# Patient Record
Sex: FEMALE | Race: WHITE | NOT HISPANIC OR LATINO | Employment: OTHER | URBAN - NONMETROPOLITAN AREA
[De-identification: names, ages, dates, MRNs, and addresses within clinical notes are randomized per-mention and may not be internally consistent; named-entity substitution may affect disease eponyms.]

---

## 2017-01-04 ENCOUNTER — LAB (OUTPATIENT)
Dept: LAB | Facility: HOSPITAL | Age: 57
End: 2017-01-04
Attending: INTERNAL MEDICINE

## 2017-01-04 ENCOUNTER — OFFICE VISIT (OUTPATIENT)
Dept: GASTROENTEROLOGY | Facility: CLINIC | Age: 57
End: 2017-01-04

## 2017-01-04 VITALS
DIASTOLIC BLOOD PRESSURE: 70 MMHG | BODY MASS INDEX: 28.17 KG/M2 | WEIGHT: 165 LBS | HEIGHT: 64 IN | HEART RATE: 74 BPM | TEMPERATURE: 97 F | SYSTOLIC BLOOD PRESSURE: 126 MMHG

## 2017-01-04 DIAGNOSIS — K74.3 PRIMARY BILIARY CHOLANGITIS (HCC): ICD-10-CM

## 2017-01-04 DIAGNOSIS — K74.3 PRIMARY BILIARY CHOLANGITIS (HCC): Primary | ICD-10-CM

## 2017-01-04 DIAGNOSIS — K21.00 GASTROESOPHAGEAL REFLUX DISEASE WITH ESOPHAGITIS: ICD-10-CM

## 2017-01-04 LAB
ALBUMIN SERPL-MCNC: 3.8 G/DL (ref 3.5–5)
ALBUMIN/GLOB SERPL: 1 G/DL (ref 1.1–2.5)
ALP SERPL-CCNC: 131 U/L (ref 24–120)
ALT SERPL W P-5'-P-CCNC: 35 U/L (ref 0–54)
ANION GAP SERPL CALCULATED.3IONS-SCNC: 12 MMOL/L (ref 4–13)
AST SERPL-CCNC: 35 U/L (ref 7–45)
BILIRUB SERPL-MCNC: 0.4 MG/DL (ref 0.1–1)
BUN BLD-MCNC: 10 MG/DL (ref 5–21)
BUN/CREAT SERPL: 16.4 (ref 7–25)
CALCIUM SPEC-SCNC: 8.8 MG/DL (ref 8.4–10.4)
CHLORIDE SERPL-SCNC: 99 MMOL/L (ref 98–110)
CO2 SERPL-SCNC: 33 MMOL/L (ref 24–31)
CREAT BLD-MCNC: 0.61 MG/DL (ref 0.5–1.4)
GFR SERPL CREATININE-BSD FRML MDRD: 101 ML/MIN/1.73
GLOBULIN UR ELPH-MCNC: 3.9 GM/DL
GLUCOSE BLD-MCNC: 99 MG/DL (ref 70–100)
POTASSIUM BLD-SCNC: 3.7 MMOL/L (ref 3.5–5.3)
PROT SERPL-MCNC: 7.7 G/DL (ref 6.3–8.7)
SODIUM BLD-SCNC: 144 MMOL/L (ref 135–145)

## 2017-01-04 PROCEDURE — 36415 COLL VENOUS BLD VENIPUNCTURE: CPT

## 2017-01-04 PROCEDURE — 99214 OFFICE O/P EST MOD 30 MIN: CPT | Performed by: INTERNAL MEDICINE

## 2017-01-04 PROCEDURE — 80053 COMPREHEN METABOLIC PANEL: CPT | Performed by: INTERNAL MEDICINE

## 2017-01-04 RX ORDER — OMEPRAZOLE 40 MG/1
40 CAPSULE, DELAYED RELEASE ORAL 2 TIMES DAILY
COMMUNITY
End: 2018-02-08 | Stop reason: SDUPTHER

## 2017-01-04 NOTE — PROGRESS NOTES
Chief Complaint   Patient presents with   • GI Problem     had endo still having problems coughing needs refill of ursodiol tid        Subjective     HPI     Hx of PBC.  No recent lab work.  Currently maintained on Ursodiol tid.  No abdominal pain.  No changes in bowel habit.  No hematochezia or melena. No N/V.  Pt denies any further complaints at this time    Continues to experience difficulty coughing.  She cough daily.  Cough is dry  She is taking Omeprazole 40 mg bid in morning and at bedtime.  EGD in 8/2016 showed Grade A esophagitis and normal duodenum.    Past Medical History   Diagnosis Date   • Arthritis        Past Surgical History   Procedure Laterality Date   • Cholecystectomy         Outpatient Prescriptions Marked as Taking for the 1/4/17 encounter (Office Visit) with Michael Landin, DO   Medication Sig Dispense Refill   • Flaxseed, Linseed, (FLAX SEED OIL PO) Take  by mouth.     • Multiple Vitamin (MULTI VITAMIN PO) Take  by mouth.     • omeprazole (priLOSEC) 40 MG capsule Take 40 mg by mouth Daily.     • venlafaxine (EFFEXOR) 75 MG tablet Take 75 mg by mouth 2 (Two) Times a Day.     • vitamin C (ASCORBIC ACID) 500 MG tablet Take 500 mg by mouth Daily.         No Known Allergies    Social History     Social History   • Marital status:      Spouse name: N/A   • Number of children: N/A   • Years of education: N/A     Occupational History   • Not on file.     Social History Main Topics   • Smoking status: Never Smoker   • Smokeless tobacco: Not on file   • Alcohol use No   • Drug use: No   • Sexual activity: Not on file     Other Topics Concern   • Not on file     Social History Narrative       Family History   Problem Relation Age of Onset   • Cirrhosis Sister    • Liver cancer Brother        Review of Systems   Constitutional: Negative for fatigue, fever and unexpected weight change.   HENT: Negative for hearing loss, sore throat and voice change.    Eyes: Negative for visual disturbance.  "  Respiratory: Negative for cough, shortness of breath and wheezing.    Cardiovascular: Negative for chest pain and palpitations.   Gastrointestinal: Negative for abdominal pain, blood in stool and vomiting.   Endocrine: Negative for polydipsia and polyuria.   Genitourinary: Negative for difficulty urinating, dysuria, hematuria and urgency.   Musculoskeletal: Negative for joint swelling and myalgias.   Skin: Negative for color change, rash and wound.   Neurological: Negative for dizziness, tremors, seizures and syncope.   Hematological: Does not bruise/bleed easily.   Psychiatric/Behavioral: Negative for agitation and confusion. The patient is not nervous/anxious.        Objective     Vitals:    01/04/17 1345   BP: 126/70   Pulse: 74   Temp: 97 °F (36.1 °C)   Weight: 165 lb (74.8 kg)   Height: 64\" (162.6 cm)     Body mass index is 28.32 kg/(m^2).    Physical Exam   Constitutional: She is oriented to person, place, and time. She appears well-developed and well-nourished.   HENT:   Head: Normocephalic and atraumatic.   Eyes: Conjunctivae are normal. Pupils are equal, round, and reactive to light. No scleral icterus.   Neck: No JVD present. No thyroid mass and no thyromegaly present.   Cardiovascular: Normal rate, regular rhythm and normal heart sounds.  Exam reveals no gallop and no friction rub.    No murmur heard.  Pulmonary/Chest: Effort normal and breath sounds normal. No accessory muscle usage. No respiratory distress. She has no wheezes. She has no rales.   Abdominal: Soft. Bowel sounds are normal. She exhibits no distension, no ascites and no mass. There is no splenomegaly or hepatomegaly. There is no tenderness. There is no rebound and no guarding.   Genitourinary:   Genitourinary Comments: Rectal-Did not examine   Musculoskeletal: Normal range of motion. She exhibits no edema.   Neurological: She is alert and oriented to person, place, and time.   Deemed a reliable historian, able to converse without " difficulty and able to move all extremities without difficulty   Skin: Skin is warm and dry.   Psychiatric: She has a normal mood and affect. Her behavior is normal.       Imaging Results (most recent)     None          Assessment/Plan     Elaine was seen today for gi problem.    Diagnoses and all orders for this visit:    Primary biliary cholangitis  -     Comprehensive Metabolic Panel  -     Comprehensive Metabolic Panel; Future    Gastroesophageal reflux disease with esophagitis          May consider change in medication if labs are not improved  Will review medication dose after CMP reviewed  CMP in 6 months,   Take Omeprazole in morning 20-30 minutes prior to breakfast and before evening meal due to cough not improving with use at [facility].  She is to call with progress  * Surgery not found *    Patient Instructions

## 2017-01-04 NOTE — MR AVS SNAPSHOT
Elaine Juárez   1/4/2017 1:30 PM   Office Visit    Dept Phone:  [phone]   Encounter #:  [id number]    Provider:  Michael Landin DO   Department:  [facility]                Your Full Care Plan              Your Updated Medication List          This list is accurate as of: 1/4/17  2:18 PM.  Always use your most recent med list.                FLAX SEED OIL PO       MULTI VITAMIN PO       omeprazole 40 MG capsule   Commonly known as:  priLOSEC       ursodiol 300 MG capsule   Commonly known as:  ACTIGALL       venlafaxine 75 MG tablet   Commonly known as:  EFFEXOR       vitamin C 500 MG tablet   Commonly known as:  ASCORBIC ACID               We Performed the Following     Comprehensive Metabolic Panel       You Were Diagnosed With        Codes Comments    Primary biliary cholangitis    -  Primary ICD-10-CM: K74.3  ICD-9-CM: 571.6     Gastroesophageal reflux disease with esophagitis     ICD-10-CM: K21.0  ICD-9-CM: 530.11       Instructions     None    Patient Instructions History      Upcoming Appointments     Visit Type Date Time Department    OFFICE VISIT 1/4/2017  1:30 PM [facility]    OFFICE VISIT 7/10/2017  1:45 PM [facility]      MyChart Signup     Our records indicate that you have declined [employer] Inchart signup. If you would like to sign up for [employer], please email [email or web address] or call [phone] to obtain an activation code.             Other Info from Your Visit           Your Appointments     Jul 10, 2017  1:45 PM CDT   Office Visit with Michael Landin DO   [facility] (--)    [address] [place] [id number] [phone]           Arrive 15 minutes prior to appointment.              Allergies     No Known Allergies      Reason for Visit     GI Problem had endo still having problems coughing needs refill of ursodiol tid       Vital Signs     Blood  "Pressure Pulse Temperature Height Weight Body Mass Index    126/70 74 97 °F (36.1 °C) 64\" (162.6 cm) 165 lb (74.8 kg) 28.32 kg/m2    Smoking Status                   Never Smoker           Problems and Diagnoses Noted     Primary biliary cholangitis    -  Primary    Inflammation of the esophagus            "

## 2017-01-21 ENCOUNTER — APPOINTMENT (OUTPATIENT)
Dept: CT IMAGING | Facility: HOSPITAL | Age: 57
End: 2017-01-21

## 2017-01-21 ENCOUNTER — HOSPITAL ENCOUNTER (INPATIENT)
Facility: HOSPITAL | Age: 57
LOS: 2 days | Discharge: HOME | End: 2017-01-23
Attending: EMERGENCY MEDICINE | Admitting: INTERNAL MEDICINE

## 2017-01-21 ENCOUNTER — APPOINTMENT (OUTPATIENT)
Dept: GENERAL RADIOLOGY | Facility: HOSPITAL | Age: 57
End: 2017-01-21

## 2017-01-21 DIAGNOSIS — J18.9 PNEUMONIA OF BOTH LOWER LOBES DUE TO INFECTIOUS ORGANISM: Primary | ICD-10-CM

## 2017-01-21 LAB
ALBUMIN SERPL-MCNC: 4 G/DL (ref 3.5–5)
ALBUMIN/GLOB SERPL: 0.9 G/DL (ref 1.1–2.5)
ALP SERPL-CCNC: 211 U/L (ref 24–120)
ALT SERPL W P-5'-P-CCNC: 28 U/L (ref 0–54)
ANION GAP SERPL CALCULATED.3IONS-SCNC: 10 MMOL/L (ref 4–13)
ARTERIAL PATENCY WRIST A: ABNORMAL
AST SERPL-CCNC: 42 U/L (ref 7–45)
ATMOSPHERIC PRESS: ABNORMAL MMHG
BASE EXCESS BLDA CALC-SCNC: 3.4 MMOL/L (ref -2–2)
BASOPHILS # BLD AUTO: 0.05 10*3/MM3 (ref 0–0.2)
BASOPHILS NFR BLD AUTO: 0.6 % (ref 0–2)
BDY SITE: ABNORMAL
BILIRUB SERPL-MCNC: 0.5 MG/DL (ref 0.1–1)
BUN BLD-MCNC: 13 MG/DL (ref 5–21)
BUN/CREAT SERPL: 19.1 (ref 7–25)
CALCIUM SPEC-SCNC: 9 MG/DL (ref 8.4–10.4)
CHLORIDE SERPL-SCNC: 101 MMOL/L (ref 98–110)
CO2 SERPL-SCNC: 30 MMOL/L (ref 24–31)
CREAT BLD-MCNC: 0.68 MG/DL (ref 0.5–1.4)
D DIMER PPP FEU-MCNC: 1.92 MG/L (FEU) (ref 0–0.5)
DEPRECATED RDW RBC AUTO: 43.1 FL (ref 40–54)
EOSINOPHIL # BLD AUTO: 0.41 10*3/MM3 (ref 0–0.7)
EOSINOPHIL NFR BLD AUTO: 5.3 % (ref 0–4)
ERYTHROCYTE [DISTWIDTH] IN BLOOD BY AUTOMATED COUNT: 13.9 % (ref 12–15)
GFR SERPL CREATININE-BSD FRML MDRD: 89 ML/MIN/1.73
GLOBULIN UR ELPH-MCNC: 4.3 GM/DL
GLUCOSE BLD-MCNC: 102 MG/DL (ref 70–100)
HCO3 BLDA-SCNC: 27.6 MMOL/L (ref 22–26)
HCT VFR BLD AUTO: 41.3 % (ref 37–47)
HGB BLD-MCNC: 13.5 G/DL (ref 12–16)
HOLD SPECIMEN: NORMAL
HOLD SPECIMEN: NORMAL
IMM GRANULOCYTES # BLD: 0.02 10*3/MM3 (ref 0–0.03)
IMM GRANULOCYTES NFR BLD: 0.3 % (ref 0–5)
LYMPHOCYTES # BLD AUTO: 2.04 10*3/MM3 (ref 0.72–4.86)
LYMPHOCYTES NFR BLD AUTO: 26.2 % (ref 15–45)
MCH RBC QN AUTO: 27.8 PG (ref 28–32)
MCHC RBC AUTO-ENTMCNC: 32.7 G/DL (ref 33–36)
MCV RBC AUTO: 85.2 FL (ref 82–98)
MODALITY: ABNORMAL
MONOCYTES # BLD AUTO: 0.66 10*3/MM3 (ref 0.19–1.3)
MONOCYTES NFR BLD AUTO: 8.5 % (ref 4–12)
NEUTROPHILS # BLD AUTO: 4.6 10*3/MM3 (ref 1.87–8.4)
NEUTROPHILS NFR BLD AUTO: 59.1 % (ref 39–78)
NRBC BLD MANUAL-RTO: 0 /100 WBC (ref 0–0)
NT-PROBNP SERPL-MCNC: 56.1 PG/ML (ref 0–900)
PCO2 BLDA: 40.6 MM HG (ref 35–45)
PH BLDA: 7.45 PH UNITS (ref 7.35–7.45)
PLATELET # BLD AUTO: 335 10*3/MM3 (ref 130–400)
PMV BLD AUTO: 11.4 FL (ref 6–12)
PO2 BLDA: 75.6 MM HG (ref 80–100)
POTASSIUM BLD-SCNC: 4.2 MMOL/L (ref 3.5–5.3)
PROT SERPL-MCNC: 8.3 G/DL (ref 6.3–8.7)
RBC # BLD AUTO: 4.85 10*6/MM3 (ref 4.2–5.4)
SAO2 % BLDCOA: 95.7 % (ref 94–100)
SAO2 % BLDCOA: 95.7 % (ref 94–100)
SODIUM BLD-SCNC: 141 MMOL/L (ref 135–145)
TROPONIN I SERPL-MCNC: <0.012 NG/ML (ref 0–0.03)
WBC NRBC COR # BLD: 7.78 10*3/MM3 (ref 4.8–10.8)
WHOLE BLOOD HOLD SPECIMEN: NORMAL
WHOLE BLOOD HOLD SPECIMEN: NORMAL

## 2017-01-21 PROCEDURE — 25010000002 METHYLPREDNISOLONE PER 125 MG: Performed by: EMERGENCY MEDICINE

## 2017-01-21 PROCEDURE — 25010000002 CEFTRIAXONE: Performed by: FAMILY MEDICINE

## 2017-01-21 PROCEDURE — 93005 ELECTROCARDIOGRAM TRACING: CPT | Performed by: EMERGENCY MEDICINE

## 2017-01-21 PROCEDURE — 82803 BLOOD GASES ANY COMBINATION: CPT

## 2017-01-21 PROCEDURE — 93010 ELECTROCARDIOGRAM REPORT: CPT | Performed by: INTERNAL MEDICINE

## 2017-01-21 PROCEDURE — 87040 BLOOD CULTURE FOR BACTERIA: CPT | Performed by: EMERGENCY MEDICINE

## 2017-01-21 PROCEDURE — 99284 EMERGENCY DEPT VISIT MOD MDM: CPT

## 2017-01-21 PROCEDURE — 85379 FIBRIN DEGRADATION QUANT: CPT | Performed by: EMERGENCY MEDICINE

## 2017-01-21 PROCEDURE — 84484 ASSAY OF TROPONIN QUANT: CPT | Performed by: EMERGENCY MEDICINE

## 2017-01-21 PROCEDURE — 80053 COMPREHEN METABOLIC PANEL: CPT | Performed by: EMERGENCY MEDICINE

## 2017-01-21 PROCEDURE — 25010000002 LEVOFLOXACIN PER 250 MG: Performed by: EMERGENCY MEDICINE

## 2017-01-21 PROCEDURE — 94760 N-INVAS EAR/PLS OXIMETRY 1: CPT

## 2017-01-21 PROCEDURE — 0 IOPAMIDOL PER 1 ML: Performed by: EMERGENCY MEDICINE

## 2017-01-21 PROCEDURE — 94799 UNLISTED PULMONARY SVC/PX: CPT

## 2017-01-21 PROCEDURE — 71275 CT ANGIOGRAPHY CHEST: CPT

## 2017-01-21 PROCEDURE — 85025 COMPLETE CBC W/AUTO DIFF WBC: CPT | Performed by: EMERGENCY MEDICINE

## 2017-01-21 PROCEDURE — 71020 HC CHEST PA AND LATERAL: CPT

## 2017-01-21 PROCEDURE — 94640 AIRWAY INHALATION TREATMENT: CPT

## 2017-01-21 PROCEDURE — 25010000002 METHYLPREDNISOLONE PER 125 MG: Performed by: FAMILY MEDICINE

## 2017-01-21 PROCEDURE — 83880 ASSAY OF NATRIURETIC PEPTIDE: CPT | Performed by: EMERGENCY MEDICINE

## 2017-01-21 PROCEDURE — 36600 WITHDRAWAL OF ARTERIAL BLOOD: CPT

## 2017-01-21 RX ORDER — NALOXONE HCL 0.4 MG/ML
0.4 VIAL (ML) INJECTION
Status: DISCONTINUED | OUTPATIENT
Start: 2017-01-21 | End: 2017-01-23 | Stop reason: HOSPADM

## 2017-01-21 RX ORDER — MAGNESIUM HYDROXIDE/ALUMINUM HYDROXICE/SIMETHICONE 120; 1200; 1200 MG/30ML; MG/30ML; MG/30ML
30 SUSPENSION ORAL EVERY 6 HOURS PRN
Status: DISCONTINUED | OUTPATIENT
Start: 2017-01-21 | End: 2017-01-23 | Stop reason: HOSPADM

## 2017-01-21 RX ORDER — MORPHINE SULFATE 2 MG/ML
1 INJECTION, SOLUTION INTRAMUSCULAR; INTRAVENOUS EVERY 4 HOURS PRN
Status: DISCONTINUED | OUTPATIENT
Start: 2017-01-21 | End: 2017-01-23 | Stop reason: HOSPADM

## 2017-01-21 RX ORDER — PANTOPRAZOLE SODIUM 40 MG/1
40 TABLET, DELAYED RELEASE ORAL
Status: DISCONTINUED | OUTPATIENT
Start: 2017-01-22 | End: 2017-01-23 | Stop reason: HOSPADM

## 2017-01-21 RX ORDER — URSODIOL 300 MG/1
300 CAPSULE ORAL 2 TIMES DAILY
Status: DISCONTINUED | OUTPATIENT
Start: 2017-01-22 | End: 2017-01-23 | Stop reason: HOSPADM

## 2017-01-21 RX ORDER — ONDANSETRON 2 MG/ML
4 INJECTION INTRAMUSCULAR; INTRAVENOUS EVERY 6 HOURS PRN
Status: DISCONTINUED | OUTPATIENT
Start: 2017-01-21 | End: 2017-01-23 | Stop reason: HOSPADM

## 2017-01-21 RX ORDER — ACETAMINOPHEN 325 MG/1
650 TABLET ORAL EVERY 4 HOURS PRN
Status: DISCONTINUED | OUTPATIENT
Start: 2017-01-21 | End: 2017-01-23 | Stop reason: HOSPADM

## 2017-01-21 RX ORDER — ALBUTEROL SULFATE 2.5 MG/3ML
2.5 SOLUTION RESPIRATORY (INHALATION)
Status: COMPLETED | OUTPATIENT
Start: 2017-01-21 | End: 2017-01-21

## 2017-01-21 RX ORDER — ALBUTEROL SULFATE 2.5 MG/3ML
2.5 SOLUTION RESPIRATORY (INHALATION)
Status: DISCONTINUED | OUTPATIENT
Start: 2017-01-21 | End: 2017-01-22 | Stop reason: ALTCHOICE

## 2017-01-21 RX ORDER — METHYLPREDNISOLONE SODIUM SUCCINATE 125 MG/2ML
60 INJECTION, POWDER, LYOPHILIZED, FOR SOLUTION INTRAMUSCULAR; INTRAVENOUS EVERY 6 HOURS SCHEDULED
Status: DISCONTINUED | OUTPATIENT
Start: 2017-01-21 | End: 2017-01-22

## 2017-01-21 RX ORDER — VENLAFAXINE 37.5 MG/1
75 TABLET ORAL 2 TIMES DAILY
Status: DISCONTINUED | OUTPATIENT
Start: 2017-01-22 | End: 2017-01-23 | Stop reason: HOSPADM

## 2017-01-21 RX ORDER — SODIUM CHLORIDE 0.9 % (FLUSH) 0.9 %
1-10 SYRINGE (ML) INJECTION AS NEEDED
Status: DISCONTINUED | OUTPATIENT
Start: 2017-01-21 | End: 2017-01-23 | Stop reason: HOSPADM

## 2017-01-21 RX ORDER — TEMAZEPAM 7.5 MG/1
7.5 CAPSULE ORAL NIGHTLY PRN
Status: DISCONTINUED | OUTPATIENT
Start: 2017-01-21 | End: 2017-01-22

## 2017-01-21 RX ORDER — HYDROCODONE BITARTRATE AND ACETAMINOPHEN 5; 325 MG/1; MG/1
1 TABLET ORAL EVERY 4 HOURS PRN
Status: DISCONTINUED | OUTPATIENT
Start: 2017-01-21 | End: 2017-01-23 | Stop reason: HOSPADM

## 2017-01-21 RX ORDER — METHYLPREDNISOLONE SODIUM SUCCINATE 125 MG/2ML
125 INJECTION, POWDER, LYOPHILIZED, FOR SOLUTION INTRAMUSCULAR; INTRAVENOUS ONCE
Status: COMPLETED | OUTPATIENT
Start: 2017-01-21 | End: 2017-01-21

## 2017-01-21 RX ORDER — LEVOFLOXACIN 5 MG/ML
750 INJECTION, SOLUTION INTRAVENOUS ONCE
Status: COMPLETED | OUTPATIENT
Start: 2017-01-21 | End: 2017-01-21

## 2017-01-21 RX ORDER — SODIUM CHLORIDE 0.9 % (FLUSH) 0.9 %
10 SYRINGE (ML) INJECTION AS NEEDED
Status: DISCONTINUED | OUTPATIENT
Start: 2017-01-21 | End: 2017-01-23 | Stop reason: HOSPADM

## 2017-01-21 RX ORDER — ASCORBIC ACID 500 MG
500 TABLET ORAL DAILY
Status: DISCONTINUED | OUTPATIENT
Start: 2017-01-22 | End: 2017-01-23 | Stop reason: HOSPADM

## 2017-01-21 RX ORDER — LORAZEPAM 1 MG/1
1 TABLET ORAL EVERY 6 HOURS PRN
Status: DISCONTINUED | OUTPATIENT
Start: 2017-01-21 | End: 2017-01-23 | Stop reason: HOSPADM

## 2017-01-21 RX ADMIN — TEMAZEPAM 7.5 MG: 7.5 CAPSULE ORAL at 23:42

## 2017-01-21 RX ADMIN — LEVOFLOXACIN 750 MG: 5 INJECTION, SOLUTION INTRAVENOUS at 16:48

## 2017-01-21 RX ADMIN — METHYLPREDNISOLONE SODIUM SUCCINATE 125 MG: 125 INJECTION, POWDER, FOR SOLUTION INTRAMUSCULAR; INTRAVENOUS at 16:42

## 2017-01-21 RX ADMIN — IOPAMIDOL 89 ML: 755 INJECTION, SOLUTION INTRAVENOUS at 17:46

## 2017-01-21 RX ADMIN — ALBUTEROL SULFATE 2.5 MG: 2.5 SOLUTION RESPIRATORY (INHALATION) at 16:26

## 2017-01-21 RX ADMIN — CEFTRIAXONE 1 G: 1 INJECTION, POWDER, FOR SOLUTION INTRAMUSCULAR; INTRAVENOUS at 22:59

## 2017-01-21 RX ADMIN — ALBUTEROL SULFATE 2.5 MG: 2.5 SOLUTION RESPIRATORY (INHALATION) at 16:29

## 2017-01-21 RX ADMIN — METHYLPREDNISOLONE SODIUM SUCCINATE 60 MG: 125 INJECTION, POWDER, FOR SOLUTION INTRAMUSCULAR; INTRAVENOUS at 23:00

## 2017-01-21 NOTE — IP AVS SNAPSHOT
AFTER VISIT SUMMARY             Elaine Juárez           About your hospitalization     You were admitted on:  January 21, 2017 You last received care in the:  [address]       Procedures & Surgeries         Medications    If you or your caregiver advised us that you are currently taking a medication and that medication is marked below as “Resume”, this simply indicates that we have reviewed those medications to make sure our new therapy recommendations do not interfere.  If you have concerns about medications other than those new ones which we are prescribing today, please consult the physician who prescribed them (or your primary physician).  Our review of your home medications is not meant to indicate that we are directing their use.             Your Medications      START taking these medications     cefdinir 300 MG capsule   Take 1 capsule by mouth Every 12 (Twelve) Hours. Indications: Pneumonia   Last time this was given:  1/23/2017 11:40 AM   Commonly known as:  OMNICEF           HYDROcodone-acetaminophen 5-325 MG per tablet   Take 1 tablet by mouth Every 4 (Four) Hours As Needed for moderate pain (4-6) for up to 8 days.   Last time this was given:  1/22/2017  9:18 PM   Commonly known as:  NORCO           Influenza Vac Subunit Quad 0.5 ML suspension prefilled syringe injection   Inject 0.5 mL into the shoulder, thigh, or buttocks 1 (One) Time for 1 dose.   Commonly known as:  FLUCELVAX           pneumococcal polysaccharide 23-valent 25 MCG/0.5ML vaccine   Inject 0.5 mL into the shoulder, thigh, or buttocks 1 (One) Time for 1 dose.   Commonly known as:  PNEUMOVAX-23           predniSONE 20 MG tablet   40 mg daily x 3 days then 30 mg daily x 3 days then 20 mg daily x 3 days then 10 mg daily x 3 days then stop   Commonly known as:  DELTASONE   Notes to Patient:  Take as directed on packaging             CONTINUE taking these medications     FLAX SEED OIL PO   Take  by mouth.           MULTI  VITAMIN PO   Take  by mouth.           omeprazole 40 MG capsule   Take 40 mg by mouth Daily.   Commonly known as:  priLOSEC           ursodiol 300 MG capsule   Take 300 mg by mouth 2 (Two) Times a Day.   Last time this was given:  1/23/2017  5:01 PM   Commonly known as:  ACTIGALL           venlafaxine 75 MG tablet   Take 75 mg by mouth 2 (Two) Times a Day.   Last time this was given:  1/23/2017  8:52 AM   Commonly known as:  EFFEXOR           vitamin C 500 MG tablet   Take 500 mg by mouth Daily.   Last time this was given:  1/23/2017  8:52 AM   Commonly known as:  ASCORBIC ACID                Where to Get Your Medications      These medications were sent to [facility] [address] [facility], [place] - [address] AT [facility] [address] - [phone] [facility] [phone] FX  [address], [facility] [address]    Hours:  24-hours Phone:  [phone]     cefdinir 300 MG capsule    Influenza Vac Subunit Quad 0.5 ML suspension prefilled syringe injection    pneumococcal polysaccharide 23-valent 25 MCG/0.5ML vaccine    predniSONE 20 MG tablet         You can get these medications from any pharmacy     Bring a paper prescription for each of these medications     HYDROcodone-acetaminophen 5-325 MG per tablet                  Your Medications      Your Medication List           Morning Noon Evening Bedtime As Needed    cefdinir 300 MG capsule   Take 1 capsule by mouth Every 12 (Twelve) Hours. Indications: Pneumonia   Commonly known as:  OMNICEF                                      FLAX SEED OIL PO   Take  by mouth.                                   HYDROcodone-acetaminophen 5-325 MG per tablet   Take 1 tablet by mouth Every 4 (Four) Hours As Needed for moderate pain (4-6) for up to 8 days.   Commonly known as:  NORCO                                   Influenza Vac Subunit Quad 0.5 ML suspension prefilled syringe injection   Inject 0.5 mL into the shoulder, thigh, or buttocks 1 (One) Time for 1 dose.    Commonly known as:  FLUCELVAX                                MULTI VITAMIN PO   Take  by mouth.                                   omeprazole 40 MG capsule   Take 40 mg by mouth Daily.   Commonly known as:  priLOSEC                                   pneumococcal polysaccharide 23-valent 25 MCG/0.5ML vaccine   Inject 0.5 mL into the shoulder, thigh, or buttocks 1 (One) Time for 1 dose.   Commonly known as:  PNEUMOVAX-23                                predniSONE 20 MG tablet   40 mg daily x 3 days then 30 mg daily x 3 days then 20 mg daily x 3 days then 10 mg daily x 3 days then stop   Commonly known as:  DELTASONE   Notes to Patient:  Take as directed on packaging                                ursodiol 300 MG capsule   Take 300 mg by mouth 2 (Two) Times a Day.   Commonly known as:  ACTIGALL                                      venlafaxine 75 MG tablet   Take 75 mg by mouth 2 (Two) Times a Day.   Commonly known as:  EFFEXOR                                      vitamin C 500 MG tablet   Take 500 mg by mouth Daily.   Commonly known as:  ASCORBIC ACID                                            Instructions for After Discharge        Activity Instructions     Activity as Tolerated                 Diet Instructions     Diet: Regular; Thin Liquids, No Restrictions       Discharge Diet:  Regular   Fluid Consistency:  Thin Liquids, No Restrictions             Discharge References/Attachments     PNEUMONIA, ADULT, EASY-TO-READ (ENGLISH)    CEFDINIR CAPSULES (ENGLISH)    ACETAMINOPHEN; HYDROCODONE TABLETS OR CAPSULES (ENGLISH)    PREDNISONE TABLETS (ENGLISH)       Follow-ups for After Discharge        Follow-up Information     Follow up with Aaron Carmona MD Follow up in 6 week(s).    Specialty:  Pulmonary Disease    Why:  with complete PFT's    Contact information:    [facility] [id number] [phone]          Follow up with Aaron Stoddard MD. Schedule an appointment as soon as possible for a visit  in 1 week(s).    Specialty:  Family Medicine    Contact information:    [address] [place] [address] [address]  [place] [id number] [phone]        Referrals and Follow-ups to Schedule     Follow-Up    As directed    Follow Up Details:  Dr. Rinney - in 1 wk; Dr. jacobs per his recommendation; Dr. Landry per her recommendation             Scheduled Appointments     Jul 10, 2017  1:45 PM CDT   Office Visit with Michael Landin DO   [facility] GASTROENTEROLOGY (--)    [address] [place] [address] [address]  [place] [address] [phone]           Arrive 15 minutes prior to appointment.              MyChart Signup     Our records indicate that you have declined [facility] signup. If you would like to sign up for [employer], please email [email or web address] or call [phone] to obtain an activation code.         Summary of Your Hospitalization        Reason for Hospitalization     Your primary diagnosis was:  Not on File    Your diagnoses also included:  Pneumonia Of Both Lower Lobes Due To Infectious Organism, Cough, Sob (Shortness Of Breath), Crest Syndrome, Raynaud's Phenomenon, Pulmonary Fibrosis      Care Providers     Provider Service Role Specialty    Peng Denson MD -- Attending Provider Hospitalist    Antonia Landry MD -- Consulting Physician  Rheumatology      Your Allergies  Date Reviewed: 1/21/2017    No active allergies      Pending Labs     Order Current Status    SANGEETHA In process    Anti-Centromere B Antibodies In process    Anti-DNA Antibody, Double-stranded In process    Anti-Dasia 1 Antibody, IgG In process    Anti-Smith Antibody In process    Anti-scleroderma Antibody In process    RNP Antibodies In process    Sjogrens Syndrome-A Extractable Nuclear Antibody In process    Sjogrens Syndrome-B Extractable Nuclear Antibody In process    Blood Culture Preliminary result    Blood Culture Preliminary result    Respiratory Culture Preliminary result      Patient Belongings  Returned     Document Return of Belongings Flowsheet     Were the patient bedside belongings sent home?   Yes   Belongings Retrieved from Security & Sent Home   N/A    Belongings Sent to Safe   --   Medications Retrieved from Pharmacy & Sent Home   N/A              More Information      Community-Acquired Pneumonia, Adult  Pneumonia is an infection of the lungs. One type of pneumonia can happen while a person is in a hospital. A different type can happen when a person is not in a hospital (community-acquired pneumonia). It is easy for this kind to spread from person to person. It can spread to you if you breathe near an infected person who coughs or sneezes. Some symptoms include:  · A dry cough.  · A wet (productive) cough.  · Fever.  · Sweating.  · Chest pain.  HOME CARE  · Take over-the-counter and prescription medicines only as told by your doctor.    Only take cough medicine if you are losing sleep.    If you were prescribed an antibiotic medicine, take it as told by your doctor. Do not stop taking the antibiotic even if you start to feel better.  · Sleep with your head and neck raised (elevated). You can do this by putting a few pillows under your head, or you can sleep in a recliner.  · Do not use tobacco products. These include cigarettes, chewing tobacco, and e-cigarettes. If you need help quitting, ask your doctor.  · Drink enough water to keep your pee (urine) clear or pale yellow.  A shot (vaccine) can help prevent pneumonia. Shots are often suggested for:  · People older than 65 years of age.  · People older than 19 years of age:    Who are having cancer treatment.    Who have long-term (chronic) lung disease.    Who have problems with their body's defense system (immune system).  You may also prevent pneumonia if you take these actions:  · Get the flu (influenza) shot every year.  · Go to the dentist as often as told.  · Wash your hands often. If soap and water are not available, use hand  .  GET HELP IF:  · You have a fever.  · You lose sleep because your cough medicine does not help.  GET HELP RIGHT AWAY IF:  · You are short of breath and it gets worse.  · You have more chest pain.  · Your sickness gets worse. This is very serious if:    You are an older adult.    Your body's defense system is weak.  · You cough up blood.     This information is not intended to replace advice given to you by your health care provider. Make sure you discuss any questions you have with your health care provider.     Document Released: 06/05/2009 Document Revised: 09/07/2016 Document Reviewed: 04/13/2016  [employer] Patient Education ©2016 Elsevier Inc.          Cefdinir capsules  What is this medicine?  CEFDINIR (SEF di ner) is a cephalosporin antibiotic. It is used to treat certain kinds of bacterial infections. It will not work for colds, flu, or other viral infections.  This medicine may be used for other purposes; ask your health care provider or pharmacist if you have questions.  What should I tell my health care provider before I take this medicine?  They need to know if you have any of these conditions:  -bleeding problems  -kidney disease  -stomach or intestine problems (especially colitis)  -an unusual or allergic reaction to cefdinir, other cephalosporin antibiotics, penicillin, penicillamine, other foods, dyes or preservatives  -pregnant or trying to get pregnant  -breast-feeding  How should I use this medicine?  Take this medicine by mouth. Swallow it with a drink of water. Follow the directions on the prescription label. You can take it with or without food. If it upsets your stomach it may help to take it with food. Take your doses at regular intervals. Do not take it more often than directed. Finish all the medicine you are prescribed even if you think your infection is better.  Talk to your pediatrician regarding the use of this medicine in children. Special care may be  needed.  Overdosage: If you think you have taken too much of this medicine contact a poison control center or emergency room at once.  NOTE: This medicine is only for you. Do not share this medicine with others.  What if I miss a dose?  If you miss a dose, take it as soon as you can. If it is almost time for your next dose, take only that dose. Do not take double or extra doses.  What may interact with this medicine?  -antacids that contain aluminum or magnesium  -iron supplements  -other antibiotics  -probenecid  This list may not describe all possible interactions. Give your health care provider a list of all the medicines, herbs, non-prescription drugs, or dietary supplements you use. Also tell them if you smoke, drink alcohol, or use illegal drugs. Some items may interact with your medicine.  What should I watch for while using this medicine?  Tell your doctor or health care professional if your symptoms do not get better in a few days.  If you are diabetic you may get a false-positive result for sugar in your urine. Check with your doctor or health care professional before you change your diet or the dose of your diabetes medicine.  What side effects may I notice from receiving this medicine?  Side effects that you should report to your doctor or health care professional as soon as possible:  -allergic reactions like skin rash, itching or hives, swelling of the face, lips, or tongue  -breathing problems  -fever or chills  -redness, blistering, peeling or loosening of the skin, including inside the mouth  -seizures  -severe or watery diarrhea  -sore throat  -swollen joints  -trouble passing urine or change in the amount of urine  -unusual bleeding or bruising  -unusually weak or tired  Side effects that usually do not require medical attention (report to your doctor or health care professional if they continue or are bothersome):  -constipation  -dizziness  -gas or heartburn  -headache  -loss of  appetite  -nausea or vomiting  -stomach pain  -stool discoloration  -vaginal itching  This list may not describe all possible side effects. Call your doctor for medical advice about side effects. You may report side effects to FDA at 0-123-FDA-8358.  Where should I keep my medicine?  Keep out of the reach of children.  Store at room temperature between 15 and 30 degrees C (59 and 86 degrees F). Throw the medicine away after the expiration date.  NOTE: This sheet is a summary. It may not cover all possible information. If you have questions about this medicine, talk to your doctor, pharmacist, or health care provider.     © 2016, Elsevier/Gold Standard. (2009-02-27 16:02:53)          Acetaminophen; Hydrocodone tablets or capsules  What is this medicine?  ACETAMINOPHEN; HYDROCODONE (a set a KAMILLE kunal fen; concepcion droe KOE done) is a pain reliever. It is used to treat moderate to severe pain.  This medicine may be used for other purposes; ask your health care provider or pharmacist if you have questions.  What should I tell my health care provider before I take this medicine?  They need to know if you have any of these conditions:  -brain tumor  -Crohn's disease, inflammatory bowel disease, or ulcerative colitis  -drug abuse or addiction  -head injury  -heart or circulation problems  -if you often drink alcohol  -kidney disease or problems going to the bathroom  -liver disease  -lung disease, asthma, or breathing problems  -an unusual or allergic reaction to acetaminophen, hydrocodone, other opioid analgesics, other medicines, foods, dyes, or preservatives  -pregnant or trying to get pregnant  -breast-feeding  How should I use this medicine?  Take this medicine by mouth. Swallow it with a full glass of water. Follow the directions on the prescription label. If the medicine upsets your stomach, take the medicine with food or milk. Do not take more than you are told to take.  Talk to your pediatrician regarding the use of this  medicine in children. This medicine is not approved for use in children.  Patients over 65 years may have a stronger reaction and need a smaller dose.  Overdosage: If you think you have taken too much of this medicine contact a poison control center or emergency room at once.  NOTE: This medicine is only for you. Do not share this medicine with others.  What if I miss a dose?  If you miss a dose, take it as soon as you can. If it is almost time for your next dose, take only that dose. Do not take double or extra doses.  What may interact with this medicine?  -alcohol  -antihistamines  -isoniazid  -medicines for depression, anxiety, or psychotic disturbances  -medicines for sleep  -muscle relaxants  -naltrexone  -narcotic medicines (opiates) for pain  -phenobarbital  -ritonavir  -tramadol  This list may not describe all possible interactions. Give your health care provider a list of all the medicines, herbs, non-prescription drugs, or dietary supplements you use. Also tell them if you smoke, drink alcohol, or use illegal drugs. Some items may interact with your medicine.  What should I watch for while using this medicine?  Tell your doctor or health care professional if your pain does not go away, if it gets worse, or if you have new or a different type of pain. You may develop tolerance to the medicine. Tolerance means that you will need a higher dose of the medicine for pain relief. Tolerance is normal and is expected if you take the medicine for a long time.  Do not suddenly stop taking your medicine because you may develop a severe reaction. Your body becomes used to the medicine. This does NOT mean you are addicted. Addiction is a behavior related to getting and using a drug for a non-medical reason. If you have pain, you have a medical reason to take pain medicine. Your doctor will tell you how much medicine to take. If your doctor wants you to stop the medicine, the dose will be slowly lowered over time to  avoid any side effects.  You may get drowsy or dizzy when you first start taking the medicine or change doses. Do not drive, use machinery, or do anything that may be dangerous until you know how the medicine affects you. Stand or sit up slowly.  There are different types of narcotic medicines (opiates) for pain. If you take more than one type at the same time, you may have more side effects. Give your health care provider a list of all medicines you use. Your doctor will tell you how much medicine to take. Do not take more medicine than directed. Call emergency for help if you have problems breathing.  The medicine will cause constipation. Try to have a bowel movement at least every 2 to 3 days. If you do not have a bowel movement for 3 days, call your doctor or health care professional.  Too much acetaminophen can be very dangerous. Do not take Tylenol (acetaminophen) or medicines that contain acetaminophen with this medicine. Many non-prescription medicines contain acetaminophen. Always read the labels carefully.  What side effects may I notice from receiving this medicine?  Side effects that you should report to your doctor or health care professional as soon as possible:  -allergic reactions like skin rash, itching or hives, swelling of the face, lips, or tongue  -breathing problems  -confusion  -feeling faint or lightheaded, falls  -stomach pain  -yellowing of the eyes or skin  Side effects that usually do not require medical attention (report to your doctor or health care professional if they continue or are bothersome):  -nausea, vomiting  -stomach upset  This list may not describe all possible side effects. Call your doctor for medical advice about side effects. You may report side effects to FDA at 7-707-FDA-9134.  Where should I keep my medicine?  Keep out of the reach of children. This medicine can be abused. Keep your medicine in a safe place to protect it from theft. Do not share this medicine with  anyone. Selling or giving away this medicine is dangerous and against the law.  This medicine may cause accidental overdose and death if it taken by other adults, children, or pets. Mix any unused medicine with a substance like cat litter or coffee grounds. Then throw the medicine away in a sealed container like a sealed bag or a coffee can with a lid. Do not use the medicine after the expiration date.  Store at room temperature between 15 and 30 degrees C (59 and 86 degrees F).  NOTE: This sheet is a summary. It may not cover all possible information. If you have questions about this medicine, talk to your doctor, pharmacist, or health care provider.     © 2016, Elsevier/Gold Standard. (2015-11-18 15:29:20)          Prednisone tablets  What is this medicine?  PREDNISONE (PRED ni sone) is a corticosteroid. It is commonly used to treat inflammation of the skin, joints, lungs, and other organs. Common conditions treated include asthma, allergies, and arthritis. It is also used for other conditions, such as blood disorders and diseases of the adrenal glands.  This medicine may be used for other purposes; ask your health care provider or pharmacist if you have questions.  What should I tell my health care provider before I take this medicine?  They need to know if you have any of these conditions:  -Cushing's syndrome  -diabetes  -glaucoma  -heart disease  -high blood pressure  -infection (especially a virus infection such as chickenpox, cold sores, or herpes)  -kidney disease  -liver disease  -mental illness  -myasthenia gravis  -osteoporosis  -seizures  -stomach or intestine problems  -thyroid disease  -an unusual or allergic reaction to lactose, prednisone, other medicines, foods, dyes, or preservatives  -pregnant or trying to get pregnant  -breast-feeding  How should I use this medicine?  Take this medicine by mouth with a glass of water. Follow the directions on the prescription label. Take this medicine with food.  If you are taking this medicine once a day, take it in the morning. Do not take more medicine than you are told to take. Do not suddenly stop taking your medicine because you may develop a severe reaction. Your doctor will tell you how much medicine to take. If your doctor wants you to stop the medicine, the dose may be slowly lowered over time to avoid any side effects.  Talk to your pediatrician regarding the use of this medicine in children. Special care may be needed.  Overdosage: If you think you have taken too much of this medicine contact a poison control center or emergency room at once.  NOTE: This medicine is only for you. Do not share this medicine with others.  What if I miss a dose?  If you miss a dose, take it as soon as you can. If it is almost time for your next dose, talk to your doctor or health care professional. You may need to miss a dose or take an extra dose. Do not take double or extra doses without advice.  What may interact with this medicine?  Do not take this medicine with any of the following medications:  -metyrapone  -mifepristone  This medicine may also interact with the following medications:  -aminoglutethimide  -amphotericin B  -aspirin and aspirin-like medicines  -barbiturates  -certain medicines for diabetes, like glipizide or glyburide  -cholestyramine  -cholinesterase inhibitors  -cyclosporine  -digoxin  -diuretics  -ephedrine  -female hormones, like estrogens and birth control pills  -isoniazid  -ketoconazole  -NSAIDS, medicines for pain and inflammation, like ibuprofen or naproxen  -phenytoin  -rifampin  -toxoids  -vaccines  -warfarin  This list may not describe all possible interactions. Give your health care provider a list of all the medicines, herbs, non-prescription drugs, or dietary supplements you use. Also tell them if you smoke, drink alcohol, or use illegal drugs. Some items may interact with your medicine.  What should I watch for while using this medicine?  Visit  your doctor or health care professional for regular checks on your progress. If you are taking this medicine over a prolonged period, carry an identification card with your name and address, the type and dose of your medicine, and your doctor's name and address.  This medicine may increase your risk of getting an infection. Tell your doctor or health care professional if you are around anyone with measles or chickenpox, or if you develop sores or blisters that do not heal properly.  If you are going to have surgery, tell your doctor or health care professional that you have taken this medicine within the last twelve months.  Ask your doctor or health care professional about your diet. You may need to lower the amount of salt you eat.  This medicine may affect blood sugar levels. If you have diabetes, check with your doctor or health care professional before you change your diet or the dose of your diabetic medicine.  What side effects may I notice from receiving this medicine?  Side effects that you should report to your doctor or health care professional as soon as possible:  -allergic reactions like skin rash, itching or hives, swelling of the face, lips, or tongue  -changes in emotions or moods  -changes in vision  -depressed mood  -eye pain  -fever or chills, cough, sore throat, pain or difficulty passing urine  -increased thirst  -swelling of ankles, feet  Side effects that usually do not require medical attention (report to your doctor or health care professional if they continue or are bothersome):  -confusion, excitement, restlessness  -headache  -nausea, vomiting  -skin problems, acne, thin and shiny skin  -trouble sleeping  -weight gain  This list may not describe all possible side effects. Call your doctor for medical advice about side effects. You may report side effects to FDA at 7-815-FDA-4060.  Where should I keep my medicine?  Keep out of the reach of children.  Store at room temperature between 15  and 30 degrees C (59 and 86 degrees F). Protect from light. Keep container tightly closed. Throw away any unused medicine after the expiration date.  NOTE: This sheet is a summary. It may not cover all possible information. If you have questions about this medicine, talk to your doctor, pharmacist, or health care provider.     © 2016, [employer]. (2012-08-02 10:57:14)            SYMPTOMS OF A STROKE    Call 911 or have someone take you to the Emergency Department if you have any of the following:    · Sudden numbness or weakness of your face, arm or leg especially on one side of the body  · Sudden confusion, diffiiculty speaking or trouble understanding   · Changes in your vision or loss of sight in one eye  · Sudden severe headache with no known cause  · sudden dizziness, trouble walking, loss of balance or coordination    It is important to seek emergency care right away if you have further stroke symptoms. If you get emergency help quickly, the powerful clot-dissolving medicines can reduce the disabilities caused by a stroke.     For more information:    American Stroke Association  8-282-0-STROKE  www.strokeassociation.org           IF YOU SMOKE OR USE TOBACCO PLEASE READ THE FOLLOWING:    Why is smoking bad for me?  Smoking increases the risk of heart disease, lung disease, vascular disease, stroke, and cancer.     If you smoke, STOP!    If you would like more information on quitting smoking, please visit the [employer] [facility] website: [email or web address]   This link will provide additional resources including the QUIT line and the Beat the [employer] support groups.     For more information:    American Cancer Society  ([phone]    American Heart Association  1-984.283.7727               YOU ARE THE MOST IMPORTANT FACTOR IN YOUR RECOVERY.     Follow all instructions carefully.     I have reviewed my discharge instructions with my nurse, including the  following information, if applicable:     Information about my illness and diagnosis   Follow up appointments (including lab draws)   Wound Care   Equipment Needs   Medications (new and continuing) along with side effects   Preventative information such as vaccines and smoking cessations   Diet   Pain   I know when to contact my Doctor's office or seek emergency care      I want my nurse to describe the side effects of my medications: YES NO   If the answer is no, I understand the side effects of my medications: YES NO   My nurse described the side effects of my medications in a way that I could understand: YES NO   I have taken my personal belongings and my own medications with me at discharge: YES NO            I have received this information and my questions have been answered. I have discussed any concerns I see with this plan with the nurse or physician. I understand these instructions.    Signature of Patient or Responsible Person: _____________________________________    Date: _________________  Time: __________________    Signature of Healthcare Provider: _______________________________________  Date: _________________  Time: __________________

## 2017-01-21 NOTE — ED PROVIDER NOTES
Subjective   Patient is a 56 y.o. female presenting with shortness of breath.   Shortness of Breath   Severity:  Moderate  Onset quality:  Gradual  Timing:  Intermittent  Progression:  Worsening  Chronicity:  Recurrent  Context: not activity, not animal exposure, not emotional upset, not occupational exposure, not pollens, not URI and not weather changes    Relieved by:  Nothing  Worsened by:  Nothing  Associated symptoms: cough and wheezing    Associated symptoms: no abdominal pain, no chest pain, no claudication, no diaphoresis, no ear pain, no fever, no headaches, no hemoptysis, no neck pain, no rash, no sore throat, no sputum production, no syncope and no vomiting    Risk factors: no recent alcohol use, no obesity and no prolonged immobilization        Review of Systems   Constitutional: Negative.  Negative for activity change, appetite change, chills, diaphoresis, fatigue and fever.   HENT: Negative for congestion, drooling, ear pain, facial swelling, hearing loss, sinus pressure and sore throat.    Eyes: Negative.  Negative for discharge.   Respiratory: Positive for cough, shortness of breath and wheezing. Negative for hemoptysis and sputum production.    Cardiovascular: Negative for chest pain, claudication and syncope.   Gastrointestinal: Negative for abdominal distention, abdominal pain, blood in stool, diarrhea, nausea and vomiting.   Endocrine: Negative.  Negative for cold intolerance, heat intolerance, polydipsia, polyphagia and polyuria.   Genitourinary: Negative.  Negative for dysuria, flank pain and urgency.   Musculoskeletal: Negative.  Negative for arthralgias, back pain, myalgias, neck pain and neck stiffness.   Skin: Negative.  Negative for color change, pallor and rash.   Allergic/Immunologic: Negative.    Neurological: Negative.  Negative for dizziness, seizures, speech difficulty, weakness, numbness and headaches.   Hematological: Negative.  Negative for adenopathy.   All other systems reviewed  and are negative.      Past Medical History   Diagnosis Date   • Arthritis        No Known Allergies    Past Surgical History   Procedure Laterality Date   • Cholecystectomy         Family History   Problem Relation Age of Onset   • Cirrhosis Sister    • Liver cancer Brother        Social History     Social History   • Marital status:      Spouse name: N/A   • Number of children: N/A   • Years of education: N/A     Social History Main Topics   • Smoking status: Never Smoker   • Smokeless tobacco: None   • Alcohol use No   • Drug use: No   • Sexual activity: Not Asked     Other Topics Concern   • None     Social History Narrative           Objective   Physical Exam   Constitutional: She appears well-developed and well-nourished.   HENT:   Head: Normocephalic.   Right Ear: External ear normal.   Eyes: Conjunctivae are normal. Pupils are equal, round, and reactive to light.   Neck: Normal range of motion. Neck supple.   Cardiovascular: Normal rate, regular rhythm, normal heart sounds and intact distal pulses.  Exam reveals no friction rub.    No murmur heard.  Pulmonary/Chest: Effort normal. No respiratory distress. She has wheezes. She has no rales.   Abdominal: Soft. Bowel sounds are normal. There is no tenderness.   Musculoskeletal: Normal range of motion. She exhibits no edema or tenderness.       Vascular Status -  Her exam exhibits right foot vasculature normal. Her exam exhibits no right foot edema. Her exam exhibits left foot vasculature normal. Her exam exhibits no left foot edema.  Neurological: She is alert. She has normal reflexes. No cranial nerve deficit. Coordination normal.   Skin: Skin is warm. No rash noted. No erythema.   Nursing note and vitals reviewed.      Procedures         ED Course  ED Course   Comment By Time   EKG shows normal sinus rhythm with nothing acute Jaycob Baldwin MD 01/21 6499   No evidence of pulmonary thromboembolic disease. The thoracic aorta  is normal in caliber with no  evidence of dissection or aneurysm.  2. Rather diffuse mixed interstitial and alveolar pneumonia involving  both lower lobes and the right middle lobe. An element of interstitial  fibrosis is also suspected. There is a tiny right-sided effusion.   Jaycob Baldwin MD 01/21 1827   Will admit Jaycob Baldwin MD 01/21 1828                  MDM    Final diagnoses:   Pneumonia of both lower lobes due to infectious organism            Jaycob Baldwin MD  01/21/17 1829

## 2017-01-21 NOTE — Clinical Note
Level of Care: Med/Surg [1]   Diagnosis: Pneumonia of both lower lobes due to infectious organism [[id number]]   Admitting Physician: KIRK CHANG [[id number]]

## 2017-01-22 LAB
ALBUMIN SERPL-MCNC: 3.7 G/DL (ref 3.5–5)
ALBUMIN/GLOB SERPL: 0.9 G/DL (ref 1.1–2.5)
ALP SERPL-CCNC: 200 U/L (ref 24–120)
ALT SERPL W P-5'-P-CCNC: 37 U/L (ref 0–54)
ANION GAP SERPL CALCULATED.3IONS-SCNC: 11 MMOL/L (ref 4–13)
ARTERIAL PATENCY WRIST A: NORMAL
AST SERPL-CCNC: 39 U/L (ref 7–45)
ATMOSPHERIC PRESS: NORMAL MMHG
BASE EXCESS BLDA CALC-SCNC: 0 MMOL/L (ref -2–2)
BASOPHILS # BLD AUTO: 0.01 10*3/MM3 (ref 0–0.2)
BASOPHILS NFR BLD AUTO: 0.2 % (ref 0–2)
BDY SITE: NORMAL
BILIRUB SERPL-MCNC: 0.3 MG/DL (ref 0.1–1)
BUN BLD-MCNC: 13 MG/DL (ref 5–21)
BUN/CREAT SERPL: 21.7 (ref 7–25)
CALCIUM SPEC-SCNC: 8.9 MG/DL (ref 8.4–10.4)
CHLORIDE SERPL-SCNC: 103 MMOL/L (ref 98–110)
CO2 SERPL-SCNC: 27 MMOL/L (ref 24–31)
CREAT BLD-MCNC: 0.6 MG/DL (ref 0.5–1.4)
DEPRECATED RDW RBC AUTO: 42.2 FL (ref 40–54)
EOSINOPHIL # BLD AUTO: 0 10*3/MM3 (ref 0–0.7)
EOSINOPHIL NFR BLD AUTO: 0 % (ref 0–4)
ERYTHROCYTE [DISTWIDTH] IN BLOOD BY AUTOMATED COUNT: 13.7 % (ref 12–15)
GFR SERPL CREATININE-BSD FRML MDRD: 103 ML/MIN/1.73
GLOBULIN UR ELPH-MCNC: 4.2 GM/DL
GLUCOSE BLD-MCNC: 196 MG/DL (ref 70–100)
HCO3 BLDA-SCNC: 24.5 MMOL/L (ref 22–26)
HCT VFR BLD AUTO: 39.2 % (ref 37–47)
HGB BLD-MCNC: 12.6 G/DL (ref 12–16)
IMM GRANULOCYTES # BLD: 0.04 10*3/MM3 (ref 0–0.03)
IMM GRANULOCYTES NFR BLD: 0.7 % (ref 0–5)
LYMPHOCYTES # BLD AUTO: 1.19 10*3/MM3 (ref 0.72–4.86)
LYMPHOCYTES NFR BLD AUTO: 21.2 % (ref 15–45)
MCH RBC QN AUTO: 27.3 PG (ref 28–32)
MCHC RBC AUTO-ENTMCNC: 32.1 G/DL (ref 33–36)
MCV RBC AUTO: 85 FL (ref 82–98)
MODALITY: NORMAL
MONOCYTES # BLD AUTO: 0.15 10*3/MM3 (ref 0.19–1.3)
MONOCYTES NFR BLD AUTO: 2.7 % (ref 4–12)
NEUTROPHILS # BLD AUTO: 4.22 10*3/MM3 (ref 1.87–8.4)
NEUTROPHILS NFR BLD AUTO: 75.2 % (ref 39–78)
PCO2 BLDA: 39.4 MM HG (ref 35–45)
PH BLDA: 7.41 PH UNITS (ref 7.35–7.45)
PLATELET # BLD AUTO: 296 10*3/MM3 (ref 130–400)
PMV BLD AUTO: 11.4 FL (ref 6–12)
PO2 BLDA: 88 MM HG (ref 80–100)
POTASSIUM BLD-SCNC: 3.8 MMOL/L (ref 3.5–5.3)
PROT SERPL-MCNC: 7.9 G/DL (ref 6.3–8.7)
RBC # BLD AUTO: 4.61 10*6/MM3 (ref 4.2–5.4)
SAO2 % BLDCOA: 96.8 % (ref 94–100)
SAO2 % BLDCOA: 96.8 % (ref 94–100)
SODIUM BLD-SCNC: 141 MMOL/L (ref 135–145)
WBC NRBC COR # BLD: 5.61 10*3/MM3 (ref 4.8–10.8)

## 2017-01-22 PROCEDURE — 25010000002 METHYLPREDNISOLONE PER 125 MG: Performed by: FAMILY MEDICINE

## 2017-01-22 PROCEDURE — 25010000002 AZITHROMYCIN: Performed by: FAMILY MEDICINE

## 2017-01-22 PROCEDURE — 85025 COMPLETE CBC W/AUTO DIFF WBC: CPT | Performed by: FAMILY MEDICINE

## 2017-01-22 PROCEDURE — 36600 WITHDRAWAL OF ARTERIAL BLOOD: CPT

## 2017-01-22 PROCEDURE — 25010000002 ENOXAPARIN PER 10 MG: Performed by: FAMILY MEDICINE

## 2017-01-22 PROCEDURE — 94799 UNLISTED PULMONARY SVC/PX: CPT

## 2017-01-22 PROCEDURE — 87205 SMEAR GRAM STAIN: CPT | Performed by: FAMILY MEDICINE

## 2017-01-22 PROCEDURE — 82803 BLOOD GASES ANY COMBINATION: CPT

## 2017-01-22 PROCEDURE — 25010000002 METHYLPREDNISOLONE PER 125 MG: Performed by: NURSE PRACTITIONER

## 2017-01-22 PROCEDURE — 94640 AIRWAY INHALATION TREATMENT: CPT

## 2017-01-22 PROCEDURE — 25010000002 LEVALBUTEROL PER 0.5 MG: Performed by: FAMILY MEDICINE

## 2017-01-22 PROCEDURE — 94760 N-INVAS EAR/PLS OXIMETRY 1: CPT

## 2017-01-22 PROCEDURE — 87070 CULTURE OTHR SPECIMN AEROBIC: CPT | Performed by: FAMILY MEDICINE

## 2017-01-22 PROCEDURE — 25010000002 CEFTRIAXONE: Performed by: FAMILY MEDICINE

## 2017-01-22 PROCEDURE — 80053 COMPREHEN METABOLIC PANEL: CPT | Performed by: FAMILY MEDICINE

## 2017-01-22 RX ORDER — METHYLPREDNISOLONE SODIUM SUCCINATE 40 MG/ML
40 INJECTION, POWDER, LYOPHILIZED, FOR SOLUTION INTRAMUSCULAR; INTRAVENOUS EVERY 12 HOURS
Status: DISCONTINUED | OUTPATIENT
Start: 2017-01-22 | End: 2017-01-23 | Stop reason: HOSPADM

## 2017-01-22 RX ORDER — LEVALBUTEROL INHALATION SOLUTION 1.25 MG/3ML
1.25 SOLUTION RESPIRATORY (INHALATION)
Status: DISCONTINUED | OUTPATIENT
Start: 2017-01-22 | End: 2017-01-23 | Stop reason: HOSPADM

## 2017-01-22 RX ORDER — TEMAZEPAM 30 MG/1
30 CAPSULE ORAL NIGHTLY PRN
Status: DISCONTINUED | OUTPATIENT
Start: 2017-01-22 | End: 2017-01-23 | Stop reason: HOSPADM

## 2017-01-22 RX ORDER — LEVALBUTEROL 1.25 MG/.5ML
1.25 SOLUTION, CONCENTRATE RESPIRATORY (INHALATION)
Status: DISCONTINUED | OUTPATIENT
Start: 2017-01-22 | End: 2017-01-22 | Stop reason: SDUPTHER

## 2017-01-22 RX ADMIN — PANTOPRAZOLE SODIUM 40 MG: 40 TABLET, DELAYED RELEASE ORAL at 05:36

## 2017-01-22 RX ADMIN — OXYCODONE HYDROCHLORIDE AND ACETAMINOPHEN 500 MG: 500 TABLET ORAL at 08:32

## 2017-01-22 RX ADMIN — ALBUTEROL SULFATE 2.5 MG: 2.5 SOLUTION RESPIRATORY (INHALATION) at 07:02

## 2017-01-22 RX ADMIN — HYDROCODONE BITARTRATE AND ACETAMINOPHEN 1 TABLET: 5; 325 TABLET ORAL at 21:18

## 2017-01-22 RX ADMIN — VENLAFAXINE HYDROCHLORIDE 75 MG: 37.5 TABLET ORAL at 08:32

## 2017-01-22 RX ADMIN — LEVALBUTEROL HYDROCHLORIDE 1.25 MG: 1.25 SOLUTION RESPIRATORY (INHALATION) at 15:31

## 2017-01-22 RX ADMIN — ALBUTEROL SULFATE 2.5 MG: 2.5 SOLUTION RESPIRATORY (INHALATION) at 11:36

## 2017-01-22 RX ADMIN — LEVALBUTEROL HYDROCHLORIDE 1.25 MG: 1.25 SOLUTION RESPIRATORY (INHALATION) at 19:46

## 2017-01-22 RX ADMIN — AZITHROMYCIN 500 MG: 500 INJECTION, POWDER, LYOPHILIZED, FOR SOLUTION INTRAVENOUS at 00:28

## 2017-01-22 RX ADMIN — TEMAZEPAM 30 MG: 30 CAPSULE ORAL at 21:18

## 2017-01-22 RX ADMIN — URSODIOL 300 MG: 300 CAPSULE ORAL at 17:54

## 2017-01-22 RX ADMIN — METHYLPREDNISOLONE SODIUM SUCCINATE 60 MG: 125 INJECTION, POWDER, FOR SOLUTION INTRAMUSCULAR; INTRAVENOUS at 05:36

## 2017-01-22 RX ADMIN — ALBUTEROL SULFATE 2.5 MG: 2.5 SOLUTION RESPIRATORY (INHALATION) at 03:49

## 2017-01-22 RX ADMIN — METHYLPREDNISOLONE SODIUM SUCCINATE 40 MG: 125 INJECTION, POWDER, FOR SOLUTION INTRAMUSCULAR; INTRAVENOUS at 17:54

## 2017-01-22 RX ADMIN — URSODIOL 300 MG: 300 CAPSULE ORAL at 08:32

## 2017-01-22 RX ADMIN — AZITHROMYCIN 500 MG: 500 INJECTION, POWDER, LYOPHILIZED, FOR SOLUTION INTRAVENOUS at 22:34

## 2017-01-22 RX ADMIN — ENOXAPARIN SODIUM 40 MG: 40 INJECTION SUBCUTANEOUS at 08:32

## 2017-01-22 RX ADMIN — CEFTRIAXONE 1 G: 1 INJECTION, POWDER, FOR SOLUTION INTRAMUSCULAR; INTRAVENOUS at 21:31

## 2017-01-22 RX ADMIN — HYDROCODONE BITARTRATE AND ACETAMINOPHEN 1 TABLET: 5; 325 TABLET ORAL at 11:42

## 2017-01-22 RX ADMIN — ALBUTEROL SULFATE 2.5 MG: 2.5 SOLUTION RESPIRATORY (INHALATION) at 00:18

## 2017-01-22 NOTE — PROGRESS NOTES
Exercise Oximetry    Patient Name:Elaine Juárez   MRN: [id number]   Date: 01/22/17             ROOM AIR BASELINE   SpO2% 99   Heart Rate 110   Blood Pressure      EXERCISE ON ROOM AIR SpO2% EXERCISE ON O2 @  LPM SpO2%   1 MINUTE 97 1 MINUTE    2 MINUTES 95 2 MINUTES    3 MINUTES 93 3 MINUTES    4 MINUTES 95 4 MINUTES    5 MINUTES 96 5 MINUTES    6 MINUTES  6 MINUTES               Distance Walked  300 Distance Walked   Dyspnea (Cassius Scale)   Dyspnea (Cassius Scale)   Fatigue (Cassius Scale)   Fatigue (Cassius Scale)   SpO2% Post Exercise  99 SpO2% Post Exercise   HR Post Exercise  115 HR Post Exercise   Time to Recovery  < 3 minutes Time to Recovery     Comments: Pt walked approximately 300 feet without any dyspnea or fatigue. Pt did begin coughing while walking, but SpO2 never dropped below 93%. HR during walk ox ranged from 110-130. Upon arriving back to room, pt SpO2 was 99% and HR was 115. Breathing treatment (Xopenex) was due and given post procedure.

## 2017-01-22 NOTE — H&P
[facility]  HISTORY AND PHYSICAL    Date of Admission: 2017  Primary Care Physician: Aaron Stoddard MD    Subjective     Chief Complaint: Several weeks of coughing with worsening since .    History of Present Illness  Patient relates she has been coughing all year.  Early in the year she had issues with ulcers and acid reflux.  She is being treated by Dr Landin for this.   Since  she has had increased problems with cough and shortness of breath to the point where she just can't handle it any more.    She presented to the ER for evaluation.  She relates she has had Bactrim recently for UTI but made no difference in cough.  Does not have fever.  No real sputum production.          Review of Systems   Constitutional: Positive for fatigue. Negative for fever and unexpected weight change.   HENT: Negative for trouble swallowing.    Eyes: Negative.  Negative for visual disturbance.   Respiratory: Positive for cough and shortness of breath. Negative for wheezing.    Cardiovascular: Negative for chest pain, palpitations and leg swelling.   Gastrointestinal: Negative for abdominal pain, diarrhea, nausea and vomiting.   Endocrine: Negative for polydipsia and polyuria.   Genitourinary: Negative for difficulty urinating and frequency.   Musculoskeletal: Negative for arthralgias and myalgias.   Skin: Negative for rash and wound.   Neurological: Negative for dizziness, light-headedness and headaches.   Hematological: Does not bruise/bleed easily.   Psychiatric/Behavioral: Negative for agitation.              Past Medical History:   Lupus  Raynauds  Elevated LFTs  Primary billiary colangitis.    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    Medications:  Prior to Admission medications    Medication Sig Start Date End Date Taking? Authorizing Provider   Flaxseed, Linseed, (FLAX SEED OIL PO) Take  by mouth.    Historical Provider, MD   Multiple Vitamin (MULTI VITAMIN PO) Take  by mouth.    Historical Provider, MD   omeprazole (priLOSEC) 40 MG capsule Take 40 mg by mouth Daily.    Historical Provider, MD   ursodiol (ACTIGALL) 300 MG capsule Take 300 mg by mouth 2 (Two) Times a Day.    Historical Provider, MD   venlafaxine (EFFEXOR) 75 MG tablet Take 75 mg by mouth 2 (Two) Times a Day.    Historical Provider, MD   vitamin C (ASCORBIC ACID) 500 MG tablet Take 500 mg by mouth Daily.    Historical Provider, MD       Objective     Vital Signs:   Visit Vitals   • BP 99/55 (BP Location: Right arm, Patient Position: Sitting)   • Pulse 91   • Temp 98.3 °F (36.8 °C)   • Resp 25   • Ht 63\" (160 cm)   • Wt 160 lb (72.6 kg)   • SpO2 97%   • BMI 28.34 kg/m2     Physical Exam   Constitutional: She is oriented to person, place, and time. She appears well-developed and well-nourished.   HENT:   Head: Normocephalic and atraumatic.   Left Ear: External ear normal.   Right occluded with  cerumen   Eyes: Conjunctivae and EOM are normal. Pupils are equal, round, and reactive to light. Right eye exhibits no discharge. Left eye exhibits no discharge.   Neck: Neck supple. No JVD present. No thyromegaly present.   Cardiovascular: Normal rate, regular rhythm, normal heart sounds and intact distal pulses.  Exam reveals no gallop and no friction rub.    No murmur heard.  Pulmonary/Chest:   Tachypnea  Fine velcro crackles bilateral lower lobes.     Abdominal: Soft. Bowel sounds are normal. She exhibits no distension. There is no tenderness. There is no rebound and no guarding.   Musculoskeletal: Normal range of " motion. She exhibits no edema, tenderness or deformity.   Lymphadenopathy:     She has no cervical adenopathy.   Neurological: She is alert and oriented to person, place, and time. No cranial nerve deficit.   Skin: Skin is warm and dry. No rash noted.   Psychiatric: She has a normal mood and affect. Her behavior is normal. Judgment and thought content normal.           Results Reviewed:  Lab Results (last 24 hours)     Procedure Component Value Units Date/Time    Blood Gas, Arterial [[id number]]  (Abnormal) Collected:  01/21/17 1624    Specimen:  Arterial Blood Updated:  01/21/17 1626     Site Arterial: right brachial      Tony's Test --       Documented in Rapid Comm        pH, Arterial 7.450 pH units      pCO2, Arterial 40.6 mm Hg      pO2, Arterial 75.6 (L) mm Hg      HCO3, Arterial 27.6 (H) mmol/L      Base Excess, Arterial 3.4 (H) mmol/L      O2 Saturation, Arterial 95.7 %      O2 Saturation Calculated 95.7 %      Barometric Pressure for Blood Gas -- mmHg       Component not reported at this site.        Modality Room air     Narrative:                                                              WBC 7.78 10*3/mm3      RBC 4.85 10*6/mm3      Hemoglobin 13.5 g/dL      Hematocrit 41.3 %      MCV 85.2 fL      MCH 27.8 (L) pg      MCHC 32.7 (L) g/dL      RDW 13.9 %      RDW-SD 43.1 fl      MPV 11.4 fL      Platelets 335 10*3/mm3      Neutrophil % 59.1 %      Lymphocyte % 26.2 %      Monocyte % 8.5 %      Eosinophil % 5.3 (H) %      Basophil % 0.6 %      Immature Grans % 0.3 %      Neutrophils, Absolute 4.60 10*3/mm3      Lymphocytes, Absolute 2.04 10*3/mm3      Monocytes, Absolute 0.66 10*3/mm3      Eosinophils, Absolute 0.41 10*3/mm3      Basophils, Absolute 0.05 10*3/mm3      Immature Grans, Absolute 0.02 10*3/mm3      nRBC 0.0 /100 WBC     CBC & Differential [[id number]] Collected:  01/21/17 1636                 D-dimer, Quantitative [[id number]]  (Abnormal) Collected:  01/21/17 1635    Specimen:  Blood from Arm,  Left Updated:  01/21/17 1658     D-Dimer, Quantitative 1.92 (H) mg/L (FEU)     Narrative:       Reference Range is 0-0.50 mg/L FEU. However, results <0.50 mg/L FEU tends to rule out DVT or PE. Results >0.50 mg/L FEU are not useful in predicting absence or presence of DVT or PE.    Blood Culture [[id number]] Collected:  01/21/17 1634    Specimen:  Blood from Arm, Right Updated:  01/21/17 1708    Blood Culture [[id number]] Collected:  01/21/17 1634    Specimen:  Blood from Arm, Left Updated:  01/21/17 1708    Comprehensive Metabolic Panel [[id number]]  (Abnormal) Collected:  01/21/17 1635    Specimen:  Blood from Arm, Left Updated:  01/21/17 1714     Glucose 102 (H) mg/dL      BUN 13 mg/dL      Creatinine 0.68 mg/dL      Sodium 141 mmol/L      Potassium 4.2 mmol/L      Chloride 101 mmol/L      CO2 30.0 mmol/L      Calcium 9.0 mg/dL      Total Protein 8.3 g/dL      Albumin 4.00 g/dL      ALT (SGPT) 28 U/L      AST (SGOT) 42 U/L      Alkaline Phosphatase 211 (H) U/L      Total Bilirubin 0.5 mg/dL      eGFR Non African Amer 89 mL/min/1.73      Globulin 4.3 gm/dL      A/G Ratio 0.9 (L) g/dL      BUN/Creatinine Ratio 19.1      Anion Gap 10.0 mmol/L     BNP [[id number]]  (Normal) Collected:  01/21/17 1635    Specimen:  Blood from Arm, Left Updated:  01/21/17 1726     proBNP 56.1 pg/mL     Troponin [[id number]]  (Normal) Collected:  01/21/17 1635    Specimen:  Blood from Arm, Left Updated:  01/21/17 1726     Troponin I <0.012 ng/mL     [facility] Draw [[id number]] Collected:  01/21/17 1635                     Imaging Results (last 24 hours)     Procedure Component Value Units Date/Time    XR Chest 2 View [[id number]] Collected:  01/21/17 1548     Updated:  01/21/17 1631    Narrative:       HISTORY: Cough     CXR: Two views of the chest are obtained. There are no prior studies for  comparison.     There are bibasilar opacities with involvement of the lingula and/or  right middle lobe. There is a prominence of the interstitial densities.  No  pleural effusion or pneumothorax is identified. There are calcified  granuloma. The heart appears normal in size. The mediastinal contour is  normal. There is mild degenerative change of the thoracic spine.       Impression:       Bibasilar opacities with prominent interstitial markings. Multifocal  pneumonia/pneumonitis is favored. No pleural effusion. Normal-sized  heart.  This report was finalized on 01/21/2017 16:29 by Dr. Keke Bangura MD.    CT Angiogram Chest With Contrast [[id number]] Collected:  01/21/17 1803     Updated:  01/21/17 1810    Narrative:       Exam: CT angiogram of the of the chest with intravenous contrast.     CLINICAL HISTORY:  Chest pain.     DOSE:  302 mGycm. Automated exposure control was utilized to diminish  patient radiation dose.     TECHNIQUE: Contiguous axial images were obtained through the thorax  following intravenous contrast administration with reformatted images  obtained in the sagittal and coronal projections from the original data  set. Three-dimensional reconstructions are also obtained.     COMPARISON:  None available     FINDINGS:     Pulmonary arteries:  There is normal enhancement of the pulmonary  arteries with no evidence of pulmonary thromboembolic disease.     Aorta:  The thoracic aorta and proximal great vessels are normal in  appearance. There is no evidence of aortic dissection or aneurysm.     LUNGS:  There are rather diffuse interstitial changes of the lungs  within the right middle lobe and both lower lobes with some associated  patchy alveolar disease. I would favor a bilateral lower lobe and right  middle lobe mixed interstitial and alveolar pneumonia. A tiny right  effusion is present. There may also be an element of chronic  interstitial fibrosis. Follow-up films of the chest are recommended  following treatment.     Pleural spaces: A tiny right effusion is present.     HEART: No evidence of enlargement. No coronary artery calcifications are  present.  There is no evidence of pericardial effusion. No evidence of RV  dysfunction.     Bones: No acute osseous abnormalities are demonstrated.     CHEST WALL: No chest wall abnormalities are demonstrated.     Lymph nodes: There are some mildly enlarged middle mediastinal lymph  nodes involving the pretracheal, AP window and subcarinal cora groups  with a subcarinal node measuring 1.37 m in short axis. The AP window  measures 1.1 m in short axis. The pretracheal node measures 1.2 cm in  short axis.     Upper abdomen: A small hiatal hernia is present. There is a  nonobstructing calculus involving the upper pole the right kidney.       Impression:       1. No evidence of pulmonary thromboembolic disease. The thoracic aorta  is normal in caliber with no evidence of dissection or aneurysm.  2. Rather diffuse mixed interstitial and alveolar pneumonia involving  both lower lobes and the right middle lobe. An element of interstitial  fibrosis is also suspected. There is a tiny right-sided effusion.  This report was finalized on 01/21/2017 18:08 by Dr. Romaine Castillo MD.          I have personally reviewed and interpreted the radiology studies and ECG obtained at time of admission.     Assessment / Plan     Assessment & Plan  Hospital Problem List     Pneumonia of both lower lobes due to infectious organism        Assessment    Interstitial infiltrates bilaterally with fibrosis.  Lupus  Primary bilary cholangitis.   Raynauds    Plan    Admit inpatient  O2  Nebs  SoluMedrol  Rocephin  Zithromax  Consult pulmonology    Code Status: Full     I discussed the patients findings and my recommendations with patient and daughter.    Estimated length of stay 4-5 days    Sadaf Junior DO   01/21/17   7:02 PM

## 2017-01-22 NOTE — PROGRESS NOTES
[facility]  INPATIENT PROGRESS NOTE    Length of Stay: 1  Date of Admission: 1/21/2017  Primary Care Physician: Aaron Stoddard MD    Subjective   Chief Complaint: Jittery from the breathing treatments.   HPI   Less short of breath.  No nausea.  Did not sleep last night.         Review of Systems     All pertinent negatives and positives are as above. All other systems have been reviewed and are negative unless otherwise stated.     Objective    Temp:  [97.1 °F (36.2 °C)-98.6 °F (37 °C)] 97.5 °F (36.4 °C)  Heart Rate:  [] 111  Resp:  [18-25] 20  BP: ()/(55-74) 133/67  Physical Exam   Constitutional: She is oriented to person, place, and time. She appears well-developed and well-nourished.   Eyes: Conjunctivae and EOM are normal. Pupils are equal, round, and reactive to light.   Neck: Neck supple.   Cardiovascular: Normal rate and regular rhythm.  Exam reveals no gallop and no friction rub.    No murmur heard.  Pulmonary/Chest:   Remains mildly tachypneic.  Less wheeze/crackle.     Abdominal: Soft. Bowel sounds are normal. There is no hepatosplenomegaly. There is no tenderness.   Musculoskeletal: Normal range of motion. She exhibits no edema.   Neurological: She is alert and oriented to person, place, and time. No cranial nerve deficit.   Skin: Skin is warm and dry.   Psychiatric: She has a normal mood and affect. Her behavior is normal.   Nursing note and vitals reviewed.          Results Review:  I have reviewed the labs, radiology results, and diagnostic studies.    Laboratory Data:     Results from last 7 days  Lab Units 01/22/17  0448 01/21/17  1636   WBC 10*3/mm3 5.61 7.78   HEMOGLOBIN g/dL 12.6 13.5   HEMATOCRIT % 39.2 41.3   PLATELETS 10*3/mm3 296 335          Results from last 7 days  Lab Units 01/22/17  0448 01/21/17  1635   SODIUM mmol/L 141 141   POTASSIUM mmol/L 3.8 4.2   CHLORIDE mmol/L 103 101   TOTAL CO2 mmol/L 27.0 30.0   BUN mg/dL  13 13   CREATININE mg/dL 0.60 0.68   CALCIUM mg/dL 8.9 9.0   BILIRUBIN mg/dL 0.3 0.5   ALK PHOS U/L 200* 211*   ALT (SGPT) U/L 37 28   AST (SGOT) U/L 39 42   GLUCOSE mg/dL 196* 102*       Culture Data:   BLOOD CULTURE   Date Value Ref Range Status   01/21/2017 No growth at less than 24 hours  Preliminary   01/21/2017 No growth at less than 24 hours  Preliminary       Radiology Data:   Imaging Results (last 24 hours)     Procedure Component Value Units Date/Time    XR Chest 2 View [[id number]] Collected:  01/21/17 1548     Updated:  01/21/17 1631    Narrative:       HISTORY: Cough     CXR: Two views of the chest are obtained. There are no prior studies for  comparison.     There are bibasilar opacities with involvement of the lingula and/or  right middle lobe. There is a prominence of the interstitial densities.  No pleural effusion or pneumothorax is identified. There are calcified  granuloma. The heart appears normal in size. The mediastinal contour is  normal. There is mild degenerative change of the thoracic spine.       Impression:       Bibasilar opacities with prominent interstitial markings. Multifocal  pneumonia/pneumonitis is favored. No pleural effusion. Normal-sized  heart.  This report was finalized on 01/21/2017 16:29 by Dr. Keke Bangura MD.    CT Angiogram Chest With Contrast [[id number]] Collected:  01/21/17 1803     Updated:  01/21/17 1810    Narrative:       Exam: CT angiogram of the of the chest with intravenous contrast.     CLINICAL HISTORY:  Chest pain.     DOSE:  302 mGycm. Automated exposure control was utilized to diminish  patient radiation dose.     TECHNIQUE: Contiguous axial images were obtained through the thorax  following intravenous contrast administration with reformatted images  obtained in the sagittal and coronal projections from the original data  set. Three-dimensional reconstructions are also obtained.     COMPARISON:  None available     FINDINGS:     Pulmonary arteries:  There  is normal enhancement of the pulmonary  arteries with no evidence of pulmonary thromboembolic disease.     Aorta:  The thoracic aorta and proximal great vessels are normal in  appearance. There is no evidence of aortic dissection or aneurysm.     LUNGS:  There are rather diffuse interstitial changes of the lungs  within the right middle lobe and both lower lobes with some associated  patchy alveolar disease. I would favor a bilateral lower lobe and right  middle lobe mixed interstitial and alveolar pneumonia. A tiny right  effusion is present. There may also be an element of chronic  interstitial fibrosis. Follow-up films of the chest are recommended  following treatment.     Pleural spaces: A tiny right effusion is present.     HEART: No evidence of enlargement. No coronary artery calcifications are  present. There is no evidence of pericardial effusion. No evidence of RV  dysfunction.     Bones: No acute osseous abnormalities are demonstrated.     CHEST WALL: No chest wall abnormalities are demonstrated.     Lymph nodes: There are some mildly enlarged middle mediastinal lymph  nodes involving the pretracheal, AP window and subcarinal cora groups  with a subcarinal node measuring 1.37 m in short axis. The AP window  measures 1.1 m in short axis. The pretracheal node measures 1.2 cm in  short axis.     Upper abdomen: A small hiatal hernia is present. There is a  nonobstructing calculus involving the upper pole the right kidney.       Impression:       1. No evidence of pulmonary thromboembolic disease. The thoracic aorta  is normal in caliber with no evidence of dissection or aneurysm.  2. Rather diffuse mixed interstitial and alveolar pneumonia involving  both lower lobes and the right middle lobe. An element of interstitial  fibrosis is also suspected. There is a tiny right-sided effusion.  This report was finalized on 01/21/2017 18:08 by Dr. Romaine Castillo MD.          I have reviewed the patient current  medications.     Assessment/Plan     Hospital Problem List     Pneumonia of both lower lobes due to infectious organism          Assessment     Interstitial infiltrates bilaterally with fibrosis.  Lupus  Primary bilary  Cirrhoisis   Raynauds      Plan    Continue steroids.  Change albuterol to xopenx  Consult Rheumatology      Discharge Planning: I expect patient to be discharged to home in 2 days.    Sadaf Junior DO   01/22/17   11:10 AM

## 2017-01-22 NOTE — CONSULTS
"    PULMONARY & CRITICAL CARE CONSULT - [facility]    17, 11:12 AM  Patient Care Team:  Aaron Stoddard MD as PCP - General  Aaron Stoddard MD as PCP - Family Medicine  Name: Elaine Juárez  : 1960  MRN: [id number]  Contact Serial Number [id number]    Chief complaint: Dyspnea    HPI:  We have been consulted by Sadaf Junior DO to see this 56 y.o. year old female born on 1960.  Patient complains of dyspnea and cough in the lower, chest for several months. Severity: moderate.  Aggravating factors: exercise.   Alleviating factors: medication(s) (none) Associated symptoms: fatigue. Sputum is white.  Patient currently is not on home oxygen therapy.. Respiratory history: none  Pt reports that she has had a cough and some SOA for months but its been worse since . She denies any fever or chills but does report when she has these \"coughing fits\" she cough so hard and so long that she sweats. She reports that she rarely produces sputum it is mostly a dry cough but if she does its white and thick. She is ok at rest but SOA with activity. She does report a hx of Lupus that has never been treated by anyone. She sees Dr. Stoddard as her PCP but she is on nothing for the Lupus. She is feeling better today and wants to go home. No other aggravating, alleviating factors or associated symptoms.    Past Medical History:  Past Medical History   Diagnosis Date   • Arthritis      Past Surgical History   Procedure Laterality Date   • Cholecystectomy       No Known Allergies  Medications:    albuterol 2.5 mg Nebulization Q4H - RT   ceftriaxone 1 g Intravenous Q24H   And      azithromycin 500 mg Intravenous Q24H   enoxaparin 40 mg Subcutaneous Daily   methylPREDNISolone sodium succinate 60 mg Intravenous Q6H   pantoprazole 40 mg Oral Q AM   ursodiol 300 mg Oral BID   venlafaxine 75 mg Oral BID   vitamin C 500 mg Oral Daily        Family History:  Family History   Problem Relation Age of Onset   • " Cirrhosis Sister    • Liver cancer Brother      Social History:   reports that she has never smoked. She does not have any smokeless tobacco history on file. She reports that she does not drink alcohol or use illicit drugs.  Review of Systems:  Review of Systems :    Constitutional: Positive for fatigue. Negative for fever and unexpected weight change.   HENT: Negative for trouble swallowing.   Eyes: Negative. Negative for visual disturbance.   Respiratory: Positive for cough and shortness of breath. Negative for wheezing.   Cardiovascular: Negative for chest pain, palpitations and leg swelling.   Gastrointestinal: Negative for abdominal pain, diarrhea, nausea and vomiting.   Endocrine: Negative for polydipsia and polyuria.   Genitourinary: Negative for difficulty urinating and frequency.   Musculoskeletal: Negative for arthralgias and myalgias.   Skin: Negative for rash and wound.   Neurological: Negative for dizziness, light-headedness and headaches.   Hematological: Does not bruise/bleed easily.   Psychiatric/Behavioral: Negative for agitation.       Physical Exam:  Temp:  [97.1 °F (36.2 °C)-98.6 °F (37 °C)] 97.5 °F (36.4 °C)  Heart Rate:  [] 111  Resp:  [18-25] 20  BP: ()/(55-74) 133/67No intake or output data in the 24 hours ending 01/22/17 1112  Last 3 weights    01/21/17  1455 01/21/17 2000   Weight: 160 lb (72.6 kg) 160 lb 9.6 oz (72.8 kg)     Physical Exam:    Constitutional: She is oriented to person, place, and time. She appears well-developed and well-nourished.   HENT:   Head: Normocephalic and atraumatic.   Left Ear: External ear normal.   Eyes: Conjunctivae and EOM are normal. Pupils are equal, round, and reactive to light. Right eye exhibits no discharge. Left eye exhibits no discharge.   Neck: Neck supple. No JVD present.   Cardiovascular: Normal rate, regular rhythm, normal heart sounds and intact distal pulses. Exam reveals no gallop and no friction rub.   No murmur  heard.  Pulmonary/Chest: No signs of distress or increased effort. Fine velcro like rales bilateral lower lobes, worse on the right  Abdominal: Soft. Bowel sounds are normal. She exhibits no distension. There is no tenderness. There is no rebound and no guarding.   Musculoskeletal: Normal range of motion. She exhibits no edema, tenderness or deformity.   Lymphadenopathy:   She has no cervical adenopathy.   Neurological: She is alert and oriented to person, place, and time. No cranial nerve deficit.   Skin: Skin is warm and dry. No rash noted.   Psychiatric: She has a normal mood and affect. Her behavior is normal. Judgment and thought content normal.         Results from last 7 days  Lab Units 01/22/17  0448 01/21/17  1636   WBC 10*3/mm3 5.61 7.78   HEMOGLOBIN g/dL 12.6 13.5   PLATELETS 10*3/mm3 296 335       Results from last 7 days  Lab Units 01/22/17  0448 01/21/17  1635   SODIUM mmol/L 141 141   POTASSIUM mmol/L 3.8 4.2   TOTAL CO2 mmol/L 27.0 30.0   BUN mg/dL 13 13   CREATININE mg/dL 0.60 0.68   GLUCOSE mg/dL 196* 102*       Results from last 7 days  Lab Units 01/22/17  0642 01/21/17  1624   PH, ARTERIAL pH units 7.411 7.450   PCO2, ARTERIAL mm Hg 39.4 40.6   PO2 ART mm Hg 88.0 75.6*     Lab Results   Component Value Date    PROBNP 56.1 01/21/2017     BLOOD CULTURE   Date Value Ref Range Status   01/21/2017 No growth at less than 24 hours  Preliminary   01/21/2017 No growth at less than 24 hours  Preliminary     Recent radiology:  Imaging Results (last 72 hours)     Procedure Component Value Units Date/Time    XR Chest 2 View [[id number]] Collected:  01/21/17 1548     Updated:  01/21/17 1631    Narrative:       HISTORY: Cough     CXR: Two views of the chest are obtained. There are no prior studies for  comparison.     There are bibasilar opacities with involvement of the lingula and/or  right middle lobe. There is a prominence of the interstitial densities.  No pleural effusion or pneumothorax is identified.  There are calcified  granuloma. The heart appears normal in size. The mediastinal contour is  normal. There is mild degenerative change of the thoracic spine.       Impression:       Bibasilar opacities with prominent interstitial markings. Multifocal  pneumonia/pneumonitis is favored. No pleural effusion. Normal-sized  heart.  This report was finalized on 01/21/2017 16:29 by Dr. Keke Bangura MD.    CT Angiogram Chest With Contrast [[id number]] Collected:  01/21/17 1803     Updated:  01/21/17 1810    Narrative:       Exam: CT angiogram of the of the chest with intravenous contrast.     CLINICAL HISTORY:  Chest pain.     DOSE:  302 mGycm. Automated exposure control was utilized to diminish  patient radiation dose.     TECHNIQUE: Contiguous axial images were obtained through the thorax  following intravenous contrast administration with reformatted images  obtained in the sagittal and coronal projections from the original data  set. Three-dimensional reconstructions are also obtained.     COMPARISON:  None available     FINDINGS:     Pulmonary arteries:  There is normal enhancement of the pulmonary  arteries with no evidence of pulmonary thromboembolic disease.     Aorta:  The thoracic aorta and proximal great vessels are normal in  appearance. There is no evidence of aortic dissection or aneurysm.     LUNGS:  There are rather diffuse interstitial changes of the lungs  within the right middle lobe and both lower lobes with some associated  patchy alveolar disease. I would favor a bilateral lower lobe and right  middle lobe mixed interstitial and alveolar pneumonia. A tiny right  effusion is present. There may also be an element of chronic  interstitial fibrosis. Follow-up films of the chest are recommended  following treatment.     Pleural spaces: A tiny right effusion is present.     HEART: No evidence of enlargement. No coronary artery calcifications are  present. There is no evidence of pericardial effusion. No  evidence of RV  dysfunction.     Bones: No acute osseous abnormalities are demonstrated.     CHEST WALL: No chest wall abnormalities are demonstrated.     Lymph nodes: There are some mildly enlarged middle mediastinal lymph  nodes involving the pretracheal, AP window and subcarinal cora groups  with a subcarinal node measuring 1.37 m in short axis. The AP window  measures 1.1 m in short axis. The pretracheal node measures 1.2 cm in  short axis.     Upper abdomen: A small hiatal hernia is present. There is a  nonobstructing calculus involving the upper pole the right kidney.       Impression:       1. No evidence of pulmonary thromboembolic disease. The thoracic aorta  is normal in caliber with no evidence of dissection or aneurysm.  2. Rather diffuse mixed interstitial and alveolar pneumonia involving  both lower lobes and the right middle lobe. An element of interstitial  fibrosis is also suspected. There is a tiny right-sided effusion.  This report was finalized on 01/21/2017 18:08 by Dr. Romaine Castillo MD.          Problems:    Active Problems:    Pneumonia of both lower lobes due to infectious organism    Pulmonary Impression:  1. Dyspnea  2. Chronic Cough  3. Pulmonary fibrosis-likely related to her underlying autoimmune conditions  4. Bilateral infiltrates  5. GERD    Recommend:  · After comparing yesterday's scan with one of her abdomen in 2015, the middle lobe and bases of her lungs are minimally worse.  · Cannot rule out pneumonia or bronchitis in addition to the fibrosis but not likely given no fever, sputum production or elevated WBCs.  · Would recommend rest/exercise sats for O2 need and rheumatology to see outpatient as she has never seen anyone or been treated for her autoimmune conditions.   · Pt wants to go home today. If she does it would be reasonable to place her on omnicef and a prednisone taper and have her f/u with Dr. Stoddard short term and our office in about 4-6 week for FVL with diffusion  and another cxr.     Electronically signed by ANASTASIA Briggs, 01/22/17, 11:12 AM  . I have seen and examined patient personally, performing a face-to-face diagnostic evaluation with plan of care reviewed and developed with APRN and nursing staff. I have addended and/or modified the above history of present illness, physical examination, and assessment and plan to reflect my findings and impressions. Essential elements of the care plan were discussed with APRN above.  Agree with findings and assessment/plan as documented above.  I did examine the patient this afternoon and discussed the current recommendations from a pulmonary standpoint with her and family member at the bedside.  She does appear to have some chronic changes on chest imaging studies particularly on the right which may relate to underlying connective tissue disease, presents of aspiration with resultant pneumonia, or possibly some other etiology but clearly she has some chronic changes.  We will plan on seeing her in the office in several weeks with PFTs and certainly agree with rheumatology evaluation.    Electronically signed by Aaron Carmona MD, on 1/22/2017, 2:49 PM

## 2017-01-22 NOTE — PLAN OF CARE
Problem: Patient Care Overview (Adult)  Goal: Plan of Care Review  Outcome: Ongoing (interventions implemented as appropriate)    01/22/17 0423   Coping/Psychosocial Response Interventions   Plan Of Care Reviewed With patient   Patient Care Overview   Progress no change   Outcome Evaluation   Outcome Summary/Follow up Plan pt just admitted         Problem: Pneumonia (Adult)  Goal: Signs and Symptoms of Listed Potential Problems Will be Absent or Manageable (Pneumonia)  Outcome: Ongoing (interventions implemented as appropriate)    01/22/17 0423   Pneumonia   Problems Assessed (Pneumonia) all   Problems Present (Pneumonia) respiratory compromise

## 2017-01-23 ENCOUNTER — APPOINTMENT (OUTPATIENT)
Dept: CARDIOLOGY | Facility: HOSPITAL | Age: 57
End: 2017-01-23

## 2017-01-23 VITALS
SYSTOLIC BLOOD PRESSURE: 124 MMHG | WEIGHT: 161 LBS | OXYGEN SATURATION: 96 % | RESPIRATION RATE: 20 BRPM | DIASTOLIC BLOOD PRESSURE: 68 MMHG | HEART RATE: 102 BPM | HEIGHT: 64 IN | TEMPERATURE: 97.3 F | BODY MASS INDEX: 27.49 KG/M2

## 2017-01-23 PROBLEM — I73.00 RAYNAUD'S PHENOMENON: Status: ACTIVE | Noted: 2017-01-23

## 2017-01-23 PROBLEM — R05.9 COUGH: Status: ACTIVE | Noted: 2017-01-23

## 2017-01-23 PROBLEM — M34.1 CREST SYNDROME: Status: ACTIVE | Noted: 2017-01-23

## 2017-01-23 PROBLEM — R06.02 SOB (SHORTNESS OF BREATH): Status: ACTIVE | Noted: 2017-01-23

## 2017-01-23 PROBLEM — J84.10 PULMONARY FIBROSIS (HCC): Status: ACTIVE | Noted: 2017-01-23

## 2017-01-23 LAB
CHROMATIN AB SERPL-ACNC: <8.6 IU/ML (ref 0–11.9)
CK SERPL-CCNC: 63 U/L (ref 0–203)
CRP SERPL-MCNC: 1.1 MG/DL (ref 0–0.99)
ERYTHROCYTE [SEDIMENTATION RATE] IN BLOOD: 34 MM/HR (ref 0–20)

## 2017-01-23 PROCEDURE — 86235 NUCLEAR ANTIGEN ANTIBODY: CPT | Performed by: INTERNAL MEDICINE

## 2017-01-23 PROCEDURE — 82550 ASSAY OF CK (CPK): CPT | Performed by: INTERNAL MEDICINE

## 2017-01-23 PROCEDURE — 85651 RBC SED RATE NONAUTOMATED: CPT | Performed by: INTERNAL MEDICINE

## 2017-01-23 PROCEDURE — 86038 ANTINUCLEAR ANTIBODIES: CPT | Performed by: INTERNAL MEDICINE

## 2017-01-23 PROCEDURE — 93306 TTE W/DOPPLER COMPLETE: CPT

## 2017-01-23 PROCEDURE — 86140 C-REACTIVE PROTEIN: CPT | Performed by: INTERNAL MEDICINE

## 2017-01-23 PROCEDURE — 25010000002 LEVALBUTEROL PER 0.5 MG: Performed by: FAMILY MEDICINE

## 2017-01-23 PROCEDURE — 25010000002 METHYLPREDNISOLONE PER 40 MG: Performed by: NURSE PRACTITIONER

## 2017-01-23 PROCEDURE — 86431 RHEUMATOID FACTOR QUANT: CPT | Performed by: INTERNAL MEDICINE

## 2017-01-23 PROCEDURE — 86225 DNA ANTIBODY NATIVE: CPT | Performed by: INTERNAL MEDICINE

## 2017-01-23 PROCEDURE — 25010000002 ENOXAPARIN PER 10 MG: Performed by: FAMILY MEDICINE

## 2017-01-23 PROCEDURE — 94640 AIRWAY INHALATION TREATMENT: CPT

## 2017-01-23 PROCEDURE — 93306 TTE W/DOPPLER COMPLETE: CPT | Performed by: INTERNAL MEDICINE

## 2017-01-23 RX ORDER — AZITHROMYCIN 250 MG/1
500 TABLET, FILM COATED ORAL
Status: DISCONTINUED | OUTPATIENT
Start: 2017-01-23 | End: 2017-01-23 | Stop reason: HOSPADM

## 2017-01-23 RX ORDER — CEFDINIR 300 MG/1
300 CAPSULE ORAL EVERY 12 HOURS SCHEDULED
Qty: 20 CAPSULE | Refills: 0 | Status: SHIPPED | OUTPATIENT
Start: 2017-01-23 | End: 2019-06-05

## 2017-01-23 RX ORDER — CEFDINIR 300 MG/1
300 CAPSULE ORAL EVERY 12 HOURS SCHEDULED
Status: DISCONTINUED | OUTPATIENT
Start: 2017-01-23 | End: 2017-01-23 | Stop reason: HOSPADM

## 2017-01-23 RX ORDER — PREDNISONE 20 MG/1
TABLET ORAL
Qty: 14 TABLET | Refills: 0 | Status: SHIPPED | OUTPATIENT
Start: 2017-01-23 | End: 2019-06-05

## 2017-01-23 RX ORDER — HYDROCODONE BITARTRATE AND ACETAMINOPHEN 5; 325 MG/1; MG/1
1 TABLET ORAL EVERY 4 HOURS PRN
Qty: 10 TABLET | Refills: 0 | Status: SHIPPED | OUTPATIENT
Start: 2017-01-23 | End: 2017-01-31

## 2017-01-23 RX ADMIN — METHYLPREDNISOLONE SODIUM SUCCINATE 40 MG: 125 INJECTION, POWDER, FOR SOLUTION INTRAMUSCULAR; INTRAVENOUS at 17:01

## 2017-01-23 RX ADMIN — METHYLPREDNISOLONE SODIUM SUCCINATE 40 MG: 125 INJECTION, POWDER, FOR SOLUTION INTRAMUSCULAR; INTRAVENOUS at 06:29

## 2017-01-23 RX ADMIN — URSODIOL 300 MG: 300 CAPSULE ORAL at 08:52

## 2017-01-23 RX ADMIN — LEVALBUTEROL HYDROCHLORIDE 1.25 MG: 1.25 SOLUTION RESPIRATORY (INHALATION) at 15:12

## 2017-01-23 RX ADMIN — PANTOPRAZOLE SODIUM 40 MG: 40 TABLET, DELAYED RELEASE ORAL at 06:29

## 2017-01-23 RX ADMIN — OXYCODONE HYDROCHLORIDE AND ACETAMINOPHEN 500 MG: 500 TABLET ORAL at 08:52

## 2017-01-23 RX ADMIN — CEFDINIR 300 MG: 300 CAPSULE ORAL at 11:40

## 2017-01-23 RX ADMIN — URSODIOL 300 MG: 300 CAPSULE ORAL at 17:01

## 2017-01-23 RX ADMIN — ENOXAPARIN SODIUM 40 MG: 40 INJECTION SUBCUTANEOUS at 08:52

## 2017-01-23 RX ADMIN — LEVALBUTEROL HYDROCHLORIDE 1.25 MG: 1.25 SOLUTION RESPIRATORY (INHALATION) at 10:40

## 2017-01-23 RX ADMIN — VENLAFAXINE HYDROCHLORIDE 75 MG: 37.5 TABLET ORAL at 08:52

## 2017-01-23 RX ADMIN — LEVALBUTEROL HYDROCHLORIDE 1.25 MG: 1.25 SOLUTION RESPIRATORY (INHALATION) at 07:20

## 2017-01-23 NOTE — PLAN OF CARE
Problem: Patient Care Overview (Adult)  Goal: Plan of Care Review  Outcome: Outcome(s) achieved Date Met:  01/23/17    Problem: Pneumonia (Adult)  Goal: Signs and Symptoms of Listed Potential Problems Will be Absent or Manageable (Pneumonia)  Outcome: Ongoing (interventions implemented as appropriate)

## 2017-01-23 NOTE — CONSULTS
Rheumatology Consultation Note    Physician Requesting Consult: Sadaf Junior, DO    Reason for Consult: Autoimmune disease?    Chief Complaint:  Cough and Shortness of Breath      History of Present Illness: Elaine Juárez is a 56 y.o.  female who presents to the hospital with cough (productive of white/thick sputum) and shortness of breath.  She has had a cough (mostly dry) for about 1 year that has worsened recently. She thinks she has had some low grade fever associated with this.  She also has severe reflux (has been on omeprazole and EGD 08/16 revealed Grade A esophagitis and normal duodenum).  She has a history of PBC (liver biopsy in 2015) and is a patient of Dr. Wiggins.  She is on ursodiol.  She reports that she has had Raynaud's symptoms for 28 years.  No history of digital ulcers. She has noticed some red spots on her face/palms of her hands in recent years.  She denies orthopnea or PND symptoms.  She has no history of photosensitive rashes or alopecia.  She has a long history of sores in the mouth (painful).  She has some mild-moderate bilateral knee pain (worse with activity).   CT chest revealed diffuse interstitial changes in RML and both lower lobes with some patchy alveolar disease, mildly enlarged mediastinal lymph nodes.  No PE.  Respiratory cultures have revealed (1+) gram negative bacille and mixed gram positive jm.  She is currently on steroids and antibiotics with improvement in symptoms.     PMH:     PBC (history of liver biopsy in 2015)  Raynaud's  Reflux Esophagitis      Surgical History:   Cholecystectomy  Liver Biopsy    Social History:  , works as a realtor, nonsmoker.  No etoh use.       Family History:   Family History   Problem Relation Age of Onset   • Cirrhosis Sister    • Liver cancer Brother      Mother - Thyroid disease.  No known rheumatological diseases in the family.     Allergies:  Codeine - GI upset    Medication Review:   Current  Facility-Administered Medications   Medication Dose Route Frequency Provider Last Rate Last Dose   • acetaminophen (TYLENOL) tablet 650 mg  650 mg Oral Q4H PRN Sadaf Junior DO       • aluminum-magnesium hydroxide-simethicone (MAALOX/MYLANTA) suspension 30 mL  30 mL Oral Q6H PRN Sadaf Junior DO       • cefTRIAXone (ROCEPHIN) 1 g/100 mL 0.9% NS (MBP)  1 g Intravenous Q24H Sadaf Junior DO   1 g at 01/22/17 2131    And   • AZITHROMYCIN 500 MG/250 ML 0.9% NS IVPB (vial-mate)  500 mg Intravenous Q24H Sadaf Junior DO   500 mg at 01/22/17 2234   • enoxaparin (LOVENOX) syringe 40 mg  40 mg Subcutaneous Daily Sadaf Junior DO   40 mg at 01/23/17 0852   • HYDROcodone-acetaminophen (NORCO) 5-325 MG per tablet 1 tablet  1 tablet Oral Q4H PRN Sadaf Junior DO   1 tablet at 01/22/17 2118   • Influenza Vac Subunit Quad (FLUCELVAX) injection 0.5 mL  0.5 mL Intramuscular During Hospitalization Sadaf Junior DO       • levalbuterol (XOPENEX) nebulizer solution 1.25 mg  1.25 mg Nebulization 4x Daily - RT aSdaf Junior DO   1.25 mg at 01/23/17 0720   • LORazepam (ATIVAN) tablet 1 mg  1 mg Oral Q6H PRN Sadaf Junior DO       • magnesium hydroxide (MILK OF MAGNESIA) suspension 2400 mg/10mL 10 mL  10 mL Oral Daily PRN Sadaf Junior DO       • methylPREDNISolone sodium succinate (SOLU-Medrol) injection 40 mg  40 mg Intravenous Q12H FelaANASTASIA Mott   40 mg at 01/23/17 0629   • Morphine sulfate (PF) injection 1 mg  1 mg Intravenous Q4H PRN Sadaf Junior DO        And   • naloxone (NARCAN) injection 0.4 mg  0.4 mg Intravenous Q5 Min PRN Sadaf Junior DO       • ondansetron (ZOFRAN) injection 4 mg  4 mg Intravenous Q6H PRN Sadaf Junior DO       • pantoprazole (PROTONIX) EC tablet 40 mg  40 mg Oral Q AM Sadaf Junior DO   40 mg at 01/23/17 0629   • pneumococcal polysaccharide 23-valent (PNEUMOVAX-23) vaccine 0.5 mL  0.5 mL Intramuscular During  "Hospitalization Sadaf Junior DO       • sodium chloride 0.9 % flush 1-10 mL  1-10 mL Intravenous PRN Sadaf Junior DO       • sodium chloride 0.9 % flush 10 mL  10 mL Intravenous PRN Jaycob Baldwin MD       • temazepam (RESTORIL) capsule 30 mg  30 mg Oral Nightly PRN Sadaf Junior DO   30 mg at 01/22/17 2118   • ursodiol (ACTIGALL) capsule 300 mg  300 mg Oral BID Sadaf Junior DO   300 mg at 01/23/17 0852   • venlafaxine (EFFEXOR) tablet 75 mg  75 mg Oral BID Sadaf Junior DO   75 mg at 01/23/17 0852   • vitamin C (ASCORBIC ACID) tablet 500 mg  500 mg Oral Daily Sadaf Junior DO   500 mg at 01/23/17 0852           Review of Systems:     CONSTITUTIONAL: negative for chills, fevers, night sweats, weight loss  EYES: negative for pain, redness, changes in vision, dry eyes  ENT: negative for sinus congestion, rhinorrhea, nose bleeds, sore throat, +oral sores, nasal ulcers, + dry mouth  NECK: negative for lymphadenopathy, stiffness, pain  RESPIRATORY: +shortness of breath, +cough, +dyspnea on exertion, no hemoptysis  CARDIOVASCULAR: negative for chest pain, palpitations, lower extremity edema  GASTROINTESTINAL: negative for abdominal pain, constipation, diarrhea, nausea, vomiting, + reflux  GENITOURINARY: negative for frequency, dysuria, hematuria, genital ulcers  SKIN: negative for rashes, +skin lesions, no psoriasis, no photosensitivity, no alopecia, + color changes in extremities  NEUROLOGICAL: negative for dizziness, headaches, numbness/tingling in extremities, seizures  MUSCULOSKELETAL: + knee pain, no joint swelling or redness  PSYCHIATRIC/BEHAVIOR: negative for depression, anxiety, sleep disturbance    Objective     Vital Signs  Visit Vitals   • /58 (BP Location: Left arm, Patient Position: Lying)   • Pulse 94   • Temp 97.1 °F (36.2 °C) (Temporal Artery )   • Resp 20   • Ht 64\" (162.6 cm)   • Wt 161 lb 12.8 oz (73.4 kg)   • SpO2 94%   • BMI 27.77 kg/m2       Physical " Exam:    GENERAL:  Alert, cooperative, no distress  MENTAL STATUS: oriented to person, place, and time with normal affect  HEAD: normocephalic, atraumatic  EYES: pupils equal and reactive, extraocular eye movements intact, conjunctiva clear  NOSE: turbinates non-inflamed, no drainage  MOUTH: mucous membranes moist without oral lesions  NECK: supple, no carotid bruits, no lymphadenopathy  HEART: RRR; no murmurs, gallops, or rubs  LUNGS: scattered rales  ABDOMEN: soft, nontender, nondistended, positive bowel sounds, no hepatosplenomegaly  EXTREMITIES: no edema, erythema, peripheral pulses palpable  NEUROLOGICAL: normal speech, no focal findings or movement disorder noted, cranial nerves II through XII grossly intact  SKIN: no appreciable periungual erythema, no sclerodactyly.  + telangectasias (facial) as well as palmar.   MSK: No swelling of MCP's, PIP's, DIP's, wrists. No elbow effusion. Full ROM shoulders. No knee or ankle effusion. No MTP swelling. Muscle strength 5/5 bilateral upper and lower extremities.             Results Review:   Lab Results (last 24 hours)     Procedure Component Value Units Date/Time    Blood Culture [[id number]]  (Normal) Collected:  01/21/17 1634    Specimen:  Blood from Arm, Left Updated:  01/22/17 1801     Blood Culture No growth at 24 hours     Blood Culture [[id number]]  (Normal) Collected:  01/21/17 1634    Specimen:  Blood from Arm, Right Updated:  01/22/17 1801     Blood Culture No growth at 24 hours     Sjogrens Syndrome-A Extractable Nuclear Antibody [[id number]] Collected:  01/23/17 0516    Specimen:  Blood Updated:  01/23/17 0600    Sjogrens Syndrome-B Extractable Nuclear Antibody [[id number]] Collected:  01/23/17 0516    Specimen:  Blood Updated:  01/23/17 0600    Anti-Smith Antibody [[id number]] Collected:  01/23/17 0516    Specimen:  Blood Updated:  01/23/17 0600    RNP Antibodies [[id number]] Collected:  01/23/17 0516    Specimen:  Blood Updated:  01/23/17 0600    SANGEETHA [[id number]]  Collected:  01/23/17 0516    Specimen:  Blood Updated:  01/23/17 0600    Anti-scleroderma Antibody [[id number]] Collected:  01/23/17 0516    Specimen:  Blood Updated:  01/23/17 0600    Rheumatoid Factor [[id number]] Collected:  01/23/17 0516    Specimen:  Blood Updated:  01/23/17 0601    Anti-Dasia 1 Antibody, IgG [[id number]] Collected:  01/23/17 0516    Specimen:  Blood Updated:  01/23/17 0601    Anti-DNA Antibody, Double-stranded [[id number]] Collected:  01/23/17 0516    Specimen:  Blood Updated:  01/23/17 0601    Anti-Centromere B Antibodies [[id number]] Collected:  01/23/17 0516    Specimen:  Blood Updated:  01/23/17 0601    C-reactive Protein [[id number]]  (Abnormal) Collected:  01/23/17 0516    Specimen:  Blood Updated:  01/23/17 0620     C-Reactive Protein 1.10 (H) mg/dL     CK [[id number]]  (Normal) Collected:  01/23/17 0516    Specimen:  Blood Updated:  01/23/17 0620     Creatine Kinase 63 U/L     Sedimentation Rate [[id number]]  (Abnormal) Collected:  01/23/17 0516    Specimen:  Blood Updated:  01/23/17 0646     Sed Rate 34 (H) mm/hr     Respiratory Culture [[id number]] Collected:  01/22/17 0031    Specimen:  Sputum from Cough Updated:  01/23/17 0838     Respiratory Culture        Moderate growth (3+) Normal Respiratory Jm     Gram Stain Result Less than 25 WBCs per low power field              Rare (1+) Epithelial cells per low power field      Rare (1+) Mixed gram positive jm       Rare (1+) Gram negative bacilli         Imaging Results (last 72 hours)     Procedure Component Value Units Date/Time    XR Chest 2 View [[id number]] Collected:  01/21/17 1548     Updated:  01/21/17 1631    Narrative:       HISTORY: Cough     CXR: Two views of the chest are obtained. There are no prior studies for  comparison.     There are bibasilar opacities with involvement of the lingula and/or  right middle lobe. There is a prominence of the interstitial densities.  No pleural effusion or pneumothorax is identified. There are  calcified  granuloma. The heart appears normal in size. The mediastinal contour is  normal. There is mild degenerative change of the thoracic spine.       Impression:       Bibasilar opacities with prominent interstitial markings. Multifocal  pneumonia/pneumonitis is favored. No pleural effusion. Normal-sized  heart.  This report was finalized on 01/21/2017 16:29 by Dr. Keke Bangura MD.    CT Angiogram Chest With Contrast [[id number]] Collected:  01/21/17 1803     Updated:  01/21/17 1810    Narrative:       Exam: CT angiogram of the of the chest with intravenous contrast.     CLINICAL HISTORY:  Chest pain.     DOSE:  302 mGycm. Automated exposure control was utilized to diminish  patient radiation dose.     TECHNIQUE: Contiguous axial images were obtained through the thorax  following intravenous contrast administration with reformatted images  obtained in the sagittal and coronal projections from the original data  set. Three-dimensional reconstructions are also obtained.     COMPARISON:  None available     FINDINGS:     Pulmonary arteries:  There is normal enhancement of the pulmonary  arteries with no evidence of pulmonary thromboembolic disease.     Aorta:  The thoracic aorta and proximal great vessels are normal in  appearance. There is no evidence of aortic dissection or aneurysm.     LUNGS:  There are rather diffuse interstitial changes of the lungs  within the right middle lobe and both lower lobes with some associated  patchy alveolar disease. I would favor a bilateral lower lobe and right  middle lobe mixed interstitial and alveolar pneumonia. A tiny right  effusion is present. There may also be an element of chronic  interstitial fibrosis. Follow-up films of the chest are recommended  following treatment.     Pleural spaces: A tiny right effusion is present.     HEART: No evidence of enlargement. No coronary artery calcifications are  present. There is no evidence of pericardial effusion. No evidence of  RV  dysfunction.     Bones: No acute osseous abnormalities are demonstrated.     CHEST WALL: No chest wall abnormalities are demonstrated.     Lymph nodes: There are some mildly enlarged middle mediastinal lymph  nodes involving the pretracheal, AP window and subcarinal cora groups  with a subcarinal node measuring 1.37 m in short axis. The AP window  measures 1.1 m in short axis. The pretracheal node measures 1.2 cm in  short axis.     Upper abdomen: A small hiatal hernia is present. There is a  nonobstructing calculus involving the upper pole the right kidney.       Impression:       1. No evidence of pulmonary thromboembolic disease. The thoracic aorta  is normal in caliber with no evidence of dissection or aneurysm.  2. Rather diffuse mixed interstitial and alveolar pneumonia involving  both lower lobes and the right middle lobe. An element of interstitial  fibrosis is also suspected. There is a tiny right-sided effusion.  This report was finalized on 01/21/2017 18:08 by Dr. Romaine Castillo MD.              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  Discussed that patient's with PBC often have other autoimmune conditions.  I have ordered serologies for further evaluation.  Will get 2D echo to assess for pulmonary artery hypertension.  She is currently being treated for the possibility of superimposed infection. Agree with full PFT's as well, she certainly may require immunosuppression from a pulmonary standpoint, would be helpful to have baseline PFT's prior to initiation of a dmard.              Antonia Landry MD  01/23/17  9:58 AM

## 2017-01-23 NOTE — PLAN OF CARE
Problem: Patient Care Overview (Adult)  Goal: Plan of Care Review  Outcome: Ongoing (interventions implemented as appropriate)    01/23/17 0531   Coping/Psychosocial Response Interventions   Plan Of Care Reviewed With patient   Patient Care Overview   Progress progress toward functional goals as expected       Goal: Adult Individualization and Mutuality  Outcome: Ongoing (interventions implemented as appropriate)    Problem: Pneumonia (Adult)  Goal: Signs and Symptoms of Listed Potential Problems Will be Absent or Manageable (Pneumonia)  Outcome: Ongoing (interventions implemented as appropriate)    01/23/17 0531   Pneumonia   Problems Assessed (Pneumonia) all   Problems Present (Pneumonia) respiratory compromise

## 2017-01-23 NOTE — PROGRESS NOTES
Automatic IV to PO Conversion Pharmacy Note - Antimicrobial    Elaine Juárez is a 56 y.o. female who meets the following criteria for IV to PO therapy conversion :     · Tolerating oral fluids or 40ml/hour of enteral nutrition and oral route not otherwise compromised  · Receiving other oral medications on a scheduled basis  · Afebrile (temperature less than 100.4 degrees F) for at least 24 hours  · WBC within/decreasing toward normal range    Lab Results   Component Value Date    WBC 5.61 01/22/2017     Temp Readings from Last 1 Encounters:   01/23/17 97.1 °F (36.2 °C) (Temporal Artery )       Assessment/Plan  Based on this criteria,   Rocephin 1000 mg IV Q24H has been changed to Cefdinir 300 mg PO Q12H and  Zithromax 500mg IV q24 has beed changed to 500mg po q24   per the directives and guidelines established by [facility] and [facility]rapeutics Committee and [facility] Executive Committee .       SONY Deleon, Pharm.D.    01/23/1710:55 AM

## 2017-01-24 LAB
ANA SER QL: NEGATIVE
BACTERIA SPEC RESP CULT: NORMAL
BH CV ECHO MEAS - AO MAX PG (FULL): 1.7 MMHG
BH CV ECHO MEAS - AO MAX PG: 6.9 MMHG
BH CV ECHO MEAS - AO MEAN PG (FULL): 1 MMHG
BH CV ECHO MEAS - AO MEAN PG: 4 MMHG
BH CV ECHO MEAS - AO ROOT AREA (BSA CORRECTED): 1.9
BH CV ECHO MEAS - AO ROOT AREA: 8.6 CM^2
BH CV ECHO MEAS - AO ROOT DIAM: 3.3 CM
BH CV ECHO MEAS - AO V2 MAX: 131 CM/SEC
BH CV ECHO MEAS - AO V2 MEAN: 89.1 CM/SEC
BH CV ECHO MEAS - AO V2 VTI: 25.2 CM
BH CV ECHO MEAS - AVA(I,A): 3.4 CM^2
BH CV ECHO MEAS - AVA(I,D): 3.4 CM^2
BH CV ECHO MEAS - AVA(V,A): 3 CM^2
BH CV ECHO MEAS - AVA(V,D): 3 CM^2
BH CV ECHO MEAS - BSA(HAYCOCK): 1.8 M^2
BH CV ECHO MEAS - BSA: 1.8 M^2
BH CV ECHO MEAS - BZI_BMI: 27.6 KILOGRAMS/M^2
BH CV ECHO MEAS - BZI_METRIC_HEIGHT: 162.6 CM
BH CV ECHO MEAS - BZI_METRIC_WEIGHT: 73 KG
BH CV ECHO MEAS - EDV(CUBED): 73.6 ML
BH CV ECHO MEAS - EDV(TEICH): 78.1 ML
BH CV ECHO MEAS - EF(CUBED): 79.3 %
BH CV ECHO MEAS - EF(TEICH): 72 %
BH CV ECHO MEAS - ESV(CUBED): 15.3 ML
BH CV ECHO MEAS - ESV(TEICH): 21.9 ML
BH CV ECHO MEAS - FS: 40.8 %
BH CV ECHO MEAS - IVS/LVPW: 1.1
BH CV ECHO MEAS - IVSD: 1.3 CM
BH CV ECHO MEAS - LA DIMENSION: 3.9 CM
BH CV ECHO MEAS - LA/AO: 1.2
BH CV ECHO MEAS - LAT PEAK E' VEL: 10.4 CM/SEC
BH CV ECHO MEAS - LV MASS(C)D: 176.4 GRAMS
BH CV ECHO MEAS - LV MASS(C)DI: 98.9 GRAMS/M^2
BH CV ECHO MEAS - LV MAX PG: 5.2 MMHG
BH CV ECHO MEAS - LV MEAN PG: 3 MMHG
BH CV ECHO MEAS - LV V1 MAX: 114 CM/SEC
BH CV ECHO MEAS - LV V1 MEAN: 78.1 CM/SEC
BH CV ECHO MEAS - LV V1 VTI: 25 CM
BH CV ECHO MEAS - LVIDD: 4.2 CM
BH CV ECHO MEAS - LVIDS: 2.5 CM
BH CV ECHO MEAS - LVOT AREA (M): 3.5 CM^2
BH CV ECHO MEAS - LVOT AREA: 3.5 CM^2
BH CV ECHO MEAS - LVOT DIAM: 2.1 CM
BH CV ECHO MEAS - LVPWD: 1.1 CM
BH CV ECHO MEAS - MED PEAK E' VEL: 5.77 CM/SEC
BH CV ECHO MEAS - MV A MAX VEL: 77.8 CM/SEC
BH CV ECHO MEAS - MV DEC TIME: 0.19 SEC
BH CV ECHO MEAS - MV E MAX VEL: 75.8 CM/SEC
BH CV ECHO MEAS - MV E/A: 0.97
BH CV ECHO MEAS - SI(AO): 120.8 ML/M^2
BH CV ECHO MEAS - SI(CUBED): 32.7 ML/M^2
BH CV ECHO MEAS - SI(LVOT): 48.5 ML/M^2
BH CV ECHO MEAS - SI(TEICH): 31.5 ML/M^2
BH CV ECHO MEAS - SV(AO): 215.5 ML
BH CV ECHO MEAS - SV(CUBED): 58.3 ML
BH CV ECHO MEAS - SV(LVOT): 86.6 ML
BH CV ECHO MEAS - SV(TEICH): 56.3 ML
CENTROMERE B AB SER-ACNC: <0.2 AI (ref 0–0.9)
DSDNA AB SER-ACNC: <1 IU/ML (ref 0–9)
E/E' RATIO: 13.1
ENA JO1 AB SER-ACNC: <0.2 AI (ref 0–0.9)
ENA RNP AB SER-ACNC: 0.2 AI (ref 0–0.9)
ENA SCL70 AB SER-ACNC: <0.2 AI (ref 0–0.9)
ENA SM AB SER-ACNC: <0.2 AI (ref 0–0.9)
ENA SS-A AB SER-ACNC: <0.2 AI (ref 0–0.9)
ENA SS-B AB SER-ACNC: <0.2 AI (ref 0–0.9)
GRAM STN SPEC: NORMAL
LEFT ATRIUM VOLUME INDEX: 33 ML/M2
LEFT ATRIUM VOLUME: 58.8 CM3

## 2017-01-24 NOTE — DISCHARGE SUMMARY
[facility] Medicine Services  DISCHARGE SUMMARY       Date of Admission: 1/21/2017  Date of Discharge:  1/23/2017  Primary Care Physician: Aaron Stoddard MD    Discharge Diagnoses:  Hospital Problem List     Pneumonia of both lower lobes due to infectious organism    Cough    SOB (shortness of breath)    CREST syndrome, partial     Raynaud's phenomenon    Pulmonary fibrosis prob sec underlying autoimmune disease            Presenting Problem/History of Present Illness:  Pneumonia of both lower lobes due to infectious organism [J18.9]  Pneumonia of both lower lobes due to infectious organism [J18.9]  Pneumonia of both lower lobes due to infectious organism [J18.9]         Hospital Course  Patient is a 56 y.o. female presented with  aches of coughing with worsening since Stacey.  Initially talked to her daughter who is a practicing nurse practitioner.  She told me that initially they thought it must have been acid reflux.  However, I treatment.  Symptoms continue to persist and now accompanied by shortness of breath.    In evaluation.  Patient underwent further testing as documented below .  Dr. Carmona and Dr. Landry and seen patient in consultation and felt that it is reasonable to place her on Omnicef and prednisone taper with follow-up with Dr.Rinney  in a short term while in follow-up in the office in about 4-6 weeks with another x-ray as well as pulmonary function tests.  Patient has been cleared from their standpoint to be discharged.  Dr. Landry on the other hand, requested some serology and echocardiogram which are still pending prior to discharge.  I will defer to Dr. Landry to follow through these.    Patient and daughter states she is doing better and breathing better.  She is very adamant of going home.  She has  been cleared by Pulmonary to go home.  Blood work for serology has been collected and Echo has been done.  She has a good support group.  Her  oxygen saturation  Ranges from 92-96% and had no post exercise destauration.        Consults:   Dr. Carmona - Pulmonary   Dr. Landry - Rheumatology      Pertinent Test Results:   Lab Results (last 24 hours)     Procedure Component Value Units Date/Time    Sjogrens Syndrome-A Extractable Nuclear Antibody [[id number]] Collected:  01/23/17 0516    Specimen:  Blood Updated:  01/23/17 0600    Sjogrens Syndrome-B Extractable Nuclear Antibody [[id number]] Collected:  01/23/17 0516    Specimen:  Blood Updated:  01/23/17 0600    Anti-Smith Antibody [[id number]] Collected:  01/23/17 0516    Specimen:  Blood Updated:  01/23/17 0600    RNP Antibodies [[id number]] Collected:  01/23/17 0516    Specimen:  Blood Updated:  01/23/17 0600    SANGEETHA [[id number]] Collected:  01/23/17 0516    Specimen:  Blood Updated:  01/23/17 0600    Anti-scleroderma Antibody [[id number]] Collected:  01/23/17 0516    Specimen:  Blood Updated:  01/23/17 0600    Anti-Dasia 1 Antibody, IgG [[id number]] Collected:  01/23/17 0516    Specimen:  Blood Updated:  01/23/17 0601    Anti-DNA Antibody, Double-stranded [[id number]] Collected:  01/23/17 0516    Specimen:  Blood Updated:  01/23/17 0601    Anti-Centromere B Antibodies [[id number]] Collected:  01/23/17 0516    Specimen:  Blood Updated:  01/23/17 0601    C-reactive Protein [[id number]]  (Abnormal) Collected:  01/23/17 0516    Specimen:  Blood Updated:  01/23/17 0620     C-Reactive Protein 1.10 (H) mg/dL     CK [[id number]]  (Normal) Collected:  01/23/17 0516    Specimen:  Blood Updated:  01/23/17 0620     Creatine Kinase 63 U/L     Sedimentation Rate [[id number]]  (Abnormal) Collected:  01/23/17 0516    Specimen:  Blood Updated:  01/23/17 0646     Sed Rate 34 (H) mm/hr     Respiratory Culture [[id number]] Collected:  01/22/17 0031    Specimen:  Sputum from Cough Updated:  01/23/17 0838     Respiratory Culture        Moderate growth (3+) Normal Respiratory Staci     Gram Stain Result Less than 25 WBCs per low power field               Rare (1+) Epithelial cells per low power field      Rare (1+) Mixed gram positive jm       Rare (1+) Gram negative bacilli     Rheumatoid Factor, Quant [[id number]]  (Normal) Collected:  01/23/17 0516    Specimen:  Blood Updated:  01/23/17 1409     Rheumatoid Factor Quantitative <8.6 IU/mL     Blood Culture [[id number]]  (Normal) Collected:  01/21/17 1634    Specimen:  Blood from Arm, Left Updated:  01/23/17 1801     Blood Culture No growth at 2 days     Blood Culture [[id number]]  (Normal) Collected:  01/21/17 1634    Specimen:  Blood from Arm, Right Updated:  01/23/17 1801     Blood Culture No growth at 2 days         Imaging Results (last 72 hours)     Procedure Component Value Units Date/Time    XR Chest 2 View [[id number]] Collected:  01/21/17 1548     Updated:  01/21/17 1631    Narrative:       HISTORY: Cough     CXR: Two views of the chest are obtained. There are no prior studies for  comparison.     There are bibasilar opacities with involvement of the lingula and/or  right middle lobe. There is a prominence of the interstitial densities.  No pleural effusion or pneumothorax is identified. There are calcified  granuloma. The heart appears normal in size. The mediastinal contour is  normal. There is mild degenerative change of the thoracic spine.       Impression:       Bibasilar opacities with prominent interstitial markings. Multifocal  pneumonia/pneumonitis is favored. No pleural effusion. Normal-sized  heart.  This report was finalized on 01/21/2017 16:29 by Dr. Keke Bangura MD.    CT Angiogram Chest With Contrast [[id number]] Collected:  01/21/17 1803     Updated:  01/21/17 1810    Narrative:       Exam: CT angiogram of the of the chest with intravenous contrast.     CLINICAL HISTORY:  Chest pain.     DOSE:  302 mGycm. Automated exposure control was utilized to diminish  patient radiation dose.     TECHNIQUE: Contiguous axial images were obtained through the thorax  following intravenous contrast  administration with reformatted images  obtained in the sagittal and coronal projections from the original data  set. Three-dimensional reconstructions are also obtained.     COMPARISON:  None available     FINDINGS:     Pulmonary arteries:  There is normal enhancement of the pulmonary  arteries with no evidence of pulmonary thromboembolic disease.     Aorta:  The thoracic aorta and proximal great vessels are normal in  appearance. There is no evidence of aortic dissection or aneurysm.     LUNGS:  There are rather diffuse interstitial changes of the lungs  within the right middle lobe and both lower lobes with some associated  patchy alveolar disease. I would favor a bilateral lower lobe and right  middle lobe mixed interstitial and alveolar pneumonia. A tiny right  effusion is present. There may also be an element of chronic  interstitial fibrosis. Follow-up films of the chest are recommended  following treatment.     Pleural spaces: A tiny right effusion is present.     HEART: No evidence of enlargement. No coronary artery calcifications are  present. There is no evidence of pericardial effusion. No evidence of RV  dysfunction.     Bones: No acute osseous abnormalities are demonstrated.     CHEST WALL: No chest wall abnormalities are demonstrated.     Lymph nodes: There are some mildly enlarged middle mediastinal lymph  nodes involving the pretracheal, AP window and subcarinal cora groups  with a subcarinal node measuring 1.37 m in short axis. The AP window  measures 1.1 m in short axis. The pretracheal node measures 1.2 cm in  short axis.     Upper abdomen: A small hiatal hernia is present. There is a  nonobstructing calculus involving the upper pole the right kidney.       Impression:       1. No evidence of pulmonary thromboembolic disease. The thoracic aorta  is normal in caliber with no evidence of dissection or aneurysm.  2. Rather diffuse mixed interstitial and alveolar pneumonia involving  both lower  "lobes and the right middle lobe. An element of interstitial  fibrosis is also suspected. There is a tiny right-sided effusion.  This report was finalized on 01/21/2017 18:08 by Dr. Romaine Castillo MD.        Condition on Discharge:   Stable    Physical Exam on Discharge:  Visit Vitals   • /68 (BP Location: Left arm, Patient Position: Lying)   • Pulse 87   • Temp 97.3 °F (36.3 °C) (Temporal Artery )   • Resp 20   • Ht 64\" (162.6 cm)   • Wt 161 lb (73 kg)   • SpO2 97%   • BMI 27.64 kg/m2     Physical Exam   Constitutional: She is oriented to person, place, and time. She appears well-developed and well-nourished.   HENT:   Head: Normocephalic and atraumatic.   Nose: Nose normal.   Mouth/Throat: Oropharynx is clear and moist.   Eyes: Conjunctivae are normal. Pupils are equal, round, and reactive to light.   Neck: Normal range of motion. Neck supple.   Cardiovascular: Normal rate, regular rhythm and normal heart sounds.    Pulmonary/Chest: Effort normal. She has no wheezes. She has no rales.   Abdominal: Soft. Bowel sounds are normal.   Neurological: She is alert and oriented to person, place, and time.   Skin: Skin is warm and dry.   Psychiatric: She has a normal mood and affect. Her behavior is normal. Thought content normal.         Discharge Disposition:  Home or Self Care    Discharge Medications:   Elaine Juárez   Home Medication Instructions STORM:[id number]    Printed on:01/23/17 1166   Medication Information                      cefdinir (OMNICEF) 300 MG capsule  Take 1 capsule by mouth Every 12 (Twelve) Hours. Indications: Pneumonia             Flaxseed, Linseed, (FLAX SEED OIL PO)  Take  by mouth.             HYDROcodone-acetaminophen (NORCO) 5-325 MG per tablet  Take 1 tablet by mouth Every 4 (Four) Hours As Needed for moderate pain (4-6) for up to 8 days.             Influenza Vac Subunit Quad (FLUCELVAX) 0.5 ML suspension prefilled syringe injection  Inject 0.5 mL into the shoulder, thigh, or " buttocks 1 (One) Time for 1 dose.             Multiple Vitamin (MULTI VITAMIN PO)  Take  by mouth.             omeprazole (priLOSEC) 40 MG capsule  Take 40 mg by mouth Daily.             pneumococcal polysaccharide 23-valent (PNEUMOVAX-23) 25 MCG/0.5ML vaccine  Inject 0.5 mL into the shoulder, thigh, or buttocks 1 (One) Time for 1 dose.             predniSONE (DELTASONE) 20 MG tablet  40 mg daily x 3 days then 30 mg daily x 3 days then 20 mg daily x 3 days then 10 mg daily x 3 days then stop             ursodiol (ACTIGALL) 300 MG capsule  Take 300 mg by mouth 2 (Two) Times a Day.             venlafaxine (EFFEXOR) 75 MG tablet  Take 75 mg by mouth 2 (Two) Times a Day.             vitamin C (ASCORBIC ACID) 500 MG tablet  Take 500 mg by mouth Daily.                 Discharge Diet:   Diet Instructions     Diet: Regular; Thin Liquids, No Restrictions       Discharge Diet:  Regular   Fluid Consistency:  Thin Liquids, No Restrictions                     Activity at Discharge:   Activity Instructions     Activity as Tolerated                     Follow-up Appointments: Dr. Rinney in 1 wk  Dr. Mathew juan 4- 6 wks with pft or per his instructions  Dr. Landry  Per her recomendation  Test Results Pending at Discharge:    Order Current Status    SANGEETHA In process    Anti-Centromere B Antibodies In process    Anti-DNA Antibody, Double-stranded In process    Anti-Dasia 1 Antibody, IgG In process    Anti-Smith Antibody In process    Anti-scleroderma Antibody In process    RNP Antibodies In process    Sjogrens Syndrome-A Extractable Nuclear Antibody In process    Sjogrens Syndrome-B Extractable Nuclear Antibody In process    Blood Culture Preliminary result    Blood Culture Preliminary result    Respiratory Culture Preliminary result           Peng Denson MD  01/23/17  6:23 PM    Time: > 30 mins    Please note that portions of this note may have been completed with a voice recognition program. Efforts were made to edit the  dictations, but occasionally words are mistranscribed.

## 2017-01-26 LAB
BACTERIA SPEC AEROBE CULT: NORMAL
BACTERIA SPEC AEROBE CULT: NORMAL

## 2017-03-17 LAB
ALBUMIN SERPL-MCNC: 3.8 G/DL (ref 3.5–5.2)
ALP BLD-CCNC: 211 U/L (ref 35–104)
ALT SERPL-CCNC: 20 U/L (ref 5–33)
ANION GAP SERPL CALCULATED.3IONS-SCNC: 15 MMOL/L (ref 7–19)
AST SERPL-CCNC: 28 U/L (ref 5–32)
BILIRUB SERPL-MCNC: 0.5 MG/DL (ref 0.2–1.2)
BUN BLDV-MCNC: 8 MG/DL (ref 6–20)
CALCIUM SERPL-MCNC: 9.6 MG/DL (ref 8.6–10)
CHLORIDE BLD-SCNC: 104 MMOL/L (ref 98–111)
CHOLESTEROL, TOTAL: 191 MG/DL (ref 160–199)
CO2: 25 MMOL/L (ref 22–29)
CREAT SERPL-MCNC: 0.5 MG/DL (ref 0.5–0.9)
GFR NON-AFRICAN AMERICAN: >60
GLOBULIN: 3.9 G/DL
GLUCOSE BLD-MCNC: 115 MG/DL (ref 74–109)
HDLC SERPL-MCNC: 48 MG/DL (ref 65–121)
LDL CHOLESTEROL CALCULATED: 110 MG/DL
POTASSIUM SERPL-SCNC: 4.3 MMOL/L (ref 3.5–5)
SODIUM BLD-SCNC: 144 MMOL/L (ref 136–145)
TOTAL PROTEIN: 7.7 G/DL (ref 6.6–8.7)
TRIGL SERPL-MCNC: 163 MG/DL (ref 150–199)
TSH SERPL DL<=0.05 MIU/L-ACNC: 1.7 UIU/ML (ref 0.27–4.2)

## 2017-04-21 ENCOUNTER — HOSPITAL ENCOUNTER (OUTPATIENT)
Dept: GENERAL RADIOLOGY | Age: 57
Discharge: HOME | End: 2017-04-21
Payer: COMMERCIAL

## 2017-04-21 DIAGNOSIS — J84.10 POSTINFLAMMATORY PULMONARY FIBROSIS (HCC): ICD-10-CM

## 2017-04-21 PROCEDURE — 71020 XR CHEST STANDARD TWO VW: CPT

## 2017-08-09 ENCOUNTER — APPOINTMENT (OUTPATIENT)
Dept: LAB | Facility: HOSPITAL | Age: 57
End: 2017-08-09
Attending: INTERNAL MEDICINE

## 2017-08-09 ENCOUNTER — OFFICE VISIT (OUTPATIENT)
Dept: GASTROENTEROLOGY | Facility: CLINIC | Age: 57
End: 2017-08-09

## 2017-08-09 VITALS
DIASTOLIC BLOOD PRESSURE: 70 MMHG | BODY MASS INDEX: 28.68 KG/M2 | TEMPERATURE: 98.1 F | HEART RATE: 80 BPM | HEIGHT: 64 IN | WEIGHT: 168 LBS | SYSTOLIC BLOOD PRESSURE: 126 MMHG

## 2017-08-09 DIAGNOSIS — K74.3 PBC (PRIMARY BILIARY CIRRHOSIS): Primary | ICD-10-CM

## 2017-08-09 LAB
ALBUMIN SERPL-MCNC: 4.1 G/DL (ref 3.5–5)
ALBUMIN/GLOB SERPL: 1.2 G/DL (ref 1.1–2.5)
ALP SERPL-CCNC: 142 U/L (ref 24–120)
ALT SERPL W P-5'-P-CCNC: 33 U/L (ref 0–54)
ANION GAP SERPL CALCULATED.3IONS-SCNC: 9 MMOL/L (ref 4–13)
AST SERPL-CCNC: 33 U/L (ref 7–45)
BILIRUB SERPL-MCNC: 0.5 MG/DL (ref 0.1–1)
BUN BLD-MCNC: 12 MG/DL (ref 5–21)
BUN/CREAT SERPL: 16.9 (ref 7–25)
CALCIUM SPEC-SCNC: 9.5 MG/DL (ref 8.4–10.4)
CHLORIDE SERPL-SCNC: 102 MMOL/L (ref 98–110)
CO2 SERPL-SCNC: 31 MMOL/L (ref 24–31)
CREAT BLD-MCNC: 0.71 MG/DL (ref 0.5–1.4)
GFR SERPL CREATININE-BSD FRML MDRD: 84 ML/MIN/1.73
GLOBULIN UR ELPH-MCNC: 3.4 GM/DL
GLUCOSE BLD-MCNC: 90 MG/DL (ref 70–100)
POTASSIUM BLD-SCNC: 4.5 MMOL/L (ref 3.5–5.3)
PROT SERPL-MCNC: 7.5 G/DL (ref 6.3–8.7)
SODIUM BLD-SCNC: 142 MMOL/L (ref 135–145)

## 2017-08-09 PROCEDURE — 80053 COMPREHEN METABOLIC PANEL: CPT | Performed by: INTERNAL MEDICINE

## 2017-08-09 PROCEDURE — 36415 COLL VENOUS BLD VENIPUNCTURE: CPT | Performed by: INTERNAL MEDICINE

## 2017-08-09 PROCEDURE — 99213 OFFICE O/P EST LOW 20 MIN: CPT | Performed by: INTERNAL MEDICINE

## 2017-08-09 RX ORDER — MYCOPHENOLATE MOFETIL 500 MG/1
1000 TABLET ORAL 2 TIMES DAILY
COMMUNITY

## 2017-08-09 NOTE — PROGRESS NOTES
Chief Complaint   Patient presents with   • Heartburn     has reflux takes omeprazole 40mg bid needs refilled also ursodiol       Subjective     Heartburn   She reports no abdominal pain, no belching, no chest pain, no coughing, no dysphagia, no early satiety, no nausea, no sore throat or no wheezing. This is a recurrent problem. The problem occurs rarely. The problem has been unchanged. Nothing aggravates the symptoms. Pertinent negatives include no anemia, fatigue, melena, muscle weakness or weight loss. Treatments tried: DAVID. The treatment provided significant relief. Past procedures include an abdominal ultrasound and an EGD.       Past Medical History:   Diagnosis Date   • Arthritis        Past Surgical History:   Procedure Laterality Date   • BREAST BIOPSY     • CHOLECYSTECTOMY     • COLONOSCOPY  05/11/2015    5 POLYPS       Outpatient Prescriptions Marked as Taking for the 8/9/17 encounter (Office Visit) with Michael Landin, DO   Medication Sig Dispense Refill   • Flaxseed, Linseed, (FLAX SEED OIL PO) Take  by mouth.     • Multiple Vitamin (MULTI VITAMIN PO) Take  by mouth.     • mycophenolate (CELLCEPT) 500 MG tablet Take  by mouth 3 (Three) Times a Day.     • omeprazole (priLOSEC) 40 MG capsule Take 40 mg by mouth 2 (Two) Times a Day.     • ursodiol (ACTIGALL) 300 MG capsule Take 300 mg by mouth 2 (Two) Times a Day.     • venlafaxine (EFFEXOR) 75 MG tablet Take 75 mg by mouth 2 (Two) Times a Day.         No Known Allergies    Social History     Social History   • Marital status:      Spouse name: N/A   • Number of children: N/A   • Years of education: N/A     Occupational History   • Not on file.     Social History Main Topics   • Smoking status: Never Smoker   • Smokeless tobacco: Never Used   • Alcohol use No   • Drug use: No   • Sexual activity: Not on file     Other Topics Concern   • Not on file     Social History Narrative       Family History   Problem Relation Age of Onset   • Cirrhosis  "Sister    • Liver cancer Brother        Review of Systems   Constitutional: Negative for fatigue, fever, unexpected weight change and weight loss.   HENT: Negative for hearing loss, sore throat and voice change.    Eyes: Negative for visual disturbance.   Respiratory: Negative for cough, shortness of breath and wheezing.    Cardiovascular: Negative for chest pain and palpitations.   Gastrointestinal: Negative for abdominal pain, blood in stool, dysphagia, melena, nausea and vomiting.   Endocrine: Negative for polydipsia and polyuria.   Genitourinary: Negative for difficulty urinating, dysuria, hematuria and urgency.   Musculoskeletal: Negative for joint swelling, myalgias and muscle weakness.   Skin: Negative for color change, rash and wound.   Neurological: Negative for dizziness, tremors, seizures and syncope.   Hematological: Does not bruise/bleed easily.   Psychiatric/Behavioral: Negative for agitation and confusion. The patient is not nervous/anxious.        Objective     Vitals:    08/09/17 1326   BP: 126/70   Pulse: 80   Temp: 98.1 °F (36.7 °C)   Weight: 168 lb (76.2 kg)   Height: 64\" (162.6 cm)     Body mass index is 28.84 kg/(m^2).    Physical Exam   Constitutional: She is oriented to person, place, and time. She appears well-developed and well-nourished.   HENT:   Head: Normocephalic and atraumatic.   Eyes: Conjunctivae are normal. Pupils are equal, round, and reactive to light. No scleral icterus.   Neck: No JVD present. No thyroid mass and no thyromegaly present.   Cardiovascular: Normal rate, regular rhythm and normal heart sounds.  Exam reveals no gallop and no friction rub.    No murmur heard.  Pulmonary/Chest: Effort normal and breath sounds normal. No accessory muscle usage. No respiratory distress. She has no wheezes. She has no rales.   Abdominal: Soft. Bowel sounds are normal. She exhibits no distension, no ascites and no mass. There is no splenomegaly or hepatomegaly. There is no tenderness. " There is no rebound and no guarding.   Genitourinary:   Genitourinary Comments: Rectal-Did not examine   Musculoskeletal: Normal range of motion. She exhibits no edema.   Neurological: She is alert and oriented to person, place, and time.   Deemed a reliable historian, able to converse without difficulty and able to move all extremities without difficulty   Skin: Skin is warm and dry.   Psychiatric: She has a normal mood and affect. Her behavior is normal.       Imaging Results (most recent)     None          Assessment/Plan     Elaine was seen today for heartburn.    Diagnoses and all orders for this visit:    PBC (primary biliary cirrhosis)  -     Comprehensive Metabolic Panel    cmp today  Ov in 6 months  * Surgery not found *    There are no Patient Instructions on file for this visit.

## 2017-08-16 ENCOUNTER — TELEPHONE (OUTPATIENT)
Dept: GASTROENTEROLOGY | Facility: CLINIC | Age: 57
End: 2017-08-16

## 2017-08-16 NOTE — TELEPHONE ENCOUNTER
----- Message from Michael Landin DO sent at 8/10/2017 12:10 PM CDT -----  Can u let her know her alk phos was 142      ----- Message -----     From: Lab, Background User     Sent: 8/9/2017   2:49 PM       To: Michael Landin DO        Called patient gave results.

## 2017-10-04 LAB
ALBUMIN SERPL-MCNC: 3.9 G/DL (ref 3.5–5.2)
ALP BLD-CCNC: 122 U/L (ref 35–104)
ALT SERPL-CCNC: 11 U/L (ref 5–33)
ANION GAP SERPL CALCULATED.3IONS-SCNC: 9 MMOL/L (ref 7–19)
AST SERPL-CCNC: 16 U/L (ref 5–32)
BASOPHILS ABSOLUTE: 0.1 K/UL (ref 0–0.2)
BASOPHILS RELATIVE PERCENT: 1 % (ref 0–1)
BILIRUB SERPL-MCNC: <0.2 MG/DL (ref 0.2–1.2)
BUN BLDV-MCNC: 10 MG/DL (ref 6–20)
CALCIUM SERPL-MCNC: 9.6 MG/DL (ref 8.6–10)
CHLORIDE BLD-SCNC: 100 MMOL/L (ref 98–111)
CO2: 31 MMOL/L (ref 22–29)
CREAT SERPL-MCNC: 0.7 MG/DL (ref 0.5–0.9)
EOSINOPHILS ABSOLUTE: 0.3 K/UL (ref 0–0.6)
EOSINOPHILS RELATIVE PERCENT: 3.8 % (ref 0–5)
GFR NON-AFRICAN AMERICAN: >60
GLUCOSE BLD-MCNC: 104 MG/DL (ref 74–109)
HCT VFR BLD CALC: 40.9 % (ref 37–47)
HEMOGLOBIN: 13 G/DL (ref 12–16)
LYMPHOCYTES ABSOLUTE: 2 K/UL (ref 1.1–4.5)
LYMPHOCYTES RELATIVE PERCENT: 25.9 % (ref 20–40)
MCH RBC QN AUTO: 27.4 PG (ref 27–31)
MCHC RBC AUTO-ENTMCNC: 31.8 G/DL (ref 33–37)
MCV RBC AUTO: 86.1 FL (ref 81–99)
MONOCYTES ABSOLUTE: 0.8 K/UL (ref 0–0.9)
MONOCYTES RELATIVE PERCENT: 10.5 % (ref 0–10)
NEUTROPHILS ABSOLUTE: 4.6 K/UL (ref 1.5–7.5)
NEUTROPHILS RELATIVE PERCENT: 58.5 % (ref 50–65)
PDW BLD-RTO: 13.3 % (ref 11.5–14.5)
PLATELET # BLD: 267 K/UL (ref 130–400)
PMV BLD AUTO: 10.5 FL (ref 9.4–12.3)
POTASSIUM SERPL-SCNC: 3.9 MMOL/L (ref 3.5–5)
RBC # BLD: 4.75 M/UL (ref 4.2–5.4)
SODIUM BLD-SCNC: 140 MMOL/L (ref 136–145)
TOTAL PROTEIN: 7.1 G/DL (ref 6.6–8.7)
WBC # BLD: 7.8 K/UL (ref 4.8–10.8)

## 2017-11-22 ENCOUNTER — HOSPITAL ENCOUNTER (OUTPATIENT)
Dept: CT IMAGING | Age: 57
Discharge: HOME | End: 2017-11-22
Payer: COMMERCIAL

## 2017-11-22 DIAGNOSIS — J84.10 PULMONARY FIBROSIS (HCC): ICD-10-CM

## 2017-11-22 PROCEDURE — 71250 CT THORAX DX C-: CPT

## 2018-02-08 ENCOUNTER — APPOINTMENT (OUTPATIENT)
Dept: LAB | Facility: HOSPITAL | Age: 58
End: 2018-02-08
Attending: INTERNAL MEDICINE

## 2018-02-08 ENCOUNTER — OFFICE VISIT (OUTPATIENT)
Dept: GASTROENTEROLOGY | Facility: CLINIC | Age: 58
End: 2018-02-08

## 2018-02-08 VITALS
BODY MASS INDEX: 29.02 KG/M2 | HEART RATE: 74 BPM | OXYGEN SATURATION: 98 % | HEIGHT: 64 IN | TEMPERATURE: 97.4 F | WEIGHT: 170 LBS | DIASTOLIC BLOOD PRESSURE: 74 MMHG | SYSTOLIC BLOOD PRESSURE: 120 MMHG

## 2018-02-08 DIAGNOSIS — K21.9 GASTROESOPHAGEAL REFLUX DISEASE, ESOPHAGITIS PRESENCE NOT SPECIFIED: ICD-10-CM

## 2018-02-08 DIAGNOSIS — K74.3 PRIMARY BILIARY CHOLANGITIS (HCC): Primary | ICD-10-CM

## 2018-02-08 LAB
ALBUMIN SERPL-MCNC: 4 G/DL (ref 3.5–5)
ALBUMIN/GLOB SERPL: 1.1 G/DL (ref 1.1–2.5)
ALP SERPL-CCNC: 126 U/L (ref 24–120)
ALT SERPL W P-5'-P-CCNC: 34 U/L (ref 0–54)
ANION GAP SERPL CALCULATED.3IONS-SCNC: 11 MMOL/L (ref 4–13)
AST SERPL-CCNC: 37 U/L (ref 7–45)
BILIRUB SERPL-MCNC: 0.2 MG/DL (ref 0.1–1)
BUN BLD-MCNC: 10 MG/DL (ref 5–21)
BUN/CREAT SERPL: 13.3 (ref 7–25)
CALCIUM SPEC-SCNC: 9 MG/DL (ref 8.4–10.4)
CHLORIDE SERPL-SCNC: 103 MMOL/L (ref 98–110)
CO2 SERPL-SCNC: 31 MMOL/L (ref 24–31)
CREAT BLD-MCNC: 0.75 MG/DL (ref 0.5–1.4)
GFR SERPL CREATININE-BSD FRML MDRD: 79 ML/MIN/1.73
GLOBULIN UR ELPH-MCNC: 3.5 GM/DL
GLUCOSE BLD-MCNC: 94 MG/DL (ref 70–100)
POTASSIUM BLD-SCNC: 3.7 MMOL/L (ref 3.5–5.3)
PROT SERPL-MCNC: 7.5 G/DL (ref 6.3–8.7)
SODIUM BLD-SCNC: 145 MMOL/L (ref 135–145)

## 2018-02-08 PROCEDURE — 36415 COLL VENOUS BLD VENIPUNCTURE: CPT | Performed by: INTERNAL MEDICINE

## 2018-02-08 PROCEDURE — 80053 COMPREHEN METABOLIC PANEL: CPT | Performed by: INTERNAL MEDICINE

## 2018-02-08 PROCEDURE — 99213 OFFICE O/P EST LOW 20 MIN: CPT | Performed by: INTERNAL MEDICINE

## 2018-02-08 RX ORDER — URSODIOL 300 MG/1
300 CAPSULE ORAL 2 TIMES DAILY
Qty: 180 CAPSULE | Refills: 3 | Status: SHIPPED | OUTPATIENT
Start: 2018-02-08 | End: 2019-02-13 | Stop reason: SDUPTHER

## 2018-02-08 RX ORDER — OMEPRAZOLE 40 MG/1
40 CAPSULE, DELAYED RELEASE ORAL DAILY
Qty: 90 CAPSULE | Refills: 3 | Status: SHIPPED | OUTPATIENT
Start: 2018-02-08 | End: 2019-02-13 | Stop reason: SDUPTHER

## 2018-02-08 NOTE — PROGRESS NOTES
Chief Complaint   Patient presents with   • Heartburn     was seen in august here to follow up       Subjective     HPI    Hx PBC, pulmonary fibrosis.  Recent dx of Crest. Maintained on Ursodiol tid.  No LFT testing within last 6 mo.  No abdominal pain.  No N/V.   No BRBPR.  No dysphagia.  Referral to [facility] for PF.  C/o increase fatigue and productive daily cough.    Past Medical History:   Diagnosis Date   • Arthritis        Past Surgical History:   Procedure Laterality Date   • BREAST BIOPSY     • CHOLECYSTECTOMY     • COLONOSCOPY  05/11/2015    5 POLYPS       Outpatient Prescriptions Marked as Taking for the 2/8/18 encounter (Office Visit) with Michael Landin,    Medication Sig Dispense Refill   • cefdinir (OMNICEF) 300 MG capsule Take 1 capsule by mouth Every 12 (Twelve) Hours. Indications: Pneumonia 20 capsule 0   • Flaxseed, Linseed, (FLAX SEED OIL PO) Take  by mouth.     • Multiple Vitamin (MULTI VITAMIN PO) Take  by mouth.     • mycophenolate (CELLCEPT) 500 MG tablet Take  by mouth 3 (Three) Times a Day.     • omeprazole (priLOSEC) 40 MG capsule Take 1 capsule by mouth Daily. 90 capsule 3   • ursodiol (ACTIGALL) 300 MG capsule Take 1 capsule by mouth 2 (Two) Times a Day. 180 capsule 3   • venlafaxine (EFFEXOR) 75 MG tablet Take 75 mg by mouth 2 (Two) Times a Day.     • vitamin C (ASCORBIC ACID) 500 MG tablet Take 500 mg by mouth Daily.     • [DISCONTINUED] omeprazole (priLOSEC) 40 MG capsule Take 40 mg by mouth 2 (Two) Times a Day.     • [DISCONTINUED] ursodiol (ACTIGALL) 300 MG capsule Take 300 mg by mouth 2 (Two) Times a Day.         No Known Allergies    Social History     Social History   • Marital status:      Spouse name: N/A   • Number of children: N/A   • Years of education: N/A     Occupational History   • Not on file.     Social History Main Topics   • Smoking status: Never Smoker   • Smokeless tobacco: Never Used   • Alcohol use No   • Drug use: No   • Sexual activity: Not on file  "    Other Topics Concern   • Not on file     Social History Narrative       Family History   Problem Relation Age of Onset   • Cirrhosis Sister    • Liver cancer Brother        Review of Systems   Constitutional: Positive for fatigue. Negative for fever and unexpected weight change.   HENT: Negative for hearing loss, sore throat and voice change.    Eyes: Negative for visual disturbance.   Respiratory: Positive for cough. Negative for shortness of breath and wheezing.    Cardiovascular: Negative for chest pain and palpitations.   Gastrointestinal: Negative for abdominal pain, blood in stool and vomiting.   Endocrine: Negative for polydipsia and polyuria.   Genitourinary: Negative for difficulty urinating, dysuria, hematuria and urgency.   Musculoskeletal: Negative for joint swelling and myalgias.   Skin: Negative for color change, rash and wound.   Neurological: Negative for dizziness, tremors, seizures and syncope.   Hematological: Does not bruise/bleed easily.   Psychiatric/Behavioral: Negative for agitation and confusion. The patient is not nervous/anxious.        Objective     Vitals:    02/08/18 1321   BP: 120/74   Pulse: 74   Temp: 97.4 °F (36.3 °C)   SpO2: 98%   Weight: 77.1 kg (170 lb)   Height: 162.6 cm (64\")     Body mass index is 29.18 kg/(m^2).    Physical Exam   Constitutional: She is oriented to person, place, and time. She appears well-developed and well-nourished.   HENT:   Head: Normocephalic and atraumatic.   Eyes: Conjunctivae are normal. Pupils are equal, round, and reactive to light. No scleral icterus.   Neck: No JVD present. No thyroid mass and no thyromegaly present.   Cardiovascular: Normal rate, regular rhythm and normal heart sounds.  Exam reveals no gallop and no friction rub.    No murmur heard.  Pulmonary/Chest: Effort normal and breath sounds normal. No accessory muscle usage. No respiratory distress. She has no wheezes. She has no rales.   Abdominal: Soft. Bowel sounds are normal. She " exhibits no distension, no ascites and no mass. There is no splenomegaly or hepatomegaly. There is no tenderness. There is no rebound and no guarding.   Genitourinary:   Genitourinary Comments: Rectal-Did not examine   Musculoskeletal: Normal range of motion. She exhibits no edema.   Neurological: She is alert and oriented to person, place, and time.   Deemed a reliable historian, able to converse without difficulty and able to move all extremities without difficulty   Skin: Skin is warm and dry.   Psychiatric: She has a normal mood and affect. Her behavior is normal.       Imaging Results (most recent)     None          Body mass index is 29.18 kg/(m^2).    Assessment/Plan     Elaine was seen today for heartburn.    Diagnoses and all orders for this visit:    PBC (primary biliary cirrhosis)    Gastroesophageal reflux disease, esophagitis presence not specified  -     Comprehensive Metabolic Panel    Other orders  -     ursodiol (ACTIGALL) 300 MG capsule; Take 1 capsule by mouth 2 (Two) Times a Day.  -     omeprazole (priLOSEC) 40 MG capsule; Take 1 capsule by mouth Daily.      * Surgery not found *    There are no Patient Instructions on file for this visit.

## 2018-02-13 LAB
ALBUMIN SERPL-MCNC: 3.9 G/DL (ref 3.5–5.2)
ALP BLD-CCNC: 115 U/L (ref 35–104)
ALT SERPL-CCNC: 16 U/L (ref 5–33)
ANION GAP SERPL CALCULATED.3IONS-SCNC: 15 MMOL/L (ref 7–19)
AST SERPL-CCNC: 21 U/L (ref 5–32)
BASOPHILS ABSOLUTE: 0.1 K/UL (ref 0–0.2)
BASOPHILS RELATIVE PERCENT: 1.3 % (ref 0–1)
BILIRUB SERPL-MCNC: <0.2 MG/DL (ref 0.2–1.2)
BUN BLDV-MCNC: 10 MG/DL (ref 6–20)
CALCIUM SERPL-MCNC: 9.1 MG/DL (ref 8.6–10)
CHLORIDE BLD-SCNC: 102 MMOL/L (ref 98–111)
CO2: 27 MMOL/L (ref 22–29)
CREAT SERPL-MCNC: 0.5 MG/DL (ref 0.5–0.9)
EOSINOPHILS ABSOLUTE: 0.2 K/UL (ref 0–0.6)
EOSINOPHILS RELATIVE PERCENT: 3 % (ref 0–5)
GFR NON-AFRICAN AMERICAN: >60
GLUCOSE BLD-MCNC: 75 MG/DL (ref 74–109)
HCT VFR BLD CALC: 43.4 % (ref 37–47)
HEMOGLOBIN: 13.5 G/DL (ref 12–16)
LYMPHOCYTES ABSOLUTE: 1.9 K/UL (ref 1.1–4.5)
LYMPHOCYTES RELATIVE PERCENT: 27.6 % (ref 20–40)
MCH RBC QN AUTO: 27 PG (ref 27–31)
MCHC RBC AUTO-ENTMCNC: 31.1 G/DL (ref 33–37)
MCV RBC AUTO: 86.8 FL (ref 81–99)
MONOCYTES ABSOLUTE: 0.7 K/UL (ref 0–0.9)
MONOCYTES RELATIVE PERCENT: 10.2 % (ref 0–10)
NEUTROPHILS ABSOLUTE: 4 K/UL (ref 1.5–7.5)
NEUTROPHILS RELATIVE PERCENT: 57.6 % (ref 50–65)
PDW BLD-RTO: 14 % (ref 11.5–14.5)
PLATELET # BLD: 283 K/UL (ref 130–400)
PMV BLD AUTO: 10.9 FL (ref 9.4–12.3)
POTASSIUM SERPL-SCNC: 3.8 MMOL/L (ref 3.5–5)
RBC # BLD: 5 M/UL (ref 4.2–5.4)
SODIUM BLD-SCNC: 144 MMOL/L (ref 136–145)
TOTAL PROTEIN: 7.3 G/DL (ref 6.6–8.7)
WBC # BLD: 7 K/UL (ref 4.8–10.8)

## 2018-04-27 LAB
CHOLESTEROL, TOTAL: 203 MG/DL (ref 160–199)
HDLC SERPL-MCNC: 58 MG/DL (ref 65–121)
LDL CHOLESTEROL CALCULATED: 104 MG/DL
TRIGL SERPL-MCNC: 206 MG/DL (ref 0–149)

## 2018-05-09 LAB
ALBUMIN SERPL-MCNC: 4 G/DL (ref 3.5–5.2)
ALP BLD-CCNC: 117 U/L (ref 35–104)
ALT SERPL-CCNC: 22 U/L (ref 5–33)
ANION GAP SERPL CALCULATED.3IONS-SCNC: 20 MMOL/L (ref 7–19)
AST SERPL-CCNC: 20 U/L (ref 5–32)
BASOPHILS ABSOLUTE: 0.1 K/UL (ref 0–0.2)
BASOPHILS RELATIVE PERCENT: 0.9 % (ref 0–1)
BILIRUB SERPL-MCNC: <0.2 MG/DL (ref 0.2–1.2)
BUN BLDV-MCNC: 8 MG/DL (ref 8–23)
CALCIUM SERPL-MCNC: 9.5 MG/DL (ref 8.8–10.2)
CHLORIDE BLD-SCNC: 101 MMOL/L (ref 98–111)
CO2: 23 MMOL/L (ref 22–29)
CREAT SERPL-MCNC: 0.6 MG/DL (ref 0.5–0.9)
EOSINOPHILS ABSOLUTE: 0.1 K/UL (ref 0–0.6)
EOSINOPHILS RELATIVE PERCENT: 0.5 % (ref 0–5)
GFR NON-AFRICAN AMERICAN: >60
GLUCOSE BLD-MCNC: 139 MG/DL (ref 74–109)
HCT VFR BLD CALC: 46.7 % (ref 37–47)
HEMOGLOBIN: 14.6 G/DL (ref 12–16)
LYMPHOCYTES ABSOLUTE: 1.2 K/UL (ref 1.1–4.5)
LYMPHOCYTES RELATIVE PERCENT: 13 % (ref 20–40)
MCH RBC QN AUTO: 27.2 PG (ref 27–31)
MCHC RBC AUTO-ENTMCNC: 31.3 G/DL (ref 33–37)
MCV RBC AUTO: 87 FL (ref 81–99)
MONOCYTES ABSOLUTE: 0.6 K/UL (ref 0–0.9)
MONOCYTES RELATIVE PERCENT: 6.3 % (ref 0–10)
NEUTROPHILS ABSOLUTE: 7.5 K/UL (ref 1.5–7.5)
NEUTROPHILS RELATIVE PERCENT: 78.8 % (ref 50–65)
PDW BLD-RTO: 13.3 % (ref 11.5–14.5)
PLATELET # BLD: 316 K/UL (ref 130–400)
PMV BLD AUTO: 11.2 FL (ref 9.4–12.3)
POTASSIUM SERPL-SCNC: 3.9 MMOL/L (ref 3.5–5)
RBC # BLD: 5.37 M/UL (ref 4.2–5.4)
SODIUM BLD-SCNC: 144 MMOL/L (ref 136–145)
TOTAL PROTEIN: 7.5 G/DL (ref 6.6–8.7)
WBC # BLD: 9.5 K/UL (ref 4.8–10.8)

## 2018-08-02 ENCOUNTER — TREATMENT (OUTPATIENT)
Dept: CARDIAC REHAB | Facility: HOSPITAL | Age: 58
End: 2018-08-02

## 2018-08-02 DIAGNOSIS — M34.9 SCLERODERMA (HCC): ICD-10-CM

## 2018-08-02 DIAGNOSIS — J84.9 INTERSTITIAL LUNG DISEASE (HCC): Primary | ICD-10-CM

## 2018-08-02 PROCEDURE — G0238 OTH RESP PROC, INDIV: HCPCS

## 2018-08-07 ENCOUNTER — TREATMENT (OUTPATIENT)
Dept: CARDIAC REHAB | Facility: HOSPITAL | Age: 58
End: 2018-08-07

## 2018-08-07 DIAGNOSIS — J84.9 INTERSTITIAL LUNG DISEASE (HCC): Primary | ICD-10-CM

## 2018-08-07 PROCEDURE — G0238 OTH RESP PROC, INDIV: HCPCS

## 2018-08-14 ENCOUNTER — TREATMENT (OUTPATIENT)
Dept: CARDIAC REHAB | Facility: HOSPITAL | Age: 58
End: 2018-08-14

## 2018-08-14 DIAGNOSIS — J84.9 INTERSTITIAL LUNG DISEASE (HCC): Primary | ICD-10-CM

## 2018-08-14 DIAGNOSIS — M34.9 SCLERODERMA (HCC): ICD-10-CM

## 2018-08-14 PROCEDURE — G0238 OTH RESP PROC, INDIV: HCPCS

## 2018-08-16 ENCOUNTER — APPOINTMENT (OUTPATIENT)
Dept: LAB | Facility: HOSPITAL | Age: 58
End: 2018-08-16
Attending: INTERNAL MEDICINE

## 2018-08-16 ENCOUNTER — OFFICE VISIT (OUTPATIENT)
Dept: GASTROENTEROLOGY | Facility: CLINIC | Age: 58
End: 2018-08-16

## 2018-08-16 VITALS
WEIGHT: 180 LBS | HEIGHT: 64 IN | TEMPERATURE: 97 F | DIASTOLIC BLOOD PRESSURE: 76 MMHG | HEART RATE: 100 BPM | BODY MASS INDEX: 30.73 KG/M2 | SYSTOLIC BLOOD PRESSURE: 124 MMHG | OXYGEN SATURATION: 98 %

## 2018-08-16 DIAGNOSIS — K21.00 GASTROESOPHAGEAL REFLUX DISEASE WITH ESOPHAGITIS: ICD-10-CM

## 2018-08-16 DIAGNOSIS — K74.3 PRIMARY BILIARY CHOLANGITIS (HCC): Primary | ICD-10-CM

## 2018-08-16 LAB
ALBUMIN SERPL-MCNC: 4.1 G/DL (ref 3.5–5)
ALBUMIN/GLOB SERPL: 1.3 G/DL (ref 1.1–2.5)
ALP SERPL-CCNC: 98 U/L (ref 24–120)
ALT SERPL W P-5'-P-CCNC: 22 U/L (ref 0–54)
ANION GAP SERPL CALCULATED.3IONS-SCNC: 9 MMOL/L (ref 4–13)
AST SERPL-CCNC: 22 U/L (ref 7–45)
BILIRUB SERPL-MCNC: 0.3 MG/DL (ref 0.1–1)
BUN BLD-MCNC: 11 MG/DL (ref 5–21)
BUN/CREAT SERPL: 14.5 (ref 7–25)
CALCIUM SPEC-SCNC: 9.2 MG/DL (ref 8.4–10.4)
CHLORIDE SERPL-SCNC: 103 MMOL/L (ref 98–110)
CO2 SERPL-SCNC: 30 MMOL/L (ref 24–31)
CREAT BLD-MCNC: 0.76 MG/DL (ref 0.5–1.4)
GFR SERPL CREATININE-BSD FRML MDRD: 78 ML/MIN/1.73
GLOBULIN UR ELPH-MCNC: 3.2 GM/DL
GLUCOSE BLD-MCNC: 120 MG/DL (ref 70–100)
POTASSIUM BLD-SCNC: 4.3 MMOL/L (ref 3.5–5.3)
PROT SERPL-MCNC: 7.3 G/DL (ref 6.3–8.7)
SODIUM BLD-SCNC: 142 MMOL/L (ref 135–145)

## 2018-08-16 PROCEDURE — 36415 COLL VENOUS BLD VENIPUNCTURE: CPT | Performed by: INTERNAL MEDICINE

## 2018-08-16 PROCEDURE — 80053 COMPREHEN METABOLIC PANEL: CPT | Performed by: INTERNAL MEDICINE

## 2018-08-16 PROCEDURE — 99213 OFFICE O/P EST LOW 20 MIN: CPT | Performed by: INTERNAL MEDICINE

## 2018-08-16 NOTE — PROGRESS NOTES
"Chief Complaint   Patient presents with   • Cirrhosis     has pbc here for 6 month follow up also wants to see about having a endo       Subjective     HPI    Hx of PBC, pulmonary fibrosis.  Dx of [place] within past year.  Maintained on Ursodiol tid.  Last CMP in Feb 2018.  T Bili-normal.  No CT scan of liver x 3 years. No abd pain, no weight loss, no rectal bleeding, no changes in bowels.  Treated at [facility] by pulmonology.  Pulmonologist questions if pt has \"silent reflux\" according to pt.  Increased to bid Prilosec over past year.  She has frequent cough.  No dysphagia.  Apt with rheumatologist at [facility] next week.      Past Medical History:   Diagnosis Date   • Arthritis        Past Surgical History:   Procedure Laterality Date   • BREAST BIOPSY     • CHOLECYSTECTOMY     • COLONOSCOPY  05/11/2015    5 POLYPS       Outpatient Prescriptions Marked as Taking for the 8/16/18 encounter (Office Visit) with Michael Landin, DO   Medication Sig Dispense Refill   • cefdinir (OMNICEF) 300 MG capsule Take 1 capsule by mouth Every 12 (Twelve) Hours. Indications: Pneumonia 20 capsule 0   • Flaxseed, Linseed, (FLAX SEED OIL PO) Take  by mouth.     • Multiple Vitamin (MULTI VITAMIN PO) Take  by mouth.     • mycophenolate (CELLCEPT) 500 MG tablet Take  by mouth 3 (Three) Times a Day.     • omeprazole (priLOSEC) 40 MG capsule Take 1 capsule by mouth Daily. (Patient taking differently: Take 40 mg by mouth 2 (Two) Times a Day.) 90 capsule 3   • predniSONE (DELTASONE) 20 MG tablet 40 mg daily x 3 days then 30 mg daily x 3 days then 20 mg daily x 3 days then 10 mg daily x 3 days then stop 14 tablet 0   • ursodiol (ACTIGALL) 300 MG capsule Take 1 capsule by mouth 2 (Two) Times a Day. 180 capsule 3   • venlafaxine (EFFEXOR) 75 MG tablet Take 75 mg by mouth 2 (Two) Times a Day.     • vitamin C (ASCORBIC ACID) 500 MG tablet Take 500 mg by mouth Daily.         No Known Allergies    Social History     Social History   • Marital " "status:      Spouse name: N/A   • Number of children: N/A   • Years of education: N/A     Occupational History   • Not on file.     Social History Main Topics   • Smoking status: Never Smoker   • Smokeless tobacco: Never Used   • Alcohol use No   • Drug use: No   • Sexual activity: Not on file     Other Topics Concern   • Not on file     Social History Narrative   • No narrative on file       Family History   Problem Relation Age of Onset   • Cirrhosis Sister    • Liver cancer Brother        Review of Systems   Constitutional: Negative for fatigue, fever and unexpected weight change.   HENT: Negative for hearing loss, sore throat and voice change.    Eyes: Negative for visual disturbance.   Respiratory: Negative for cough, shortness of breath and wheezing.    Cardiovascular: Negative for chest pain and palpitations.   Gastrointestinal: Negative for abdominal pain, blood in stool and vomiting.   Endocrine: Negative for polydipsia and polyuria.   Genitourinary: Negative for difficulty urinating, dysuria, hematuria and urgency.   Musculoskeletal: Negative for joint swelling and myalgias.   Skin: Negative for color change, rash and wound.   Neurological: Negative for dizziness, tremors, seizures and syncope.   Hematological: Does not bruise/bleed easily.   Psychiatric/Behavioral: Negative for agitation and confusion. The patient is not nervous/anxious.        Objective     Vitals:    08/16/18 1316   BP: 124/76   Pulse: 100   Temp: 97 °F (36.1 °C)   SpO2: 98%   Weight: 81.6 kg (180 lb)   Height: 162.6 cm (64\")     Body mass index is 30.9 kg/m².    Physical Exam   Constitutional: She is oriented to person, place, and time. She appears well-developed and well-nourished. She is cooperative.   HENT:   Head: Normocephalic and atraumatic.   Eyes: Pupils are equal, round, and reactive to light. Conjunctivae are normal. No scleral icterus.   Neck: Normal range of motion. Neck supple. No JVD present. No thyroid mass and no " thyromegaly present.   Cardiovascular: Normal rate, regular rhythm and normal heart sounds.  Exam reveals no gallop and no friction rub.    No murmur heard.  Pulmonary/Chest: Effort normal and breath sounds normal. No accessory muscle usage. No respiratory distress. She has no wheezes. She has no rales.   Abdominal: Soft. Normal appearance and bowel sounds are normal. She exhibits no distension, no ascites and no mass. There is no hepatosplenomegaly. There is no tenderness. There is no rebound and no guarding.   Musculoskeletal: Normal range of motion. She exhibits no edema or tenderness.     Vascular Status -  Her right foot exhibits normal foot vasculature  and no edema. Her left foot exhibits normal foot vasculature  and no edema.  Lymphadenopathy:     She has no cervical adenopathy.   Neurological: She is alert and oriented to person, place, and time. She has normal strength. Gait normal.   Skin: Skin is warm, dry and intact. No rash noted.       Imaging Results (most recent)     None          Body mass index is 30.9 kg/m².    Assessment/Plan     Elaine was seen today for cirrhosis.    Diagnoses and all orders for this visit:    PBC (primary biliary cirrhosis)  -     US Liver  -     Comprehensive Metabolic Panel    Gastroesophageal reflux disease with esophagitis  -     Ambulatory Referral to Gastroenterology        * Surgery not found *    US with elastography to determine level of liver fibrosis (if insurance approves)  Unusual for bid PPI to not control reflux, discussed Phill, referral to Dr De Leon  pulmonology has concerns regarding needing surgical intervention-will proceed with referral  No adjustment to ursodiol as t bili is stable    Patient's Body mass index is 30.9 kg/m². BMI is above normal parameters. Recommendations include: no follow-up required.      There are no Patient Instructions on file for this visit.

## 2018-08-17 ENCOUNTER — TELEPHONE (OUTPATIENT)
Dept: GASTROENTEROLOGY | Facility: CLINIC | Age: 58
End: 2018-08-17

## 2018-08-17 NOTE — TELEPHONE ENCOUNTER
----- Message from Michael Landin DO sent at 8/16/2018  6:05 PM CDT -----  Regarding: lft's  Can u let her know her latest LFT's are completely NORMAL!!  ----- Message -----  From: Lab, Background User  Sent: 8/16/2018   2:57 PM  To: Michael Landin DO        Called patient gave results

## 2018-08-21 ENCOUNTER — APPOINTMENT (OUTPATIENT)
Dept: CARDIAC REHAB | Facility: HOSPITAL | Age: 58
End: 2018-08-21

## 2018-08-21 ENCOUNTER — OFFICE VISIT (OUTPATIENT)
Dept: CARDIAC REHAB | Facility: HOSPITAL | Age: 58
End: 2018-08-21

## 2018-08-21 DIAGNOSIS — M34.9 SCLERODERMA (HCC): ICD-10-CM

## 2018-08-21 DIAGNOSIS — J84.9 INTERSTITIAL LUNG DISEASE (HCC): Primary | ICD-10-CM

## 2018-08-21 DIAGNOSIS — M34.9 SCLERODERMA (HCC): Primary | ICD-10-CM

## 2018-08-23 ENCOUNTER — APPOINTMENT (OUTPATIENT)
Dept: CARDIAC REHAB | Facility: HOSPITAL | Age: 58
End: 2018-08-23

## 2018-08-23 ENCOUNTER — APPOINTMENT (OUTPATIENT)
Dept: ULTRASOUND IMAGING | Facility: HOSPITAL | Age: 58
End: 2018-08-23
Attending: INTERNAL MEDICINE

## 2018-08-23 ENCOUNTER — TELEPHONE (OUTPATIENT)
Dept: GASTROENTEROLOGY | Facility: CLINIC | Age: 58
End: 2018-08-23

## 2018-08-24 ENCOUNTER — OFFICE VISIT (OUTPATIENT)
Dept: CARDIAC REHAB | Facility: HOSPITAL | Age: 58
End: 2018-08-24

## 2018-08-24 ENCOUNTER — APPOINTMENT (OUTPATIENT)
Dept: ULTRASOUND IMAGING | Facility: HOSPITAL | Age: 58
End: 2018-08-24
Attending: INTERNAL MEDICINE

## 2018-08-24 DIAGNOSIS — J84.9 INTERSTITIAL LUNG DISEASE (HCC): ICD-10-CM

## 2018-08-24 DIAGNOSIS — M34.9 SCLERODERMA (HCC): Primary | ICD-10-CM

## 2018-08-27 ENCOUNTER — OFFICE VISIT (OUTPATIENT)
Dept: CARDIAC REHAB | Facility: HOSPITAL | Age: 58
End: 2018-08-27

## 2018-08-27 DIAGNOSIS — M34.9 SCLERODERMA (HCC): Primary | ICD-10-CM

## 2018-08-28 ENCOUNTER — HOSPITAL ENCOUNTER (OUTPATIENT)
Dept: ULTRASOUND IMAGING | Facility: HOSPITAL | Age: 58
Discharge: HOME | End: 2018-08-28
Attending: INTERNAL MEDICINE | Admitting: INTERNAL MEDICINE

## 2018-08-28 ENCOUNTER — APPOINTMENT (OUTPATIENT)
Dept: CARDIAC REHAB | Facility: HOSPITAL | Age: 58
End: 2018-08-28

## 2018-08-28 PROCEDURE — 76705 ECHO EXAM OF ABDOMEN: CPT

## 2018-08-30 ENCOUNTER — APPOINTMENT (OUTPATIENT)
Dept: CARDIAC REHAB | Facility: HOSPITAL | Age: 58
End: 2018-08-30

## 2018-08-31 ENCOUNTER — TELEPHONE (OUTPATIENT)
Dept: GASTROENTEROLOGY | Facility: CLINIC | Age: 58
End: 2018-08-31

## 2018-09-04 ENCOUNTER — APPOINTMENT (OUTPATIENT)
Dept: CARDIAC REHAB | Facility: HOSPITAL | Age: 58
End: 2018-09-04

## 2018-09-06 ENCOUNTER — APPOINTMENT (OUTPATIENT)
Dept: CARDIAC REHAB | Facility: HOSPITAL | Age: 58
End: 2018-09-06

## 2018-09-10 ENCOUNTER — OFFICE VISIT (OUTPATIENT)
Dept: CARDIAC REHAB | Facility: HOSPITAL | Age: 58
End: 2018-09-10

## 2018-09-10 DIAGNOSIS — J84.9 INTERSTITIAL LUNG DISEASE (HCC): Primary | ICD-10-CM

## 2018-09-11 ENCOUNTER — APPOINTMENT (OUTPATIENT)
Dept: CARDIAC REHAB | Facility: HOSPITAL | Age: 58
End: 2018-09-11

## 2018-09-13 ENCOUNTER — APPOINTMENT (OUTPATIENT)
Dept: CARDIAC REHAB | Facility: HOSPITAL | Age: 58
End: 2018-09-13

## 2018-09-14 ENCOUNTER — OFFICE VISIT (OUTPATIENT)
Dept: CARDIAC REHAB | Facility: HOSPITAL | Age: 58
End: 2018-09-14

## 2018-09-14 DIAGNOSIS — J84.9 INTERSTITIAL LUNG DISEASE (HCC): Primary | ICD-10-CM

## 2018-09-18 ENCOUNTER — APPOINTMENT (OUTPATIENT)
Dept: CARDIAC REHAB | Facility: HOSPITAL | Age: 58
End: 2018-09-18

## 2018-09-19 ENCOUNTER — APPOINTMENT (OUTPATIENT)
Dept: CARDIAC REHAB | Facility: HOSPITAL | Age: 58
End: 2018-09-19

## 2018-09-19 ENCOUNTER — OFFICE VISIT (OUTPATIENT)
Dept: CARDIAC REHAB | Facility: HOSPITAL | Age: 58
End: 2018-09-19

## 2018-09-19 DIAGNOSIS — J84.9 INTERSTITIAL LUNG DISEASE (HCC): Primary | ICD-10-CM

## 2018-09-20 ENCOUNTER — APPOINTMENT (OUTPATIENT)
Dept: CARDIAC REHAB | Facility: HOSPITAL | Age: 58
End: 2018-09-20

## 2018-09-24 ENCOUNTER — OFFICE VISIT (OUTPATIENT)
Dept: CARDIAC REHAB | Facility: HOSPITAL | Age: 58
End: 2018-09-24

## 2018-09-24 DIAGNOSIS — J84.9 INTERSTITIAL LUNG DISEASE (HCC): Primary | ICD-10-CM

## 2018-10-05 ENCOUNTER — TELEPHONE (OUTPATIENT)
Dept: GASTROENTEROLOGY | Facility: CLINIC | Age: 58
End: 2018-10-05

## 2018-10-05 NOTE — TELEPHONE ENCOUNTER
Review of records from Dr Arevalo from [place] [facility]    Recommends Impedance while on PPI for sx of GERD/cough  recommend bid PPI  He suspects dysphagia to pills and some solids r/t scleroderma esophagus  He agree with further w/u for pulm HTN, and thinks she would benefit from ENT consult    He does not think esophageal manometry would be helpful as he suspects scleroderma of esophagus (no definitive tx for this).    Record scanned to pt chart

## 2018-12-19 ENCOUNTER — APPOINTMENT (OUTPATIENT)
Dept: CARDIAC REHAB | Facility: HOSPITAL | Age: 58
End: 2018-12-19

## 2018-12-21 ENCOUNTER — APPOINTMENT (OUTPATIENT)
Dept: CARDIAC REHAB | Facility: HOSPITAL | Age: 58
End: 2018-12-21

## 2018-12-24 ENCOUNTER — APPOINTMENT (OUTPATIENT)
Dept: CARDIAC REHAB | Facility: HOSPITAL | Age: 58
End: 2018-12-24

## 2018-12-26 ENCOUNTER — APPOINTMENT (OUTPATIENT)
Dept: CARDIAC REHAB | Facility: HOSPITAL | Age: 58
End: 2018-12-26

## 2018-12-28 ENCOUNTER — APPOINTMENT (OUTPATIENT)
Dept: CARDIAC REHAB | Facility: HOSPITAL | Age: 58
End: 2018-12-28

## 2018-12-31 ENCOUNTER — APPOINTMENT (OUTPATIENT)
Dept: CARDIAC REHAB | Facility: HOSPITAL | Age: 58
End: 2018-12-31

## 2019-02-13 ENCOUNTER — OFFICE VISIT (OUTPATIENT)
Dept: GASTROENTEROLOGY | Facility: CLINIC | Age: 59
End: 2019-02-13

## 2019-02-13 ENCOUNTER — LAB (OUTPATIENT)
Dept: LAB | Facility: HOSPITAL | Age: 59
End: 2019-02-13

## 2019-02-13 ENCOUNTER — TELEPHONE (OUTPATIENT)
Dept: GASTROENTEROLOGY | Facility: CLINIC | Age: 59
End: 2019-02-13

## 2019-02-13 VITALS
OXYGEN SATURATION: 98 % | SYSTOLIC BLOOD PRESSURE: 126 MMHG | HEART RATE: 70 BPM | BODY MASS INDEX: 30.73 KG/M2 | HEIGHT: 64 IN | WEIGHT: 180 LBS | TEMPERATURE: 97 F | DIASTOLIC BLOOD PRESSURE: 80 MMHG

## 2019-02-13 DIAGNOSIS — K74.3 PRIMARY BILIARY CHOLANGITIS (HCC): Primary | ICD-10-CM

## 2019-02-13 DIAGNOSIS — K21.9 GASTROESOPHAGEAL REFLUX DISEASE, ESOPHAGITIS PRESENCE NOT SPECIFIED: ICD-10-CM

## 2019-02-13 DIAGNOSIS — K74.3 PRIMARY BILIARY CHOLANGITIS (HCC): ICD-10-CM

## 2019-02-13 LAB
ALBUMIN SERPL-MCNC: 4 G/DL (ref 3.5–5)
ALBUMIN/GLOB SERPL: 1.3 G/DL (ref 1.1–2.5)
ALP SERPL-CCNC: 121 U/L (ref 24–120)
ALT SERPL W P-5'-P-CCNC: 30 U/L (ref 0–54)
ANION GAP SERPL CALCULATED.3IONS-SCNC: 8 MMOL/L (ref 4–13)
AST SERPL-CCNC: 45 U/L (ref 7–45)
BILIRUB SERPL-MCNC: 0.4 MG/DL (ref 0.1–1)
BUN BLD-MCNC: 9 MG/DL (ref 5–21)
BUN/CREAT SERPL: 12.7 (ref 7–25)
CALCIUM SPEC-SCNC: 8.5 MG/DL (ref 8.4–10.4)
CHLORIDE SERPL-SCNC: 99 MMOL/L (ref 98–110)
CO2 SERPL-SCNC: 30 MMOL/L (ref 24–31)
CREAT BLD-MCNC: 0.71 MG/DL (ref 0.5–1.4)
DEPRECATED RDW RBC AUTO: 40.1 FL (ref 40–54)
ERYTHROCYTE [DISTWIDTH] IN BLOOD BY AUTOMATED COUNT: 13.9 % (ref 12–15)
GFR SERPL CREATININE-BSD FRML MDRD: 84 ML/MIN/1.73
GLOBULIN UR ELPH-MCNC: 3.2 GM/DL
GLUCOSE BLD-MCNC: 98 MG/DL (ref 70–100)
HCT VFR BLD AUTO: 40.6 % (ref 37–47)
HGB BLD-MCNC: 13.2 G/DL (ref 12–16)
MCH RBC QN AUTO: 26.3 PG (ref 28–32)
MCHC RBC AUTO-ENTMCNC: 32.5 G/DL (ref 33–36)
MCV RBC AUTO: 80.9 FL (ref 82–98)
PLATELET # BLD AUTO: 254 10*3/MM3 (ref 130–400)
PMV BLD AUTO: 10.8 FL (ref 6–12)
POTASSIUM BLD-SCNC: 4 MMOL/L (ref 3.5–5.3)
PROT SERPL-MCNC: 7.2 G/DL (ref 6.3–8.7)
RBC # BLD AUTO: 5.02 10*6/MM3 (ref 4.2–5.4)
SODIUM BLD-SCNC: 137 MMOL/L (ref 135–145)
WBC NRBC COR # BLD: 5.45 10*3/MM3 (ref 4.8–10.8)

## 2019-02-13 PROCEDURE — 80053 COMPREHEN METABOLIC PANEL: CPT | Performed by: INTERNAL MEDICINE

## 2019-02-13 PROCEDURE — 36415 COLL VENOUS BLD VENIPUNCTURE: CPT | Performed by: INTERNAL MEDICINE

## 2019-02-13 PROCEDURE — 99214 OFFICE O/P EST MOD 30 MIN: CPT | Performed by: INTERNAL MEDICINE

## 2019-02-13 PROCEDURE — 85027 COMPLETE CBC AUTOMATED: CPT | Performed by: INTERNAL MEDICINE

## 2019-02-13 RX ORDER — OMEPRAZOLE 40 MG/1
40 CAPSULE, DELAYED RELEASE ORAL 2 TIMES DAILY
Qty: 180 CAPSULE | Refills: 2 | Status: SHIPPED | OUTPATIENT
Start: 2019-02-13 | End: 2020-02-15

## 2019-02-13 RX ORDER — OMEPRAZOLE 40 MG/1
40 CAPSULE, DELAYED RELEASE ORAL 2 TIMES DAILY
Qty: 180 CAPSULE | Refills: 3 | Status: SHIPPED | OUTPATIENT
Start: 2019-02-13 | End: 2019-02-13 | Stop reason: SDUPTHER

## 2019-02-13 RX ORDER — URSODIOL 300 MG/1
300 CAPSULE ORAL 2 TIMES DAILY
Qty: 180 CAPSULE | Refills: 2 | Status: SHIPPED | OUTPATIENT
Start: 2019-02-13 | End: 2020-02-06 | Stop reason: SDUPTHER

## 2019-02-13 NOTE — PROGRESS NOTES
Chief Complaint   Patient presents with   • Liver Follow-up     not having any problems was seen 6 months ago       PCP: Aaron Stoddard MD  REFER: No ref. provider found    Subjective     HPI    Hx of PBC.  Hx of GERD.  GERD and dysphagia s controlled with use of bid Prilosec.  No abdominal pain, no rectal bleeding, no weight loss.  Maintained on Ursodiol bid.  Last CMP Aug 2018.   No US since Aug 2018.     Past Medical History:   Diagnosis Date   • Arthritis        Past Surgical History:   Procedure Laterality Date   • BREAST BIOPSY     • CHOLECYSTECTOMY     • COLONOSCOPY  05/11/2015    5 POLYPS       Outpatient Medications Marked as Taking for the 2/13/19 encounter (Office Visit) with Michael Landin, DO   Medication Sig Dispense Refill   • Flaxseed, Linseed, (FLAX SEED OIL PO) Take  by mouth.     • Multiple Vitamin (MULTI VITAMIN PO) Take  by mouth.     • mycophenolate (CELLCEPT) 500 MG tablet Take  by mouth 3 (Three) Times a Day.     • omeprazole (priLOSEC) 40 MG capsule Take 1 capsule by mouth 2 (Two) Times a Day. 180 capsule 2   • ursodiol (ACTIGALL) 300 MG capsule Take 1 capsule by mouth 2 (Two) Times a Day. 180 capsule 2   • venlafaxine (EFFEXOR) 75 MG tablet Take 75 mg by mouth 2 (Two) Times a Day.     • vitamin C (ASCORBIC ACID) 500 MG tablet Take 500 mg by mouth Daily.     • [DISCONTINUED] omeprazole (priLOSEC) 40 MG capsule Take 1 capsule by mouth Daily. (Patient taking differently: Take 40 mg by mouth 2 (Two) Times a Day.) 90 capsule 3   • [DISCONTINUED] omeprazole (priLOSEC) 40 MG capsule Take 1 capsule by mouth 2 (Two) Times a Day. 180 capsule 3   • [DISCONTINUED] ursodiol (ACTIGALL) 300 MG capsule Take 1 capsule by mouth 2 (Two) Times a Day. 180 capsule 3       No Known Allergies    Social History     Socioeconomic History   • Marital status:      Spouse name: Not on file   • Number of children: Not on file   • Years of education: Not on file   • Highest education level: Not on file  "  Social Needs   • Financial resource strain: Not on file   • Food insecurity - worry: Not on file   • Food insecurity - inability: Not on file   • Transportation needs - medical: Not on file   • Transportation needs - non-medical: Not on file   Occupational History   • Not on file   Tobacco Use   • Smoking status: Never Smoker   • Smokeless tobacco: Never Used   Substance and Sexual Activity   • Alcohol use: No   • Drug use: No   • Sexual activity: Not on file   Other Topics Concern   • Not on file   Social History Narrative   • Not on file       Family History   Problem Relation Age of Onset   • Cirrhosis Sister    • Liver cancer Brother        Review of Systems   Constitutional: Negative for fatigue, fever and unexpected weight change.   HENT: Negative for hearing loss, sore throat and voice change.    Eyes: Negative for visual disturbance.   Respiratory: Negative for cough, shortness of breath and wheezing.    Cardiovascular: Negative for chest pain and palpitations.   Gastrointestinal: Negative for abdominal pain, blood in stool and vomiting.   Endocrine: Negative for polydipsia and polyuria.   Genitourinary: Negative for difficulty urinating, dysuria, hematuria and urgency.   Musculoskeletal: Negative for joint swelling and myalgias.   Skin: Negative for color change, rash and wound.   Neurological: Negative for dizziness, tremors, seizures and syncope.   Hematological: Does not bruise/bleed easily.   Psychiatric/Behavioral: Negative for agitation and confusion. The patient is not nervous/anxious.        Objective     Vitals:    02/13/19 1355   BP: 126/80   Pulse: 70   Temp: 97 °F (36.1 °C)   SpO2: 98%   Weight: 81.6 kg (180 lb)   Height: 162.6 cm (64\")     Body mass index is 30.9 kg/m².    Physical Exam   Constitutional: She is oriented to person, place, and time. She appears well-developed and well-nourished. She is cooperative.   HENT:   Head: Normocephalic and atraumatic.   Eyes: Conjunctivae are normal. " Pupils are equal, round, and reactive to light. No scleral icterus.   Neck: Normal range of motion. Neck supple. No JVD present. No thyroid mass and no thyromegaly present.   Cardiovascular: Normal rate, regular rhythm and normal heart sounds. Exam reveals no gallop and no friction rub.   No murmur heard.  Pulmonary/Chest: Effort normal and breath sounds normal. No accessory muscle usage. No respiratory distress. She has no wheezes. She has no rales.   Abdominal: Soft. Normal appearance and bowel sounds are normal. She exhibits no distension, no ascites and no mass. There is no hepatosplenomegaly. There is no tenderness. There is no rebound and no guarding.   Musculoskeletal: Normal range of motion. She exhibits no edema or tenderness.     Vascular Status -  Her right foot exhibits normal foot vasculature  and no edema. Her left foot exhibits normal foot vasculature  and no edema.  Lymphadenopathy:     She has no cervical adenopathy.   Neurological: She is alert and oriented to person, place, and time. She has normal strength. Gait normal.   Skin: Skin is warm, dry and intact. No rash noted.       Imaging Results (most recent)     None          Body mass index is 30.9 kg/m².    Assessment/Plan     Elaine was seen today for liver follow-up.    Diagnoses and all orders for this visit:    PBC (primary biliary cirrhosis)  -     CBC (No Diff)  -     Comprehensive Metabolic Panel  -     CBC (No Diff)  -     Comprehensive Metabolic Panel  -     CBC (No Diff); Future  -     Comprehensive Metabolic Panel; Future  -     US Liver; Future    Gastroesophageal reflux disease, esophagitis presence not specified  -     Discontinue: omeprazole (priLOSEC) 40 MG capsule; Take 1 capsule by mouth 2 (Two) Times a Day.  -     omeprazole (priLOSEC) 40 MG capsule; Take 1 capsule by mouth 2 (Two) Times a Day.    Other orders  -     ursodiol (ACTIGALL) 300 MG capsule; Take 1 capsule by mouth 2 (Two) Times a Day.        * Surgery not found  *    Patient's Body mass index is 30.9 kg/m². BMI is above normal parameters. Recommendations include: no follow-up required.      There are no Patient Instructions on file for this visit.

## 2019-02-14 ENCOUNTER — TELEPHONE (OUTPATIENT)
Dept: GASTROENTEROLOGY | Facility: CLINIC | Age: 59
End: 2019-02-14

## 2019-02-14 NOTE — PROGRESS NOTES
Canceled previous US Liver order [id number]. Since the yearly US Liver will require elastography, I will re-order as [id number] US Elastography Parenchyma. I will pend and route to Micah/Dr. Landin for review and sig.  Phoebe Collins MA

## 2019-02-14 NOTE — TELEPHONE ENCOUNTER
Gave Pt her results. Verbally understood.       ----- Message from Michael Landin DO sent at 2/13/2019  6:03 PM CST -----  Regarding: labs  Can u let her know her LFT's continue to trend more towards NORMAL and that I would like her to stay on her current rx      ----- Message -----  From: Lab, Background User  Sent: 2/13/2019   3:01 PM  To: Michael Landin DO

## 2019-02-20 LAB
ALBUMIN SERPL-MCNC: 3.9 G/DL (ref 3.5–5.2)
ALP BLD-CCNC: 118 U/L (ref 35–104)
ALT SERPL-CCNC: 17 U/L (ref 5–33)
ANION GAP SERPL CALCULATED.3IONS-SCNC: 14 MMOL/L (ref 7–19)
AST SERPL-CCNC: 19 U/L (ref 5–32)
BASOPHILS ABSOLUTE: 0.1 K/UL (ref 0–0.2)
BASOPHILS RELATIVE PERCENT: 1.1 % (ref 0–1)
BILIRUB SERPL-MCNC: <0.2 MG/DL (ref 0.2–1.2)
BUN BLDV-MCNC: 10 MG/DL (ref 8–23)
CALCIUM SERPL-MCNC: 9.2 MG/DL (ref 8.8–10.2)
CHLORIDE BLD-SCNC: 100 MMOL/L (ref 98–111)
CO2: 27 MMOL/L (ref 22–29)
CREAT SERPL-MCNC: 0.7 MG/DL (ref 0.5–0.9)
EOSINOPHILS ABSOLUTE: 0.3 K/UL (ref 0–0.6)
EOSINOPHILS RELATIVE PERCENT: 4.1 % (ref 0–5)
GFR NON-AFRICAN AMERICAN: >60
GLUCOSE BLD-MCNC: 98 MG/DL (ref 74–109)
HCT VFR BLD CALC: 43.7 % (ref 37–47)
HEMOGLOBIN: 13.4 G/DL (ref 12–16)
LYMPHOCYTES ABSOLUTE: 1.8 K/UL (ref 1.1–4.5)
LYMPHOCYTES RELATIVE PERCENT: 28.5 % (ref 20–40)
MCH RBC QN AUTO: 25.9 PG (ref 27–31)
MCHC RBC AUTO-ENTMCNC: 30.7 G/DL (ref 33–37)
MCV RBC AUTO: 84.4 FL (ref 81–99)
MONOCYTES ABSOLUTE: 0.7 K/UL (ref 0–0.9)
MONOCYTES RELATIVE PERCENT: 10.7 % (ref 0–10)
NEUTROPHILS ABSOLUTE: 3.5 K/UL (ref 1.5–7.5)
NEUTROPHILS RELATIVE PERCENT: 55.1 % (ref 50–65)
PDW BLD-RTO: 13.8 % (ref 11.5–14.5)
PLATELET # BLD: 288 K/UL (ref 130–400)
PMV BLD AUTO: 10.8 FL (ref 9.4–12.3)
POTASSIUM SERPL-SCNC: 3.5 MMOL/L (ref 3.5–5)
RBC # BLD: 5.18 M/UL (ref 4.2–5.4)
SODIUM BLD-SCNC: 141 MMOL/L (ref 136–145)
TOTAL PROTEIN: 7.3 G/DL (ref 6.6–8.7)
WBC # BLD: 6.4 K/UL (ref 4.8–10.8)

## 2019-04-22 ENCOUNTER — HOSPITAL ENCOUNTER (OUTPATIENT)
Dept: GENERAL RADIOLOGY | Age: 59
Discharge: HOME | End: 2019-04-22
Payer: COMMERCIAL

## 2019-04-22 DIAGNOSIS — R05.9 COUGH: ICD-10-CM

## 2019-04-22 DIAGNOSIS — R07.81 RIB PAIN ON LEFT SIDE: ICD-10-CM

## 2019-04-22 DIAGNOSIS — J84.10 PULMONARY FIBROSIS (HCC): ICD-10-CM

## 2019-04-22 PROCEDURE — 71046 X-RAY EXAM CHEST 2 VIEWS: CPT

## 2019-05-10 NOTE — PAYOR COMM NOTE
"[facility]  MORENA BRADY RN [phone]  FAX [phone]  Elaine Juárez (56 y.o. Female)     Date of Birth Social Security Number Address Home Phone MRN    1960  [id number] [place] [address] [phone] [id number]    Jewish Marital Status          Jew        Admission Date Admission Type Admitting Provider Attending Provider Department, Room/Bed    1/21/17 Emergency Sadaf Junior DO Horn, Frances Marie, DO [facility] 4C, 477/1    Discharge Date Discharge Disposition Discharge Destination                      Attending Provider: Sadaf Junior DO     Allergies:  No Known Allergies    Isolation:  None   Infection:  None   Code Status:  FULL    Ht:  64\" (162.6 cm)   Wt:  161 lb 12.8 oz (73.4 kg)    Admission Cmt:  None   Principal Problem:  None                Active Insurance as of 1/21/2017     Primary Coverage     Payor Plan Insurance Group Employer/Plan Group    ANTHEM BLUE CROSS ANTHEM BLUE CROSS BLUE [facility] PPO [id number]     Payor Plan Address Payor Plan Phone Number Effective From Effective To    [address] [phone] 1/1/2017     [place] [address]       Subscriber Name Subscriber Birth Date Member ID       GARRISON JUÁREZ 9/8/1957 [id number]                 Emergency Contacts      (Rel.) Home Phone Work Phone Mobile Phone    FranckGarrison (Spouse) [phone] -- --               History & Physical      Sadaf Junior DO at 1/21/2017  7:02 PM              [facility] Services  HISTORY AND PHYSICAL    Date of Admission: 1/21/2017  Primary Care Physician: Aaron Stoddard MD    Subjective     Chief Complaint: Several weeks of coughing with worsening since Christmas.    History of Present Illness  Patient relates she has been coughing all year.  Early in the year she had issues with ulcers and acid reflux.  She is being treated by Dr Landin for this.   Since Christmas she " has had increased problems with cough and shortness of breath to the point where she just can't handle it any more.    She presented to the ER for evaluation.  She relates she has had Bactrim recently for UTI but made no difference in cough.  Does not have fever.  No real sputum production.          Review of Systems   Constitutional: Positive for fatigue. Negative for fever and unexpected weight change.   HENT: Negative for trouble swallowing.    Eyes: Negative.  Negative for visual disturbance.   Respiratory: Positive for cough and shortness of breath. Negative for wheezing.    Cardiovascular: Negative for chest pain, palpitations and leg swelling.   Gastrointestinal: Negative for abdominal pain, diarrhea, nausea and vomiting.   Endocrine: Negative for polydipsia and polyuria.   Genitourinary: Negative for difficulty urinating and frequency.   Musculoskeletal: Negative for arthralgias and myalgias.   Skin: Negative for rash and wound.   Neurological: Negative for dizziness, light-headedness and headaches.   Hematological: Does not bruise/bleed easily.   Psychiatric/Behavioral: Negative for agitation.              Past Medical History:   Lupus  Raynauds  Elevated LFTs  Primary billiary colangitis.    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    Medications:  Prior to Admission medications    Medication Sig Start Date End Date Taking? Authorizing Provider   Flaxseed, Linseed, (FLAX SEED OIL PO) Take  by mouth.    Historical Provider, MD   Multiple Vitamin (MULTI VITAMIN PO) Take  by mouth.    Historical Provider, MD   omeprazole  "(priLOSEC) 40 MG capsule Take 40 mg by mouth Daily.    Historical Provider, MD   ursodiol (ACTIGALL) 300 MG capsule Take 300 mg by mouth 2 (Two) Times a Day.    Historical Provider, MD   venlafaxine (EFFEXOR) 75 MG tablet Take 75 mg by mouth 2 (Two) Times a Day.    Historical Provider, MD   vitamin C (ASCORBIC ACID) 500 MG tablet Take 500 mg by mouth Daily.    Historical Provider, MD       Objective     Vital Signs:   Visit Vitals   • BP 99/55 (BP Location: Right arm, Patient Position: Sitting)   • Pulse 91   • Temp 98.3 °F (36.8 °C)   • Resp 25   • Ht 63\" (160 cm)   • Wt 160 lb (72.6 kg)   • SpO2 97%   • BMI 28.34 kg/m2     Physical Exam   Constitutional: She is oriented to person, place, and time. She appears well-developed and well-nourished.   HENT:   Head: Normocephalic and atraumatic.   Left Ear: External ear normal.   Right occluded with  cerumen   Eyes: Conjunctivae and EOM are normal. Pupils are equal, round, and reactive to light. Right eye exhibits no discharge. Left eye exhibits no discharge.   Neck: Neck supple. No JVD present. No thyromegaly present.   Cardiovascular: Normal rate, regular rhythm, normal heart sounds and intact distal pulses.  Exam reveals no gallop and no friction rub.    No murmur heard.  Pulmonary/Chest:   Tachypnea  Fine velcro crackles bilateral lower lobes.     Abdominal: Soft. Bowel sounds are normal. She exhibits no distension. There is no tenderness. There is no rebound and no guarding.   Musculoskeletal: Normal range of motion. She exhibits no edema, tenderness or deformity.   Lymphadenopathy:     She has no cervical adenopathy.   Neurological: She is alert and oriented to person, place, and time. No cranial nerve deficit.   Skin: Skin is warm and dry. No rash noted.   Psychiatric: She has a normal mood and affect. Her behavior is normal. Judgment and thought content normal.           Results Reviewed:  Lab Results (last 24 hours)     Procedure Component Value Units Date/Time "    Blood Gas, Arterial [[id number]]  (Abnormal) Collected:  01/21/17 1624    Specimen:  Arterial Blood Updated:  01/21/17 1626     Site Arterial: right brachial      Tony's Test --       Documented in Rapid Comm        pH, Arterial 7.450 pH units      pCO2, Arterial 40.6 mm Hg      pO2, Arterial 75.6 (L) mm Hg      HCO3, Arterial 27.6 (H) mmol/L      Base Excess, Arterial 3.4 (H) mmol/L      O2 Saturation, Arterial 95.7 %      O2 Saturation Calculated 95.7 %      Barometric Pressure for Blood Gas -- mmHg       Component not reported at this site.        Modality Room air     Narrative:                                                              WBC 7.78 10*3/mm3      RBC 4.85 10*6/mm3      Hemoglobin 13.5 g/dL      Hematocrit 41.3 %      MCV 85.2 fL      MCH 27.8 (L) pg      MCHC 32.7 (L) g/dL      RDW 13.9 %      RDW-SD 43.1 fl      MPV 11.4 fL      Platelets 335 10*3/mm3      Neutrophil % 59.1 %      Lymphocyte % 26.2 %      Monocyte % 8.5 %      Eosinophil % 5.3 (H) %      Basophil % 0.6 %      Immature Grans % 0.3 %      Neutrophils, Absolute 4.60 10*3/mm3      Lymphocytes, Absolute 2.04 10*3/mm3      Monocytes, Absolute 0.66 10*3/mm3      Eosinophils, Absolute 0.41 10*3/mm3      Basophils, Absolute 0.05 10*3/mm3      Immature Grans, Absolute 0.02 10*3/mm3      nRBC 0.0 /100 WBC     CBC & Differential [[id number]] Collected:  01/21/17 1636                 D-dimer, Quantitative [[id number]]  (Abnormal) Collected:  01/21/17 1635    Specimen:  Blood from Arm, Left Updated:  01/21/17 1658     D-Dimer, Quantitative 1.92 (H) mg/L (FEU)     Narrative:       Reference Range is 0-0.50 mg/L FEU. However, results <0.50 mg/L FEU tends to rule out DVT or PE. Results >0.50 mg/L FEU are not useful in predicting absence or presence of DVT or PE.    Blood Culture [[id number]] Collected:  01/21/17 1634    Specimen:  Blood from Arm, Right Updated:  01/21/17 1708    Blood Culture [[id number]] Collected:  01/21/17 0481    Specimen:  Blood  from Arm, Left Updated:  01/21/17 1708    Comprehensive Metabolic Panel [[id number]]  (Abnormal) Collected:  01/21/17 1635    Specimen:  Blood from Arm, Left Updated:  01/21/17 1714     Glucose 102 (H) mg/dL      BUN 13 mg/dL      Creatinine 0.68 mg/dL      Sodium 141 mmol/L      Potassium 4.2 mmol/L      Chloride 101 mmol/L      CO2 30.0 mmol/L      Calcium 9.0 mg/dL      Total Protein 8.3 g/dL      Albumin 4.00 g/dL      ALT (SGPT) 28 U/L      AST (SGOT) 42 U/L      Alkaline Phosphatase 211 (H) U/L      Total Bilirubin 0.5 mg/dL      eGFR Non African Amer 89 mL/min/1.73      Globulin 4.3 gm/dL      A/G Ratio 0.9 (L) g/dL      BUN/Creatinine Ratio 19.1      Anion Gap 10.0 mmol/L     BNP [[id number]]  (Normal) Collected:  01/21/17 1635    Specimen:  Blood from Arm, Left Updated:  01/21/17 1726     proBNP 56.1 pg/mL     Troponin [[id number]]  (Normal) Collected:  01/21/17 1635    Specimen:  Blood from Arm, Left Updated:  01/21/17 1726     Troponin I <0.012 ng/mL     [place] Draw [[id number]] Collected:  01/21/17 1635                     Imaging Results (last 24 hours)     Procedure Component Value Units Date/Time    XR Chest 2 View [[id number]] Collected:  01/21/17 1548     Updated:  01/21/17 1631    Narrative:       HISTORY: Cough     CXR: Two views of the chest are obtained. There are no prior studies for  comparison.     There are bibasilar opacities with involvement of the lingula and/or  right middle lobe. There is a prominence of the interstitial densities.  No pleural effusion or pneumothorax is identified. There are calcified  granuloma. The heart appears normal in size. The mediastinal contour is  normal. There is mild degenerative change of the thoracic spine.       Impression:       Bibasilar opacities with prominent interstitial markings. Multifocal  pneumonia/pneumonitis is favored. No pleural effusion. Normal-sized  heart.  This report was finalized on 01/21/2017 16:29 by Dr. Keke Bangura MD.    CT Angiogram  Chest With Contrast [[id number]] Collected:  01/21/17 1803     Updated:  01/21/17 1810    Narrative:       Exam: CT angiogram of the of the chest with intravenous contrast.     CLINICAL HISTORY:  Chest pain.     DOSE:  302 mGycm. Automated exposure control was utilized to diminish  patient radiation dose.     TECHNIQUE: Contiguous axial images were obtained through the thorax  following intravenous contrast administration with reformatted images  obtained in the sagittal and coronal projections from the original data  set. Three-dimensional reconstructions are also obtained.     COMPARISON:  None available     FINDINGS:     Pulmonary arteries:  There is normal enhancement of the pulmonary  arteries with no evidence of pulmonary thromboembolic disease.     Aorta:  The thoracic aorta and proximal great vessels are normal in  appearance. There is no evidence of aortic dissection or aneurysm.     LUNGS:  There are rather diffuse interstitial changes of the lungs  within the right middle lobe and both lower lobes with some associated  patchy alveolar disease. I would favor a bilateral lower lobe and right  middle lobe mixed interstitial and alveolar pneumonia. A tiny right  effusion is present. There may also be an element of chronic  interstitial fibrosis. Follow-up films of the chest are recommended  following treatment.     Pleural spaces: A tiny right effusion is present.     HEART: No evidence of enlargement. No coronary artery calcifications are  present. There is no evidence of pericardial effusion. No evidence of RV  dysfunction.     Bones: No acute osseous abnormalities are demonstrated.     CHEST WALL: No chest wall abnormalities are demonstrated.     Lymph nodes: There are some mildly enlarged middle mediastinal lymph  nodes involving the pretracheal, AP window and subcarinal cora groups  with a subcarinal node measuring 1.37 m in short axis. The AP window  measures 1.1 m in short axis. The pretracheal node  measures 1.2 cm in  short axis.     Upper abdomen: A small hiatal hernia is present. There is a  nonobstructing calculus involving the upper pole the right kidney.       Impression:       1. No evidence of pulmonary thromboembolic disease. The thoracic aorta  is normal in caliber with no evidence of dissection or aneurysm.  2. Rather diffuse mixed interstitial and alveolar pneumonia involving  both lower lobes and the right middle lobe. An element of interstitial  fibrosis is also suspected. There is a tiny right-sided effusion.  This report was finalized on 01/21/2017 18:08 by Dr. Romaine Castillo MD.          I have personally reviewed and interpreted the radiology studies and ECG obtained at time of admission.     Assessment / Plan     Assessment & Plan  Hospital Problem List     Pneumonia of both lower lobes due to infectious organism        Assessment    Interstitial infiltrates bilaterally with fibrosis.  Lupus  Primary bilary cholangitis.   Raynauds    Plan    Admit inpatient  O2  Nebs  SoluMedrol  Rocephin  Zithromax  Consult pulmonology    Code Status: Full     I discussed the patients findings and my recommendations with patient and daughter.    Estimated length of stay 4-5 days    Sadaf Junior DO   01/21/17   7:02 PM               Electronically signed by Sadaf Junior DO at 1/21/2017  7:16 PM           Emergency Department Notes      Jaycob Baldwin MD at 1/21/2017  4:05 PM          Subjective   Patient is a 56 y.o. female presenting with shortness of breath.   Shortness of Breath   Severity:  Moderate  Onset quality:  Gradual  Timing:  Intermittent  Progression:  Worsening  Chronicity:  Recurrent  Context: not activity, not animal exposure, not emotional upset, not occupational exposure, not pollens, not URI and not weather changes    Relieved by:  Nothing  Worsened by:  Nothing  Associated symptoms: cough and wheezing    Associated symptoms: no abdominal pain, no chest pain, no  claudication, no diaphoresis, no ear pain, no fever, no headaches, no hemoptysis, no neck pain, no rash, no sore throat, no sputum production, no syncope and no vomiting    Risk factors: no recent alcohol use, no obesity and no prolonged immobilization        Review of Systems   Constitutional: Negative.  Negative for activity change, appetite change, chills, diaphoresis, fatigue and fever.   HENT: Negative for congestion, drooling, ear pain, facial swelling, hearing loss, sinus pressure and sore throat.    Eyes: Negative.  Negative for discharge.   Respiratory: Positive for cough, shortness of breath and wheezing. Negative for hemoptysis and sputum production.    Cardiovascular: Negative for chest pain, claudication and syncope.   Gastrointestinal: Negative for abdominal distention, abdominal pain, blood in stool, diarrhea, nausea and vomiting.   Endocrine: Negative.  Negative for cold intolerance, heat intolerance, polydipsia, polyphagia and polyuria.   Genitourinary: Negative.  Negative for dysuria, flank pain and urgency.   Musculoskeletal: Negative.  Negative for arthralgias, back pain, myalgias, neck pain and neck stiffness.   Skin: Negative.  Negative for color change, pallor and rash.   Allergic/Immunologic: Negative.    Neurological: Negative.  Negative for dizziness, seizures, speech difficulty, weakness, numbness and headaches.   Hematological: Negative.  Negative for adenopathy. other systems reviewed and are negative.      Past Medical History   Diagnosis Date   • Arthritis        No Known Allergies    Past Surgical History   Procedure Laterality Date   • Cholecystectomy         Family History   Problem Relation Age of Onset   • Cirrhosis Sister    • Liver cancer Brother        Social History     Social History   • Marital status:      Spouse name: N/A   • Number of children: N/A   • Years of education: N/A     Social History Main Topics   • Smoking status: Never Smoker   • Smokeless tobacco:  None   • Alcohol use No   • Drug use: No   • Sexual activity: Not Asked     Other Topics Concern   • None     Social History Narrative           Objective   Physical Exam   Constitutional: She appears well-developed and well-nourished.   HENT:   Head: Normocephalic.   Right Ear: External ear normal.   Eyes: Conjunctivae are normal. Pupils are equal, round, and reactive to light.   Neck: Normal range of motion. Neck supple.   Cardiovascular: Normal rate, regular rhythm, normal heart sounds and intact distal pulses.  Exam reveals no friction rub.  murmur heard.  Pulmonary/Chest: Effort normal. No respiratory distress. She has wheezes. She has no rales.   Abdominal: Soft. Bowel sounds are normal. There is no tenderness.   Musculoskeletal: Normal range of motion. She exhibits no edema or tenderness.       Vascular Status -  Her exam exhibits right foot vasculature normal. Her exam exhibits no right foot edema. Her exam exhibits left foot vasculature normal. Her exam exhibits no left foot edema.  Neurological: She is alert. She has normal reflexes. No cranial nerve deficit. Coordination normal.   Skin: Skin is warm. No rash noted. No erythema. note and vitals reviewed.      Procedures        ED Course  ED Course   Comment By Time   EKG shows normal sinus rhythm with nothing acute Jaycob Baldwin MD 01/21 1659   No evidence of pulmonary thromboembolic disease. The thoracic aorta  is normal in caliber with no evidence of dissection or aneurysm.  2. Rather diffuse mixed interstitial and alveolar pneumonia involving  both lower lobes and the right middle lobe. An element of interstitial  fibrosis is also suspected. There is a tiny right-sided effusion.   Jaycob Baldwin MD 01/21 1827   Will admit Jaycob Baldwin MD 01/21 1828                  MDM    Final diagnoses:   Pneumonia of both lower lobes due to infectious organism           Jaycob Baldwin MD  01/21/17 1829       Electronically signed by Jaycob Baldwin MD at 1/21/2017  6:29  PM        Intake & Output (last 3 days)       01/20 0701 - 01/21 0700 01/21 0701 - 01/22 0700 01/22 0701 - 01/23 0700 01/23 0701 - 01/24 0700    P.O.   720     Total Intake(mL/kg)   720 (9.8)     Net     +720              Unmeasured Urine Occurrence   4 x         Lab Results (last 72 hours)     Procedure Component Value Units Date/Time    Blood Gas, Arterial [[id number]]  (Abnormal) Collected:  01/21/17 1624    Specimen:  Arterial Blood Updated:  01/21/17 1626     Site Arterial: right brachial      Tony's Test --       Documented in Rapid Comm        pH, Arterial 7.450 pH units      pCO2, Arterial 40.6 mm Hg      pO2, Arterial 75.6 (L) mm Hg      HCO3, Arterial 27.6 (H) mmol/L      Base Excess, Arterial 3.4 (H) mmol/L      O2 Saturation, Arterial 95.7 %      O2 Saturation Calculated 95.7 %      Barometric Pressure for Blood Gas -- mmHg       Component not reported at this site.        Modality Room air     Narrative:       Serial Number: [id number]    : [id number]    CBC Auto Differential [[id number]]  (Abnormal) Collected:  01/21/17 1636    Specimen:  Blood from Arm, Left Updated:  01/21/17 1656     WBC 7.78 10*3/mm3      RBC 4.85 10*6/mm3      Hemoglobin 13.5 g/dL      Hematocrit 41.3 %      MCV 85.2 fL      MCH 27.8 (L) pg      MCHC 32.7 (L) g/dL      RDW 13.9 %      RDW-SD 43.1 fl      MPV 11.4 fL      Platelets 335 10*3/mm3      Neutrophil % 59.1 %      Lymphocyte % 26.2 %      Monocyte % 8.5 %      Eosinophil % 5.3 (H) %      Basophil % 0.6 %      Immature Grans % 0.3 %      Neutrophils, Absolute 4.60 10*3/mm3      Lymphocytes, Absolute 2.04 10*3/mm3      Monocytes, Absolute 0.66 10*3/mm3      Eosinophils, Absolute 0.41 10*3/mm3      Basophils, Absolute 0.05 10*3/mm3      Immature Grans, Absolute 0.02 10*3/mm3      nRBC 0.0 /100 WBC     CBC & Differential [[id number]] Collected:  01/21/17 1636    Specimen:  Blood Updated:  01/21/17 1656    Narrative:       The following orders were created for panel order CBC &  Differential.  Procedure                               Abnormality         Status                     ---------                               -----------         ------                     CBC Auto Differential[[id number]]         Abnormal            Final result                 Please view results for these tests on the individual orders.    D-dimer, Quantitative [[id number]]  (Abnormal) Collected:  01/21/17 1635    Specimen:  Blood from Arm, Left Updated:  01/21/17 1658     D-Dimer, Quantitative 1.92 (H) mg/L (FEU)     Narrative:       Reference Range is 0-0.50 mg/L FEU. However, results <0.50 mg/L FEU tends to rule out DVT or PE. Results >0.50 mg/L FEU are not useful in predicting absence or presence of DVT or PE.    Comprehensive Metabolic Panel [[id number]]  (Abnormal) Collected:  01/21/17 1635    Specimen:  Blood from Arm, Left Updated:  01/21/17 1714     Glucose 102 (H) mg/dL      BUN 13 mg/dL      Creatinine 0.68 mg/dL      Sodium 141 mmol/L      Potassium 4.2 mmol/L      Chloride 101 mmol/L      CO2 30.0 mmol/L      Calcium 9.0 mg/dL      Total Protein 8.3 g/dL      Albumin 4.00 g/dL      ALT (SGPT) 28 U/L      AST (SGOT) 42 U/L      Alkaline Phosphatase 211 (H) U/L      Total Bilirubin 0.5 mg/dL      eGFR Non African Amer 89 mL/min/1.73      Globulin 4.3 gm/dL      A/G Ratio 0.9 (L) g/dL      BUN/Creatinine Ratio 19.1      Anion Gap 10.0 mmol/L     BNP [[id number]]  (Normal) Collected:  01/21/17 1635    Specimen:  Blood from Arm, Left Updated:  01/21/17 1726     proBNP 56.1 pg/mL     Troponin [[id number]]  (Normal) Collected:  01/21/17 1635    Specimen:  Blood from Arm, Left Updated:  01/21/17 1726     Troponin I <0.012 ng/mL     [facility] Draw [[id number]] Collected:  01/21/17 1635    Specimen:  Blood Updated:  01/21/17 2101    Narrative:       The following orders were created for panel order [facility] Draw.  Procedure                               Abnormality         Status                     ---------                                -----------         ------                     Light Blue Top[[id number]]                                    Final result               Green Top (Gel)[[id number]]                                   Final result               Lavender Top[[id number]]                                      Final result               Red Top[[id number]]                                           Final result               Green Top (No Gel)[[id number]]                                                             Please view results for these tests on the individual orders.    Light Blue Top [[id number]] Collected:  01/21/17 1635    Specimen:  Blood from Arm, Left Updated:  01/21/17 2101     Extra Tube hold for add-on       Auto resulted       Green Top (Gel) [[id number]] Collected:  01/21/17 1635    Specimen:  Blood from Arm, Left Updated:  01/21/17 2101     Extra Tube Hold for add-ons.       Auto resulted.       Lavender Top [[id number]] Collected:  01/21/17 1636    Specimen:  Blood from Arm, Left Updated:  01/21/17 2101     Extra Tube hold for add-on       Auto resulted       Red Top [[id number]] Collected:  01/21/17 1635    Specimen:  Blood from Arm, Left Updated:  01/21/17 2101     Extra Tube Hold for add-ons.       Auto resulted.       CBC Auto Differential [[id number]]  (Abnormal) Collected:  01/22/17 0448    Specimen:  Blood Updated:  01/22/17 0540     WBC 5.61 10*3/mm3      RBC 4.61 10*6/mm3      Hemoglobin 12.6 g/dL      Hematocrit 39.2 %      MCV 85.0 fL      MCH 27.3 (L) pg      MCHC 32.1 (L) g/dL      RDW 13.7 %      RDW-SD 42.2 fl      MPV 11.4 fL      Platelets 296 10*3/mm3      Neutrophil % 75.2 %      Lymphocyte % 21.2 %      Monocyte % 2.7 (L) %      Eosinophil % 0.0 %      Basophil % 0.2 %      Immature Grans % 0.7 %      Neutrophils, Absolute 4.22 10*3/mm3      Lymphocytes, Absolute 1.19 10*3/mm3      Monocytes, Absolute 0.15 (L) 10*3/mm3      Eosinophils, Absolute 0.00 10*3/mm3      Basophils, Absolute 0.01 10*3/mm3       Immature Grans, Absolute 0.04 (H) 10*3/mm3     Comprehensive Metabolic Panel [[id number]]  (Abnormal) Collected:  01/22/17 0448    Specimen:  Blood Updated:  01/22/17 0557     Glucose 196 (H) mg/dL      BUN 13 mg/dL      Creatinine 0.60 mg/dL      Sodium 141 mmol/L      Potassium 3.8 mmol/L      Chloride 103 mmol/L      CO2 27.0 mmol/L      Calcium 8.9 mg/dL      Total Protein 7.9 g/dL      Albumin 3.70 g/dL      ALT (SGPT) 37 U/L      AST (SGOT) 39 U/L      Alkaline Phosphatase 200 (H) U/L      Total Bilirubin 0.3 mg/dL      eGFR Non African Amer 103 mL/min/1.73      Globulin 4.2 gm/dL      A/G Ratio 0.9 (L) g/dL      BUN/Creatinine Ratio 21.7      Anion Gap 11.0 mmol/L     Blood Gas, Arterial [[id number]] Collected:  01/22/17 0642    Specimen:  Arterial Blood Updated:  01/22/17 0644     Site Arterial: left radial      Tony's Test --       Documented in Rapid Comm        pH, Arterial 7.411 pH units      pCO2, Arterial 39.4 mm Hg      pO2, Arterial 88.0 mm Hg      HCO3, Arterial 24.5 mmol/L      Base Excess, Arterial 0.0 mmol/L      O2 Saturation, Arterial 96.8 %      O2 Saturation Calculated 96.8 %      Barometric Pressure for Blood Gas -- mmHg       Component not reported at this site.        Modality Room air     Narrative:       Serial Number: [id number]    : [id number]    Respiratory Culture [[id number]] Collected:  01/22/17 0031    Specimen:  Sputum from Cough Updated:  01/22/17 1127     Gram Stain Result Less than 25 WBCs per low power field              Rare (1+) Epithelial cells per low power field      Rare (1+) Mixed gram positive jm       Rare (1+) Gram negative bacilli     Blood Culture [[id number]]  (Normal) Collected:  01/21/17 1634    Specimen:  Blood from Arm, Left Updated:  01/22/17 1801     Blood Culture No growth at 24 hours     Blood Culture [[id number]]  (Normal) Collected:  01/21/17 1634    Specimen:  Blood from Arm, Right Updated:  01/22/17 1801     Blood Culture No growth at 24 hours     Sjogrens  Syndrome-A Extractable Nuclear Antibody [[id number]] Collected:  01/23/17 0516    Specimen:  Blood Updated:  01/23/17 0600    Sjogrens Syndrome-B Extractable Nuclear Antibody [[id number]] Collected:  01/23/17 0516    Specimen:  Blood Updated:  01/23/17 0600    Anti-Smith Antibody [[id number]] Collected:  01/23/17 0516    Specimen:  Blood Updated:  01/23/17 0600    RNP Antibodies [[id number]] Collected:  01/23/17 0516    Specimen:  Blood Updated:  01/23/17 0600    SANGEETHA [[id number]] Collected:  01/23/17 0516    Specimen:  Blood Updated:  01/23/17 0600    Anti-scleroderma Antibody [[id number]] Collected:  01/23/17 0516    Specimen:  Blood Updated:  01/23/17 0600    Rheumatoid Factor [[id number]] Collected:  01/23/17 0516    Specimen:  Blood Updated:  01/23/17 0601    Anti-Dasia 1 Antibody, IgG [[id number]] Collected:  01/23/17 0516    Specimen:  Blood Updated:  01/23/17 0601    Anti-DNA Antibody, Double-stranded [[id number]] Collected:  01/23/17 0516    Specimen:  Blood Updated:  01/23/17 0601    Anti-Centromere B Antibodies [[id number]] Collected:  01/23/17 0516    Specimen:  Blood Updated:  01/23/17 0601    C-reactive Protein [[id number]]  (Abnormal) Collected:  01/23/17 0516    Specimen:  Blood Updated:  01/23/17 0620     C-Reactive Protein 1.10 (H) mg/dL     CK [[id number]]  (Normal) Collected:  01/23/17 0516    Specimen:  Blood Updated:  01/23/17 0620     Creatine Kinase 63 U/L     Sedimentation Rate [[id number]]  (Abnormal) Collected:  01/23/17 0516    Specimen:  Blood Updated:  01/23/17 0646     Sed Rate 34 (H) mm/hr           Imaging Results (last 72 hours)     Procedure Component Value Units Date/Time    XR Chest 2 View [[id number]] Collected:  01/21/17 1548     Updated:  01/21/17 1631    Narrative:       HISTORY: Cough     CXR: Two views of the chest are obtained. There are no prior studies for  comparison.     There are bibasilar opacities with involvement of the lingula and/or  right middle lobe. There is a prominence of the interstitial  [Follow-Up: _____] : a [unfilled] follow-up visit densities.  No pleural effusion or pneumothorax is identified. There are calcified  granuloma. The heart appears normal in size. The mediastinal contour is  normal. There is mild degenerative change of the thoracic spine.       Impression:       Bibasilar opacities with prominent interstitial markings. Multifocal  pneumonia/pneumonitis is favored. No pleural effusion. Normal-sized  heart.  This report was finalized on 01/21/2017 16:29 by Dr. Keke Bangura MD.    CT Angiogram Chest With Contrast [[id number]] Collected:  01/21/17 1803     Updated:  01/21/17 1810    Narrative:       Exam: CT angiogram of the of the chest with intravenous contrast.     CLINICAL HISTORY:  Chest pain.     DOSE:  302 mGycm. Automated exposure control was utilized to diminish  patient radiation dose.     TECHNIQUE: Contiguous axial images were obtained through the thorax  following intravenous contrast administration with reformatted images  obtained in the sagittal and coronal projections from the original data  set. Three-dimensional reconstructions are also obtained.     COMPARISON:  None available     FINDINGS:     Pulmonary arteries:  There is normal enhancement of the pulmonary  arteries with no evidence of pulmonary thromboembolic disease.     Aorta:  The thoracic aorta and proximal great vessels are normal in  appearance. There is no evidence of aortic dissection or aneurysm.     LUNGS:  There are rather diffuse interstitial changes of the lungs  within the right middle lobe and both lower lobes with some associated  patchy alveolar disease. I would favor a bilateral lower lobe and right  middle lobe mixed interstitial and alveolar pneumonia. A tiny right  effusion is present. There may also be an element of chronic  interstitial fibrosis. Follow-up films of the chest are recommended  following treatment.     Pleural spaces: A tiny right effusion is present.     HEART: No evidence of enlargement. No coronary artery calcifications  are  present. There is no evidence of pericardial effusion. No evidence of RV  dysfunction.     Bones: No acute osseous abnormalities are demonstrated.     CHEST WALL: No chest wall abnormalities are demonstrated.     Lymph nodes: There are some mildly enlarged middle mediastinal lymph  nodes involving the pretracheal, AP window and subcarinal cora groups  with a subcarinal node measuring 1.37 m in short axis. The AP window  measures 1.1 m in short axis. The pretracheal node measures 1.2 cm in  short axis.     Upper abdomen: A small hiatal hernia is present. There is a  nonobstructing calculus involving the upper pole the right kidney.       Impression:       1. No evidence of pulmonary thromboembolic disease. The thoracic aorta  is normal in caliber with no evidence of dissection or aneurysm.  2. Rather diffuse mixed interstitial and alveolar pneumonia involving  both lower lobes and the right middle lobe. An element of interstitial  fibrosis is also suspected. There is a tiny right-sided effusion.  This report was finalized on 01/21/2017 18:08 by Dr. Romaine Castillo MD.          Orders (last 24 hrs)     Start     Ordered    01/23/17 0600  SANGEETHA  Morning Draw      01/22/17 1957 01/23/17 0600  Anti-DNA Antibody, Double-stranded  Morning Draw      01/22/17 1957 01/23/17 0600  C-reactive Protein  Morning Draw      01/22/17 1957 01/23/17 0600  Rheumatoid Factor  Morning Draw      01/22/17 1957 01/23/17 0600  Sjogrens Syndrome-A Extractable Nuclear Antibody  Morning Draw      01/22/17 1957 01/23/17 0600  Sjogrens Syndrome-B Extractable Nuclear Antibody  Morning Draw      01/22/17 1957 01/23/17 0600  Anti-Smith Antibody  Morning Draw      01/22/17 1957 01/23/17 0600  Sedimentation Rate  Morning Draw      01/22/17 1957 01/23/17 0600  Anti-Centromere B Antibodies  Morning Draw      01/22/17 1957 01/23/17 0600  Anti-scleroderma Antibody  Morning Draw      01/22/17 1957 01/23/17 0600  RNP  Antibodies  Morning Draw      01/22/17 1957 01/23/17 0600  CK  Morning Draw      01/22/17 1957 01/1958  Anti-Dasia 1 Antibody, IgG  Once      01/22/17 1957 01/22/17 1800  methylPREDNISolone sodium succinate (SOLU-Medrol) injection 40 mg  Every 12 Hours      01/22/17 1132    01/22/17 1630  levalbuterol (XOPENEX) nebulizer solution 1.25 mg  4 Times Daily - RT      01/22/17 1224    01/22/17 1451  Nursing Communication Patient is OK for discharge pulmonary wise, call my office in AM and schedule follow-up in 6 weeks with complete PFT's  Continuous     Comments:  Patient is OK for discharge pulmonary wise, call my office in AM and schedule follow-up in 6 weeks with complete PFT's    01/22/17 1452    01/22/17 1230  levalbuterol (XOPENEX) nebulizer solution 1.25 mg  4 Times Daily - RT,   Status:  Discontinued      01/22/17 1201    01/22/17 1201  temazepam (RESTORIL) capsule 30 mg  Nightly PRN      01/22/17 1201    01/22/17 1157  Inpatient Consult to Rheumatology  Once     Provider:  Antonia Landry MD    01/22/17 1201    01/22/17 1133  Home O2 Eval (Desaturation Screen)  Once     Comments:  Please document in the notes all four O2 readings for qualification.     01/22/17 1132    01/22/17 0900  ursodiol (ACTIGALL) capsule 300 mg  2 Times Daily      01/21/17 2212 01/22/17 0900  venlafaxine (EFFEXOR) tablet 75 mg  2 Times Daily      01/21/17 2212 01/22/17 0900  vitamin C (ASCORBIC ACID) tablet 500 mg  Daily      01/21/17 2212 01/22/17 0900  enoxaparin (LOVENOX) syringe 40 mg  Daily      01/21/17 2212 01/22/17 0600  pantoprazole (PROTONIX) EC tablet 40 mg  Every Early Morning      01/21/17 2212 01/21/17 2330  albuterol (PROVENTIL) nebulizer solution 2.5 mg  Every 4 Hours - RT,   Status:  Discontinued      01/21/17 2212    01/21/17 2318  temazepam (RESTORIL) capsule 7.5 mg  Nightly PRN,   Status:  Discontinued      01/21/17 2318 01/21/17 2300  AZITHROMYCIN 500 MG/250 ML 0.9% NS IVPB (vial-mate)   Every 24 Hours      01/21/17 2212 01/21/17 2300  methylPREDNISolone sodium succinate (SOLU-Medrol) injection 60 mg  Every 6 Hours Scheduled,   Status:  Discontinued      01/21/17 2212 01/21/17 2230  cefTRIAXone (ROCEPHIN) 1 g/100 mL 0.9% NS (MBP)  Every 24 Hours      01/21/17 2212 01/21/17 2212  Morphine sulfate (PF) injection 1 mg  Every 4 Hours PRN      01/21/17 2212    01/21/17 2212  naloxone (NARCAN) injection 0.4 mg  Every 5 Minutes PRN      01/21/17 2212    01/21/17 2212  sodium chloride 0.9 % flush 1-10 mL  As Needed      01/21/17 2212 01/21/17 2212  acetaminophen (TYLENOL) tablet 650 mg  Every 4 Hours PRN      01/21/17 2212    01/21/17 2212  HYDROcodone-acetaminophen (NORCO) 5-325 MG per tablet 1 tablet  Every 4 Hours PRN      01/21/17 2212 01/21/17 2212  aluminum-magnesium hydroxide-simethicone (MAALOX/MYLANTA) suspension 30 mL  Every 6 Hours PRN      01/21/17 2212 01/21/17 2212  LORazepam (ATIVAN) tablet 1 mg  Every 6 Hours PRN      01/21/17 2212 01/21/17 2212  magnesium hydroxide (MILK OF MAGNESIA) suspension 2400 mg/10mL 10 mL  Daily PRN      01/21/17 2212 01/21/17 2212  ondansetron (ZOFRAN) injection 4 mg  Every 6 Hours PRN      01/21/17 2212 01/21/17 1953  pneumococcal polysaccharide 23-valent (PNEUMOVAX-23) vaccine 0.5 mL  During Hospitalization      01/21/17 1954 01/21/17 1953  Influenza Vac Subunit Quad (FLUCELVAX) injection 0.5 mL  During Hospitalization      01/21/17 1954 01/21/17 1607  sodium chloride 0.9 % flush 10 mL  As Needed      01/21/17 1608    Unscheduled  Blood Gas, Arterial  As Needed     Comments:  Respiratory Distress    01/21/17 2212             Physician Progress Notes (last 72 hours) (Notes from 1/20/2017  7:31 AM through 1/23/2017  7:31 AM)      Sadaf Junior DO at 1/22/2017 11:10 AM  Version 1 of 1             [facility] Medicine Services  INPATIENT PROGRESS NOTE    Length of Stay: 1  Date of Admission:  1/21/2017  Primary Care Physician: Aaron Stoddard MD    Subjective   Chief Complaint: Jittery from the breathing treatments.   HPI   Less short of breath.  No nausea.  Did not sleep last night.         Review of Systems     All pertinent negatives and positives are as above. All other systems have been reviewed and are negative unless otherwise stated.     Objective    Temp:  [97.1 °F (36.2 °C)-98.6 °F (37 °C)] 97.5 °F (36.4 °C)  Heart Rate:  [] 111  Resp:  [18-25] 20  BP: ()/(55-74) 133/67  Physical Exam   Constitutional: She is oriented to person, place, and time. She appears well-developed and well-nourished.   Eyes: Conjunctivae and EOM are normal. Pupils are equal, round, and reactive to light.   Neck: Neck supple.   Cardiovascular: Normal rate and regular rhythm.  Exam reveals no gallop and no friction rub.    No murmur heard.  Pulmonary/Chest:   Remains mildly tachypneic.  Less wheeze/crackle.     Abdominal: Soft. Bowel sounds are normal. There is no hepatosplenomegaly. There is no tenderness.   Musculoskeletal: Normal range of motion. She exhibits no edema.   Neurological: She is alert and oriented to person, place, and time. No cranial nerve deficit.   Skin: Skin is warm and dry.   Psychiatric: She has a normal mood and affect. Her behavior is normal.   Nursing note and vitals reviewed.          Results Review:  I have reviewed the labs, radiology results, and diagnostic studies.    Laboratory Data:     Results from last 7 days  Lab Units 01/22/17  0448 01/21/17  1636   WBC 10*3/mm3 5.61 7.78   HEMOGLOBIN g/dL 12.6 13.5   HEMATOCRIT % 39.2 41.3   PLATELETS 10*3/mm3 296 335          Results from last 7 days  Lab Units 01/22/17  0448 01/21/17  1635   SODIUM mmol/L 141 141   POTASSIUM mmol/L 3.8 4.2   CHLORIDE mmol/L 103 101   TOTAL CO2 mmol/L 27.0 30.0   BUN mg/dL 13 13   CREATININE mg/dL 0.60 0.68   CALCIUM mg/dL 8.9 9.0   BILIRUBIN mg/dL 0.3 0.5   ALK PHOS U/L 200* 211*   ALT (SGPT) U/L 37 28    AST (SGOT) U/L 39 42   GLUCOSE mg/dL 196* 102*       Culture Data:   BLOOD CULTURE   Date Value Ref Range Status   01/21/2017 No growth at less than 24 hours  Preliminary   01/21/2017 No growth at less than 24 hours  Preliminary       Radiology Data:   Imaging Results (last 24 hours)     Procedure Component Value Units Date/Time    XR Chest 2 View [[id number]] Collected:  01/21/17 1548     Updated:  01/21/17 1631    Narrative:       HISTORY: Cough     CXR: Two views of the chest are obtained. There are no prior studies for  comparison.     There are bibasilar opacities with involvement of the lingula and/or  right middle lobe. There is a prominence of the interstitial densities.  No pleural effusion or pneumothorax is identified. There are calcified  granuloma. The heart appears normal in size. The mediastinal contour is  normal. There is mild degenerative change of the thoracic spine.       Impression:       Bibasilar opacities with prominent interstitial markings. Multifocal  pneumonia/pneumonitis is favored. No pleural effusion. Normal-sized  heart.  This report was finalized on 01/21/2017 16:29 by Dr. Keke Bangura MD.    CT Angiogram Chest With Contrast [[id number]] Collected:  01/21/17 1803     Updated:  01/21/17 1810    Narrative:       Exam: CT angiogram of the of the chest with intravenous contrast.     CLINICAL HISTORY:  Chest pain.     DOSE:  302 mGycm. Automated exposure control was utilized to diminish  patient radiation dose.     TECHNIQUE: Contiguous axial images were obtained through the thorax  following intravenous contrast administration with reformatted images  obtained in the sagittal and coronal projections from the original data  set. Three-dimensional reconstructions are also obtained.     COMPARISON:  None available     FINDINGS:     Pulmonary arteries:  There is normal enhancement of the pulmonary  arteries with no evidence of pulmonary thromboembolic disease.     Aorta:  The thoracic aorta  and proximal great vessels are normal in  appearance. There is no evidence of aortic dissection or aneurysm.     LUNGS:  There are rather diffuse interstitial changes of the lungs  within the right middle lobe and both lower lobes with some associated  patchy alveolar disease. I would favor a bilateral lower lobe and right  middle lobe mixed interstitial and alveolar pneumonia. A tiny right  effusion is present. There may also be an element of chronic  interstitial fibrosis. Follow-up films of the chest are recommended  following treatment.     Pleural spaces: A tiny right effusion is present.     HEART: No evidence of enlargement. No coronary artery calcifications are  present. There is no evidence of pericardial effusion. No evidence of RV  dysfunction.     Bones: No acute osseous abnormalities are demonstrated.     CHEST WALL: No chest wall abnormalities are demonstrated.     Lymph nodes: There are some mildly enlarged middle mediastinal lymph  nodes involving the pretracheal, AP window and subcarinal cora groups  with a subcarinal node measuring 1.37 m in short axis. The AP window  measures 1.1 m in short axis. The pretracheal node measures 1.2 cm in  short axis.     Upper abdomen: A small hiatal hernia is present. There is a  nonobstructing calculus involving the upper pole the right kidney.       Impression:       1. No evidence of pulmonary thromboembolic disease. The thoracic aorta  is normal in caliber with no evidence of dissection or aneurysm.  2. Rather diffuse mixed interstitial and alveolar pneumonia involving  both lower lobes and the right middle lobe. An element of interstitial  fibrosis is also suspected. There is a tiny right-sided effusion.  This report was finalized on 01/21/2017 18:08 by Dr. Romaine Castillo MD.          I have reviewed the patient current medications.     Assessment/Plan     Hospital Problem List     Pneumonia of both lower lobes due to infectious organism     "      Assessment     Interstitial infiltrates bilaterally with fibrosis.  Lupus  Primary bilary  Cirrhoisis   Raynauds      Plan    Continue steroids.  Change albuterol to xopenx  Consult Rheumatology      Discharge Planning: I expect patient to be discharged to home in 2 days.    Sadaf Junior DO   17   11:10 AM       Electronically signed by Sadaf Junior DO at 2017 11:54 AM           Consult Notes (last 72 hours) (Notes from 2017  7:31 AM through 2017  7:31 AM)      Aaron Carmona MD at 2017 11:12 AM  Version 2 of 2     Consult Orders:    1. Inpatient Consult to Pulmonology [[id number]] ordered by Sadaf Junior DO at 17 1901                    PULMONARY & CRITICAL CARE CONSULT - [facility]    17, 11:12 AM  Patient Care Team:  Aaron Stoddard MD as PCP - General  Aaron Stoddard MD as PCP - Family Medicine  Name: Elaine Juárez  : 1960  MRN: [id number]  Contact Serial Number [id number]    Chief complaint: Dyspnea    HPI:  We have been consulted by Sadaf Junior DO to see this 56 y.o. year old female born on 1960.  Patient complains of dyspnea and cough in the lower, chest for several months. Severity: moderate.  Aggravating factors: exercise.   Alleviating factors: medication(s) (none) Associated symptoms: fatigue. Sputum is white.  Patient currently is not on home oxygen therapy.. Respiratory history: none  Pt reports that she has had a cough and some SOA for months but its been worse since . She denies any fever or chills but does report when she has these \"coughing fits\" she cough so hard and so long that she sweats. She reports that she rarely produces sputum it is mostly a dry cough but if she does its white and thick. She is ok at rest but SOA with activity. She does report a hx of Lupus that has never been treated by anyone. She sees Dr. Stoddard as her PCP but she is on nothing for the Lupus. She is feeling " better today and wants to go home. No other aggravating, alleviating factors or associated symptoms.    Past Medical History:  Past Medical History   Diagnosis Date   • Arthritis      Past Surgical History   Procedure Laterality Date   • Cholecystectomy       No Known Allergies  Medications:    albuterol 2.5 mg Nebulization Q4H - RT   ceftriaxone 1 g Intravenous Q24H   And      azithromycin 500 mg Intravenous Q24H   enoxaparin 40 mg Subcutaneous Daily   methylPREDNISolone sodium succinate 60 mg Intravenous Q6H   pantoprazole 40 mg Oral Q AM   ursodiol 300 mg Oral BID   venlafaxine 75 mg Oral BID   vitamin C 500 mg Oral Daily        Family History:  Family History   Problem Relation Age of Onset   • Cirrhosis Sister    • Liver cancer Brother      Social History:   reports that she has never smoked. She does not have any smokeless tobacco history on file. She reports that she does not drink alcohol or use illicit drugs.  Review of Systems:  Review of Systems :    Constitutional: Positive for fatigue. Negative for fever and unexpected weight change.   HENT: Negative for trouble swallowing.   Eyes: Negative. Negative for visual disturbance.   Respiratory: Positive for cough and shortness of breath. Negative for wheezing.   Cardiovascular: Negative for chest pain, palpitations and leg swelling.   Gastrointestinal: Negative for abdominal pain, diarrhea, nausea and vomiting.   Endocrine: Negative for polydipsia and polyuria.   Genitourinary: Negative for difficulty urinating and frequency.   Musculoskeletal: Negative for arthralgias and myalgias.   Skin: Negative for rash and wound.   Neurological: Negative for dizziness, light-headedness and headaches.   Hematological: Does not bruise/bleed easily.   Psychiatric/Behavioral: Negative for agitation.       Physical Exam:  Temp:  [97.1 °F (36.2 °C)-98.6 °F (37 °C)] 97.5 °F (36.4 °C)  Heart Rate:  [] 111  Resp:  [18-25] 20  BP: ()/(55-74) 133/67No intake or output  data in the 24 hours ending 01/22/17 1112  Last 3 weights    01/21/17  1455 01/21/17 2000   Weight: 160 lb (72.6 kg) 160 lb 9.6 oz (72.8 kg)     Physical Exam:    Constitutional: She is oriented to person, place, and time. She appears well-developed and well-nourished.   HENT:   Head: Normocephalic and atraumatic.   Left Ear: External ear normal.   Eyes: Conjunctivae and EOM are normal. Pupils are equal, round, and reactive to light. Right eye exhibits no discharge. Left eye exhibits no discharge.   Neck: Neck supple. No JVD present.   Cardiovascular: Normal rate, regular rhythm, normal heart sounds and intact distal pulses. Exam reveals no gallop and no friction rub.   No murmur heard.  Pulmonary/Chest: No signs of distress or increased effort. Fine velcro like rales bilateral lower lobes, worse on the right  Abdominal: Soft. Bowel sounds are normal. She exhibits no distension. There is no tenderness. There is no rebound and no guarding.   Musculoskeletal: Normal range of motion. She exhibits no edema, tenderness or deformity.   Lymphadenopathy:   She has no cervical adenopathy.   Neurological: She is alert and oriented to person, place, and time. No cranial nerve deficit.   Skin: Skin is warm and dry. No rash noted.   Psychiatric: She has a normal mood and affect. Her behavior is normal. Judgment and thought content normal.         Results from last 7 days  Lab Units 01/22/17  0448 01/21/17  1636   WBC 10*3/mm3 5.61 7.78   HEMOGLOBIN g/dL 12.6 13.5   PLATELETS 10*3/mm3 296 335       Results from last 7 days  Lab Units 01/22/17  0448 01/21/17  1635   SODIUM mmol/L 141 141   POTASSIUM mmol/L 3.8 4.2   TOTAL CO2 mmol/L 27.0 30.0   BUN mg/dL 13 13   CREATININE mg/dL 0.60 0.68   GLUCOSE mg/dL 196* 102*       Results from last 7 days  Lab Units 01/22/17  0642 01/21/17  1624   PH, ARTERIAL pH units 7.411 7.450   PCO2, ARTERIAL mm Hg 39.4 40.6   PO2 ART mm Hg 88.0 75.6*     Lab Results   Component Value Date    PROBNP  56.1 01/21/2017     BLOOD CULTURE   Date Value Ref Range Status   01/21/2017 No growth at less than 24 hours  Preliminary   01/21/2017 No growth at less than 24 hours  Preliminary     Recent radiology:  Imaging Results (last 72 hours)     Procedure Component Value Units Date/Time    XR Chest 2 View [[id number]] Collected:  01/21/17 1548     Updated:  01/21/17 1631    Narrative:       HISTORY: Cough     CXR: Two views of the chest are obtained. There are no prior studies for  comparison.     There are bibasilar opacities with involvement of the lingula and/or  right middle lobe. There is a prominence of the interstitial densities.  No pleural effusion or pneumothorax is identified. There are calcified  granuloma. The heart appears normal in size. The mediastinal contour is  normal. There is mild degenerative change of the thoracic spine.       Impression:       Bibasilar opacities with prominent interstitial markings. Multifocal  pneumonia/pneumonitis is favored. No pleural effusion. Normal-sized  heart.  This report was finalized on 01/21/2017 16:29 by Dr. Keke Bangura MD.    CT Angiogram Chest With Contrast [[id number]] Collected:  01/21/17 1803     Updated:  01/21/17 1810    Narrative:       Exam: CT angiogram of the of the chest with intravenous contrast.     CLINICAL HISTORY:  Chest pain.     DOSE:  302 mGycm. Automated exposure control was utilized to diminish  patient radiation dose.     TECHNIQUE: Contiguous axial images were obtained through the thorax  following intravenous contrast administration with reformatted images  obtained in the sagittal and coronal projections from the original data  set. Three-dimensional reconstructions are also obtained.     COMPARISON:  None available     FINDINGS:     Pulmonary arteries:  There is normal enhancement of the pulmonary  arteries with no evidence of pulmonary thromboembolic disease.     Aorta:  The thoracic aorta and proximal great vessels are normal  in  appearance. There is no evidence of aortic dissection or aneurysm.     LUNGS:  There are rather diffuse interstitial changes of the lungs  within the right middle lobe and both lower lobes with some associated  patchy alveolar disease. I would favor a bilateral lower lobe and right  middle lobe mixed interstitial and alveolar pneumonia. A tiny right  effusion is present. There may also be an element of chronic  interstitial fibrosis. Follow-up films of the chest are recommended  following treatment.     Pleural spaces: A tiny right effusion is present.     HEART: No evidence of enlargement. No coronary artery calcifications are  present. There is no evidence of pericardial effusion. No evidence of RV  dysfunction.     Bones: No acute osseous abnormalities are demonstrated.     CHEST WALL: No chest wall abnormalities are demonstrated.     Lymph nodes: There are some mildly enlarged middle mediastinal lymph  nodes involving the pretracheal, AP window and subcarinal cora groups  with a subcarinal node measuring 1.37 m in short axis. The AP window  measures 1.1 m in short axis. The pretracheal node measures 1.2 cm in  short axis.     Upper abdomen: A small hiatal hernia is present. There is a  nonobstructing calculus involving the upper pole the right kidney.       Impression:       1. No evidence of pulmonary thromboembolic disease. The thoracic aorta  is normal in caliber with no evidence of dissection or aneurysm.  2. Rather diffuse mixed interstitial and alveolar pneumonia involving  both lower lobes and the right middle lobe. An element of interstitial  fibrosis is also suspected. There is a tiny right-sided effusion.  This report was finalized on 01/21/2017 18:08 by Dr. Romaine Castillo MD.          Problems:    Active Problems:    Pneumonia of both lower lobes due to infectious organism    Pulmonary Impression:  1. Dyspnea  2. Chronic Cough  3. Pulmonary fibrosis-likely related to her underlying autoimmune  conditions  4. Bilateral infiltrates  5. GERD    Recommend:  · After comparing yesterday's scan with one of her abdomen in 2015, the middle lobe and bases of her lungs are minimally worse.  · Cannot rule out pneumonia or bronchitis in addition to the fibrosis but not likely given no fever, sputum production or elevated WBCs.  · Would recommend rest/exercise sats for O2 need and rheumatology to see outpatient as she has never seen anyone or been treated for her autoimmune conditions.   · Pt wants to go home today. If she does it would be reasonable to place her on omnicef and a prednisone taper and have her f/u with Dr. Stoddard short term and our office in about 4-6 week for FVL with diffusion and another cxr.     Electronically signed by ANASTASIA Briggs, 01/22/17, 11:12 AM  . I have seen and examined patient personally, performing a face-to-face diagnostic evaluation with plan of care reviewed and developed with APRN and nursing staff. I have addended and/or modified the above history of present illness, physical examination, and assessment and plan to reflect my findings and impressions. Essential elements of the care plan were discussed with APRN above.  Agree with findings and assessment/plan as documented above.  I did examine the patient this afternoon and discussed the current recommendations from a pulmonary standpoint with her and family member at the bedside.  She does appear to have some chronic changes on chest imaging studies particularly on the right which may relate to underlying connective tissue disease, presents of aspiration with resultant pneumonia, or possibly some other etiology but clearly she has some chronic changes.  We will plan on seeing her in the office in several weeks with PFTs and certainly agree with rheumatology evaluation.    Electronically signed by Aaron Carmona MD, on 1/22/2017, 2:49 PM             Electronically signed by Aaron Carmona MD at 1/22/2017  2:50  "PM      ANASTASIA Barksdale at 2017 11:12 AM  Version 1 of 2             PULMONARY & CRITICAL CARE CONSULT - [facility]    17, 11:12 AM  Patient Care Team:  Aaron Stoddard MD as PCP - General  Aaron Stoddard MD as PCP - Family Medicine  Name: Elaine Juárez  : 1960  MRN: [id number]  Contact Serial Number [id number]    Chief complaint: Dyspnea    HPI:  We have been consulted by Sadaf Junior DO to see this 56 y.o. year old female born on 1960.  Patient complains of dyspnea and cough in the lower, chest for several months. Severity: moderate.  Aggravating factors: exercise.   Alleviating factors: medication(s) (none) Associated symptoms: fatigue. Sputum is white.  Patient currently is not on home oxygen therapy.. Respiratory history: none  Pt reports that she has had a cough and some SOA for months but its been worse since . She denies any fever or chills but does report when she has these \"coughing fits\" she cough so hard and so long that she sweats. She reports that she rarely produces sputum it is mostly a dry cough but if she does its white and thick. She is ok at rest but SOA with activity. She does report a hx of Lupus that has never been treated by anyone. She sees Dr. Stoddard as her PCP but she is on nothing for the Lupus. She is feeling better today and wants to go home. No other aggravating, alleviating factors or associated symptoms.    Past Medical History:  Past Medical History   Diagnosis Date   • Arthritis      Past Surgical History   Procedure Laterality Date   • Cholecystectomy       No Known Allergies  Medications:    albuterol 2.5 mg Nebulization Q4H - RT   ceftriaxone 1 g Intravenous Q24H   And      azithromycin 500 mg Intravenous Q24H   enoxaparin 40 mg Subcutaneous Daily   methylPREDNISolone sodium succinate 60 mg Intravenous Q6H   pantoprazole 40 mg Oral Q AM   ursodiol 300 mg Oral BID   venlafaxine 75 mg Oral BID   vitamin C 500 mg Oral " Daily        Family History:  Family History   Problem Relation Age of Onset   • Cirrhosis Sister    • Liver cancer Brother      Social History:   reports that she has never smoked. She does not have any smokeless tobacco history on file. She reports that she does not drink alcohol or use illicit drugs.  Review of Systems:  Review of Systems :    Constitutional: Positive for fatigue. Negative for fever and unexpected weight change.   HENT: Negative for trouble swallowing.   Eyes: Negative. Negative for visual disturbance.   Respiratory: Positive for cough and shortness of breath. Negative for wheezing.   Cardiovascular: Negative for chest pain, palpitations and leg swelling.   Gastrointestinal: Negative for abdominal pain, diarrhea, nausea and vomiting.   Endocrine: Negative for polydipsia and polyuria.   Genitourinary: Negative for difficulty urinating and frequency.   Musculoskeletal: Negative for arthralgias and myalgias.   Skin: Negative for rash and wound.   Neurological: Negative for dizziness, light-headedness and headaches.   Hematological: Does not bruise/bleed easily.   Psychiatric/Behavioral: Negative for agitation.       Physical Exam:  Temp:  [97.1 °F (36.2 °C)-98.6 °F (37 °C)] 97.5 °F (36.4 °C)  Heart Rate:  [] 111  Resp:  [18-25] 20  BP: ()/(55-74) 133/67No intake or output data in the 24 hours ending 01/22/17 1112  Last 3 weights    01/21/17  1455 01/21/17 2000   Weight: 160 lb (72.6 kg) 160 lb 9.6 oz (72.8 kg)     Physical Exam:    Constitutional: She is oriented to person, place, and time. She appears well-developed and well-nourished.   HENT:   Head: Normocephalic and atraumatic.   Left Ear: External ear normal.   Eyes: Conjunctivae and EOM are normal. Pupils are equal, round, and reactive to light. Right eye exhibits no discharge. Left eye exhibits no discharge.   Neck: Neck supple. No JVD present.   Cardiovascular: Normal rate, regular rhythm, normal heart sounds and intact distal  pulses. Exam reveals no gallop and no friction rub.   No murmur heard.  Pulmonary/Chest: No signs of distress or increased effort. Fine velcro like rales bilateral lower lobes, worse on the right  Abdominal: Soft. Bowel sounds are normal. She exhibits no distension. There is no tenderness. There is no rebound and no guarding.   Musculoskeletal: Normal range of motion. She exhibits no edema, tenderness or deformity.   Lymphadenopathy:   She has no cervical adenopathy.   Neurological: She is alert and oriented to person, place, and time. No cranial nerve deficit.   Skin: Skin is warm and dry. No rash noted.   Psychiatric: She has a normal mood and affect. Her behavior is normal. Judgment and thought content normal.         Results from last 7 days  Lab Units 01/22/17  0448 01/21/17  1636   WBC 10*3/mm3 5.61 7.78   HEMOGLOBIN g/dL 12.6 13.5   PLATELETS 10*3/mm3 296 335       Results from last 7 days  Lab Units 01/22/17  0448 01/21/17  1635   SODIUM mmol/L 141 141   POTASSIUM mmol/L 3.8 4.2   TOTAL CO2 mmol/L 27.0 30.0   BUN mg/dL 13 13   CREATININE mg/dL 0.60 0.68   GLUCOSE mg/dL 196* 102*       Results from last 7 days  Lab Units 01/22/17  0642 01/21/17  1624   PH, ARTERIAL pH units 7.411 7.450   PCO2, ARTERIAL mm Hg 39.4 40.6   PO2 ART mm Hg 88.0 75.6*     Lab Results   Component Value Date    PROBNP 56.1 01/21/2017     BLOOD CULTURE   Date Value Ref Range Status   01/21/2017 No growth at less than 24 hours  Preliminary   01/21/2017 No growth at less than 24 hours  Preliminary     Recent radiology:  Imaging Results (last 72 hours)     Procedure Component Value Units Date/Time    XR Chest 2 View [[id number]] Collected:  01/21/17 1548     Updated:  01/21/17 1631    Narrative:       HISTORY: Cough     CXR: Two views of the chest are obtained. There are no prior studies for  comparison.     There are bibasilar opacities with involvement of the lingula and/or  right middle lobe. There is a prominence of the interstitial  densities.  No pleural effusion or pneumothorax is identified. There are calcified  granuloma. The heart appears normal in size. The mediastinal contour is  normal. There is mild degenerative change of the thoracic spine.       Impression:       Bibasilar opacities with prominent interstitial markings. Multifocal  pneumonia/pneumonitis is favored. No pleural effusion. Normal-sized  heart.  This report was finalized on 01/21/2017 16:29 by Dr. Keke Bangura MD.    CT Angiogram Chest With Contrast [[id number]] Collected:  01/21/17 1803     Updated:  01/21/17 1810    Narrative:       Exam: CT angiogram of the of the chest with intravenous contrast.     CLINICAL HISTORY:  Chest pain.     DOSE:  302 mGycm. Automated exposure control was utilized to diminish  patient radiation dose.     TECHNIQUE: Contiguous axial images were obtained through the thorax  following intravenous contrast administration with reformatted images  obtained in the sagittal and coronal projections from the original data  set. Three-dimensional reconstructions are also obtained.     COMPARISON:  None available     FINDINGS:     Pulmonary arteries:  There is normal enhancement of the pulmonary  arteries with no evidence of pulmonary thromboembolic disease.     Aorta:  The thoracic aorta and proximal great vessels are normal in  appearance. There is no evidence of aortic dissection or aneurysm.     LUNGS:  There are rather diffuse interstitial changes of the lungs  within the right middle lobe and both lower lobes with some associated  patchy alveolar disease. I would favor a bilateral lower lobe and right  middle lobe mixed interstitial and alveolar pneumonia. A tiny right  effusion is present. There may also be an element of chronic  interstitial fibrosis. Follow-up films of the chest are recommended  following treatment.     Pleural spaces: A tiny right effusion is present.     HEART: No evidence of enlargement. No coronary artery calcifications  are  present. There is no evidence of pericardial effusion. No evidence of RV  dysfunction.     Bones: No acute osseous abnormalities are demonstrated.     CHEST WALL: No chest wall abnormalities are demonstrated.     Lymph nodes: There are some mildly enlarged middle mediastinal lymph  nodes involving the pretracheal, AP window and subcarinal cora groups  with a subcarinal node measuring 1.37 m in short axis. The AP window  measures 1.1 m in short axis. The pretracheal node measures 1.2 cm in  short axis.     Upper abdomen: A small hiatal hernia is present. There is a  nonobstructing calculus involving the upper pole the right kidney.       Impression:       1. No evidence of pulmonary thromboembolic disease. The thoracic aorta  is normal in caliber with no evidence of dissection or aneurysm.  2. Rather diffuse mixed interstitial and alveolar pneumonia involving  both lower lobes and the right middle lobe. An element of interstitial  fibrosis is also suspected. There is a tiny right-sided effusion.  This report was finalized on 01/21/2017 18:08 by Dr. Romaine Castillo MD.          Problems:    Active Problems:    Pneumonia of both lower lobes due to infectious organism    Pulmonary Impression:  6. Dyspnea  7. Chronic Cough  8. Pulmonary fibrosis-likely related to her underlying autoimmune conditions  9. Bilateral infiltrates  10. GERD    Recommend:  · After comparing yesterday's scan with one of her abdomen in 2015, the middle lobe and bases of her lungs are minimally worse.  · Cannot rule out pneumonia or bronchitis in addition to the fibrosis but not likely given no fever, sputum production or elevated WBCs.  · Would recommend rest/exercise sats for O2 need and rheumatology to see outpatient as she has never seen anyone or been treated for her autoimmune conditions.   · Pt wants to go home today. If she does it would be reasonable to place her on omnicef and a prednisone taper and have her f/u with Dr. Stoddard  short term and our office in about 4-6 week for FVL with diffusion and another cxr.     Electronically signed by ANASTASIA Briggs, 01/22/17, 11:12 AM           Electronically signed by ANASTASIA Barksdale at 1/22/2017 11:31 AM

## 2019-06-04 PROBLEM — R05.9 COUGH: Status: RESOLVED | Noted: 2017-01-23 | Resolved: 2019-06-04

## 2019-06-04 PROBLEM — G47.33 OBSTRUCTIVE SLEEP APNEA ON CPAP: Status: ACTIVE | Noted: 2019-06-04

## 2019-06-04 PROBLEM — J84.10 CALCIFIED GRANULOMA OF LUNG: Status: ACTIVE | Noted: 2019-06-04

## 2019-06-04 PROBLEM — R06.02 SOB (SHORTNESS OF BREATH): Status: RESOLVED | Noted: 2017-01-23 | Resolved: 2019-06-04

## 2019-06-04 PROBLEM — K21.9 GASTROESOPHAGEAL REFLUX DISEASE WITHOUT ESOPHAGITIS: Status: ACTIVE | Noted: 2019-06-04

## 2019-06-04 PROBLEM — J98.4 CALCIFIED GRANULOMA OF LUNG: Status: ACTIVE | Noted: 2019-06-04

## 2019-06-04 PROBLEM — J18.9 PNEUMONIA OF BOTH LOWER LOBES DUE TO INFECTIOUS ORGANISM: Status: RESOLVED | Noted: 2017-01-21 | Resolved: 2019-06-04

## 2019-06-04 PROBLEM — Z99.89 OBSTRUCTIVE SLEEP APNEA ON CPAP: Status: ACTIVE | Noted: 2019-06-04

## 2019-06-04 NOTE — PROGRESS NOTES
ANASTASIA Briggs  [facility]   Respiratory Disease Clinic  [address] [place] [address]  Phone: [phone]  Fax: [phone]     Elaine Juárez is a 59 y.o. female.   CC:   Chief Complaint   Patient presents with   • F/U OF PULMONARY FIBROSIS        HPI:Mr. Juárez is coming in for a follow-up of pulmonary fibrosis secondary to underlying autoimmune disease.  She has been lost to follow-up since last year secondary to insurance coverage issues.  She has been diagnosed with crest syndrome.  She is followed by Dr. Landry.  She was also seen by Dr. Eugene in [place].  She indicates that they were concerned she may have pulmonary hypertension secondary to her scleroderma.  She says there were some treatments that were recommended however she would like those to be performed locally so she does not have to travel as much.  I am uncertain of what those procedures are other than I assume she had an echocardiogram showing some pulmonary hypertension.  She denies any leg edema, dizziness or syncope.  Patient cannot recall.  She is on CellCept 500 mg twice daily as well as prednisone 2.5 mg.  Last time I saw her she was on CellCept 1000 mg twice daily and prednisone 5 mg.  She indicates both of these doses have been reduced secondary to improved coughing.  However she is present today indicating her cough is now worse.  Recent increasing in symptoms prompted a repeat CT of her chest.  This showed her fibrosis to be stable and no new abnormalities.  She is on Prilosec for reflux.  This is stable.  When I saw her previously she indicated Mucinex was helpful in thinning secretions and helping her cough.  She has not been utilizing this.  She has been on multiple inhalers none of which were helpful.  She did undergo home sleep study and an auto titrating CPAP was recommended.  She indicates she attempted it however this was not tolerated and she is no longer using it.    The following  portions of the patient's history were reviewed and updated as appropriate: allergies, current medications, past family history, past medical history, past social history, past surgical history and problem list.    Past Medical History:   Diagnosis Date   • Arthritis    • BMI 31.0-31.9,adult 6/4/2019   • Calcified granuloma of lung (CMS/HCC) 6/4/2019    Right lung   • Gastroesophageal reflux disease without esophagitis 6/4/2019   • Obstructive sleep apnea on CPAP 6/4/2019       Family History   Problem Relation Age of Onset   • Cirrhosis Sister    • Liver cancer Brother        Social History     Socioeconomic History   • Marital status:      Spouse name: Not on file   • Number of children: Not on file   • Years of education: Not on file   • Highest education level: Not on file   Tobacco Use   • Smoking status: Never Smoker   • Smokeless tobacco: Never Used   Substance and Sexual Activity   • Alcohol use: No   • Drug use: No   • Sexual activity: Defer       Review of Systems   Constitutional: Positive for fatigue. Negative for activity change, chills and fever.   HENT: Negative for congestion, postnasal drip, rhinorrhea, sinus pressure, sore throat and trouble swallowing.    Eyes: Negative for blurred vision, double vision and pain.   Respiratory: Positive for cough and shortness of breath. Negative for chest tightness and wheezing.    Cardiovascular: Negative for chest pain, palpitations and leg swelling.   Gastrointestinal: Negative for abdominal distention, constipation, diarrhea, nausea and vomiting.   Endocrine: Negative for polydipsia, polyphagia and polyuria.   Genitourinary: Negative for dysuria, frequency and urgency.   Musculoskeletal: Positive for joint swelling. Negative for arthralgias, back pain and gait problem.   Skin: Negative for color change, dry skin, rash and skin lesions.   Allergic/Immunologic: Negative for environmental allergies, food allergies and immunocompromised state.    Neurological: Negative for dizziness, seizures, speech difficulty, weakness, light-headedness, memory problem and confusion.   Hematological: Negative for adenopathy. Does not bruise/bleed easily.   Psychiatric/Behavioral: Negative for sleep disturbance, negative for hyperactivity and depressed mood. The patient is not nervous/anxious.        .vs    Physical Exam   Constitutional: She is oriented to person, place, and time. She appears well-developed and well-nourished. No distress.   HENT:   Head: Normocephalic and atraumatic.   Right Ear: External ear normal.   Left Ear: External ear normal.   Nose: Nose normal.   Mouth/Throat: Oropharynx is clear and moist. No oropharyngeal exudate.   Eyes: Conjunctivae and EOM are normal. Pupils are equal, round, and reactive to light. Right eye exhibits no discharge. Left eye exhibits no discharge.   Neck: Normal range of motion. Neck supple. No JVD present.   Cardiovascular: Regular rhythm. Tachycardia present.   No murmur heard.  Prominent P2   Pulmonary/Chest: Effort normal and breath sounds normal. No respiratory distress. She has no wheezes.   Bilateral fine rales, worse in the upper lobes and left lower lobe   Abdominal: Soft. Bowel sounds are normal. She exhibits no distension. There is no tenderness.   Musculoskeletal: Normal range of motion. She exhibits no edema or deformity.   Splint to the right wrist   Neurological: She is alert and oriented to person, place, and time. She displays normal reflexes. No cranial nerve deficit. Coordination normal.   Skin: Skin is warm and dry. No rash noted. She is not diaphoretic. No erythema.   Petechial type skin changes to the palms of her hands and circumorally   Psychiatric: She has a normal mood and affect. Her behavior is normal. Thought content normal.   Nursing note and vitals reviewed.      Pulmonary Functions Testing Results:    FEV1   Date Value Ref Range Status   06/05/2019 59% liters Final     FVC   Date Value Ref  Range Status   06/05/2019 54% liters Final     FEV1/FVC   Date Value Ref Range Status   06/05/2019 87.09% % Final     DLCO   Date Value Ref Range Status   06/05/2019 48% ml/mmHg sec Final     My interpretation of her flow volume loop diffusion reflects moderate restrictive disease with normal mid flows, severe diffusion impairment when corrected for alveolar volume was normal, FEV1, FVC and diffusion all reduced compared to spirometry from last year    CXR: No imaging for today.  However she had imaging at [facility] in April 2019 showing bibasilar fibrosis, hypoaeration and calcified granuloma in the right lung, stable        Problem List Items Addressed This Visit        Respiratory    Pulmonary fibrosis prob sec underlying autoimmune disease - Primary    Relevant Orders    Pulmonary Function Test (Completed)    Calcified granuloma of lung (CMS/HCC)    Overview     Right lung         Obstructive sleep apnea on CPAP       Digestive    Gastroesophageal reflux disease without esophagitis       Musculoskeletal and Integument    CREST syndrome, partial        Other    BMI 31.0-31.9,adult      Other Visit Diagnoses     Pulmonary hypertension (CMS/HCC)        Relevant Orders    Adult Transthoracic Echo Complete W/ Cont if Necessary Per Protocol        Patient's Body mass index is 30.35 kg/m². BMI is above normal parameters. Recommendations include: educational material.      Assessment/Plan         Patient with persistent shortness of breath and coughing.  Lost to follow-up.  Imaging remains stable however her spirometry and diffusion both have declined.  Recent reduction in her CellCept and prednisone for improvement in symptoms.  Symptoms have worsened since this decrease which may indicate they need to be increased back to previous dosing.  Will defer that for now.  Will order echocardiogram to assess for pulmonary hypertension secondary to her scleroderma.  Order 6-minute walk for functional class testing.  She is  encouraged to utilize her oxygen with exertion to prevent worsening pulmonary hypertension.  Unfortunately she cannot tolerate treatment for sleep apnea.  Recommended Biotene rinse for mouth dryness.  Reassessment in a few weeks after echocardiogram and 6-minute walk testing has been completed.  If symptoms continue to worsen and/or these tests are abnormal we may need to consider increasing CellCept and prednisone additionally as well as considering treatment for the pulmonary hypertension.    Fela Blackman, ANASTASIA  6/5/2019  11:50 AM    Return in about 2 months (around 8/5/2019).

## 2019-06-05 ENCOUNTER — OFFICE VISIT (OUTPATIENT)
Dept: PULMONOLOGY | Facility: CLINIC | Age: 59
End: 2019-06-05

## 2019-06-05 VITALS
HEART RATE: 115 BPM | WEIGHT: 176.8 LBS | DIASTOLIC BLOOD PRESSURE: 82 MMHG | SYSTOLIC BLOOD PRESSURE: 128 MMHG | BODY MASS INDEX: 30.19 KG/M2 | OXYGEN SATURATION: 95 % | HEIGHT: 64 IN

## 2019-06-05 DIAGNOSIS — Z99.89 OBSTRUCTIVE SLEEP APNEA ON CPAP: ICD-10-CM

## 2019-06-05 DIAGNOSIS — I27.20 PULMONARY HYPERTENSION (HCC): ICD-10-CM

## 2019-06-05 DIAGNOSIS — K21.9 GASTROESOPHAGEAL REFLUX DISEASE WITHOUT ESOPHAGITIS: ICD-10-CM

## 2019-06-05 DIAGNOSIS — M34.1 CREST SYNDROME (HCC): ICD-10-CM

## 2019-06-05 DIAGNOSIS — J84.10 PULMONARY FIBROSIS (HCC): Primary | ICD-10-CM

## 2019-06-05 DIAGNOSIS — G47.33 OBSTRUCTIVE SLEEP APNEA ON CPAP: ICD-10-CM

## 2019-06-05 DIAGNOSIS — J84.10 CALCIFIED GRANULOMA OF LUNG (HCC): ICD-10-CM

## 2019-06-05 LAB
DIFF CAP.CO: NORMAL ML/MMHG SEC
FEV1/FVC: NORMAL %
FEV1: NORMAL LITERS
FVC VOL RESPIRATORY: NORMAL LITERS

## 2019-06-05 PROCEDURE — 94010 BREATHING CAPACITY TEST: CPT | Performed by: NURSE PRACTITIONER

## 2019-06-05 PROCEDURE — 94729 DIFFUSING CAPACITY: CPT | Performed by: NURSE PRACTITIONER

## 2019-06-05 PROCEDURE — 99214 OFFICE O/P EST MOD 30 MIN: CPT | Performed by: NURSE PRACTITIONER

## 2019-06-05 RX ORDER — TRAZODONE HYDROCHLORIDE 150 MG/1
75 TABLET ORAL NIGHTLY
COMMUNITY
Start: 2019-04-30 | End: 2020-07-17 | Stop reason: DRUGHIGH

## 2019-06-05 RX ORDER — PREDNISONE 2.5 MG
2.5 TABLET ORAL DAILY
Refills: 1 | COMMUNITY
Start: 2019-05-09 | End: 2021-04-14

## 2019-06-05 NOTE — PATIENT INSTRUCTIONS

## 2019-06-07 ENCOUNTER — TELEPHONE (OUTPATIENT)
Dept: PULMONOLOGY | Facility: CLINIC | Age: 59
End: 2019-06-07

## 2019-06-10 ENCOUNTER — TELEPHONE (OUTPATIENT)
Dept: PULMONOLOGY | Facility: CLINIC | Age: 59
End: 2019-06-10

## 2019-06-10 NOTE — TELEPHONE ENCOUNTER
Pt canceled her Echo by email to [facility].   It was noted in computer that due to estimated amount cost and after given all options pt still canceled.  Just wanted you to know.

## 2019-06-12 ENCOUNTER — APPOINTMENT (OUTPATIENT)
Dept: CARDIOLOGY | Facility: HOSPITAL | Age: 59
End: 2019-06-12

## 2019-07-05 ENCOUNTER — OFFICE VISIT (OUTPATIENT)
Dept: PULMONOLOGY | Facility: CLINIC | Age: 59
End: 2019-07-05

## 2019-07-05 DIAGNOSIS — J84.10 PULMONARY FIBROSIS (HCC): Primary | ICD-10-CM

## 2019-07-05 PROCEDURE — 94618 PULMONARY STRESS TESTING: CPT | Performed by: NURSE PRACTITIONER

## 2019-07-05 NOTE — PROGRESS NOTES
SIX MINUTE WALK COMPLETED. PATIENT WALKED FOR A FULL SIX MINUTES ON THE TREADMILL. THE PATIENT WALKED AT A SPEED OF 1.5. PATIENT WALKED A DISTANCE .3082 METERS.  PATIENT'S SAT DROPPED TO 88% AND WAS PUT ON 2L. PATIENT'S SATS STAYED BETWEEN 90% AND 93% AFTER BEING PLACED ON 2L. SEE FULL SCANNED DOCUMENT FOR ENTIRE STUDY RESULTS.

## 2019-07-25 ENCOUNTER — TELEPHONE (OUTPATIENT)
Dept: GASTROENTEROLOGY | Facility: CLINIC | Age: 59
End: 2019-07-25

## 2019-08-17 NOTE — TELEPHONE ENCOUNTER
F/U with Micah on 9/16/19 for further advisement.    Pt has a f/u appt with Dr. Landin 9/16/19 at 1:15 pm.    Phoebe Collins MA

## 2019-11-05 DIAGNOSIS — I27.20 PULMONARY HYPERTENSION (HCC): ICD-10-CM

## 2019-11-05 DIAGNOSIS — J84.10 PULMONARY FIBROSIS (HCC): Primary | ICD-10-CM

## 2019-11-18 DIAGNOSIS — J84.10 PULMONARY FIBROSIS (HCC): ICD-10-CM

## 2019-11-18 DIAGNOSIS — I27.20 PULMONARY HYPERTENSION (HCC): ICD-10-CM

## 2020-02-06 ENCOUNTER — OFFICE VISIT (OUTPATIENT)
Dept: GASTROENTEROLOGY | Facility: CLINIC | Age: 60
End: 2020-02-06

## 2020-02-06 ENCOUNTER — TELEPHONE (OUTPATIENT)
Dept: GASTROENTEROLOGY | Facility: CLINIC | Age: 60
End: 2020-02-06

## 2020-02-06 VITALS
SYSTOLIC BLOOD PRESSURE: 126 MMHG | HEART RATE: 74 BPM | TEMPERATURE: 97.5 F | BODY MASS INDEX: 28.85 KG/M2 | DIASTOLIC BLOOD PRESSURE: 80 MMHG | WEIGHT: 169 LBS | HEIGHT: 64 IN | OXYGEN SATURATION: 97 %

## 2020-02-06 DIAGNOSIS — K74.3 PRIMARY BILIARY CHOLANGITIS (HCC): Primary | ICD-10-CM

## 2020-02-06 PROCEDURE — 99213 OFFICE O/P EST LOW 20 MIN: CPT | Performed by: NURSE PRACTITIONER

## 2020-02-06 RX ORDER — MELATONIN
1000 DAILY
COMMUNITY
End: 2020-07-17

## 2020-02-06 RX ORDER — URSODIOL 300 MG/1
300 CAPSULE ORAL 2 TIMES DAILY
Qty: 180 CAPSULE | Refills: 2 | Status: SHIPPED | OUTPATIENT
Start: 2020-02-06 | End: 2020-11-05 | Stop reason: SDUPTHER

## 2020-02-06 NOTE — PROGRESS NOTES
Chief Complaint   Patient presents with   • Cirrhosis     primary biliary cirrhosis needs ursodiol refilled ingenio        PCP: Aaron Stoddard MD  REFER: No ref. provider found    Subjective     HPI    Known diagnosis of PBC,  successfully maintained on luz.  She has been out of Luz for little over one month.  No increase fatigue, no abdominal pain.  Labs drawn by rheumatology 1/9/2020 with elevation in LFT (no records to review).  No other complaints.  No ultrasound since 9/2018.  Dx of CREST 3 yr ago.    Past Medical History:   Diagnosis Date   • Arthritis    • BMI 31.0-31.9,adult 6/4/2019   • Calcified granuloma of lung (CMS/HCC) 6/4/2019    Right lung   • Gastroesophageal reflux disease without esophagitis 6/4/2019   • Obstructive sleep apnea on CPAP 6/4/2019       Past Surgical History:   Procedure Laterality Date   • BREAST BIOPSY     • CHOLECYSTECTOMY     • COLONOSCOPY  05/11/2015    5 POLYPS       Outpatient Medications Marked as Taking for the 2/6/20 encounter (Office Visit) with Micah Barton APRN   Medication Sig Dispense Refill   • cholecalciferol (VITAMIN D3) 25 MCG (1000 UT) tablet Take 1,000 Units by mouth Daily.     • Flaxseed, Linseed, (FLAX SEED OIL PO) Take  by mouth.     • mycophenolate (CELLCEPT) 500 MG tablet Take  by mouth 3 (Three) Times a Day.     • omeprazole (priLOSEC) 40 MG capsule Take 1 capsule by mouth 2 (Two) Times a Day. 180 capsule 2   • predniSONE (DELTASONE) 2.5 MG tablet   1   • traZODone (DESYREL) 150 MG tablet 75 mg Every Night.     • ursodiol (ACTIGALL) 300 MG capsule Take 1 capsule by mouth 2 (Two) Times a Day. 180 capsule 2   • venlafaxine (EFFEXOR) 75 MG tablet Take 75 mg by mouth 2 (Two) Times a Day.     • [DISCONTINUED] ursodiol (ACTIGALL) 300 MG capsule Take 1 capsule by mouth 2 (Two) Times a Day. 180 capsule 2       Allergies   Allergen Reactions   • Codeine GI Intolerance       Social History     Socioeconomic History   • Marital status:      Spouse  "name: Not on file   • Number of children: Not on file   • Years of education: Not on file   • Highest education level: Not on file   Tobacco Use   • Smoking status: Never Smoker   • Smokeless tobacco: Never Used   Substance and Sexual Activity   • Alcohol use: No   • Drug use: No   • Sexual activity: Defer       Family History   Problem Relation Age of Onset   • Cirrhosis Sister    • Liver cancer Brother        Review of Systems   Constitutional: Negative for fatigue, fever and unexpected weight change.   HENT: Negative for hearing loss, sore throat and voice change.    Eyes: Negative for visual disturbance.   Respiratory: Negative for cough, shortness of breath and wheezing.    Cardiovascular: Negative for chest pain and palpitations.   Gastrointestinal: Negative for abdominal pain, blood in stool and vomiting.   Endocrine: Negative for polydipsia and polyuria.   Genitourinary: Negative for difficulty urinating, dysuria, hematuria and urgency.   Musculoskeletal: Negative for joint swelling and myalgias.   Skin: Negative for color change, rash and wound.   Neurological: Negative for dizziness, tremors, seizures and syncope.   Hematological: Does not bruise/bleed easily.   Psychiatric/Behavioral: Negative for agitation and confusion. The patient is not nervous/anxious.        Objective     Vitals:    02/06/20 1333   BP: 126/80   Pulse: 74   Temp: 97.5 °F (36.4 °C)   SpO2: 97%   Weight: 76.7 kg (169 lb)   Height: 162.6 cm (64\")     Body mass index is 29.01 kg/m².    Physical Exam   Constitutional: She is oriented to person, place, and time. She appears well-developed and well-nourished. She is cooperative.   HENT:   Head: Normocephalic and atraumatic.   Eyes: Pupils are equal, round, and reactive to light. Conjunctivae are normal. No scleral icterus.   Neck: Normal range of motion. Neck supple. No JVD present. No thyroid mass and no thyromegaly present.   Cardiovascular: Normal rate, regular rhythm and normal heart " sounds. Exam reveals no gallop and no friction rub.   No murmur heard.  Pulmonary/Chest: Effort normal and breath sounds normal. No accessory muscle usage. No respiratory distress. She has no wheezes. She has no rales.   Abdominal: Soft. Normal appearance and bowel sounds are normal. She exhibits no distension, no ascites and no mass. There is no hepatosplenomegaly. There is no tenderness. There is no rebound and no guarding.   Musculoskeletal: Normal range of motion. She exhibits no edema or tenderness.     Vascular Status -  Her right foot exhibits normal foot vasculature  and no edema. Her left foot exhibits normal foot vasculature  and no edema.  Lymphadenopathy:     She has no cervical adenopathy.   Neurological: She is alert and oriented to person, place, and time. She has normal strength. Gait normal.   Skin: Skin is warm, dry and intact. No rash noted.       Imaging Results (Most Recent)     None          Body mass index is 29.01 kg/m².    Assessment/Plan     Elaine was seen today for cirrhosis.    Diagnoses and all orders for this visit:    PBC (primary biliary cirrhosis)  -     US Liver; Future  -     US Elastography Parenchyma; Future  -     Comprehensive Metabolic Panel; Future    Other orders  -     ursodiol (ACTIGALL) 300 MG capsule; Take 1 capsule by mouth 2 (Two) Times a Day.        * Surgery not found *      Restart brandi  Repeat CMP in 6 months (unless review of labs drawn in Jan indicate labs need to be sooner)  Explained need to have US w/ elastiography      Patient's Body mass index is 29.01 kg/m². BMI is above normal parameters. Recommendations include: no follow up.      Michael Landin,    02/06/20            There are no Patient Instructions on file for this visit.

## 2020-02-11 ENCOUNTER — HOSPITAL ENCOUNTER (OUTPATIENT)
Dept: ULTRASOUND IMAGING | Facility: HOSPITAL | Age: 60
Discharge: HOME | End: 2020-02-11

## 2020-02-11 ENCOUNTER — HOSPITAL ENCOUNTER (OUTPATIENT)
Dept: ULTRASOUND IMAGING | Facility: HOSPITAL | Age: 60
Discharge: HOME | End: 2020-02-11
Admitting: INTERNAL MEDICINE

## 2020-02-11 DIAGNOSIS — K74.3 PRIMARY BILIARY CHOLANGITIS (HCC): ICD-10-CM

## 2020-02-11 PROCEDURE — 76705 ECHO EXAM OF ABDOMEN: CPT

## 2020-02-11 PROCEDURE — 76981 USE PARENCHYMA: CPT

## 2020-02-15 DIAGNOSIS — K21.9 GASTROESOPHAGEAL REFLUX DISEASE, ESOPHAGITIS PRESENCE NOT SPECIFIED: ICD-10-CM

## 2020-02-15 RX ORDER — OMEPRAZOLE 40 MG/1
CAPSULE, DELAYED RELEASE ORAL
Qty: 180 CAPSULE | Refills: 4 | Status: SHIPPED | OUTPATIENT
Start: 2020-02-15 | End: 2022-03-22 | Stop reason: SDUPTHER

## 2020-02-16 ENCOUNTER — TELEPHONE (OUTPATIENT)
Dept: GASTROENTEROLOGY | Facility: CLINIC | Age: 60
End: 2020-02-16

## 2020-02-16 DIAGNOSIS — K74.3 PRIMARY BILIARY CHOLANGITIS (HCC): Primary | ICD-10-CM

## 2020-02-24 ENCOUNTER — APPOINTMENT (OUTPATIENT)
Dept: GENERAL RADIOLOGY | Age: 60
DRG: 866 | End: 2020-02-24
Attending: FAMILY MEDICINE
Payer: COMMERCIAL

## 2020-02-24 ENCOUNTER — HOSPITAL ENCOUNTER (INPATIENT)
Age: 60
LOS: 1 days | Discharge: HOME | DRG: 866 | End: 2020-02-27
Attending: FAMILY MEDICINE | Admitting: FAMILY MEDICINE
Payer: COMMERCIAL

## 2020-02-24 ENCOUNTER — OUTSIDE FACILITY SERVICE (OUTPATIENT)
Dept: PULMONOLOGY | Facility: CLINIC | Age: 60
End: 2020-02-24

## 2020-02-24 LAB
ALBUMIN SERPL-MCNC: 3.2 G/DL (ref 3.5–5.2)
ALP BLD-CCNC: 264 U/L (ref 35–104)
ALT SERPL-CCNC: 27 U/L (ref 5–33)
ANION GAP SERPL CALCULATED.3IONS-SCNC: 14 MMOL/L (ref 7–19)
AST SERPL-CCNC: 34 U/L (ref 5–32)
BASE EXCESS ARTERIAL: 1 MMOL/L (ref -2–2)
BASOPHILS ABSOLUTE: 0 K/UL (ref 0–0.2)
BASOPHILS RELATIVE PERCENT: 0.8 % (ref 0–1)
BILIRUB SERPL-MCNC: 0.5 MG/DL (ref 0.2–1.2)
BILIRUBIN URINE: NEGATIVE
BLOOD, URINE: NEGATIVE
BUN BLDV-MCNC: 10 MG/DL (ref 8–23)
CALCIUM SERPL-MCNC: 8.4 MG/DL (ref 8.8–10.2)
CARBOXYHEMOGLOBIN ARTERIAL: 2.4 % (ref 0–5)
CHLORIDE BLD-SCNC: 98 MMOL/L (ref 98–111)
CLARITY: CLEAR
CO2: 19 MMOL/L (ref 22–29)
COLOR: YELLOW
CREAT SERPL-MCNC: 0.7 MG/DL (ref 0.5–0.9)
EOSINOPHILS ABSOLUTE: 0 K/UL (ref 0–0.6)
EOSINOPHILS RELATIVE PERCENT: 1.1 % (ref 0–5)
GFR NON-AFRICAN AMERICAN: >60
GLUCOSE BLD-MCNC: 107 MG/DL (ref 74–109)
GLUCOSE URINE: NEGATIVE MG/DL
HCO3 ARTERIAL: 23.6 MMOL/L (ref 22–26)
HCT VFR BLD CALC: 46.7 % (ref 37–47)
HEMOGLOBIN, ART, EXTENDED: 13.6 G/DL (ref 12–16)
HEMOGLOBIN: 14.4 G/DL (ref 12–16)
IMMATURE GRANULOCYTES #: 0 K/UL
KETONES, URINE: NEGATIVE MG/DL
LEUKOCYTE ESTERASE, URINE: NEGATIVE
LYMPHOCYTES ABSOLUTE: 1 K/UL (ref 1.1–4.5)
LYMPHOCYTES RELATIVE PERCENT: 28.3 % (ref 20–40)
MCH RBC QN AUTO: 26 PG (ref 27–31)
MCHC RBC AUTO-ENTMCNC: 30.8 G/DL (ref 33–37)
MCV RBC AUTO: 84.3 FL (ref 81–99)
METHEMOGLOBIN ARTERIAL: 0.6 %
MONOCYTES ABSOLUTE: 0.6 K/UL (ref 0–0.9)
MONOCYTES RELATIVE PERCENT: 15.8 % (ref 0–10)
NEUTROPHILS ABSOLUTE: 2 K/UL (ref 1.5–7.5)
NEUTROPHILS RELATIVE PERCENT: 53.2 % (ref 50–65)
NITRITE, URINE: NEGATIVE
O2 CONTENT ARTERIAL: 18.8 ML/DL
O2 SAT, ARTERIAL: 97 %
O2 THERAPY: ABNORMAL
PCO2 ARTERIAL: 31 MMHG (ref 35–45)
PDW BLD-RTO: 15 % (ref 11.5–14.5)
PH ARTERIAL: 7.49 (ref 7.35–7.45)
PH UA: 6 (ref 5–8)
PLATELET # BLD: 183 K/UL (ref 130–400)
PMV BLD AUTO: 11.7 FL (ref 9.4–12.3)
PO2 ARTERIAL: 144 MMHG (ref 80–100)
POTASSIUM SERPL-SCNC: 3.7 MMOL/L (ref 3.5–5)
POTASSIUM, WHOLE BLOOD: 3
PROTEIN UA: NEGATIVE MG/DL
RBC # BLD: 5.54 M/UL (ref 4.2–5.4)
SODIUM BLD-SCNC: 131 MMOL/L (ref 136–145)
SPECIFIC GRAVITY UA: 1.01 (ref 1–1.03)
TOTAL PROTEIN: 7.2 G/DL (ref 6.6–8.7)
UROBILINOGEN, URINE: 1 E.U./DL
WBC # BLD: 3.7 K/UL (ref 4.8–10.8)

## 2020-02-24 PROCEDURE — 80053 COMPREHEN METABOLIC PANEL: CPT

## 2020-02-24 PROCEDURE — 36600 WITHDRAWAL OF ARTERIAL BLOOD: CPT

## 2020-02-24 PROCEDURE — 2580000003 HC RX 258: Performed by: FAMILY MEDICINE

## 2020-02-24 PROCEDURE — 87632 RESP VIRUS 6-11 TARGETS: CPT

## 2020-02-24 PROCEDURE — 81003 URINALYSIS AUTO W/O SCOPE: CPT

## 2020-02-24 PROCEDURE — 96374 THER/PROPH/DIAG INJ IV PUSH: CPT

## 2020-02-24 PROCEDURE — 99244 OFF/OP CNSLTJ NEW/EST MOD 40: CPT | Performed by: INTERNAL MEDICINE

## 2020-02-24 PROCEDURE — 6370000000 HC RX 637 (ALT 250 FOR IP): Performed by: FAMILY MEDICINE

## 2020-02-24 PROCEDURE — G0379 DIRECT REFER HOSPITAL OBSERV: HCPCS

## 2020-02-24 PROCEDURE — 85025 COMPLETE CBC W/AUTO DIFF WBC: CPT

## 2020-02-24 PROCEDURE — G0378 HOSPITAL OBSERVATION PER HR: HCPCS

## 2020-02-24 PROCEDURE — 6360000002 HC RX W HCPCS: Performed by: FAMILY MEDICINE

## 2020-02-24 PROCEDURE — 36415 COLL VENOUS BLD VENIPUNCTURE: CPT

## 2020-02-24 PROCEDURE — 71046 X-RAY EXAM CHEST 2 VIEWS: CPT

## 2020-02-24 PROCEDURE — 87804 INFLUENZA ASSAY W/OPTIC: CPT

## 2020-02-24 PROCEDURE — 1210000000 HC MED SURG R&B

## 2020-02-24 PROCEDURE — 84132 ASSAY OF SERUM POTASSIUM: CPT

## 2020-02-24 PROCEDURE — 2700000000 HC OXYGEN THERAPY PER DAY

## 2020-02-24 PROCEDURE — 87040 BLOOD CULTURE FOR BACTERIA: CPT

## 2020-02-24 PROCEDURE — 82803 BLOOD GASES ANY COMBINATION: CPT

## 2020-02-24 RX ORDER — MYCOPHENOLATE MOFETIL 500 MG/1
TABLET ORAL 2 TIMES DAILY
COMMUNITY

## 2020-02-24 RX ORDER — METHYLPREDNISOLONE SODIUM SUCCINATE 40 MG/ML
80 INJECTION, POWDER, LYOPHILIZED, FOR SOLUTION INTRAMUSCULAR; INTRAVENOUS EVERY 8 HOURS
Status: DISCONTINUED | OUTPATIENT
Start: 2020-02-24 | End: 2020-02-25

## 2020-02-24 RX ORDER — VENLAFAXINE 75 MG/1
75 TABLET ORAL DAILY
COMMUNITY
End: 2022-09-27 | Stop reason: ALTCHOICE

## 2020-02-24 RX ORDER — TRAZODONE HYDROCHLORIDE 150 MG/1
150 TABLET ORAL NIGHTLY
COMMUNITY
End: 2022-09-27 | Stop reason: ALTCHOICE

## 2020-02-24 RX ORDER — URSODIOL 300 MG/1
300 CAPSULE ORAL 2 TIMES DAILY
Status: DISCONTINUED | OUTPATIENT
Start: 2020-02-24 | End: 2020-02-27 | Stop reason: HOSPADM

## 2020-02-24 RX ORDER — VENLAFAXINE 75 MG/1
75 TABLET ORAL DAILY
Status: DISCONTINUED | OUTPATIENT
Start: 2020-02-25 | End: 2020-02-27 | Stop reason: HOSPADM

## 2020-02-24 RX ORDER — URSODIOL 300 MG/1
300 CAPSULE ORAL 2 TIMES DAILY
COMMUNITY

## 2020-02-24 RX ORDER — PREDNISONE 2.5 MG
2.5 TABLET ORAL DAILY
Status: ON HOLD | COMMUNITY
End: 2020-02-27 | Stop reason: HOSPADM

## 2020-02-24 RX ORDER — SODIUM CHLORIDE, SODIUM LACTATE, POTASSIUM CHLORIDE, CALCIUM CHLORIDE 600; 310; 30; 20 MG/100ML; MG/100ML; MG/100ML; MG/100ML
INJECTION, SOLUTION INTRAVENOUS CONTINUOUS
Status: DISCONTINUED | OUTPATIENT
Start: 2020-02-24 | End: 2020-02-27

## 2020-02-24 RX ORDER — OMEPRAZOLE 20 MG/1
40 CAPSULE, DELAYED RELEASE ORAL DAILY
COMMUNITY
End: 2022-09-27 | Stop reason: ALTCHOICE

## 2020-02-24 RX ORDER — PANTOPRAZOLE SODIUM 40 MG/1
40 TABLET, DELAYED RELEASE ORAL
Status: DISCONTINUED | OUTPATIENT
Start: 2020-02-25 | End: 2020-02-27 | Stop reason: HOSPADM

## 2020-02-24 RX ORDER — TRAZODONE HYDROCHLORIDE 150 MG/1
150 TABLET ORAL NIGHTLY
Status: DISCONTINUED | OUTPATIENT
Start: 2020-02-24 | End: 2020-02-27 | Stop reason: HOSPADM

## 2020-02-24 RX ORDER — AMOXICILLIN AND CLAVULANATE POTASSIUM 500; 125 MG/1; MG/1
1 TABLET, FILM COATED ORAL 3 TIMES DAILY
Status: ON HOLD | COMMUNITY
End: 2020-02-27 | Stop reason: HOSPADM

## 2020-02-24 RX ORDER — MYCOPHENOLATE MOFETIL 250 MG/1
500 CAPSULE ORAL 2 TIMES DAILY
Status: DISCONTINUED | OUTPATIENT
Start: 2020-02-24 | End: 2020-02-27 | Stop reason: HOSPADM

## 2020-02-24 RX ADMIN — POTASSIUM BICARBONATE 20 MEQ: 782 TABLET, EFFERVESCENT ORAL at 20:48

## 2020-02-24 RX ADMIN — TRAZODONE HYDROCHLORIDE 150 MG: 150 TABLET ORAL at 20:48

## 2020-02-24 RX ADMIN — SODIUM CHLORIDE, POTASSIUM CHLORIDE, SODIUM LACTATE AND CALCIUM CHLORIDE: 600; 310; 30; 20 INJECTION, SOLUTION INTRAVENOUS at 16:58

## 2020-02-24 RX ADMIN — METHYLPREDNISOLONE SODIUM SUCCINATE 80 MG: 40 INJECTION, POWDER, FOR SOLUTION INTRAMUSCULAR; INTRAVENOUS at 16:58

## 2020-02-24 RX ADMIN — MYCOPHENOLATE MOFETIL 500 MG: 250 CAPSULE ORAL at 20:48

## 2020-02-24 RX ADMIN — URSODIOL 300 MG: 300 CAPSULE ORAL at 20:48

## 2020-02-24 SDOH — HEALTH STABILITY: MENTAL HEALTH: HOW OFTEN DO YOU HAVE A DRINK CONTAINING ALCOHOL?: NEVER

## 2020-02-24 ASSESSMENT — ENCOUNTER SYMPTOMS
EYES NEGATIVE: 1
VOMITING: 1
NAUSEA: 1
SHORTNESS OF BREATH: 1
COUGH: 1

## 2020-02-24 NOTE — PROGRESS NOTES
2/24/2020  4:31 PM - April Briggs Incoming Lab Results From [employer] Value Ref Range & Units Status Collected Lab   pH, Arterial 7.490High   7.350 - 7.450 Final 02/24/2020  4:28 PM [facility]   pCO2, Arterial 31. 0Low   35.0 - 45.0 mmHg Final 02/24/2020  4:28 PM [facility]   pO2, Arterial 144. 0High   80.0 - 100.0 mmHg Final 02/24/2020  4:28 PM [facility]   HCO3, Arterial 23.6  22.0 - 26.0 mmol/L Final 02/24/2020  4:28 PM [facility] Excess, Arterial 1.0  -2.0 - 2.0 mmol/L Final 02/24/2020  4:28 PM [facility]   Hemoglobin, Art, Extended 13.6  12.0 - 16.0 g/dL Final 02/24/2020  4:28 PM [address] [facility]   O2 Sat, Arterial 97.0  >92 % Final 02/24/2020  4:28 PM [facility]   Carboxyhgb, Arterial 2.4  0.0 - 5.0 % Final 02/24/2020  4:28 PM [facility]        0.0-1.5   (Smokers 1.5-5.0)    Methemoglobin, Arterial 0.6  <1.5 % Final 02/24/2020  4:28 PM [address] [facility]   O2 Content, Arterial 18.8  Not Established         Pt on 2 lpm nc, RB

## 2020-02-24 NOTE — CONSULTS
Pulmonary and Critical Care Consult Note  [address] [facility]    MR# [id number]    Acct# [de-identified]  2/24/2020   5:44 PM    Referring Laura Moeller MD  Chief Complaint: Fever and cough. HPI: We have been consulted to see this 62y.o. year old female born on 1960. 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 She is followed by Dr. Maximiliano Alonzo from a rheumatologic standpoint and has been seen by myself in the office and is routinely followed by Dr. Nida Bowens in our office. He is on chronic therapy with CellCept and prednisone. She was on a cruise to [place] recently returning home here to [place] this past weekend. She developed some problems with fever and cough. She was seen by her primary care physician admitted for further evaluation of these issues. She wears oxygen at home periodically and her blood gases on 2 L FiO2 revealed a PO2 of 144, PCO2 31, and pH 7.49. She has had some fever and some cough which is been minimally productive. Her chest x-ray shows chronic changes with no acute process. She apparently had some fever at home but has been afebrile since admission. Had some mild GI symptoms with nausea and vomiting but no diarrhea. Of her crest syndrome she has had a recent 2D echocardiogram which showed no evidence of pulmonary hypertension.   Past Medical History      Past Medical History:   Diagnosis Date    CREST syndrome ([address]     Kidney calculi     Lupus (HCC)     RIOS (nonalcoholic steatohepatitis)     Pulmonary fibrosis (HCC)      SurgicalHistory  Past Surgical History:   Procedure Laterality Date    BREAST BIOPSY      CHOLECYSTECTOMY      COLONOSCOPY       Allergies  Allergies   Allergen Reactions    Codeine Nausea And Vomiting     Medications    methylPREDNISolone, 80 mg, Intravenous, Q8H    potassium bicarb-citric acid, 20 mEq, Oral, Daily    mycophenolate, 500 mg, Oral, BID    [START ON 2/25/2020]

## 2020-02-25 ENCOUNTER — LAB REQUISITION (OUTPATIENT)
Dept: LAB | Facility: HOSPITAL | Age: 60
End: 2020-02-25

## 2020-02-25 ENCOUNTER — OUTSIDE FACILITY SERVICE (OUTPATIENT)
Dept: PULMONOLOGY | Facility: CLINIC | Age: 60
End: 2020-02-25

## 2020-02-25 ENCOUNTER — APPOINTMENT (OUTPATIENT)
Dept: ULTRASOUND IMAGING | Age: 60
DRG: 866 | End: 2020-02-25
Attending: FAMILY MEDICINE
Payer: COMMERCIAL

## 2020-02-25 DIAGNOSIS — Z00.00 ROUTINE GENERAL MEDICAL EXAMINATION AT A HEALTH CARE FACILITY: ICD-10-CM

## 2020-02-25 PROBLEM — R50.9 FEVER OF UNKNOWN ORIGIN: Status: ACTIVE | Noted: 2020-02-25

## 2020-02-25 LAB
ANION GAP SERPL CALCULATED.3IONS-SCNC: 13 MMOL/L (ref 7–19)
BASOPHILS ABSOLUTE: 0 K/UL (ref 0–0.2)
BASOPHILS RELATIVE PERCENT: 0.6 % (ref 0–1)
BUN BLDV-MCNC: 11 MG/DL (ref 8–23)
C DIFF TOXIN/ANTIGEN: NORMAL
CALCIUM SERPL-MCNC: 8.8 MG/DL (ref 8.8–10.2)
CHLORIDE BLD-SCNC: 101 MMOL/L (ref 98–111)
CO2: 26 MMOL/L (ref 22–29)
CREAT SERPL-MCNC: 0.7 MG/DL (ref 0.5–0.9)
EOSINOPHILS ABSOLUTE: 0 K/UL (ref 0–0.6)
EOSINOPHILS RELATIVE PERCENT: 0 % (ref 0–5)
GFR NON-AFRICAN AMERICAN: >60
GLUCOSE BLD-MCNC: 149 MG/DL (ref 74–109)
HCT VFR BLD CALC: 44.7 % (ref 37–47)
HEMOGLOBIN: 13.8 G/DL (ref 12–16)
IMMATURE GRANULOCYTES #: 0 K/UL
LYMPHOCYTES ABSOLUTE: 0.6 K/UL (ref 1.1–4.5)
LYMPHOCYTES RELATIVE PERCENT: 36.8 % (ref 20–40)
MCH RBC QN AUTO: 26.6 PG (ref 27–31)
MCHC RBC AUTO-ENTMCNC: 30.9 G/DL (ref 33–37)
MCV RBC AUTO: 86.3 FL (ref 81–99)
MONOCYTES ABSOLUTE: 0.1 K/UL (ref 0–0.9)
MONOCYTES RELATIVE PERCENT: 6.1 % (ref 0–10)
NEUTROPHILS ABSOLUTE: 0.9 K/UL (ref 1.5–7.5)
NEUTROPHILS RELATIVE PERCENT: 55.9 % (ref 50–65)
PDW BLD-RTO: 14.9 % (ref 11.5–14.5)
PLATELET # BLD: 192 K/UL (ref 130–400)
PLATELET SLIDE REVIEW: ABNORMAL
PMV BLD AUTO: 11.6 FL (ref 9.4–12.3)
POTASSIUM SERPL-SCNC: 4.5 MMOL/L (ref 3.5–5)
RAPID INFLUENZA  B AGN: NEGATIVE
RAPID INFLUENZA A AGN: NEGATIVE
RBC # BLD: 5.18 M/UL (ref 4.2–5.4)
SODIUM BLD-SCNC: 140 MMOL/L (ref 136–145)
WBC # BLD: 1.6 K/UL (ref 4.8–10.8)

## 2020-02-25 PROCEDURE — 87427 SHIGA-LIKE TOXIN AG IA: CPT

## 2020-02-25 PROCEDURE — G0378 HOSPITAL OBSERVATION PER HR: HCPCS

## 2020-02-25 PROCEDURE — 76705 ECHO EXAM OF ABDOMEN: CPT

## 2020-02-25 PROCEDURE — 99222 1ST HOSP IP/OBS MODERATE 55: CPT | Performed by: INTERNAL MEDICINE

## 2020-02-25 PROCEDURE — 96372 THER/PROPH/DIAG INJ SC/IM: CPT

## 2020-02-25 PROCEDURE — 99214 OFFICE O/P EST MOD 30 MIN: CPT | Performed by: INTERNAL MEDICINE

## 2020-02-25 PROCEDURE — 87070 CULTURE OTHR SPECIMN AEROBIC: CPT

## 2020-02-25 PROCEDURE — 6360000002 HC RX W HCPCS: Performed by: NURSE PRACTITIONER

## 2020-02-25 PROCEDURE — 96376 TX/PRO/DX INJ SAME DRUG ADON: CPT

## 2020-02-25 PROCEDURE — 87205 SMEAR GRAM STAIN: CPT

## 2020-02-25 PROCEDURE — 87899 AGENT NOS ASSAY W/OPTIC: CPT

## 2020-02-25 PROCEDURE — 87045 FECES CULTURE AEROBIC BACT: CPT

## 2020-02-25 PROCEDURE — 6360000002 HC RX W HCPCS: Performed by: FAMILY MEDICINE

## 2020-02-25 PROCEDURE — 87324 CLOSTRIDIUM AG IA: CPT

## 2020-02-25 PROCEDURE — 6370000000 HC RX 637 (ALT 250 FOR IP): Performed by: FAMILY MEDICINE

## 2020-02-25 PROCEDURE — 87015 SPECIMEN INFECT AGNT CONCNTJ: CPT

## 2020-02-25 PROCEDURE — 87077 CULTURE AEROBIC IDENTIFY: CPT

## 2020-02-25 PROCEDURE — 87449 NOS EACH ORGANISM AG IA: CPT

## 2020-02-25 PROCEDURE — 36415 COLL VENOUS BLD VENIPUNCTURE: CPT

## 2020-02-25 PROCEDURE — 2700000000 HC OXYGEN THERAPY PER DAY

## 2020-02-25 PROCEDURE — 80048 BASIC METABOLIC PNL TOTAL CA: CPT

## 2020-02-25 PROCEDURE — 85025 COMPLETE CBC W/AUTO DIFF WBC: CPT

## 2020-02-25 PROCEDURE — 0097U HC SO GI PTHGN MULT REV TRANS & AMP PRB TECH 22 TRGT: CPT

## 2020-02-25 RX ORDER — METHYLPREDNISOLONE SODIUM SUCCINATE 125 MG/2ML
60 INJECTION, POWDER, LYOPHILIZED, FOR SOLUTION INTRAMUSCULAR; INTRAVENOUS EVERY 8 HOURS
Status: DISCONTINUED | OUTPATIENT
Start: 2020-02-25 | End: 2020-02-27

## 2020-02-25 RX ADMIN — METHYLPREDNISOLONE SODIUM SUCCINATE 60 MG: 125 INJECTION, POWDER, FOR SOLUTION INTRAMUSCULAR; INTRAVENOUS at 23:33

## 2020-02-25 RX ADMIN — METHYLPREDNISOLONE SODIUM SUCCINATE 60 MG: 125 INJECTION, POWDER, FOR SOLUTION INTRAMUSCULAR; INTRAVENOUS at 16:29

## 2020-02-25 RX ADMIN — VENLAFAXINE 75 MG: 75 TABLET ORAL at 08:18

## 2020-02-25 RX ADMIN — METHYLPREDNISOLONE SODIUM SUCCINATE 80 MG: 40 INJECTION, POWDER, FOR SOLUTION INTRAMUSCULAR; INTRAVENOUS at 00:09

## 2020-02-25 RX ADMIN — MYCOPHENOLATE MOFETIL 500 MG: 250 CAPSULE ORAL at 21:01

## 2020-02-25 RX ADMIN — TRAZODONE HYDROCHLORIDE 150 MG: 150 TABLET ORAL at 21:01

## 2020-02-25 RX ADMIN — MYCOPHENOLATE MOFETIL 500 MG: 250 CAPSULE ORAL at 08:18

## 2020-02-25 RX ADMIN — METHYLPREDNISOLONE SODIUM SUCCINATE 80 MG: 40 INJECTION, POWDER, FOR SOLUTION INTRAMUSCULAR; INTRAVENOUS at 08:18

## 2020-02-25 RX ADMIN — URSODIOL 300 MG: 300 CAPSULE ORAL at 08:18

## 2020-02-25 RX ADMIN — PANTOPRAZOLE SODIUM 40 MG: 40 TABLET, DELAYED RELEASE ORAL at 06:05

## 2020-02-25 RX ADMIN — URSODIOL 300 MG: 300 CAPSULE ORAL at 21:01

## 2020-02-25 RX ADMIN — ENOXAPARIN SODIUM 40 MG: 40 INJECTION SUBCUTANEOUS at 08:18

## 2020-02-25 ASSESSMENT — PAIN SCALES - GENERAL: PAINLEVEL_OUTOF10: 0

## 2020-02-25 NOTE — PROGRESS NOTES
shortness of breath. Cardiovascular: Negative. Gastrointestinal: Positive for nausea and vomiting. Genitourinary: Negative. Musculoskeletal: Positive for joint swelling. Skin: Negative. Neurological: Negative. Psychiatric/Behavioral: Negative. Physical Exam:  Blood pressure 96/62, pulse 89, temperature 97.1 °F (36.2 °C), temperature source Temporal, resp. rate 18, SpO2 98 %. Intake/Output Summary (Last 24 hours) at 2/25/2020 1052  Last data filed at 2/25/2020 0534  Gross per 24 hour   Intake 1323 ml   Output 1150 ml   Net 173 ml     Physical Exam:    Constitutional:       Appearance: Normal appearance. Comments: She is a somewhat overweight white female who appears in no acute distress. HENT:      Head:      Comments: Nasal oxygen is in place. Eyes:      Extraocular Movements: Extraocular movements intact. Pupils: Pupils are equal, round, and reactive to light. Neck:      Musculoskeletal: Normal range of motion and neck supple. Cardiovascular:      Rate and Rhythm: Normal rate and regular rhythm. Pulmonary:      Comments: Some fine basilar crackles are present posteriorly, mild tachypnea  Abdominal:      General: Abdomen is flat. Palpations: Abdomen is soft. Musculoskeletal:      Comments: She has some mild joint deformities of the fingers. Skin:     General: Skin is warm and dry. Neurological:      Mental Status: She is alert and oriented to person, place, and time.    Psychiatric:         Mood and Affect: Mood normal.         Behavior: Behavior normal.     Recent Labs     02/24/20  1638 02/25/20  0428   WBC 3.7* 1.6*   RBC 5.54* 5.18   HGB 14.4 13.8   HCT 46.7 44.7    192   MCV 84.3 86.3   MCH 26.0* 26.6*   MCHC 30.8* 30.9*   RDW 15.0* 14.9*      Recent Labs     02/24/20  1628 02/24/20  1638 02/25/20  0428   NA  --  131* 140   K 3.0 3.7 4.5   CL  --  98 101   CO2  --  19* 26   BUN  --  10 11   CREATININE  --  0.7 0.7   CALCIUM  --  8.4* 8.8   GLUCOSE

## 2020-02-25 NOTE — PROGRESS NOTES
Attempted to call lab regarding blood cultures x2 that need to be drawn. No answer will attempt to call back.  Electronically signed by Nury Hall RN on 2/24/2020 at 9:00 PM

## 2020-02-25 NOTE — H&P
[facility] [address]                              HISTORY AND PHYSICAL    PATIENT NAME: Aashish Daniels                     :        1960  MED REC NO:   [id number]                              ROOM:       [facility]  ACCOUNT NO:   [de-identified]                           ADMIT DATE: 2020  PROVIDER:     Hemal Dela Cruz MD    HISTORY OF PRESENT ILLNESS:  This is a 66-year-old admitted from the  office with acute hypoxic respiratory failure on a history of chronic  hypoxic respiratory failure due to pulmonary fibrosis secondary to CREST  syndrome, followed by Dr. Leigh Morales as an outpatient. The patient had  been on a cruise last week and on Wednesday began feeling like she was  developing a sinus problem; on Thursday, she began having nausea,  vomiting, diarrhea, weakness, achiness, chills and subjective fever. She got off the cruise ship and presented to home. She has continued to  progressively get weaker and continued to feel worse, more breathless,  more short of air. Denies any chest pain, syncope or presyncope. PAST MEDICAL HISTORY:  Significant for the above-mentioned pulmonary  fibrosis, followed by Dr. Leigh Morales. She has a history of CREST  syndrome, followed by her rheumatologist, Dr. Radha Salazar. She has a  history of reflux disease. She has a lupus, chronic depression. MEDICATIONS:  Include recently started on Augmentin 875 b.i.d., takes  over-the-counter multivitamin and flaxseed oil. She takes mycophenolate  500 mg one tablet four times daily; omeprazole 40 mg twice daily; oxygen  2 liters a day by nasal cannula continuously; prednisone 2.5 mg daily;  trazodone 150 at bedtime; ursodiol 300 mg twice daily from Dr. Jillian Juárez  for liver problems, she has a history of cirrhosis, non-alcoholic  steatohepatitis; and she takes venlafaxine XR 75 mg daily.     ALLERGIES:  She has allergies to LORTAB AND CODEINE. SOCIAL HISTORY:  She is . She is a nonsmoker. She does not use  alcohol. She has a very supportive family. PHYSICAL EXAMINATION:  VITAL SIGNS:  Here in the office, O2 sat on 2 liters was anywhere from  83% to 87% and that was lying down at rest.  Her blood pressure is  116/76, pulse was in the 90s, weight 168 pounds, temperature is 98.3,  height 5 feet 4 inches. GENERAL:  She looks ill, moderate distress. NECK:  Supple. No adenopathy. CHEST:  She has bibasilar rales. HEART:  Regular. ABDOMEN:  Benign. EXTREMITIES:  No clubbing, cyanosis or significant edema. ASSESSMENT:  1. Acute illness with acute hypoxic respiratory failure in the face of  a history of interstitial fibrosis or pulmonary fibrosis caused by CREST  syndrome. 2.  Autoimmune issues causing CREST syndrome. 3.  Cirrhosis. 4.  Chronic depression. 5.  Reflux disease. PLAN:  She is admitted with studies ordered for flu and others. We will  get Dr. Magdalene Tee group to see her. We will start her on some IV  steroids, oxygen and follow up based on how she progresses. Please see  orders for further details.         Jose New MD    D: 02/24/2020 18:12:34      T: 02/24/2020 18:15:59     [email or web address]  Job#: [id number]     Doc#: [id number]    CC:

## 2020-02-25 NOTE — PROGRESS NOTES
Progress Note  2/25/2020 6:35 AM  Subjective:   Admit Date: 2/24/2020  PCP: Debbie Mendoza MD    Interval History: \"I feel a little better. \"    DIET GENERAL; Intake/Output Summary (Last 24 hours) at 2/25/2020 0635  Last data filed at 2/25/2020 0534  Gross per 24 hour   Intake 1323 ml   Output 1150 ml   Net 173 ml     Medications:      lactated ringers 75 mL/hr at 02/24/20 1658      enoxaparin  40 mg Subcutaneous Daily    methylPREDNISolone  80 mg Intravenous Q8H    potassium bicarb-citric acid  20 mEq Oral Daily    mycophenolate  500 mg Oral BID    pantoprazole  40 mg Oral QAM AC    traZODone  150 mg Oral Nightly    ursodiol  300 mg Oral BID    venlafaxine  75 mg Oral Daily     Recent Labs     02/24/20  1638 02/25/20  0428   WBC 3.7* 1.6*   HGB 14.4 13.8    192     Recent Labs     02/24/20  1628 02/24/20  1638 02/25/20  0428   NA  --  131* 140   K 3.0 3.7 4.5   CL  --  98 101   CO2  --  19* 26   BUN  --  10 11   CREATININE  --  0.7 0.7   GLUCOSE  --  107 149*     Recent Labs     02/24/20  1638   AST 34*   ALT 27   BILITOT 0.5   ALKPHOS 264*       Objective:   Vitals: BP 96/60   Pulse 77   Temp 97.5 °F (36.4 °C) (Temporal)   Resp 14   SpO2 96%   General appearance: alert and cooperative with exam  Lungs: Bibasilar rales. Heart: regular rate and rhythm, S1, S2 normal, no murmur, click, rub or gallop  Abdomen: soft, non-tender; bowel sounds normal; no masses,  no organomegaly  Extremities: extremities normal, atraumatic, no cyanosis or edema  Neurologic: No obvious focal neurologic deficits. Assessment and Plan:   1. FUO: ID consult. 2. Leukopenia: Hematology consult. 3. Pulm fibrosis with CREST syndrome: Dr Morena Cook has seen her. 4. Hyperglycemia: Related to IV steroids. Advance Directive: Full Code  DVT prophylaxis with enoxaparin 40 mg sub-Q daily. Discharge planning: Active Problems:    * No active hospital problems. *  Resolved Problems:    * No resolved hospital problems. Sy Shah MD

## 2020-02-25 NOTE — CONSULTS
CONSULTATION NOTE :ID       Patient - Lisa Anglin,  Age - 62 y.o.    - 1960      Room Number - 4283/227-76   N -  [id number]   Acct # - [de-identified]  Date of Admission -  2020  2:51 PM  Patient's PCP: Leeanna Mills MD     Requesting Physician: Leeanna Mills MD    REASON FOR CONSULTATION   fuo in immunocompromised patient    HISTORY OF PRESENT ILLNESS       This is a very pleasant 62 y.o. female who was admitted to the hospital with a chief complaints of \"I felt awful\". The patient was recently on a cruise. She reports about 3 or 4 days into the cruise she began feeling \"sick\". When I asked her to be more specific she said she had some worsening shortness of breath but since she has some baseline pulmonary fibrosis she did not think much of it. She also developed nausea vomiting and diarrhea. She did not check her temperature but felt like she had fever because she also experienced some chills and sweats. She was on a cruise with other family members and none of them are ill. On Tuesday she was in [facility]. She states she was already a little more short of breath at that time. The next day she was at [facility] and she was already getting sicker and by Wednesday night states that she felt quite sick and essentially stayed in her room. When she got off the ship she states that she would just go shopping. She did not do any excursions where she was in the water or around any wildlife. She did not get evaluated by any medical personnel on the ship. When I asked her what her main complaint was she just said \"I feel bad\". She states she was not actually even able to hold her head up when she was admitted so she feels like she was improved from that standpoint.     When I asked her if there is anything else specific like headache, specific worsening of her shortness of breath, cough, nausea vomiting or diarrhea that was more problematic, she stated no. She had been home for a few days and was not improving so went to see her primary provider Dr. Titus Guillen. In his office her O2 sats were between 83 to 87% on 2 L. She was subsequently admitted to the hospital.  Per his note she had recently been started on Augmentin 875 twice a day however I do not know when it was started and did not asked the patient about that. Patient states she has oxygen at home \"when needed\" she did not take it with her on the cruise. Patient has been on CellCept under the direction of Dr. Ralph Conte her rheumatologist.  This is been held since admission. PAST MEDICAL AND SURGICAL HISTORY       Past Medical History:   Diagnosis Date    [facility] ([facility]     Kidney calculi     Lupus ([facility]     RIOS (nonalcoholic steatohepatitis)     Pulmonary fibrosis (HCC)        Past Surgical History:   Procedure Laterality Date    BREAST BIOPSY      CHOLECYSTECTOMY      COLONOSCOPY           MEDICATIONS :       Scheduled Meds:   enoxaparin  40 mg Subcutaneous Daily    methylPREDNISolone  80 mg Intravenous Q8H    potassium bicarb-citric acid  20 mEq Oral Daily    mycophenolate  500 mg Oral BID    pantoprazole  40 mg Oral QAM AC    traZODone  150 mg Oral Nightly    ursodiol  300 mg Oral BID    venlafaxine  75 mg Oral Daily     Continuous Infusions:   lactated ringers 75 mL/hr at 02/24/20 1658     PRN Meds:    ALLERGIES:      Codeine      SOCIAL HISTORY:     TOBACCO:   reports that she has never smoked. She has never used smokeless tobacco.     ETOH:   reports no history of alcohol use. Patient currently lives at home    FAMILY HISTORY:     History reviewed. No pertinent family history.     REVIEW OF SYSTEMS:     Constitutional: Subjective fever, is complaining of night sweats and fatigue  Ears: no hearing difficulty  Mouth/throat: no  dysphagia  Lungs:  shortness of breath  CVS: no palpitation, no chest pain  GI:  no nausea , no vomiting, no diarrhea that have negative  Blood culture reportedly pending  Respiratory culture reportedly pending    Urinalysis negative    Peripheral blood smear review pending       Culture: No results for input(s): BC, BLOODCULT2, ORG in the last 72 hours. IMAGING:   Xr Chest Standard (2 Vw)    Result Date: 2/24/2020  Examination. XR CHEST (2 VW) 2/24/2020 2:30 PM History: Respiratory distress. The frontal and lateral views of the chest are compared with the previous study dated 4/22/2019. No significant interval change. Interstitial changes in the lungs bilaterally more pronounced in the lower lobes are stable. There is elevation of the right diaphragm with blunting of the right lateral costophrenic sulcus similar to the previous study. This may also represent chronic process as suggested previously. The heart size in the normal range. There is no bony abnormality. Chronic interstitial lung changes more pronounced at bilateral lung bases, right worse than the left. No change in the previous study. Signed by Dr Hernandez Jimenez on 2/24/2020 4:12 PM          ASSESSMENT AND PLAN     There is no problem list on file for this patient. 1. Leukopenia/neutropenia-appears to be a new problem. Viral?  Unlikely related to her travel but certainly need to keep that in mind. 2. Acute respiratory failure in patient with history of chronic respiratory failure (associated with interstitial lung disease from CREST syndrome) on home O2 periodically. 3. Nausea vomiting diarrhea  4. Subjective fevers and patient with complaints of myalgias arthralgias and sweats. Agree with holding antibacterial therapy at this point unless patient does have a positive blood culture or deterioration in her status. Certainly need to continue to monitor for elevated temperature but this may be masked with the steroids. Monitor white blood count/absolute neutrophil count closely as well.   Monitor respiratory status    Thank you Jose Rosenthal MD for

## 2020-02-25 NOTE — CONSULTS
of breath;  CARDIOVASCULAR: no palpitation, no chest pain, no shortness of breath;  GI: no abdominal pain, no nausea, no vomiting,  diarrhea, no constipation;  AD: no dysuria, no hematuria, no frequency or urgency, no nephrolithiasis;  MUSCULOSKELETAL: no joint pain, no swelling, no stiffness;  ENDOCRINE: no polyuria, no polydipsia, no cold or heat intolerance;  HEMATOLOGY: no easy bruising or bleeding, no history of clotting disorder;  DERMATOLOGY: no skin rash, no eczema, no pruritus;  PSYCHIATRY: no depression, no anxiety, no panic attacks, no suicidal ideation, no homicidal ideation;  NEUROLOGY: no syncope, no seizures, no numbness or tingling of hands, no numbness or tingling of feet, no paresis;    Objective   BP 96/62   Pulse 89   Temp 97.1 °F (36.2 °C) (Temporal)   Resp 18   SpO2 98%     PHYSICAL EXAM:  CONSTITUTIONAL: Alert, appropriate, no acute distress, ill appearing  EYES: Non icteric, EOM intact, pupils equal round   ENT: Mucus membranes moist, no oral pharyngeal lesions, external inspection of ears and nose are normal  NECK: Supple, no masses. No palpable thyroid mass  CHEST/LUNGS: CTA bilaterally, normal respiratory effort   CARDIOVASCULAR: RRR, no murmurs. No lower extremity edema  ABDOMEN: soft non-tender, active bowel sounds, no HSM. No palpable masses  EXTREMITIES: warm, full ROM in all 4 extremities, no focal weakness. SKIN: warm, dry with no rashes or lesions  LYMPH: No cervical, clavicular, axillary, or inguinal lymphadenopathy  NEUROLOGIC: follows commands, non focal   PSYCH: mood and affect appropriate.   Alert and oriented to time, place, person        LABORATORY RESULTS REVIEWED BY ME:  Recent Labs     02/25/20  0428 02/24/20  1638   WBC 1.6* 3.7*   HGB 13.8 14.4   HCT 44.7 46.7   MCV 86.3 84.3    183       Lab Results   Component Value Date     02/25/2020    K 4.5 02/25/2020     02/25/2020    CO2 26 02/25/2020    BUN 11 02/25/2020    CREATININE 0.7 02/25/2020

## 2020-02-26 ENCOUNTER — LAB REQUISITION (OUTPATIENT)
Dept: LAB | Facility: HOSPITAL | Age: 60
End: 2020-02-26

## 2020-02-26 ENCOUNTER — OUTSIDE FACILITY SERVICE (OUTPATIENT)
Dept: PULMONOLOGY | Facility: CLINIC | Age: 60
End: 2020-02-26

## 2020-02-26 DIAGNOSIS — Z00.00 ROUTINE GENERAL MEDICAL EXAMINATION AT A HEALTH CARE FACILITY: ICD-10-CM

## 2020-02-26 LAB
ADV 40+41 DNA STL QL NAA+NON-PROBE: NOT DETECTED
ANION GAP SERPL CALCULATED.3IONS-SCNC: 9 MMOL/L (ref 7–19)
ASTRO TYP 1-8 RNA STL QL NAA+NON-PROBE: NOT DETECTED
BASOPHILS ABSOLUTE: 0 K/UL (ref 0–0.2)
BASOPHILS RELATIVE PERCENT: 0.2 % (ref 0–1)
BUN BLDV-MCNC: 15 MG/DL (ref 8–23)
C CAYETANENSIS DNA STL QL NAA+NON-PROBE: NOT DETECTED
CALCIUM SERPL-MCNC: 8.4 MG/DL (ref 8.8–10.2)
CAMPY SP DNA.DIARRHEA STL QL NAA+PROBE: NOT DETECTED
CHLORIDE BLD-SCNC: 102 MMOL/L (ref 98–111)
CO2: 28 MMOL/L (ref 22–29)
CREAT SERPL-MCNC: 0.6 MG/DL (ref 0.5–0.9)
CRYPTOSP STL CULT: NOT DETECTED
E COLI DNA SPEC QL NAA+PROBE: NOT DETECTED
E HISTOLYT AG STL-ACNC: NOT DETECTED
EAEC PAA PLAS AGGR+AATA ST NAA+NON-PRB: NOT DETECTED
EC STX1 + STX2 GENES STL NAA+PROBE: NOT DETECTED
EOSINOPHILS ABSOLUTE: 0 K/UL (ref 0–0.6)
EOSINOPHILS RELATIVE PERCENT: 0 % (ref 0–5)
EPEC EAE GENE STL QL NAA+NON-PROBE: NOT DETECTED
ETEC LTA+ST1A+ST1B TOX ST NAA+NON-PROBE: NOT DETECTED
G LAMBLIA DNA SPEC QL NAA+PROBE: NOT DETECTED
GFR NON-AFRICAN AMERICAN: >60
GLUCOSE BLD-MCNC: 153 MG/DL (ref 74–109)
HCT VFR BLD CALC: 42.7 % (ref 37–47)
HEMOGLOBIN: 12.9 G/DL (ref 12–16)
IMMATURE GRANULOCYTES #: 0 K/UL
LYMPHOCYTES ABSOLUTE: 1.1 K/UL (ref 1.1–4.5)
LYMPHOCYTES RELATIVE PERCENT: 17.3 % (ref 20–40)
MCH RBC QN AUTO: 26.9 PG (ref 27–31)
MCHC RBC AUTO-ENTMCNC: 30.2 G/DL (ref 33–37)
MCV RBC AUTO: 89 FL (ref 81–99)
MONOCYTES ABSOLUTE: 0.5 K/UL (ref 0–0.9)
MONOCYTES RELATIVE PERCENT: 7.1 % (ref 0–10)
NEUTROPHILS ABSOLUTE: 4.9 K/UL (ref 1.5–7.5)
NEUTROPHILS RELATIVE PERCENT: 74.8 % (ref 50–65)
NOROVIRUS GI+II RNA STL QL NAA+NON-PROBE: NOT DETECTED
P SHIGELLOIDES DNA STL QL NAA+PROBE: NOT DETECTED
PDW BLD-RTO: 14.7 % (ref 11.5–14.5)
PLATELET # BLD: 182 K/UL (ref 130–400)
PMV BLD AUTO: 12 FL (ref 9.4–12.3)
POTASSIUM SERPL-SCNC: 4.3 MMOL/L (ref 3.5–5)
RBC # BLD: 4.8 M/UL (ref 4.2–5.4)
RV RNA STL NAA+PROBE: NOT DETECTED
SALMONELLA DNA SPEC QL NAA+PROBE: NOT DETECTED
SAPO I+II+IV+V RNA STL QL NAA+NON-PROBE: NOT DETECTED
SHIGELLA SP+EIEC IPAH STL QL NAA+PROBE: NOT DETECTED
SODIUM BLD-SCNC: 139 MMOL/L (ref 136–145)
V CHOLERAE DNA SPEC QL NAA+PROBE: NOT DETECTED
VIBRIO DNA SPEC NAA+PROBE: NOT DETECTED
WBC # BLD: 6.5 K/UL (ref 4.8–10.8)
YERSINIA STL CULT: NOT DETECTED

## 2020-02-26 PROCEDURE — 99231 SBSQ HOSP IP/OBS SF/LOW 25: CPT | Performed by: INTERNAL MEDICINE

## 2020-02-26 PROCEDURE — 1210000000 HC MED SURG R&B

## 2020-02-26 PROCEDURE — 2580000003 HC RX 258: Performed by: FAMILY MEDICINE

## 2020-02-26 PROCEDURE — 99232 SBSQ HOSP IP/OBS MODERATE 35: CPT | Performed by: INTERNAL MEDICINE

## 2020-02-26 PROCEDURE — 6370000000 HC RX 637 (ALT 250 FOR IP): Performed by: FAMILY MEDICINE

## 2020-02-26 PROCEDURE — 36415 COLL VENOUS BLD VENIPUNCTURE: CPT

## 2020-02-26 PROCEDURE — 80048 BASIC METABOLIC PNL TOTAL CA: CPT

## 2020-02-26 PROCEDURE — 2700000000 HC OXYGEN THERAPY PER DAY

## 2020-02-26 PROCEDURE — 85025 COMPLETE CBC W/AUTO DIFF WBC: CPT

## 2020-02-26 PROCEDURE — 96372 THER/PROPH/DIAG INJ SC/IM: CPT

## 2020-02-26 PROCEDURE — 6360000002 HC RX W HCPCS: Performed by: FAMILY MEDICINE

## 2020-02-26 PROCEDURE — 0097U HC BIOFIRE FILMARRAY GI PANEL: CPT

## 2020-02-26 PROCEDURE — 6360000002 HC RX W HCPCS: Performed by: NURSE PRACTITIONER

## 2020-02-26 RX ADMIN — TRAZODONE HYDROCHLORIDE 150 MG: 150 TABLET ORAL at 20:38

## 2020-02-26 RX ADMIN — METHYLPREDNISOLONE SODIUM SUCCINATE 60 MG: 125 INJECTION, POWDER, FOR SOLUTION INTRAMUSCULAR; INTRAVENOUS at 09:14

## 2020-02-26 RX ADMIN — VENLAFAXINE 75 MG: 75 TABLET ORAL at 09:14

## 2020-02-26 RX ADMIN — URSODIOL 300 MG: 300 CAPSULE ORAL at 09:14

## 2020-02-26 RX ADMIN — URSODIOL 300 MG: 300 CAPSULE ORAL at 20:39

## 2020-02-26 RX ADMIN — MYCOPHENOLATE MOFETIL 500 MG: 250 CAPSULE ORAL at 20:38

## 2020-02-26 RX ADMIN — ENOXAPARIN SODIUM 40 MG: 40 INJECTION SUBCUTANEOUS at 09:27

## 2020-02-26 RX ADMIN — METHYLPREDNISOLONE SODIUM SUCCINATE 60 MG: 125 INJECTION, POWDER, FOR SOLUTION INTRAMUSCULAR; INTRAVENOUS at 16:41

## 2020-02-26 RX ADMIN — PANTOPRAZOLE SODIUM 40 MG: 40 TABLET, DELAYED RELEASE ORAL at 06:22

## 2020-02-26 RX ADMIN — METHYLPREDNISOLONE SODIUM SUCCINATE 60 MG: 125 INJECTION, POWDER, FOR SOLUTION INTRAMUSCULAR; INTRAVENOUS at 23:37

## 2020-02-26 RX ADMIN — MYCOPHENOLATE MOFETIL 500 MG: 250 CAPSULE ORAL at 09:27

## 2020-02-26 RX ADMIN — SODIUM CHLORIDE, POTASSIUM CHLORIDE, SODIUM LACTATE AND CALCIUM CHLORIDE: 600; 310; 30; 20 INJECTION, SOLUTION INTRAVENOUS at 20:39

## 2020-02-26 ASSESSMENT — ENCOUNTER SYMPTOMS
SHORTNESS OF BREATH: 1
COUGH: 1
BACK PAIN: 0
COLOR CHANGE: 0
ABDOMINAL PAIN: 0
VOICE CHANGE: 0
EYE DISCHARGE: 0
NAUSEA: 0
EYE ITCHING: 0
BLOOD IN STOOL: 0
CONSTIPATION: 0
TROUBLE SWALLOWING: 0
WHEEZING: 0
ABDOMINAL DISTENTION: 0
DIARRHEA: 1
PHOTOPHOBIA: 0
VOMITING: 0
SORE THROAT: 0

## 2020-02-26 ASSESSMENT — PAIN SCALES - GENERAL: PAINLEVEL_OUTOF10: 0

## 2020-02-26 NOTE — PROGRESS NOTES
Progress Note  2/26/2020 5:35 AM  Subjective:   Admit Date: 2/24/2020  PCP: Shola Garcia MD    Interval History: She feels even better    DIET GENERAL; Intake/Output Summary (Last 24 hours) at 2/26/2020 0535  Last data filed at 2/25/2020 1923  Gross per 24 hour   Intake 720 ml   Output 500 ml   Net 220 ml     Medications:      lactated ringers 75 mL/hr at 02/24/20 1658      enoxaparin  40 mg Subcutaneous Daily    methylPREDNISolone  60 mg Intravenous Q8H    potassium bicarb-citric acid  20 mEq Oral Daily    mycophenolate  500 mg Oral BID    pantoprazole  40 mg Oral QAM AC    traZODone  150 mg Oral Nightly    ursodiol  300 mg Oral BID    venlafaxine  75 mg Oral Daily     Recent Labs     02/24/20  1638 02/25/20  0428   WBC 3.7* 1.6*   HGB 14.4 13.8    192     Recent Labs     02/24/20  1628 02/24/20  1638 02/25/20  0428   NA  --  131* 140   K 3.0 3.7 4.5   CL  --  98 101   CO2  --  19* 26   BUN  --  10 11   CREATININE  --  0.7 0.7   GLUCOSE  --  107 149*     Recent Labs     02/24/20  1638   AST 34*   ALT 27   BILITOT 0.5   ALKPHOS 264*       Objective:   Vitals: /77   Pulse 69   Temp 98.2 °F (36.8 °C) (Temporal)   Resp 16   SpO2 98%   General appearance: alert and cooperative with exam  Lungs: Bibasilar rales. Heart: regular rate and rhythm, S1, S2 normal, no murmur, click, rub or gallop  Abdomen: soft, non-tender; bowel sounds normal; no masses,  no organomegaly  Extremities: extremities normal, atraumatic, no cyanosis or edema  Neurologic: No obvious focal neurologic deficits. Assessment and Plan:   1. FUO: ID consult. 2. Leukopenia: Hematology consult. 3. Pulm fibrosis with CREST syndrome: Dr Ciaran Bauer has seen her. 4. Hyponatremia: Likely due to illness and poor po intake due to the illness, but now resolved with IV fluids. 5. LORETTA: Use home CPAP. 6. Hyperglycemia: Related to IV steroids. Advance Directive: Full Code  DVT prophylaxis with enoxaparin 40 mg sub-Q daily.

## 2020-02-26 NOTE — PROGRESS NOTES
Spoke with Ed Rodriguez in lab. The respiratory virus PCR panel is a send out and will be back within 48 hours.  Electronically signed by Nury Hall RN on 2/25/2020 at 9:42 PM

## 2020-02-26 NOTE — PROGRESS NOTES
present posteriorly  Abdominal:      General: Abdomen is flat. Palpations: Abdomen is soft. Musculoskeletal:      Comments: She has some mild joint deformities of the fingers. Skin:     General: Skin is warm and dry. Neurological:      Mental Status: She is alert and oriented to person, place, and time. Psychiatric:         Mood and Affect: Mood normal.         Behavior: Behavior normal.     Recent Labs     02/24/20  1638 02/25/20 0428 02/26/20 0457   WBC 3.7* 1.6* 6.5   RBC 5.54* 5.18 4.80   HGB 14.4 13.8 12.9   HCT 46.7 44.7 42.7    192 182   MCV 84.3 86.3 89.0   MCH 26.0* 26.6* 26.9*   MCHC 30.8* 30.9* 30.2*   RDW 15.0* 14.9* 14.7*      Recent Labs     02/24/20  1628 02/24/20 1638 02/25/20 0428 02/26/20  0457   NA  --  131* 140 139   K 3.0 3.7 4.5 4.3   CL  --  98 101 102   CO2  --  19* 26 28   BUN  --  10 11 15   CREATININE  --  0.7 0.7 0.6   CALCIUM  --  8.4* 8.8 8.4*   GLUCOSE  --  107 149* 153*   PHART 7.490*  --   --   --    DVP5GJL 31.0*  --   --   --    PO2ART 144.0*  --   --   --    LQO0TSH 23.6  --   --   --    [id number] 97.0  --   --   --    BEART 1.0  --   --   --    AST  --  34*  --   --    ALT  --  27  --   --    ALKPHOS  --  264*  --   --    BILITOT  --  0.5  --   --       Recent Labs     02/24/20 2111 02/24/20  2345   BC No Growth to date. Any change in status will be called. --    LABGRAM  --  Many Gram positive cocci  in chains and pairs  Moderate WBC's (Polymorphonuclear) present  Many Gram negative rods present  Moderate Epithelial Cells present     CULTRESP  --  Normal respiratory kike     Radiograph: None today  My radiograph interpretation: None today    Pulmonary Assessment:    1. Fever, rule out viral or bacterial illness, resolved  2. 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 Presumed pulmonary hypertension  4. Obstructive sleep apnea, on CPAP  5.  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 In terms of her CellCept and steroid therapy will defer this to her rheumatologist particularly long-term. We will schedule her for an office follow-up with [facility] in about 4 weeks.     Electronically signed by Rob Bland on 2/26/2020 at 8:42 AM

## 2020-02-26 NOTE — PROGRESS NOTES
Negative for color change and rash. Neurological: Negative for dizziness, tremors, seizures, syncope and light-headedness. Hematological: Negative for adenopathy. Does not bruise/bleed easily. Psychiatric/Behavioral: Negative for behavioral problems and suicidal ideas. The patient is not nervous/anxious. All other systems reviewed and are negative. Objective   /77   Pulse 69   Temp 98.2 °F (36.8 °C) (Temporal)   Resp 16   SpO2 98%     PHYSICAL EXAM:  Physical Exam  Constitutional:       Appearance: She is well-developed. HENT:      Head: Normocephalic and atraumatic. Eyes:      General: No scleral icterus. Conjunctiva/sclera: Conjunctivae normal.   Neck:      Musculoskeletal: Normal range of motion and neck supple. Trachea: No tracheal deviation. Cardiovascular:      Rate and Rhythm: Normal rate and regular rhythm. Heart sounds: Normal heart sounds. No murmur. Pulmonary:      Effort: Pulmonary effort is normal. No respiratory distress. Breath sounds: Normal breath sounds. Comments: Nasal O2  Abdominal:      General: Bowel sounds are normal. There is no distension. Palpations: Abdomen is soft. Tenderness: There is no abdominal tenderness. Musculoskeletal:         General: No tenderness. Comments: Full ROM in all 4 extremities   Skin:     General: Skin is warm and dry. Findings: No rash. Neurological:      Mental Status: She is alert and oriented to person, place, and time. Coordination: Coordination normal.   Psychiatric:         Behavior: Behavior normal.         Thought Content:  Thought content normal.           Recent Labs     02/26/20  0457 02/25/20  0428 02/24/20  1638   WBC 6.5 1.6* 3.7*   HGB 12.9 13.8 14.4   HCT 42.7 44.7 46.7   MCV 89.0 86.3 84.3    192 183       Lab Results   Component Value Date     02/26/2020    K 4.3 02/26/2020     02/26/2020    CO2 28 02/26/2020    BUN 15 02/26/2020    CREATININE 0.6

## 2020-02-26 NOTE — PROGRESS NOTES
Contacted reference lab regarding [facility] stool send out order. Stool collected yesterday evening was not enough and reported to night shift staff. Stool misc sendout recollected at 1424 today. Respiratory viral PCR panel collected 2/25 at 1824 was sent out today. [facility] lab reports it will be 2 days before resulted.

## 2020-02-27 VITALS
OXYGEN SATURATION: 91 % | DIASTOLIC BLOOD PRESSURE: 72 MMHG | RESPIRATION RATE: 18 BRPM | TEMPERATURE: 97.6 F | SYSTOLIC BLOOD PRESSURE: 128 MMHG | HEART RATE: 50 BPM

## 2020-02-27 LAB
ADENOVIRUS PCR: NOT DETECTED
ANION GAP SERPL CALCULATED.3IONS-SCNC: 11 MMOL/L (ref 7–19)
BASOPHILS ABSOLUTE: 0 K/UL (ref 0–0.2)
BASOPHILS RELATIVE PERCENT: 0.1 % (ref 0–1)
BUN BLDV-MCNC: 16 MG/DL (ref 8–23)
CALCIUM SERPL-MCNC: 8.5 MG/DL (ref 8.8–10.2)
CAMPYLOBACTER ANTIGEN: NORMAL
CHLORIDE BLD-SCNC: 103 MMOL/L (ref 98–111)
CO2: 28 MMOL/L (ref 22–29)
CREAT SERPL-MCNC: 0.6 MG/DL (ref 0.5–0.9)
CULTURE, STOOL: NORMAL
E COLI SHIGA TOXIN ASSAY: NORMAL
EOSINOPHILS ABSOLUTE: 0 K/UL (ref 0–0.6)
EOSINOPHILS RELATIVE PERCENT: 0 % (ref 0–5)
GFR NON-AFRICAN AMERICAN: >60
GLUCOSE BLD-MCNC: 139 MG/DL (ref 74–109)
HCT VFR BLD CALC: 41.9 % (ref 37–47)
HEMOGLOBIN: 12.3 G/DL (ref 12–16)
HUMAN METAPNEUMOVIRUS PCR: NOT DETECTED
IMMATURE GRANULOCYTES #: 0.1 K/UL
INFLUENZA A: DETECTED
INFLUENZA B: NOT DETECTED
LYMPHOCYTES ABSOLUTE: 1.4 K/UL (ref 1.1–4.5)
LYMPHOCYTES RELATIVE PERCENT: 20 % (ref 20–40)
MCH RBC QN AUTO: 26.3 PG (ref 27–31)
MCHC RBC AUTO-ENTMCNC: 29.4 G/DL (ref 33–37)
MCV RBC AUTO: 89.5 FL (ref 81–99)
MONOCYTES ABSOLUTE: 0.5 K/UL (ref 0–0.9)
MONOCYTES RELATIVE PERCENT: 6.9 % (ref 0–10)
NEUTROPHILS ABSOLUTE: 5 K/UL (ref 1.5–7.5)
NEUTROPHILS RELATIVE PERCENT: 71.6 % (ref 50–65)
PARAINFLUENZA 1 PCR: NOT DETECTED
PARAINFLUENZA 2 PCR: NOT DETECTED
PARAINFLUENZA 3 PCR: NOT DETECTED
PARAINFLUENZA 4 PCR: NOT DETECTED
PDW BLD-RTO: 14.7 % (ref 11.5–14.5)
PLATELET # BLD: 205 K/UL (ref 130–400)
PMV BLD AUTO: 11.9 FL (ref 9.4–12.3)
POTASSIUM SERPL-SCNC: 4.3 MMOL/L (ref 3.5–5)
RBC # BLD: 4.68 M/UL (ref 4.2–5.4)
RHINO/ENTEROVIRUS PCR: NOT DETECTED
RSV BY PCR: NOT DETECTED
RSV SOURCE: ABNORMAL
SODIUM BLD-SCNC: 142 MMOL/L (ref 136–145)
WBC # BLD: 7 K/UL (ref 4.8–10.8)

## 2020-02-27 PROCEDURE — 87086 URINE CULTURE/COLONY COUNT: CPT

## 2020-02-27 PROCEDURE — 6360000002 HC RX W HCPCS: Performed by: FAMILY MEDICINE

## 2020-02-27 PROCEDURE — 36415 COLL VENOUS BLD VENIPUNCTURE: CPT

## 2020-02-27 PROCEDURE — 87186 SC STD MICRODIL/AGAR DIL: CPT

## 2020-02-27 PROCEDURE — 85025 COMPLETE CBC W/AUTO DIFF WBC: CPT

## 2020-02-27 PROCEDURE — 6370000000 HC RX 637 (ALT 250 FOR IP): Performed by: FAMILY MEDICINE

## 2020-02-27 PROCEDURE — 2700000000 HC OXYGEN THERAPY PER DAY

## 2020-02-27 PROCEDURE — 80048 BASIC METABOLIC PNL TOTAL CA: CPT

## 2020-02-27 PROCEDURE — 99231 SBSQ HOSP IP/OBS SF/LOW 25: CPT | Performed by: INTERNAL MEDICINE

## 2020-02-27 RX ORDER — METHYLPREDNISOLONE SODIUM SUCCINATE 40 MG/ML
40 INJECTION, POWDER, LYOPHILIZED, FOR SOLUTION INTRAMUSCULAR; INTRAVENOUS EVERY 12 HOURS
Status: DISCONTINUED | OUTPATIENT
Start: 2020-02-27 | End: 2020-02-27 | Stop reason: HOSPADM

## 2020-02-27 RX ORDER — PREDNISONE 10 MG/1
TABLET ORAL
Qty: 12 TABLET | Refills: 0 | Status: SHIPPED | OUTPATIENT
Start: 2020-02-27

## 2020-02-27 RX ADMIN — URSODIOL 300 MG: 300 CAPSULE ORAL at 09:16

## 2020-02-27 RX ADMIN — VENLAFAXINE 75 MG: 75 TABLET ORAL at 09:16

## 2020-02-27 RX ADMIN — PANTOPRAZOLE SODIUM 40 MG: 40 TABLET, DELAYED RELEASE ORAL at 06:12

## 2020-02-27 RX ADMIN — MYCOPHENOLATE MOFETIL 500 MG: 250 CAPSULE ORAL at 09:16

## 2020-02-27 ASSESSMENT — ENCOUNTER SYMPTOMS
EYE DISCHARGE: 0
DIARRHEA: 0
VOICE CHANGE: 0
ABDOMINAL PAIN: 0
WHEEZING: 0
ABDOMINAL DISTENTION: 0
EYE ITCHING: 0
TROUBLE SWALLOWING: 0
VOMITING: 0
BLOOD IN STOOL: 0
CONSTIPATION: 0
BACK PAIN: 0
SHORTNESS OF BREATH: 1
COLOR CHANGE: 0
SORE THROAT: 0
NAUSEA: 0
PHOTOPHOBIA: 0

## 2020-02-27 ASSESSMENT — PAIN SCALES - GENERAL: PAINLEVEL_OUTOF10: 0

## 2020-02-27 NOTE — PLAN OF CARE
Problem: Falls - Risk of:  Goal: Will remain free from falls  Description  Will remain free from falls  2/27/2020 0939 by Khushboo Royal RN  Outcome: Completed  2/27/2020 0315 by Justus Watts RN  Outcome: Ongoing  Goal: Absence of physical injury  Description  Absence of physical injury  2/27/2020 0939 by Khushboo Royal RN  Outcome: Completed  2/27/2020 0315 by Justus Watts RN  Outcome: Ongoing  Goal: Will remain free from falls  Description  Will remain free from falls  Outcome: Completed

## 2020-02-27 NOTE — PROGRESS NOTES
PROGRESS NOTE    Patient name: Areli Machuca  Patient : 1960  Room: 434      SUBJECTIVE: She feels 100% better. Breathing better even with exertion now. Diarrhea has resolved. INTERVAL HISTORY  The patient is a 62years old female who has a history of crest syndrome and pulmonary fibrosis. She is followed by pulmonary, rheumatology and Dr. Corie Richard. She is currently on treatment with mycophenolate 500 mg 4 times daily and prednisone 2.5 mg daily. 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 She reports a history of being in a cruise ship last week. She had complaints of nausea, vomiting diarrhea, generalized weakness chills and subjective fever. She was then seen by Dr. Corie Richard on 2000 and and admitted to the hospital for further work-up. Her CBC showed neutropenia with neutrophil counts of 900, hemoglobin 13.8 platelet counts 525,395. Subjective   REVIEW OF SYSTEMS:   Review of Systems   Constitutional: Positive for activity change and fatigue. Negative for chills, diaphoresis, fever and unexpected weight change. HENT: Negative for mouth sores, nosebleeds, sore throat, trouble swallowing and voice change. Eyes: Negative for photophobia, discharge and itching. Respiratory: Positive for shortness of breath (better). Negative for wheezing. Cardiovascular: Negative for chest pain, palpitations and leg swelling. Gastrointestinal: Negative for abdominal distention, abdominal pain, blood in stool, constipation, diarrhea, nausea and vomiting. Endocrine: Negative for cold intolerance, heat intolerance, polydipsia and polyuria. Genitourinary: Negative for difficulty urinating, dysuria, hematuria and urgency. Musculoskeletal: Positive for arthralgias. Negative for back pain, joint swelling and myalgias. Skin: Negative for color change and rash.    Neurological: Negative for 02/27/2020    PROT 7.2 02/24/2020    LABALBU 3.2 (L) 02/24/2020    BILITOT 0.5 02/24/2020    ALKPHOS 264 (H) 02/24/2020    AST 34 (H) 02/24/2020    ALT 27 02/24/2020    LABGLOM >60 02/27/2020    GLOB 3.9 03/17/2017       No results found for: INR, PROTIME    30 Day lookback of cultures:    Blood Culture Recent:   Recent Labs     02/24/20  2111   BC No Growth to date. Any change in status will be called. Gram Stain Recent:   Recent Labs     02/24/20 2345   LABGRAM Many Gram positive cocci  in chains and pairs  Moderate WBC's (Polymorphonuclear) present  Many Gram negative rods present  Moderate Epithelial Cells present       Resp Culture Recent:   Recent Labs     02/24/20 2345   CULTRESP Normal respiratory kike     Body Fluid Recent : No results for input(s): BFCX in the last 720 hours. MRSA Recent : No results for input(s): [address] in the last 720 hours. Urine Culture Recent : No results for input(s): LABURIN in the last 720 hours. Organism Recent : No results for input(s): ORG in the last 720 hours. 2/11/2020 ultrasound elastography at [facility]  1. Increased echogenicity of the liver with mild coarsening of the  echotexture consistent with fatty infiltration. 2. Metavir score F1 -- Portal fibrosis without septa.     CT abdomen November 2015- at [facility]  Mild splenomegaly with the spleen measured 13 cm [id number].     Narrative   US SPLEEN - 2/25/2020 6:45 AM   Indication: Neutropenia   Comparison: None available.       Impression   Findings/impression:   1.  Mild splenomegaly with maximum splenic measurement of 13.4 cm. 2.  No focal splenic lesion identified. 3.  No perisplenic fluid collection.    Signed by Dr Alonso Pike on 2/25/2020 12:24 PM             ASSESSMENT:  #Neutropenia- differential diagnosis include autoimmune neutropenia versus drug-induced neutropenia (CellCept) vs viral induced vs splenomegaly    On cellcept 500 mg PO bid    Hematologic & oncologic: Leukopenia (19% to 46%), Neutropenia: Neutropenia (including severe neutropenia) may occur, requiring dose reduction or interruption of treatment. CBC today 2/26/2020  WBC 7 with ANC 5  -- normalized   HGB 12.3   ,000    U/s spleen 2/25/2020 -- Mild splenomegaly 13.4 cm    PBS - Peripheral blood smear, physician requested review: 2/25/2020   1.  Leukopenia with absolute neutropenia and lymphopenia.   2.  Mild left shift of the myeloid series.   3.  Mildly increased numbers of reactive lymphocytes present.   4.  No obvious infectious organisms identified.   5.  Negative for blasts. #CREST syndrome    #Pulmonary fibrosis    Solumedrol 60 mg IV every 8 hrs  Nasal O2    Respiratory viral PCR panel collected 2/25 at 1824 was sent out today. [facility] lab reports it will be 2 days before resulted. #Chronic hepatopathy       PLAN:  Continue current therapy      Buck Wotoen PA-C    02/27/20  6:35 AM  Physician's attestation/substantial contribution:  I, Dr Al Hoffman, independently performed an evaluation on Virginia Holland. I have reviewed relevant medical information/data to include but not limited to medication list, relevant appropriate labs and imaging when applicable. I reviewed other physician's notes, ancillary services and nurses assessment. I have reviewed the above documentation completed by the Nurse Practitioner or Physician Assistant. Please see my additional contributions to the history of present illness, physical examination, and assessment/medical decision-making that reflect my findings and impressions. I discussed essential elements of the care plan with the NP or PA and the patient as well. I answered all the questions to the patient's satisfaction. I concur with above stated. Subjective-feeling overall well. Afebrile. Respiratory symptoms are better. Objective- physical exam is unchanged. Assessment/plan:  Neutropenia-resolved. Will repeat CBC in 1 week as outpatient.   Continue current medications. Disposition-okay to discharge from my standpoint.     Payam Ho MD

## 2020-02-27 NOTE — PROGRESS NOTES
INFECTIOUS DISEASES PROGRESS NOTE    Patient: Mariela Pierre  YOB: 1960  MRN: [id number]   Admit date: 2020   Admitting Physician: Kaelyn Leslie MD  Primary Care Physician: Kaelyn Leslie MD    CHIEF COMPLAINT: \"I am better\"      Interval History: Reports she is feeling much better. She is not really having any more diarrhea. I did order a stool for GI panel by PCR at [facility].  There was inadequate stool so was re-collected today and sent. I also contacted her nurse today via phone and asked about the respiratory PCR panel. It apparently was not collected until late yesterday and not sent out until today. Patient states they are trying to wean her off steroids currently. She reports her breathing is much better as well. Allergies: Allergies   Allergen Reactions    Codeine Nausea And Vomiting       Current Meds: enoxaparin (LOVENOX) injection 40 mg, Daily  methylPREDNISolone sodium (SOLU-MEDROL) injection 60 mg, Q8H  lactated ringers infusion, Continuous  potassium bicarb-citric acid (EFFER-K) effervescent tablet 20 mEq, Daily  mycophenolate (CELLCEPT) capsule 500 mg, BID  pantoprazole (PROTONIX) tablet 40 mg, QAM AC  traZODone (DESYREL) tablet 150 mg, Nightly  ursodiol (ACTIGALL) capsule 300 mg, BID  venlafaxine (EFFEXOR) tablet 75 mg, Daily        Review of Systems  General: No fever  GI: Diarrhea has significantly improved  Respiratory cough and shortness of breath improved    Vital Signs:  /77   Pulse 65   Temp 97.7 °F (36.5 °C) (Temporal)   Resp 16   SpO2 96%   Temp (24hrs), Av.2 °F (36.8 °C), Min:97.7 °F (36.5 °C), Max:98.7 °F (37.1 °C)      Physical Exam  General: The patient's much better appearing lying in bed she is in no acute distress  HEENT: Sclera anicteric and noninjected  Neck: No meningismus  Respiratory: Effort fairly unlabored however patient does have scattered rhonchi throughout with some coarse expiratory wheezing.   Abdomen: Soft nontender

## 2020-02-29 LAB
CULTURE, RESPIRATORY: ABNORMAL
CULTURE, RESPIRATORY: ABNORMAL
GRAM STAIN RESULT: ABNORMAL
ORGANISM: ABNORMAL

## 2020-03-01 LAB
BLOOD CULTURE, ROUTINE: NORMAL
CULTURE, BLOOD 2: NORMAL
ORGANISM: ABNORMAL
ORGANISM: ABNORMAL
URINE CULTURE, ROUTINE: ABNORMAL

## 2020-03-03 LAB
BASOPHILS ABSOLUTE: 0.1 K/UL (ref 0–0.2)
BASOPHILS RELATIVE PERCENT: 0.7 % (ref 0–1)
EOSINOPHILS ABSOLUTE: 0.3 K/UL (ref 0–0.6)
EOSINOPHILS RELATIVE PERCENT: 2.1 % (ref 0–5)
HCT VFR BLD CALC: 50 % (ref 37–47)
HEMOGLOBIN: 15.4 G/DL (ref 12–16)
IMMATURE GRANULOCYTES #: 0.4 K/UL
LYMPHOCYTES ABSOLUTE: 2.8 K/UL (ref 1.1–4.5)
LYMPHOCYTES RELATIVE PERCENT: 21.2 % (ref 20–40)
MCH RBC QN AUTO: 26 PG (ref 27–31)
MCHC RBC AUTO-ENTMCNC: 30.8 G/DL (ref 33–37)
MCV RBC AUTO: 84.5 FL (ref 81–99)
MONOCYTES ABSOLUTE: 1.7 K/UL (ref 0–0.9)
MONOCYTES RELATIVE PERCENT: 12.9 % (ref 0–10)
NEUTROPHILS ABSOLUTE: 7.9 K/UL (ref 1.5–7.5)
NEUTROPHILS RELATIVE PERCENT: 60.3 % (ref 50–65)
PDW BLD-RTO: 15.4 % (ref 11.5–14.5)
PLATELET # BLD: 425 K/UL (ref 130–400)
PMV BLD AUTO: 11.5 FL (ref 9.4–12.3)
RBC # BLD: 5.92 M/UL (ref 4.2–5.4)
WBC # BLD: 13.1 K/UL (ref 4.8–10.8)

## 2020-03-13 ENCOUNTER — TELEPHONE (OUTPATIENT)
Dept: GASTROENTEROLOGY | Facility: CLINIC | Age: 60
End: 2020-03-13

## 2020-03-18 PROBLEM — I27.0 PRIMARY PULMONARY HYPERTENSION: Status: ACTIVE | Noted: 2020-03-18

## 2020-05-05 ENCOUNTER — APPOINTMENT (OUTPATIENT)
Dept: ULTRASOUND IMAGING | Facility: HOSPITAL | Age: 60
End: 2020-05-05

## 2020-05-05 ENCOUNTER — OFFICE VISIT (OUTPATIENT)
Dept: PULMONOLOGY | Facility: CLINIC | Age: 60
End: 2020-05-05

## 2020-05-05 DIAGNOSIS — G47.33 OBSTRUCTIVE SLEEP APNEA ON CPAP: ICD-10-CM

## 2020-05-05 DIAGNOSIS — K21.9 GASTROESOPHAGEAL REFLUX DISEASE WITHOUT ESOPHAGITIS: ICD-10-CM

## 2020-05-05 DIAGNOSIS — Z99.89 OBSTRUCTIVE SLEEP APNEA ON CPAP: ICD-10-CM

## 2020-05-05 DIAGNOSIS — M34.1 CREST SYNDROME (HCC): ICD-10-CM

## 2020-05-05 DIAGNOSIS — I27.0 PRIMARY PULMONARY HYPERTENSION (HCC): ICD-10-CM

## 2020-05-05 DIAGNOSIS — J84.10 PULMONARY FIBROSIS (HCC): Primary | ICD-10-CM

## 2020-05-05 DIAGNOSIS — J84.10 CALCIFIED GRANULOMA OF LUNG (HCC): ICD-10-CM

## 2020-05-05 PROCEDURE — 99442 PR PHYS/QHP TELEPHONE EVALUATION 11-20 MIN: CPT | Performed by: NURSE PRACTITIONER

## 2020-05-05 RX ORDER — PREDNISONE 1 MG/1
5 TABLET ORAL DAILY
Qty: 90 TABLET | Refills: 1 | Status: SHIPPED | OUTPATIENT
Start: 2020-05-05 | End: 2020-07-17

## 2020-05-05 RX ORDER — PREDNISONE 10 MG/1
TABLET ORAL
Qty: 31 TABLET | Refills: 0 | Status: SHIPPED | OUTPATIENT
Start: 2020-05-05 | End: 2020-07-17

## 2020-05-28 LAB
ALBUMIN SERPL-MCNC: 3.9 G/DL (ref 3.5–5.2)
ALP BLD-CCNC: 142 U/L (ref 35–104)
ALT SERPL-CCNC: 28 U/L (ref 5–33)
ANION GAP SERPL CALCULATED.3IONS-SCNC: 16 MMOL/L (ref 7–19)
AST SERPL-CCNC: 22 U/L (ref 5–32)
BASOPHILS ABSOLUTE: 0.1 K/UL (ref 0–0.2)
BASOPHILS RELATIVE PERCENT: 0.8 % (ref 0–1)
BILIRUB SERPL-MCNC: 0.3 MG/DL (ref 0.2–1.2)
BUN BLDV-MCNC: 12 MG/DL (ref 8–23)
CALCIUM SERPL-MCNC: 10.3 MG/DL (ref 8.8–10.2)
CHLORIDE BLD-SCNC: 101 MMOL/L (ref 98–111)
CO2: 26 MMOL/L (ref 22–29)
CREAT SERPL-MCNC: 0.7 MG/DL (ref 0.5–0.9)
EOSINOPHILS ABSOLUTE: 0.3 K/UL (ref 0–0.6)
EOSINOPHILS RELATIVE PERCENT: 2.7 % (ref 0–5)
GFR NON-AFRICAN AMERICAN: >60
GLUCOSE BLD-MCNC: 100 MG/DL (ref 74–109)
HCT VFR BLD CALC: 46.3 % (ref 37–47)
HEMOGLOBIN: 14 G/DL (ref 12–16)
IMMATURE GRANULOCYTES #: 0.1 K/UL
LYMPHOCYTES ABSOLUTE: 2 K/UL (ref 1.1–4.5)
LYMPHOCYTES RELATIVE PERCENT: 20.3 % (ref 20–40)
MCH RBC QN AUTO: 26.5 PG (ref 27–31)
MCHC RBC AUTO-ENTMCNC: 30.2 G/DL (ref 33–37)
MCV RBC AUTO: 87.5 FL (ref 81–99)
MONOCYTES ABSOLUTE: 1 K/UL (ref 0–0.9)
MONOCYTES RELATIVE PERCENT: 10.1 % (ref 0–10)
NEUTROPHILS ABSOLUTE: 6.4 K/UL (ref 1.5–7.5)
NEUTROPHILS RELATIVE PERCENT: 65.3 % (ref 50–65)
PDW BLD-RTO: 14.8 % (ref 11.5–14.5)
PLATELET # BLD: 271 K/UL (ref 130–400)
PMV BLD AUTO: 11.2 FL (ref 9.4–12.3)
POTASSIUM SERPL-SCNC: 4.4 MMOL/L (ref 3.5–5)
RBC # BLD: 5.29 M/UL (ref 4.2–5.4)
SODIUM BLD-SCNC: 143 MMOL/L (ref 136–145)
TOTAL PROTEIN: 6.9 G/DL (ref 6.6–8.7)
WBC # BLD: 9.7 K/UL (ref 4.8–10.8)

## 2020-06-19 LAB
AMMONIA: 19 UMOL/L (ref 11–51)
BACTERIA: ABNORMAL /HPF
BILIRUBIN URINE: NEGATIVE
BLOOD, URINE: NEGATIVE
CLARITY: CLEAR
COLOR: YELLOW
EPITHELIAL CELLS, UA: ABNORMAL /HPF
GLUCOSE URINE: NEGATIVE MG/DL
KETONES, URINE: NEGATIVE MG/DL
LEUKOCYTE ESTERASE, URINE: ABNORMAL
NITRITE, URINE: NEGATIVE
PH UA: 6 (ref 5–8)
PROTEIN UA: NEGATIVE MG/DL
RBC UA: ABNORMAL /HPF (ref 0–2)
SPECIFIC GRAVITY UA: 1.02 (ref 1–1.03)
T4 FREE: 1.13 NG/DL (ref 0.93–1.7)
TSH SERPL DL<=0.05 MIU/L-ACNC: 1.47 UIU/ML (ref 0.27–4.2)
UROBILINOGEN, URINE: 0.2 E.U./DL
WBC UA: ABNORMAL /HPF (ref 0–5)

## 2020-07-13 DIAGNOSIS — J84.10 PULMONARY FIBROSIS (HCC): Primary | ICD-10-CM

## 2020-07-17 ENCOUNTER — OFFICE VISIT (OUTPATIENT)
Dept: CARDIOLOGY | Facility: CLINIC | Age: 60
End: 2020-07-17

## 2020-07-17 ENCOUNTER — PREP FOR SURGERY (OUTPATIENT)
Dept: OTHER | Facility: HOSPITAL | Age: 60
End: 2020-07-17

## 2020-07-17 ENCOUNTER — TRANSCRIBE ORDERS (OUTPATIENT)
Dept: ADMINISTRATIVE | Facility: HOSPITAL | Age: 60
End: 2020-07-17

## 2020-07-17 VITALS
BODY MASS INDEX: 28.17 KG/M2 | HEIGHT: 64 IN | DIASTOLIC BLOOD PRESSURE: 78 MMHG | HEART RATE: 105 BPM | WEIGHT: 165 LBS | SYSTOLIC BLOOD PRESSURE: 116 MMHG

## 2020-07-17 DIAGNOSIS — I27.0 PRIMARY PULMONARY HYPERTENSION (HCC): Primary | ICD-10-CM

## 2020-07-17 DIAGNOSIS — I73.00 RAYNAUD'S PHENOMENON WITHOUT GANGRENE: ICD-10-CM

## 2020-07-17 DIAGNOSIS — M34.1 CREST SYNDROME (HCC): ICD-10-CM

## 2020-07-17 DIAGNOSIS — Z01.818 PRE-OP TESTING: Primary | ICD-10-CM

## 2020-07-17 DIAGNOSIS — J84.10 PULMONARY FIBROSIS (HCC): ICD-10-CM

## 2020-07-17 PROCEDURE — 93000 ELECTROCARDIOGRAM COMPLETE: CPT | Performed by: INTERNAL MEDICINE

## 2020-07-17 PROCEDURE — 99203 OFFICE O/P NEW LOW 30 MIN: CPT | Performed by: INTERNAL MEDICINE

## 2020-07-17 RX ORDER — ASPIRIN 81 MG/1
81 TABLET ORAL DAILY
Status: CANCELLED | OUTPATIENT
Start: 2020-07-18

## 2020-07-17 RX ORDER — SODIUM CHLORIDE 0.9 % (FLUSH) 0.9 %
10 SYRINGE (ML) INJECTION AS NEEDED
Status: CANCELLED | OUTPATIENT
Start: 2020-07-17

## 2020-07-17 RX ORDER — ASPIRIN 81 MG/1
324 TABLET, CHEWABLE ORAL ONCE
Status: CANCELLED | OUTPATIENT
Start: 2020-07-17 | End: 2020-07-17

## 2020-07-17 RX ORDER — SODIUM CHLORIDE 0.9 % (FLUSH) 0.9 %
3 SYRINGE (ML) INJECTION EVERY 12 HOURS SCHEDULED
Status: CANCELLED | OUTPATIENT
Start: 2020-07-17

## 2020-07-17 RX ORDER — TRAZODONE HYDROCHLORIDE 150 MG/1
150 TABLET ORAL NIGHTLY
Status: ON HOLD | COMMUNITY
End: 2022-04-22 | Stop reason: SDUPTHER

## 2020-07-17 NOTE — PROGRESS NOTES
"    [facility] - CARDIOLOGY  New Patient Initial Outpatient [facility]    Primary Care Physician: Aaron Stoddard MD    Subjective     Chief Complaint:  \"need a right heart catheterization,\" SOA      History of Present Illness  61yo followed by Dr. Griffith and Dr Sanders for scleroderma/CREST, and was referred for right heart catheterization. She c/o chronic dyspnea (>1 year). Aggravating factor: scleroderma. Alleviating factor: on O2 via nasal cannula. Progression: gradually worsening. Associated symptoms: no chest pain, orthopnea, PND, LE edema.     Prior echocardiograms have not shown any pulm hyn      Review of Systems   Constitution: Negative.   HENT: Negative.  Negative for nosebleeds.    Eyes: Negative.    Cardiovascular: Negative.  Negative for chest pain, claudication, dyspnea on exertion, irregular heartbeat, leg swelling, near-syncope, orthopnea, palpitations, paroxysmal nocturnal dyspnea and syncope.   Respiratory: Positive for cough and shortness of breath. Negative for wheezing.    Endocrine: Negative.    Hematologic/Lymphatic: Negative.  Negative for bleeding problem. Does not bruise/bleed easily.   Skin: Negative.    Musculoskeletal: Negative.    Gastrointestinal: Negative.  Negative for dysphagia, hematemesis, hematochezia and melena.   Genitourinary: Negative.  Negative for hematuria and non-menstrual bleeding.   Neurological: Negative.    Psychiatric/Behavioral: Negative.    Allergic/Immunologic: Negative.     Otherwise complete ROS reviewed and negative except as mentioned in the HPI.  I have reviewed the Review of Systems as provided above.       Past Medical History:   Past Medical History:   Diagnosis Date   • Arthritis    • BMI 31.0-31.9,adult 6/4/2019   • Calcified granuloma of lung (CMS/HCC) 6/4/2019    Right lung   • CHF (congestive heart failure) (CMS/HCC)    • Gastroesophageal reflux disease without esophagitis 6/4/2019   • Obstructive sleep apnea on CPAP 6/4/2019       Past Surgical " History:  Past Surgical History:   Procedure Laterality Date   • BREAST BIOPSY     • CHOLECYSTECTOMY     • COLONOSCOPY  05/11/2015    5 POLYPS       Family History: family history includes Cirrhosis in her sister; Liver cancer in her brother; No Known Problems in her father and mother.    Social History:  reports that she has never smoked. She has never used smokeless tobacco. She reports that she does not drink alcohol or use drugs.    Medications:  Prior to Admission medications    Medication Sig Start Date End Date Taking? Authorizing Provider   cholecalciferol (VITAMIN D3) 25 MCG (1000 UT) tablet Take 1,000 Units by mouth Daily.    Latanya Spring MD   Flaxseed, Linseed, (FLAX SEED OIL PO) Take  by mouth.    Latanya Spring MD   Multiple Vitamin (MULTI VITAMIN PO) Take  by mouth.    Latanya Spring MD   mycophenolate (CELLCEPT) 500 MG tablet Take  by mouth 3 (Three) Times a Day.    Latanya Spring MD   omeprazole (priLOSEC) 40 MG capsule TAKE 1 CAPSULE TWICE A DAY 2/15/20   Michael Landin,    predniSONE (DELTASONE) 10 MG tablet Take 4 tabs daily x 3 days, then take 3 tabs daily x 3 days, then take 2 tabs daily x 3 days, then take 1 tab daily x 3 days 5/5/20   Fela Blackman APRN   predniSONE (DELTASONE) 2.5 MG tablet  5/9/19   Latanya Spring MD   predniSONE (DELTASONE) 5 MG tablet Take 1 tablet by mouth Daily for 90 days. 5/5/20 8/3/20  Fela Blackman APRN   traZODone (DESYREL) 150 MG tablet 75 mg Every Night. 4/30/19   Latanya Spring MD   ursodiol (ACTIGALL) 300 MG capsule Take 1 capsule by mouth 2 (Two) Times a Day. 2/6/20   Michael Landin DO   venlafaxine (EFFEXOR) 75 MG tablet Take 75 mg by mouth 2 (Two) Times a Day.    Latanya Spring MD   vitamin C (ASCORBIC ACID) 500 MG tablet Take 500 mg by mouth Daily.    Latanya Spring MD     Allergies:  Allergies   Allergen Reactions   • Codeine GI Intolerance       Objective     Vital Signs: /78  "(BP Location: Right arm, Patient Position: Sitting)   Pulse 105   Ht 162.6 cm (64\")   Wt 74.8 kg (165 lb)   BMI 28.32 kg/m²     Physical Exam   Constitutional: No distress.   HENT:   Mouth/Throat: Oropharynx is clear. Pharynx is normal.   Neck: Normal range of motion and thyroid normal. Neck supple. No JVD present. No thyromegaly present.   Cardiovascular: Regular rhythm, S1 normal, S2 normal, normal heart sounds, intact distal pulses and normal pulses.  No extrasystoles are present. Tachycardia present. PMI is not displaced. Exam reveals no gallop.   No murmur heard.  Pulmonary/Chest: Effort normal.   +dry crackles at bases bilaterally   Abdominal: Soft. Bowel sounds are normal. She exhibits no distension. There is no splenomegaly or hepatomegaly. There is no tenderness.   Musculoskeletal: She exhibits no edema or tenderness.   Neurological: She is alert and oriented to person, place, and time.   Skin: Skin is warm and dry.       Results Reviewed:      ECG 12 Lead  Date/Time: 7/17/2020 11:15 AM  Performed by: Thong Martin MD  Authorized by: Thong Martin MD   Comparison: not compared with previous ECG   Rhythm: sinus tachycardia  BPM: 105  QRS axis: right    Clinical impression: abnormal EKG              Lab Results   Component Value Date    TRIG 206 (H) 04/27/2018    HDL 58 (L) 04/27/2018     No results found for: HGBA1C    Assessment / Plan        Problem List Items Addressed This Visit (all new to me, more work-up planned)       Cardiovascular and Mediastinum    Primary pulmonary hypertension (CMS/HCC) - Primary    Overview     Suspected, awaiting right heart cath            Respiratory    Pulmonary fibrosis prob sec underlying autoimmune disease       Musculoskeletal and Integument    CREST syndrome, partial        Other    Raynaud's phenomenon        Plans: proceed with outpatient RHC via IJ approach; all r/b/a discussed with patient. Results will be communicated with pulmonary; likely no cardiology " f/u will be required.    Thong Martin MD   07/17/20   11:16

## 2020-07-20 ENCOUNTER — APPOINTMENT (OUTPATIENT)
Dept: LAB | Facility: HOSPITAL | Age: 60
End: 2020-07-20

## 2020-07-21 ENCOUNTER — LAB (OUTPATIENT)
Dept: LAB | Facility: HOSPITAL | Age: 60
End: 2020-07-21

## 2020-07-21 LAB — SARS-COV-2 N GENE RESP QL NAA+PROBE: NOT DETECTED

## 2020-07-21 PROCEDURE — 87635 SARS-COV-2 COVID-19 AMP PRB: CPT | Performed by: INTERNAL MEDICINE

## 2020-07-21 PROCEDURE — C9803 HOPD COVID-19 SPEC COLLECT: HCPCS | Performed by: INTERNAL MEDICINE

## 2020-07-22 ENCOUNTER — HOSPITAL ENCOUNTER (OUTPATIENT)
Facility: HOSPITAL | Age: 60
Discharge: HOME | End: 2020-07-22
Attending: INTERNAL MEDICINE | Admitting: INTERNAL MEDICINE

## 2020-07-22 VITALS
SYSTOLIC BLOOD PRESSURE: 133 MMHG | TEMPERATURE: 99 F | BODY MASS INDEX: 28.19 KG/M2 | HEIGHT: 64 IN | HEART RATE: 86 BPM | RESPIRATION RATE: 23 BRPM | DIASTOLIC BLOOD PRESSURE: 91 MMHG | OXYGEN SATURATION: 94 % | WEIGHT: 165.13 LBS

## 2020-07-22 DIAGNOSIS — I27.0 PRIMARY PULMONARY HYPERTENSION (HCC): ICD-10-CM

## 2020-07-22 DIAGNOSIS — I27.0 PRIMARY PULMONARY HYPERTENSION (HCC): Primary | ICD-10-CM

## 2020-07-22 DIAGNOSIS — Z99.89 OBSTRUCTIVE SLEEP APNEA ON CPAP: Primary | ICD-10-CM

## 2020-07-22 DIAGNOSIS — G47.33 OBSTRUCTIVE SLEEP APNEA ON CPAP: Primary | ICD-10-CM

## 2020-07-22 LAB
ARTERIAL PATENCY WRIST A: ABNORMAL
ATMOSPHERIC PRESS: 752 MMHG
BASE EXCESS BLDV CALC-SCNC: 2.9 MMOL/L (ref 0–2)
BASE EXCESS BLDV CALC-SCNC: 3.2 MMOL/L (ref 0–2)
BDY SITE: ABNORMAL
BODY TEMPERATURE: 37 C
COHGB MFR BLD: 1.2 % (ref 0–5)
COHGB MFR BLD: 1.3 % (ref 0–5)
COHGB MFR BLD: 1.3 % (ref 0–5)
HCO3 BLDV-SCNC: 26.4 MMOL/L (ref 22–28)
HCO3 BLDV-SCNC: 28 MMOL/L (ref 22–28)
HGB BLDA-MCNC: 13.2 G/DL (ref 12–16)
HGB BLDA-MCNC: 13.3 G/DL (ref 12–16)
HGB BLDA-MCNC: 13.4 G/DL (ref 12–16)
Lab: ABNORMAL
METHGB BLD QL: 0.5 % (ref 0–3)
METHGB BLD QL: 0.7 % (ref 0–3)
METHGB BLD QL: 0.7 % (ref 0–3)
MODALITY: ABNORMAL
NOTE: ABNORMAL
NOTE: ABNORMAL
OXYHGB MFR BLDV: 59.9 % (ref 60–85)
OXYHGB MFR BLDV: 61 % (ref 94–99)
OXYHGB MFR BLDV: 70.8 % (ref 60–85)
PCO2 BLDV: 34.8 MM HG (ref 41–51)
PCO2 BLDV: 43.7 MM HG (ref 41–51)
PH BLDV: 7.41 PH UNITS (ref 7.32–7.42)
PH BLDV: 7.49 PH UNITS (ref 7.32–7.42)
PO2 BLDV: 31.6 MM HG (ref 27–53)
PO2 BLDV: 34.6 MM HG (ref 27–53)
POTASSIUM BLDV-SCNC: 4 MMOL/L (ref 3.5–5.2)
POTASSIUM BLDV-SCNC: 4.2 MMOL/L (ref 3.5–5.2)
SAO2 % BLDCOV: 61.1 % (ref 45–75)
SAO2 % BLDCOV: 72.1 % (ref 45–75)
SODIUM BLDV-SCNC: 142 MMOL/L (ref 136–145)
SODIUM BLDV-SCNC: 142 MMOL/L (ref 136–145)
VENTILATOR MODE: ABNORMAL

## 2020-07-22 PROCEDURE — 83050 HGB METHEMOGLOBIN QUAN: CPT

## 2020-07-22 PROCEDURE — 82805 BLOOD GASES W/O2 SATURATION: CPT

## 2020-07-22 PROCEDURE — C1894 INTRO/SHEATH, NON-LASER: HCPCS | Performed by: INTERNAL MEDICINE

## 2020-07-22 PROCEDURE — 82375 ASSAY CARBOXYHB QUANT: CPT

## 2020-07-22 PROCEDURE — S0260 H&P FOR SURGERY: HCPCS | Performed by: INTERNAL MEDICINE

## 2020-07-22 PROCEDURE — 93451 RIGHT HEART CATH: CPT | Performed by: INTERNAL MEDICINE

## 2020-07-22 PROCEDURE — 82820 HEMOGLOBIN-OXYGEN AFFINITY: CPT

## 2020-07-22 PROCEDURE — 93463 DRUG ADMIN & HEMODYNMIC MEAS: CPT | Performed by: INTERNAL MEDICINE

## 2020-07-22 PROCEDURE — 25010000002 ADENOSINE (DIAGNOSTIC) PER 30 MG: Performed by: INTERNAL MEDICINE

## 2020-07-22 PROCEDURE — 25010000002 DIPHENHYDRAMINE PER 50 MG: Performed by: INTERNAL MEDICINE

## 2020-07-22 RX ORDER — DIPHENHYDRAMINE HYDROCHLORIDE 50 MG/ML
INJECTION INTRAMUSCULAR; INTRAVENOUS AS NEEDED
Status: DISCONTINUED | OUTPATIENT
Start: 2020-07-22 | End: 2020-07-22 | Stop reason: HOSPADM

## 2020-07-22 RX ORDER — ASPIRIN 81 MG/1
81 TABLET ORAL DAILY
Status: DISCONTINUED | OUTPATIENT
Start: 2020-07-23 | End: 2020-07-22 | Stop reason: HOSPADM

## 2020-07-22 RX ORDER — ASPIRIN 81 MG/1
TABLET, CHEWABLE ORAL
Status: DISCONTINUED
Start: 2020-07-22 | End: 2020-07-22 | Stop reason: HOSPADM

## 2020-07-22 RX ORDER — ASPIRIN 81 MG/1
324 TABLET, CHEWABLE ORAL ONCE
Status: COMPLETED | OUTPATIENT
Start: 2020-07-22 | End: 2020-07-22

## 2020-07-22 RX ORDER — SODIUM CHLORIDE 0.9 % (FLUSH) 0.9 %
3 SYRINGE (ML) INJECTION EVERY 12 HOURS SCHEDULED
Status: DISCONTINUED | OUTPATIENT
Start: 2020-07-22 | End: 2020-07-22 | Stop reason: HOSPADM

## 2020-07-22 RX ORDER — SODIUM CHLORIDE 0.9 % (FLUSH) 0.9 %
10 SYRINGE (ML) INJECTION AS NEEDED
Status: DISCONTINUED | OUTPATIENT
Start: 2020-07-22 | End: 2020-07-22 | Stop reason: HOSPADM

## 2020-07-22 RX ADMIN — ASPIRIN 81 MG 324 MG: 81 TABLET ORAL at 08:27

## 2020-07-22 NOTE — INTERVAL H&P NOTE
H&P reviewed. The patient was examined and there are no changes to the H&P.       I discussed right heart catheterization and possible vasodilator challenge, the procedure, risks (including bleeding, infection, vascular damage [including minor oozing, bruising, bleeding, and up to and including the need for vascular surgery], contrast reaction, renal failure, respiratory failure, heart attack, stroke, arrhythmia and even death), benefits, and alternatives and the patient has voiced understanding and is willing to proceed.

## 2020-08-05 PROBLEM — J84.10 CALCIFIED GRANULOMA OF LUNG: Status: RESOLVED | Noted: 2019-06-04 | Resolved: 2020-08-05

## 2020-08-05 PROBLEM — J96.11 CHRONIC RESPIRATORY FAILURE WITH HYPOXIA: Status: ACTIVE | Noted: 2020-08-05

## 2020-08-05 PROBLEM — J98.4 CALCIFIED GRANULOMA OF LUNG: Status: RESOLVED | Noted: 2019-06-04 | Resolved: 2020-08-05

## 2020-08-05 NOTE — PROGRESS NOTES
ANASTASIA Briggs  [facility]   Respiratory Disease Clinic  [address] [place] [address]  Phone: [phone]  Fax: [phone]     Elaine Juárez is a 60 y.o. female.   CC:   Chief Complaint   Patient presents with   • pulmonary fibrosis        HPI: Mrs. 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  Recent right heart catheterization confirms primary pulmonary hypertension secondary to this condition.  PA pressure 39.  She has been prescribed OPSUMIT.  At baseline she experiences moderate persistent shortness of breath and dry cough occurring in the chest daily for many years.  It is worsened by activity and improved with rest, oxygen, Mucinex, CellCept and prednisone.  At her last office visit she reported her rheumatologist decreased her CellCept dosing to 500 mg twice a day and prednisone to 2.5 mg for improvement in cough at the request of the patient.  We discussed that she has continued to report to me worsening coughing symptoms. We discussed she may need to return to her previous dosing on the CellCept and prednisone.  She reports seeing them within the last 2 weeks.  They have increased her back to full dose CellCept and 2.5 mg of prednisone.  She reports since starting the OPSUMIT, increasing her CellCept and resuming her CPAP her cough has improved significantly.  She has been prescribed 2 L of supplemental oxygen.  She does benefit from this and is compliant with it.  She has been prescribed a auto titrating CPAP for sleep apnea however she was not compliant with this in the past.  This has recently been represcribed to her.  Most recent compliance report indicates she is compliant with it.  Her AHI is less than 1.  She is feeling much better.  She is scheduled for updated 6-minute walk on September 4.   to better assess her functional class in regards to her  "pulmonary hypertension.   Her condition is complicated by reflux however this is currently controlled by her medications.  She has recently reduced her omeprazole to once daily.  She is not noticed worsening cough or reflux symptoms with this reduction.  It is also complicated by her obesity.   She is a bit frustrated as she had applied for disability but was declined due to \"making too much money\".    The following portions of the patient's history were reviewed and updated as appropriate: allergies, current medications, past family history, past medical history, past social history, past surgical history and problem list.    Past Medical History:   Diagnosis Date   • Arthritis    • BMI 31.0-31.9,adult 6/4/2019   • Calcified granuloma of lung (CMS/HCC) 6/4/2019    Right lung   • CHF (congestive heart failure) (CMS/[facility])    • Chronic respiratory failure with hypoxia (CMS/[facility]) 8/5/2020   • Gastroesophageal reflux disease without esophagitis 6/4/2019   • Obstructive sleep apnea on CPAP 6/4/2019       Prior to Admission medications    Medication Sig Start Date End Date Taking? Authorizing Provider   mycophenolate (CELLCEPT) 500 MG tablet Take  by mouth 2 (Two) Times a Day.    Latanya Spring MD   omeprazole (priLOSEC) 40 MG capsule TAKE 1 CAPSULE TWICE A DAY 2/15/20   Michael Landin,    predniSONE (DELTASONE) 2.5 MG tablet Take 2.5 mg by mouth Daily. 5/9/19   Latanya Spring MD   traZODone (DESYREL) 150 MG tablet Take 150 mg by mouth Every Night.    Latanya Spring MD   ursodiol (ACTIGALL) 300 MG capsule Take 1 capsule by mouth 2 (Two) Times a Day. 2/6/20   Michael Landin DO   venlafaxine (EFFEXOR) 75 MG tablet Take 75 mg by mouth Daily.    Latanya Spring MD       Family History   Problem Relation Age of Onset   • Cirrhosis Sister    • Liver cancer Brother    • No Known Problems Mother    • No Known Problems Father        Social History     Socioeconomic History   • Marital status: " "     Spouse name: Not on file   • Number of children: Not on file   • Years of education: Not on file   • Highest education level: Not on file   Tobacco Use   • Smoking status: Never Smoker   • Smokeless tobacco: Never Used   Substance and Sexual Activity   • Alcohol use: No   • Drug use: No   • Sexual activity: Defer       Review of Systems   Constitutional: Positive for fatigue. Negative for activity change, chills and fever.   HENT: Negative for congestion, postnasal drip, rhinorrhea, sinus pressure, sore throat and trouble swallowing.    Eyes: Negative for blurred vision, double vision and pain.   Respiratory: Positive for cough and shortness of breath. Negative for chest tightness and wheezing.    Cardiovascular: Negative for chest pain, palpitations and leg swelling.   Gastrointestinal: Positive for GERD. Negative for abdominal distention, constipation, diarrhea, nausea and vomiting.   Endocrine: Negative for polydipsia, polyphagia and polyuria.   Genitourinary: Negative for dysuria, frequency and urgency.   Musculoskeletal: Negative for arthralgias, back pain, gait problem and joint swelling.   Skin: Negative for color change, dry skin, rash and skin lesions.   Allergic/Immunologic: Negative for environmental allergies, food allergies and immunocompromised state.   Neurological: Negative for dizziness, seizures, speech difficulty, weakness, light-headedness, memory problem and confusion.   Hematological: Negative for adenopathy. Does not bruise/bleed easily.   Psychiatric/Behavioral: Negative for sleep disturbance, negative for hyperactivity and depressed mood. The patient is not nervous/anxious.        /84   Pulse 101   Temp 97.3 °F (36.3 °C)   Ht 162.6 cm (64\")   Wt 74.9 kg (165 lb 3.2 oz)   SpO2 93% Comment: RA  BMI 28.36 kg/m²     Physical Exam   Constitutional: She is oriented to person, place, and time. She appears well-developed and well-nourished. No distress. Face mask in place. " She is obese.  HENT:   Head: Normocephalic and atraumatic.   Right Ear: External ear normal.   Left Ear: External ear normal.   Nose: Nose normal.   Mouth/Throat: Oropharynx is clear and moist. No oropharyngeal exudate.   Eyes: Pupils are equal, round, and reactive to light. Conjunctivae and EOM are normal. Right eye exhibits no discharge. Left eye exhibits no discharge.   Neck: Normal range of motion. Neck supple. No JVD present.   Cardiovascular: Regular rhythm. Tachycardia present.   No murmur heard.  Pulmonary/Chest: Effort normal and breath sounds normal. No accessory muscle usage. Tachypnea noted. No respiratory distress. She has no wheezes.   Fine bibasilar Velcro rales   Abdominal: Soft. Bowel sounds are normal. She exhibits no distension. There is no tenderness.   Musculoskeletal: Normal range of motion. She exhibits no edema or deformity.   Neurological: She is alert and oriented to person, place, and time. She displays normal reflexes. No cranial nerve deficit. Coordination normal.   Skin: Skin is warm and dry. No rash noted. She is not diaphoretic. No erythema.   Bilateral hand telangectasis   Psychiatric: She has a normal mood and affect. Her behavior is normal. Thought content normal.   Nursing note and vitals reviewed.      Pulmonary Functions Testing Results:    PFT Values        Some values may be hidden. Unless noted otherwise, only the newest values recorded on each date are displayed.         Old Values PFT Results 6/5/19   FVC 54%   FEV1 59%   FEV1/FVC 87.09%   DLCO 48%      Pre Drug PFT Results 6/5/19   No data to display.      Post Drug PFT Results 6/5/19   No data to display.      Other Tests PFT Results 6/5/19   No data to display.           Results for orders placed in visit on 06/05/19   Pulmonary Function Test         No PFTs for this visit    CXR: No imaging for this visit        Problem List Items Addressed This Visit        Cardiovascular and Mediastinum    Primary pulmonary  hypertension (CMS/HCC) - Primary    Overview     Suspected, awaiting right heart cath         Relevant Orders    CT Chest Hi Resolution       Respiratory    Pulmonary fibrosis prob sec underlying autoimmune disease    Relevant Orders    CT Chest Hi Resolution    Obstructive sleep apnea on CPAP    Chronic respiratory failure with hypoxia (CMS/HCC)       Digestive    Gastroesophageal reflux disease without esophagitis       Musculoskeletal and Integument    CREST syndrome, partial     Relevant Orders    CT Chest Hi Resolution       Other    Raynaud's phenomenon    BMI 31.0-31.9,adult        Patient's Body mass index is 28.36 kg/m². BMI is above normal parameters. Recommendations include: educational material.      Assessment/Plan         Patient with pulmonary fibrosis secondary to crest syndrome, now with pulmonary hypertension.  Most recent right heart cath showed a PA pressure 39.  She has been started on OPSUMIT.  Recently evaluated by rheumatology.  CellCept was increased to 1000 mg twice daily.  She remains on prednisone 2.5 mg daily.  She also recently resumed her CPAP therapy.  Review of her compliance report confirms compliance.  She reports since starting the above-stated medications and resuming her CPAP per fatigue and coughing have improved significantly.  I encouraged to continue all of these measures.  She is scheduled for 6-minute walk on September 4 to better assess functional class to determine whether she will need it any additional pulmonary hypertension medications.  Recently she reduced her reflux treatment to once daily.  She has had no worsening symptoms.  We will keep her on once a day dosing for now but I have encouraged her to monitor this closely and resume twice a day dosing if coughing worsens or she experiences any reflux related symptoms.  She continues to do well on her 2 L of supplemental O2.  I recommend she continue this.  She needs updated high-resolution CT imaging.  She also  needs updated PFTs.  Unable to do PFTs currently due to the pandemic.  We will arrange this as well as her high-resolution CT imaging prior to her next office visit.  She is a bit discouraged about being declined for disability.  I encouraged her to reapply.  Given her underlying autoimmune disease, resulting interstitial lung disease and now developing pulmonary hypertension, I feel she should meet criteria for some sort of disability assistance.  She has tried bronchodilators on numerous occasions and did not benefit.  We will not rechallenge her with any of these.  Follow-up scheduled.  She is aware to reach out sooner if she feels that her condition is changing or worsening.    Fela Blackman, APRN  8/18/2020  16:48    Return in about 6 months (around 2/18/2021) for ct, FVL with diffusion.

## 2020-08-18 ENCOUNTER — OFFICE VISIT (OUTPATIENT)
Dept: PULMONOLOGY | Facility: CLINIC | Age: 60
End: 2020-08-18

## 2020-08-18 VITALS
HEART RATE: 101 BPM | TEMPERATURE: 97.3 F | SYSTOLIC BLOOD PRESSURE: 136 MMHG | DIASTOLIC BLOOD PRESSURE: 84 MMHG | OXYGEN SATURATION: 93 % | BODY MASS INDEX: 28.2 KG/M2 | HEIGHT: 64 IN | WEIGHT: 165.2 LBS

## 2020-08-18 DIAGNOSIS — G47.33 OBSTRUCTIVE SLEEP APNEA ON CPAP: ICD-10-CM

## 2020-08-18 DIAGNOSIS — J84.10 PULMONARY FIBROSIS (HCC): ICD-10-CM

## 2020-08-18 DIAGNOSIS — J96.11 CHRONIC RESPIRATORY FAILURE WITH HYPOXIA (HCC): ICD-10-CM

## 2020-08-18 DIAGNOSIS — Z99.89 OBSTRUCTIVE SLEEP APNEA ON CPAP: ICD-10-CM

## 2020-08-18 DIAGNOSIS — I73.00 RAYNAUD'S PHENOMENON WITHOUT GANGRENE: ICD-10-CM

## 2020-08-18 DIAGNOSIS — I27.0 PRIMARY PULMONARY HYPERTENSION (HCC): Primary | ICD-10-CM

## 2020-08-18 DIAGNOSIS — M34.1 CREST SYNDROME (HCC): ICD-10-CM

## 2020-08-18 DIAGNOSIS — K21.9 GASTROESOPHAGEAL REFLUX DISEASE WITHOUT ESOPHAGITIS: ICD-10-CM

## 2020-08-18 PROCEDURE — 99214 OFFICE O/P EST MOD 30 MIN: CPT | Performed by: NURSE PRACTITIONER

## 2020-08-18 NOTE — PATIENT INSTRUCTIONS

## 2020-09-04 ENCOUNTER — OFFICE VISIT (OUTPATIENT)
Dept: PULMONOLOGY | Facility: CLINIC | Age: 60
End: 2020-09-04

## 2020-09-04 DIAGNOSIS — J84.10 PULMONARY FIBROSIS (HCC): Primary | ICD-10-CM

## 2020-09-04 PROCEDURE — 94618 PULMONARY STRESS TESTING: CPT | Performed by: NURSE PRACTITIONER

## 2020-09-04 NOTE — PROCEDURES
6 Minute Walk Test  Performed by: Fela Blackman APRN  Authorized by: Fela Blackman APRN     General:     - Medical History Checked      - Medical clearance provided for the patient to participate in exercise testing      Contraindications:    - No contraindications identified       Exercise Details     Supplemental oxygen:  2L with rest and exercise/[employer] [facility]    Mobility aid:  N/A    Speed:  1.1    Miles:  0.1    Feet:  52.8    Meters:  16.0934    Rest, Exercise, Recovery Readings    Rest     BP: 130/70    SAT: 98%    O2: RA    HR: 83    RPE: 0   Finish     BP: 130/80    SAT: 95%    O2: 3L    HR: 88    RPE: 0   Recovery 1     BP: 130/80    SAT: 98%    O2: 2L    HR: 91    RPE:  0   Recovery 2     BP:  130/70    SAT:  97%    O2: RA    HR:  88    RPE: 0    Minute by Minute Recordings    1st Minute     SAT: 92%    O2: RA    HR: 68    RPE:  3   2nd minute     SAT: 84%    O2: RA    HR: 108    RPE: 3    Notes: 81% 2L Pulse 117    3rd minute     SAT: 87%    O2: 3L    HR: 100    RPE: 3   4th minute     SAT: 93%    O2: 3L    HR: 117    RPE: 2   5th minute     SAT: 95%    O2: 3L    HR: 82    RPE: 1   6th minute     SAT: 97%    O2: 3L    HR: 84    RPE: 1    Was test terminated?: No        Technician:  Jarrod Schulz CMA       Overall notes:  Patient walked the full 6 minutes at the speed of 1. She walked a total distance of 0.1miles. After minute 1 her sat dropped to 84% I then put her on 2L. Her sat stayed right at 84-85% and then dropped to 81%. I turned the oxygen up to 3L. She was able to stay on 3L for the rest of the test. Her oxygen levels steadily increased from 87% to 97%.

## 2020-09-22 LAB
ALBUMIN SERPL-MCNC: 4 G/DL (ref 3.5–5.2)
ALP BLD-CCNC: 166 U/L (ref 35–104)
ALT SERPL-CCNC: 12 U/L (ref 5–33)
ANION GAP SERPL CALCULATED.3IONS-SCNC: 12 MMOL/L (ref 7–19)
AST SERPL-CCNC: 17 U/L (ref 5–32)
BASOPHILS ABSOLUTE: 0.1 K/UL (ref 0–0.2)
BASOPHILS RELATIVE PERCENT: 1.4 % (ref 0–1)
BILIRUB SERPL-MCNC: 0.4 MG/DL (ref 0.2–1.2)
BUN BLDV-MCNC: 10 MG/DL (ref 8–23)
CALCIUM SERPL-MCNC: 9.5 MG/DL (ref 8.8–10.2)
CHLORIDE BLD-SCNC: 102 MMOL/L (ref 98–111)
CHOLESTEROL, TOTAL: 216 MG/DL (ref 160–199)
CO2: 28 MMOL/L (ref 22–29)
CREAT SERPL-MCNC: 0.7 MG/DL (ref 0.5–0.9)
EOSINOPHILS ABSOLUTE: 0.3 K/UL (ref 0–0.6)
EOSINOPHILS RELATIVE PERCENT: 3.4 % (ref 0–5)
GFR AFRICAN AMERICAN: >59
GFR NON-AFRICAN AMERICAN: >60
GLUCOSE BLD-MCNC: 106 MG/DL (ref 74–109)
HCT VFR BLD CALC: 43.9 % (ref 37–47)
HDLC SERPL-MCNC: 66 MG/DL (ref 65–121)
HEMOGLOBIN: 13.8 G/DL (ref 12–16)
IMMATURE GRANULOCYTES #: 0 K/UL
LDL CHOLESTEROL CALCULATED: 121 MG/DL
LYMPHOCYTES ABSOLUTE: 1.9 K/UL (ref 1.1–4.5)
LYMPHOCYTES RELATIVE PERCENT: 26.2 % (ref 20–40)
MCH RBC QN AUTO: 27.1 PG (ref 27–31)
MCHC RBC AUTO-ENTMCNC: 31.4 G/DL (ref 33–37)
MCV RBC AUTO: 86.2 FL (ref 81–99)
MONOCYTES ABSOLUTE: 0.7 K/UL (ref 0–0.9)
MONOCYTES RELATIVE PERCENT: 9.6 % (ref 0–10)
NEUTROPHILS ABSOLUTE: 4.3 K/UL (ref 1.5–7.5)
NEUTROPHILS RELATIVE PERCENT: 58.9 % (ref 50–65)
PDW BLD-RTO: 13.7 % (ref 11.5–14.5)
PLATELET # BLD: 286 K/UL (ref 130–400)
PMV BLD AUTO: 11.2 FL (ref 9.4–12.3)
POTASSIUM SERPL-SCNC: 3.8 MMOL/L (ref 3.5–5)
RBC # BLD: 5.09 M/UL (ref 4.2–5.4)
SEDIMENTATION RATE, ERYTHROCYTE: 28 MM/HR (ref 0–25)
SODIUM BLD-SCNC: 142 MMOL/L (ref 136–145)
TOTAL PROTEIN: 7.5 G/DL (ref 6.6–8.7)
TRIGL SERPL-MCNC: 143 MG/DL (ref 0–149)
WBC # BLD: 7.3 K/UL (ref 4.8–10.8)

## 2020-10-05 ENCOUNTER — TELEPHONE (OUTPATIENT)
Dept: PULMONOLOGY | Facility: CLINIC | Age: 60
End: 2020-10-05

## 2020-10-05 NOTE — TELEPHONE ENCOUNTER
Patient is going to call [facility] to see if she has refills.  According to the enrollment form she has 11 refiils.

## 2020-10-20 RX ORDER — PREDNISONE 1 MG/1
5 TABLET ORAL DAILY
Qty: 90 TABLET | Refills: 3 | Status: SHIPPED | OUTPATIENT
Start: 2020-10-20 | End: 2021-04-14

## 2020-11-05 ENCOUNTER — TELEPHONE (OUTPATIENT)
Dept: GASTROENTEROLOGY | Facility: CLINIC | Age: 60
End: 2020-11-05

## 2020-11-05 RX ORDER — URSODIOL 300 MG/1
CAPSULE ORAL
Qty: 180 CAPSULE | Refills: 2 | OUTPATIENT
Start: 2020-11-05

## 2020-11-05 RX ORDER — URSODIOL 300 MG/1
300 CAPSULE ORAL 2 TIMES DAILY
Qty: 180 CAPSULE | Refills: 1 | Status: SHIPPED | OUTPATIENT
Start: 2020-11-05 | End: 2020-11-05 | Stop reason: SDUPTHER

## 2020-11-05 RX ORDER — URSODIOL 300 MG/1
300 CAPSULE ORAL 2 TIMES DAILY
Qty: 180 CAPSULE | Refills: 1 | Status: SHIPPED | OUTPATIENT
Start: 2020-11-05 | End: 2021-07-21 | Stop reason: SDUPTHER

## 2021-01-12 ENCOUNTER — TELEPHONE (OUTPATIENT)
Dept: PULMONOLOGY | Facility: CLINIC | Age: 61
End: 2021-01-12

## 2021-01-12 NOTE — TELEPHONE ENCOUNTER
Patient reached out wanting assistance with her OPSUMIT.  Can you please call her and see if you can help her with the patient assistance or have our local rep or someone from the drug company assist her if you cannot question?  Thank you

## 2021-01-14 NOTE — TELEPHONE ENCOUNTER
Spoke with patient and she is going to call [phone] Boo arrieta for patient assistance on the opsumit.    She says it is $8000 for a month through [employer].

## 2021-02-08 PROBLEM — J84.10 PULMONARY FIBROSIS (HCC): Chronic | Status: ACTIVE | Noted: 2017-01-23

## 2021-02-08 PROBLEM — Z99.89 OBSTRUCTIVE SLEEP APNEA ON CPAP: Chronic | Status: ACTIVE | Noted: 2019-06-04

## 2021-02-08 PROBLEM — G47.33 OBSTRUCTIVE SLEEP APNEA ON CPAP: Chronic | Status: ACTIVE | Noted: 2019-06-04

## 2021-02-08 PROBLEM — I27.0 PRIMARY PULMONARY HYPERTENSION: Chronic | Status: ACTIVE | Noted: 2020-03-18

## 2021-02-08 PROBLEM — J96.11 CHRONIC RESPIRATORY FAILURE WITH HYPOXIA (HCC): Chronic | Status: ACTIVE | Noted: 2020-08-05

## 2021-02-08 PROBLEM — M34.1 CREST SYNDROME: Chronic | Status: ACTIVE | Noted: 2017-01-23

## 2021-02-08 PROBLEM — K21.9 GASTROESOPHAGEAL REFLUX DISEASE WITHOUT ESOPHAGITIS: Chronic | Status: ACTIVE | Noted: 2019-06-04

## 2021-02-15 DIAGNOSIS — M34.1 CREST SYNDROME (HCC): Primary | ICD-10-CM

## 2021-02-15 RX ORDER — PREDNISONE 1 MG/1
5 TABLET ORAL DAILY
Qty: 90 TABLET | Refills: 3 | Status: SHIPPED | OUTPATIENT
Start: 2021-02-15 | End: 2021-05-05 | Stop reason: SDUPTHER

## 2021-03-02 DIAGNOSIS — M34.1 CREST SYNDROME (HCC): ICD-10-CM

## 2021-03-02 DIAGNOSIS — I27.0 PRIMARY PULMONARY HYPERTENSION (HCC): ICD-10-CM

## 2021-03-02 DIAGNOSIS — J84.10 PULMONARY FIBROSIS (HCC): ICD-10-CM

## 2021-03-05 ENCOUNTER — IMMUNIZATION (OUTPATIENT)
Age: 61
End: 2021-03-05
Payer: COMMERCIAL

## 2021-03-05 PROCEDURE — 0001A COVID-19, PFIZER VACCINE 30MCG/0.3ML DOSE: CPT | Performed by: FAMILY MEDICINE

## 2021-03-05 PROCEDURE — 91300 COVID-19, PFIZER VACCINE 30MCG/0.3ML DOSE: CPT | Performed by: FAMILY MEDICINE

## 2021-03-26 ENCOUNTER — IMMUNIZATION (OUTPATIENT)
Age: 61
End: 2021-03-26
Payer: COMMERCIAL

## 2021-03-26 PROCEDURE — 91300 COVID-19, PFIZER VACCINE 30MCG/0.3ML DOSE: CPT | Performed by: FAMILY MEDICINE

## 2021-03-26 PROCEDURE — 0002A PR IMM ADMN SARSCOV2 30MCG/0.3ML DIL RECON 2ND DOSE: CPT | Performed by: FAMILY MEDICINE

## 2021-04-06 NOTE — PROGRESS NOTES
Chief Complaint  primary pulmonary hypertension    Subjective    History of Present Illness {CC  Problem List  Visit Diagnosis   Encounters  Notes  Medications  Labs  Result Review Imaging  Media     Elaine Juárez presents to [facility] for:    Management of her pulmonary fibrosis likely secondary to underlying autoimmune disease, specifically crest syndrome.  Most recent high-resolution imaging shows persistent pulmonary fibrosis as well as some nodularity in the right lower lobe measuring approximately 7 mm which is new.  She also had a pretracheal node measuring approximately 2.2 cm.  Follow-up was recommended.  PFTs support restriction with diffusion impairment.  Recent right heart catheterization confirms primary pulmonary hypertension secondary to this condition.  PA pressure 39.  She has been prescribed OPSUMIT.  She has had no issue.  She indicates her cough actually improved after starting the OPSUMIT.  Most recent walk test her oxygen was increased from 2 L to 3 L with exertion.  She is compliant with this and does benefit from it.  Distance walked 0.1 mile.  She frequently gets short of breath and has dry coughing with exertion.  She reports her functional status to be unchanged.  She is on CellCept 1000 mg twice a day and prednisone 5 mg.  This is an increase from 2.5 mg previously.  She is followed by rheumatology for this, Dr. Landry.  She has been diagnosed with sleep apnea.  She is on CPAP.  She has not been compliant with it as she reports it is not helping her.  She has a diagnosis of reflux.  She is on omeprazole which helps.  She has been out of this and symptoms have been worse.  She is needing a prescription.        Prior to Admission medications    Medication Sig Start Date End Date Taking? Authorizing Provider   mycophenolate (CELLCEPT) 500 MG tablet Take  by mouth 4 (Four) Times a Day.    Provider, MD Latanya   omeprazole  "(priLOSEC) 40 MG capsule TAKE 1 CAPSULE TWICE A DAY 2/15/20   Michael Landin,    predniSONE (DELTASONE) 2.5 MG tablet Take 2.5 mg by mouth Daily. 5/9/19   ProviderLatanya MD   predniSONE (DELTASONE) 5 MG tablet Take 1 tablet by mouth Daily. 10/20/20   Fela Blackman APRN   predniSONE (DELTASONE) 5 MG tablet Take 1 tablet by mouth Daily for 90 days. 2/15/21 5/16/21  Fela Blackman APRN   traZODone (DESYREL) 150 MG tablet Take 75 mg by mouth Every Night.    Provider, MD Latanya   ursodiol (ACTIGALL) 300 MG capsule Take 1 capsule by mouth 2 (Two) Times a Day. 11/5/20   Micah Barton APRN   venlafaxine (EFFEXOR) 75 MG tablet Take 75 mg by mouth Daily.    ProviderLatanya MD       Social History     Socioeconomic History   • Marital status:      Spouse name: Not on file   • Number of children: Not on file   • Years of education: Not on file   • Highest education level: Not on file   Tobacco Use   • Smoking status: Never Smoker   • Smokeless tobacco: Never Used   Substance and Sexual Activity   • Alcohol use: No   • Drug use: No   • Sexual activity: Defer       Objective   Vital Signs:   /76   Pulse 106   Ht 162.6 cm (64\")   Wt 78.9 kg (174 lb)   SpO2 95% Comment: RA  BMI 29.87 kg/m²     Physical Exam  Constitutional:       Interventions: Face mask in place.   Cardiovascular:      Rate and Rhythm: Normal rate and regular rhythm.      Heart sounds: No murmur heard.     Pulmonary:      Effort: Pulmonary effort is normal. No tachypnea, accessory muscle usage or respiratory distress.      Breath sounds: Examination of the right-lower field reveals rales. Examination of the left-lower field reveals rales. Rales present.   Musculoskeletal:      Right lower leg: No edema.      Left lower leg: No edema.   Skin:     Comments: Bilateral hand and face telangectasis    Neurological:      Mental Status: She is alert and oriented to person, place, and time.        Result Review :{ " Labs  Result Review  Imaging  Med Tab  Media :    PFT Values        Some values may be hidden. Unless noted otherwise, only the newest values recorded on each date are displayed.         Old Values PFT Results 19   FVC 54%   FEV1 59%   FEV1/FVC 87.09%   DLCO 48%      Pre Drug PFT Results 19   No data to display.      Post Drug PFT Results 19   No data to display.      Other Tests PFT Results 19   No data to display.           My interpretation of the PFT: None for this visit    Results for orders placed in visit on 19    Pulmonary Function Test     CT Chest Hi Resolution (2021 00:00)      My interpretation of imagin.2 cm pretracheal node, new, chronic pleural thickening and honeycombing, chronic right middle lobe bronchiectasis, new 7 mm nodule right lower lobe    My interpretation of labs: None for this visit      Assessment and Plan {CC Problem List  Visit Diagnosis  ROS  Review (Popup)  Health Maintenance  Quality  BestPractice  Medications  SmartSets  SnapShot Encounters  Media      Diagnoses and all orders for this visit:    1. Pulmonary fibrosis prob sec underlying autoimmune disease (Primary)  Comments:  CT ordered for August to update imaging of her condition.  Recent imaging changed    2. Chronic respiratory failure with hypoxia (CMS/HCC)  Comments:  Continue 3 L of oxygen with exertion    3. Primary pulmonary hypertension (CMS/HCC)  Comments:  Continue OPSUMIT.  Functional status unchanged.  If worse repeat echo and consider additional medication    4. Obstructive sleep apnea on CPAP  Comments:  Noncompliant.  Encouraged to return device to DME if not using    5. CREST syndrome, partial   Comments:  Continue CellCept 1000 mg twice daily.  Continue prednisone 5 mg daily.  Keep follow-up with Dr. Landry    6. Gastroesophageal reflux disease without esophagitis  Comments:  Prescription for omeprazole sent to pharmacy.  Continue  Orders:  -     omeprazole  (priLOSEC) 40 MG capsule; Take 1 capsule by mouth Daily for 90 days.  Dispense: 90 capsule; Refill: 3    7. Lung nodule  Comments:  Repeat CT in August, 6 months from previous, new nodule, 7 mm, right lower lobe  Orders:  -     CT Chest With Contrast; Future  -     Creatinine, Serum; Future    8. Adenopathy  Comments:  Follow-up CT in August for adenopathy, pretracheal, 2.2 cm, new  Orders:  -     CT Chest With Contrast; Future  -     Creatinine, Serum; Future        Patient's Body mass index is 29.87 kg/m². BMI is above normal parameters. Recommendations include: educational material.      Fela Blackman, ANASTASIA  4/14/2021  13:17 CDT    Follow Up {Instructions Charge Capture  Follow-up Communications   Return in about 4 months (around 8/14/2021) for FVL with diffusion.    Patient was given instructions and counseling regarding her condition or for health maintenance advice. Please see specific information pulled into the AVS if appropriate.

## 2021-04-14 ENCOUNTER — OFFICE VISIT (OUTPATIENT)
Dept: PULMONOLOGY | Facility: CLINIC | Age: 61
End: 2021-04-14

## 2021-04-14 VITALS
SYSTOLIC BLOOD PRESSURE: 124 MMHG | HEART RATE: 106 BPM | BODY MASS INDEX: 29.71 KG/M2 | HEIGHT: 64 IN | WEIGHT: 174 LBS | DIASTOLIC BLOOD PRESSURE: 76 MMHG | OXYGEN SATURATION: 95 %

## 2021-04-14 DIAGNOSIS — Z99.89 OBSTRUCTIVE SLEEP APNEA ON CPAP: Chronic | ICD-10-CM

## 2021-04-14 DIAGNOSIS — G47.33 OBSTRUCTIVE SLEEP APNEA ON CPAP: Chronic | ICD-10-CM

## 2021-04-14 DIAGNOSIS — R91.1 LUNG NODULE: ICD-10-CM

## 2021-04-14 DIAGNOSIS — J96.11 CHRONIC RESPIRATORY FAILURE WITH HYPOXIA (HCC): Chronic | ICD-10-CM

## 2021-04-14 DIAGNOSIS — R59.9 ADENOPATHY: ICD-10-CM

## 2021-04-14 DIAGNOSIS — K21.9 GASTROESOPHAGEAL REFLUX DISEASE WITHOUT ESOPHAGITIS: Chronic | ICD-10-CM

## 2021-04-14 DIAGNOSIS — I27.0 PRIMARY PULMONARY HYPERTENSION (HCC): Chronic | ICD-10-CM

## 2021-04-14 DIAGNOSIS — M34.1 CREST SYNDROME (HCC): Chronic | ICD-10-CM

## 2021-04-14 DIAGNOSIS — J84.10 PULMONARY FIBROSIS (HCC): Primary | Chronic | ICD-10-CM

## 2021-04-14 PROCEDURE — 99214 OFFICE O/P EST MOD 30 MIN: CPT | Performed by: NURSE PRACTITIONER

## 2021-04-14 RX ORDER — MACITENTAN 10 MG/1
TABLET, FILM COATED ORAL
COMMUNITY
End: 2021-08-16 | Stop reason: SDUPTHER

## 2021-04-14 RX ORDER — OMEPRAZOLE 40 MG/1
40 CAPSULE, DELAYED RELEASE ORAL DAILY
Qty: 90 CAPSULE | Refills: 3 | Status: SHIPPED | OUTPATIENT
Start: 2021-04-14 | End: 2021-07-13

## 2021-04-14 NOTE — PATIENT INSTRUCTIONS
"BMI for Adults  What is BMI?  Body mass index (BMI) is a number that is calculated from a person's weight and height. BMI can help estimate how much of a person's weight is composed of fat. BMI does not measure body fat directly. Rather, it is an alternative to procedures that directly measure body fat, which can be difficult and expensive.  BMI can help identify people who may be at higher risk for certain medical problems.  What are BMI measurements used for?  BMI is used as a screening tool to identify possible weight problems. It helps determine whether a person is obese, overweight, a healthy weight, or underweight.  BMI is useful for:  · Identifying a weight problem that may be related to a medical condition or may increase the risk for medical problems.  · Promoting changes, such as changes in diet and exercise, to help reach a healthy weight. BMI screening can be repeated to see if these changes are working.  How is BMI calculated?  BMI involves measuring your weight in relation to your height. Both height and weight are measured, and the BMI is calculated from those numbers. This can be done either in English (U[place] or metric measurements. Note that charts and online BMI calculators are available to help you find your BMI quickly and easily without having to do these calculations yourself.  To calculate your BMI in English (U.S[place] measurements:    1. Measure your weight in pounds (lb).  2. Multiply the number of pounds by 703.  ? For example, for a person who weighs 180 lb, multiply that number by 703, which equals 126,540.  3. Measure your height in inches. Then multiply that number by itself to get a measurement called \"inches squared.\"  ? For example, for a person who is 70 inches tall, the \"inches squared\" measurement is 70 inches x 70 inches, which equals 4,900 inches squared.  4. Divide the total from step 2 (number of lb x 703) by the total from step 3 (inches squared): 126,540 ÷ 4,900 = 25.8. This is " "your BMI.  To calculate your BMI in metric measurements:  1. Measure your weight in kilograms (kg).  2. Measure your height in meters (m). Then multiply that number by itself to get a measurement called \"meters squared.\"  ? For example, for a person who is 1.75 m tall, the \"meters squared\" measurement is 1.75 m x 1.75 m, which is equal to 3.1 meters squared.  3. Divide the number of kilograms (your weight) by the meters squared number. In this example: 70 ÷ 3.1 = 22.6. This is your BMI.  What do the results mean?  BMI charts are used to identify whether you are underweight, normal weight, overweight, or obese. The following guidelines will be used:  · Underweight: BMI less than 18.5.  · Normal weight: BMI between 18.5 and 24.9.  · Overweight: BMI between 25 and 29.9.  · Obese: BMI of 30 or above.  Keep these notes in mind:  · Weight includes both fat and muscle, so someone with a muscular build, such as an athlete, may have a BMI that is higher than 24.9. In cases like these, BMI is not an accurate measure of body fat.  · To determine if excess body fat is the cause of a BMI of 25 or higher, further assessments may need to be done by a health care provider.  · BMI is usually interpreted in the same way for men and women.  Where to find more information  For more information about BMI, including tools to quickly calculate your BMI, go to these websites:  · Centers for Disease Control and Prevention: www.cdc.gov  · American Heart Association: www.heart.org  · National Heart, Lung, and Blood [place]: www.nhlbi.nih.gov  Summary  · Body mass index (BMI) is a number that is calculated from a person's weight and height.  · BMI may help estimate how much of a person's weight is composed of fat. BMI can help identify those who may be at higher risk for certain medical problems.  · BMI can be measured using English measurements or metric measurements.  · BMI charts are used to identify whether you are underweight, normal " weight, overweight, or obese.  This information is not intended to replace advice given to you by your health care provider. Make sure you discuss any questions you have with your health care provider.  Document Revised: 09/09/2020 Document Reviewed: 07/17/2020  Elsevier Patient Education © 2021 [employer]

## 2021-05-05 DIAGNOSIS — M34.1 CREST SYNDROME (HCC): ICD-10-CM

## 2021-05-05 RX ORDER — PREDNISONE 1 MG/1
5 TABLET ORAL DAILY
Qty: 90 TABLET | Refills: 3 | Status: SHIPPED | OUTPATIENT
Start: 2021-05-05 | End: 2021-08-04

## 2021-05-05 NOTE — TELEPHONE ENCOUNTER
Fax received from [facility] to refill prednisone for 90- days supply.   I looked and there was a script sent back in February.  Do you want to approve this script?

## 2021-05-11 RX ORDER — URSODIOL 300 MG/1
CAPSULE ORAL
Qty: 180 CAPSULE | Refills: 1 | OUTPATIENT
Start: 2021-05-11

## 2021-05-27 RX ORDER — URSODIOL 300 MG/1
CAPSULE ORAL
Qty: 180 CAPSULE | Refills: 1 | OUTPATIENT
Start: 2021-05-27

## 2021-07-19 NOTE — PROGRESS NOTES
Chief Complaint   Patient presents with   • Cirrhosis     has cirrhois needs ursodiol refilled also has question about liver us       PCP: Aaron Stoddard MD  REFER: No ref. provider found    Subjective     HPI    Elaine Juárez is maintained on brandi for known diagnosis of PBC.  She denies fatigue, abdominal pain.  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   She has been out of brandi for apx one month.  No increase fatigue.  She complain of   Difficulty swallowing, constant globus sensation, constant cough.  No nausea/vomitting.   No heartburn, no indigestion.   No bright red blood per rectum, no melena.  No change in bowels.    Last US 2019.     CScope (Dr Landin) 2015-tubular adenoma       Lab Results - Last 18 Months   Lab Units 09/22/20  1028 05/28/20  0833 03/03/20  1530 02/27/20  0408 02/26/20  0457 02/25/20  0428   HEMOGLOBIN g/dL 13.8 14.0 15.4 12.3 12.9 13.8   HEMATOCRIT % 43.9 46.3 50.0* 41.9 42.7 44.7   MCV fL 86.2 87.5 84.5 89.5 89.0 86.3   WBC K/uL 7.3 9.7 13.1* 7.0 6.5 1.6*   PLATELETS K/uL 286 271 425* 205 182 192       Lab Results - Last 18 Months   Lab Units 09/22/20  1028 07/22/20  1100 07/22/20  1047 05/28/20  0833 02/27/20  0408 02/26/20  0457 02/25/20  0428 02/25/20  0428 02/24/20  1638 02/24/20  1638   GLUCOSE mg/dL 106  --   --  100 139* 153*  --  149*  --  107   SODIUM mmol/L 142  --   --  143 142 139   < > 140   < > 131*   SODIUM, VENOUS mmol/L  --  142 142  --   --   --   --   --   --   --    POTASSIUM mmol/L 3.8  --   --  4.4 4.3 4.3   < > 4.5   < > 3.7   POTASSIUM, VENOUS mmol/L  --  4.0 4.2  --   --   --   --   --   --   --    CREATININE mg/dL 0.7  --   --  0.7 0.6 0.6  --  0.7  --  0.7   BUN mg/dL 10  --   --  12 16 15  --  11  --  10   ALK PHOS U/L 166*  --   --  142*  --   --   --   --   --  264*   ALT (SGPT) U/L 12  --   --  28  --   --   --   --   --  27   AST (SGOT) U/L 17  --   --  22  --   --   --   --   --  34*   BILIRUBIN mg/dL 0.4  --   --  0.3  --   --    --   --   --  0.5   ALBUMIN g/dL 4  --   --  3.9  --   --   --   --   --  3.2*    < > = values in this interval not displayed.       Lab Results - Last 18 Months   Lab Units 06/19/20  1312   TSH uIU/mL 1.470       Past Medical History:   Diagnosis Date   • Arthritis    • BMI 31.0-31.9,adult 6/4/2019   • Calcified granuloma of lung (CMS/HCC) 6/4/2019    Right lung   • CHF (congestive heart failure) (CMS/[facility])    • Chronic respiratory failure with hypoxia (CMS/[facility]) 8/5/2020   • Gastroesophageal reflux disease without esophagitis 6/4/2019   • Obstructive sleep apnea on CPAP 6/4/2019       Past Surgical History:   Procedure Laterality Date   • BREAST BIOPSY     • CARDIAC CATHETERIZATION N/A 7/22/2020    Procedure: Right Heart Cath;  Surgeon: Thong Martin MD;  Location:  [facility]ATH [facility];  Service: Cardiology;  Laterality: N/A;   • CHOLECYSTECTOMY     • COLONOSCOPY  05/11/2015    5 POLYPS       Outpatient Medications Marked as Taking for the 7/21/21 encounter (Office Visit) with Micah Barton APRN   Medication Sig Dispense Refill   • Macitentan (Opsumit) 10 MG tablet Opsumit 10 mg tablet     • mycophenolate (CELLCEPT) 500 MG tablet Take  by mouth 4 (Four) Times a Day.     • omeprazole (priLOSEC) 40 MG capsule TAKE 1 CAPSULE TWICE A  capsule 4   • predniSONE (DELTASONE) 5 MG tablet Take 1 tablet by mouth Daily for 90 days. 90 tablet 3   • traZODone (DESYREL) 150 MG tablet Take 75 mg by mouth Every Night.     • ursodiol (ACTIGALL) 300 MG capsule Take 1 capsule by mouth 2 (Two) Times a Day. 180 capsule 3   • venlafaxine (EFFEXOR) 75 MG tablet Take 75 mg by mouth Daily.     • [DISCONTINUED] ursodiol (ACTIGALL) 300 MG capsule Take 1 capsule by mouth 2 (Two) Times a Day. 180 capsule 1       Allergies   Allergen Reactions   • Codeine GI Intolerance       Social History     Socioeconomic History   • Marital status:      Spouse name: Not on file   • Number of children: Not on file   • Years of  "education: Not on file   • Highest education level: Not on file   Tobacco Use   • Smoking status: Never Smoker   • Smokeless tobacco: Never Used   Substance and Sexual Activity   • Alcohol use: No   • Drug use: No   • Sexual activity: Defer       Review of Systems   Constitutional: Negative for unexpected weight change.   HENT: Positive for trouble swallowing.    Respiratory: Positive for cough. Negative for shortness of breath.    Cardiovascular: Negative for chest pain.   Gastrointestinal: Negative for abdominal pain and anal bleeding.       Objective     Vitals:    07/21/21 1019   BP: 130/80   Pulse: 100   Temp: 97.3 °F (36.3 °C)   SpO2: 96%   Weight: 79.4 kg (175 lb)   Height: 162.6 cm (64\")     Body mass index is 30.04 kg/m².    Physical Exam  Constitutional:       Appearance: Normal appearance. She is well-developed.   Eyes:      General: No scleral icterus.  Cardiovascular:      Rate and Rhythm: Regular rhythm.      Heart sounds: Normal heart sounds. No murmur heard.     Pulmonary:      Effort: Pulmonary effort is normal. No accessory muscle usage.      Breath sounds: Normal breath sounds.   Abdominal:      General: Bowel sounds are normal. There is no distension.      Palpations: Abdomen is soft. There is no mass.      Tenderness: There is no abdominal tenderness. There is no guarding or rebound.   Skin:     General: Skin is warm and dry.      Coloration: Skin is not jaundiced.   Neurological:      Mental Status: She is alert.   Psychiatric:         Behavior: Behavior is cooperative.         Imaging Results (Most Recent)     None          Body mass index is 30.04 kg/m².    Assessment/Plan     Diagnoses and all orders for this visit:    1. PBC (primary biliary cirrhosis) (Primary)  -     Comprehensive Metabolic Panel; Future  -     CBC (No Diff); Future  -     US Liver; Future  -     US Elastography Parenchyma; Future    2. History of adenomatous polyp of colon  -     Case Request; Standing  -     Implement " Anesthesia Orders Day of Procedure; Standing  -     Obtain Informed Consent; Standing  -     Verify bowel prep was successful; Standing  -     Give tap water enema if bowel prep was insufficient; Standing  -     Case Request    3. Globus sensation  -     FL Esophagram Complete Single Contrast; Future  -     Case Request; Standing  -     Implement Anesthesia Orders Day of Procedure; Standing  -     Obtain Informed Consent; Standing  -     Verify bowel prep was successful; Standing  -     Give tap water enema if bowel prep was insufficient; Standing  -     Case Request    Other orders  -     ursodiol (ACTIGALL) 300 MG capsule; Take 1 capsule by mouth 2 (Two) Times a Day.  Dispense: 180 capsule; Refill: 3        COLONOSCOPY WITH ANESTHESIA (N/A), ESOPHAGOGASTRODUODENOSCOPY WITH ANESTHESIA (N/A)    Esophagram prior to EGD to assess motility (scleroderma)  If EGD unremarkable consideration for ENT    Will keep brandi at current dose for now, may need to increase dose pending result of lab work ordered at today visit  (reviewed with Dr Landin)    Advised pt to stop use of NSAIDs, Fish Oil, and MV 5 days prior to procedure, per Dr Landin protocol.  Tylenol based products are ok to take.  Pt verbalized understanding.    Precautions are currently being put in place due to COVID-19.  I have explained to Elaine Juárez they will be required to undergo COVID testing prior to their procedure.  Elaine Juárez verbalized understanding and was willing to proceed.    The risk of the endoscopy were discussed in detail.  We discussed the risk of perforation (one out of [id number], riskier with dilation), bleeding (one out of 500), and the rare risks of infection, adverse reaction to anesthesia, respiratory failure, cardiac failure including MI and adverse reaction to medications, etc.  We discussed consequences that could occur if a risk were to develop such as the need for hospitalization, blood transfusion, surgical intervention,  medications, pain and disability and death.  Alternatives include not doing anything, or pursuing an UGI series which only offers a diagnosis with potential less accuracy compared to egd.  The patient verbalizes understanding and agrees to proceed.    All risks, benefits, alternatives, and indications of colonoscopy procedure have been discussed with the patient. Risks to include perforation of the colon requiring possible surgery or colostomy, risk of bleeding from biopsies or removal of colon tissue, possibility of missing a colon polyp or cancer, or adverse drug reaction.  Benefits to include the diagnosis and management of disease of the colon and rectum. Alternatives to include barium enema, radiographic evaluation, lab testing or no intervention. Pt verbalizes understanding and agrees to proceed with procedure.        Micah Barton, APRN  07/21/21          There are no Patient Instructions on file for this visit.

## 2021-07-21 ENCOUNTER — OFFICE VISIT (OUTPATIENT)
Dept: GASTROENTEROLOGY | Facility: CLINIC | Age: 61
End: 2021-07-21

## 2021-07-21 VITALS
SYSTOLIC BLOOD PRESSURE: 130 MMHG | DIASTOLIC BLOOD PRESSURE: 80 MMHG | HEART RATE: 100 BPM | WEIGHT: 175 LBS | BODY MASS INDEX: 29.88 KG/M2 | TEMPERATURE: 97.3 F | OXYGEN SATURATION: 96 % | HEIGHT: 64 IN

## 2021-07-21 DIAGNOSIS — Z86.010 HISTORY OF ADENOMATOUS POLYP OF COLON: ICD-10-CM

## 2021-07-21 DIAGNOSIS — R09.89 GLOBUS SENSATION: ICD-10-CM

## 2021-07-21 DIAGNOSIS — K74.3 PRIMARY BILIARY CHOLANGITIS (HCC): Primary | ICD-10-CM

## 2021-07-21 PROBLEM — R09.A2 GLOBUS SENSATION: Status: ACTIVE | Noted: 2021-07-21

## 2021-07-21 PROBLEM — Z86.0101 HISTORY OF ADENOMATOUS POLYP OF COLON: Status: ACTIVE | Noted: 2021-07-21

## 2021-07-21 PROCEDURE — 99214 OFFICE O/P EST MOD 30 MIN: CPT | Performed by: NURSE PRACTITIONER

## 2021-07-21 RX ORDER — URSODIOL 300 MG/1
300 CAPSULE ORAL 2 TIMES DAILY
Qty: 180 CAPSULE | Refills: 3 | Status: SHIPPED | OUTPATIENT
Start: 2021-07-21 | End: 2022-07-07

## 2021-07-29 ENCOUNTER — TRANSCRIBE ORDERS (OUTPATIENT)
Dept: LAB | Facility: HOSPITAL | Age: 61
End: 2021-07-29

## 2021-07-29 DIAGNOSIS — Z01.818 PREOPERATIVE TESTING: Primary | ICD-10-CM

## 2021-08-02 ENCOUNTER — TELEPHONE (OUTPATIENT)
Dept: PULMONOLOGY | Facility: CLINIC | Age: 61
End: 2021-08-02

## 2021-08-02 ENCOUNTER — LAB (OUTPATIENT)
Dept: LAB | Facility: HOSPITAL | Age: 61
End: 2021-08-02

## 2021-08-02 DIAGNOSIS — M34.1 CREST SYNDROME (HCC): Primary | ICD-10-CM

## 2021-08-02 DIAGNOSIS — J40 BRONCHITIS: ICD-10-CM

## 2021-08-02 LAB — SARS-COV-2 ORF1AB RESP QL NAA+PROBE: NOT DETECTED

## 2021-08-02 PROCEDURE — U0004 COV-19 TEST NON-CDC HGH THRU: HCPCS | Performed by: INTERNAL MEDICINE

## 2021-08-02 PROCEDURE — C9803 HOPD COVID-19 SPEC COLLECT: HCPCS | Performed by: INTERNAL MEDICINE

## 2021-08-02 RX ORDER — AMOXICILLIN AND CLAVULANATE POTASSIUM 875; 125 MG/1; MG/1
1 TABLET, FILM COATED ORAL 2 TIMES DAILY
Qty: 20 TABLET | Refills: 0 | Status: ON HOLD | OUTPATIENT
Start: 2021-08-02 | End: 2021-08-04

## 2021-08-02 RX ORDER — PREDNISONE 10 MG/1
TABLET ORAL
Qty: 31 TABLET | Refills: 0 | Status: ON HOLD | OUTPATIENT
Start: 2021-08-02 | End: 2021-08-04

## 2021-08-02 RX ORDER — DOXYCYCLINE HYCLATE 100 MG
100 TABLET ORAL 2 TIMES DAILY
Qty: 20 TABLET | Refills: 0 | Status: ON HOLD | OUTPATIENT
Start: 2021-08-02 | End: 2021-08-04

## 2021-08-02 NOTE — TELEPHONE ENCOUNTER
diffuculty breathing despite being on 2L.   While moving around her 02 drops to 78%.    She is requesting to take extra prednisone.   She takes it once a day currently.   She is coughing up white sputum.  She is having a lung scan with contrast.   She feels like she is getting pneumonia.

## 2021-08-02 NOTE — TELEPHONE ENCOUNTER
Thank you.  If these medications do not help in the outpatient setting she needs to go to the hospital.  This would be her second round of steroids.

## 2021-08-02 NOTE — TELEPHONE ENCOUNTER
Augmentin, doxycycline and prednisone taper sent to pharmacy.  Ask her where the CT scan is being performed so I can look at the results.

## 2021-08-03 ENCOUNTER — TRANSCRIBE ORDERS (OUTPATIENT)
Dept: LAB | Facility: HOSPITAL | Age: 61
End: 2021-08-03

## 2021-08-03 DIAGNOSIS — Z01.818 PREOPERATIVE TESTING: Primary | ICD-10-CM

## 2021-08-04 ENCOUNTER — ANESTHESIA EVENT (OUTPATIENT)
Dept: GASTROENTEROLOGY | Facility: HOSPITAL | Age: 61
End: 2021-08-04

## 2021-08-04 ENCOUNTER — HOSPITAL ENCOUNTER (OUTPATIENT)
Facility: HOSPITAL | Age: 61
Setting detail: HOSPITAL OUTPATIENT SURGERY
Discharge: HOME | End: 2021-08-04
Attending: INTERNAL MEDICINE | Admitting: INTERNAL MEDICINE

## 2021-08-04 ENCOUNTER — ANESTHESIA (OUTPATIENT)
Dept: GASTROENTEROLOGY | Facility: HOSPITAL | Age: 61
End: 2021-08-04

## 2021-08-04 VITALS
HEIGHT: 64 IN | RESPIRATION RATE: 20 BRPM | HEART RATE: 105 BPM | BODY MASS INDEX: 29.71 KG/M2 | DIASTOLIC BLOOD PRESSURE: 76 MMHG | OXYGEN SATURATION: 89 % | TEMPERATURE: 98.2 F | SYSTOLIC BLOOD PRESSURE: 122 MMHG | WEIGHT: 174 LBS

## 2021-08-04 DIAGNOSIS — R09.89 GLOBUS SENSATION: ICD-10-CM

## 2021-08-04 DIAGNOSIS — Z86.010 HISTORY OF ADENOMATOUS POLYP OF COLON: ICD-10-CM

## 2021-08-04 PROCEDURE — 45385 COLONOSCOPY W/LESION REMOVAL: CPT | Performed by: INTERNAL MEDICINE

## 2021-08-04 PROCEDURE — 25010000002 PROPOFOL 10 MG/ML EMULSION: Performed by: NURSE ANESTHETIST, CERTIFIED REGISTERED

## 2021-08-04 PROCEDURE — 88305 TISSUE EXAM BY PATHOLOGIST: CPT | Performed by: INTERNAL MEDICINE

## 2021-08-04 PROCEDURE — 43235 EGD DIAGNOSTIC BRUSH WASH: CPT | Performed by: INTERNAL MEDICINE

## 2021-08-04 RX ORDER — LIDOCAINE HYDROCHLORIDE 20 MG/ML
INJECTION, SOLUTION EPIDURAL; INFILTRATION; INTRACAUDAL; PERINEURAL AS NEEDED
Status: DISCONTINUED | OUTPATIENT
Start: 2021-08-04 | End: 2021-08-04 | Stop reason: SURG

## 2021-08-04 RX ORDER — PROPOFOL 10 MG/ML
VIAL (ML) INTRAVENOUS AS NEEDED
Status: DISCONTINUED | OUTPATIENT
Start: 2021-08-04 | End: 2021-08-04 | Stop reason: SURG

## 2021-08-04 RX ORDER — SODIUM CHLORIDE 0.9 % (FLUSH) 0.9 %
10 SYRINGE (ML) INJECTION AS NEEDED
Status: DISCONTINUED | OUTPATIENT
Start: 2021-08-04 | End: 2021-08-04 | Stop reason: HOSPADM

## 2021-08-04 RX ORDER — SODIUM CHLORIDE 9 MG/ML
500 INJECTION, SOLUTION INTRAVENOUS CONTINUOUS PRN
Status: DISCONTINUED | OUTPATIENT
Start: 2021-08-04 | End: 2021-08-04 | Stop reason: HOSPADM

## 2021-08-04 RX ORDER — LIDOCAINE HYDROCHLORIDE 10 MG/ML
0.5 INJECTION, SOLUTION EPIDURAL; INFILTRATION; INTRACAUDAL; PERINEURAL ONCE AS NEEDED
Status: DISCONTINUED | OUTPATIENT
Start: 2021-08-04 | End: 2021-08-04 | Stop reason: HOSPADM

## 2021-08-04 RX ADMIN — PROPOFOL 480 MG: 10 INJECTION, EMULSION INTRAVENOUS at 11:10

## 2021-08-04 RX ADMIN — LIDOCAINE HYDROCHLORIDE 100 MG: 20 INJECTION, SOLUTION EPIDURAL; INFILTRATION; INTRACAUDAL; PERINEURAL at 11:13

## 2021-08-04 RX ADMIN — SODIUM CHLORIDE 500 ML: 9 INJECTION, SOLUTION INTRAVENOUS at 10:45

## 2021-08-04 RX ADMIN — LIDOCAINE HYDROCHLORIDE 100 MG: 20 INJECTION, SOLUTION EPIDURAL; INFILTRATION; INTRACAUDAL; PERINEURAL at 11:10

## 2021-08-04 NOTE — ANESTHESIA POSTPROCEDURE EVALUATION
Patient: Elaine Juárez    Procedure Summary     Date: 08/04/21 Room / Location: [facility] ENDOSCOPY 5 / [facility]    Anesthesia Start: 1105 Anesthesia Stop: 1130    Procedures:       COLONOSCOPY WITH ANESTHESIA (N/A )      ESOPHAGOGASTRODUODENOSCOPY WITH ANESTHESIA (N/A ) Diagnosis:       History of adenomatous polyp of colon      Globus sensation      (History of adenomatous polyp of colon [Z86.010])      (Globus sensation [R09.89])    Surgeons: Michael Landin DO Provider: Melissa Quinones CRNA    Anesthesia Type: MAC ASA Status: 3          Anesthesia Type: MAC    Vitals  Vitals Value Taken Time   BP 74/44 08/04/21 1150   Temp     Pulse 106 08/04/21 1152   Resp 22 08/04/21 1145   SpO2 79 % 08/04/21 1152   Vitals shown include unvalidated device data.        Post Anesthesia Care and Evaluation    Patient location during evaluation: PHASE II  Patient participation: complete - patient participated  Level of consciousness: awake and alert  Pain management: adequate  Airway patency: patent  Anesthetic complications: No anesthetic complications  PONV Status: none  Cardiovascular status: acceptable  Respiratory status: acceptable  Hydration status: acceptable  No anesthesia care post op

## 2021-08-04 NOTE — ANESTHESIA PREPROCEDURE EVALUATION
Anesthesia Evaluation     Patient summary reviewed   no history of anesthetic complications:  NPO Solid Status: > 8 hours  NPO Liquid Status: > 2 hours           Airway   Mallampati: I  TM distance: >3 FB  Neck ROM: full  No difficulty expected  Dental - normal exam     Pulmonary    (+) home oxygen, sleep apnea,   (-) asthma    ROS comment: Pulmonary fibrosis  Pulmonary HTN  Cardiovascular     (-) hypertension, CAD, CHF, CABG, hyperlipidemia      Neuro/Psych  (-) seizures, TIA, CVA  GI/Hepatic/Renal/Endo    (+)  GERD,  liver disease (stage 1 cirrhosis),   (-) no renal disease, diabetes    Musculoskeletal     Abdominal    Substance History      OB/GYN          Other        ROS/Med Hx Other: CREST                  Anesthesia Plan    ASA 3     MAC     intravenous induction     Anesthetic plan, all risks, benefits, and alternatives have been provided, discussed and informed consent has been obtained with: patient.

## 2021-08-04 NOTE — PROGRESS NOTES
Chief Complaint  pulmonary fibrosis prob sec underlying autoimmune disease    Subjective    History of Present Illness {CC  Problem List  Visit Diagnosis   Encounters  Notes  Medications  Labs  Result Review Imaging  Media     Elaine Juárez presents to [facility] for:    Management of her pulmonary fibrosis likely secondary to underlying autoimmune disease, specifically crest syndrome.  Most recent high-resolution imaging a few months ago showed persistent pulmonary fibrosis as well as some nodularity in the right lower lobe measuring approximately 7 mm which was new.  She also had a pretracheal node measuring approximately 2.2 cm.  Follow-up was recommended.  She is here today to discuss those results.  PFTs support restriction with diffusion impairment.  She has not had those updated in quite some time due to Covid.  She was due to do those today however she had an office appointment with GI prior to her appointment here.  By the time she arrived she was out of oxygen.  Saturation was in the 70s.  She was placed on oxygen with a quick recovery however she felt too fatigued to proceed with PFTs.  We will arrange those for future visit.  Right heart catheterization performed secondary to known connective tissue disease.  Cath confirms primary pulmonary hypertension secondary to this condition.  PA pressure 39.  She has been prescribed OPSUMIT.  She has had no issue.  Last office visit she indicated her cough actually improved after starting the OPSUMIT.  Most recent walk test her oxygen was increased from 2 L to 3 L with exertion.  She is compliant with this and does benefit from it.  Distance walked 0.1 mile.  She frequently gets short of breath and has dry coughing with exertion.  She reports her functional status to be unchanged.  She is on CellCept 1000 mg twice a day and prednisone 5 mg.  She is followed by rheumatology for this, Dr. Landry.  She  has been diagnosed with sleep apnea.  She has been prescribed CPAP.  She has been intermittently compliant with this in the past.  More recently she has been compliant with it and sleeping better. She is obese.  She has a diagnosis of reflux associated with her crest syndrome.  She is on omeprazole which helps.  She was seen by GI and underwent an upper endoscopy and swallow evaluation.  Endoscopy was normal.  She saw Dr. Landin today who is going to refer her to [place] for treatment of her swallowing and esophageal complaints.  Recently she has had worsening upper respiratory symptoms.  She was initially treated with Medrol by another provider without overwhelming improvement.  She called back to the clinic and was concerned she may be developing pneumonia.  CT scan was already pending.  Prescription for Augmentin, doxycycline and additional prednisone given.  She reports she is actually starting to feel much better.  Her primary complaint now is lots of drainage and mucus from her sinuses.  She also thinks she has allergies.  She is requesting referral to an allergy specialist.  She is currently not taking any medication for allergies.  She is requesting a prescription for a portable oxygen concentrator as well as a wheelchair as she is having some difficulty getting around now due to her O2 need.  She is also requesting refills for her nebulizer machine.        Prior to Admission medications    Medication Sig Start Date End Date Taking? Authorizing Provider   Macitentan (Opsumit) 10 MG tablet Opsumit 10 mg tablet    ProviderLatanya MD   mycophenolate (CELLCEPT) 500 MG tablet Take  by mouth 4 (Four) Times a Day.    Provider, MD Latanya   omeprazole (priLOSEC) 40 MG capsule TAKE 1 CAPSULE TWICE A DAY 2/15/20   Michael Landin DO   predniSONE (DELTASONE) 5 MG tablet Take 1 tablet by mouth Daily for 90 days. 5/5/21 8/4/21  Fela Blackman APRN   traZODone (DESYREL) 150 MG tablet Take 75 mg by mouth  "Every Night.    Provider, MD Latanya   ursodiol (ACTIGALL) 300 MG capsule Take 1 capsule by mouth 2 (Two) Times a Day. 7/21/21   Micah Barton APRN   venlafaxine (EFFEXOR) 75 MG tablet Take 75 mg by mouth Daily.    Provider, MD Latanya   amoxicillin-clavulanate (Augmentin) 875-125 MG per tablet Take 1 tablet by mouth 2 (Two) Times a Day for 10 days. 8/2/21 8/4/21  Fela Blackman APRN   doxycycline (VIBRAMYICN) 100 MG tablet Take 1 tablet by mouth 2 (Two) Times a Day. 8/2/21 8/4/21  Fela Blackman APRN   predniSONE (DELTASONE) 10 MG tablet Take 4 tabs daily x 3 days, then take 3 tabs daily x 3 days, then take 2 tabs daily x 3 days, then take 1 tab daily x 3 days 8/2/21 8/4/21  Fela Blackman APRN       Social History     Socioeconomic History   • Marital status:    Tobacco Use   • Smoking status: Never Smoker   • Smokeless tobacco: Never Used   Vaping Use   • Vaping Use: Never used   Substance and Sexual Activity   • Alcohol use: No   • Drug use: No   • Sexual activity: Defer       Objective   Vital Signs:   /90   Pulse 73   Ht 162.6 cm (64\")   Wt 80.7 kg (178 lb)   SpO2 90% Comment: 3 L (after sitting it went up)  BMI 30.55 kg/m²     Physical Exam  Constitutional:       Appearance: She is obese.      Interventions: Face mask in place.   Cardiovascular:      Rate and Rhythm: Normal rate and regular rhythm.      Heart sounds: No murmur heard.     Pulmonary:      Effort: Pulmonary effort is normal.      Breath sounds: Normal breath sounds.      Comments: Fine bibasilar Velcro rales  Musculoskeletal:      Right lower leg: No edema.      Left lower leg: No edema.   Skin:     Comments: Bilateral hand telangectasis    Neurological:      Mental Status: She is alert and oriented to person, place, and time.        Result Review :{ Labs  Result Review  Imaging  Med Tab  Media :      My interpretation of the PFT: None for this visit    Results for orders placed in visit on " 06/05/19    Pulmonary Function Test     CT Chest With Contrast Diagnostic (08/05/2021 10:00)    My interpretation of imaging: Mildly enlarged pretracheal lymph node, stable, persistent bronchiectasis of both lower lobes, unchanged, no new infiltrates, similar peripheral right lower lobe nodule, 7 mm, stable, suspect scarring    My interpretation of labs: none for this visit      Assessment and Plan {CC Problem List  Visit Diagnosis  ROS  Review (Popup)  Health Maintenance  Quality  BestPractice  Medications  SmartSets  SnapShot Encounters  Media      Diagnoses and all orders for this visit:    1. Pulmonary fibrosis prob sec underlying autoimmune disease (Primary)  -     ipratropium-albuterol (DUO-NEB) 0.5-2.5 mg/3 ml nebulizer; Take 3 mL by nebulization 4 (Four) Times a Day As Needed for Wheezing for up to 30 days.  Dispense: 120 mL; Refill: 5    2. Chronic respiratory failure with hypoxia (CMS/HCC)  Comments:  Continue 3 L of supplemental oxygen.  She is benefiting from it.  Will reassess with walking at her next visit  Orders:  -     Pulmonary Function Test  -     Lightweight Wheelchair  -     ipratropium-albuterol (DUO-NEB) 0.5-2.5 mg/3 ml nebulizer; Take 3 mL by nebulization 4 (Four) Times a Day As Needed for Wheezing for up to 30 days.  Dispense: 120 mL; Refill: 5  -     Oxygen Therapy    3. CREST syndrome, partial   Comments:  Continue CellCept 1000 twice daily and prednisone 5 daily.  Complete taper.  If symptoms worsen or recur upon completion patient may need to increase to 7.5 mg  Orders:  -     ipratropium-albuterol (DUO-NEB) 0.5-2.5 mg/3 ml nebulizer; Take 3 mL by nebulization 4 (Four) Times a Day As Needed for Wheezing for up to 30 days.  Dispense: 120 mL; Refill: 5    4. Primary pulmonary hypertension (CMS/HCC)  Comments:  Continue OPSUMIT.  Update walking oxygen evaluation at next visit.  Unfortunate event with patient running out of oxygen today, unable to perform    5. Obstructive sleep  apnea on CPAP  Comments:  Strongly encourage compliance with her CPAP.  Currently she is compliant and benefiting    6. Gastroesophageal reflux disease without esophagitis  Comments:  Continue Prilosec.  Awaiting referral to [place] specialist    7. BMI 31.0-31.9,adult  Comments:  Obesity contributing.  Educational material provided in her after visit summary    8. Non-seasonal allergic rhinitis due to other allergic trigger  -     Ambulatory Referral to Allergy    9. Simple chronic bronchitis (CMS/[facility])  Comments:  Refills sent to pharmacy for her DuoNeb.  She has supplies a nebulizer at home available  Orders:  -     ipratropium-albuterol (DUO-NEB) 0.5-2.5 mg/3 ml nebulizer; Take 3 mL by nebulization 4 (Four) Times a Day As Needed for Wheezing for up to 30 days.  Dispense: 120 mL; Refill: 5      Unable to complete PFTs today due to patient running out of portable oxygen just prior to her arrival here.  Respiratory status would not allow her to complete this test or additional walking assessment.  She is currently on a taper of prednisone for her underlying condition.  Imaging showed no acute infiltrates or issues.  She has developed some allergy complaints that she is insistent upon being referred to an allergist for.  I am making the referral however I encouraged her to start some basic over-the-counter antihistamine and or nasal sprays in the interim.  She has never tried this before.  Strongly encouraged to remain compliant with all recommended therapies.  Keep follow-up with all specialists.  Call if condition is worsening or not improving.  Her condition has continued to slowly decline over time despite treatment.  She is requesting a wheelchair for assistance with mobility as this seems to be worsening.  Order given.      Patient's Body mass index is 30.55 kg/m². indicating that she is obese (BMI >30). Obesity-related health conditions include the following: GERD, swallowing issues and obstructive sleep  apnea.  Educational material provided and after visit summary about obesity    Fela Blackman, APRCHEY  10/25/2021  08:52 CDT    Follow Up {Instructions Charge Capture  Follow-up Communications   Return in about 3 months (around 11/12/2021) for FVL with diffusion, Rest/exercise.    Patient was given instructions and counseling regarding her condition or for health maintenance advice. Please see specific information pulled into the AVS if appropriate.

## 2021-08-05 ENCOUNTER — HOSPITAL ENCOUNTER (OUTPATIENT)
Dept: CT IMAGING | Facility: HOSPITAL | Age: 61
Discharge: HOME | End: 2021-08-05
Admitting: NURSE PRACTITIONER

## 2021-08-05 ENCOUNTER — TELEPHONE (OUTPATIENT)
Dept: GASTROENTEROLOGY | Facility: CLINIC | Age: 61
End: 2021-08-05

## 2021-08-05 DIAGNOSIS — R91.1 LUNG NODULE: ICD-10-CM

## 2021-08-05 DIAGNOSIS — R59.9 ADENOPATHY: ICD-10-CM

## 2021-08-05 LAB
CREAT BLDA-MCNC: 0.8 MG/DL (ref 0.6–1.3)
CYTO UR: NORMAL
LAB AP CASE REPORT: NORMAL
PATH REPORT.FINAL DX SPEC: NORMAL
PATH REPORT.GROSS SPEC: NORMAL

## 2021-08-05 PROCEDURE — 25010000002 IOPAMIDOL 61 % SOLUTION: Performed by: NURSE PRACTITIONER

## 2021-08-05 PROCEDURE — 82565 ASSAY OF CREATININE: CPT

## 2021-08-05 PROCEDURE — 71260 CT THORAX DX C+: CPT

## 2021-08-05 RX ADMIN — IOPAMIDOL 100 ML: 612 INJECTION, SOLUTION INTRAVENOUS at 10:02

## 2021-08-09 ENCOUNTER — LAB (OUTPATIENT)
Dept: LAB | Facility: HOSPITAL | Age: 61
End: 2021-08-09

## 2021-08-09 LAB — SARS-COV-2 RNA PNL SPEC NAA+PROBE: NOT DETECTED

## 2021-08-09 PROCEDURE — C9803 HOPD COVID-19 SPEC COLLECT: HCPCS | Performed by: NURSE PRACTITIONER

## 2021-08-09 PROCEDURE — 87635 SARS-COV-2 COVID-19 AMP PRB: CPT | Performed by: NURSE PRACTITIONER

## 2021-08-10 ENCOUNTER — HOSPITAL ENCOUNTER (OUTPATIENT)
Dept: ULTRASOUND IMAGING | Facility: HOSPITAL | Age: 61
Discharge: HOME | End: 2021-08-10

## 2021-08-10 ENCOUNTER — APPOINTMENT (OUTPATIENT)
Dept: ULTRASOUND IMAGING | Facility: HOSPITAL | Age: 61
End: 2021-08-10

## 2021-08-10 ENCOUNTER — HOSPITAL ENCOUNTER (OUTPATIENT)
Dept: ULTRASOUND IMAGING | Facility: HOSPITAL | Age: 61
End: 2021-08-10

## 2021-08-10 ENCOUNTER — HOSPITAL ENCOUNTER (OUTPATIENT)
Dept: GENERAL RADIOLOGY | Facility: HOSPITAL | Age: 61
Discharge: HOME | End: 2021-08-10

## 2021-08-10 DIAGNOSIS — R09.89 GLOBUS SENSATION: ICD-10-CM

## 2021-08-10 DIAGNOSIS — K74.3 PRIMARY BILIARY CHOLANGITIS (HCC): ICD-10-CM

## 2021-08-10 PROCEDURE — 74220 X-RAY XM ESOPHAGUS 1CNTRST: CPT

## 2021-08-10 PROCEDURE — 76705 ECHO EXAM OF ABDOMEN: CPT

## 2021-08-10 PROCEDURE — 76981 USE PARENCHYMA: CPT

## 2021-08-10 RX ADMIN — BARIUM SULFATE 60 ML: 960 POWDER, FOR SUSPENSION ORAL at 08:35

## 2021-08-10 RX ADMIN — BARIUM SULFATE 120 ML: 980 POWDER, FOR SUSPENSION ORAL at 08:35

## 2021-08-10 RX ADMIN — BARIUM SULFATE 700 MG: 700 TABLET ORAL at 08:35

## 2021-08-12 ENCOUNTER — OFFICE VISIT (OUTPATIENT)
Dept: GASTROENTEROLOGY | Facility: CLINIC | Age: 61
End: 2021-08-12

## 2021-08-12 ENCOUNTER — LAB (OUTPATIENT)
Dept: LAB | Facility: HOSPITAL | Age: 61
End: 2021-08-12

## 2021-08-12 ENCOUNTER — OFFICE VISIT (OUTPATIENT)
Dept: PULMONOLOGY | Facility: CLINIC | Age: 61
End: 2021-08-12

## 2021-08-12 VITALS
WEIGHT: 178 LBS | DIASTOLIC BLOOD PRESSURE: 90 MMHG | OXYGEN SATURATION: 90 % | HEART RATE: 73 BPM | HEIGHT: 64 IN | SYSTOLIC BLOOD PRESSURE: 172 MMHG | BODY MASS INDEX: 30.39 KG/M2

## 2021-08-12 VITALS
BODY MASS INDEX: 29.88 KG/M2 | SYSTOLIC BLOOD PRESSURE: 130 MMHG | TEMPERATURE: 97 F | DIASTOLIC BLOOD PRESSURE: 80 MMHG | WEIGHT: 175 LBS | HEART RATE: 74 BPM | OXYGEN SATURATION: 99 % | HEIGHT: 64 IN

## 2021-08-12 DIAGNOSIS — K74.3 PRIMARY BILIARY CHOLANGITIS (HCC): ICD-10-CM

## 2021-08-12 DIAGNOSIS — I27.0 PRIMARY PULMONARY HYPERTENSION (HCC): Chronic | ICD-10-CM

## 2021-08-12 DIAGNOSIS — K74.3 PRIMARY BILIARY CHOLANGITIS (HCC): Primary | ICD-10-CM

## 2021-08-12 DIAGNOSIS — K21.9 GASTROESOPHAGEAL REFLUX DISEASE WITHOUT ESOPHAGITIS: Chronic | ICD-10-CM

## 2021-08-12 DIAGNOSIS — G47.33 OBSTRUCTIVE SLEEP APNEA ON CPAP: Chronic | ICD-10-CM

## 2021-08-12 DIAGNOSIS — M34.1 CREST SYNDROME (HCC): Chronic | ICD-10-CM

## 2021-08-12 DIAGNOSIS — R13.19 ESOPHAGEAL DYSPHAGIA: ICD-10-CM

## 2021-08-12 DIAGNOSIS — J96.11 CHRONIC RESPIRATORY FAILURE WITH HYPOXIA (HCC): Chronic | ICD-10-CM

## 2021-08-12 DIAGNOSIS — R59.9 ADENOPATHY: ICD-10-CM

## 2021-08-12 DIAGNOSIS — J41.0 SIMPLE CHRONIC BRONCHITIS (HCC): Chronic | ICD-10-CM

## 2021-08-12 DIAGNOSIS — J84.10 PULMONARY FIBROSIS (HCC): Primary | Chronic | ICD-10-CM

## 2021-08-12 DIAGNOSIS — R91.1 LUNG NODULE: Primary | ICD-10-CM

## 2021-08-12 DIAGNOSIS — J30.89 NON-SEASONAL ALLERGIC RHINITIS DUE TO OTHER ALLERGIC TRIGGER: ICD-10-CM

## 2021-08-12 DIAGNOSIS — Z99.89 OBSTRUCTIVE SLEEP APNEA ON CPAP: Chronic | ICD-10-CM

## 2021-08-12 LAB
ALBUMIN SERPL-MCNC: 4.1 G/DL (ref 3.5–5.2)
ALBUMIN/GLOB SERPL: 1.5 G/DL
ALP SERPL-CCNC: 112 U/L (ref 39–117)
ALT SERPL W P-5'-P-CCNC: 12 U/L (ref 1–33)
ANION GAP SERPL CALCULATED.3IONS-SCNC: 9 MMOL/L (ref 5–15)
AST SERPL-CCNC: 17 U/L (ref 1–32)
BILIRUB SERPL-MCNC: 0.3 MG/DL (ref 0–1.2)
BUN SERPL-MCNC: 10 MG/DL (ref 8–23)
BUN/CREAT SERPL: 12.5 (ref 7–25)
CALCIUM SPEC-SCNC: 9.2 MG/DL (ref 8.6–10.5)
CHLORIDE SERPL-SCNC: 102 MMOL/L (ref 98–107)
CO2 SERPL-SCNC: 28 MMOL/L (ref 22–29)
CREAT SERPL-MCNC: 0.8 MG/DL (ref 0.57–1)
DEPRECATED RDW RBC AUTO: 43.8 FL (ref 37–54)
ERYTHROCYTE [DISTWIDTH] IN BLOOD BY AUTOMATED COUNT: 14.6 % (ref 12.3–15.4)
GFR SERPL CREATININE-BSD FRML MDRD: 72 ML/MIN/1.73
GLOBULIN UR ELPH-MCNC: 2.7 GM/DL
GLUCOSE SERPL-MCNC: 106 MG/DL (ref 65–99)
HCT VFR BLD AUTO: 41.5 % (ref 34–46.6)
HGB BLD-MCNC: 12.9 G/DL (ref 12–15.9)
MCH RBC QN AUTO: 26.1 PG (ref 26.6–33)
MCHC RBC AUTO-ENTMCNC: 31.1 G/DL (ref 31.5–35.7)
MCV RBC AUTO: 84 FL (ref 79–97)
PLATELET # BLD AUTO: 226 10*3/MM3 (ref 140–450)
PMV BLD AUTO: 11.1 FL (ref 6–12)
POTASSIUM SERPL-SCNC: 3.4 MMOL/L (ref 3.5–5.2)
PROT SERPL-MCNC: 6.8 G/DL (ref 6–8.5)
RBC # BLD AUTO: 4.94 10*6/MM3 (ref 3.77–5.28)
SODIUM SERPL-SCNC: 139 MMOL/L (ref 136–145)
WBC # BLD AUTO: 9.92 10*3/MM3 (ref 3.4–10.8)

## 2021-08-12 PROCEDURE — 99213 OFFICE O/P EST LOW 20 MIN: CPT | Performed by: INTERNAL MEDICINE

## 2021-08-12 PROCEDURE — 80053 COMPREHEN METABOLIC PANEL: CPT

## 2021-08-12 PROCEDURE — 85027 COMPLETE CBC AUTOMATED: CPT

## 2021-08-12 PROCEDURE — 36415 COLL VENOUS BLD VENIPUNCTURE: CPT

## 2021-08-12 PROCEDURE — 99214 OFFICE O/P EST MOD 30 MIN: CPT | Performed by: NURSE PRACTITIONER

## 2021-08-12 RX ORDER — OMEPRAZOLE 40 MG/1
40 CAPSULE, DELAYED RELEASE ORAL DAILY
COMMUNITY
End: 2022-03-16

## 2021-08-12 RX ORDER — IPRATROPIUM BROMIDE AND ALBUTEROL SULFATE 2.5; .5 MG/3ML; MG/3ML
3 SOLUTION RESPIRATORY (INHALATION) 4 TIMES DAILY PRN
Qty: 120 ML | Refills: 5 | Status: ON HOLD | OUTPATIENT
Start: 2021-08-12 | End: 2022-04-21

## 2021-08-12 RX ORDER — METHYLPREDNISOLONE 4 MG/1
TABLET ORAL
COMMUNITY
Start: 2021-07-09 | End: 2021-11-11

## 2021-08-12 NOTE — PATIENT INSTRUCTIONS
"BMI for Adults  What is BMI?  Body mass index (BMI) is a number that is calculated from a person's weight and height. BMI can help estimate how much of a person's weight is composed of fat. BMI does not measure body fat directly. Rather, it is an alternative to procedures that directly measure body fat, which can be difficult and expensive.  BMI can help identify people who may be at higher risk for certain medical problems.  What are BMI measurements used for?  BMI is used as a screening tool to identify possible weight problems. It helps determine whether a person is obese, overweight, a healthy weight, or underweight.  BMI is useful for:  · Identifying a weight problem that may be related to a medical condition or may increase the risk for medical problems.  · Promoting changes, such as changes in diet and exercise, to help reach a healthy weight. BMI screening can be repeated to see if these changes are working.  How is BMI calculated?  BMI involves measuring your weight in relation to your height. Both height and weight are measured, and the BMI is calculated from those numbers. This can be done either in English (U[place] or metric measurements. Note that charts and online BMI calculators are available to help you find your BMI quickly and easily without having to do these calculations yourself.  To calculate your BMI in English ([place].S[place] measurements:    1. Measure your weight in pounds (lb).  2. Multiply the number of pounds by 703.  ? For example, for a person who weighs 180 lb, multiply that number by 703, which equals 126,540.  3. Measure your height in inches. Then multiply that number by itself to get a measurement called \"inches squared.\"  ? For example, for a person who is 70 inches tall, the \"inches squared\" measurement is 70 inches x 70 inches, which equals 4,900 inches squared.  4. Divide the total from step 2 (number of lb x 703) by the total from step 3 (inches squared): 126,540 ÷ 4,900 = 25.8. This is " "your BMI.  To calculate your BMI in metric measurements:  1. Measure your weight in kilograms (kg).  2. Measure your height in meters (m). Then multiply that number by itself to get a measurement called \"meters squared.\"  ? For example, for a person who is 1.75 m tall, the \"meters squared\" measurement is 1.75 m x 1.75 m, which is equal to 3.1 meters squared.  3. Divide the number of kilograms (your weight) by the meters squared number. In this example: 70 ÷ 3.1 = 22.6. This is your BMI.  What do the results mean?  BMI charts are used to identify whether you are underweight, normal weight, overweight, or obese. The following guidelines will be used:  · Underweight: BMI less than 18.5.  · Normal weight: BMI between 18.5 and 24.9.  · Overweight: BMI between 25 and 29.9.  · Obese: BMI of 30 or above.  Keep these notes in mind:  · Weight includes both fat and muscle, so someone with a muscular build, such as an athlete, may have a BMI that is higher than 24.9. In cases like these, BMI is not an accurate measure of body fat.  · To determine if excess body fat is the cause of a BMI of 25 or higher, further assessments may need to be done by a health care provider.  · BMI is usually interpreted in the same way for men and women.  Where to find more information  For more information about BMI, including tools to quickly calculate your BMI, go to these websites:  · Centers for Disease Control and Prevention: www.cdc.gov  · American Heart Association: www.heart.org  · National Heart, Lung, and Blood [place]: www.nhlbi.nih.gov  Summary  · Body mass index (BMI) is a number that is calculated from a person's weight and height.  · BMI may help estimate how much of a person's weight is composed of fat. BMI can help identify those who may be at higher risk for certain medical problems.  · BMI can be measured using English measurements or metric measurements.  · BMI charts are used to identify whether you are underweight, normal " weight, overweight, or obese.  This information is not intended to replace advice given to you by your health care provider. Make sure you discuss any questions you have with your health care provider.  Document Revised: 09/09/2020 Document Reviewed: 07/17/2020  Elsevier Patient Education © 2021 [employer].

## 2021-08-12 NOTE — PROGRESS NOTES
Chief Complaint   Patient presents with   • Cirrhosis     still having problems wanat to ask questions about her swallowing test       PCP: Aaron Stoddard MD  REFER: No ref. provider found    Subjective     HPI               Returns after her recent EGD /UGI  Overall she is doing well   Most of her issues are related to her SOB  regurg is still an issue        Past Medical History:   Diagnosis Date   • Arthritis    • BMI 31.0-31.9,adult 6/4/2019   • Calcified granuloma of lung (CMS/HCC) 6/4/2019    Right lung   • CHF (congestive heart failure) (CMS/[facility])     denies   • Chronic respiratory failure with hypoxia (CMS/[facility]) 8/5/2020   • Gastroesophageal reflux disease without esophagitis 6/4/2019   • Obstructive sleep apnea on CPAP 6/4/2019       Past Surgical History:   Procedure Laterality Date   • BREAST BIOPSY     • CARDIAC CATHETERIZATION N/A 7/22/2020    Procedure: Right Heart Cath;  Surgeon: Thong Martin MD;  Location: [facility] CATH INVASIVE LOCATION;  Service: Cardiology;  Laterality: N/A;   • CHOLECYSTECTOMY     • COLONOSCOPY  05/11/2015    5 POLYPS   • COLONOSCOPY N/A 8/4/2021    Procedure: COLONOSCOPY WITH ANESTHESIA;  Surgeon: Michael Landin DO;  Location: [facility] ENDOSCOPY;  Service: Gastroenterology;  Laterality: N/A;  pre-hx polyps  post-colon polyps   • ENDOSCOPY N/A 8/4/2021    Procedure: ESOPHAGOGASTRODUODENOSCOPY WITH ANESTHESIA;  Surgeon: Michael Landin DO;  Location: [facility] ENDOSCOPY;  Service: Gastroenterology;  Laterality: N/A;  pre-dysphagia  post-normal  pcp-lanette stoddard       Outpatient Medications Marked as Taking for the 8/12/21 encounter (Office Visit) with Michael Landin DO   Medication Sig Dispense Refill   • Macitentan (Opsumit) 10 MG tablet Opsumit 10 mg tablet     • methylPREDNISolone (MEDROL) 4 MG dose pack follow package directions     • mycophenolate (CELLCEPT) 500 MG tablet Take  by mouth 4 (Four) Times a Day.     • omeprazole (priLOSEC) 40 MG capsule Take 40 mg by  "mouth Daily.     • traZODone (DESYREL) 150 MG tablet Take 75 mg by mouth Every Night.     • ursodiol (ACTIGALL) 300 MG capsule Take 1 capsule by mouth 2 (Two) Times a Day. 180 capsule 3   • venlafaxine (EFFEXOR) 75 MG tablet Take 75 mg by mouth Daily.         Allergies   Allergen Reactions   • Codeine Nausea And Vomiting       Social History     Socioeconomic History   • Marital status:      Spouse name: Not on file   • Number of children: Not on file   • Years of education: Not on file   • Highest education level: Not on file   Tobacco Use   • Smoking status: Never Smoker   • Smokeless tobacco: Never Used   Vaping Use   • Vaping Use: Never used   Substance and Sexual Activity   • Alcohol use: No   • Drug use: No   • Sexual activity: Defer       Review of Systems   Constitutional: Negative for unexpected weight change.   Respiratory: Negative for shortness of breath.    Cardiovascular: Negative for chest pain.   Gastrointestinal: Negative for abdominal pain and anal bleeding.       Objective     Vitals:    08/12/21 1310   BP: 130/80   Pulse: 74   Temp: 97 °F (36.1 °C)   SpO2: 99%   Weight: 79.4 kg (175 lb)   Height: 162.6 cm (64\")     Body mass index is 30.04 kg/m².    Physical Exam  Constitutional:       Appearance: Normal appearance. She is well-developed.   Eyes:      General: No scleral icterus.  Neck:      Thyroid: No thyroid mass or thyromegaly.      Vascular: No JVD.   Pulmonary:      Effort: Pulmonary effort is normal. No accessory muscle usage.   Abdominal:      General: There is no distension.      Tenderness: There is no abdominal tenderness. There is no guarding.   Skin:     Coloration: Skin is not jaundiced.   Neurological:      Mental Status: She is alert.   Psychiatric:         Behavior: Behavior is cooperative.         Judgment: Judgment normal.         Imaging Results (Most Recent)     None          Body mass index is 30.04 kg/m².    Assessment/Plan     Diagnoses and all orders for this " visit:    1. PBC (primary biliary cirrhosis) (Primary)    2. Esophageal dysphagia      Would suggest she get the blood work today to f/u on her LFT's/dz progression  Suggest she see's an esoph specialist in [place]   * Surgery not found *        Advised pt to stop use of NSAIDs, Fish Oil, and MV 5 days prior to procedure, per Dr Landin protocol.  Tylenol based products are ok to take.  Pt verbalized understanding.    Precautions are currently being put in place due to COVID-19.  I have explained to Elaine Juárez they will be required to undergo COVID testing prior to their procedure.  Elaine Juárez verbalized understanding and was willing to proceed.        Michael Landin, DO  08/12/21          There are no Patient Instructions on file for this visit.

## 2021-08-13 ENCOUNTER — TELEPHONE (OUTPATIENT)
Dept: PULMONOLOGY | Facility: CLINIC | Age: 61
End: 2021-08-13

## 2021-08-13 NOTE — TELEPHONE ENCOUNTER
Patient was speaking to our Referral coordinator about an referral. While speaking with the patient she noticed to patient seemed to be having difficulty breathing and offered to call an ambulance. Patient's  told her he was calling for an ambulance.     I called to check on the patient and also spoke to the . He did say he had called for an ambulance.

## 2021-08-14 ENCOUNTER — TELEPHONE (OUTPATIENT)
Dept: GASTROENTEROLOGY | Facility: CLINIC | Age: 61
End: 2021-08-14

## 2021-08-16 ENCOUNTER — TELEPHONE (OUTPATIENT)
Dept: PULMONOLOGY | Facility: CLINIC | Age: 61
End: 2021-08-16

## 2021-08-16 RX ORDER — MACITENTAN 10 MG/1
1 TABLET, FILM COATED ORAL DAILY
Qty: 90 TABLET | Refills: 3 | Status: SHIPPED | OUTPATIENT
Start: 2021-08-16 | End: 2022-08-16 | Stop reason: SDUPTHER

## 2021-08-20 ENCOUNTER — TELEPHONE (OUTPATIENT)
Dept: PULMONOLOGY | Facility: CLINIC | Age: 61
End: 2021-08-20

## 2021-08-20 DIAGNOSIS — J96.11 CHRONIC RESPIRATORY FAILURE WITH HYPOXIA (HCC): Primary | ICD-10-CM

## 2021-08-20 DIAGNOSIS — J41.0 SIMPLE CHRONIC BRONCHITIS (HCC): ICD-10-CM

## 2021-08-20 NOTE — TELEPHONE ENCOUNTER
PA for opsumit sent and approved. Patient's daughter notified.      Approvedtoday  PA Case: [id number], Status: Approved, Coverage Starts on: 8/20/2021 12:00:00 AM, Coverage Ends on: 8/20/2022 12:00:00 AM.

## 2021-08-30 LAB
ALBUMIN SERPL-MCNC: 3.9 G/DL (ref 3.5–5.2)
ALP BLD-CCNC: 106 U/L (ref 35–104)
ALT SERPL-CCNC: 10 U/L (ref 5–33)
ANION GAP SERPL CALCULATED.3IONS-SCNC: 9 MMOL/L (ref 7–19)
AST SERPL-CCNC: 16 U/L (ref 5–32)
BILIRUB SERPL-MCNC: <0.2 MG/DL (ref 0.2–1.2)
BUN BLDV-MCNC: 10 MG/DL (ref 8–23)
CALCIUM SERPL-MCNC: 8.8 MG/DL (ref 8.8–10.2)
CHLORIDE BLD-SCNC: 102 MMOL/L (ref 98–111)
CHOLESTEROL, TOTAL: 187 MG/DL (ref 160–199)
CO2: 27 MMOL/L (ref 22–29)
CREAT SERPL-MCNC: 0.8 MG/DL (ref 0.5–0.9)
GFR AFRICAN AMERICAN: >59
GFR NON-AFRICAN AMERICAN: >60
GLUCOSE BLD-MCNC: 116 MG/DL (ref 74–109)
HBA1C MFR BLD: 5.7 % (ref 4–6)
HDLC SERPL-MCNC: 54 MG/DL (ref 65–121)
LDL CHOLESTEROL CALCULATED: 102 MG/DL
POTASSIUM SERPL-SCNC: 3.6 MMOL/L (ref 3.5–5)
SODIUM BLD-SCNC: 138 MMOL/L (ref 136–145)
TOTAL PROTEIN: 6.8 G/DL (ref 6.6–8.7)
TRIGL SERPL-MCNC: 154 MG/DL (ref 0–149)

## 2021-08-31 ENCOUNTER — HOSPITAL ENCOUNTER (OUTPATIENT)
Dept: PULMONOLOGY | Facility: HOSPITAL | Age: 61
Discharge: HOME | End: 2021-08-31
Admitting: NURSE PRACTITIONER

## 2021-08-31 DIAGNOSIS — R06.02 SHORTNESS OF BREATH: Primary | ICD-10-CM

## 2021-08-31 LAB
A-A DO2: 6 MMHG
ARTERIAL PATENCY WRIST A: POSITIVE
ATMOSPHERIC PRESS: 741 MMHG
BASE EXCESS BLDA CALC-SCNC: 1.7 MMOL/L (ref 0–2)
BDY SITE: NORMAL
BODY TEMPERATURE: 37 C
COHGB MFR BLD: 1 % (ref 0–5)
GAS FLOW AIRWAY: 2 LPM
HCO3 BLDA-SCNC: 25.6 MMOL/L (ref 20–26)
HCT VFR BLD CALC: 39.9 % (ref 38–51)
HGB BLDA-MCNC: 13 G/DL (ref 12–16)
Lab: NORMAL
METHGB BLD QL: 0.7 % (ref 0–3)
MODALITY: NORMAL
NOTE: NORMAL
OXYHGB MFR BLDV: 96.6 % (ref 94–99)
PCO2 BLDA: 37.2 MM HG (ref 35–45)
PCO2 TEMP ADJ BLD: 37.2 MM HG (ref 35–45)
PH BLDA: 7.45 PH UNITS (ref 7.35–7.45)
PH, TEMP CORRECTED: 7.45 PH UNITS (ref 7.35–7.45)
PO2 BLDA: 97.9 MM HG (ref 83–108)
PO2 TEMP ADJ BLD: 97.9 MM HG (ref 83–108)
POTASSIUM BLDA-SCNC: 3.7 MMOL/L (ref 3.5–5.2)
SAO2 % BLDCOA: 98.2 % (ref 94–99)
SODIUM BLDA-SCNC: 139 MMOL/L (ref 136–145)
VENTILATOR MODE: NORMAL

## 2021-08-31 PROCEDURE — 83050 HGB METHEMOGLOBIN QUAN: CPT

## 2021-08-31 PROCEDURE — 82805 BLOOD GASES W/O2 SATURATION: CPT

## 2021-08-31 PROCEDURE — 36600 WITHDRAWAL OF ARTERIAL BLOOD: CPT

## 2021-08-31 PROCEDURE — 82375 ASSAY CARBOXYHB QUANT: CPT

## 2021-09-01 ENCOUNTER — TELEPHONE (OUTPATIENT)
Dept: PULMONOLOGY | Facility: CLINIC | Age: 61
End: 2021-09-01

## 2021-09-01 NOTE — TELEPHONE ENCOUNTER
Linsey from [employer] [phone] phone/fax [phone] left voicemail requesting F2F and O2 order for pt to get O2 from their company.  Orders and F2F faxed.

## 2021-10-14 ENCOUNTER — TELEPHONE (OUTPATIENT)
Dept: PULMONOLOGY | Facility: CLINIC | Age: 61
End: 2021-10-14

## 2021-10-14 NOTE — TELEPHONE ENCOUNTER
She called stating she is needing something to help with breathing other than steroids.    She don't have any other symptoms.  She did say that when exerting herself on 3L 02 her sat goes down into the 60's.  This does It at home as well.       Please advise.

## 2021-10-20 NOTE — TELEPHONE ENCOUNTER
Discussed L2K with pt.  She was given information and is agreeable to device.  Paperwork and F2F faxed to [employer].

## 2021-11-08 ENCOUNTER — LAB (OUTPATIENT)
Dept: LAB | Facility: HOSPITAL | Age: 61
End: 2021-11-08

## 2021-11-08 ENCOUNTER — TELEPHONE (OUTPATIENT)
Dept: PULMONOLOGY | Facility: CLINIC | Age: 61
End: 2021-11-08

## 2021-11-08 DIAGNOSIS — Z01.812 ENCOUNTER FOR PREOPERATIVE SCREENING LABORATORY TESTING FOR COVID-19 VIRUS: ICD-10-CM

## 2021-11-08 DIAGNOSIS — Z20.822 ENCOUNTER FOR PREOPERATIVE SCREENING LABORATORY TESTING FOR COVID-19 VIRUS: ICD-10-CM

## 2021-11-08 PROCEDURE — C9803 HOPD COVID-19 SPEC COLLECT: HCPCS

## 2021-11-08 PROCEDURE — U0004 COV-19 TEST NON-CDC HGH THRU: HCPCS

## 2021-11-08 NOTE — TELEPHONE ENCOUNTER
I was at pt's house for the L2K setup.  Her initial sat was 77% on 3L with exercise.  Increased pt's O2 to 5L with exercise,Sats 91%.  3L at rest (97%)  ANASTASIA Gallagher is aware and agreeable.  Pt has an appt in our office on Nov 11.

## 2021-11-09 LAB — SARS-COV-2 ORF1AB RESP QL NAA+PROBE: NOT DETECTED

## 2021-11-11 ENCOUNTER — PROCEDURE VISIT (OUTPATIENT)
Dept: PULMONOLOGY | Facility: CLINIC | Age: 61
End: 2021-11-11

## 2021-11-11 ENCOUNTER — OFFICE VISIT (OUTPATIENT)
Dept: PULMONOLOGY | Facility: CLINIC | Age: 61
End: 2021-11-11

## 2021-11-11 VITALS
SYSTOLIC BLOOD PRESSURE: 128 MMHG | OXYGEN SATURATION: 94 % | WEIGHT: 177.8 LBS | HEIGHT: 63 IN | HEART RATE: 105 BPM | BODY MASS INDEX: 31.5 KG/M2 | DIASTOLIC BLOOD PRESSURE: 78 MMHG

## 2021-11-11 DIAGNOSIS — I27.0 PRIMARY PULMONARY HYPERTENSION (HCC): Chronic | ICD-10-CM

## 2021-11-11 DIAGNOSIS — Z23 NEED FOR INFLUENZA VACCINATION: ICD-10-CM

## 2021-11-11 DIAGNOSIS — Z01.812 ENCOUNTER FOR PREOPERATIVE SCREENING LABORATORY TESTING FOR COVID-19 VIRUS: ICD-10-CM

## 2021-11-11 DIAGNOSIS — Z20.822 ENCOUNTER FOR PREOPERATIVE SCREENING LABORATORY TESTING FOR COVID-19 VIRUS: ICD-10-CM

## 2021-11-11 DIAGNOSIS — G47.33 OBSTRUCTIVE SLEEP APNEA ON CPAP: Chronic | ICD-10-CM

## 2021-11-11 DIAGNOSIS — Z99.89 OBSTRUCTIVE SLEEP APNEA ON CPAP: Chronic | ICD-10-CM

## 2021-11-11 DIAGNOSIS — M34.1 CREST SYNDROME (HCC): Chronic | ICD-10-CM

## 2021-11-11 DIAGNOSIS — J96.11 CHRONIC RESPIRATORY FAILURE WITH HYPOXIA (HCC): Chronic | ICD-10-CM

## 2021-11-11 DIAGNOSIS — I27.0 PRIMARY PULMONARY HYPERTENSION (HCC): Primary | ICD-10-CM

## 2021-11-11 DIAGNOSIS — J84.10 PULMONARY FIBROSIS (HCC): Primary | Chronic | ICD-10-CM

## 2021-11-11 DIAGNOSIS — K21.9 GASTROESOPHAGEAL REFLUX DISEASE WITHOUT ESOPHAGITIS: Chronic | ICD-10-CM

## 2021-11-11 PROCEDURE — 99214 OFFICE O/P EST MOD 30 MIN: CPT | Performed by: NURSE PRACTITIONER

## 2021-11-11 PROCEDURE — 90471 IMMUNIZATION ADMIN: CPT | Performed by: NURSE PRACTITIONER

## 2021-11-11 PROCEDURE — 90686 IIV4 VACC NO PRSV 0.5 ML IM: CPT | Performed by: NURSE PRACTITIONER

## 2021-11-11 RX ORDER — PREDNISONE 10 MG/1
10 TABLET ORAL DAILY
Qty: 90 TABLET | Refills: 3 | Status: SHIPPED | OUTPATIENT
Start: 2021-11-11 | End: 2022-01-28 | Stop reason: SDUPTHER

## 2021-11-11 NOTE — PROCEDURES
Walking Oximetry  Performed by: Philomena Rivera, RRT  Authorized by: Fela Blackman APRN     Supplemental Oxygen: continuous  Rest on O2 @ Liters:  5L PD  SAT %:  93  Exercise on O2 @ Liters:  6L CONT  SAT %:  94      Pt was 81% on 4L; On pt's inogen at 6L PD sat was 88%.    Pt walked for a distance of 308 ft for 6 mins.

## 2021-11-11 NOTE — PATIENT INSTRUCTIONS
"BMI for Adults  What is BMI?  Body mass index (BMI) is a number that is calculated from a person's weight and height. BMI can help estimate how much of a person's weight is composed of fat. BMI does not measure body fat directly. Rather, it is an alternative to procedures that directly measure body fat, which can be difficult and expensive.  BMI can help identify people who may be at higher risk for certain medical problems.  What are BMI measurements used for?  BMI is used as a screening tool to identify possible weight problems. It helps determine whether a person is obese, overweight, a healthy weight, or underweight.  BMI is useful for:  · Identifying a weight problem that may be related to a medical condition or may increase the risk for medical problems.  · Promoting changes, such as changes in diet and exercise, to help reach a healthy weight. BMI screening can be repeated to see if these changes are working.  How is BMI calculated?  BMI involves measuring your weight in relation to your height. Both height and weight are measured, and the BMI is calculated from those numbers. This can be done either in English (U.S[place] or metric measurements. Note that charts and online BMI calculators are available to help you find your BMI quickly and easily without having to do these calculations yourself.  To calculate your BMI in English ([place].S[place] measurements:    1. Measure your weight in pounds (lb).  2. Multiply the number of pounds by 703.  ? For example, for a person who weighs 180 lb, multiply that number by 703, which equals 126,540.  3. Measure your height in inches. Then multiply that number by itself to get a measurement called \"inches squared.\"  ? For example, for a person who is 70 inches tall, the \"inches squared\" measurement is 70 inches x 70 inches, which equals 4,900 inches squared.  4. Divide the total from step 2 (number of lb x 703) by the total from step 3 (inches squared): 126,540 ÷ 4,900 = 25.8. This is " "your BMI.    To calculate your BMI in metric measurements:  1. Measure your weight in kilograms (kg).  2. Measure your height in meters (m). Then multiply that number by itself to get a measurement called \"meters squared.\"  ? For example, for a person who is 1.75 m tall, the \"meters squared\" measurement is 1.75 m x 1.75 m, which is equal to 3.1 meters squared.  3. Divide the number of kilograms (your weight) by the meters squared number. In this example: 70 ÷ 3.1 = 22.6. This is your BMI.  What do the results mean?  BMI charts are used to identify whether you are underweight, normal weight, overweight, or obese. The following guidelines will be used:  · Underweight: BMI less than 18.5.  · Normal weight: BMI between 18.5 and 24.9.  · Overweight: BMI between 25 and 29.9.  · Obese: BMI of 30 or above.  Keep these notes in mind:  · Weight includes both fat and muscle, so someone with a muscular build, such as an athlete, may have a BMI that is higher than 24.9. In cases like these, BMI is not an accurate measure of body fat.  · To determine if excess body fat is the cause of a BMI of 25 or higher, further assessments may need to be done by a health care provider.  · BMI is usually interpreted in the same way for men and women.  Where to find more information  For more information about BMI, including tools to quickly calculate your BMI, go to these websites:  · Centers for Disease Control and Prevention: www.cdc.gov  · American Heart Association: www.heart.org  · National Heart, Lung, and Blood [place]: www.nhlbi.nih.gov  Summary  · Body mass index (BMI) is a number that is calculated from a person's weight and height.  · BMI may help estimate how much of a person's weight is composed of fat. BMI can help identify those who may be at higher risk for certain medical problems.  · BMI can be measured using English measurements or metric measurements.  · BMI charts are used to identify whether you are underweight, normal " weight, overweight, or obese.  This information is not intended to replace advice given to you by your health care provider. Make sure you discuss any questions you have with your health care provider.  Document Revised: 09/09/2020 Document Reviewed: 07/17/2020  Elsevier Patient Education © 2021 [employer] Inc.

## 2021-11-22 DIAGNOSIS — M34.1 CREST SYNDROME (HCC): Chronic | ICD-10-CM

## 2021-11-22 DIAGNOSIS — I27.0 PRIMARY PULMONARY HYPERTENSION (HCC): Chronic | ICD-10-CM

## 2022-01-26 DIAGNOSIS — D84.9 IMMUNOCOMPROMISED (HCC): ICD-10-CM

## 2022-01-26 RX ORDER — SODIUM CHLORIDE 9 MG/ML
INJECTION, SOLUTION INTRAVENOUS CONTINUOUS
OUTPATIENT
Start: 2022-01-26

## 2022-01-26 RX ORDER — ACETAMINOPHEN 325 MG/1
650 TABLET ORAL
OUTPATIENT
Start: 2022-01-26

## 2022-01-26 RX ORDER — DIPHENHYDRAMINE HYDROCHLORIDE 50 MG/ML
50 INJECTION INTRAMUSCULAR; INTRAVENOUS
OUTPATIENT
Start: 2022-01-26

## 2022-01-26 RX ORDER — ONDANSETRON 2 MG/ML
8 INJECTION INTRAMUSCULAR; INTRAVENOUS
OUTPATIENT
Start: 2022-01-26

## 2022-01-26 RX ORDER — ALBUTEROL SULFATE 90 UG/1
4 AEROSOL, METERED RESPIRATORY (INHALATION) PRN
OUTPATIENT
Start: 2022-01-26

## 2022-01-26 RX ORDER — EPINEPHRINE 1 MG/ML
0.3 INJECTION, SOLUTION, CONCENTRATE INTRAVENOUS PRN
OUTPATIENT
Start: 2022-01-26

## 2022-01-28 DIAGNOSIS — M34.1 CREST SYNDROME: Chronic | ICD-10-CM

## 2022-01-28 DIAGNOSIS — J84.10 PULMONARY FIBROSIS: Chronic | ICD-10-CM

## 2022-01-28 RX ORDER — PREDNISONE 10 MG/1
5 TABLET ORAL DAILY
Qty: 90 TABLET | Refills: 3 | Status: SHIPPED | OUTPATIENT
Start: 2022-01-28 | End: 2022-04-28

## 2022-01-28 NOTE — TELEPHONE ENCOUNTER
Rx Refill Note  Requested Prescriptions     Pending Prescriptions Disp Refills   • predniSONE (DELTASONE) 10 MG tablet 90 tablet 3     Sig: Take 0.5 tablets by mouth Daily for 90 days.      Last office visit with prescribing clinician: 11/11/2021      Next office visit with prescribing clinician: 2/15/2022            Ari Mccullough Rep  01/28/22, 11:01 CST

## 2022-02-01 PROBLEM — Z91.09 ENVIRONMENTAL ALLERGIES: Chronic | Status: ACTIVE | Noted: 2022-02-01

## 2022-02-01 PROBLEM — Z91.09 ENVIRONMENTAL ALLERGIES: Status: ACTIVE | Noted: 2022-02-01

## 2022-02-07 ENCOUNTER — TELEPHONE (OUTPATIENT)
Dept: INFUSION THERAPY | Age: 62
End: 2022-02-07

## 2022-03-02 NOTE — PROGRESS NOTES
Chief Complaint  pulmonary fibrosis  and chronic respiratory failure with hypoxia    Subjective    History of Present Illness     Elaine Juárez presents to [facility] for:    Management of her pulmonary fibrosis likely secondary to underlying autoimmune disease, specifically crest syndrome.  She is also under treatment for pulmonary hypertension secondary to her autoimmune disease.  She is obese.  She is a never smoker.     Most recent high-resolution imaging August 2021, stable ILD/bronchiectasis    Unable to do PFTs times multiple attempts due to coughing     Most recent 6-minute walk in 2020 showing the need for 3 L with a walk distance of 52 feet. We did attempt to qualify her for high flow oxygen via L2K. Respiratory therapy arranged this at her home.  She tolerated it very well.  She was able to utilize 3 L on the L2K.  Without it she required 3 L at rest and 5 L with exertion.  She indicates it is very cumbersome and difficult to use.  She was also diagnosed with COVID around 1 February.  Since then her O2 need has increased.  She has had a transition to high flow cannula.  This has made using the L2K even more cumbersome.  She has not been using it as a result.  She is also having difficulty getting insurance to approve it.  We brought her back in for 6-minute walk February 2022. She increased her distance significantly to 422 feet however her walking O2 need increased to 8 L.    Most recent echocardiogram November 2021 showing no pulmonary hypertension. Previous echo in 2019 showing an EF of 50 and no pulmonary hypertension.  She did undergo a heart catheterization with right heart cath in 2020 showing a PA pressure of 38.  She did not have a positive vasodilator response.  She was started on OPSUMIT.  She is still taking this without issue.  She recently saw cardiology.  She indicated she wanted them to do another heart cath to see if things were  "better.  She indicates they did not recommend doing this unless we felt it was necessary.    Most recent ABG in August 2021 showing a pH of 7.44, PCO2 37, PO2 of 97 and a bicarb of 25.     She has some allergic complaints. These were much better with Allegra.  She indicates she began to feel that things were \"too dry\" and she discontinued it.  Since starting high flow oxygen she has developed what she believes to be a hole in her septum.  She has had some bleeding and irritation.  She is wondering if she may benefit from an ENT evaluation.    She is followed by Dr. Landry with rheumatology for her crest syndrome. She is on CellCept 1000 mg twice a day and prednisone 5 mg.  We have treated her with prednisone tapers and also an attempt at increasing daily prednisone dose to 10 mg.  She is very intolerant to this as it results in agitation and insomnia.  It does not improve her condition either.  She    She has been diagnosed with sleep apnea.  She has been prescribed CPAP. She is compliant with this and benefiting from it.     She has reflux related to her crest syndrome. She is on omeprazole. She had a recent upper endoscopy and swallow evaluation per GI which was normal.  She feels this is doing well.  She is wanting refills on her medications.    She would like to be referred to [place] for consideration of lung transplantation if this would be beneficial.  We did review notes together from her previous interstitial lung disease specialist at [facility].  She felt that the esophageal dysmotility issue related to her crest may keep her from being a candidate for transplant although she was never formally referred.  Patient is aware this may factor into transplant candidacy but would like a formal evaluation.       Prior to Admission medications    Medication Sig Start Date End Date Taking? Authorizing Provider   ipratropium-albuterol (DUO-NEB) 0.5-2.5 mg/3 ml nebulizer Take 3 mL by nebulization 4 (Four) Times " "a Day As Needed for Wheezing for up to 30 days. 8/12/21 9/11/21  Fela Blackman APRN   Macitentan (Opsumit) 10 MG tablet Take 1 tablet by mouth Daily. 8/16/21   Fela Blackman APRN   mycophenolate (CELLCEPT) 500 MG tablet Take  by mouth 4 (Four) Times a Day.    ProviderLatanya MD   omeprazole (priLOSEC) 40 MG capsule TAKE 1 CAPSULE TWICE A DAY 2/15/20   Michael Landin,    omeprazole (priLOSEC) 40 MG capsule Take 40 mg by mouth Daily.    ProviderLatanya MD   predniSONE (DELTASONE) 10 MG tablet Take 0.5 tablets by mouth Daily for 90 days. 1/28/22 4/28/22  Fela Blackman APRN   traZODone (DESYREL) 150 MG tablet Take 75 mg by mouth Every Night.    ProviderLatanya MD   ursodiol (ACTIGALL) 300 MG capsule Take 1 capsule by mouth 2 (Two) Times a Day. 7/21/21   Micah Barton APRN   venlafaxine (EFFEXOR) 75 MG tablet Take 75 mg by mouth Daily.    Provider, MD Latanya       Social History     Socioeconomic History   • Marital status:    Tobacco Use   • Smoking status: Never Smoker   • Smokeless tobacco: Never Used   Vaping Use   • Vaping Use: Never used   Substance and Sexual Activity   • Alcohol use: No   • Drug use: No   • Sexual activity: Defer       Objective   Vital Signs:   /88   Pulse 119   Ht 160 cm (63\")   Wt 79.6 kg (175 lb 6.4 oz)   SpO2 94% Comment: 6lt  BMI 31.07 kg/m²     Physical Exam  Constitutional:       Appearance: She is obese.      Interventions: Nasal cannula and face mask in place.   Cardiovascular:      Rate and Rhythm: Normal rate and regular rhythm.      Heart sounds: No murmur heard.  Pulmonary:      Effort: Pulmonary effort is normal. No tachypnea, accessory muscle usage or respiratory distress.      Breath sounds: Examination of the right-lower field reveals rales. Examination of the left-lower field reveals rales. Rales present.   Musculoskeletal:      Right lower leg: No edema.      Left lower leg: No edema.   Skin:     Nails: There is " clubbing.      Comments: Bilateral hand telangectasis and face, thick nails     Neurological:      Mental Status: She is alert and oriented to person, place, and time.        Result Review :      My interpretation of the PFT: None for this visit    Results for orders placed in visit on 06/05/19    Pulmonary Function Test    Rest/Exercise Pulse Ox Values        Some values may be hidden. Unless noted otherwise, only the newest values recorded on each date are displayed.         Rest/Exercise Pulse Ox Results 11/11/21   Rest on O2 @ Liters 5L PD   Rest on O2 SAT % 93   Exercise on O2 @ Liters 6L CONT   Exercise on O2 SAT % 94             My interpretation of imaging: None for this visit    My interpretation of labs: None for this visit      Assessment and Plan     Diagnoses and all orders for this visit:    1. Pulmonary fibrosis prob sec underlying autoimmune disease (Primary)  Comments:  Stable on CT imaging August 2021.  Recommend annual imaging unless symptoms worsen and imaging is needed sooner.  Referral for transplant per pt request  Orders:  -     Ambulatory Referral to Pulmonology    2. CREST syndrome, partial   Comments:  Doing well on CellCept and prednisone 5 mg.  Unimproved with increased doses of prednisone.  Keep ongoing follow-up with Dr. Landry.    3. Primary pulmonary hypertension (HCC)  Comments:  Continue OPSUMIT.  Recent 6MW showed significant improvement in distance however worsening oxygenation in the setting of Covid. F/U 6MW 90 days post COVID  Orders:  -     Ambulatory Referral to Pulmonology    4. Chronic respiratory failure with hypoxia (HCC)  Comments:  Continue 4 L at rest and 6 to 8 L with exertion per recent walking oximetry to keep saturation above 88.      5. Obstructive sleep apnea on CPAP  Comments:  Continue PAP therapy    6. Gastroesophageal reflux disease without esophagitis  Comments:  Continue omeprazole.  Refill sent to the pharmacy.  Currently asymptomatic.  Recent endoscopy  performed which was good.    7. BMI 31.0-31.9,adult  Comments:  Contributes to shortness of breath.  Education material provided    8. Environmental allergies  Comments:  Improved.  No longer requiring Allegra    9. Abnormal nasal septum  Comments:  Defect to nasal septum, likely secondary to high flow O2.  Some bleeding issues.  Will refer to ENT  Orders:  -     Ambulatory Referral to ENT (Otolaryngology)      Patient with pulmonary hypertension in the setting of autoimmune disease.  She is on OPSUMIT.  Perform walking oximetry testing recently.  She improved her distance significantly but O2 need increased.  Unfortunately she has contracted Covid.  Uncertain whether increased O2 need is secondary to Covid versus worsening pulmonary hypertension.  We will repeat 6-minute walk 90 days from COVID diagnosis.  If 6-minute walk worse, consider addition of Uptravi to her OPSUMIT.  She will see Dr. Berg when she returns to discuss further.  To date she has not benefited from increased treatment for her autoimmune disease.  She is requesting to be evaluated at a transplant center for lung transplantation.  We did go over some concerns about her underlying conditions that may prevent her from being a candidate.  We reviewed records from [place] interstitial [facility], Dr. Eugene in 2018 expressing concern that her esophageal dysmotility issue may affect her candidacy.  She still feels that if she does not have formal evaluation she will feels she may not have done everything that could be done to help her condition.  We briefly discussed palliative care as an option if future planning and treatment options fail.  She is very open minded to this however wants to pursue additional options at this point.    Patient's Body mass index is 31.07 kg/m². indicating that she is obese (BMI >30).     Fela Blackman, APRN  3/22/2022  16:38 CDT    Follow Up   Return in about 6 weeks (around 5/2/2022) for MAKE F/U WITH DR BERG,  DISCUSSED WITH HIM, 6 MW BEFORE APPT, MAKE IT BEGINNING OF MAY.    Patient was given instructions and counseling regarding her condition or for health maintenance advice. Please see specific information pulled into the AVS if appropriate.

## 2022-03-04 ENCOUNTER — OFFICE VISIT (OUTPATIENT)
Dept: PULMONOLOGY | Facility: CLINIC | Age: 62
End: 2022-03-04

## 2022-03-04 DIAGNOSIS — I27.0 PRIMARY PULMONARY HYPERTENSION: Primary | ICD-10-CM

## 2022-03-04 PROCEDURE — 94618 PULMONARY STRESS TESTING: CPT | Performed by: NURSE PRACTITIONER

## 2022-03-07 VITALS
WEIGHT: 176 LBS | RESPIRATION RATE: 16 BRPM | HEIGHT: 63 IN | HEART RATE: 100 BPM | BODY MASS INDEX: 31.18 KG/M2 | OXYGEN SATURATION: 99 % | SYSTOLIC BLOOD PRESSURE: 120 MMHG | DIASTOLIC BLOOD PRESSURE: 80 MMHG

## 2022-03-07 NOTE — PROCEDURES
6 Minute Walk Test  Performed by: Jarord Schulz CMA  Authorized by: Fela Blackman APRN     General:     - Medical History Checked      - Medical clearance provided for the patient to participate in exercise testing      Contraindications:    - No contraindications identified       Exercise Details     Supplemental oxygen:  Portables and Concentrator per [facility]    Mobility aid:  NA    Speed:  1.2    Miles:  0.08    Feet:  422.4    Meters:  128.75    Rest, Exercise, Recovery Readings    Rest     BP: 120/80    SAT: 99%    O2: 3L    HR: 100    RPE: 0   Finish     BP: 132/80    SAT: 84%    O2: 8L    HR: 110    RPE: 3   Recovery 1     BP: 130/80    SAT: 96%    O2: 8L    HR: 111    RPE:  2   Recovery 2     BP:  130/80    SAT:  98%    O2: 3L    HR:  105    RPE: 0    Minute by Minute Recordings    1st Minute     SAT: 90%    O2: 6L    HR: 92    RPE:  2   2nd minute     SAT: 85%    O2: 7L    HR: 80   3rd minute     SAT: 84%    O2: 8L    Notes: Stopped at 3:22 to 2:35   4th minute     SAT: 91%    O2: 8L    HR: 95    RPE: 2   5th minute     SAT: 86%    O2: 8L    HR: 115    RPE: 3    Notes: Stopped at 1:30 to 1:13   6th minute     SAT: 84%    O2: 8L    HR: 120    RPE: 3    Was test terminated?: No        Technician:  Jarrod Schulz CMA       Overall notes:  Patient walked on the treadmill for 4 minutes and 56 seconds. Her oxygen levels did drop as low as 84% around minutes 3 and 6. She did stop walking both times during the test to allow her oxygen level to recover. The total distance walked was 422.4 feet or 128.75 meters.  She is scheduled to see Fela OCONNOR on 3-.

## 2022-03-16 ENCOUNTER — OFFICE VISIT (OUTPATIENT)
Dept: CARDIOLOGY | Facility: CLINIC | Age: 62
End: 2022-03-16

## 2022-03-16 ENCOUNTER — TELEPHONE (OUTPATIENT)
Dept: PULMONOLOGY | Facility: CLINIC | Age: 62
End: 2022-03-16

## 2022-03-16 VITALS
HEIGHT: 63 IN | SYSTOLIC BLOOD PRESSURE: 116 MMHG | BODY MASS INDEX: 36.86 KG/M2 | WEIGHT: 208 LBS | DIASTOLIC BLOOD PRESSURE: 82 MMHG | OXYGEN SATURATION: 86 % | HEART RATE: 87 BPM

## 2022-03-16 DIAGNOSIS — Z99.89 OBSTRUCTIVE SLEEP APNEA ON CPAP: Chronic | ICD-10-CM

## 2022-03-16 DIAGNOSIS — R06.02 SHORTNESS OF BREATH: ICD-10-CM

## 2022-03-16 DIAGNOSIS — J96.11 CHRONIC RESPIRATORY FAILURE WITH HYPOXIA: Chronic | ICD-10-CM

## 2022-03-16 DIAGNOSIS — G47.33 OBSTRUCTIVE SLEEP APNEA ON CPAP: Chronic | ICD-10-CM

## 2022-03-16 DIAGNOSIS — J84.10 PULMONARY FIBROSIS: Primary | Chronic | ICD-10-CM

## 2022-03-16 DIAGNOSIS — I27.0 PRIMARY PULMONARY HYPERTENSION: Chronic | ICD-10-CM

## 2022-03-16 DIAGNOSIS — M34.1 CREST SYNDROME: Chronic | ICD-10-CM

## 2022-03-16 PROBLEM — K76.6 PAH (PULMONARY ARTERIAL HYPERTENSION) WITH PORTAL HYPERTENSION: Status: ACTIVE | Noted: 2022-03-16

## 2022-03-16 PROBLEM — I27.21 PAH (PULMONARY ARTERIAL HYPERTENSION) WITH PORTAL HYPERTENSION (HCC): Status: ACTIVE | Noted: 2022-03-16

## 2022-03-16 PROCEDURE — 93000 ELECTROCARDIOGRAM COMPLETE: CPT | Performed by: NURSE PRACTITIONER

## 2022-03-16 PROCEDURE — 99213 OFFICE O/P EST LOW 20 MIN: CPT | Performed by: NURSE PRACTITIONER

## 2022-03-16 NOTE — PROGRESS NOTES
"    Subjective:     Encounter Date:03/16/2022      Patient ID: Elaine Juárez is a 62 y.o. female.    Chief Complaint: shortness of breath     History of Present Illness     The patient is followed by Dr. Griffith for pulmonary hypertension, pulmonary fibrosis, chronic hypoxic respiratory failure on home oxygen and Dr. Sanders for scleroderma/CREST.  When she establish care with Dr. Martin around July 2020 she reported chronic shortness of breath for greater than 1 year.  She was on oxygen at that time as well.  Shortness of breath was gradually worsening.  She denied any chest pain, orthopnea, PND, lower extremity edema.  Prior echoes had not shown any pulmonary hypertension.  She was referred for right heart catheterization.  This ultimately took place in July 2020 and revealed pulmonary arterial hypertension, who Group 1.  It appears no further cardiology follow-up was recommended at that time.    The patient presents today, stating that ANASTASIA Gallagher has referred her back for further evaluation of her shortness of breath.  However, per my discussion with Fela, there are no current cardiac concerns.  The patient states that her shortness of breath with exertion has been gradually worsening over the past 6 months (since around September).  She states she was previously on 3 L nasal cannula and is now on 6 L.  She becomes very short of breath and hypoxic with oxygen saturations in the mid 80s with minimal exertion (for example walking just a few steps to get on the exam table today).  She has associated coughing.  She has occasional white or clear sputum production.  She denies fever or chills.  She states she had a Covid 1 month ago, \"but my lungs did okay.\"  She denies any edema, sudden weight gain, orthopnea, PND, chest pain, palpitations, syncope or presyncope.  She reports well-controlled blood pressure.  She states that she is very sedentary due to her breathing, and therefore has gained 12 pounds over " the past 1 year.  She states that she feels well, without any significant symptoms at rest.     Her most recent echo was November 2021-see report below.  She states this was performed after her shortness of breath had already begun to worsen further.    The following portions of the patient's history were reviewed and updated as appropriate: allergies, current medications, past family history, past medical history, past social history, past surgical history and problem list.    Review of Systems   Constitutional: Positive for malaise/fatigue.   Cardiovascular: Positive for dyspnea on exertion. Negative for chest pain, claudication, leg swelling, near-syncope, orthopnea, palpitations, paroxysmal nocturnal dyspnea and syncope.   Respiratory: Positive for cough, shortness of breath and sputum production.    Hematologic/Lymphatic: Does not bruise/bleed easily.   Musculoskeletal: Negative for falls.   Gastrointestinal: Negative for bloating.   Neurological: Negative for dizziness, light-headedness and weakness.       Allergies   Allergen Reactions   • Codeine Nausea And Vomiting       Current Outpatient Medications:   •  Macitentan (Opsumit) 10 MG tablet, Take 1 tablet by mouth Daily., Disp: 90 tablet, Rfl: 3  •  mycophenolate (CELLCEPT) 500 MG tablet, Take  by mouth 4 (Four) Times a Day., Disp: , Rfl:   •  omeprazole (priLOSEC) 40 MG capsule, TAKE 1 CAPSULE TWICE A DAY, Disp: 180 capsule, Rfl: 4  •  predniSONE (DELTASONE) 10 MG tablet, Take 0.5 tablets by mouth Daily for 90 days., Disp: 90 tablet, Rfl: 3  •  traZODone (DESYREL) 150 MG tablet, Take 75 mg by mouth Every Night., Disp: , Rfl:   •  ursodiol (ACTIGALL) 300 MG capsule, Take 1 capsule by mouth 2 (Two) Times a Day., Disp: 180 capsule, Rfl: 3  •  venlafaxine (EFFEXOR) 75 MG tablet, Take 75 mg by mouth Daily., Disp: , Rfl:   •  ipratropium-albuterol (DUO-NEB) 0.5-2.5 mg/3 ml nebulizer, Take 3 mL by nebulization 4 (Four) Times a Day As Needed for Wheezing for up to  "30 days., Disp: 120 mL, Rfl: 5         Objective:    /82   Pulse 87   Ht 160 cm (63\")   Wt 94.3 kg (208 lb)   SpO2 (!) 86%   BMI 36.85 kg/m²        Vitals and nursing note reviewed.   Constitutional:       General: Not in acute distress.     Appearance: Well-developed and not in distress. Not diaphoretic.   Neck:      Vascular: No JVD.   Pulmonary:      Effort: Pulmonary effort is normal. No respiratory distress.      Breath sounds: Rales (dry crackles bilateral bases ) present.      Comments: Nasal cannula in place - now on 6 L   Cardiovascular:      Normal rate. Regular rhythm.      Murmurs: There is no murmur.   Edema:     Peripheral edema absent.   Abdominal:      Tenderness: There is no abdominal tenderness.   Skin:     General: Skin is warm and dry.   Neurological:      Mental Status: Alert and oriented to person, place, and time.         Lab Review:   Lab Results   Component Value Date    GLUCOSE 116 (H) 08/30/2021    BUN 10 08/30/2021    CREATININE 0.8 08/30/2021    EGFRIFNONA >60 08/30/2021    EGFRIFAFRI >59 08/30/2021    BCR 12.5 08/12/2021    K 3.6 08/30/2021    CO2 27 08/30/2021    CALCIUM 8.8 08/30/2021    ALBUMIN 3.9 08/30/2021    AST 16 08/30/2021    ALT 10 08/30/2021             ECG 12 Lead    Date/Time: 3/16/2022 4:57 PM  Performed by: Juliana Mims APRN  Authorized by: Juliana Mims APRN   Comparison: compared with previous ECG from 7/17/2020  Similar to previous ECG  Comparison to previous ECG: T wave inversions V1, V2, T wave flattening aVL  Rhythm: sinus rhythm  Ectopy: unifocal PVCs  BPM: 80          7/2020 right heart cath:    Findings:     Ultrasound assessment of the right neck: Small internal jugular vein located lateral to the internal carotid artery with two-dimensional ultrasound.  Vein was observed to fully collapse with inspiration, and had no visible thrombus.  Artery had pulsatile flow was confirmed by color Doppler.  Needle seen directly entering anterior surface of the " vein.     Right heart catheterization (performed with patient on RA)  Hemodynamics (baseline)  RA: 6  RV: 63/6  PA:  64/19, m38  PCW: 10     Transpulmonary gradient: 28     Oximetry  PA: 61%  Ao: 96% (by pulse oximetry)     Hgb 13.3 g/dL     Cardiac output and index via assumed David method: 3.8 L/min, 2.1 L/min/m2  Pulmonary vascular resistance: 7.4 Wood units     Vasodilator challenge (performed with patient on RA)  Hemodynamics (mean PA pressures at respective adenosine doses)  @50mcg/kg/min: 63/18, m36  @100mcg/kg/min: 53/13, m30  @150mcg/kg/min: 58/17, m33  @200mcg/kg/min: 61/21, m38  @250mcg/kg/min:  65/22, m39     Oximetry (at 250mcg/kg/min)  PA: 71%  Ao: 95% (by pulse oximetry)     Hgb 13.4 g/dL     Cardiac output and index via assumed David method (on 250mcg/kg/min of adenosine):  5.6 L/min, 3.1 L/min/m2     Estimated Blood Loss: 1mL  Specimens: None  Complications: None     Impression:  1.  Pulmonary arterial hypertension, WHO group 1 (mean PA pressure 39mmHg with normal wedge pressure, low-normal cardiac output); PVR ~7.4 Wood units  2.  Non-responder to vasodilator challenge with adenosine, as mean PA pressure did not fall by >10mmHg, but it did demonstrate initial fall up to 9mmHg and cardiac output did improve to 5.6 L/min     Plan: Results will be communicated to patient's pulmonologist, Dr. Griffith          Assessment:      Problem List Items Addressed This Visit        Multi-system (Lupus, Sarcoid...)    CREST syndrome, partial  (Chronic)       Pulmonary and Pneumonias    Pulmonary fibrosis prob sec underlying autoimmune disease - Primary (Chronic)    Primary pulmonary hypertension (HCC) (Chronic)            Chronic respiratory failure with hypoxia (HCC) (Chronic)       Sleep    Obstructive sleep apnea on CPAP (Chronic)          Plan:     1.  Pulmonary arterial hypertension, WHO Group 1: She has scleroderma/CREST, sleep apnea, pulmonary fibrosis, chronic hypoxic respiratory failure with worsening  exertional dyspnea and increased oxygen requirements in recent months.  However, she has no symptoms to suggest a new cardiac source for her dyspnea.  Specifically she denies any edema, orthopnea, PND or rapid weight gain to suggest heart failure-recent echo findings in November are unremarkable per report noted above.  She also denies chest pain or palpitations.  I suspect worsening dyspnea on exertion with associated hypoxia/worsening oxygen desaturations are secondary to her lung disease.  No further cardiac work-up at this time.    I did recheck her oxygen saturation prior to leaving the office and this had improved to 97%.    Follow-up as needed for new or worsening symptoms, or development of a cardiac problem/new cardiac diagnosis.

## 2022-03-16 NOTE — TELEPHONE ENCOUNTER
Patient is scheduled to be seen today at 2pm with the heart group. They have questions about the reason for the visit.   Please call [phone].

## 2022-03-22 ENCOUNTER — OFFICE VISIT (OUTPATIENT)
Dept: PULMONOLOGY | Facility: CLINIC | Age: 62
End: 2022-03-22

## 2022-03-22 VITALS
DIASTOLIC BLOOD PRESSURE: 88 MMHG | BODY MASS INDEX: 31.08 KG/M2 | HEART RATE: 119 BPM | WEIGHT: 175.4 LBS | SYSTOLIC BLOOD PRESSURE: 148 MMHG | HEIGHT: 63 IN | OXYGEN SATURATION: 94 %

## 2022-03-22 DIAGNOSIS — J96.11 CHRONIC RESPIRATORY FAILURE WITH HYPOXIA: Chronic | ICD-10-CM

## 2022-03-22 DIAGNOSIS — Z99.89 OBSTRUCTIVE SLEEP APNEA ON CPAP: Chronic | ICD-10-CM

## 2022-03-22 DIAGNOSIS — I27.0 PRIMARY PULMONARY HYPERTENSION: Chronic | ICD-10-CM

## 2022-03-22 DIAGNOSIS — J84.10 PULMONARY FIBROSIS: Primary | Chronic | ICD-10-CM

## 2022-03-22 DIAGNOSIS — G47.33 OBSTRUCTIVE SLEEP APNEA ON CPAP: Chronic | ICD-10-CM

## 2022-03-22 DIAGNOSIS — K21.9 GERD WITHOUT ESOPHAGITIS: ICD-10-CM

## 2022-03-22 DIAGNOSIS — K21.9 GASTROESOPHAGEAL REFLUX DISEASE WITHOUT ESOPHAGITIS: Chronic | ICD-10-CM

## 2022-03-22 DIAGNOSIS — Q30.9 ABNORMAL NASAL SEPTUM: ICD-10-CM

## 2022-03-22 DIAGNOSIS — M34.1 CREST SYNDROME: Chronic | ICD-10-CM

## 2022-03-22 DIAGNOSIS — Z91.09 ENVIRONMENTAL ALLERGIES: Chronic | ICD-10-CM

## 2022-03-22 PROCEDURE — 99214 OFFICE O/P EST MOD 30 MIN: CPT | Performed by: NURSE PRACTITIONER

## 2022-03-22 RX ORDER — OMEPRAZOLE 40 MG/1
40 CAPSULE, DELAYED RELEASE ORAL DAILY
Qty: 90 CAPSULE | Refills: 3 | Status: SHIPPED | OUTPATIENT
Start: 2022-03-22 | End: 2022-04-11 | Stop reason: SDUPTHER

## 2022-03-22 NOTE — TELEPHONE ENCOUNTER
Rx Refill Note  Requested Prescriptions     Pending Prescriptions Disp Refills   • omeprazole (priLOSEC) 40 MG capsule 90 capsule 3     Sig: Take 1 capsule by mouth Daily.      Last office visit with prescribing clinician: 3/22/2022      Next office visit with prescribing clinician: Visit date not found            Ari Mccullough Rep  03/22/22, 14:18 CDT

## 2022-03-31 NOTE — PROGRESS NOTES
Phase II visit - see session report   Cellcept Counseling:  I discussed with the patient the risks of mycophenolate mofetil including but not limited to infection/immunosuppression, GI upset, hypokalemia, hypercholesterolemia, bone marrow suppression, lymphoproliferative disorders, malignancy, GI ulceration/bleed/perforation, colitis, interstitial lung disease, kidney failure, progressive multifocal leukoencephalopathy, and birth defects.  The patient understands that monitoring is required including a baseline creatinine and regular CBC testing. In addition, patient must alert us immediately if symptoms of infection or other concerning signs are noted.

## 2022-04-11 DIAGNOSIS — K21.9 GERD WITHOUT ESOPHAGITIS: ICD-10-CM

## 2022-04-11 RX ORDER — OMEPRAZOLE 40 MG/1
40 CAPSULE, DELAYED RELEASE ORAL DAILY
Qty: 90 CAPSULE | Refills: 3 | Status: SHIPPED | OUTPATIENT
Start: 2022-04-11 | End: 2022-04-20

## 2022-04-11 NOTE — TELEPHONE ENCOUNTER
Rx Refill Note  Requested Prescriptions     Pending Prescriptions Disp Refills   • omeprazole (priLOSEC) 40 MG capsule 90 capsule 3     Sig: Take 1 capsule by mouth Daily.      Last office visit with prescribing clinician: 3/22/2022      Next office visit with prescribing clinician: Visit date not found            Ari Mccullough Rep  04/11/22, 11:53 CDT

## 2022-04-20 ENCOUNTER — APPOINTMENT (OUTPATIENT)
Dept: CT IMAGING | Facility: HOSPITAL | Age: 62
End: 2022-04-20

## 2022-04-20 ENCOUNTER — HOSPITAL ENCOUNTER (OUTPATIENT)
Facility: HOSPITAL | Age: 62
Setting detail: OBSERVATION
Discharge: HOME | End: 2022-04-22
Attending: EMERGENCY MEDICINE | Admitting: EMERGENCY MEDICINE

## 2022-04-20 ENCOUNTER — APPOINTMENT (OUTPATIENT)
Dept: GENERAL RADIOLOGY | Facility: HOSPITAL | Age: 62
End: 2022-04-20

## 2022-04-20 DIAGNOSIS — R41.82 ALTERED MENTAL STATUS, UNSPECIFIED ALTERED MENTAL STATUS TYPE: Primary | ICD-10-CM

## 2022-04-20 DIAGNOSIS — M34.1 CREST SYNDROME: ICD-10-CM

## 2022-04-20 PROBLEM — E87.6 HYPOKALEMIA: Status: ACTIVE | Noted: 2022-04-20

## 2022-04-20 PROBLEM — Z91.199 H/O NONCOMPLIANCE WITH MEDICAL TREATMENT, PRESENTING HAZARDS TO HEALTH: Status: ACTIVE | Noted: 2022-04-20

## 2022-04-20 PROBLEM — F41.0 PANIC ATTACK: Status: ACTIVE | Noted: 2022-04-20

## 2022-04-20 PROBLEM — F22 PARANOIA (PSYCHOSIS): Status: ACTIVE | Noted: 2022-04-20

## 2022-04-20 LAB
ALBUMIN SERPL-MCNC: 4 G/DL (ref 3.5–5.2)
ALBUMIN/GLOB SERPL: 1.6 G/DL
ALP SERPL-CCNC: 101 U/L (ref 39–117)
ALT SERPL W P-5'-P-CCNC: 13 U/L (ref 1–33)
AMPHET+METHAMPHET UR QL: NEGATIVE
AMPHETAMINES UR QL: NEGATIVE
ANION GAP SERPL CALCULATED.3IONS-SCNC: 12 MMOL/L (ref 5–15)
ARTERIAL PATENCY WRIST A: ABNORMAL
AST SERPL-CCNC: 18 U/L (ref 1–32)
ATMOSPHERIC PRESS: 753 MMHG
BARBITURATES UR QL SCN: NEGATIVE
BASE EXCESS BLDA CALC-SCNC: 3.3 MMOL/L (ref 0–2)
BASOPHILS # BLD AUTO: 0.06 10*3/MM3 (ref 0–0.2)
BASOPHILS NFR BLD AUTO: 1 % (ref 0–1.5)
BDY SITE: ABNORMAL
BENZODIAZ UR QL SCN: NEGATIVE
BILIRUB SERPL-MCNC: 0.3 MG/DL (ref 0–1.2)
BODY TEMPERATURE: 37 C
BUN SERPL-MCNC: 5 MG/DL (ref 8–23)
BUN/CREAT SERPL: 7.7 (ref 7–25)
BUPRENORPHINE SERPL-MCNC: NEGATIVE NG/ML
CALCIUM SPEC-SCNC: 9.1 MG/DL (ref 8.6–10.5)
CANNABINOIDS SERPL QL: NEGATIVE
CHLORIDE SERPL-SCNC: 104 MMOL/L (ref 98–107)
CO2 SERPL-SCNC: 28 MMOL/L (ref 22–29)
COCAINE UR QL: NEGATIVE
CREAT SERPL-MCNC: 0.65 MG/DL (ref 0.57–1)
D DIMER PPP FEU-MCNC: 0.8 MG/L (FEU) (ref 0–0.5)
DEPRECATED RDW RBC AUTO: 42.1 FL (ref 37–54)
EGFRCR SERPLBLD CKD-EPI 2021: 98.5 ML/MIN/1.73
EOSINOPHIL # BLD AUTO: 0.21 10*3/MM3 (ref 0–0.4)
EOSINOPHIL NFR BLD AUTO: 3.5 % (ref 0.3–6.2)
ERYTHROCYTE [DISTWIDTH] IN BLOOD BY AUTOMATED COUNT: 13.9 % (ref 12.3–15.4)
GAS FLOW AIRWAY: 3 LPM
GLOBULIN UR ELPH-MCNC: 2.5 GM/DL
GLUCOSE SERPL-MCNC: 113 MG/DL (ref 65–99)
HCO3 BLDA-SCNC: 27.7 MMOL/L (ref 20–26)
HCT VFR BLD AUTO: 39.8 % (ref 34–46.6)
HGB BLD-MCNC: 12.9 G/DL (ref 12–15.9)
HOLD SPECIMEN: NORMAL
HOLD SPECIMEN: NORMAL
IMM GRANULOCYTES # BLD AUTO: 0.02 10*3/MM3 (ref 0–0.05)
IMM GRANULOCYTES NFR BLD AUTO: 0.3 % (ref 0–0.5)
LYMPHOCYTES # BLD AUTO: 1.54 10*3/MM3 (ref 0.7–3.1)
LYMPHOCYTES NFR BLD AUTO: 25.4 % (ref 19.6–45.3)
Lab: ABNORMAL
MAGNESIUM SERPL-MCNC: 2.1 MG/DL (ref 1.6–2.4)
MCH RBC QN AUTO: 26.9 PG (ref 26.6–33)
MCHC RBC AUTO-ENTMCNC: 32.4 G/DL (ref 31.5–35.7)
MCV RBC AUTO: 82.9 FL (ref 79–97)
METHADONE UR QL SCN: NEGATIVE
MODALITY: ABNORMAL
MONOCYTES # BLD AUTO: 0.59 10*3/MM3 (ref 0.1–0.9)
MONOCYTES NFR BLD AUTO: 9.7 % (ref 5–12)
NEUTROPHILS NFR BLD AUTO: 3.65 10*3/MM3 (ref 1.7–7)
NEUTROPHILS NFR BLD AUTO: 60.1 % (ref 42.7–76)
NRBC BLD AUTO-RTO: 0 /100 WBC (ref 0–0.2)
NT-PROBNP SERPL-MCNC: 107.8 PG/ML (ref 0–900)
NT-PROBNP SERPL-MCNC: 121.7 PG/ML (ref 0–900)
OPIATES UR QL: NEGATIVE
OXYCODONE UR QL SCN: NEGATIVE
PCO2 BLDA: 40.6 MM HG (ref 35–45)
PCO2 TEMP ADJ BLD: 40.6 MM HG (ref 35–45)
PCP UR QL SCN: NEGATIVE
PH BLDA: 7.44 PH UNITS (ref 7.35–7.45)
PH, TEMP CORRECTED: 7.44 PH UNITS (ref 7.35–7.45)
PLATELET # BLD AUTO: 228 10*3/MM3 (ref 140–450)
PMV BLD AUTO: 10.7 FL (ref 6–12)
PO2 BLDA: 68.8 MM HG (ref 83–108)
PO2 TEMP ADJ BLD: 68.8 MM HG (ref 83–108)
POTASSIUM SERPL-SCNC: 3 MMOL/L (ref 3.5–5.2)
PROPOXYPH UR QL: NEGATIVE
PROT SERPL-MCNC: 6.5 G/DL (ref 6–8.5)
RBC # BLD AUTO: 4.8 10*6/MM3 (ref 3.77–5.28)
SAO2 % BLDCOA: 95.4 % (ref 94–99)
SARS-COV-2 RNA PNL SPEC NAA+PROBE: NOT DETECTED
SODIUM SERPL-SCNC: 144 MMOL/L (ref 136–145)
TRICYCLICS UR QL SCN: NEGATIVE
TROPONIN T SERPL-MCNC: <0.01 NG/ML (ref 0–0.03)
TROPONIN T SERPL-MCNC: <0.01 NG/ML (ref 0–0.03)
VENTILATOR MODE: ABNORMAL
WBC NRBC COR # BLD: 6.07 10*3/MM3 (ref 3.4–10.8)
WHOLE BLOOD HOLD SPECIMEN: NORMAL
WHOLE BLOOD HOLD SPECIMEN: NORMAL

## 2022-04-20 PROCEDURE — 80306 DRUG TEST PRSMV INSTRMNT: CPT | Performed by: EMERGENCY MEDICINE

## 2022-04-20 PROCEDURE — 80053 COMPREHEN METABOLIC PANEL: CPT | Performed by: EMERGENCY MEDICINE

## 2022-04-20 PROCEDURE — 83880 ASSAY OF NATRIURETIC PEPTIDE: CPT | Performed by: EMERGENCY MEDICINE

## 2022-04-20 PROCEDURE — 0 IOPAMIDOL PER 1 ML: Performed by: EMERGENCY MEDICINE

## 2022-04-20 PROCEDURE — 36600 WITHDRAWAL OF ARTERIAL BLOOD: CPT

## 2022-04-20 PROCEDURE — G0378 HOSPITAL OBSERVATION PER HR: HCPCS

## 2022-04-20 PROCEDURE — 84484 ASSAY OF TROPONIN QUANT: CPT | Performed by: EMERGENCY MEDICINE

## 2022-04-20 PROCEDURE — 83735 ASSAY OF MAGNESIUM: CPT | Performed by: EMERGENCY MEDICINE

## 2022-04-20 PROCEDURE — 70450 CT HEAD/BRAIN W/O DYE: CPT

## 2022-04-20 PROCEDURE — 82803 BLOOD GASES ANY COMBINATION: CPT

## 2022-04-20 PROCEDURE — 63710000001 MYCOPHENOLATE MOFETIL PER 250 MG: Performed by: NURSE PRACTITIONER

## 2022-04-20 PROCEDURE — 85379 FIBRIN DEGRADATION QUANT: CPT | Performed by: EMERGENCY MEDICINE

## 2022-04-20 PROCEDURE — 25010000002 LORAZEPAM PER 2 MG: Performed by: FAMILY MEDICINE

## 2022-04-20 PROCEDURE — 99285 EMERGENCY DEPT VISIT HI MDM: CPT

## 2022-04-20 PROCEDURE — 71275 CT ANGIOGRAPHY CHEST: CPT

## 2022-04-20 PROCEDURE — 0 POTASSIUM CHLORIDE 10 MEQ/100ML SOLUTION: Performed by: EMERGENCY MEDICINE

## 2022-04-20 PROCEDURE — 87635 SARS-COV-2 COVID-19 AMP PRB: CPT | Performed by: EMERGENCY MEDICINE

## 2022-04-20 PROCEDURE — 93005 ELECTROCARDIOGRAM TRACING: CPT | Performed by: EMERGENCY MEDICINE

## 2022-04-20 PROCEDURE — 93010 ELECTROCARDIOGRAM REPORT: CPT | Performed by: INTERNAL MEDICINE

## 2022-04-20 PROCEDURE — 71045 X-RAY EXAM CHEST 1 VIEW: CPT

## 2022-04-20 PROCEDURE — 85025 COMPLETE CBC W/AUTO DIFF WBC: CPT | Performed by: EMERGENCY MEDICINE

## 2022-04-20 RX ORDER — POTASSIUM CHLORIDE 7.45 MG/ML
10 INJECTION INTRAVENOUS ONCE
Status: COMPLETED | OUTPATIENT
Start: 2022-04-20 | End: 2022-04-20

## 2022-04-20 RX ORDER — SODIUM CHLORIDE 0.9 % (FLUSH) 0.9 %
10 SYRINGE (ML) INJECTION EVERY 12 HOURS SCHEDULED
Status: DISCONTINUED | OUTPATIENT
Start: 2022-04-20 | End: 2022-04-22 | Stop reason: HOSPADM

## 2022-04-20 RX ORDER — PREDNISONE 1 MG/1
5 TABLET ORAL DAILY
Status: DISCONTINUED | OUTPATIENT
Start: 2022-04-21 | End: 2022-04-22 | Stop reason: HOSPADM

## 2022-04-20 RX ORDER — ONDANSETRON 2 MG/ML
4 INJECTION INTRAMUSCULAR; INTRAVENOUS EVERY 6 HOURS PRN
Status: DISCONTINUED | OUTPATIENT
Start: 2022-04-20 | End: 2022-04-22 | Stop reason: HOSPADM

## 2022-04-20 RX ORDER — BUSPIRONE HYDROCHLORIDE 10 MG/1
10 TABLET ORAL 2 TIMES DAILY PRN
Status: ON HOLD | COMMUNITY
End: 2022-09-12

## 2022-04-20 RX ORDER — ACETAMINOPHEN 325 MG/1
650 TABLET ORAL EVERY 4 HOURS PRN
Status: DISCONTINUED | OUTPATIENT
Start: 2022-04-20 | End: 2022-04-22 | Stop reason: HOSPADM

## 2022-04-20 RX ORDER — BUSPIRONE HYDROCHLORIDE 10 MG/1
10 TABLET ORAL 2 TIMES DAILY
Status: DISCONTINUED | OUTPATIENT
Start: 2022-04-21 | End: 2022-04-22 | Stop reason: HOSPADM

## 2022-04-20 RX ORDER — CLONAZEPAM 0.5 MG/1
0.5 TABLET ORAL 2 TIMES DAILY PRN
Status: DISCONTINUED | OUTPATIENT
Start: 2022-04-20 | End: 2022-04-22 | Stop reason: HOSPADM

## 2022-04-20 RX ORDER — SODIUM CHLORIDE 0.9 % (FLUSH) 0.9 %
10 SYRINGE (ML) INJECTION AS NEEDED
Status: DISCONTINUED | OUTPATIENT
Start: 2022-04-20 | End: 2022-04-22 | Stop reason: HOSPADM

## 2022-04-20 RX ORDER — LORAZEPAM 2 MG/ML
0.5 INJECTION INTRAMUSCULAR ONCE
Status: COMPLETED | OUTPATIENT
Start: 2022-04-20 | End: 2022-04-20

## 2022-04-20 RX ORDER — ONDANSETRON 2 MG/ML
4 INJECTION INTRAMUSCULAR; INTRAVENOUS ONCE
Status: DISCONTINUED | OUTPATIENT
Start: 2022-04-20 | End: 2022-04-20

## 2022-04-20 RX ORDER — FEXOFENADINE HCL 180 MG/1
180 TABLET ORAL DAILY PRN
Status: ON HOLD | COMMUNITY
End: 2022-09-12

## 2022-04-20 RX ORDER — ESOMEPRAZOLE MAGNESIUM 40 MG/1
40 CAPSULE, DELAYED RELEASE ORAL
Status: ON HOLD | COMMUNITY
End: 2022-04-21

## 2022-04-20 RX ORDER — URSODIOL 300 MG/1
300 CAPSULE ORAL 2 TIMES DAILY
Status: DISCONTINUED | OUTPATIENT
Start: 2022-04-20 | End: 2022-04-22 | Stop reason: HOSPADM

## 2022-04-20 RX ORDER — PANTOPRAZOLE SODIUM 40 MG/1
40 TABLET, DELAYED RELEASE ORAL
Status: DISCONTINUED | OUTPATIENT
Start: 2022-04-21 | End: 2022-04-22 | Stop reason: HOSPADM

## 2022-04-20 RX ORDER — POTASSIUM CHLORIDE 750 MG/1
40 CAPSULE, EXTENDED RELEASE ORAL
Status: COMPLETED | OUTPATIENT
Start: 2022-04-20 | End: 2022-04-21

## 2022-04-20 RX ORDER — BUSPIRONE HYDROCHLORIDE 10 MG/1
10 TABLET ORAL ONCE
Status: COMPLETED | OUTPATIENT
Start: 2022-04-20 | End: 2022-04-20

## 2022-04-20 RX ORDER — FAMOTIDINE 10 MG/ML
20 INJECTION, SOLUTION INTRAVENOUS ONCE
Status: DISCONTINUED | OUTPATIENT
Start: 2022-04-20 | End: 2022-04-20

## 2022-04-20 RX ORDER — CLONAZEPAM 0.5 MG/1
0.5 TABLET ORAL 2 TIMES DAILY PRN
COMMUNITY

## 2022-04-20 RX ORDER — ONDANSETRON 4 MG/1
4 TABLET, FILM COATED ORAL EVERY 6 HOURS PRN
Status: DISCONTINUED | OUTPATIENT
Start: 2022-04-20 | End: 2022-04-22 | Stop reason: HOSPADM

## 2022-04-20 RX ORDER — ACETAMINOPHEN 650 MG/1
650 SUPPOSITORY RECTAL EVERY 4 HOURS PRN
Status: DISCONTINUED | OUTPATIENT
Start: 2022-04-20 | End: 2022-04-22 | Stop reason: HOSPADM

## 2022-04-20 RX ORDER — ACETAMINOPHEN 160 MG/5ML
650 SOLUTION ORAL EVERY 4 HOURS PRN
Status: DISCONTINUED | OUTPATIENT
Start: 2022-04-20 | End: 2022-04-22 | Stop reason: HOSPADM

## 2022-04-20 RX ORDER — MYCOPHENOLATE MOFETIL 500 MG/1
1000 TABLET ORAL 2 TIMES DAILY
Status: DISCONTINUED | OUTPATIENT
Start: 2022-04-20 | End: 2022-04-22 | Stop reason: HOSPADM

## 2022-04-20 RX ORDER — VENLAFAXINE 37.5 MG/1
75 TABLET ORAL DAILY
Status: DISCONTINUED | OUTPATIENT
Start: 2022-04-21 | End: 2022-04-22 | Stop reason: HOSPADM

## 2022-04-20 RX ADMIN — POTASSIUM CHLORIDE 10 MEQ: 7.46 INJECTION, SOLUTION INTRAVENOUS at 16:57

## 2022-04-20 RX ADMIN — MYCOPHENOLATE MOFETIL 1000 MG: 500 TABLET ORAL at 23:39

## 2022-04-20 RX ADMIN — LORAZEPAM 0.5 MG: 2 INJECTION INTRAMUSCULAR; INTRAVENOUS at 21:26

## 2022-04-20 RX ADMIN — Medication 10 ML: at 23:24

## 2022-04-20 RX ADMIN — URSODIOL 300 MG: 300 CAPSULE ORAL at 23:41

## 2022-04-20 RX ADMIN — BUSPIRONE HYDROCHLORIDE 10 MG: 10 TABLET ORAL at 17:38

## 2022-04-20 RX ADMIN — TRAZODONE HYDROCHLORIDE 100 MG: 100 TABLET ORAL at 23:40

## 2022-04-20 RX ADMIN — POTASSIUM CHLORIDE 40 MEQ: 10 CAPSULE, COATED, EXTENDED RELEASE ORAL at 23:24

## 2022-04-20 RX ADMIN — IOPAMIDOL 100 ML: 755 INJECTION, SOLUTION INTRAVENOUS at 14:09

## 2022-04-21 ENCOUNTER — TELEPHONE (OUTPATIENT)
Dept: NEUROLOGY | Facility: CLINIC | Age: 62
End: 2022-04-21

## 2022-04-21 ENCOUNTER — APPOINTMENT (OUTPATIENT)
Dept: MRI IMAGING | Facility: HOSPITAL | Age: 62
End: 2022-04-21

## 2022-04-21 ENCOUNTER — APPOINTMENT (OUTPATIENT)
Dept: NEUROLOGY | Facility: HOSPITAL | Age: 62
End: 2022-04-21

## 2022-04-21 ENCOUNTER — APPOINTMENT (OUTPATIENT)
Dept: GENERAL RADIOLOGY | Facility: HOSPITAL | Age: 62
End: 2022-04-21

## 2022-04-21 PROBLEM — F31.12 BIPOLAR AFFECTIVE DISORDER, CURRENTLY MANIC, MODERATE: Status: ACTIVE | Noted: 2022-04-21

## 2022-04-21 LAB
AMMONIA BLD-SCNC: 36 UMOL/L (ref 11–51)
ANION GAP SERPL CALCULATED.3IONS-SCNC: 11 MMOL/L (ref 5–15)
APPEARANCE CSF: CLEAR
APPEARANCE CSF: CLEAR
APTT PPP: 27.5 SECONDS (ref 24.1–35)
BUN SERPL-MCNC: 5 MG/DL (ref 8–23)
BUN/CREAT SERPL: 6.8 (ref 7–25)
C GATTII+NEOFOR DNA CSF QL NAA+NON-PROBE: NOT DETECTED
CALCIUM SPEC-SCNC: 8.7 MG/DL (ref 8.6–10.5)
CERULOPLASMIN SERPL-MCNC: 24 MG/DL (ref 19–39)
CHLORIDE SERPL-SCNC: 108 MMOL/L (ref 98–107)
CHOLEST SERPL-MCNC: 175 MG/DL (ref 0–200)
CMV DNA CSF QL NAA+PROBE: NOT DETECTED
CO2 SERPL-SCNC: 27 MMOL/L (ref 22–29)
COLOR CSF: COLORLESS
COLOR CSF: COLORLESS
COLOR SPUN CSF: COLORLESS
COLOR SPUN CSF: COLORLESS
CREAT SERPL-MCNC: 0.73 MG/DL (ref 0.57–1)
DEPRECATED RDW RBC AUTO: 42.6 FL (ref 37–54)
E COLI K1 DNA CSF QL NAA+NON-PROBE: NOT DETECTED
EGFRCR SERPLBLD CKD-EPI 2021: 92 ML/MIN/1.73
ERYTHROCYTE [DISTWIDTH] IN BLOOD BY AUTOMATED COUNT: 13.9 % (ref 12.3–15.4)
EV RNA CSF QL NAA+PROBE: NOT DETECTED
FERRITIN SERPL-MCNC: 193.4 NG/ML (ref 13–150)
GLUCOSE CSF-MCNC: 73 MG/DL (ref 40–70)
GLUCOSE SERPL-MCNC: 95 MG/DL (ref 65–99)
GP B STREP DNA SPEC QL NAA+PROBE: NOT DETECTED
HAEM INFLU SEROTYP DNA SPEC NAA+PROBE: NOT DETECTED
HAV IGM SERPL QL IA: NORMAL
HBA1C MFR BLD: 5.5 % (ref 4.8–5.6)
HBV CORE IGM SERPL QL IA: NORMAL
HBV SURFACE AG SERPL QL IA: NORMAL
HCT VFR BLD AUTO: 37.6 % (ref 34–46.6)
HCV AB SER DONR QL: NORMAL
HDLC SERPL-MCNC: 50 MG/DL (ref 40–60)
HGB BLD-MCNC: 11.9 G/DL (ref 12–15.9)
HHV6 DNA CSF QL NAA+PROBE: NOT DETECTED
HIV1+2 AB SER QL: NORMAL
HOLD SPECIMEN: NORMAL
HSV1 DNA CSF QL NAA+PROBE: NOT DETECTED
HSV2 DNA CSF QL NAA+PROBE: NOT DETECTED
INR PPP: 1 (ref 0.91–1.09)
INR PPP: 1.03 (ref 0.91–1.09)
IRON 24H UR-MRATE: 50 MCG/DL (ref 37–145)
IRON SATN MFR SERPL: 19 % (ref 20–50)
L MONOCYTOG RRNA SPEC QL PROBE: NOT DETECTED
LDLC SERPL CALC-MCNC: 101 MG/DL (ref 0–100)
LDLC/HDLC SERPL: 1.95 {RATIO}
MCH RBC QN AUTO: 26.8 PG (ref 26.6–33)
MCHC RBC AUTO-ENTMCNC: 31.6 G/DL (ref 31.5–35.7)
MCV RBC AUTO: 84.7 FL (ref 79–97)
METHOD: ABNORMAL
METHOD: ABNORMAL
N MEN DNA SPEC QL NAA+PROBE: NOT DETECTED
NUC CELL # CSF MANUAL: 4 /MM3 (ref 0–8)
NUC CELL # CSF MANUAL: 4 /MM3 (ref 0–8)
PARECHOVIRUS A RNA CSF QL NAA+NON-PROBE: NOT DETECTED
PLATELET # BLD AUTO: 218 10*3/MM3 (ref 140–450)
PMV BLD AUTO: 11.3 FL (ref 6–12)
POTASSIUM SERPL-SCNC: 3.8 MMOL/L (ref 3.5–5.2)
PROT CSF-MCNC: 26.3 MG/DL (ref 15–45)
PROTHROMBIN TIME: 12.8 SECONDS (ref 11.9–14.6)
PROTHROMBIN TIME: 13.1 SECONDS (ref 11.9–14.6)
RBC # BLD AUTO: 4.44 10*6/MM3 (ref 3.77–5.28)
RBC # CSF MANUAL: 1 /MM3 (ref 0–0)
RBC # CSF MANUAL: 35 /MM3 (ref 0–0)
S PNEUM DNA CSF QL NAA+NON-PROBE: NOT DETECTED
SODIUM SERPL-SCNC: 146 MMOL/L (ref 136–145)
SPECIMEN VOL CSF: 8.5 ML
SPECIMEN VOL CSF: 8.5 ML
TIBC SERPL-MCNC: 265 MCG/DL (ref 298–536)
TRANSFERRIN SERPL-MCNC: 178 MG/DL (ref 200–360)
TRIGL SERPL-MCNC: 137 MG/DL (ref 0–150)
TSH SERPL DL<=0.05 MIU/L-ACNC: 3.53 UIU/ML (ref 0.27–4.2)
TUBE # CSF: 1
TUBE # CSF: 4
VLDLC SERPL-MCNC: 24 MG/DL (ref 5–40)
VZV DNA CSF QL NAA+PROBE: NOT DETECTED
WBC NRBC COR # BLD: 5.96 10*3/MM3 (ref 3.4–10.8)

## 2022-04-21 PROCEDURE — 0 GADOBENATE DIMEGLUMINE 529 MG/ML SOLUTION: Performed by: INTERNAL MEDICINE

## 2022-04-21 PROCEDURE — 63710000001 PREDNISONE PER 5 MG: Performed by: NURSE PRACTITIONER

## 2022-04-21 PROCEDURE — 85610 PROTHROMBIN TIME: CPT | Performed by: INTERNAL MEDICINE

## 2022-04-21 PROCEDURE — 86255 FLUORESCENT ANTIBODY SCREEN: CPT | Performed by: PSYCHIATRY & NEUROLOGY

## 2022-04-21 PROCEDURE — 95816 EEG AWAKE AND DROWSY: CPT

## 2022-04-21 PROCEDURE — G0432 EIA HIV-1/HIV-2 SCREEN: HCPCS | Performed by: INTERNAL MEDICINE

## 2022-04-21 PROCEDURE — 99204 OFFICE O/P NEW MOD 45 MIN: CPT | Performed by: PSYCHIATRY & NEUROLOGY

## 2022-04-21 PROCEDURE — 82945 GLUCOSE OTHER FLUID: CPT | Performed by: PSYCHIATRY & NEUROLOGY

## 2022-04-21 PROCEDURE — 70553 MRI BRAIN STEM W/O & W/DYE: CPT

## 2022-04-21 PROCEDURE — 82728 ASSAY OF FERRITIN: CPT | Performed by: INTERNAL MEDICINE

## 2022-04-21 PROCEDURE — A9577 INJ MULTIHANCE: HCPCS | Performed by: INTERNAL MEDICINE

## 2022-04-21 PROCEDURE — 95816 EEG AWAKE AND DROWSY: CPT | Performed by: PSYCHIATRY & NEUROLOGY

## 2022-04-21 PROCEDURE — 86618 LYME DISEASE ANTIBODY: CPT | Performed by: PSYCHIATRY & NEUROLOGY

## 2022-04-21 PROCEDURE — 82140 ASSAY OF AMMONIA: CPT | Performed by: INTERNAL MEDICINE

## 2022-04-21 PROCEDURE — 83540 ASSAY OF IRON: CPT | Performed by: INTERNAL MEDICINE

## 2022-04-21 PROCEDURE — 86788 WEST NILE VIRUS AB IGM: CPT | Performed by: PSYCHIATRY & NEUROLOGY

## 2022-04-21 PROCEDURE — 85730 THROMBOPLASTIN TIME PARTIAL: CPT | Performed by: PSYCHIATRY & NEUROLOGY

## 2022-04-21 PROCEDURE — 88108 CYTOPATH CONCENTRATE TECH: CPT | Performed by: PSYCHIATRY & NEUROLOGY

## 2022-04-21 PROCEDURE — 83036 HEMOGLOBIN GLYCOSYLATED A1C: CPT | Performed by: NURSE PRACTITIONER

## 2022-04-21 PROCEDURE — G0378 HOSPITAL OBSERVATION PER HR: HCPCS

## 2022-04-21 PROCEDURE — 87798 DETECT AGENT NOS DNA AMP: CPT | Performed by: PSYCHIATRY & NEUROLOGY

## 2022-04-21 PROCEDURE — 82164 ANGIOTENSIN I ENZYME TEST: CPT | Performed by: PSYCHIATRY & NEUROLOGY

## 2022-04-21 PROCEDURE — 86789 WEST NILE VIRUS ANTIBODY: CPT | Performed by: PSYCHIATRY & NEUROLOGY

## 2022-04-21 PROCEDURE — 80061 LIPID PANEL: CPT | Performed by: NURSE PRACTITIONER

## 2022-04-21 PROCEDURE — 85610 PROTHROMBIN TIME: CPT | Performed by: PSYCHIATRY & NEUROLOGY

## 2022-04-21 PROCEDURE — 36415 COLL VENOUS BLD VENIPUNCTURE: CPT | Performed by: NURSE PRACTITIONER

## 2022-04-21 PROCEDURE — 84157 ASSAY OF PROTEIN OTHER: CPT | Performed by: PSYCHIATRY & NEUROLOGY

## 2022-04-21 PROCEDURE — 82607 VITAMIN B-12: CPT | Performed by: CLINICAL NURSE SPECIALIST

## 2022-04-21 PROCEDURE — 84443 ASSAY THYROID STIM HORMONE: CPT | Performed by: NURSE PRACTITIONER

## 2022-04-21 PROCEDURE — 87496 CYTOMEG DNA AMP PROBE: CPT | Performed by: PSYCHIATRY & NEUROLOGY

## 2022-04-21 PROCEDURE — 82390 ASSAY OF CERULOPLASMIN: CPT | Performed by: INTERNAL MEDICINE

## 2022-04-21 PROCEDURE — 63710000001 MYCOPHENOLATE MOFETIL PER 250 MG: Performed by: NURSE PRACTITIONER

## 2022-04-21 PROCEDURE — 83916 OLIGOCLONAL BANDS: CPT | Performed by: PSYCHIATRY & NEUROLOGY

## 2022-04-21 PROCEDURE — 89050 BODY FLUID CELL COUNT: CPT | Performed by: PSYCHIATRY & NEUROLOGY

## 2022-04-21 PROCEDURE — 84466 ASSAY OF TRANSFERRIN: CPT | Performed by: INTERNAL MEDICINE

## 2022-04-21 PROCEDURE — 80048 BASIC METABOLIC PNL TOTAL CA: CPT | Performed by: NURSE PRACTITIONER

## 2022-04-21 PROCEDURE — 86780 TREPONEMA PALLIDUM: CPT | Performed by: PSYCHIATRY & NEUROLOGY

## 2022-04-21 PROCEDURE — 85027 COMPLETE CBC AUTOMATED: CPT | Performed by: NURSE PRACTITIONER

## 2022-04-21 PROCEDURE — 87498 ENTEROVIRUS PROBE&REVRS TRNS: CPT | Performed by: PSYCHIATRY & NEUROLOGY

## 2022-04-21 PROCEDURE — 87483 CNS DNA AMP PROBE TYPE 12-25: CPT | Performed by: PSYCHIATRY & NEUROLOGY

## 2022-04-21 PROCEDURE — 80074 ACUTE HEPATITIS PANEL: CPT | Performed by: INTERNAL MEDICINE

## 2022-04-21 RX ORDER — LANOLIN ALCOHOL/MO/W.PET/CERES
3 CREAM (GRAM) TOPICAL NIGHTLY
Status: DISCONTINUED | OUTPATIENT
Start: 2022-04-21 | End: 2022-04-22 | Stop reason: HOSPADM

## 2022-04-21 RX ORDER — LIDOCAINE HYDROCHLORIDE 10 MG/ML
5 INJECTION, SOLUTION INFILTRATION; PERINEURAL ONCE
Status: DISCONTINUED | OUTPATIENT
Start: 2022-04-21 | End: 2022-04-22 | Stop reason: HOSPADM

## 2022-04-21 RX ORDER — OMEPRAZOLE 20 MG/1
20 CAPSULE, DELAYED RELEASE ORAL DAILY
COMMUNITY
End: 2022-08-16 | Stop reason: SDUPTHER

## 2022-04-21 RX ADMIN — POTASSIUM CHLORIDE 40 MEQ: 10 CAPSULE, COATED, EXTENDED RELEASE ORAL at 01:53

## 2022-04-21 RX ADMIN — GADOBENATE DIMEGLUMINE 16 ML: 529 INJECTION, SOLUTION INTRAVENOUS at 17:32

## 2022-04-21 RX ADMIN — BUSPIRONE HYDROCHLORIDE 10 MG: 10 TABLET ORAL at 20:58

## 2022-04-21 RX ADMIN — Medication 3 MG: at 23:27

## 2022-04-21 RX ADMIN — MYCOPHENOLATE MOFETIL 1000 MG: 500 TABLET ORAL at 21:00

## 2022-04-21 RX ADMIN — URSODIOL 300 MG: 300 CAPSULE ORAL at 20:58

## 2022-04-21 RX ADMIN — URSODIOL 300 MG: 300 CAPSULE ORAL at 08:20

## 2022-04-21 RX ADMIN — MYCOPHENOLATE MOFETIL 1000 MG: 500 TABLET ORAL at 08:20

## 2022-04-21 RX ADMIN — Medication 10 ML: at 21:00

## 2022-04-21 RX ADMIN — PREDNISONE 5 MG: 5 TABLET ORAL at 08:20

## 2022-04-21 RX ADMIN — VENLAFAXINE HYDROCHLORIDE 75 MG: 37.5 TABLET ORAL at 08:20

## 2022-04-21 RX ADMIN — PANTOPRAZOLE SODIUM 40 MG: 40 TABLET, DELAYED RELEASE ORAL at 05:32

## 2022-04-21 RX ADMIN — Medication 10 ML: at 08:25

## 2022-04-22 ENCOUNTER — READMISSION MANAGEMENT (OUTPATIENT)
Dept: CALL CENTER | Facility: HOSPITAL | Age: 62
End: 2022-04-22

## 2022-04-22 VITALS
BODY MASS INDEX: 30.39 KG/M2 | TEMPERATURE: 97.4 F | SYSTOLIC BLOOD PRESSURE: 103 MMHG | HEART RATE: 94 BPM | OXYGEN SATURATION: 97 % | DIASTOLIC BLOOD PRESSURE: 73 MMHG | HEIGHT: 64 IN | RESPIRATION RATE: 16 BRPM | WEIGHT: 178 LBS

## 2022-04-22 LAB
ALBUMIN SERPL-MCNC: 3.6 G/DL (ref 3.5–5.2)
ALBUMIN/GLOB SERPL: 1.6 G/DL
ALP SERPL-CCNC: 84 U/L (ref 39–117)
ALT SERPL W P-5'-P-CCNC: 11 U/L (ref 1–33)
ANION GAP SERPL CALCULATED.3IONS-SCNC: 10 MMOL/L (ref 5–15)
AST SERPL-CCNC: 16 U/L (ref 1–32)
BILIRUB SERPL-MCNC: 0.3 MG/DL (ref 0–1.2)
BUN SERPL-MCNC: 9 MG/DL (ref 8–23)
BUN/CREAT SERPL: 12.7 (ref 7–25)
CALCIUM SPEC-SCNC: 8.8 MG/DL (ref 8.6–10.5)
CHLORIDE SERPL-SCNC: 106 MMOL/L (ref 98–107)
CO2 SERPL-SCNC: 27 MMOL/L (ref 22–29)
CREAT SERPL-MCNC: 0.71 MG/DL (ref 0.57–1)
DEPRECATED RDW RBC AUTO: 42.5 FL (ref 37–54)
EGFRCR SERPLBLD CKD-EPI 2021: 95.1 ML/MIN/1.73
ERYTHROCYTE [DISTWIDTH] IN BLOOD BY AUTOMATED COUNT: 13.9 % (ref 12.3–15.4)
GLOBULIN UR ELPH-MCNC: 2.3 GM/DL
GLUCOSE SERPL-MCNC: 104 MG/DL (ref 65–99)
HCT VFR BLD AUTO: 35 % (ref 34–46.6)
HGB BLD-MCNC: 11.1 G/DL (ref 12–15.9)
MCH RBC QN AUTO: 26.6 PG (ref 26.6–33)
MCHC RBC AUTO-ENTMCNC: 31.7 G/DL (ref 31.5–35.7)
MCV RBC AUTO: 83.9 FL (ref 79–97)
PLATELET # BLD AUTO: 194 10*3/MM3 (ref 140–450)
PMV BLD AUTO: 10.9 FL (ref 6–12)
POTASSIUM SERPL-SCNC: 3.5 MMOL/L (ref 3.5–5.2)
PROT SERPL-MCNC: 5.9 G/DL (ref 6–8.5)
QT INTERVAL: 358 MS
QT INTERVAL: 378 MS
QTC INTERVAL: 410 MS
QTC INTERVAL: 461 MS
RBC # BLD AUTO: 4.17 10*6/MM3 (ref 3.77–5.28)
SODIUM SERPL-SCNC: 143 MMOL/L (ref 136–145)
WBC NRBC COR # BLD: 6.21 10*3/MM3 (ref 3.4–10.8)

## 2022-04-22 PROCEDURE — 99214 OFFICE O/P EST MOD 30 MIN: CPT | Performed by: PSYCHIATRY & NEUROLOGY

## 2022-04-22 PROCEDURE — 86255 FLUORESCENT ANTIBODY SCREEN: CPT | Performed by: PSYCHIATRY & NEUROLOGY

## 2022-04-22 PROCEDURE — 63710000001 MYCOPHENOLATE MOFETIL PER 250 MG: Performed by: NURSE PRACTITIONER

## 2022-04-22 PROCEDURE — 63710000001 PREDNISONE PER 5 MG: Performed by: NURSE PRACTITIONER

## 2022-04-22 PROCEDURE — G0378 HOSPITAL OBSERVATION PER HR: HCPCS

## 2022-04-22 PROCEDURE — 80053 COMPREHEN METABOLIC PANEL: CPT | Performed by: INTERNAL MEDICINE

## 2022-04-22 PROCEDURE — 85027 COMPLETE CBC AUTOMATED: CPT | Performed by: INTERNAL MEDICINE

## 2022-04-22 RX ORDER — TRAZODONE HYDROCHLORIDE 100 MG/1
100 TABLET ORAL NIGHTLY
Qty: 30 TABLET | Refills: 0 | Status: SHIPPED | OUTPATIENT
Start: 2022-04-22 | End: 2022-05-22

## 2022-04-22 RX ADMIN — PREDNISONE 5 MG: 5 TABLET ORAL at 08:45

## 2022-04-22 RX ADMIN — PANTOPRAZOLE SODIUM 40 MG: 40 TABLET, DELAYED RELEASE ORAL at 05:21

## 2022-04-22 RX ADMIN — URSODIOL 300 MG: 300 CAPSULE ORAL at 08:45

## 2022-04-22 RX ADMIN — MYCOPHENOLATE MOFETIL 1000 MG: 500 TABLET ORAL at 08:45

## 2022-04-22 RX ADMIN — Medication 10 ML: at 08:46

## 2022-04-22 RX ADMIN — CLONAZEPAM 0.5 MG: 0.5 TABLET ORAL at 02:22

## 2022-04-22 RX ADMIN — ACETAMINOPHEN 650 MG: 325 TABLET ORAL at 02:16

## 2022-04-22 RX ADMIN — VENLAFAXINE HYDROCHLORIDE 75 MG: 37.5 TABLET ORAL at 08:45

## 2022-04-23 LAB — VIT B12 BLD-MCNC: 358 PG/ML (ref 211–946)

## 2022-04-23 NOTE — OUTREACH NOTE
Prep Survey    Flowsheet Row Responses   [facility] facility patient discharged from? [place]   Is LACE score < 7 ? No   Emergency Room discharge w/ pulse ox? No   Eligibility Readm Mgmt   Discharge diagnosis Bipolar affective disorder   Does the patient have one of the following disease processes/diagnoses(primary or secondary)? Other   Does the patient have Home health ordered? No   Is there a DME ordered? No   Prep survey completed? Yes          JOLLY FALCON - Registered Nurse

## 2022-04-25 LAB
ACE CSF-CCNC: <1.5 U/L (ref 0–3.1)
CYTO UR: NORMAL
LAB AP CASE REPORT: NORMAL
OLIGOCLONAL BANDS.IT SER+CSF QL: NORMAL
PATH REPORT.FINAL DX SPEC: NORMAL
PATH REPORT.GROSS SPEC: NORMAL
WNV IGG SPEC QL IA: NEGATIVE
WNV IGM CSF QL IA: NEGATIVE

## 2022-04-27 ENCOUNTER — READMISSION MANAGEMENT (OUTPATIENT)
Dept: CALL CENTER | Facility: HOSPITAL | Age: 62
End: 2022-04-27

## 2022-04-27 LAB
B BURGDOR AB CSF IA-ACNC: 0 LIV
CMV DNA SPEC QL NAA+PROBE: NEGATIVE
EBV DNA SPEC QL NAA+PROBE: NEGATIVE
EV RNA SPEC QL NAA+PROBE: NEGATIVE
HU1 AB TITR CSF IF: NORMAL TITER
HU2 AB TITR CSF IF: NORMAL TITER
NMDAR IGG TITR CSF IF: NORMAL {TITER}
NMDAR IGG TITR SER IF: NORMAL {TITER}
PURKINJE CELLS AB TITR CSF IF: NORMAL TITER
T PALLIDUM AB CSF QL IF: NON REACTIVE

## 2022-04-27 NOTE — OUTREACH NOTE
Medical Week 1 Survey    Flowsheet Row Responses   [facility] facility patient discharged from? [place]   Does the patient have one of the following disease processes/diagnoses(primary or secondary)? Other   Week 1 attempt successful? No   Unsuccessful attempts Attempt 1          KALYAN NERI - Registered Nurse

## 2022-05-02 ENCOUNTER — READMISSION MANAGEMENT (OUTPATIENT)
Dept: CALL CENTER | Facility: HOSPITAL | Age: 62
End: 2022-05-02

## 2022-05-02 NOTE — OUTREACH NOTE
Medical Week 1 Survey    Flowsheet Row Responses   [facility] facility patient discharged from? [place]   Does the patient have one of the following disease processes/diagnoses(primary or secondary)? Other   Week 1 attempt successful? No   Unsuccessful attempts Attempt 2          JESENIA LEUNG - Registered Nurse

## 2022-05-06 ENCOUNTER — OFFICE VISIT (OUTPATIENT)
Dept: PULMONOLOGY | Facility: CLINIC | Age: 62
End: 2022-05-06

## 2022-05-06 ENCOUNTER — TELEPHONE (OUTPATIENT)
Dept: PULMONOLOGY | Facility: CLINIC | Age: 62
End: 2022-05-06

## 2022-05-06 VITALS
HEART RATE: 110 BPM | HEIGHT: 64 IN | BODY MASS INDEX: 29.02 KG/M2 | DIASTOLIC BLOOD PRESSURE: 80 MMHG | WEIGHT: 170 LBS | OXYGEN SATURATION: 99 % | SYSTOLIC BLOOD PRESSURE: 130 MMHG | RESPIRATION RATE: 16 BRPM

## 2022-05-06 DIAGNOSIS — J84.10 PULMONARY FIBROSIS: Primary | ICD-10-CM

## 2022-05-06 PROCEDURE — 94618 PULMONARY STRESS TESTING: CPT | Performed by: NURSE PRACTITIONER

## 2022-05-06 NOTE — PROCEDURES
6 Minute Walk Test  Performed by: Jarrod Schulz CMA  Authorized by: Fela Blackman APRN     General:     - Medical History Checked      - Medical clearance provided for the patient to participate in exercise testing       Exercise Details     Supplemental oxygen:  CONTINUOUS-LEGACY    Mobility aid:  NA    Speed:  1.0    Miles:  0.08    Feet:  422.40    Meters:  128.747    Rest, Exercise, Recovery Readings    Rest     BP: 130/80    SAT: 99%    O2: 6L    HR: 110    RPE: 0   Finish     BP: 138/80    SAT: 97%    O2: 10L    HR: 114    RPE: 0   Recovery 1     BP: 130/80    SAT: 99%    O2: 6L    HR: 105    RPE:  0   Recovery 2     BP:  128/78    SAT:  99%    O2: 6L    HR:  105    RPE: 0    Minute by Minute Recordings    1st Minute     SAT: 96%    O2: 6L    HR: 115    RPE:  0   2nd minute     SAT: 91%    O2: 6L    HR: 124    RPE: 1   3rd minute     SAT: 88%    O2: 6L    HR: 124    RPE: 3   4th minute     SAT: 84%    O2: 8L    HR: 128    RPE: 4    Notes: INCREASED 02 TO 10L AND STOPPED FOR PATIENT'S SAT TO RECOVER   5th minute     SAT: 92%    O2: 10L    HR: 121    RPE: 1    Notes: STARTED WALKING AFTER A ONE MINUTE REST   6th minute     SAT: 93%    O2: 10L    HR: 119    RPE: 1    Was test terminated?: No        Technician:  Jarrod Schulz CMA       Overall notes:  Patient walked on the treadmill at a speed of 1 for 5 minutes. At minute 4 her oxygen dropped to the low 80's and the test was paused for about a minute to allow the patient to recover on 10L. Her oxygen on 10L did come up to 92% and the test was re-started. She did walk a total of 422.40 feet or 128.747 meters.

## 2022-05-06 NOTE — TELEPHONE ENCOUNTER
Patient is asking if we can send Dr. Ruiz's records, a liver biopsy, which was done November 2015, to the transplant team?

## 2022-05-10 ENCOUNTER — OFFICE VISIT (OUTPATIENT)
Dept: NEUROLOGY | Facility: CLINIC | Age: 62
End: 2022-05-10

## 2022-05-10 ENCOUNTER — OFFICE VISIT (OUTPATIENT)
Dept: PULMONOLOGY | Facility: CLINIC | Age: 62
End: 2022-05-10

## 2022-05-10 VITALS
SYSTOLIC BLOOD PRESSURE: 128 MMHG | HEART RATE: 95 BPM | WEIGHT: 169.8 LBS | BODY MASS INDEX: 28.99 KG/M2 | DIASTOLIC BLOOD PRESSURE: 70 MMHG | HEIGHT: 64 IN | OXYGEN SATURATION: 95 %

## 2022-05-10 VITALS
WEIGHT: 170 LBS | HEART RATE: 81 BPM | HEIGHT: 64 IN | BODY MASS INDEX: 29.02 KG/M2 | OXYGEN SATURATION: 100 % | DIASTOLIC BLOOD PRESSURE: 70 MMHG | SYSTOLIC BLOOD PRESSURE: 120 MMHG

## 2022-05-10 DIAGNOSIS — J96.11 CHRONIC RESPIRATORY FAILURE WITH HYPOXIA: ICD-10-CM

## 2022-05-10 DIAGNOSIS — M34.1 CREST SYNDROME: Primary | ICD-10-CM

## 2022-05-10 DIAGNOSIS — J84.10 PULMONARY FIBROSIS: ICD-10-CM

## 2022-05-10 DIAGNOSIS — G47.33 OBSTRUCTIVE SLEEP APNEA ON CPAP: ICD-10-CM

## 2022-05-10 DIAGNOSIS — K21.9 GASTROESOPHAGEAL REFLUX DISEASE WITHOUT ESOPHAGITIS: ICD-10-CM

## 2022-05-10 DIAGNOSIS — I27.0 PRIMARY PULMONARY HYPERTENSION: ICD-10-CM

## 2022-05-10 DIAGNOSIS — F22 PARANOIA (PSYCHOSIS): Primary | ICD-10-CM

## 2022-05-10 DIAGNOSIS — M34.1 CREST SYNDROME: Chronic | ICD-10-CM

## 2022-05-10 DIAGNOSIS — Z99.89 OBSTRUCTIVE SLEEP APNEA ON CPAP: ICD-10-CM

## 2022-05-10 PROCEDURE — 99214 OFFICE O/P EST MOD 30 MIN: CPT | Performed by: PHYSICIAN ASSISTANT

## 2022-05-10 PROCEDURE — 99214 OFFICE O/P EST MOD 30 MIN: CPT | Performed by: INTERNAL MEDICINE

## 2022-05-10 NOTE — PROGRESS NOTES
"Background:  Pt w crest, pulm htn resp failure hx covid 2022   Chief Complaint  pulmonary fibrosis and hx covid 19    Subjective    History of Present Illness       Elaine Juárez presents to [facility].  She had Covid around February.  Inhalers do not help her.  She is compliant and benefiting from home oxygen use.  She is in midst of transplant evaluation.  Records have been sent, awaiting appointment.  Pt reports moderate intermittent dyspnea on exertion in chest associated with desaturation and alleviated by rest.      Tobacco Use: Low Risk    • Smoking Tobacco Use: Never Smoker   • Smokeless Tobacco Use: Never Used      Objective     Vital Signs:   /70   Pulse 95   Ht 162.6 cm (64\")   Wt 77 kg (169 lb 12.8 oz)   SpO2 95% Comment: 3lt pulse  BMI 29.15 kg/m²   Physical Exam  Constitutional:       General: She is not in acute distress.     Appearance: She is well-developed. She is not ill-appearing or toxic-appearing.   HENT:      Head: Atraumatic.   Eyes:      General: No scleral icterus.     Conjunctiva/sclera: Conjunctivae normal.   Cardiovascular:      Rate and Rhythm: Normal rate and regular rhythm.      Heart sounds: S1 normal and S2 normal.   Pulmonary:      Effort: Pulmonary effort is normal.      Breath sounds: Rales present.   Abdominal:      General: There is no distension.   Musculoskeletal:         General: No deformity.      Cervical back: Neck supple.   Skin:     Coloration: Skin is not pale.      Findings: No rash.   Neurological:      Mental Status: She is alert.        Result Review  Data Reviewed:{ Labs  Result Review  Imaging  Media :23}     CT Angiogram Chest (04/20/2022 14:11)  1. No evidence of pulmonary embolism or acute intrathoracic pathology.  2. Mild cardiomegaly and dilation of the central pulmonary arteries  compatible pulmonary arterial hypertension.  3. Advanced interstitial fibrosis, changes of COPD and " bronchiectasis in  the lower lung zones, greatest in the right middle lobe. This appears  stable.  4. Stable slightly enlarged precarinal lymph node measuring 12 mm. No  other evidence of lymphadenopathy.     CT Chest With Contrast Diagnostic (08/05/2021 10:00)  1. Similar chronic pulmonary findings of fibrosis and bronchiectasis. No  new suspicious or superimposed acute finding.  2. Enlarged pulmonary artery, which can be seen with pulmonary artery  hypertension.  3. Borderline heart size.  4. Similar mild adenopathy compared to 1/21/2017.             Assessment and Plan  {CC Problem List  Visit Diagnosis  ROS  Review (Popup)  Health Maintenance  Quality  BestPractice  Medications  SmartSets  SnapShot Encounters  Media :23}   Diagnoses and all orders for this visit:    1. CREST syndrome, partial  (Primary)    2. Chronic respiratory failure with hypoxia (HCC)    3. Pulmonary fibrosis (HCC)    4. Obstructive sleep apnea on CPAP    5. Gastroesophageal reflux disease without esophagitis    6. Primary pulmonary hypertension (HCC)    We discussed the exercise oximetry, with desaturation and quick recovery related to severe lung disease and perhaps to a lesser extent due to pulmonary hypertension  Continue oxygen for resp failure  Continue current inhalers.  We discussed stability of ct findings despite Covid in interim.  Continue tx for GERD  She does not use cpap which does not help.for sleep apnea  Await visit with [place]  For now continue opsumit.  ?candidate for inhaled trepostinil.  Will defer to [place].  Continue monitoring saturation at home.  She will get prevnar 20 at drug store  Continue mycophenolate for crest  Repeat echocardiogram if [place] appointment gets delayed  Follow Up {Instructions Charge Capture  Follow-up Communications :23}   No follow-ups on file.  Patient was given instructions and counseling regarding her condition or for health maintenance advice. Please see specific  information pulled into the AVS if appropriate.    Electronically signed by Faisal Griffith MD, 5/10/2022, 15:05 CDT

## 2022-05-10 NOTE — PROGRESS NOTES
Subjective   Elaine Juárez is a 62 y.o. female  who presents today for a hospital follow-up. She is accompanied by her daughter.   History of Present Illness   Crest syndrome   The patient has had issues with crest syndrome and was recently admitted to the hospital with altered mental status. She has had significant pulmonary difficulties including possible pulmonary fibrosis and is on continuous oxygen.     The patient's finding in the hospital included the subacute onset of a delusional disorder with concern for autoimmune encephalopathy. She persisted with a non-focal examination and had unremarkable MRI and EEG findings. Lumbar puncture showed no significant findings.     The patient states her breathing issue has not improved or worsened. She notes she presented to the hospital because she wanted someone to make sure she was taking her medications as directed. Elaine notes she asked not to be discharged from the hospital, but her insurance would not cover her hospital stay; however, she now feels fine about taking her medications. Her medications are filled for her.     Her daughter states the patient received a phone call from the lung transplant team and they are going to call the patient to schedule an appointment once they receive her medical records.     Elaine is currently taking prednisone 5 mg daily. She notes higher doses of prednisone cause sleep disturbances.     She states she has chronic diarrhea and a cough. Her headaches have not improved. She lives with her .     She notes having an appointment scheduled with a psychiatrist.     The following portions of the patient's history were reviewed and updated as appropriate: allergies, current medications, past family history, past medical history, past social history, past surgical history and problem list.    Review of Systems:  A review of systems was performed, and positive findings are noted in the HPI.      Current Outpatient Medications:    •  busPIRone (BUSPAR) 10 MG tablet, Take 10 mg by mouth 2 (Two) Times a Day As Needed., Disp: , Rfl:   •  clonazePAM (KlonoPIN) 0.5 MG tablet, Take 0.5 mg by mouth 2 (Two) Times a Day As Needed for Anxiety., Disp: , Rfl:   •  fexofenadine (ALLEGRA) 180 MG tablet, Take 180 mg by mouth Daily As Needed (allergies)., Disp: , Rfl:   •  Macitentan (Opsumit) 10 MG tablet, Take 1 tablet by mouth Daily., Disp: 90 tablet, Rfl: 3  •  mycophenolate (CELLCEPT) 500 MG tablet, Take 1,000 mg by mouth 2 (Two) Times a Day., Disp: , Rfl:   •  omeprazole (priLOSEC) 20 MG capsule, Take 20 mg by mouth Daily., Disp: , Rfl:   •  traZODone (DESYREL) 100 MG tablet, Take 1 tablet by mouth Every Night for 30 days., Disp: 30 tablet, Rfl: 0  •  ursodiol (ACTIGALL) 300 MG capsule, Take 1 capsule by mouth 2 (Two) Times a Day., Disp: 180 capsule, Rfl: 3  •  venlafaxine (EFFEXOR) 75 MG tablet, Take 75 mg by mouth Daily., Disp: , Rfl:      Objective   Physical Exam  Vitals and nursing note reviewed.   HENT:      Head: Normocephalic.      Right Ear: Hearing and external ear normal.      Left Ear: Hearing and external ear normal.      Nose: Nose normal.      Mouth/Throat:      Pharynx: Oropharynx is clear.   Eyes:      General: Lids are normal. Vision grossly intact. Gaze aligned appropriately. No scleral icterus.     Extraocular Movements: Extraocular movements intact.      Conjunctiva/sclera: Conjunctivae normal.      Pupils: Pupils are equal, round, and reactive to light.      Visual Fields: Right eye visual fields normal and left eye visual fields normal.   Neck:      Vascular: No carotid bruit or JVD.      Trachea: Trachea and phonation normal.   Cardiovascular:      Rate and Rhythm: Normal rate.      Heart sounds: Normal heart sounds.   Pulmonary:      Effort: Pulmonary effort is normal.      Breath sounds: Normal breath sounds.      Comments: Mild dyspnea and is wearing oxygen.   Musculoskeletal:      Cervical back: Normal range of motion.    Skin:     General: Skin is warm and dry.   Neurological:      Mental Status: She is alert and oriented to person, place, and time.      GCS: GCS eye subscore is 4. GCS verbal subscore is 5. GCS motor subscore is 6.      Cranial Nerves: Cranial nerves are intact.      Sensory: Sensation is intact.      Motor: Motor function is intact.      Coordination: Coordination is intact.      Gait: Gait is intact.      Deep Tendon Reflexes: Reflexes are normal and symmetric.   Psychiatric:         Attention and Perception: Attention and perception normal.         Mood and Affect: Mood and affect normal.         Speech: Speech normal.         Behavior: Behavior normal.         Thought Content: Thought content normal.         Cognition and Memory: Cognition and memory normal.         Judgment: Judgment normal.             Diagnoses and all orders for this visit:    1. Paranoia (psychosis) (HCC) (Primary)    2. CREST syndrome, partial       1. Recent history of altered mental status associated with psychosis with negative work-up for encephalopathy or encephalitis  • Issues were discussed with the patient and family. At this point, the patient will return on an as needed basis.                Transcribed from ambient dictation for CHELLE Cobos by WASHINGTON ZURITA.  05/10/22   12:18 CDT    Patient verbalized consent to the visit recording.

## 2022-05-20 DIAGNOSIS — Z01.818 PREOP TESTING: Primary | ICD-10-CM

## 2022-06-03 ENCOUNTER — LAB (OUTPATIENT)
Dept: LAB | Facility: HOSPITAL | Age: 62
End: 2022-06-03

## 2022-06-03 DIAGNOSIS — Z01.818 PREOP TESTING: ICD-10-CM

## 2022-06-03 LAB — SARS-COV-2 ORF1AB RESP QL NAA+PROBE: NOT DETECTED

## 2022-06-03 PROCEDURE — U0004 COV-19 TEST NON-CDC HGH THRU: HCPCS

## 2022-06-03 PROCEDURE — C9803 HOPD COVID-19 SPEC COLLECT: HCPCS

## 2022-06-03 NOTE — PROGRESS NOTES
Lg Kitchen Jr, MD  [facility]  [address], [address]  [place] [address]  Fax [phone]  Phone [phone]      Visit Type: NEW PATIENT   Chief Complaint   Patient presents with   • abnormal nasal septum        HPI   Accompanied by: Daughter  She complains of hole in nose.  She has noted hole in nose- began 6 months ago. She noted within 2 months of the the high flow oxygen.  She has been coughing for years.  She has had Endo.  She is asking whether there is therapy.  She says mouth is not too dry.  Smoke- none  Drink- none  Denies Afrin  Has been told [facility]. Now has CREST. Has diagnosis of scleroderma      Past Medical History:   Diagnosis Date   • Allergies    • Arthritis    • BMI 31.0-31.9,adult 06/04/2019   • Calcified granuloma of lung (HCC) 06/04/2019    Right lung   • CHF (congestive heart failure) ([facility])     denies   • Chronic respiratory failure with hypoxia ([facility]) 08/05/2020   • Chronic respiratory failure with hypoxia, on home oxygen therapy (HCC)    • COVID-19 02/01/2022   • CREST syndrome ([facility])    • Environmental allergies 02/01/2022   • Gastroesophageal reflux disease without esophagitis 06/04/2019   • Obstructive sleep apnea on CPAP 06/04/2019   • Pulmonary fibrosis ([facility])    • Pulmonary hypertension ([facility])    • Short-term memory loss        Past Surgical History:   Procedure Laterality Date   • BREAST BIOPSY     • CARDIAC CATHETERIZATION N/A 7/22/2020    Procedure: Right Heart Cath;  Surgeon: Thong Martin MD;  Location: [facility] CATH INVASIVE LOCATION;  Service: Cardiology;  Laterality: N/A;   • CHOLECYSTECTOMY     • COLONOSCOPY  05/11/2015    5 POLYPS   • COLONOSCOPY N/A 8/4/2021    Procedure: COLONOSCOPY WITH ANESTHESIA;  Surgeon: Michael Landin DO;  Location: [facility] ENDOSCOPY;  Service: Gastroenterology;  Laterality: N/A;  pre-hx polyps  post-colon polyps   • ENDOSCOPY N/A 8/4/2021    Procedure:  ESOPHAGOGASTRODUODENOSCOPY WITH ANESTHESIA;  Surgeon: Michael Landin DO;  Location: [facility] ENDOSCOPY;  Service: Gastroenterology;  Laterality: N/A;  pre-dysphagia  post-normal  pcp-lanette aguila       Family History: Her family history includes Cirrhosis in her sister; Liver cancer in her brother; No Known Problems in her father and mother.     Social History: She  reports that she has never smoked. She has never used smokeless tobacco. She reports that she does not drink alcohol and does not use drugs.    Home Medications:  Macitentan, busPIRone, clonazePAM, fexofenadine, ipratropium-albuterol, mycophenolate, omeprazole, traZODone, ursodiol, and venlafaxine    Allergies:  She is allergic to codeine and latex.       Vital Signs:   Temp:  [98.9 °F (37.2 °C)] 98.9 °F (37.2 °C)  Heart Rate:  [119] 119  BP: (102)/(72) 102/72  ENT Physical Exam  Constitutional  Appearance: patient appears well-developed and well-nourished, patient is cooperative;  Communication/Voice: communication appropriate for developmental age; vocal quality normal;  Constitutional comments: In wheelchair  Wearing O2  obese  Head and Face  Appearance: head appears normal, face appears normal and face appears atraumatic;  Palpation: facial palpation normal;  Salivary: glands normal;  Ear  Hearing: intact;  Auricles: bilateral auricles normal;  External Mastoids: right external mastoid normal; left external mastoid normal;  Ear Canals: bilateral ear canals normal;  Tympanic Membranes: bilateral tympanic membranes normal;  Nose  External Nose: nares narrow; external nose normal;  Internal Nose: nasal obstruction present; right nasal cavity with 50% blockage; left nasal cavity with 20% blockage; Nasal cavity comments: Very dry, posterior perforation with mild bloody crusting   bilateral intranasal mucosa edematous and erythematous; nasal septal deviation present; deviation is to the right, septal deviation is intermediate; nasal septal perforation  and crusting noted; Septum comments: L to R high and NS angle moderate; 1 cm anterior septal perf   bilateral inferior turbinates bluish and edematous;  Oral Cavity/Oropharynx  Lips: normal;  Teeth: normal;  Gums: gingiva normal;  Tongue: tongue macroglossia present; Rucker III position; Oral Tongue comments: Prolapse severe  Oral mucosa: normal;  Hard palate: normal;  Soft palate: normal;  Tonsils: normal;  Base of Tongue: normal;  Posterior pharyngeal wall: normal;  Neck  Neck: neck normal;  Respiratory  Inspection: breathing unlabored; normal breathing rate;  Cardiovascular  Inspection: extremities are warm and well perfused; no peripheral edema present;  Neurovestibular  Mental Status: alert and oriented;  Psychiatric: mood normal; affect is appropriate;         Result Review    RESULTS REVIEW    I have reviewed the patients old records in the chart.   I have reviewed the patients old records in the chart.  The following results/records were reviewed:   Referral to Otolaryngology for Abnormal nasal septum (03/22/2022)  CT Head Without Contrast (04/20/2022 18:34)  Progress Notes by Fela Blackman APRN (03/22/2022 14:15)      Assessment & Plan    Diagnoses and all orders for this visit:    1. Nasal septal perforation (Primary)  Comments:  1 cm central cartilaginous perforation with posterior crusting    2. Deviated nasal septum, congenital  Comments:  Left to right high into the nasal septal angle with partial nasal obstruction    3. On supplemental oxygen by nasal cannula  Comments:  High flow nasal oxygen    4. Rhinitis, chronic  Comments:  Moderate to severe, possible autoimmune       Medical and surgical options were discussed including observation, continued medical management, medication modification and surgical management. Risks, benefits and alternatives were discussed and questions were answered. After considering the options, the patient decided to proceed with observation.     Patient has nasal  septal perforation.  Because of her significant pulmonary issues, I have recommended the patient try hydration and nasal hygiene prior to considering septal button.  I am concerned that the patient has some degree of autoimmune issues causing her nasal septum.  I see no lesion to biopsy.  I will follow for progression.  If she does progress, I will consider nasal biopsy to see if she has Wegener's.  She does have a history of autoimmune, scleroderma.  I feel patient has a combination of issues causing her nasal septal perforation.  Nasal saline  Nasal ointment      My Chart:  Patient is using My Chart    Patient, Daughter understand(s) and agree(s) with the treatment plan as described.    Return in about 3 months (around 9/6/2022) for Recheck nose.      Lg Kitchen Jr, MD  06/06/22  15:50 CDT

## 2022-06-06 ENCOUNTER — OFFICE VISIT (OUTPATIENT)
Dept: OTOLARYNGOLOGY | Facility: CLINIC | Age: 62
End: 2022-06-06

## 2022-06-06 VITALS
TEMPERATURE: 98.9 F | HEART RATE: 119 BPM | WEIGHT: 169 LBS | SYSTOLIC BLOOD PRESSURE: 102 MMHG | DIASTOLIC BLOOD PRESSURE: 72 MMHG | BODY MASS INDEX: 28.85 KG/M2 | HEIGHT: 64 IN

## 2022-06-06 DIAGNOSIS — Z78.9 ON SUPPLEMENTAL OXYGEN BY NASAL CANNULA: ICD-10-CM

## 2022-06-06 DIAGNOSIS — Q67.4 DEVIATED NASAL SEPTUM, CONGENITAL: ICD-10-CM

## 2022-06-06 DIAGNOSIS — J34.89 NASAL SEPTAL PERFORATION: Primary | ICD-10-CM

## 2022-06-06 DIAGNOSIS — J31.0 RHINITIS, CHRONIC: ICD-10-CM

## 2022-06-06 PROCEDURE — 99203 OFFICE O/P NEW LOW 30 MIN: CPT | Performed by: OTOLARYNGOLOGY

## 2022-06-06 RX ORDER — IPRATROPIUM BROMIDE AND ALBUTEROL SULFATE 2.5; .5 MG/3ML; MG/3ML
SOLUTION RESPIRATORY (INHALATION)
COMMUNITY
End: 2022-09-08 | Stop reason: SDUPTHER

## 2022-06-06 RX ORDER — TRAZODONE HYDROCHLORIDE 100 MG/1
TABLET ORAL
COMMUNITY
End: 2022-08-16 | Stop reason: SDUPTHER

## 2022-06-06 NOTE — PATIENT INSTRUCTIONS
NASAL SALINE:  Use 2 puffs each nostril 4-6 times daily and more frequently if possible.  You can buy saline spray or you can make your own and use an old spray bottle to administer  Use a humidifier at bedside  Recipe for saline:  Water                                 1 quart  Salt (table)                        1 tablespoon  Gylcerin (or Cyndie Syrup)    1 teaspoon  Sodium bicarbonate           1 teaspoon  Sprays or Cheney pots are recommended    Do not allow to stand for more than 24 hrs. Make new solution. There is no preservative in this solution.       APPLYING OINTMENT IN THE NOSE:  Use a Qtip or your little finger.  Get a small amount of Ointment (Polysporin, Bactroban, Generic ointment is fine)  Insert into nose gently applying the ointment all around, THEN gently pinch nostrils. This will spread ointement inside nose better.  Do this 2-4 times daily and more often as desired    Wear CPAP         CONTACT INFORMATION:  The main office phone number is [phone]. For emergencies after hours and on weekends, this number will convert over to our answering service and the on call provider will answer. Please try to keep non emergent phone calls/ questions to office hours 9am-5pm Monday through Friday.     [employer]  As an alternative, you can sign up and use the Epic MyChart system for more direct and quicker access for non emergent questions/ problems.  [facility] [employer] allows you to send messages to your doctor, view your test results, renew your prescriptions, schedule appointments, and more. To sign up, go to [employer] and click on the Sign Up Now link in the New User? box. Enter your [employer] Activation Code exactly as it appears below along with the last four digits of your Social Security Number and your Date of Birth () to complete the sign-up process. If you do not sign up before the expiration date, you must request a new code.    [employer] Activation Code: Activation code not  generated  Current [employer] Status: Active    If you have questions, you can email [email or web address] or call [phone] to talk to our [employer] staff. Remember, [employer] is NOT to be used for urgent needs. For medical emergencies, dial 911.

## 2022-07-03 DIAGNOSIS — K50.90 CROHN'S DISEASE WITHOUT COMPLICATION, UNSPECIFIED GASTROINTESTINAL TRACT LOCATION: Primary | ICD-10-CM

## 2022-07-07 RX ORDER — URSODIOL 300 MG/1
CAPSULE ORAL
Qty: 180 CAPSULE | Refills: 3 | Status: SHIPPED | OUTPATIENT
Start: 2022-07-07 | End: 2022-07-21 | Stop reason: SDUPTHER

## 2022-07-21 ENCOUNTER — OFFICE VISIT (OUTPATIENT)
Dept: GASTROENTEROLOGY | Facility: CLINIC | Age: 62
End: 2022-07-21

## 2022-07-21 VITALS
TEMPERATURE: 97 F | WEIGHT: 169 LBS | DIASTOLIC BLOOD PRESSURE: 70 MMHG | BODY MASS INDEX: 28.85 KG/M2 | HEIGHT: 64 IN | HEART RATE: 88 BPM | SYSTOLIC BLOOD PRESSURE: 110 MMHG | OXYGEN SATURATION: 98 %

## 2022-07-21 DIAGNOSIS — K21.9 GASTROESOPHAGEAL REFLUX DISEASE WITHOUT ESOPHAGITIS: ICD-10-CM

## 2022-07-21 DIAGNOSIS — K74.3 PRIMARY BILIARY CHOLANGITIS: Primary | ICD-10-CM

## 2022-07-21 PROCEDURE — 99214 OFFICE O/P EST MOD 30 MIN: CPT | Performed by: NURSE PRACTITIONER

## 2022-07-21 RX ORDER — URSODIOL 300 MG/1
300 CAPSULE ORAL 2 TIMES DAILY
Qty: 180 CAPSULE | Refills: 3 | Status: SHIPPED | OUTPATIENT
Start: 2022-07-21

## 2022-07-21 NOTE — PROGRESS NOTES
Chief Complaint   Patient presents with   • Med Refill     Needs omeprazole 20mg refilled. Also asked about  having liver scan       PCP: Aaron Stoddard MD  REFER: No ref. provider found    Subjective     HPI    Elaine Juárez presents to office with known PMH of PBC and GERD.  She is compliant with brandi.  Today she denies increase fatigue.  She was evaluated at [facility] for lung transplant but denied due to liver disease.  She carries diagnosis of CREST/scleroderma.  She is currently on 6 liters NC.  Dysphagia is stable.   Previous US/Elastiography list fibrosis score as a 3    UPPER GI ENDOSCOPY (08/04/2021 10:55)    COLONOSCOPY (08/04/2021 10:54)  Tissue Pathology Exam (08/04/2021 11:29)tubular adenoma (5 years)      Lab Results - Last 18 Months   Lab Units 04/22/22  0421 04/21/22  0537 04/20/22  1257 08/12/21  1409   HEMOGLOBIN g/dL 11.1* 11.9* 12.9 12.9   HEMATOCRIT % 35.0 37.6 39.8 41.5   MCV fL 83.9 84.7 82.9 84.0   WBC 10*3/mm3 6.21 5.96 6.07 9.92   PLATELETS 10*3/mm3 194 218 228 226       Lab Results - Last 18 Months   Lab Units 04/22/22  0421 04/21/22  0537 04/20/22  1257 08/31/21  1615 08/30/21  1350 08/12/21  1409 08/05/21  0934   GLUCOSE mg/dL 104* 95 113*  --  116* 106*  --    SODIUM mmol/L 143 146* 144  --  138 139  --    SODIUM, ARTERIAL mmol/L  --   --   --  139  --   --   --    POTASSIUM mmol/L 3.5 3.8 3.0*  --  3.6 3.4*  --    CREATININE mg/dL 0.71 0.73 0.65  --  0.8 0.80 0.80   BUN mg/dL 9 5* 5*  --  10 10  --    BUN / CREAT RATIO  12.7 6.8* 7.7  --   --  12.5  --    ALK PHOS U/L 84  --  101  --  106* 112  --    ALT (SGPT) U/L 11  --  13  --  10 12  --    AST (SGOT) U/L 16  --  18  --  16 17  --    BILIRUBIN mg/dL 0.3  --  0.3  --  <0.2 0.3  --    ALBUMIN g/dL 3.60  --  4.00  --  3.9 4.10  --        Lab Results - Last 18 Months   Lab Units 04/21/22  0537   IRON mcg/dL 50   TIBC mcg/dL 265*   IRON SATURATION % 19*   FERRITIN ng/mL 193.40*   TSH uIU/mL 3.530         Past Medical History:    Diagnosis Date   • Allergies    • Arthritis    • BMI 31.0-31.9,adult 06/04/2019   • Calcified granuloma of lung (HCC) 06/04/2019    Right lung   • CHF (congestive heart failure) ([facility])     denies   • Chronic respiratory failure with hypoxia ([facility]) 08/05/2020   • Chronic respiratory failure with hypoxia, on home oxygen therapy ([facility])    • COVID-19 02/01/2022   • CREST syndrome ([facility])    • Environmental allergies 02/01/2022   • Gastroesophageal reflux disease without esophagitis 06/04/2019   • Obstructive sleep apnea on CPAP 06/04/2019   • Pulmonary fibrosis ([facility])    • Pulmonary hypertension ([facility])    • Short-term memory loss        Past Surgical History:   Procedure Laterality Date   • BREAST BIOPSY     • CARDIAC CATHETERIZATION N/A 7/22/2020    Procedure: Right Heart Cath;  Surgeon: Thong Martin MD;  Location: [facility] CATH INVASIVE LOCATION;  Service: Cardiology;  Laterality: N/A;   • CHOLECYSTECTOMY     • COLONOSCOPY  05/11/2015    5 POLYPS   • COLONOSCOPY N/A 8/4/2021    Procedure: COLONOSCOPY WITH ANESTHESIA;  Surgeon: Michael Landin DO;  Location: [facility] ENDOSCOPY;  Service: Gastroenterology;  Laterality: N/A;  pre-hx polyps  post-colon polyps   • ENDOSCOPY N/A 8/4/2021    Procedure: ESOPHAGOGASTRODUODENOSCOPY WITH ANESTHESIA;  Surgeon: Michael Landin DO;  Location: [facility] ENDOSCOPY;  Service: Gastroenterology;  Laterality: N/A;  pre-dysphagia  post-normal  pcp-lanette aguila       Outpatient Medications Marked as Taking for the 7/21/22 encounter (Office Visit) with Micah Barton APRN   Medication Sig Dispense Refill   • busPIRone (BUSPAR) 10 MG tablet Take 10 mg by mouth 2 (Two) Times a Day As Needed.     • clonazePAM (KlonoPIN) 0.5 MG tablet Take 0.5 mg by mouth 2 (Two) Times a Day As Needed for Anxiety.     • fexofenadine (ALLEGRA) 180 MG tablet Take 180 mg by mouth Daily As Needed (allergies).     • ipratropium-albuterol (DUO-NEB) 0.5-2.5 mg/3 ml nebulizer ipratropium 0.5 mg-albuterol 3 mg  "(2.5 mg base)/3 mL nebulization soln     • Macitentan (Opsumit) 10 MG tablet Take 1 tablet by mouth Daily. 90 tablet 3   • mycophenolate (CELLCEPT) 500 MG tablet Take 1,000 mg by mouth 2 (Two) Times a Day.     • omeprazole (priLOSEC) 20 MG capsule Take 20 mg by mouth Daily.     • traZODone (DESYREL) 100 MG tablet trazodone 100 mg tablet     • ursodiol (ACTIGALL) 300 MG capsule Take 1 capsule by mouth 2 (Two) Times a Day. 180 capsule 3   • venlafaxine (EFFEXOR) 75 MG tablet Take 75 mg by mouth Daily.     • [DISCONTINUED] ursodiol (ACTIGALL) 300 MG capsule TAKE 1 CAPSULE TWICE DAILY 180 capsule 3       Allergies   Allergen Reactions   • Codeine Nausea And Vomiting   • Latex Rash       Social History     Socioeconomic History   • Marital status:    Tobacco Use   • Smoking status: Never Smoker   • Smokeless tobacco: Never Used   Vaping Use   • Vaping Use: Never used   Substance and Sexual Activity   • Alcohol use: No   • Drug use: No   • Sexual activity: Defer       Review of Systems   Constitutional: Negative for unexpected weight change.   Respiratory: Negative for shortness of breath.    Cardiovascular: Negative for chest pain.   Gastrointestinal: Negative for abdominal pain and anal bleeding.       Objective     Vitals:    07/21/22 1321   BP: 110/70   Pulse: 88   Temp: 97 °F (36.1 °C)   SpO2: 98%   Weight: 76.7 kg (169 lb)   Height: 162.6 cm (64\")     Body mass index is 29.01 kg/m².    Physical Exam  Constitutional:       Appearance: Normal appearance. She is well-developed.   Eyes:      General: No scleral icterus.  Cardiovascular:      Rate and Rhythm: Regular rhythm.      Heart sounds: Normal heart sounds. No murmur heard.  Pulmonary:      Effort: Pulmonary effort is normal. No accessory muscle usage.      Breath sounds: Normal breath sounds.   Abdominal:      General: Bowel sounds are normal. There is no distension.      Palpations: Abdomen is soft. There is no mass.      Tenderness: There is no abdominal " tenderness. There is no guarding or rebound.   Skin:     General: Skin is warm and dry.      Coloration: Skin is not jaundiced.   Neurological:      Mental Status: She is alert.   Psychiatric:         Behavior: Behavior is cooperative.         Imaging Results (Most Recent)     None          Body mass index is 29.01 kg/m².    Assessment & Plan     Diagnoses and all orders for this visit:    1. PBC (primary biliary cirrhosis) (Primary)  -     ursodiol (ACTIGALL) 300 MG capsule; Take 1 capsule by mouth 2 (Two) Times a Day.  Dispense: 180 capsule; Refill: 3    2. Gastroesophageal reflux disease without esophagitis         * Surgery not found *      brandi is working well, labs are stable.  Will refill at current dose for Elaine Juárez to continue   She is due for US.  We will hold on imaging as she plans for evaluation at [facility] for possible Lung transplant.  Wait to determine what imaging they will need.    She will call office with questions or concerns  In the future if she needs office visit it will be ok to schedule video as it is difficult for her to ambulate        Micah Barton, ANASTASIA  07/21/22          There are no Patient Instructions on file for this visit.

## 2022-07-26 ENCOUNTER — TELEPHONE (OUTPATIENT)
Dept: PULMONOLOGY | Facility: CLINIC | Age: 62
End: 2022-07-26

## 2022-07-26 NOTE — TELEPHONE ENCOUNTER
Talked with [employer] to get the opsumit approved.  It was approved for the dates of 07/26/2022-07/26/2023.  They are faxing an approval letter to us.     Patient notified

## 2022-07-27 PROBLEM — K76.6 PAH (PULMONARY ARTERIAL HYPERTENSION) WITH PORTAL HYPERTENSION: Chronic | Status: ACTIVE | Noted: 2022-03-16

## 2022-07-27 PROBLEM — I27.21 PAH (PULMONARY ARTERIAL HYPERTENSION) WITH PORTAL HYPERTENSION (HCC): Chronic | Status: ACTIVE | Noted: 2022-03-16

## 2022-07-27 PROBLEM — I27.0 PRIMARY PULMONARY HYPERTENSION (HCC): Chronic | Status: RESOLVED | Noted: 2020-03-18 | Resolved: 2022-07-27

## 2022-07-27 PROBLEM — K74.3 PRIMARY BILIARY CHOLANGITIS: Chronic | Status: ACTIVE | Noted: 2017-08-09

## 2022-07-27 PROBLEM — I73.00 RAYNAUD'S PHENOMENON: Chronic | Status: ACTIVE | Noted: 2017-01-23

## 2022-08-16 ENCOUNTER — OFFICE VISIT (OUTPATIENT)
Dept: PULMONOLOGY | Facility: CLINIC | Age: 62
End: 2022-08-16

## 2022-08-16 VITALS
HEIGHT: 64 IN | HEART RATE: 108 BPM | BODY MASS INDEX: 29.02 KG/M2 | OXYGEN SATURATION: 97 % | WEIGHT: 170 LBS | DIASTOLIC BLOOD PRESSURE: 68 MMHG | SYSTOLIC BLOOD PRESSURE: 102 MMHG

## 2022-08-16 DIAGNOSIS — J84.10 PULMONARY FIBROSIS: Chronic | ICD-10-CM

## 2022-08-16 DIAGNOSIS — Z23 NEED FOR STREPTOCOCCUS PNEUMONIAE AND INFLUENZA VACCINATION: ICD-10-CM

## 2022-08-16 DIAGNOSIS — J96.11 CHRONIC RESPIRATORY FAILURE WITH HYPOXIA: Chronic | ICD-10-CM

## 2022-08-16 DIAGNOSIS — G47.33 OBSTRUCTIVE SLEEP APNEA ON CPAP: Chronic | ICD-10-CM

## 2022-08-16 DIAGNOSIS — I73.00 RAYNAUD'S PHENOMENON WITHOUT GANGRENE: Chronic | ICD-10-CM

## 2022-08-16 DIAGNOSIS — I27.20 PULMONARY HYPERTENSION: Primary | Chronic | ICD-10-CM

## 2022-08-16 DIAGNOSIS — M34.1 CREST SYNDROME: Chronic | ICD-10-CM

## 2022-08-16 DIAGNOSIS — K21.9 GASTROESOPHAGEAL REFLUX DISEASE WITHOUT ESOPHAGITIS: Chronic | ICD-10-CM

## 2022-08-16 DIAGNOSIS — R05.9 COUGH: Chronic | ICD-10-CM

## 2022-08-16 DIAGNOSIS — Z99.89 OBSTRUCTIVE SLEEP APNEA ON CPAP: Chronic | ICD-10-CM

## 2022-08-16 DIAGNOSIS — K74.3 PRIMARY BILIARY CHOLANGITIS: Chronic | ICD-10-CM

## 2022-08-16 PROCEDURE — 90677 PCV20 VACCINE IM: CPT | Performed by: NURSE PRACTITIONER

## 2022-08-16 PROCEDURE — 90471 IMMUNIZATION ADMIN: CPT | Performed by: NURSE PRACTITIONER

## 2022-08-16 PROCEDURE — 99214 OFFICE O/P EST MOD 30 MIN: CPT | Performed by: NURSE PRACTITIONER

## 2022-08-16 RX ORDER — OMEPRAZOLE 40 MG/1
CAPSULE, DELAYED RELEASE ORAL
COMMUNITY
Start: 2022-07-04 | End: 2022-08-16

## 2022-08-16 RX ORDER — BENZONATATE 200 MG/1
200 CAPSULE ORAL 3 TIMES DAILY PRN
Qty: 45 CAPSULE | Refills: 5 | Status: SHIPPED | OUTPATIENT
Start: 2022-08-16 | End: 2022-08-26

## 2022-08-16 RX ORDER — MACITENTAN 10 MG/1
1 TABLET, FILM COATED ORAL DAILY
Qty: 90 TABLET | Refills: 3 | Status: SHIPPED | OUTPATIENT
Start: 2022-08-16

## 2022-08-16 NOTE — TELEPHONE ENCOUNTER
Rx Refill Note  Requested Prescriptions     Pending Prescriptions Disp Refills   • Macitentan (Opsumit) 10 MG tablet 90 tablet 3     Sig: Take 1 tablet by mouth Daily.      Last office visit with prescribing clinician: 3/22/2022      Next office visit with prescribing clinician: 8/16/2022            Philomena Whitfield CMA  08/16/22, 13:41 CDT

## 2022-08-25 ENCOUNTER — TELEPHONE (OUTPATIENT)
Dept: PULMONOLOGY | Facility: CLINIC | Age: 62
End: 2022-08-25

## 2022-08-25 DIAGNOSIS — M34.1 CREST SYNDROME: Primary | ICD-10-CM

## 2022-08-25 RX ORDER — PREDNISONE 1 MG/1
5 TABLET ORAL DAILY
Qty: 90 TABLET | Refills: 3 | Status: SHIPPED | OUTPATIENT
Start: 2022-08-25 | End: 2022-11-23

## 2022-08-25 NOTE — TELEPHONE ENCOUNTER
Patient called and requesting a refill on her prednisone. She would like it sent to [facility] pharmacy.      Last office visit 08/16/2022  Next office visit 10/25/2022

## 2022-08-26 ENCOUNTER — TELEPHONE (OUTPATIENT)
Dept: PULMONOLOGY | Facility: CLINIC | Age: 62
End: 2022-08-26

## 2022-08-26 NOTE — TELEPHONE ENCOUNTER
Per Mayank at [facility]- 16 mcg four times a day for the first week, then as tolerated nursing will call us to go to the 32 mcg and so forth with a goal of 64 mcg.       This will given as a verbal for the target dose.

## 2022-08-26 NOTE — TELEPHONE ENCOUNTER
Target is whatever the patient can tolerate up to a max of 64. Home nurse will help her decide her dose over time

## 2022-08-26 NOTE — TELEPHONE ENCOUNTER
They are calling wanting to know the target dose of the Tyvaso.    On the enrollment form it looks like 48 mcg or 64 mcg or other      Which one do I tell them?

## 2022-09-07 ENCOUNTER — TELEPHONE (OUTPATIENT)
Dept: GASTROENTEROLOGY | Facility: CLINIC | Age: 62
End: 2022-09-07

## 2022-09-07 NOTE — TELEPHONE ENCOUNTER
Patients granddaughter called and said patient was ready to have the us/elastography.i told her you would have to put the order in and then someone would call to schedule.

## 2022-09-08 DIAGNOSIS — K74.3 PRIMARY BILIARY CHOLANGITIS: Primary | ICD-10-CM

## 2022-09-08 DIAGNOSIS — J96.11 CHRONIC RESPIRATORY FAILURE WITH HYPOXIA: Primary | ICD-10-CM

## 2022-09-08 RX ORDER — IPRATROPIUM BROMIDE AND ALBUTEROL SULFATE 2.5; .5 MG/3ML; MG/3ML
3 SOLUTION RESPIRATORY (INHALATION) 4 TIMES DAILY PRN
Qty: 360 ML | Refills: 5 | Status: ON HOLD | OUTPATIENT
Start: 2022-09-08 | End: 2022-09-12

## 2022-09-08 NOTE — TELEPHONE ENCOUNTER
Rx Refill Note  Requested Prescriptions     Pending Prescriptions Disp Refills   • ipratropium-albuterol (DUO-NEB) 0.5-2.5 mg/3 ml nebulizer 360 mL 5     Sig: Take 3 mL by nebulization 4 (Four) Times a Day As Needed for Wheezing or Shortness of Air.      Last office visit with prescribing clinician: 8/16/2022      Next office visit with prescribing clinician: 10/25/2022            [place] MA  09/08/22, 10:58 CDT

## 2022-09-11 ENCOUNTER — HOSPITAL ENCOUNTER (INPATIENT)
Facility: HOSPITAL | Age: 62
LOS: 6 days | Discharge: HOME HEALTH | End: 2022-09-17
Attending: STUDENT IN AN ORGANIZED HEALTH CARE EDUCATION/TRAINING PROGRAM | Admitting: FAMILY MEDICINE

## 2022-09-11 ENCOUNTER — APPOINTMENT (OUTPATIENT)
Dept: CT IMAGING | Facility: HOSPITAL | Age: 62
End: 2022-09-11

## 2022-09-11 ENCOUNTER — APPOINTMENT (OUTPATIENT)
Dept: GENERAL RADIOLOGY | Facility: HOSPITAL | Age: 62
End: 2022-09-11

## 2022-09-11 DIAGNOSIS — K74.3 PRIMARY BILIARY CHOLANGITIS: Chronic | ICD-10-CM

## 2022-09-11 DIAGNOSIS — K21.9 GASTROESOPHAGEAL REFLUX DISEASE WITHOUT ESOPHAGITIS: Chronic | ICD-10-CM

## 2022-09-11 DIAGNOSIS — R09.89 GLOBUS SENSATION: ICD-10-CM

## 2022-09-11 DIAGNOSIS — E66.9 OBESITY (BMI 30-39.9): ICD-10-CM

## 2022-09-11 DIAGNOSIS — F31.12 BIPOLAR AFFECTIVE DISORDER, CURRENTLY MANIC, MODERATE: ICD-10-CM

## 2022-09-11 DIAGNOSIS — Z91.09 ENVIRONMENTAL ALLERGIES: Chronic | ICD-10-CM

## 2022-09-11 DIAGNOSIS — F41.0 PANIC ATTACK: ICD-10-CM

## 2022-09-11 DIAGNOSIS — J84.10 PULMONARY FIBROSIS: Chronic | ICD-10-CM

## 2022-09-11 DIAGNOSIS — J96.11 CHRONIC RESPIRATORY FAILURE WITH HYPOXIA: Chronic | ICD-10-CM

## 2022-09-11 DIAGNOSIS — Z99.89 OBSTRUCTIVE SLEEP APNEA ON CPAP: Chronic | ICD-10-CM

## 2022-09-11 DIAGNOSIS — G47.33 OBSTRUCTIVE SLEEP APNEA ON CPAP: Chronic | ICD-10-CM

## 2022-09-11 DIAGNOSIS — F22 PARANOIA (PSYCHOSIS): ICD-10-CM

## 2022-09-11 DIAGNOSIS — Z74.09 IMPAIRED MOBILITY: ICD-10-CM

## 2022-09-11 DIAGNOSIS — M34.1 CREST SYNDROME: Chronic | ICD-10-CM

## 2022-09-11 DIAGNOSIS — Z91.199 H/O NONCOMPLIANCE WITH MEDICAL TREATMENT, PRESENTING HAZARDS TO HEALTH: ICD-10-CM

## 2022-09-11 DIAGNOSIS — Z86.010 HISTORY OF ADENOMATOUS POLYP OF COLON: ICD-10-CM

## 2022-09-11 DIAGNOSIS — J96.20 ACUTE ON CHRONIC RESPIRATORY FAILURE, UNSPECIFIED WHETHER WITH HYPOXIA OR HYPERCAPNIA: ICD-10-CM

## 2022-09-11 DIAGNOSIS — R09.02 HYPOXIA: Primary | ICD-10-CM

## 2022-09-11 DIAGNOSIS — J96.21 ACUTE ON CHRONIC RESPIRATORY FAILURE WITH HYPOXIA: ICD-10-CM

## 2022-09-11 DIAGNOSIS — E87.6 HYPOKALEMIA: ICD-10-CM

## 2022-09-11 DIAGNOSIS — K76.6 PAH (PULMONARY ARTERIAL HYPERTENSION) WITH PORTAL HYPERTENSION: Chronic | ICD-10-CM

## 2022-09-11 DIAGNOSIS — I27.21 PAH (PULMONARY ARTERIAL HYPERTENSION) WITH PORTAL HYPERTENSION: Chronic | ICD-10-CM

## 2022-09-11 DIAGNOSIS — I73.00 RAYNAUD'S PHENOMENON WITHOUT GANGRENE: Chronic | ICD-10-CM

## 2022-09-11 LAB
ALBUMIN SERPL-MCNC: 4.2 G/DL (ref 3.5–5.2)
ALBUMIN/GLOB SERPL: 1.4 G/DL
ALP SERPL-CCNC: 105 U/L (ref 39–117)
ALT SERPL W P-5'-P-CCNC: 14 U/L (ref 1–33)
ANION GAP SERPL CALCULATED.3IONS-SCNC: 14 MMOL/L (ref 5–15)
ARTERIAL PATENCY WRIST A: POSITIVE
AST SERPL-CCNC: 20 U/L (ref 1–32)
ATMOSPHERIC PRESS: 752 MMHG
BASE EXCESS BLDA CALC-SCNC: 0.2 MMOL/L (ref 0–2)
BASOPHILS # BLD AUTO: 0.1 10*3/MM3 (ref 0–0.2)
BASOPHILS NFR BLD AUTO: 0.8 % (ref 0–1.5)
BDY SITE: ABNORMAL
BILIRUB SERPL-MCNC: 0.4 MG/DL (ref 0–1.2)
BILIRUB UR QL STRIP: NEGATIVE
BODY TEMPERATURE: 37 C
BUN SERPL-MCNC: 9 MG/DL (ref 8–23)
BUN/CREAT SERPL: 10.2 (ref 7–25)
CALCIUM SPEC-SCNC: 9 MG/DL (ref 8.6–10.5)
CHLORIDE SERPL-SCNC: 103 MMOL/L (ref 98–107)
CLARITY UR: CLEAR
CO2 SERPL-SCNC: 23 MMOL/L (ref 22–29)
COLOR UR: YELLOW
CREAT SERPL-MCNC: 0.88 MG/DL (ref 0.57–1)
D-LACTATE SERPL-SCNC: 1.2 MMOL/L (ref 0.5–2)
D-LACTATE SERPL-SCNC: 2.4 MMOL/L (ref 0.5–2)
DEPRECATED RDW RBC AUTO: 41.1 FL (ref 37–54)
EGFRCR SERPLBLD CKD-EPI 2021: 73.5 ML/MIN/1.73
EOSINOPHIL # BLD AUTO: 0.13 10*3/MM3 (ref 0–0.4)
EOSINOPHIL NFR BLD AUTO: 1 % (ref 0.3–6.2)
ERYTHROCYTE [DISTWIDTH] IN BLOOD BY AUTOMATED COUNT: 13.6 % (ref 12.3–15.4)
GAS FLOW AIRWAY: 10 LPM
GLOBULIN UR ELPH-MCNC: 3 GM/DL
GLUCOSE SERPL-MCNC: 134 MG/DL (ref 65–99)
GLUCOSE UR STRIP-MCNC: NEGATIVE MG/DL
HCO3 BLDA-SCNC: 24.2 MMOL/L (ref 20–26)
HCT VFR BLD AUTO: 44.4 % (ref 34–46.6)
HGB BLD-MCNC: 13.5 G/DL (ref 12–15.9)
HGB UR QL STRIP.AUTO: NEGATIVE
HOLD SPECIMEN: NORMAL
IMM GRANULOCYTES # BLD AUTO: 0.07 10*3/MM3 (ref 0–0.05)
IMM GRANULOCYTES NFR BLD AUTO: 0.6 % (ref 0–0.5)
KETONES UR QL STRIP: NEGATIVE
LEUKOCYTE ESTERASE UR QL STRIP.AUTO: NEGATIVE
LYMPHOCYTES # BLD AUTO: 1.28 10*3/MM3 (ref 0.7–3.1)
LYMPHOCYTES NFR BLD AUTO: 10.2 % (ref 19.6–45.3)
Lab: ABNORMAL
MAGNESIUM SERPL-MCNC: 2.1 MG/DL (ref 1.6–2.4)
MCH RBC QN AUTO: 25.4 PG (ref 26.6–33)
MCHC RBC AUTO-ENTMCNC: 30.4 G/DL (ref 31.5–35.7)
MCV RBC AUTO: 83.5 FL (ref 79–97)
MODALITY: ABNORMAL
MONOCYTES # BLD AUTO: 0.68 10*3/MM3 (ref 0.1–0.9)
MONOCYTES NFR BLD AUTO: 5.4 % (ref 5–12)
NEUTROPHILS NFR BLD AUTO: 10.25 10*3/MM3 (ref 1.7–7)
NEUTROPHILS NFR BLD AUTO: 82 % (ref 42.7–76)
NITRITE UR QL STRIP: NEGATIVE
NRBC BLD AUTO-RTO: 0 /100 WBC (ref 0–0.2)
PCO2 BLDA: 36.5 MM HG (ref 35–45)
PCO2 TEMP ADJ BLD: 36.5 MM HG (ref 35–45)
PH BLDA: 7.43 PH UNITS (ref 7.35–7.45)
PH UR STRIP.AUTO: 6 [PH] (ref 5–8)
PH, TEMP CORRECTED: 7.43 PH UNITS (ref 7.35–7.45)
PLATELET # BLD AUTO: 252 10*3/MM3 (ref 140–450)
PMV BLD AUTO: 11.5 FL (ref 6–12)
PO2 BLDA: 65.4 MM HG (ref 83–108)
PO2 TEMP ADJ BLD: 65.4 MM HG (ref 83–108)
POTASSIUM SERPL-SCNC: 3.2 MMOL/L (ref 3.5–5.2)
PROT SERPL-MCNC: 7.2 G/DL (ref 6–8.5)
PROT UR QL STRIP: NEGATIVE
RBC # BLD AUTO: 5.32 10*6/MM3 (ref 3.77–5.28)
SAO2 % BLDCOA: 94.2 % (ref 94–99)
SARS-COV-2 RNA PNL SPEC NAA+PROBE: NOT DETECTED
SODIUM SERPL-SCNC: 140 MMOL/L (ref 136–145)
SP GR UR STRIP: >1.03 (ref 1–1.03)
TROPONIN T SERPL-MCNC: <0.01 NG/ML (ref 0–0.03)
UROBILINOGEN UR QL STRIP: ABNORMAL
VENTILATOR MODE: ABNORMAL
WBC NRBC COR # BLD: 12.51 10*3/MM3 (ref 3.4–10.8)
WHOLE BLOOD HOLD COAG: NORMAL
WHOLE BLOOD HOLD SPECIMEN: NORMAL

## 2022-09-11 PROCEDURE — 25010000002 CEFEPIME PER 500 MG: Performed by: STUDENT IN AN ORGANIZED HEALTH CARE EDUCATION/TRAINING PROGRAM

## 2022-09-11 PROCEDURE — 82803 BLOOD GASES ANY COMBINATION: CPT

## 2022-09-11 PROCEDURE — 99285 EMERGENCY DEPT VISIT HI MDM: CPT

## 2022-09-11 PROCEDURE — 71275 CT ANGIOGRAPHY CHEST: CPT

## 2022-09-11 PROCEDURE — 84484 ASSAY OF TROPONIN QUANT: CPT | Performed by: STUDENT IN AN ORGANIZED HEALTH CARE EDUCATION/TRAINING PROGRAM

## 2022-09-11 PROCEDURE — 83605 ASSAY OF LACTIC ACID: CPT | Performed by: STUDENT IN AN ORGANIZED HEALTH CARE EDUCATION/TRAINING PROGRAM

## 2022-09-11 PROCEDURE — P9612 CATHETERIZE FOR URINE SPEC: HCPCS

## 2022-09-11 PROCEDURE — 87150 DNA/RNA AMPLIFIED PROBE: CPT | Performed by: STUDENT IN AN ORGANIZED HEALTH CARE EDUCATION/TRAINING PROGRAM

## 2022-09-11 PROCEDURE — 87147 CULTURE TYPE IMMUNOLOGIC: CPT | Performed by: STUDENT IN AN ORGANIZED HEALTH CARE EDUCATION/TRAINING PROGRAM

## 2022-09-11 PROCEDURE — 83735 ASSAY OF MAGNESIUM: CPT | Performed by: STUDENT IN AN ORGANIZED HEALTH CARE EDUCATION/TRAINING PROGRAM

## 2022-09-11 PROCEDURE — 93005 ELECTROCARDIOGRAM TRACING: CPT | Performed by: STUDENT IN AN ORGANIZED HEALTH CARE EDUCATION/TRAINING PROGRAM

## 2022-09-11 PROCEDURE — 0 IOPAMIDOL PER 1 ML: Performed by: STUDENT IN AN ORGANIZED HEALTH CARE EDUCATION/TRAINING PROGRAM

## 2022-09-11 PROCEDURE — 36600 WITHDRAWAL OF ARTERIAL BLOOD: CPT

## 2022-09-11 PROCEDURE — 80053 COMPREHEN METABOLIC PANEL: CPT | Performed by: STUDENT IN AN ORGANIZED HEALTH CARE EDUCATION/TRAINING PROGRAM

## 2022-09-11 PROCEDURE — 81003 URINALYSIS AUTO W/O SCOPE: CPT | Performed by: FAMILY MEDICINE

## 2022-09-11 PROCEDURE — 85025 COMPLETE CBC W/AUTO DIFF WBC: CPT | Performed by: STUDENT IN AN ORGANIZED HEALTH CARE EDUCATION/TRAINING PROGRAM

## 2022-09-11 PROCEDURE — 87040 BLOOD CULTURE FOR BACTERIA: CPT | Performed by: STUDENT IN AN ORGANIZED HEALTH CARE EDUCATION/TRAINING PROGRAM

## 2022-09-11 PROCEDURE — 36415 COLL VENOUS BLD VENIPUNCTURE: CPT

## 2022-09-11 PROCEDURE — 71045 X-RAY EXAM CHEST 1 VIEW: CPT

## 2022-09-11 PROCEDURE — 87635 SARS-COV-2 COVID-19 AMP PRB: CPT | Performed by: STUDENT IN AN ORGANIZED HEALTH CARE EDUCATION/TRAINING PROGRAM

## 2022-09-11 PROCEDURE — 25010000002 VANCOMYCIN 10 G RECONSTITUTED SOLUTION: Performed by: STUDENT IN AN ORGANIZED HEALTH CARE EDUCATION/TRAINING PROGRAM

## 2022-09-11 RX ORDER — GUAIFENESIN/DEXTROMETHORPHAN 100-10MG/5
10 SYRUP ORAL EVERY 6 HOURS PRN
Status: DISCONTINUED | OUTPATIENT
Start: 2022-09-11 | End: 2022-09-17 | Stop reason: HOSPADM

## 2022-09-11 RX ORDER — ONDANSETRON 2 MG/ML
4 INJECTION INTRAMUSCULAR; INTRAVENOUS EVERY 6 HOURS PRN
Status: DISCONTINUED | OUTPATIENT
Start: 2022-09-11 | End: 2022-09-17 | Stop reason: HOSPADM

## 2022-09-11 RX ORDER — FAMOTIDINE 20 MG/1
20 TABLET, FILM COATED ORAL
Status: DISCONTINUED | OUTPATIENT
Start: 2022-09-12 | End: 2022-09-15 | Stop reason: SDUPTHER

## 2022-09-11 RX ORDER — GUAIFENESIN 600 MG/1
600 TABLET, EXTENDED RELEASE ORAL EVERY 12 HOURS SCHEDULED
Status: DISCONTINUED | OUTPATIENT
Start: 2022-09-12 | End: 2022-09-17 | Stop reason: HOSPADM

## 2022-09-11 RX ORDER — BUSPIRONE HYDROCHLORIDE 10 MG/1
10 TABLET ORAL 2 TIMES DAILY PRN
Status: DISCONTINUED | OUTPATIENT
Start: 2022-09-11 | End: 2022-09-17 | Stop reason: HOSPADM

## 2022-09-11 RX ORDER — SODIUM CHLORIDE 0.9 % (FLUSH) 0.9 %
10 SYRINGE (ML) INJECTION AS NEEDED
Status: DISCONTINUED | OUTPATIENT
Start: 2022-09-11 | End: 2022-09-17 | Stop reason: HOSPADM

## 2022-09-11 RX ORDER — ACETAMINOPHEN 650 MG/1
650 SUPPOSITORY RECTAL EVERY 4 HOURS PRN
Status: DISCONTINUED | OUTPATIENT
Start: 2022-09-11 | End: 2022-09-17 | Stop reason: HOSPADM

## 2022-09-11 RX ORDER — SODIUM CHLORIDE 0.9 % (FLUSH) 0.9 %
10 SYRINGE (ML) INJECTION EVERY 12 HOURS SCHEDULED
Status: DISCONTINUED | OUTPATIENT
Start: 2022-09-11 | End: 2022-09-17 | Stop reason: HOSPADM

## 2022-09-11 RX ORDER — VENLAFAXINE 37.5 MG/1
75 TABLET ORAL DAILY
Status: DISCONTINUED | OUTPATIENT
Start: 2022-09-12 | End: 2022-09-17 | Stop reason: HOSPADM

## 2022-09-11 RX ORDER — ALBUTEROL SULFATE 2.5 MG/3ML
2.5 SOLUTION RESPIRATORY (INHALATION) EVERY 4 HOURS PRN
Status: DISCONTINUED | OUTPATIENT
Start: 2022-09-11 | End: 2022-09-17 | Stop reason: HOSPADM

## 2022-09-11 RX ORDER — ACETAMINOPHEN 325 MG/1
650 TABLET ORAL EVERY 4 HOURS PRN
Status: DISCONTINUED | OUTPATIENT
Start: 2022-09-11 | End: 2022-09-17 | Stop reason: HOSPADM

## 2022-09-11 RX ORDER — METHYLPREDNISOLONE SODIUM SUCCINATE 125 MG/2ML
62.5 INJECTION, POWDER, LYOPHILIZED, FOR SOLUTION INTRAMUSCULAR; INTRAVENOUS DAILY
Status: DISCONTINUED | OUTPATIENT
Start: 2022-09-12 | End: 2022-09-12

## 2022-09-11 RX ORDER — CLONAZEPAM 0.5 MG/1
0.5 TABLET ORAL 2 TIMES DAILY PRN
Status: DISCONTINUED | OUTPATIENT
Start: 2022-09-11 | End: 2022-09-17 | Stop reason: HOSPADM

## 2022-09-11 RX ORDER — ENOXAPARIN SODIUM 100 MG/ML
40 INJECTION SUBCUTANEOUS DAILY
Status: DISCONTINUED | OUTPATIENT
Start: 2022-09-12 | End: 2022-09-17 | Stop reason: HOSPADM

## 2022-09-11 RX ORDER — IPRATROPIUM BROMIDE AND ALBUTEROL SULFATE 2.5; .5 MG/3ML; MG/3ML
3 SOLUTION RESPIRATORY (INHALATION)
Status: DISCONTINUED | OUTPATIENT
Start: 2022-09-12 | End: 2022-09-17

## 2022-09-11 RX ADMIN — CEFEPIME 2 G: 2 INJECTION, POWDER, FOR SOLUTION INTRAVENOUS at 19:29

## 2022-09-11 RX ADMIN — SODIUM CHLORIDE, POTASSIUM CHLORIDE, SODIUM LACTATE AND CALCIUM CHLORIDE 250 ML: 600; 310; 30; 20 INJECTION, SOLUTION INTRAVENOUS at 19:28

## 2022-09-11 RX ADMIN — Medication 1750 MG: at 20:23

## 2022-09-11 RX ADMIN — IOPAMIDOL 81 ML: 755 INJECTION, SOLUTION INTRAVENOUS at 20:16

## 2022-09-11 NOTE — ED PROVIDER NOTES
Subjective   PIT    History of Present Illness   Patient presents with hypoxia.  She has a history of advanced pulmonary hypertension for which she follows with a pulmonologist.  She also has crest syndrome and pulmonary fibrosis.  She has been feeling gradually worse for a long time but since starting a new inhaler on Tuesday she notes that she has been having fairly quickly worsening shortness of breath.  She also states that inhaler makes her feel very weak and lightheaded and she feels it drops her blood pressure.  She has been noticing her oxygen will drop sometimes down to the 50s or 70s whenever she gets up to go to the bathroom and it is difficult to recover.  She has been having diarrhea and not drinking as much fluid as she should.  No fevers.  She has her baseline cough productive of white sputum.    Review of Systems   Constitutional: Positive for chills. Negative for fever.   HENT: Negative for congestion and sore throat.    Respiratory: Positive for cough and shortness of breath.    Cardiovascular: Negative for chest pain and palpitations.   Gastrointestinal: Negative for abdominal pain and vomiting.   Genitourinary: Negative for difficulty urinating and dysuria.   Musculoskeletal: Negative for gait problem and joint swelling.   Skin: Negative for rash and wound.   Neurological: Positive for light-headedness. Negative for syncope.       Past Medical History:   Diagnosis Date   • Allergies    • Arthritis    • BMI 31.0-31.9,adult 06/04/2019   • Calcified granuloma of lung ([facility]) 06/04/2019    Right lung   • CHF (congestive heart failure) ([facility])     denies   • Chronic respiratory failure with hypoxia ([facility]) 08/05/2020   • Chronic respiratory failure with hypoxia, on home oxygen therapy ([facility])    • COVID-19 02/01/2022   • CREST syndrome ([facility])    • Environmental allergies 02/01/2022   • Gastroesophageal reflux disease without esophagitis 06/04/2019   • Obstructive sleep apnea on CPAP 06/04/2019   • Pulmonary  fibrosis (HCC)    • Pulmonary hypertension (HCC)    • Short-term memory loss        Allergies   Allergen Reactions   • Codeine Nausea And Vomiting   • Latex Rash       Past Surgical History:   Procedure Laterality Date   • BREAST BIOPSY     • CARDIAC CATHETERIZATION N/A 7/22/2020    Procedure: Right Heart Cath;  Surgeon: Thong Martin MD;  Location: [facility] CATH INVASIVE LOCATION;  Service: Cardiology;  Laterality: N/A;   • CHOLECYSTECTOMY     • COLONOSCOPY  05/11/2015    5 POLYPS   • COLONOSCOPY N/A 8/4/2021    Procedure: COLONOSCOPY WITH ANESTHESIA;  Surgeon: Michael Landin DO;  Location: [facility] ENDOSCOPY;  Service: Gastroenterology;  Laterality: N/A;  pre-hx polyps  post-colon polyps   • ENDOSCOPY N/A 8/4/2021    Procedure: ESOPHAGOGASTRODUODENOSCOPY WITH ANESTHESIA;  Surgeon: Michael Landin DO;  Location: [facility] ENDOSCOPY;  Service: Gastroenterology;  Laterality: N/A;  pre-dysphagia  post-normal  pcp-lanette aguila       Family History   Problem Relation Age of Onset   • Cirrhosis Sister    • Liver cancer Brother    • No Known Problems Mother    • No Known Problems Father    • Colon cancer Neg Hx    • Colon polyps Neg Hx        Social History     Socioeconomic History   • Marital status:    Tobacco Use   • Smoking status: Never Smoker   • Smokeless tobacco: Never Used   Vaping Use   • Vaping Use: Never used   Substance and Sexual Activity   • Alcohol use: No   • Drug use: No   • Sexual activity: Defer           Objective   Physical Exam  Vitals reviewed.   Constitutional:       Appearance: She is ill-appearing.   HENT:      Head: Normocephalic and atraumatic.   Eyes:      Extraocular Movements: Extraocular movements intact.      Conjunctiva/sclera: Conjunctivae normal.   Cardiovascular:      Pulses: Normal pulses.      Heart sounds: Normal heart sounds.   Pulmonary:      Effort: Pulmonary effort is normal. No respiratory distress.      Breath sounds: No decreased breath sounds or wheezing.    Abdominal:      General: Abdomen is flat. There is no distension.   Musculoskeletal:      Cervical back: Normal range of motion and neck supple.      Right lower leg: No edema.      Left lower leg: No edema.   Skin:     General: Skin is warm and dry.   Neurological:      General: No focal deficit present.      Mental Status: She is alert. Mental status is at baseline.   Psychiatric:         Behavior: Behavior normal.         Thought Content: Thought content normal.         Procedures           ED Course  ED Course as of 09/11/22 2354   Sun Sep 11, 2022   1915 XR Chest 1 View [AS]      ED Course User Index  [AS] Jake Sanabria MD                                           [facility]   Elaine Juárez is a 62 y.o. female with PMH above who presents to the Emergency Department with hypoxia.  Normally wears 8 L of oxygen at home and is requiring 12 here to keep her sats above 92%.  Work of breathing improved with high flow nasal cannula, respiratory rate decreased.  She appears moderately dehydrated and 250 cc fluid bolus was ordered given her history of pulmonary hypertension.  She has not skipped any doses of steroids with her crest syndrome and continues on immunosuppressants.  Suspect hypovolemia may be the etiology of her symptoms but will also cover for sepsis given her tachycardia, tachypnea, immunosuppressed state.  Vanco cefepime ordered empirically.  Laboratory studies ordered.  Chest x-ray and EKG ordered.  She does not have painful swelling in her legs, sudden worsening of her condition, or chest pain to raise suspicion for PE.     ED Course:   -EKG reviewed by me; shows sinus rhythm, no focal ischemic changes, no arrhythmia. TWI in anterior leads present previously.  -Lab studies notable for mild lactic acidosis and mild leukocytosis.  The patient had greatly improved vital signs and symptoms on repeat evaluation with decreased tachycardia and improved sats.  As able to wean her oxygen to 10 L high  flow.-chest x-ray reviewed by me. no focal consolidations, no pulmonary edema, mediastinum not widened. PE study ordered for closer eval. PE study did not show any evidence of pneumonia or PE.  Further fluid resuscitation is held at this time given the improvement and need for precise hydration status; overhydrating patient can be catastrophic with her medical hx. discussed the case with hospitalist Garrison Trivedi who agrees to admission.      Final diagnosis: hypoxia    All questions answered. Patient/family was understanding and in agreement with today's assessment and plan. The patient was monitored during their stay in the ED and dispositioned without acute event.    Electronically signed by:  Jake Sanabria MD 9/11/2022 23:54 CDT      Note: Dragon medical dictation software was used in the creation of this note.        Final diagnoses:   None       ED Disposition  ED Disposition     ED Disposition   Decision to Admit    Condition   --    Comment   Level of Care: Critical Care [6]   Diagnosis: Hypoxia [321320]   Admitting Physician: SHARON FAGAN [[id number]]   Attending Physician: SHARON FAGAN [[id number]]   Certification: I Certify That Inpatient Hospital Services Are Medically Necessary For Greater Than 2 Midnights               No follow-up provider specified.       Medication List      No changes were made to your prescriptions during this visit.          Jake Sanabria MD  09/11/22 [id number]

## 2022-09-12 ENCOUNTER — APPOINTMENT (OUTPATIENT)
Dept: CARDIOLOGY | Facility: HOSPITAL | Age: 62
End: 2022-09-12

## 2022-09-12 LAB
B PARAPERT DNA SPEC QL NAA+PROBE: NOT DETECTED
B PERT DNA SPEC QL NAA+PROBE: NOT DETECTED
BACTERIA BLD CULT: ABNORMAL
BH CV ECHO MEAS - AO MAX PG: 3.6 MMHG
BH CV ECHO MEAS - AO MEAN PG: 2 MMHG
BH CV ECHO MEAS - AO ROOT DIAM: 3.1 CM
BH CV ECHO MEAS - AO V2 MAX: 94.8 CM/SEC
BH CV ECHO MEAS - AO V2 VTI: 13.4 CM
BH CV ECHO MEAS - AVA(I,D): 3 CM2
BH CV ECHO MEAS - EDV(CUBED): 50.2 ML
BH CV ECHO MEAS - EDV(MOD-SP4): 69.7 ML
BH CV ECHO MEAS - EF(MOD-SP4): 62 %
BH CV ECHO MEAS - ESV(CUBED): 11.7 ML
BH CV ECHO MEAS - ESV(MOD-SP4): 26.5 ML
BH CV ECHO MEAS - FS: 38.5 %
BH CV ECHO MEAS - IVS/LVPW: 0.93 CM
BH CV ECHO MEAS - IVSD: 1 CM
BH CV ECHO MEAS - LA DIMENSION: 3 CM
BH CV ECHO MEAS - LAT PEAK E' VEL: 6.7 CM/SEC
BH CV ECHO MEAS - LV DIASTOLIC VOL/BSA (35-75): 38.5 CM2
BH CV ECHO MEAS - LV MASS(C)D: 118.6 GRAMS
BH CV ECHO MEAS - LV MAX PG: 1.73 MMHG
BH CV ECHO MEAS - LV MEAN PG: 1 MMHG
BH CV ECHO MEAS - LV SYSTOLIC VOL/BSA (12-30): 14.6 CM2
BH CV ECHO MEAS - LV V1 MAX: 65.8 CM/SEC
BH CV ECHO MEAS - LV V1 VTI: 9.8 CM
BH CV ECHO MEAS - LVIDD: 3.7 CM
BH CV ECHO MEAS - LVIDS: 2.27 CM
BH CV ECHO MEAS - LVOT AREA: 4.2 CM2
BH CV ECHO MEAS - LVOT DIAM: 2.3 CM
BH CV ECHO MEAS - LVPWD: 1.08 CM
BH CV ECHO MEAS - MED PEAK E' VEL: 2.8 CM/SEC
BH CV ECHO MEAS - MV A MAX VEL: 95.1 CM/SEC
BH CV ECHO MEAS - MV DEC TIME: 0.07 MSEC
BH CV ECHO MEAS - MV E MAX VEL: 80.5 CM/SEC
BH CV ECHO MEAS - MV E/A: 0.85
BH CV ECHO MEAS - PA V2 MAX: 46.1 CM/SEC
BH CV ECHO MEAS - PI END-D VEL: 237 CM/SEC
BH CV ECHO MEAS - RAP SYSTOLE: 5 MMHG
BH CV ECHO MEAS - RVSP: 60.1 MMHG
BH CV ECHO MEAS - SI(MOD-SP4): 23.9 ML/M2
BH CV ECHO MEAS - SV(LVOT): 40.8 ML
BH CV ECHO MEAS - SV(MOD-SP4): 43.2 ML
BH CV ECHO MEAS - TR MAX PG: 55.1 MMHG
BH CV ECHO MEAS - TR MAX VEL: 371 CM/SEC
BH CV ECHO MEASUREMENTS AVERAGE E/E' RATIO: 16.95
BOTTLE TYPE: ABNORMAL
C PNEUM DNA NPH QL NAA+NON-PROBE: NOT DETECTED
D-LACTATE SERPL-SCNC: 2.2 MMOL/L (ref 0.5–2)
FLUAV SUBTYP SPEC NAA+PROBE: NOT DETECTED
FLUBV RNA ISLT QL NAA+PROBE: NOT DETECTED
HADV DNA SPEC NAA+PROBE: NOT DETECTED
HCOV 229E RNA SPEC QL NAA+PROBE: NOT DETECTED
HCOV HKU1 RNA SPEC QL NAA+PROBE: NOT DETECTED
HCOV NL63 RNA SPEC QL NAA+PROBE: NOT DETECTED
HCOV OC43 RNA SPEC QL NAA+PROBE: NOT DETECTED
HMPV RNA NPH QL NAA+NON-PROBE: NOT DETECTED
HPIV1 RNA ISLT QL NAA+PROBE: NOT DETECTED
HPIV2 RNA SPEC QL NAA+PROBE: NOT DETECTED
HPIV3 RNA NPH QL NAA+PROBE: NOT DETECTED
HPIV4 P GENE NPH QL NAA+PROBE: NOT DETECTED
LEFT ATRIUM VOLUME INDEX: 18 ML/M2
LEFT ATRIUM VOLUME: 32.6 ML
M PNEUMO IGG SER IA-ACNC: NOT DETECTED
MAXIMAL PREDICTED HEART RATE: 156 BPM
MRSA DNA SPEC QL NAA+PROBE: NORMAL
NT-PROBNP SERPL-MCNC: 7417 PG/ML (ref 0–900)
QT INTERVAL: 394 MS
QTC INTERVAL: 575 MS
RHINOVIRUS RNA SPEC NAA+PROBE: NOT DETECTED
RSV RNA NPH QL NAA+NON-PROBE: NOT DETECTED
SARS-COV-2 RNA NPH QL NAA+NON-PROBE: NOT DETECTED
STRESS TARGET HR: 133 BPM

## 2022-09-12 PROCEDURE — 94799 UNLISTED PULMONARY SVC/PX: CPT

## 2022-09-12 PROCEDURE — 25010000002 ENOXAPARIN PER 10 MG: Performed by: FAMILY MEDICINE

## 2022-09-12 PROCEDURE — 94664 DEMO&/EVAL PT USE INHALER: CPT

## 2022-09-12 PROCEDURE — 25010000002 METHYLPREDNISOLONE PER 40 MG: Performed by: INTERNAL MEDICINE

## 2022-09-12 PROCEDURE — 63710000001 MYCOPHENOLATE MOFETIL PER 250 MG: Performed by: INTERNAL MEDICINE

## 2022-09-12 PROCEDURE — 25010000002 PERFLUTREN 6.52 MG/ML SUSPENSION: Performed by: FAMILY MEDICINE

## 2022-09-12 PROCEDURE — 93306 TTE W/DOPPLER COMPLETE: CPT | Performed by: INTERNAL MEDICINE

## 2022-09-12 PROCEDURE — 99223 1ST HOSP IP/OBS HIGH 75: CPT | Performed by: INTERNAL MEDICINE

## 2022-09-12 PROCEDURE — 94640 AIRWAY INHALATION TREATMENT: CPT

## 2022-09-12 PROCEDURE — 25010000002 METHYLPREDNISOLONE PER 125 MG: Performed by: FAMILY MEDICINE

## 2022-09-12 PROCEDURE — 0202U NFCT DS 22 TRGT SARS-COV-2: CPT | Performed by: INTERNAL MEDICINE

## 2022-09-12 PROCEDURE — 93306 TTE W/DOPPLER COMPLETE: CPT

## 2022-09-12 PROCEDURE — 25010000002 FUROSEMIDE PER 20 MG: Performed by: FAMILY MEDICINE

## 2022-09-12 PROCEDURE — 83880 ASSAY OF NATRIURETIC PEPTIDE: CPT | Performed by: NURSE PRACTITIONER

## 2022-09-12 PROCEDURE — 25010000002 AZITHROMYCIN PER 500 MG: Performed by: INTERNAL MEDICINE

## 2022-09-12 PROCEDURE — 83605 ASSAY OF LACTIC ACID: CPT | Performed by: INTERNAL MEDICINE

## 2022-09-12 PROCEDURE — 87641 MR-STAPH DNA AMP PROBE: CPT | Performed by: FAMILY MEDICINE

## 2022-09-12 PROCEDURE — 25010000002 CEFTRIAXONE PER 250 MG: Performed by: INTERNAL MEDICINE

## 2022-09-12 RX ORDER — OMEPRAZOLE 40 MG/1
40 CAPSULE, DELAYED RELEASE ORAL DAILY
COMMUNITY
End: 2023-04-04 | Stop reason: SDUPTHER

## 2022-09-12 RX ORDER — FUROSEMIDE 10 MG/ML
20 INJECTION INTRAMUSCULAR; INTRAVENOUS ONCE
Status: DISCONTINUED | OUTPATIENT
Start: 2022-09-12 | End: 2022-09-12

## 2022-09-12 RX ORDER — MYCOPHENOLATE MOFETIL 500 MG/1
1000 TABLET ORAL 2 TIMES DAILY
Status: DISCONTINUED | OUTPATIENT
Start: 2022-09-12 | End: 2022-09-17 | Stop reason: HOSPADM

## 2022-09-12 RX ORDER — METHYLPREDNISOLONE SODIUM SUCCINATE 40 MG/ML
40 INJECTION, POWDER, LYOPHILIZED, FOR SOLUTION INTRAMUSCULAR; INTRAVENOUS EVERY 12 HOURS
Status: DISCONTINUED | OUTPATIENT
Start: 2022-09-12 | End: 2022-09-14

## 2022-09-12 RX ORDER — FUROSEMIDE 10 MG/ML
20 INJECTION INTRAMUSCULAR; INTRAVENOUS EVERY 12 HOURS
Status: COMPLETED | OUTPATIENT
Start: 2022-09-12 | End: 2022-09-13

## 2022-09-12 RX ORDER — LANOLIN ALCOHOL/MO/W.PET/CERES
3 CREAM (GRAM) TOPICAL NIGHTLY PRN
Status: DISCONTINUED | OUTPATIENT
Start: 2022-09-12 | End: 2022-09-17 | Stop reason: HOSPADM

## 2022-09-12 RX ORDER — POTASSIUM CHLORIDE 750 MG/1
40 CAPSULE, EXTENDED RELEASE ORAL 2 TIMES DAILY
Status: COMPLETED | OUTPATIENT
Start: 2022-09-12 | End: 2022-09-13

## 2022-09-12 RX ORDER — TRAZODONE HYDROCHLORIDE 100 MG/1
100 TABLET ORAL NIGHTLY PRN
Status: DISCONTINUED | OUTPATIENT
Start: 2022-09-12 | End: 2022-09-17 | Stop reason: HOSPADM

## 2022-09-12 RX ADMIN — FUROSEMIDE 20 MG: 10 INJECTION, SOLUTION INTRAMUSCULAR; INTRAVENOUS at 16:35

## 2022-09-12 RX ADMIN — Medication 10 ML: at 01:04

## 2022-09-12 RX ADMIN — MYCOPHENOLATE MOFETIL 1000 MG: 500 TABLET, FILM COATED ORAL at 09:16

## 2022-09-12 RX ADMIN — BUSPIRONE HYDROCHLORIDE 10 MG: 10 TABLET ORAL at 09:55

## 2022-09-12 RX ADMIN — MYCOPHENOLATE MOFETIL 1000 MG: 500 TABLET, FILM COATED ORAL at 20:26

## 2022-09-12 RX ADMIN — GUAIFENESIN 600 MG: 600 TABLET, EXTENDED RELEASE ORAL at 08:22

## 2022-09-12 RX ADMIN — FAMOTIDINE 20 MG: 20 TABLET, FILM COATED ORAL at 16:38

## 2022-09-12 RX ADMIN — ENOXAPARIN SODIUM 40 MG: 40 INJECTION SUBCUTANEOUS at 08:32

## 2022-09-12 RX ADMIN — VENLAFAXINE HYDROCHLORIDE 75 MG: 37.5 TABLET ORAL at 08:21

## 2022-09-12 RX ADMIN — GUAIFENESIN 600 MG: 600 TABLET, EXTENDED RELEASE ORAL at 20:25

## 2022-09-12 RX ADMIN — PERFLUTREN 9.78 MG: 6.52 INJECTION, SUSPENSION INTRAVENOUS at 11:31

## 2022-09-12 RX ADMIN — Medication 10 ML: at 20:26

## 2022-09-12 RX ADMIN — GUAIFENESIN 600 MG: 600 TABLET, EXTENDED RELEASE ORAL at 01:00

## 2022-09-12 RX ADMIN — TREPROSTINIL 1 DOSE: 16 INHALANT ORAL at 20:26

## 2022-09-12 RX ADMIN — Medication 3 MG: at 03:24

## 2022-09-12 RX ADMIN — TREPROSTINIL 1 DOSE: 16 INHALANT ORAL at 13:14

## 2022-09-12 RX ADMIN — IPRATROPIUM BROMIDE AND ALBUTEROL SULFATE 3 ML: 2.5; .5 SOLUTION RESPIRATORY (INHALATION) at 18:56

## 2022-09-12 RX ADMIN — TRAZODONE HYDROCHLORIDE 100 MG: 100 TABLET ORAL at 21:32

## 2022-09-12 RX ADMIN — IPRATROPIUM BROMIDE AND ALBUTEROL SULFATE 3 ML: 2.5; .5 SOLUTION RESPIRATORY (INHALATION) at 00:53

## 2022-09-12 RX ADMIN — TREPROSTINIL 1 DOSE: 16 INHALANT ORAL at 17:35

## 2022-09-12 RX ADMIN — FAMOTIDINE 20 MG: 20 TABLET, FILM COATED ORAL at 06:16

## 2022-09-12 RX ADMIN — AZITHROMYCIN DIHYDRATE 500 MG: 500 INJECTION, POWDER, LYOPHILIZED, FOR SOLUTION INTRAVENOUS at 09:20

## 2022-09-12 RX ADMIN — Medication 10 ML: at 08:21

## 2022-09-12 RX ADMIN — METHYLPREDNISOLONE SODIUM SUCCINATE 62.5 MG: 125 INJECTION, POWDER, FOR SOLUTION INTRAMUSCULAR; INTRAVENOUS at 08:20

## 2022-09-12 RX ADMIN — CEFTRIAXONE 1 G: 1 INJECTION, POWDER, FOR SOLUTION INTRAMUSCULAR; INTRAVENOUS at 13:14

## 2022-09-12 RX ADMIN — POTASSIUM CHLORIDE 40 MEQ: 10 CAPSULE, COATED, EXTENDED RELEASE ORAL at 20:25

## 2022-09-12 RX ADMIN — IPRATROPIUM BROMIDE AND ALBUTEROL SULFATE 3 ML: 2.5; .5 SOLUTION RESPIRATORY (INHALATION) at 13:49

## 2022-09-12 RX ADMIN — MACITENTAN 10 MG: 10 TABLET, FILM COATED ORAL at 20:26

## 2022-09-12 RX ADMIN — METHYLPREDNISOLONE SODIUM SUCCINATE 40 MG: 40 INJECTION, POWDER, FOR SOLUTION INTRAMUSCULAR; INTRAVENOUS at 20:26

## 2022-09-12 RX ADMIN — CLONAZEPAM 0.5 MG: 0.5 TABLET ORAL at 21:32

## 2022-09-12 NOTE — PROGRESS NOTES
[facility] Medicine Services  INPATIENT PROGRESS NOTE    Length of Stay: 1  Date of Admission: 9/11/2022  Primary Care Physician: Aaron Stoddard MD    Subjective   Chief Complaint: Respiratory failure hypercapnia hypoxia/pulmonary fibrosis/CAD syndrome.    HPI   Blood pressures on the low side, afebrile.  Blood culture shows positive cocci 1 out of 2 bottles, possible contamination, will order MRSA for now.  Potassium is 3.2, p.o. potassium . possible liver fluid overload, Lasix IV for 1 day.    Review of Systems   Constitutional: Positive for activity change, appetite change and fatigue. Negative for chills and fever.   HENT: Negative for hearing loss, nosebleeds, tinnitus and trouble swallowing.    Eyes: Negative for visual disturbance.   Respiratory: Positive for cough, shortness of breath and wheezing. Negative for chest tightness.    Cardiovascular: Negative for chest pain, palpitations and leg swelling.   Gastrointestinal: Negative for abdominal distention, abdominal pain, blood in stool, constipation, diarrhea, nausea and vomiting.   Endocrine: Negative for cold intolerance, heat intolerance, polydipsia, polyphagia and polyuria.   Genitourinary: Negative for decreased urine volume, difficulty urinating, dysuria, flank pain, frequency and hematuria.   Musculoskeletal: Negative for arthralgias, joint swelling and myalgias.   Skin: Negative for rash.   Allergic/Immunologic: Negative for immunocompromised state.   Neurological: Positive for weakness. Negative for dizziness, syncope, light-headedness and headaches.   Hematological: Negative for adenopathy. Does not bruise/bleed easily.   Psychiatric/Behavioral: Negative for confusion and sleep disturbance. The patient is not nervous/anxious.           All pertinent negatives and positives are as above. All other systems have been reviewed and are negative unless otherwise stated.     Objective    Temp:  [97.9 °F (36.6  °C)-98.3 °F (36.8 °C)] 98.2 °F (36.8 °C)  Heart Rate:  [] 98  Resp:  [23-60] 60  BP: (100-136)/(48-99) 122/74    Intake/Output Summary (Last 24 hours) at 9/12/2022 1506  Last data filed at 9/12/2022 1400  Gross per 24 hour   Intake 730 ml   Output --   Net 730 ml     Physical Exam  Vitals and nursing note reviewed.   Constitutional:       Comments: Chronically ill.   HENT:      Head: Normocephalic.   Eyes:      Conjunctiva/sclera: Conjunctivae normal.      Pupils: Pupils are equal, round, and reactive to light.   Neck:      Vascular: No JVD.   Cardiovascular:      Rate and Rhythm: Normal rate and regular rhythm.      Heart sounds: Normal heart sounds.   Pulmonary:      Effort: No respiratory distress.      Breath sounds: No wheezing or rales.      Comments: On high flow oxygen.  Diminished breath bilateral, clear.  Chest:      Chest wall: No tenderness.   Abdominal:      General: Bowel sounds are normal. There is no distension.      Palpations: Abdomen is soft.      Tenderness: There is no abdominal tenderness.   Musculoskeletal:         General: No tenderness or deformity.      Cervical back: Neck supple.   Skin:     General: Skin is warm and dry.      Capillary Refill: Capillary refill takes 2 to 3 seconds.      Findings: No rash.   Neurological:      Mental Status: She is alert and oriented to person, place, and time.      Cranial Nerves: No cranial nerve deficit.      Motor: Weakness present. No abnormal muscle tone.      Deep Tendon Reflexes: Reflexes normal.   Psychiatric:         Mood and Affect: Mood normal.         Behavior: Behavior normal.         Thought Content: Thought content normal.         Results Review:  Lab Results (last 24 hours)     Procedure Component Value Units Date/Time    Respiratory Panel PCR w/COVID-19(SARS-CoV-2) KARISHMA/LUIS ARMANDO/DENYS/PAD/COR/MAD/DAVID In-House, NP Swab in UTM/VTM, 3-4 HR TAT - Swab, Nasopharynx [[id number]]  (Normal) Collected: 09/12/22 1319    Specimen: Swab from Nasopharynx  Updated: 09/12/22 1455     ADENOVIRUS, PCR Not Detected     Coronavirus 229E Not Detected     Coronavirus HKU1 Not Detected     Coronavirus NL63 Not Detected     Coronavirus OC43 Not Detected     COVID19 Not Detected     Human Metapneumovirus Not Detected     Human Rhinovirus/Enterovirus Not Detected     Influenza A PCR Not Detected     Influenza B PCR Not Detected     Parainfluenza Virus 1 Not Detected     Parainfluenza Virus 2 Not Detected     Parainfluenza Virus 3 Not Detected     Parainfluenza Virus 4 Not Detected     RSV, PCR Not Detected     Bordetella pertussis pcr Not Detected     Bordetella parapertussis PCR Not Detected     Chlamydophila pneumoniae PCR Not Detected     Mycoplasma pneumo by PCR Not Detected    Narrative:      In the setting of a positive respiratory panel with a viral infection PLUS a negative procalcitonin without other underlying concern for bacterial infection, consider observing off antibiotics or discontinuation of antibiotics and continue supportive care. If the respiratory panel is positive for atypical bacterial infection (Bordetella pertussis, Chlamydophila pneumoniae, or Mycoplasma pneumoniae), consider antibiotic de-escalation to target atypical bacterial infection.    Blood Culture - Blood, Arm, Left [[id number]]  (Abnormal) Collected: 09/11/22 1909    Specimen: Blood from Arm, Left Updated: 09/12/22 1434     Blood Culture Abnormal Stain     Gram Stain Anaerobic Bottle Gram positive cocci in clusters      Aerobic Bottle Gram positive cocci in clusters    Blood Culture ID, PCR - Blood, Arm, Left [[id number]] Collected: 09/11/22 1909    Specimen: Blood from Arm, Left Updated: 09/12/22 1407    BNP [[id number]]  (Abnormal) Collected: 09/12/22 1106    Specimen: Blood Updated: 09/12/22 1133     proBNP 7,417.0 pg/mL     Narrative:      Among patients with dyspnea, NT-proBNP is highly sensitive for the detection of acute congestive heart failure. In addition NT-proBNP of <300 pg/ml  effectively rules out acute congestive heart failure with 99% negative predictive value.    Results may be falsely decreased if patient taking Biotin.      Lactic Acid, Plasma [[id number]]  (Abnormal) Collected: 09/12/22 0949    Specimen: Blood Updated: 09/12/22 1024     Lactate 2.2 mmol/L     [place] Draw [[id number]] Collected: 09/11/22 1858    Specimen: Blood from Arm, Right Updated: 09/11/22 2302    Narrative:      The following orders were created for panel order [place] Draw.  Procedure                               Abnormality         Status                     ---------                               -----------         ------                     Green Top (Gel)[[id number]]                                  Final result               Lavender Top[[id number]]                                     Final result               Red Top[[id number]]                                          Final result               [place] Blood Culture Willie...[[id number]]                      Final result               Gray Top[[id number]]                                         Final result               Light Blue Top[[id number]]                                   Final result                 Please view results for these tests on the individual orders.    [place] Top [[id number]] Collected: 09/11/22 1858    Specimen: Blood from Arm, Right Updated: 09/11/22 2302     Extra Tube Hold for add-ons.     Comment: Auto resulted.       STAT Lactic Acid, Reflex [[id number]]  (Normal) Collected: 09/11/22 2213    Specimen: Blood Updated: 09/11/22 2236     Lactate 1.2 mmol/L     Urinalysis With Culture If Indicated - Urine, Catheter In/Out [[id number]]  (Abnormal) Collected: 09/11/22 2213    Specimen: Urine, Catheter In/Out Updated: 09/11/22 2225     Color, UA Yellow     Appearance, UA Clear     pH, UA 6.0     Specific Gravity, UA >1.030     Glucose, UA Negative     Ketones, UA Negative     Bilirubin, UA Negative     Blood, UA Negative     Protein, UA Negative      Leuk Esterase, UA Negative     Nitrite, UA Negative     Urobilinogen, UA 0.2 E.U./dL    Narrative:      In absence of clinical symptoms, the presence of pyuria, bacteria, and/or nitrites on the urinalysis result does not correlate with infection.  Urine microscopic not indicated.    Blood Gas, Arterial - [[id number]]  (Abnormal) Collected: 09/11/22 2218    Specimen: Arterial Blood Updated: 09/11/22 2217     Site Left Radial     Tony's Test Positive     pH, Arterial 7.430 pH units      pCO2, Arterial 36.5 mm Hg      pO2, Arterial 65.4 mm Hg      Comment: 84 Value below reference range        HCO3, Arterial 24.2 mmol/L      Base Excess, Arterial 0.2 mmol/L      O2 Saturation, Arterial 94.2 %      Temperature 37.0 C      Barometric Pressure for Blood Gas 752 mmHg      Modality HFNC     Flow Rate 10.0 lpm      Ventilator Mode NA     Collected by [id number]     Comment: Meter: [id number]     :  RAKEL        pCO2, Temperature Corrected 36.5 mm Hg      pH, Temp Corrected 7.430 pH Units      pO2, Temperature Corrected 65.4 mm Hg     COVID-19,[facility] IN-HOUSE,Nasal Swab No Transport Media 3-4 HR TAT - Swab, Nasal Cavity [[id number]]  (Normal) Collected: 09/11/22 1909    Specimen: Swab from Nasal Cavity Updated: 09/11/22 2028     COVID19 Not Detected    Narrative:      Fact sheet for providers: https://www.fda.gov/media/025704/download     Fact sheet for patients: https://www.fda.gov/media/906892/download    Test performed by PCR.    Consider negative results in combination with clinical observations, patient history, and epidemiological information.    Green Top (Gel) [[id number]] Collected: 09/11/22 1858    Specimen: Blood from Arm, Right Updated: 09/11/22 2002     Extra Tube Hold for add-ons.     Comment: Auto resulted.       [place] Blood Culture Bottle Set [[id number]] Collected: 09/11/22 1858    Specimen: Blood from Arm, Right Updated: 09/11/22 2002     Extra Tube Hold for add-ons.     Comment: Auto  resulted.       Lavender Top [[id number]] Collected: 09/11/22 1858    Specimen: Blood from Arm, Right Updated: 09/11/22 2002     Extra Tube hold for add-on     Comment: Auto resulted       Red Top [[id number]] Collected: 09/11/22 1858    Specimen: Blood from Arm, Right Updated: 09/11/22 2002     Extra Tube Hold for add-ons.     Comment: Auto resulted.       Light Blue Top [[id number]] Collected: 09/11/22 1858    Specimen: Blood from Arm, Right Updated: 09/11/22 2002     Extra Tube Hold for add-ons.     Comment: Auto resulted       Blood Culture - Blood, Arm, Right [[id number]] Collected: 09/11/22 1858    Specimen: Blood from Arm, Right Updated: 09/11/22 1942    Lactic Acid, Plasma [[id number]]  (Abnormal) Collected: 09/11/22 1858    Specimen: Blood from Arm, Right Updated: 09/11/22 1937     Lactate 2.4 mmol/L     Comprehensive Metabolic Panel [[id number]]  (Abnormal) Collected: 09/11/22 1858    Specimen: Blood from Arm, Right Updated: 09/11/22 1928     Glucose 134 mg/dL      BUN 9 mg/dL      Creatinine 0.88 mg/dL      Sodium 140 mmol/L      Potassium 3.2 mmol/L      Chloride 103 mmol/L      CO2 23.0 mmol/L      Calcium 9.0 mg/dL      Total Protein 7.2 g/dL      Albumin 4.20 g/dL      ALT (SGPT) 14 U/L      AST (SGOT) 20 U/L      Alkaline Phosphatase 105 U/L      Total Bilirubin 0.4 mg/dL      Globulin 3.0 gm/dL      A/G Ratio 1.4 g/dL      BUN/Creatinine Ratio 10.2     Anion Gap 14.0 mmol/L      eGFR 73.5 mL/min/1.73      Comment: National Kidney Foundation and American Society of Nephrology (ASN) Task Force recommended calculation based on the Chronic Kidney Disease Epidemiology Collaboration (CKD-EPI) equation refit without adjustment for race.       Narrative:      GFR Normal >60  Chronic Kidney Disease <60  Kidney Failure <15      Troponin [[id number]]  (Normal) Collected: 09/11/22 1858    Specimen: Blood from Arm, Right Updated: 09/11/22 1925     Troponin T <0.010 ng/mL     Narrative:      Troponin T Reference  Range:  <= 0.03 ng/mL-   Negative for AMI  >0.03 ng/mL-     Abnormal for myocardial necrosis.  Clinicians would have to utilize clinical acumen, EKG, Troponin and serial changes to determine if it is an Acute Myocardial Infarction or myocardial injury due to an underlying chronic condition.       Results may be falsely decreased if patient taking Biotin.      Magnesium [[id number]]  (Normal) Collected: 09/11/22 1858    Specimen: Blood from Arm, Right Updated: 09/11/22 1922     Magnesium 2.1 mg/dL     CBC & Differential [[id number]]  (Abnormal) Collected: 09/11/22 1858    Specimen: Blood from Arm, Right Updated: 09/11/22 1912    Narrative:      The following orders were created for panel order CBC & Differential.  Procedure                               Abnormality         Status                     ---------                               -----------         ------                     CBC Auto Differential[[id number]]        Abnormal            Final result                 Please view results for these tests on the individual orders.    CBC Auto Differential [[id number]]  (Abnormal) Collected: 09/11/22 1858    Specimen: Blood from Arm, Right Updated: 09/11/22 1912     WBC 12.51 10*3/mm3      RBC 5.32 10*6/mm3      Hemoglobin 13.5 g/dL      Hematocrit 44.4 %      MCV 83.5 fL      MCH 25.4 pg      MCHC 30.4 g/dL      RDW 13.6 %      RDW-SD 41.1 fl      MPV 11.5 fL      Platelets 252 10*3/mm3      Neutrophil % 82.0 %      Lymphocyte % 10.2 %      Monocyte % 5.4 %      Eosinophil % 1.0 %      Basophil % 0.8 %      Immature Grans % 0.6 %      Neutrophils, Absolute 10.25 10*3/mm3      Lymphocytes, Absolute 1.28 10*3/mm3      Monocytes, Absolute 0.68 10*3/mm3      Eosinophils, Absolute 0.13 10*3/mm3      Basophils, Absolute 0.10 10*3/mm3      Immature Grans, Absolute 0.07 10*3/mm3      nRBC 0.0 /100 WBC            Cultures:  Blood Culture   Date Value Ref Range Status   09/11/2022 Abnormal Stain (C)  Preliminary       Radiology  Data:    Imaging Results (Last 24 Hours)     Procedure Component Value Units Date/Time    CT Angiogram Chest [[id number]] Collected: 09/11/22 2024     Updated: 09/11/22 2030    Narrative:      EXAMINATION: CT ANGIOGRAM CHEST-      9/11/2022 8:10 PM CDT     HISTORY: Shortness of breath. Hypoxia. History of pulmonary  hypertension. Pulmonary fibrosis.     In order to have a CT radiation dose as low as reasonably achievable  Automated Exposure Control was utilized for adjustment of the mA and/or  KV according to patient size.     DLP in mGycm= 202.     CT angiogram chest.  CT angiography protocol.   CT imaging with bolus IV contrast injection.   Under concurrent supervision axial, sagittal, coronal, and  three-dimensional data sets were constructed. Borderline cardiomegaly.  Pulmonary artery enlargement.  No pulmonary embolism.     Stable mildly prominent mediastinal lymph nodes.     No thoracic aortic aneurysm or dissection.     Pulmonary fibrosis and bronchiectasis.     No pneumothorax or pleural effusion.     No acute pneumonia is seen.     Summary:  1. No pulmonary embolism.  2. Stable appearance of chronic lung changes.                                   This report was finalized on 09/11/2022 20:26 by Dr. Lorenzo Ruff MD.    XR Chest 1 View [[id number]] Collected: 09/11/22 1948     Updated: 09/11/22 1952    Narrative:      EXAMINATION: XR CHEST 1 VW-     9/11/2022 7:08 PM CDT     HISTORY: Hypoxia.     One view chest x-ray.     Comparison is made with 04/20/2022.     Heart size is magnified though there does appear to be mild cardiomegaly  Mild infiltrate in both lungs compatible with mild diffuse edema.     No focal consolidation.  No pneumothorax.     Summary:  1. Mild cardiac enlargement and mild diffuse infiltrate for which edema  is favored.     This report was finalized on 09/11/2022 19:49 by Dr. Lorenzo Ruff MD.          Allergies   Allergen Reactions   • Codeine Nausea And Vomiting   • Latex Rash        Scheduled meds:   azithromycin, 500 mg, Intravenous, Q24H  cefTRIAXone, 1 g, Intravenous, Q24H  enoxaparin, 40 mg, Subcutaneous, Daily  famotidine, 20 mg, Oral, BID AC  guaiFENesin, 600 mg, Oral, Q12H  ipratropium-albuterol, 3 mL, Nebulization, Q6H - RT  Macitentan, 10 mg, Oral, Nightly  methylPREDNISolone sodium succinate, 40 mg, Intravenous, Q12H  mycophenolate, 1,000 mg, Oral, BID  sodium chloride, 10 mL, Intravenous, Q12H  Treprostinil, 1 dose, Inhalation, 4x Daily  venlafaxine, 75 mg, Oral, Daily        PRN meds:  •  acetaminophen **OR** acetaminophen  •  albuterol  •  busPIRone  •  clonazePAM  •  guaiFENesin-dextromethorphan  •  melatonin  •  ondansetron  •  sodium chloride    Assessment/Plan       Acute on chronic respiratory failure with hypoxia (HCC)    CREST syndrome, partial     Raynaud's phenomenon    Pulmonary fibrosis prob sec underlying autoimmune disease    PBC (primary biliary cirrhosis)    Obstructive sleep apnea on CPAP    PAH (pulmonary arterial hypertension) with portal hypertension (HCC)    Hypokalemia    Acute-on-chronic respiratory failure (HCC)    Obesity (BMI 30-39.9)      Plan:  Pulmonary fibrosis/pulmonary hypertension/chronic respiratory failure hypoxia/obstructive sleep apnea.  Patient is on chronic 2 L of oxygen at home.  Patient is on chronic CPAP at home.  On Tyvaso.  Patient follows Dr. Griffith outpatient.  Patient was declined for lung transplant at [place] and currently waiting for interview at [facility].  DuoNebs 4 times daily.  Albuterol every 4 hours as needed.  Pulmicort nebs twice daily.  Solu-Medrol twice daily.  Cefepime and vancomycin started in ER.  Robitussin as needed.  Pulmonary consult.  Azithromycin . Rocephin.  Mucinex.  DuoNebs.  CTA of the chest-No pulmonary embolism, Stable appearance of chronic lung changes.  Chest x-ray-Mild cardiac enlargement and mild diffuse infiltrate for which edema  is favored.  On 15 L high flow.    BNP 7400.  Troponin  negative.  Pulmonary hypertension.  Lasix bid 1 day.  Echocardiogram- ejection fraction 56 to 60%, diastolic dysfunction grade 1, right ventricle cavity severely dilated, severe reduced right ventricle systolic function, right atrial cavity severely dilated, tricuspid regurgitation, moderate to severe pulm hypertension, small less than 1 cm pericardial effusion-fluid-filled- no evidence of cardiac tamponade.    Potassium 3.2.  P.o. potassium.  Magnesium-normal.    History of crest syndrome.  Patient follow with rheumatology outpatient.  On CellCept and prednisone.    Insomnia/depression/anxiety.  Trazodone at night.  Effexor.  BuSpar as needed.  Clonazepam as needed.  Melatonin at night as needed.    Reflux.  Pepcid.  Zofran as needed.    Lovenox prophylaxis.    Nutrition.  Regular diet.    Respiratory panel-negative.  Blood culture 1 out of 2 bottles shows gram-positive cocci in cluster.  COVID-19-negative.    Discharge Planning: 3 to 6 days    Electronically signed by Andreas Whiteside MD, 09/12/22, 3:06 PM CDT.

## 2022-09-12 NOTE — PLAN OF CARE
Pt admitted from ED with complaint of worsening SOB on 10 HFNC. Alert/Oriented x 4. Vital signs stable. SR/ST on monitor throughout shift. Remains tachypnic when awake but maintains O2 sats Pt brought 3 non-formulary meds with her and were placed in med room in CCU in appropriate drawer. O2 demand increased with any activity. Increased O2 at times to 15L to meet O2 demands. No complaint of CP noted. No other issues or concerns noted.  Will continue to monitor for acute changes in status

## 2022-09-12 NOTE — PLAN OF CARE
Goal Outcome Evaluation:  Plan of Care Reviewed With: other (see comments)        Progress: no change  Outcome Evaluation: Initial nutrition assessment. Pt is identified at nutrition risk r/t report of reduced appetite and weight loss. Sh is ordered a regular diet. No po intake recorded at this time. No weight loss noted per weight records. Also received a diet edu consult for BMI > 30 per COPD order set. Pt was sleeping with O2 on at time of RD visit. Not appropriate for diet education today. Unsure if appropriate for weight loss/BMI education at all d/t reports of poor appetite. Will cont to follow to establish intake and assess for nutrition needs.

## 2022-09-12 NOTE — CONSULTS
PULMONARY & CRITICAL CARE CONSULT - [facility]    22, 08:46 CDT  Patient Care Team:  Aaron Stoddard MD as PCP - General  Aaron Stoddard MD as PCP - Family Medicine  Michael Landin DO as Consulting Physician (Gastroenterology)  Fela Blackman APRN as Nurse Practitioner (Pulmonary Disease)  [facility]  ([facility])  Antonia Landry MD as Consulting Physician (Rheumatology)  Thong Martin MD as Cardiologist (Cardiology)  Name: Elaine Juárez  : 1960  MRN: [id number]  Contact Serial Number [id number]    Chief complaint: shortness of breath  HPI:  We have been consulted by Andreas Whiteside MD to see this 62 y.o. female born on 1960.  Patient complains of dyspnea in the chest for 2 weeks. Severity: severe and worsening.  Aggravating factors: walking.   Alleviating factors: medication(s) (oxygen and rest) Associated symptoms: malaise. Sputum is white and scant.  Patient currently is on oxygen at 8 L/min per nasal cannula.. Respiratory history:  CREST syndrome with pulmonary features, chronic respiratory failure on immune suppression, complicated by pulmonary hypertension on multiple PH meds.   She has been admitted to the ICU.  Review of outside records from [place] indicate right heart cath indicating pulmonary hypertension for which meds including era and inhaled prostanoid had been started, but lost to follow up there about 2 years ago.  She is seeking evaluation for lung transplantation at [facility]  Past Medical History:   has a past medical history of Allergies, Arthritis, BMI 31.0-31.9,adult (2019), Calcified granuloma of lung ([facility]) (2019), CHF (congestive heart failure) ([facility]), Chronic respiratory failure with hypoxia ([facility]) (2020), Chronic respiratory failure with hypoxia, on home oxygen therapy ([facility]), COVID-19 (2022), CREST syndrome ([facility]), Environmental allergies (2022), Gastroesophageal reflux disease without esophagitis  (06/04/2019), Obstructive sleep apnea on CPAP (06/04/2019), Pulmonary fibrosis (HCC), Pulmonary hypertension (HCC), and Short-term memory loss.   has a past surgical history that includes Cholecystectomy; Colonoscopy (05/11/2015); Breast biopsy; Cardiac catheterization (N/A, 7/22/2020); Colonoscopy (N/A, 8/4/2021); and Esophagogastroduodenoscopy (N/A, 8/4/2021).  Allergies   Allergen Reactions   • Codeine Nausea And Vomiting   • Latex Rash     Medications:  enoxaparin, 40 mg, Subcutaneous, Daily  famotidine, 20 mg, Oral, BID AC  guaiFENesin, 600 mg, Oral, Q12H  ipratropium-albuterol, 3 mL, Nebulization, Q6H - RT  Macitentan, 10 mg, Oral, Nightly  methylPREDNISolone sodium succinate, 62.5 mg, Intravenous, Daily  mycophenolate, 1,000 mg, Oral, BID  sodium chloride, 10 mL, Intravenous, Q12H  Treprostinil, 1 dose, Inhalation, 4x Daily  venlafaxine, 75 mg, Oral, Daily         Family History:  Family History   Problem Relation Age of Onset   • Cirrhosis Sister    • Liver cancer Brother    • No Known Problems Mother    • No Known Problems Father    • Colon cancer Neg Hx    • Colon polyps Neg Hx      Social History:   reports that she has never smoked. She has never used smokeless tobacco. She reports that she does not drink alcohol and does not use drugs.  Review of Systems:  Review of Systems   Constitutional: Positive for fatigue. Negative for fever.   HENT: Negative for nosebleeds and postnasal drip.    Eyes: Negative for photophobia and visual disturbance.   Respiratory: Positive for cough and shortness of breath. Negative for choking and wheezing.    Cardiovascular: Negative for chest pain and palpitations.   Gastrointestinal: Positive for diarrhea.   Genitourinary: Negative for dysuria and hematuria.   Musculoskeletal: Negative for myalgias and neck stiffness.   Skin: Negative for rash and wound.   Neurological: Negative for syncope and speech difficulty.   Psychiatric/Behavioral: Negative for confusion and decreased  concentration.      Physical Exam:  Temp:  [97.9 °F (36.6 °C)-98.3 °F (36.8 °C)] 98.3 °F (36.8 °C)  Heart Rate:  [] 99  Resp:  [25-57] 25  BP: (100-136)/(48-99) 106/84    Intake/Output Summary (Last 24 hours) at 9/12/2022 0846  Last data filed at 9/11/2022 2009  Gross per 24 hour   Intake 350 ml   Output --   Net 350 ml         09/11/22  1845 09/12/22  0015   Weight: 81.5 kg (179 lb 11.2 oz) 77.7 kg (171 lb 4.8 oz)     SpO2 Percentage    09/12/22 0700 09/12/22 0730 09/12/22 0830   SpO2: (!) 89% 96% 96%     Body mass index is 30.34 kg/m².   Physical Exam  Constitutional:       General: She is not in acute distress.     Appearance: She is well-developed. She is ill-appearing. She is not toxic-appearing.      Interventions: Nasal cannula in place.   HENT:      Head: Atraumatic.      Right Ear: External ear normal.      Left Ear: External ear normal.      Nose: Nose normal.      Mouth/Throat:      Mouth: Mucous membranes are moist.   Eyes:      General: No scleral icterus.     Conjunctiva/sclera: Conjunctivae normal.   Neck:      Thyroid: No thyromegaly.   Cardiovascular:      Rate and Rhythm: Normal rate and regular rhythm.      Heart sounds: S1 normal and S2 normal. No murmur heard.    No friction rub.   Pulmonary:      Effort: Pulmonary effort is normal.      Breath sounds: Rales present.   Abdominal:      General: Bowel sounds are normal. There is no distension.      Tenderness: There is no abdominal tenderness.   Musculoskeletal:         General: No deformity.      Cervical back: Neck supple.   Skin:     Coloration: Skin is not pale.      Findings: No rash.   Neurological:      Mental Status: She is alert and oriented to person, place, and time.   Psychiatric:         Mood and Affect: Mood normal.         Behavior: Behavior normal. Behavior is cooperative.       Results from last 7 days   Lab Units 09/11/22  1858   WBC 10*3/mm3 12.51*   HEMOGLOBIN g/dL 13.5   PLATELETS 10*3/mm3 252     Results from last 7  days   Lab Units 09/11/22  1858   SODIUM mmol/L 140   POTASSIUM mmol/L 3.2*   CO2 mmol/L 23.0   BUN mg/dL 9   CREATININE mg/dL 0.88   MAGNESIUM mg/dL 2.1   GLUCOSE mg/dL 134*     Results from last 7 days   Lab Units 09/11/22  2218   PH, ARTERIAL pH units 7.430   PCO2, ARTERIAL mm Hg 36.5   PO2 ART mm Hg 65.4*     Lab Results   Component Value Date    PROBNP 121.7 04/20/2022     No results found for: BLOODCX, URINECX, WOUNDCX, MRSACX, RESPCX, STOOLCX  Recent radiology:   Imaging Results (Last 72 Hours)       Procedure Component Value Units Date/Time    CT Angiogram Chest [[id number]] Collected: 09/11/22 2024     Updated: 09/11/22 2030    Narrative:      EXAMINATION: CT ANGIOGRAM CHEST-      9/11/2022 8:10 PM CDT     HISTORY: Shortness of breath. Hypoxia. History of pulmonary  hypertension. Pulmonary fibrosis.     In order to have a CT radiation dose as low as reasonably achievable  Automated Exposure Control was utilized for adjustment of the mA and/or  KV according to patient size.     DLP in mGycm= 202.     CT angiogram chest.  CT angiography protocol.   CT imaging with bolus IV contrast injection.   Under concurrent supervision axial, sagittal, coronal, and  three-dimensional data sets were constructed. Borderline cardiomegaly.  Pulmonary artery enlargement.  No pulmonary embolism.     Stable mildly prominent mediastinal lymph nodes.     No thoracic aortic aneurysm or dissection.     Pulmonary fibrosis and bronchiectasis.     No pneumothorax or pleural effusion.     No acute pneumonia is seen.     Summary:  1. No pulmonary embolism.  2. Stable appearance of chronic lung changes.    This report was finalized on 09/11/2022 20:26 by Dr. Lorenzo Ruff MD.    XR Chest 1 View [[id number]] Collected: 09/11/22 1948     Updated: 09/11/22 1952    Narrative:      EXAMINATION: XR CHEST 1 VW-     9/11/2022 7:08 PM CDT     HISTORY: Hypoxia.     One view chest x-ray.     Comparison is made with 04/20/2022.     Heart size is  magnified though there does appear to be mild cardiomegaly  Mild infiltrate in both lungs compatible with mild diffuse edema.     No focal consolidation.  No pneumothorax.     Summary:  1. Mild cardiac enlargement and mild diffuse infiltrate for which edema  is favored.     This report was finalized on 2022 19:49 by Dr. Lorenzo Ruff MD.          My radiograph interpretation/independent review of imaging: bilat bronchiectasis, fibrotic changes, cant rule out acute infiltrated in lateral RLL, although prior scan in 2022 had similar opacity  Other test results (not lab or imaging):   Independent review of ekg: right axis deviation, septal q wave  Problem List as identified by Epic (may contain historical, inactive problems)  Patient Active Problem List   Diagnosis   • CREST syndrome, partial    • Raynaud's phenomenon   • Pulmonary fibrosis prob sec underlying autoimmune disease   • PBC (primary biliary cirrhosis)   • Gastroesophageal reflux disease without esophagitis   • BMI 31.0-31.9,adult   • Obstructive sleep apnea on CPAP   • Chronic respiratory failure with hypoxia (HCC)   • History of adenomatous polyp of colon   • Globus sensation   • Environmental allergies   • PAH (pulmonary arterial hypertension) with portal hypertension (HCC)   • Hypokalemia   • Panic attack   • Paranoia (psychosis) (HCC)   • H/O noncompliance with medical treatment, presenting hazards to health   • Bipolar affective disorder, currently manic, moderate (HCC), suspected   • Acute on chronic respiratory failure with hypoxia (HCC)   • Acute-on-chronic respiratory failure (HCC)   • Obesity (BMI 30-39.9)     Pulmonary Assessment:  New problem (to me), with additional workup planned: acute on chronic respiratory failure with hypoxia, compensated with high flow oxygen  New problem (to me), no additional workup planned: diarrhea, likely medication related  Other problems either stable, failing to improve or worsenin. CREST  syndrome  2. Interstitial lung disease with fibrosis due to above  3. Pulmonary hypertension with group 1 component  4. Immune compromised state due to tx for ctd  5. History of Covid  6. History of psychiatric disease, stable    Recommend/plan:   · Evaluate for viral infection  · Empiric antibiotics  · Echocardiogram to reassess the right ventricle    Thank you for this consult.  We will follow along.  Electronically signed by Faisal Griffith MD, 09/12/22, 8:46 AM CDT.

## 2022-09-12 NOTE — CASE MANAGEMENT/SOCIAL WORK
Discharge Planning Assessment  [facility]     Patient Name: Elaine Juárez  MRN: [id number]  Today's Date: 9/12/2022    Admit Date: 9/11/2022     Discharge Needs Assessment     Row Name 09/12/22 1142       Living Environment    People in Home spouse    Name(s) of People in Home [place]    Current Living Arrangements home    Primary Care Provided by spouse/significant other    Provides Primary Care For no one    Family Caregiver if Needed spouse    Family Caregiver Names [place]    Able to Return to Prior Arrangements yes       Resource/Environmental Concerns    Resource/Environmental Concerns none       Transition Planning    Patient/Family Anticipates Transition to home with family    Transportation Anticipated family or friend will provide       Discharge Needs Assessment    Readmission Within the Last 30 Days no previous admission in last 30 days    Equipment Currently Used at Home oxygen    Concerns to be Addressed no discharge needs identified    Equipment Needed After Discharge none    Discharge Coordination/Progress spoke to patient who lives with spouse; has RX coverage Oxygen 8LNC continuously with [facility] and PCP; spouse is caregiver will follow for DC needs               Discharge Plan    No documentation.               Continued Care and Services - Admitted Since 9/11/2022    Coordination has not been started for this encounter.          Demographic Summary    No documentation.                Functional Status    No documentation.                Psychosocial    No documentation.                Abuse/Neglect    No documentation.                Legal    No documentation.                Substance Abuse    No documentation.                Patient Forms    No documentation.                   Gladis Johnston RN

## 2022-09-12 NOTE — H&P
[facility] Medicine Services  HISTORY AND PHYSICAL    Date of Admission: 9/11/2022  Primary Care Physician: Aaron Stoddard MD    Subjective     Chief Complaint: Shortness of breath    History of Present Illness  62 year old female with PMH of CREST syndrome, pulmonary fibrosis, pulmonary hypertension, chronic respiratory failure high flow 8 lpm home oxygen, CHAYA/CPAP, that presents to the ER with complaints of worsening shortness of breath for over 2 weeks. She has not felt well for about a month. Follow with Dr Griffith whom started her on Tyvaso for PAH/ILD this August. She follow with rheumatology also and is on Cellcept and prednisone. CXR and CT chest show chronic changes.     States was declined from lung transplant at [facility] and is currently waiting to interview at [facility].     Review of Systems   Otherwise complete ROS reviewed and negative except as mentioned in the HPI.    Past Medical History:   Past Medical History:   Diagnosis Date   • Allergies    • Arthritis    • BMI 31.0-31.9,adult 06/04/2019   • Calcified granuloma of lung (HCC) 06/04/2019    Right lung   • CHF (congestive heart failure) (HCC)     denies   • Chronic respiratory failure with hypoxia (HCC) 08/05/2020   • Chronic respiratory failure with hypoxia, on home oxygen therapy (HCC)    • COVID-19 02/01/2022   • CREST syndrome ([facility])    • Environmental allergies 02/01/2022   • Gastroesophageal reflux disease without esophagitis 06/04/2019   • Obstructive sleep apnea on CPAP 06/04/2019   • Pulmonary fibrosis (HCC)    • Pulmonary hypertension (HCC)    • Short-term memory loss      Past Surgical History:  Past Surgical History:   Procedure Laterality Date   • BREAST BIOPSY     • CARDIAC CATHETERIZATION N/A 7/22/2020    Procedure: Right Heart Cath;  Surgeon: Thong Martin MD;  Location: [facility] CATH INVASIVE LOCATION;  Service: Cardiology;  Laterality: N/A;   • CHOLECYSTECTOMY     • COLONOSCOPY   05/11/2015    5 POLYPS   • COLONOSCOPY N/A 8/4/2021    Procedure: COLONOSCOPY WITH ANESTHESIA;  Surgeon: Michael Landin DO;  Location:  [facility];  Service: Gastroenterology;  Laterality: N/A;  pre-hx polyps  post-colon polyps   • ENDOSCOPY N/A 8/4/2021    Procedure: ESOPHAGOGASTRODUODENOSCOPY WITH ANESTHESIA;  Surgeon: Michael Landin DO;  Location: [facility] ENDOSCOPY;  Service: Gastroenterology;  Laterality: N/A;  pre-dysphagia  post-normal  pcp-lanette aguila     Social History:  reports that she has never smoked. She has never used smokeless tobacco. She reports that she does not drink alcohol and does not use drugs.    Family History: family history includes Cirrhosis in her sister; Liver cancer in her brother; No Known Problems in her father and mother.       Allergies:  Allergies   Allergen Reactions   • Codeine Nausea And Vomiting   • Latex Rash       Medications:  Prior to Admission medications    Medication Sig Start Date End Date Taking? Authorizing Provider   busPIRone (BUSPAR) 10 MG tablet Take 10 mg by mouth 2 (Two) Times a Day As Needed.    ProviderLatanya MD   clonazePAM (KlonoPIN) 0.5 MG tablet Take 0.5 mg by mouth 2 (Two) Times a Day As Needed for Anxiety.    ProviderLatanya MD   fexofenadine (ALLEGRA) 180 MG tablet Take 180 mg by mouth Daily As Needed (allergies).    ProviderLatanya MD   ipratropium-albuterol (DUO-NEB) 0.5-2.5 mg/3 ml nebulizer Take 3 mL by nebulization 4 (Four) Times a Day As Needed for Wheezing or Shortness of Air. 9/8/22   Fela Blackman APRN   Macitentan (Opsumit) 10 MG tablet Take 1 tablet by mouth Daily. 8/16/22   Fela Blackman APRN   mycophenolate (CELLCEPT) 500 MG tablet Take 1,000 mg by mouth 2 (Two) Times a Day.    ProviderLatanya MD   predniSONE (DELTASONE) 5 MG tablet Take 1 tablet by mouth Daily for 90 days. 8/25/22 11/23/22  Fela Blackman APRN   traZODone (DESYREL) 100 MG tablet Take 1 tablet by mouth Every Night for 30  "days. 4/22/22 5/22/22  Raffi Cotter MD   Treprostinil 16 MCG powder Inhale 16 mcg 4 (Four) Times a Day.    Provider, MD Latanya   ursodiol (ACTIGALL) 300 MG capsule Take 1 capsule by mouth 2 (Two) Times a Day. 7/21/22   Micah Barton APRN   venlafaxine (EFFEXOR) 75 MG tablet Take 75 mg by mouth Daily.    Provider, MD Latanya     I have utilized all available immediate resources to obtain, update, and review the patient's current medications.    Objective     Vital Signs: /78   Pulse (!) 129   Temp 97.9 °F (36.6 °C) (Oral)   Resp (!) 40   Ht 160 cm (63\")   Wt 81.5 kg (179 lb 11.2 oz)   SpO2 100%   BMI 31.83 kg/m²   Physical Exam  Vitals reviewed.   Constitutional:       General: She is not in acute distress.     Appearance: She is well-developed. She is ill-appearing. She is not toxic-appearing.   HENT:      Head: Normocephalic and atraumatic.      Right Ear: External ear normal.      Left Ear: External ear normal.      Mouth/Throat:      Mouth: Mucous membranes are dry.      Pharynx: Oropharynx is clear.   Eyes:      General:         Right eye: No discharge.         Left eye: No discharge.      Extraocular Movements: Extraocular movements intact.      Conjunctiva/sclera: Conjunctivae normal.      Pupils: Pupils are equal, round, and reactive to light.   Neck:      Vascular: No JVD.   Cardiovascular:      Rate and Rhythm: Regular rhythm. Tachycardia present.      Pulses: Normal pulses.      Heart sounds: Normal heart sounds. No murmur heard.    No friction rub. No gallop.   Pulmonary:      Effort: No respiratory distress.      Breath sounds: No stridor. Rhonchi and rales present. No wheezing.   Chest:      Chest wall: No tenderness.   Abdominal:      General: Bowel sounds are normal. There is no distension.      Palpations: Abdomen is soft.      Tenderness: There is no abdominal tenderness. There is no guarding or rebound.      Hernia: No hernia is present.   Musculoskeletal:       "   General: No swelling, tenderness or deformity. Normal range of motion.      Cervical back: Normal range of motion and neck supple. No rigidity or tenderness. No muscular tenderness.      Right lower leg: No edema.      Left lower leg: No edema.   Skin:     General: Skin is warm and dry.      Findings: Lesion (hypervascular macules in cheeks) present. No erythema or rash.   Neurological:      General: No focal deficit present.      Mental Status: She is alert and oriented to person, place, and time.      Cranial Nerves: No cranial nerve deficit.      Sensory: No sensory deficit.      Motor: No weakness or abnormal muscle tone.      Deep Tendon Reflexes: Reflexes normal.   Psychiatric:         Mood and Affect: Mood normal.         Behavior: Behavior normal.     Results Reviewed:  Lab Results (last 24 hours)     Procedure Component Value Units Date/Time    COVID-19,[facility] IN-HOUSE,Nasal Swab No Transport Media 3-4 HR TAT - Swab, Nasal Cavity [[id number]]  (Normal) Collected: 09/11/22 1909    Specimen: Swab from Nasal Cavity Updated: 09/11/22 2028     COVID19 Not Detected    Narrative:      Fact sheet for providers: https://www.fda.gov/media/408307/download     Fact sheet for patients: https://www.fda.gov/media/225564/download    Test performed by PCR.    Consider negative results in combination with clinical observations, patient history, and epidemiological information.    Blood Culture - Blood, Arm, Left [[id number]] Collected: 09/11/22 1909    Specimen: Blood from Arm, Left Updated: 09/11/22 2008    [place] Draw [[id number]] Collected: 09/11/22 1858    Specimen: Blood from Arm, Right Updated: 09/11/22 2002    Narrative:      The following orders were created for panel order [place] Draw.  Procedure                               Abnormality         Status                     ---------                               -----------         ------                     Green Top (Gel)[[id number]]                                   Final result               Lavender Top[[id number]]                                     Final result               Red Top[[id number]]                                          Final result               [place] Blood Culture Willie...[[id number]]                      Final result               Gray Top[[id number]]                                         In process                 Light Blue Top[[id number]]                                   Final result                 Please view results for these tests on the individual orders.    Green Top (Gel) [[id number]] Collected: 09/11/22 1858    Specimen: Blood from Arm, Right Updated: 09/11/22 2002     Extra Tube Hold for add-ons.     Comment: Auto resulted.       [place] Blood Culture Bottle Set [[id number]] Collected: 09/11/22 1858    Specimen: Blood from Arm, Right Updated: 09/11/22 2002     Extra Tube Hold for add-ons.     Comment: Auto resulted.       Lavender Top [[id number]] Collected: 09/11/22 1858    Specimen: Blood from Arm, Right Updated: 09/11/22 2002     Extra Tube hold for add-on     Comment: Auto resulted       Red Top [[id number]] Collected: 09/11/22 1858    Specimen: Blood from Arm, Right Updated: 09/11/22 2002     Extra Tube Hold for add-ons.     Comment: Auto resulted.       Light Blue Top [[id number]] Collected: 09/11/22 1858    Specimen: Blood from Arm, Right Updated: 09/11/22 2002     Extra Tube Hold for add-ons.     Comment: Auto resulted       Blood Culture - Blood, Arm, Right [[id number]] Collected: 09/11/22 1858    Specimen: Blood from Arm, Right Updated: 09/11/22 1942    Lactic Acid, Plasma [[id number]]  (Abnormal) Collected: 09/11/22 1858    Specimen: Blood from Arm, Right Updated: 09/11/22 1937     Lactate 2.4 mmol/L     Comprehensive Metabolic Panel [[id number]]  (Abnormal) Collected: 09/11/22 1858    Specimen: Blood from Arm, Right Updated: 09/11/22 1928     Glucose 134 mg/dL      BUN 9 mg/dL      Creatinine 0.88 mg/dL      Sodium 140 mmol/L      Potassium  3.2 mmol/L      Chloride 103 mmol/L      CO2 23.0 mmol/L      Calcium 9.0 mg/dL      Total Protein 7.2 g/dL      Albumin 4.20 g/dL      ALT (SGPT) 14 U/L      AST (SGOT) 20 U/L      Alkaline Phosphatase 105 U/L      Total Bilirubin 0.4 mg/dL      Globulin 3.0 gm/dL      A/G Ratio 1.4 g/dL      BUN/Creatinine Ratio 10.2     Anion Gap 14.0 mmol/L      eGFR 73.5 mL/min/1.73      Comment: National Kidney Foundation and American Society of Nephrology (ASN) Task Force recommended calculation based on the Chronic Kidney Disease Epidemiology Collaboration (CKD-EPI) equation refit without adjustment for race.       Narrative:      GFR Normal >60  Chronic Kidney Disease <60  Kidney Failure <15      Troponin [[id number]]  (Normal) Collected: 09/11/22 1858    Specimen: Blood from Arm, Right Updated: 09/11/22 1925     Troponin T <0.010 ng/mL     Narrative:      Troponin T Reference Range:  <= 0.03 ng/mL-   Negative for AMI  >0.03 ng/mL-     Abnormal for myocardial necrosis.  Clinicians would have to utilize clinical acumen, EKG, Troponin and serial changes to determine if it is an Acute Myocardial Infarction or myocardial injury due to an underlying chronic condition.       Results may be falsely decreased if patient taking Biotin.      Magnesium [[id number]]  (Normal) Collected: 09/11/22 1858    Specimen: Blood from Arm, Right Updated: 09/11/22 1922     Magnesium 2.1 mg/dL     CBC & Differential [[id number]]  (Abnormal) Collected: 09/11/22 1858    Specimen: Blood from Arm, Right Updated: 09/11/22 1912    Narrative:      The following orders were created for panel order CBC & Differential.  Procedure                               Abnormality         Status                     ---------                               -----------         ------                     CBC Auto Differential[[id number]]        Abnormal            Final result                 Please view results for these tests on the individual orders.    CBC Auto  Differential [[id number]]  (Abnormal) Collected: 09/11/22 1858    Specimen: Blood from Arm, Right Updated: 09/11/22 1912     WBC 12.51 10*3/mm3      RBC 5.32 10*6/mm3      Hemoglobin 13.5 g/dL      Hematocrit 44.4 %      MCV 83.5 fL      MCH 25.4 pg      MCHC 30.4 g/dL      RDW 13.6 %      RDW-SD 41.1 fl      MPV 11.5 fL      Platelets 252 10*3/mm3      Neutrophil % 82.0 %      Lymphocyte % 10.2 %      Monocyte % 5.4 %      Eosinophil % 1.0 %      Basophil % 0.8 %      Immature Grans % 0.6 %      Neutrophils, Absolute 10.25 10*3/mm3      Lymphocytes, Absolute 1.28 10*3/mm3      Monocytes, Absolute 0.68 10*3/mm3      Eosinophils, Absolute 0.13 10*3/mm3      Basophils, Absolute 0.10 10*3/mm3      Immature Grans, Absolute 0.07 10*3/mm3      nRBC 0.0 /100 WBC     [place] Top [[id number]] Collected: 09/11/22 1858    Specimen: Blood from Arm, Right Updated: 09/11/22 1906        Imaging Results (Last 24 Hours)     Procedure Component Value Units Date/Time    CT Angiogram Chest [[id number]] Collected: 09/11/22 2024     Updated: 09/11/22 2030    Narrative:      EXAMINATION: CT ANGIOGRAM CHEST-      9/11/2022 8:10 PM CDT     HISTORY: Shortness of breath. Hypoxia. History of pulmonary  hypertension. Pulmonary fibrosis.     In order to have a CT radiation dose as low as reasonably achievable  Automated Exposure Control was utilized for adjustment of the mA and/or  KV according to patient size.     DLP in mGycm= 202.     CT angiogram chest.  CT angiography protocol.   CT imaging with bolus IV contrast injection.   Under concurrent supervision axial, sagittal, coronal, and  three-dimensional data sets were constructed. Borderline cardiomegaly.  Pulmonary artery enlargement.  No pulmonary embolism.     Stable mildly prominent mediastinal lymph nodes.     No thoracic aortic aneurysm or dissection.     Pulmonary fibrosis and bronchiectasis.     No pneumothorax or pleural effusion.     No acute pneumonia is seen.     Summary:  1. No  pulmonary embolism.  2. Stable appearance of chronic lung changes.                                   This report was finalized on 09/11/2022 20:26 by Dr. Lorenzo Ruff MD.    XR Chest 1 View [[id number]] Collected: 09/11/22 1948     Updated: 09/11/22 1952    Narrative:      EXAMINATION: XR CHEST 1 VW-     9/11/2022 7:08 PM CDT     HISTORY: Hypoxia.     One view chest x-ray.     Comparison is made with 04/20/2022.     Heart size is magnified though there does appear to be mild cardiomegaly  Mild infiltrate in both lungs compatible with mild diffuse edema.     No focal consolidation.  No pneumothorax.     Summary:  1. Mild cardiac enlargement and mild diffuse infiltrate for which edema  is favored.     This report was finalized on 09/11/2022 19:49 by Dr. Lorenzo Ruff MD.        I have personally reviewed and interpreted the radiology studies and ECG obtained at time of admission.     Assessment / Plan     Assessment:   Active Hospital Problems    Diagnosis    • **Acute on chronic respiratory failure with hypoxia (HCC)    • Hypokalemia    • PAH (pulmonary arterial hypertension) with portal hypertension (HCC)    • Obstructive sleep apnea on CPAP    • PBC (primary biliary cirrhosis)    • CREST syndrome, partial     • Raynaud's phenomenon    • Pulmonary fibrosis prob sec underlying autoimmune disease      Plan:   Admit to critical care  Vitals per protocol  Diet as tolerated  Oxygen high flow to maintain saturation 88-92%  IVF saline lock    Douneb QID   Albuterol q4h prn  Pulmicort nebulized BID  Solumedrol 62.5 mg IVP BID  Cefepime/Vancomycin given empirically in EM  Pulmonology consult in AM      Replace potassium    NIV > High flow oxygen  Check Abg     DVT prophylaxis > Lovenox 40 mg SQ daily    Code Status/Advanced Care Plan: Full    The patient's surrogate decision maker is see records.     I discussed my findings and recommendations with the patient and daughters at bedside.    Estimated length of stay is over 2  midnights.     The patient was seen and examined by me on 9/11/2022 at 2032.      Electronically signed by Fan Neri MD, 09/11/22, 22:06 CDT.

## 2022-09-12 NOTE — PAYOR COMM NOTE
"FROM: LINDA ROJAS  PHONE: [phone]  FAX: [phone]    PENDING: [id number]    Jose Juárez (62 y.o. Female)             Date of Birth   1960    Social Security Number       Address   [address] [place] [address] [address] [place] [address]    Home Phone   [phone]    MRN   [id number]       Mormonism   Christian    Marital Status                               Admission Date   9/11/22    Admission Type   Emergency    Admitting Provider   Andreas Whiteside MD    Attending Provider   Andreas Whiteside MD    Department, Room/Bed   [facility] CARDIAC CARE, C010/1       Discharge Date       Discharge Disposition       Discharge Destination                               Attending Provider: Andreas Whiteside MD    Allergies: Codeine, Latex    Isolation: None   Infection: COVID (rule out) (09/12/22)   Code Status: CPR   Advance Care Planning Activity    Ht: 160 cm (63\")   Wt: 77.7 kg (171 lb 4.8 oz)    Admission Cmt: None   Principal Problem: Acute on chronic respiratory failure with hypoxia (HCC) [J96.21]                 Active Insurance as of 9/11/2022     Primary Coverage     Payor Plan Insurance Group Employer/Plan Group    [employer] [id number] [id number]     Payor Plan Address Payor Plan Phone Number Payor Plan Fax Number Effective Dates    [address] [phone]  1/1/2022 - None Entered    [place] [id number]       Subscriber Name Subscriber Birth Date Member ID       JOSE JUÁREZ 1960 [id number]                 Emergency Contacts      (Rel.) Home Phone Work Phone Mobile Phone    Garrison Juárez (Spouse) [phone] -- [phone]    elodia figueroa (Daughter) [phone] -- --    vipul daniels (Daughter) [phone] -- --               History & Physical      Fan Neri MD at 09/11/22 2148              [facility] Medicine Services  HISTORY AND PHYSICAL    Date of Admission: 9/11/2022  Primary Care " Physician: Aaron Stoddard MD    Subjective     Chief Complaint: Shortness of breath    History of Present Illness  62 year old female with PMH of CREST syndrome, pulmonary fibrosis, pulmonary hypertension, chronic respiratory failure high flow 8 lpm home oxygen, CHAYA/CPAP, that presents to the ER with complaints of worsening shortness of breath for over 2 weeks. She has not felt well for about a month. Follow with Dr Griffith whom started her on Tyvaso for PAH/ILD this August. She follow with rheumatology also and is on Cellcept and prednisone. CXR and CT chest show chronic changes.     States was declined from lung transplant at [place] and is currently waiting to interview at [facility].     Review of Systems   Otherwise complete ROS reviewed and negative except as mentioned in the HPI.    Past Medical History:   Past Medical History:   Diagnosis Date   • Allergies    • Arthritis    • BMI 31.0-31.9,adult 06/04/2019   • Calcified granuloma of lung (HCC) 06/04/2019    Right lung   • CHF (congestive heart failure) (HCC)     denies   • Chronic respiratory failure with hypoxia (HCC) 08/05/2020   • Chronic respiratory failure with hypoxia, on home oxygen therapy (HCC)    • COVID-19 02/01/2022   • CREST syndrome (HCC)    • Environmental allergies 02/01/2022   • Gastroesophageal reflux disease without esophagitis 06/04/2019   • Obstructive sleep apnea on CPAP 06/04/2019   • Pulmonary fibrosis (HCC)    • Pulmonary hypertension (HCC)    • Short-term memory loss      Past Surgical History:  Past Surgical History:   Procedure Laterality Date   • BREAST BIOPSY     • CARDIAC CATHETERIZATION N/A 7/22/2020    Procedure: Right Heart Cath;  Surgeon: Thong Martin MD;  Location: [facility] CATH INVASIVE LOC[facility];  Service: Cardiology;  Laterality: N/A;   • CHOLECYSTECTOMY     • COLONOSCOPY  05/11/2015    5 POLYPS   • COLONOSCOPY N/A 8/4/2021    Procedure: COLONOSCOPY WITH ANESTHESIA;  Surgeon: Michael Landin, DO;   Location:  [facility] ENDOS[facility];  Service: Gastroenterology;  Laterality: N/A;  pre-hx polyps  post-colon polyps   • ENDOSCOPY N/A 8/4/2021    Procedure: ESOPHAGOGASTRODUODENOSCOPY WITH ANESTHESIA;  Surgeon: Michael Landin DO;  Location:  [facility]OS[facility];  Service: Gastroenterology;  Laterality: N/A;  pre-dysphagia  post-normal  pcp-lanette aguila     Social History:  reports that she has never smoked. She has never used smokeless tobacco. She reports that she does not drink alcohol and does not use drugs.    Family History: family history includes Cirrhosis in her sister; Liver cancer in her brother; No Known Problems in her father and mother.       Allergies:  Allergies   Allergen Reactions   • Codeine Nausea And Vomiting   • Latex Rash       Medications:  Prior to Admission medications    Medication Sig Start Date End Date Taking? Authorizing Provider   busPIRone (BUSPAR) 10 MG tablet Take 10 mg by mouth 2 (Two) Times a Day As Needed.    ProviderLatanya MD   clonazePAM (KlonoPIN) 0.5 MG tablet Take 0.5 mg by mouth 2 (Two) Times a Day As Needed for Anxiety.    ProviderLatanya MD   fexofenadine (ALLEGRA) 180 MG tablet Take 180 mg by mouth Daily As Needed (allergies).    ProviderLatanya MD   ipratropium-albuterol (DUO-NEB) 0.5-2.5 mg/3 ml nebulizer Take 3 mL by nebulization 4 (Four) Times a Day As Needed for Wheezing or Shortness of Air. 9/8/22   Fela Blackman APRN   Macitentan (Opsumit) 10 MG tablet Take 1 tablet by mouth Daily. 8/16/22   Fela Blackman APRN   mycophenolate (CELLCEPT) 500 MG tablet Take 1,000 mg by mouth 2 (Two) Times a Day.    ProviderLatanya MD   predniSONE (DELTASONE) 5 MG tablet Take 1 tablet by mouth Daily for 90 days. 8/25/22 11/23/22  Fela Blackman APRN   traZODone (DESYREL) 100 MG tablet Take 1 tablet by mouth Every Night for 30 days. 4/22/22 5/22/22  Raffi Cotter MD   Treprostinil 16 MCG powder Inhale 16 mcg 4 (Four) Times a Day.    Provider  "MD Latanya   ursodiol (ACTIGALL) 300 MG capsule Take 1 capsule by mouth 2 (Two) Times a Day. 7/21/22   Micah Barton APRN   venlafaxine (EFFEXOR) 75 MG tablet Take 75 mg by mouth Daily.    Provider, MD Latanya     I have utilized all available immediate resources to obtain, update, and review the patient's current medications.    Objective     Vital Signs: /78   Pulse (!) 129   Temp 97.9 °F (36.6 °C) (Oral)   Resp (!) 40   Ht 160 cm (63\")   Wt 81.5 kg (179 lb 11.2 oz)   SpO2 100%   BMI 31.83 kg/m²   Physical Exam  Vitals reviewed.   Constitutional:       General: She is not in acute distress.     Appearance: She is well-developed. She is ill-appearing. She is not toxic-appearing.   HENT:      Head: Normocephalic and atraumatic.      Right Ear: External ear normal.      Left Ear: External ear normal.      Mouth/Throat:      Mouth: Mucous membranes are dry.      Pharynx: Oropharynx is clear.   Eyes:      General:         Right eye: No discharge.         Left eye: No discharge.      Extraocular Movements: Extraocular movements intact.      Conjunctiva/sclera: Conjunctivae normal.      Pupils: Pupils are equal, round, and reactive to light.   Neck:      Vascular: No JVD.   Cardiovascular:      Rate and Rhythm: Regular rhythm. Tachycardia present.      Pulses: Normal pulses.      Heart sounds: Normal heart sounds. No murmur heard.    No friction rub. No gallop.   Pulmonary:      Effort: No respiratory distress.      Breath sounds: No stridor. Rhonchi and rales present. No wheezing.   Chest:      Chest wall: No tenderness.   Abdominal:      General: Bowel sounds are normal. There is no distension.      Palpations: Abdomen is soft.      Tenderness: There is no abdominal tenderness. There is no guarding or rebound.      Hernia: No hernia is present.   Musculoskeletal:         General: No swelling, tenderness or deformity. Normal range of motion.      Cervical back: Normal range of motion and " neck supple. No rigidity or tenderness. No muscular tenderness.      Right lower leg: No edema.      Left lower leg: No edema.   Skin:     General: Skin is warm and dry.      Findings: Lesion (hypervascular macules in cheeks) present. No erythema or rash.   Neurological:      General: No focal deficit present.      Mental Status: She is alert and oriented to person, place, and time.      Cranial Nerves: No cranial nerve deficit.      Sensory: No sensory deficit.      Motor: No weakness or abnormal muscle tone.      Deep Tendon Reflexes: Reflexes normal.   Psychiatric:         Mood and Affect: Mood normal.         Behavior: Behavior normal.     Results Reviewed:  Lab Results (last 24 hours)     Procedure Component Value Units Date/Time    COVID-19,[facility] IN-HOUSE,Nasal Swab No Transport Media 3-4 HR TAT - Swab, Nasal Cavity [[id number]]  (Normal) Collected: 09/11/22 1909    Specimen: Swab from Nasal Cavity Updated: 09/11/22 2028     COVID19 Not Detected    Narrative:      Fact sheet for providers: https://www.fda.gov/media/561423/download     Fact sheet for patients: https://www.fda.gov/media/743703/download    Test performed by PCR.    Consider negative results in combination with clinical observations, patient history, and epidemiological information.    Blood Culture - Blood, Arm, Left [[id number]] Collected: 09/11/22 1909    Specimen: Blood from Arm, Left Updated: 09/11/22 2008    [place] Draw [[id number]] Collected: 09/11/22 1858    Specimen: Blood from Arm, Right Updated: 09/11/22 2002    Narrative:      The following orders were created for panel order [place] Draw.  Procedure                               Abnormality         Status                     ---------                               -----------         ------                     Green Top (Gel)[[id number]]                                  Final result               Lavender Top[[id number]]                                     Final result                Red Top[[id number]]                                          Final result               [place] Blood Culture Willie...[[id number]]                      Final result               Gray Top[[id number]]                                         In process                 Light Blue Top[[id number]]                                   Final result                 Please view results for these tests on the individual orders.    Green Top (Gel) [[id number]] Collected: 09/11/22 1858    Specimen: Blood from Arm, Right Updated: 09/11/22 2002     Extra Tube Hold for add-ons.     Comment: Auto resulted.       [place] Blood Culture Bottle Set [[id number]] Collected: 09/11/22 1858    Specimen: Blood from Arm, Right Updated: 09/11/22 2002     Extra Tube Hold for add-ons.     Comment: Auto resulted.       Lavender Top [[id number]] Collected: 09/11/22 1858    Specimen: Blood from Arm, Right Updated: 09/11/22 2002     Extra Tube hold for add-on     Comment: Auto resulted       Red Top [[id number]] Collected: 09/11/22 1858    Specimen: Blood from Arm, Right Updated: 09/11/22 2002     Extra Tube Hold for add-ons.     Comment: Auto resulted.       Light Blue Top [[id number]] Collected: 09/11/22 1858    Specimen: Blood from Arm, Right Updated: 09/11/22 2002     Extra Tube Hold for add-ons.     Comment: Auto resulted       Blood Culture - Blood, Arm, Right [[id number]] Collected: 09/11/22 1858    Specimen: Blood from Arm, Right Updated: 09/11/22 1942    Lactic Acid, Plasma [[id number]]  (Abnormal) Collected: 09/11/22 1858    Specimen: Blood from Arm, Right Updated: 09/11/22 1937     Lactate 2.4 mmol/L     Comprehensive Metabolic Panel [[id number]]  (Abnormal) Collected: 09/11/22 1858    Specimen: Blood from Arm, Right Updated: 09/11/22 1928     Glucose 134 mg/dL      BUN 9 mg/dL      Creatinine 0.88 mg/dL      Sodium 140 mmol/L      Potassium 3.2 mmol/L      Chloride 103 mmol/L      CO2 23.0 mmol/L      Calcium 9.0 mg/dL      Total Protein 7.2 g/dL       Albumin 4.20 g/dL      ALT (SGPT) 14 U/L      AST (SGOT) 20 U/L      Alkaline Phosphatase 105 U/L      Total Bilirubin 0.4 mg/dL      Globulin 3.0 gm/dL      A/G Ratio 1.4 g/dL      BUN/Creatinine Ratio 10.2     Anion Gap 14.0 mmol/L      eGFR 73.5 mL/min/1.73      Comment: National Kidney Foundation and American Society of Nephrology (ASN) Task Force recommended calculation based on the Chronic Kidney Disease Epidemiology Collaboration (CKD-EPI) equation refit without adjustment for race.       Narrative:      GFR Normal >60  Chronic Kidney Disease <60  Kidney Failure <15      Troponin [[id number]]  (Normal) Collected: 09/11/22 1858    Specimen: Blood from Arm, Right Updated: 09/11/22 1925     Troponin T <0.010 ng/mL     Narrative:      Troponin T Reference Range:  <= 0.03 ng/mL-   Negative for AMI  >0.03 ng/mL-     Abnormal for myocardial necrosis.  Clinicians would have to utilize clinical acumen, EKG, Troponin and serial changes to determine if it is an Acute Myocardial Infarction or myocardial injury due to an underlying chronic condition.       Results may be falsely decreased if patient taking Biotin.      Magnesium [[id number]]  (Normal) Collected: 09/11/22 1858    Specimen: Blood from Arm, Right Updated: 09/11/22 1922     Magnesium 2.1 mg/dL     CBC & Differential [[id number]]  (Abnormal) Collected: 09/11/22 1858    Specimen: Blood from Arm, Right Updated: 09/11/22 1912    Narrative:      The following orders were created for panel order CBC & Differential.  Procedure                               Abnormality         Status                     ---------                               -----------         ------                     CBC Auto Differential[[id number]]        Abnormal            Final result                 Please view results for these tests on the individual orders.    CBC Auto Differential [[id number]]  (Abnormal) Collected: 09/11/22 1858    Specimen: Blood from Arm, Right Updated: 09/11/22 1912      WBC 12.51 10*3/mm3      RBC 5.32 10*6/mm3      Hemoglobin 13.5 g/dL      Hematocrit 44.4 %      MCV 83.5 fL      MCH 25.4 pg      MCHC 30.4 g/dL      RDW 13.6 %      RDW-SD 41.1 fl      MPV 11.5 fL      Platelets 252 10*3/mm3      Neutrophil % 82.0 %      Lymphocyte % 10.2 %      Monocyte % 5.4 %      Eosinophil % 1.0 %      Basophil % 0.8 %      Immature Grans % 0.6 %      Neutrophils, Absolute 10.25 10*3/mm3      Lymphocytes, Absolute 1.28 10*3/mm3      Monocytes, Absolute 0.68 10*3/mm3      Eosinophils, Absolute 0.13 10*3/mm3      Basophils, Absolute 0.10 10*3/mm3      Immature Grans, Absolute 0.07 10*3/mm3      nRBC 0.0 /100 WBC     [place] Top [[id number]] Collected: 09/11/22 1858    Specimen: Blood from Arm, Right Updated: 09/11/22 1906        Imaging Results (Last 24 Hours)     Procedure Component Value Units Date/Time    CT Angiogram Chest [[id number]] Collected: 09/11/22 2024     Updated: 09/11/22 2030    Narrative:      EXAMINATION: CT ANGIOGRAM CHEST-      9/11/2022 8:10 PM CDT     HISTORY: Shortness of breath. Hypoxia. History of pulmonary  hypertension. Pulmonary fibrosis.     In order to have a CT radiation dose as low as reasonably achievable  Automated Exposure Control was utilized for adjustment of the mA and/or  KV according to patient size.     DLP in mGycm= 202.     CT angiogram chest.  CT angiography protocol.   CT imaging with bolus IV contrast injection.   Under concurrent supervision axial, sagittal, coronal, and  three-dimensional data sets were constructed. Borderline cardiomegaly.  Pulmonary artery enlargement.  No pulmonary embolism.     Stable mildly prominent mediastinal lymph nodes.     No thoracic aortic aneurysm or dissection.     Pulmonary fibrosis and bronchiectasis.     No pneumothorax or pleural effusion.     No acute pneumonia is seen.     Summary:  1. No pulmonary embolism.  2. Stable appearance of chronic lung changes.                                   This report was finalized on  09/11/2022 20:26 by Dr. Lorenzo Ruff MD.    XR Chest 1 View [[id number]] Collected: 09/11/22 1948     Updated: 09/11/22 1952    Narrative:      EXAMINATION: XR CHEST 1 VW-     9/11/2022 7:08 PM CDT     HISTORY: Hypoxia.     One view chest x-ray.     Comparison is made with 04/20/2022.     Heart size is magnified though there does appear to be mild cardiomegaly  Mild infiltrate in both lungs compatible with mild diffuse edema.     No focal consolidation.  No pneumothorax.     Summary:  1. Mild cardiac enlargement and mild diffuse infiltrate for which edema  is favored.     This report was finalized on 09/11/2022 19:49 by Dr. Lorenzo Ruff MD.        I have personally reviewed and interpreted the radiology studies and ECG obtained at time of admission.     Assessment / Plan     Assessment:   Active Hospital Problems    Diagnosis    • **Acute on chronic respiratory failure with hypoxia (HCC)    • Hypokalemia    • PAH (pulmonary arterial hypertension) with portal hypertension (HCC)    • Obstructive sleep apnea on CPAP    • PBC (primary biliary cirrhosis)    • CREST syndrome, partial     • Raynaud's phenomenon    • Pulmonary fibrosis prob sec underlying autoimmune disease      Plan:   Admit to critical care  Vitals per protocol  Diet as tolerated  Oxygen high flow to maintain saturation 88-92%  IVF saline lock    Douneb QID   Albuterol q4h prn  Pulmicort nebulized BID  Solumedrol 62.5 mg IVP BID  Cefepime/Vancomycin given empirically in EM  Pulmonology consult in AM      Replace potassium    NIV > High flow oxygen  Check Abg     DVT prophylaxis > Lovenox 40 mg SQ daily    Code Status/Advanced Care Plan: Full    The patient's surrogate decision maker is see records.     I discussed my findings and recommendations with the patient and daughters at bedside.    Estimated length of stay is over 2 midnights.     The patient was seen and examined by me on 9/11/2022 at 2032.      Electronically signed by Fan Neri  MD, 09/11/22, 22:06 CDT.              Electronically signed by Fan Neri MD at 09/11/22 2206          Emergency Department Notes      Jake Sanabria MD at 09/11/22 1852          Subjective   PIT    History of Present Illness   Patient presents with hypoxia.  She has a history of advanced pulmonary hypertension for which she follows with a pulmonologist.  She also has crest syndrome and pulmonary fibrosis.  She has been feeling gradually worse for a long time but since starting a new inhaler on Tuesday she notes that she has been having fairly quickly worsening shortness of breath.  She also states that inhaler makes her feel very weak and lightheaded and she feels it drops her blood pressure.  She has been noticing her oxygen will drop sometimes down to the 50s or 70s whenever she gets up to go to the bathroom and it is difficult to recover.  She has been having diarrhea and not drinking as much fluid as she should.  No fevers.  She has her baseline cough productive of white sputum.    Review of Systems   Constitutional: Positive for chills. Negative for fever.   HENT: Negative for congestion and sore throat.    Respiratory: Positive for cough and shortness of breath.    Cardiovascular: Negative for chest pain and palpitations.   Gastrointestinal: Negative for abdominal pain and vomiting.   Genitourinary: Negative for difficulty urinating and dysuria.   Musculoskeletal: Negative for gait problem and joint swelling.   Skin: Negative for rash and wound.   Neurological: Positive for light-headedness. Negative for syncope.       Past Medical History:   Diagnosis Date   • Allergies    • Arthritis    • BMI 31.0-31.9,adult 06/04/2019   • Calcified granuloma of lung ([facility]) 06/04/2019    Right lung   • CHF (congestive heart failure) ([facility])     denies   • Chronic respiratory failure with hypoxia ([facility]) 08/05/2020   • Chronic respiratory failure with hypoxia, on home oxygen therapy ([facility])    • COVID-19  02/01/2022   • CREST syndrome (HCC)    • Environmental allergies 02/01/2022   • Gastroesophageal reflux disease without esophagitis 06/04/2019   • Obstructive sleep apnea on CPAP 06/04/2019   • Pulmonary fibrosis (HCC)    • Pulmonary hypertension (HCC)    • Short-term memory loss        Allergies   Allergen Reactions   • Codeine Nausea And Vomiting   • Latex Rash       Past Surgical History:   Procedure Laterality Date   • BREAST BIOPSY     • CARDIAC CATHETERIZATION N/A 7/22/2020    Procedure: Right Heart Cath;  Surgeon: Thong Martin MD;  Location: [facility] CATH INVASIVE LOCATION;  Service: Cardiology;  Laterality: N/A;   • CHOLECYSTECTOMY     • COLONOSCOPY  05/11/2015    5 POLYPS   • COLONOSCOPY N/A 8/4/2021    Procedure: COLONOSCOPY WITH ANESTHESIA;  Surgeon: Michael Landin DO;  Location: [facility] ENDOSCOPY;  Service: Gastroenterology;  Laterality: N/A;  pre-hx polyps  post-colon polyps   • ENDOSCOPY N/A 8/4/2021    Procedure: ESOPHAGOGASTRODUODENOSCOPY WITH ANESTHESIA;  Surgeon: Michael Landin DO;  Location: [facility] ENDOSCOPY;  Service: Gastroenterology;  Laterality: N/A;  pre-dysphagia  post-normal  pcp-lanette aguila       Family History   Problem Relation Age of Onset   • Cirrhosis Sister    • Liver cancer Brother    • No Known Problems Mother    • No Known Problems Father    • Colon cancer Neg Hx    • Colon polyps Neg Hx        Social History     Socioeconomic History   • Marital status:    Tobacco Use   • Smoking status: Never Smoker   • Smokeless tobacco: Never Used   Vaping Use   • Vaping Use: Never used   Substance and Sexual Activity   • Alcohol use: No   • Drug use: No   • Sexual activity: Defer           Objective   Physical Exam  Vitals reviewed.   Constitutional:       Appearance: She is ill-appearing.   HENT:      Head: Normocephalic and atraumatic.   Eyes:      Extraocular Movements: Extraocular movements intact.      Conjunctiva/sclera: Conjunctivae normal.   Cardiovascular:       Pulses: Normal pulses.      Heart sounds: Normal heart sounds.   Pulmonary:      Effort: Pulmonary effort is normal. No respiratory distress.      Breath sounds: No decreased breath sounds or wheezing.   Abdominal:      General: Abdomen is flat. There is no distension.   Musculoskeletal:      Cervical back: Normal range of motion and neck supple.      Right lower leg: No edema.      Left lower leg: No edema.   Skin:     General: Skin is warm and dry.   Neurological:      General: No focal deficit present.      Mental Status: She is alert. Mental status is at baseline.   Psychiatric:         Behavior: Behavior normal.         Thought Content: Thought content normal.         Procedures          ED Course  ED Course as of 09/11/22 2354   Sun Sep 11, 2022   1915 XR Chest 1 View [AS]      ED Course User Index  [AS] Jake Sanabria MD                                           [facility]   Elaine Juárez is a 62 y.o. female with PMH above who presents to the Emergency Department with hypoxia.  Normally wears 8 L of oxygen at home and is requiring 12 here to keep her sats above 92%.  Work of breathing improved with high flow nasal cannula, respiratory rate decreased.  She appears moderately dehydrated and 250 cc fluid bolus was ordered given her history of pulmonary hypertension.  She has not skipped any doses of steroids with her crest syndrome and continues on immunosuppressants.  Suspect hypovolemia may be the etiology of her symptoms but will also cover for sepsis given her tachycardia, tachypnea, immunosuppressed state.  Vanco cefepime ordered empirically.  Laboratory studies ordered.  Chest x-ray and EKG ordered.  She does not have painful swelling in her legs, sudden worsening of her condition, or chest pain to raise suspicion for PE.     ED Course:   -EKG reviewed by me; shows sinus rhythm, no focal ischemic changes, no arrhythmia. TWI in anterior leads present previously.  -Lab studies notable for mild  lactic acidosis and mild leukocytosis.  The patient had greatly improved vital signs and symptoms on repeat evaluation with decreased tachycardia and improved sats.  As able to wean her oxygen to 10 L high flow.-chest x-ray reviewed by me. no focal consolidations, no pulmonary edema, mediastinum not widened. PE study ordered for closer eval. PE study did not show any evidence of pneumonia or PE.  Further fluid resuscitation is held at this time given the improvement and need for precise hydration status; overhydrating patient can be catastrophic with her medical hx. discussed the case with hospitalist Garrison Trivedi who agrees to admission.      Final diagnosis: hypoxia    All questions answered. Patient/family was understanding and in agreement with today's assessment and plan. The patient was monitored during their stay in the ED and dispositioned without acute event.    Electronically signed by:  Jake Sanabria MD 9/11/2022 23:54 CDT      Note: Dragon medical dictation software was used in the creation of this note.        Final diagnoses:   None       ED Disposition  ED Disposition     ED Disposition   Decision to Admit    Condition   --    Comment   Level of Care: Critical Care [6]   Diagnosis: Hypoxia [170467]   Admitting Physician: SHARON FAGAN [[id number]]   Attending Physician: SHARON FAGAN [[id number]]   Certification: I Certify That Inpatient Hospital Services Are Medically Necessary For Greater Than 2 Midnights               No follow-up provider specified.       Medication List      No changes were made to your prescriptions during this visit.          Jake Sanabria MD  09/11/22 0975      Electronically signed by Jake Sanabria MD at 09/11/22 7121       Vital Signs (last day)     Date/Time Temp Temp src Pulse Resp BP Patient Position SpO2    09/12/22 0930 -- -- -- 23 -- -- --    09/12/22 0830 -- -- 99 25 106/84 -- 96    09/12/22 0730 -- -- 104 25 113/84 -- 96    09/12/22  0700 -- -- 121 25 136/92 -- 89    09/12/22 0330 -- -- 117 32 -- -- 92    09/12/22 0315 -- -- 100 -- 131/86 -- 97    09/12/22 0300 -- -- 109 -- 128/95 -- 97    09/12/22 0245 -- -- 105 -- 116/99 -- 97    09/12/22 0230 -- -- 106 -- 111/98 -- 98    09/12/22 0215 -- -- 115 -- 122/94 -- 97    09/12/22 0200 -- -- 110 -- 125/73 -- 97    09/12/22 0145 -- -- 103 -- 118/74 -- 96    09/12/22 0130 -- -- 110 -- 122/90 -- 98    09/12/22 0115 -- -- 109 -- 128/95 -- 99    09/12/22 0100 -- -- 108 -- 125/90 -- 98    09/12/22 0057 -- -- -- 36 -- -- --    09/12/22 0050 -- -- 108 29 -- -- 96    09/12/22 0045 -- -- 101 -- 100/81 -- 95    09/12/22 0030 -- -- 114 -- 116/76 -- 92    09/12/22 0015 98.3 (36.8) Oral 110 34 129/96 -- 95    09/11/22 2331 98.3 (36.8) Oral 108 35 123/89 -- 95    09/11/22 2316 -- -- 108 -- 102/69 -- 96    09/11/22 2301 -- -- 112 -- 115/83 -- 97 09/11/22 2231 -- -- 116 -- 123/87 -- 99    09/11/22 2216 -- -- 116 -- 108/85 -- 96    09/11/22 2201 -- -- 114 -- 122/84 -- 97    09/11/22 2146 -- -- 115 -- 112/94 -- 92    09/11/22 1931 -- -- 129 -- 106/78 -- 100    09/11/22 1916 -- -- 128 -- 110/84 -- 98    09/11/22 1901 -- -- 132 -- 113/70 -- 91    09/11/22 1853 -- -- 131 40 -- -- 91    09/11/22 1846 -- -- -- -- 121/48 Lying --    09/11/22 1845 97.9 (36.6) Oral 133 57 -- -- 84            Facility-Administered Medications as of 9/12/2022   Medication Dose Route Frequency Provider Last Rate Last Admin   • acetaminophen (TYLENOL) tablet 650 mg  650 mg Oral Q4H PRN Fan Neri MD        Or   • acetaminophen (TYLENOL) suppository 650 mg  650 mg Rectal Q4H PRN Fan Neri MD       • albuterol (PROVENTIL) nebulizer solution 0.083% 2.5 mg/3mL  2.5 mg Nebulization Q4H PRN Fan Neri MD       • azithromycin (ZITHROMAX) 500 mg in sodium chloride 0.9 % 250 mL IVPB-VTB  500 mg Intravenous Q24H Faisal Griffith MD   500 mg at 09/12/22 0920   • busPIRone (BUSPAR) tablet 10 mg  10 mg Oral BID  PRN Fan Neri MD   10 mg at 09/12/22 0955   • [COMPLETED] cefepime (MAXIPIME) 2 g/100 mL 0.9% NS (mbp)  2 g Intravenous Once Jake Sanabria MD   Stopped at 09/11/22 2009   • cefTRIAXone (ROCEPHIN) 1 g in sodium chloride 0.9 % 100 mL IVPB-VTB  1 g Intravenous Q24H Faisal Griffith MD       • clonazePAM (KlonoPIN) tablet 0.5 mg  0.5 mg Oral BID PRN Fan Neri MD       • Enoxaparin Sodium (LOVENOX) syringe 40 mg  40 mg Subcutaneous Daily Fan Neri MD   40 mg at 09/12/22 0832   • famotidine (PEPCID) tablet 20 mg  20 mg Oral BID AC Fan Neri MD   20 mg at 09/12/22 0616   • guaiFENesin (MUCINEX) 12 hr tablet 600 mg  600 mg Oral Q12H Fan Neri MD   600 mg at 09/12/22 0822   • guaiFENesin-dextromethorphan (ROBITUSSIN DM) 100-10 MG/5ML syrup 10 mL  10 mL Oral Q6H PRN Fan Neri MD       • [COMPLETED] iopamidol (ISOVUE-370) 76 % injection 100 mL  100 mL Intravenous Once in imaging Jake Sanabria MD   81 mL at 09/11/22 2016   • ipratropium-albuterol (DUO-NEB) nebulizer solution 3 mL  3 mL Nebulization Q6H - RT Fan Neri MD   3 mL at 09/12/22 0053   • [COMPLETED] lactated ringers bolus 250 mL  250 mL Intravenous Once Jake Sanabria MD   Stopped at 09/11/22 2009   • Macitentan tablet 10 mg (OPSUMIT - PATIENT SUPPLIED - TAKE ONE TABLET NIGHTLY)  10 mg Oral Nightly Faisal Griffith MD       • melatonin tablet 3 mg  3 mg Oral Nightly PRN Fan Neri MD   3 mg at 09/12/22 0324   • methylPREDNISolone sodium succinate (SOLU-Medrol) injection 40 mg  40 mg Intravenous Q12H Faisal Griffith MD       • mycophenolate (CELLCEPT) tablet 1,000 mg - PATIENT SUPPLIED  1,000 mg Oral BID Faisal Griffith MD   1,000 mg at 09/12/22 0916   • ondansetron (ZOFRAN) injection 4 mg  4 mg Intravenous Q6H PRN Fan Neri MD       • sodium chloride 0.9 % flush 10 mL  10 mL  Intravenous Q12H Fan Neri MD   10 mL at 09/12/22 0821   • sodium chloride 0.9 % flush 10 mL  10 mL Intravenous PRN Fan Neri MD       • Treprostinil powder inhaler (PATIENT SUPPLIED TYVASO) - ONE CARTRIDGE FOUR TIMES DAILY  1 dose Inhalation 4x Daily Faisal Griffith MD       • [COMPLETED] vancomycin 1750 mg/500 mL 0.9% NS IVPB ([facility])  20 mg/kg Intravenous Once Jake Sanabria MD   1,750 mg at 09/11/22 2023   • venlafaxine (EFFEXOR) tablet 75 mg  75 mg Oral Daily Fan Neri MD   75 mg at 09/12/22 0821       Lab Results (last 24 hours)     Procedure Component Value Units Date/Time    Lactic Acid, Plasma [[id number]]  (Abnormal) Collected: 09/12/22 0949    Specimen: Blood Updated: 09/12/22 1024     Lactate 2.2 mmol/L     [place] Draw [[id number]] Collected: 09/11/22 1858    Specimen: Blood from Arm, Right Updated: 09/11/22 2302    Narrative:      The following orders were created for panel order [place] Draw.  Procedure                               Abnormality         Status                     ---------                               -----------         ------                     Green Top (Gel)[[id number]]                                  Final result               Lavender Top[[id number]]                                     Final result               Red Top[[id number]]                                          Final result               [place] Blood Culture Willie...[[id number]]                      Final result               Gray Top[[id number]]                                         Final result               Light Blue Top[[id number]]                                   Final result                 Please view results for these tests on the individual orders.    [place] Top [[id number]] Collected: 09/11/22 1858    Specimen: Blood from Arm, Right Updated: 09/11/22 2302     Extra Tube Hold for add-ons.     Comment: Auto resulted.       STAT Lactic Acid, Reflex [[id number]]   (Normal) Collected: 09/11/22 2213    Specimen: Blood Updated: 09/11/22 2236     Lactate 1.2 mmol/L     Urinalysis With Culture If Indicated - Urine, Catheter In/Out [[id number]]  (Abnormal) Collected: 09/11/22 2213    Specimen: Urine, Catheter In/Out Updated: 09/11/22 2225     Color, UA Yellow     Appearance, UA Clear     pH, UA 6.0     Specific Gravity, UA >1.030     Glucose, UA Negative     Ketones, UA Negative     Bilirubin, UA Negative     Blood, UA Negative     Protein, UA Negative     Leuk Esterase, UA Negative     Nitrite, UA Negative     Urobilinogen, UA 0.2 E.U./dL    Narrative:      In absence of clinical symptoms, the presence of pyuria, bacteria, and/or nitrites on the urinalysis result does not correlate with infection.  Urine microscopic not indicated.    Blood Gas, Arterial - [[id number]]  (Abnormal) Collected: 09/11/22 2218    Specimen: Arterial Blood Updated: 09/11/22 2217     Site Left Radial     Tony's Test Positive     pH, Arterial 7.430 pH units      pCO2, Arterial 36.5 mm Hg      pO2, Arterial 65.4 mm Hg      Comment: 84 Value below reference range        HCO3, Arterial 24.2 mmol/L      Base Excess, Arterial 0.2 mmol/L      O2 Saturation, Arterial 94.2 %      Temperature 37.0 C      Barometric Pressure for Blood Gas 752 mmHg      Modality HFNC     Flow Rate 10.0 lpm      Ventilator Mode NA     Collected by [id number]     Comment: Meter: [id number]     :  RAKEL        pCO2, Temperature Corrected 36.5 mm Hg      pH, Temp Corrected 7.430 pH Units      pO2, Temperature Corrected 65.4 mm Hg     COVID-19,[facility] IN-HOUSE,Nasal Swab No Transport Media 3-4 HR TAT - Swab, Nasal Cavity [[id number]]  (Normal) Collected: 09/11/22 1909    Specimen: Swab from Nasal Cavity Updated: 09/11/22 2028     COVID19 Not Detected    Narrative:      Fact sheet for providers: https://www.fda.gov/media/812379/download     Fact sheet for patients: https://www.fda.gov/media/265476/download    Test performed by  PCR.    Consider negative results in combination with clinical observations, patient history, and epidemiological information.    Blood Culture - Blood, Arm, Left [[id number]] Collected: 09/11/22 1909    Specimen: Blood from Arm, Left Updated: 09/11/22 2008    Green Top (Gel) [[id number]] Collected: 09/11/22 1858    Specimen: Blood from Arm, Right Updated: 09/11/22 2002     Extra Tube Hold for add-ons.     Comment: Auto resulted.       Ideal Blood Culture Bottle Set [[id number]] Collected: 09/11/22 1858    Specimen: Blood from Arm, Right Updated: 09/11/22 2002     Extra Tube Hold for add-ons.     Comment: Auto resulted.       Lavender Top [[id number]] Collected: 09/11/22 1858    Specimen: Blood from Arm, Right Updated: 09/11/22 2002     Extra Tube hold for add-on     Comment: Auto resulted       Red Top [[id number]] Collected: 09/11/22 1858    Specimen: Blood from Arm, Right Updated: 09/11/22 2002     Extra Tube Hold for add-ons.     Comment: Auto resulted.       Light Blue Top [[id number]] Collected: 09/11/22 1858    Specimen: Blood from Arm, Right Updated: 09/11/22 2002     Extra Tube Hold for add-ons.     Comment: Auto resulted       Blood Culture - Blood, Arm, Right [[id number]] Collected: 09/11/22 1858    Specimen: Blood from Arm, Right Updated: 09/11/22 1942    Lactic Acid, Plasma [[id number]]  (Abnormal) Collected: 09/11/22 1858    Specimen: Blood from Arm, Right Updated: 09/11/22 1937     Lactate 2.4 mmol/L     Comprehensive Metabolic Panel [[id number]]  (Abnormal) Collected: 09/11/22 1858    Specimen: Blood from Arm, Right Updated: 09/11/22 1928     Glucose 134 mg/dL      BUN 9 mg/dL      Creatinine 0.88 mg/dL      Sodium 140 mmol/L      Potassium 3.2 mmol/L      Chloride 103 mmol/L      CO2 23.0 mmol/L      Calcium 9.0 mg/dL      Total Protein 7.2 g/dL      Albumin 4.20 g/dL      ALT (SGPT) 14 U/L      AST (SGOT) 20 U/L      Alkaline Phosphatase 105 U/L      Total Bilirubin 0.4 mg/dL      Globulin 3.0 gm/dL       A/G Ratio 1.4 g/dL      BUN/Creatinine Ratio 10.2     Anion Gap 14.0 mmol/L      eGFR 73.5 mL/min/1.73      Comment: National Kidney Foundation and American Society of Nephrology (ASN) Task Force recommended calculation based on the Chronic Kidney Disease Epidemiology Collaboration (CKD-EPI) equation refit without adjustment for race.       Narrative:      GFR Normal >60  Chronic Kidney Disease <60  Kidney Failure <15      Troponin [[id number]]  (Normal) Collected: 09/11/22 1858    Specimen: Blood from Arm, Right Updated: 09/11/22 1925     Troponin T <0.010 ng/mL     Narrative:      Troponin T Reference Range:  <= 0.03 ng/mL-   Negative for AMI  >0.03 ng/mL-     Abnormal for myocardial necrosis.  Clinicians would have to utilize clinical acumen, EKG, Troponin and serial changes to determine if it is an Acute Myocardial Infarction or myocardial injury due to an underlying chronic condition.       Results may be falsely decreased if patient taking Biotin.      Magnesium [[id number]]  (Normal) Collected: 09/11/22 1858    Specimen: Blood from Arm, Right Updated: 09/11/22 1922     Magnesium 2.1 mg/dL     CBC & Differential [[id number]]  (Abnormal) Collected: 09/11/22 1858    Specimen: Blood from Arm, Right Updated: 09/11/22 1912    Narrative:      The following orders were created for panel order CBC & Differential.  Procedure                               Abnormality         Status                     ---------                               -----------         ------                     CBC Auto Differential[[id number]]        Abnormal            Final result                 Please view results for these tests on the individual orders.    CBC Auto Differential [[id number]]  (Abnormal) Collected: 09/11/22 1858    Specimen: Blood from Arm, Right Updated: 09/11/22 1912     WBC 12.51 10*3/mm3      RBC 5.32 10*6/mm3      Hemoglobin 13.5 g/dL      Hematocrit 44.4 %      MCV 83.5 fL      MCH 25.4 pg      MCHC 30.4 g/dL      RDW  13.6 %      RDW-SD 41.1 fl      MPV 11.5 fL      Platelets 252 10*3/mm3      Neutrophil % 82.0 %      Lymphocyte % 10.2 %      Monocyte % 5.4 %      Eosinophil % 1.0 %      Basophil % 0.8 %      Immature Grans % 0.6 %      Neutrophils, Absolute 10.25 10*3/mm3      Lymphocytes, Absolute 1.28 10*3/mm3      Monocytes, Absolute 0.68 10*3/mm3      Eosinophils, Absolute 0.13 10*3/mm3      Basophils, Absolute 0.10 10*3/mm3      Immature Grans, Absolute 0.07 10*3/mm3      nRBC 0.0 /100 WBC         Imaging Results (Last 24 Hours)     Procedure Component Value Units Date/Time    CT Angiogram Chest [[id number]] Collected: 09/11/22 2024     Updated: 09/11/22 2030    Narrative:      EXAMINATION: CT ANGIOGRAM CHEST-      9/11/2022 8:10 PM CDT     HISTORY: Shortness of breath. Hypoxia. History of pulmonary  hypertension. Pulmonary fibrosis.     In order to have a CT radiation dose as low as reasonably achievable  Automated Exposure Control was utilized for adjustment of the mA and/or  KV according to patient size.     DLP in mGycm= 202.     CT angiogram chest.  CT angiography protocol.   CT imaging with bolus IV contrast injection.   Under concurrent supervision axial, sagittal, coronal, and  three-dimensional data sets were constructed. Borderline cardiomegaly.  Pulmonary artery enlargement.  No pulmonary embolism.     Stable mildly prominent mediastinal lymph nodes.     No thoracic aortic aneurysm or dissection.     Pulmonary fibrosis and bronchiectasis.     No pneumothorax or pleural effusion.     No acute pneumonia is seen.     Summary:  1. No pulmonary embolism.  2. Stable appearance of chronic lung changes.                                   This report was finalized on 09/11/2022 20:26 by Dr. Lorenzo Ruff MD.    XR Chest 1 View [[id number]] Collected: 09/11/22 1948     Updated: 09/11/22 1952    Narrative:      EXAMINATION: XR CHEST 1 VW-     9/11/2022 7:08 PM CDT     HISTORY: Hypoxia.     One view chest x-ray.     Comparison  is made with 04/20/2022.     Heart size is magnified though there does appear to be mild cardiomegaly  Mild infiltrate in both lungs compatible with mild diffuse edema.     No focal consolidation.  No pneumothorax.     Summary:  1. Mild cardiac enlargement and mild diffuse infiltrate for which edema  is favored.     This report was finalized on 09/11/2022 19:49 by Dr. Lorenzo Ruff MD.        ECG/EMG Results (last 24 hours)     Procedure Component Value Units Date/Time    ECG 12 Lead [[id number]] Collected: 09/11/22 1917     Updated: 09/12/22 0723     QT Interval 394 ms      QTC Interval 575 ms     Narrative:      Test Reason : hypoxic tachycardic  Blood Pressure :   */*   mmHG  Vent. Rate : 128 BPM     Atrial Rate : 127 BPM     P-R Int :   * ms          QRS Dur :  86 ms      QT Int : 394 ms       P-R-T Axes :   * 104  25 degrees     QTc Int : 575 ms    Undetermined rhythm  Rightward axis  Septal infarct , age undetermined  Abnormal ECG  When compared with ECG of 20-APR-2022 14:51,  Current undetermined rhythm precludes rhythm comparison, needs review  Septal infarct is now Present  ST no longer elevated in Inferior leads    Referred By: ANDREWS           Confirmed By:     Adult Transthoracic Echo Complete W/ Cont if Necessary Per Protocol [[id number]] Resulted: 09/12/22 0919     Updated: 09/12/22 0919     Target HR (85%) 133 bpm      Max. Pred. HR (100%) 156 bpm           Orders (last 24 hrs)      Start     Ordered    09/12/22 2100  Macitentan tablet 10 mg (OPSUMIT - PATIENT SUPPLIED - TAKE ONE TABLET NIGHTLY)  Nightly         09/12/22 0809 09/12/22 2020  methylPREDNISolone sodium succinate (SOLU-Medrol) injection 40 mg  Every 12 Hours         09/12/22 0910    09/12/22 1200  Treprostinil powder inhaler (PATIENT SUPPLIED TYVASO) - ONE CARTRIDGE FOUR TIMES DAILY  4 Times Daily        Note to Pharmacy: 16 mcg per cartridge    09/12/22 0809 09/12/22 1100  cefTRIAXone (ROCEPHIN) 1 g in sodium chloride 0.9 % 100 mL  IVPB-VTB  Every 24 Hours         09/12/22 0910    09/12/22 1000  azithromycin (ZITHROMAX) 500 mg in sodium chloride 0.9 % 250 mL IVPB-VTB  Every 24 Hours         09/12/22 0910    09/12/22 0911  Lactic Acid, Plasma  Once         09/12/22 0910    09/12/22 0909  Respiratory Panel PCR w/COVID-19(SARS-CoV-2) [facility] In-House, NP Swab in UTM/VTM, 3-4 HR TAT - Swab, Nasopharynx  Once         09/12/22 0909    09/12/22 0909  Adult Transthoracic Echo Complete W/ Cont if Necessary Per Protocol  Once         09/12/22 0909 09/12/22 0900  venlafaxine (EFFEXOR) tablet 75 mg  Daily         09/11/22 2204 09/12/22 0900  Enoxaparin Sodium (LOVENOX) syringe 40 mg  Daily         09/11/22 2204 09/12/22 0900  methylPREDNISolone sodium succinate (SOLU-Medrol) injection 62.5 mg  Daily,   Status:  Discontinued         09/11/22 2205 09/12/22 0900  mycophenolate (CELLCEPT) tablet 1,000 mg - PATIENT SUPPLIED  2 Times Daily        Note to Pharmacy: Take two tablets by mouth twice daily    09/12/22 0809 09/12/22 0702  Inpatient Pulmonology Consult  IN         Specialty:  Pulmonary Disease  Provider:  Faisal Griffith MD    09/11/22 2204 09/12/22 0600  CBC Auto Differential  Morning Draw         09/11/22 2204 09/12/22 0600  Comprehensive Metabolic Panel  Morning Draw         09/11/22 2204 09/12/22 0600  famotidine (PEPCID) tablet 20 mg  2 Times Daily Before Meals         09/11/22 2204 09/12/22 0600  Document Pulse Oximetry - On Room Air / Home O2 Level  Daily      Comments: Reapply Oxygen if O2 Sat Drops Below 88%    09/11/22 2205 09/12/22 0600  Tobacco Cessation Education  Daily      Comments: Document in Education Activity    09/11/22 2205 09/12/22 0600  Respiratory Treatment Education (MDI / Spacer / Nebulizer)  Daily      Comments: Document in Education Activity    09/11/22 2205 09/12/22 0600  COPD Education  Daily      Comments: Document in Education Activity    09/11/22 5160     09/12/22 0110  melatonin tablet 3 mg  Nightly PRN         09/12/22 0110    09/12/22 0100  ipratropium-albuterol (DUO-NEB) nebulizer solution 3 mL  Every 6 Hours - RT         09/11/22 2205 09/12/22 0035  Inpatient Case Management  Consult  Once        Provider:  (Not yet assigned)    09/12/22 0035    09/12/22 0015  guaiFENesin (MUCINEX) 12 hr tablet 600 mg  Every 12 Hours Scheduled         09/11/22 2205 09/12/22 0000  Cough / Deep Breathe  Every 4 Hours       09/11/22 2205 09/11/22 2300  Vital Signs Every Hour and Per Hospital Policy Based on Patient Condition  Every Hour       09/11/22 2204 09/11/22 2300  Intake and Output  Every Hour       09/11/22 2204 09/11/22 2217  Blood Gas, Arterial -  PROCEDURE ONCE         09/11/22 2218 09/11/22 2206  sodium chloride 0.9 % flush 10 mL  Every 12 Hours Scheduled         09/11/22 2204 09/11/22 2205  guaiFENesin-dextromethorphan (ROBITUSSIN DM) 100-10 MG/5ML syrup 10 mL  Every 6 Hours PRN         09/11/22 2205 09/11/22 2205  albuterol (PROVENTIL) nebulizer solution 0.083% 2.5 mg/3mL  Every 4 Hours PRN         09/11/22 2205    09/11/22 2205  Cardiac Monitoring  Continuous         09/11/22 2204 09/11/22 2205  Continuous Pulse Oximetry  Continuous         09/11/22 2204 09/11/22 2205  Height & Weight  Once         09/11/22 2204 09/11/22 2205  Daily Weights  Daily       09/11/22 2204 09/11/22 2205  Oral care for patients not on NPPV or intubated  Every Shift      Comments: Oral care for patients not on NPPV or intubated    09/11/22 2204 09/11/22 2205  Use Mobility Guidelines for Advancement of Activity  Continuous         09/11/22 2204 09/11/22 2205  Insert Peripheral IV  Once         09/11/22 2204 09/11/22 2205  Saline Lock & Maintain IV Access  Continuous         09/11/22 2204 09/11/22 2205  Diet Regular  Diet Effective Now         09/11/22 2204 09/11/22 2205  Reason for COPD Admission: New Oxygen Requirements  Once   "       09/11/22 2205 09/11/22 2205  Inpatient Nutrition Consult - BMI 30-39.9  Once        Provider:  (Not yet assigned)    09/11/22 2205 09/11/22 2204  busPIRone (BUSPAR) tablet 10 mg  2 Times Daily PRN         09/11/22 2204 09/11/22 2204  clonazePAM (KlonoPIN) tablet 0.5 mg  2 Times Daily PRN         09/11/22 2204 09/11/22 2204  sodium chloride 0.9 % flush 10 mL  As Needed         09/11/22 2204 09/11/22 2204  ondansetron (ZOFRAN) injection 4 mg  Every 6 Hours PRN         09/11/22 2204 09/11/22 2203  acetaminophen (TYLENOL) tablet 650 mg  Every 4 Hours PRN        \"Or\" Linked Group Details    09/11/22 2204 09/11/22 2203  acetaminophen (TYLENOL) suppository 650 mg  Every 4 Hours PRN        \"Or\" Linked Group Details    09/11/22 2204 09/11/22 2203  Code Status and Medical Interventions:  Continuous         09/11/22 2203 09/11/22 2158  STAT Lactic Acid, Reflex  PROCEDURE ONCE         09/11/22 1937    09/11/22 2157  Blood Gas, Arterial -  Once         09/11/22 2156 09/11/22 2108  Inpatient Admission  Once         09/11/22 2107 09/11/22 2108  Cardiac Monitoring  Continuous,   Status:  Canceled         09/11/22 2107 09/11/22 2029  Urinalysis With Culture If Indicated - Urine, Catheter In/Out  STAT         09/11/22 2028 09/11/22 2016  iopamidol (ISOVUE-370) 76 % injection 100 mL  Once in Imaging         09/11/22 2014 09/11/22 1957  CT Angiogram Chest  1 Time Imaging         09/11/22 1957 09/11/22 1913  CBC Auto Differential  Once         09/11/22 1912 09/11/22 1904  Blood Culture - Blood, Arm, Right  Once        \"And\" Linked Group Details    09/11/22 1903 09/11/22 1904  Blood Culture - Blood, Arm, Left  Once        \"And\" Linked Group Details    09/11/22 1903 09/11/22 1904  Comprehensive Metabolic Panel  STAT         09/11/22 1904 09/11/22 1904  CBC Auto Differential  STAT,   Status:  Canceled         09/11/22 1904 09/11/22 1904  CBC & Differential  STAT         " "09/11/22 1904    09/11/22 1903  lactated ringers bolus 250 mL  Once         09/11/22 1901    09/11/22 1903  cefepime (MAXIPIME) 2 g/100 mL 0.9% NS (mbp)  Once         09/11/22 1901    09/11/22 1903  vancomycin 1750 mg/500 mL 0.9% NS IVPB ([facility])  Once         09/11/22 1901    09/11/22 1902  Blood Culture - Blood,  Once,   Status:  Canceled        \"And\" Linked Group Details    09/11/22 1901    09/11/22 1902  Blood Culture - Blood, Blood, Venous Line  Once,   Status:  Canceled        \"And\" Linked Group Details    09/11/22 1901    09/11/22 1900  COVID-19,[facility] IN-HOUSE,Nasal Swab No Transport Media 3-4 HR TAT - Swab, Nasal Cavity  Once         09/11/22 1901    09/11/22 1900  Lactic Acid, Plasma  STAT         09/11/22 1901    09/11/22 1900  Magnesium  STAT         09/11/22 1901    09/11/22 1900  Troponin  STAT         09/11/22 1901    09/11/22 1859  CBC & Differential  STAT,   Status:  Canceled         09/11/22 1901    09/11/22 1859  Comprehensive Metabolic Panel  STAT,   Status:  Canceled         09/11/22 1901    09/11/22 1859  XR Chest 1 View  1 Time Imaging         09/11/22 1901    09/11/22 1859  ECG 12 Lead  STAT         09/11/22 1901    09/11/22 1859  CBC Auto Differential  PROCEDURE ONCE,   Status:  Canceled         09/11/22 1901    09/11/22 1854  [place] Draw  STAT         09/11/22 1853    09/11/22 1854  Green Top (Gel)  PROCEDURE ONCE         09/11/22 1853    09/11/22 1854  Lavender Top  PROCEDURE ONCE         09/11/22 1853    09/11/22 1854  Red Top  PROCEDURE ONCE         09/11/22 1853    09/11/22 1854  [place] Blood Culture Bottle Set  PROCEDURE ONCE         09/11/22 1853    09/11/22 1854  Martinez Top  PROCEDURE ONCE         09/11/22 1853    09/11/22 1854  Light [place] Top  PROCEDURE ONCE         09/11/22 1853    --  Treprostinil 16 MCG powder  4 Times Daily         09/11/22 1936                Ventilator/Non-Invasive Ventilation Settings (From admission, onward)            None        Physician Progress Notes " (last 24 hours)  Notes from 09/11/22 1033 through 09/12/22 1033   No notes of this type exist for this encounter.         Consult Notes (last 24 hours)  Notes from 09/11/22 1033 through 09/12/22 1033   No notes of this type exist for this encounter.

## 2022-09-13 ENCOUNTER — TELEPHONE (OUTPATIENT)
Dept: PULMONOLOGY | Facility: CLINIC | Age: 62
End: 2022-09-13

## 2022-09-13 LAB
ANION GAP SERPL CALCULATED.3IONS-SCNC: 13 MMOL/L (ref 5–15)
BACTERIA SPEC AEROBE CULT: ABNORMAL
BUN SERPL-MCNC: 13 MG/DL (ref 8–23)
BUN/CREAT SERPL: 16.9 (ref 7–25)
CALCIUM SPEC-SCNC: 8.8 MG/DL (ref 8.6–10.5)
CHLORIDE SERPL-SCNC: 106 MMOL/L (ref 98–107)
CHOLEST SERPL-MCNC: 169 MG/DL (ref 0–200)
CO2 SERPL-SCNC: 25 MMOL/L (ref 22–29)
CREAT SERPL-MCNC: 0.77 MG/DL (ref 0.57–1)
DEPRECATED RDW RBC AUTO: 40.8 FL (ref 37–54)
EGFRCR SERPLBLD CKD-EPI 2021: 86.3 ML/MIN/1.73
ERYTHROCYTE [DISTWIDTH] IN BLOOD BY AUTOMATED COUNT: 13.3 % (ref 12.3–15.4)
GLUCOSE SERPL-MCNC: 147 MG/DL (ref 65–99)
GRAM STN SPEC: ABNORMAL
GRAM STN SPEC: ABNORMAL
HBA1C MFR BLD: 5.4 % (ref 4.8–5.6)
HCT VFR BLD AUTO: 36.6 % (ref 34–46.6)
HDLC SERPL-MCNC: 45 MG/DL (ref 40–60)
HGB BLD-MCNC: 11.2 G/DL (ref 12–15.9)
ISOLATED FROM: ABNORMAL
LDLC SERPL CALC-MCNC: 106 MG/DL (ref 0–100)
LDLC/HDLC SERPL: 2.32 {RATIO}
MCH RBC QN AUTO: 25.7 PG (ref 26.6–33)
MCHC RBC AUTO-ENTMCNC: 30.6 G/DL (ref 31.5–35.7)
MCV RBC AUTO: 83.9 FL (ref 79–97)
PLATELET # BLD AUTO: 184 10*3/MM3 (ref 140–450)
PMV BLD AUTO: 12 FL (ref 6–12)
POTASSIUM SERPL-SCNC: 3.9 MMOL/L (ref 3.5–5.2)
RBC # BLD AUTO: 4.36 10*6/MM3 (ref 3.77–5.28)
SODIUM SERPL-SCNC: 144 MMOL/L (ref 136–145)
TRIGL SERPL-MCNC: 99 MG/DL (ref 0–150)
TSH SERPL DL<=0.05 MIU/L-ACNC: 0.6 UIU/ML (ref 0.27–4.2)
VLDLC SERPL-MCNC: 18 MG/DL (ref 5–40)
WBC NRBC COR # BLD: 7.69 10*3/MM3 (ref 3.4–10.8)

## 2022-09-13 PROCEDURE — 25010000002 ENOXAPARIN PER 10 MG: Performed by: FAMILY MEDICINE

## 2022-09-13 PROCEDURE — 63710000001 MYCOPHENOLATE MOFETIL PER 250 MG: Performed by: INTERNAL MEDICINE

## 2022-09-13 PROCEDURE — 80061 LIPID PANEL: CPT | Performed by: FAMILY MEDICINE

## 2022-09-13 PROCEDURE — 25010000002 AZITHROMYCIN PER 500 MG: Performed by: INTERNAL MEDICINE

## 2022-09-13 PROCEDURE — 94799 UNLISTED PULMONARY SVC/PX: CPT

## 2022-09-13 PROCEDURE — 85027 COMPLETE CBC AUTOMATED: CPT | Performed by: FAMILY MEDICINE

## 2022-09-13 PROCEDURE — 84443 ASSAY THYROID STIM HORMONE: CPT | Performed by: FAMILY MEDICINE

## 2022-09-13 PROCEDURE — 25010000002 METHYLPREDNISOLONE PER 40 MG: Performed by: INTERNAL MEDICINE

## 2022-09-13 PROCEDURE — 99222 1ST HOSP IP/OBS MODERATE 55: CPT | Performed by: INTERNAL MEDICINE

## 2022-09-13 PROCEDURE — 25010000002 CEFTRIAXONE PER 250 MG: Performed by: INTERNAL MEDICINE

## 2022-09-13 PROCEDURE — 80048 BASIC METABOLIC PNL TOTAL CA: CPT | Performed by: FAMILY MEDICINE

## 2022-09-13 PROCEDURE — 83036 HEMOGLOBIN GLYCOSYLATED A1C: CPT | Performed by: FAMILY MEDICINE

## 2022-09-13 PROCEDURE — 99233 SBSQ HOSP IP/OBS HIGH 50: CPT | Performed by: INTERNAL MEDICINE

## 2022-09-13 PROCEDURE — 25010000002 FUROSEMIDE PER 20 MG: Performed by: FAMILY MEDICINE

## 2022-09-13 RX ORDER — SILDENAFIL CITRATE 20 MG/1
20 TABLET ORAL EVERY 8 HOURS SCHEDULED
Status: DISCONTINUED | OUTPATIENT
Start: 2022-09-13 | End: 2022-09-17 | Stop reason: HOSPADM

## 2022-09-13 RX ADMIN — FAMOTIDINE 20 MG: 20 TABLET, FILM COATED ORAL at 05:07

## 2022-09-13 RX ADMIN — ENOXAPARIN SODIUM 40 MG: 40 INJECTION SUBCUTANEOUS at 08:13

## 2022-09-13 RX ADMIN — GUAIFENESIN 600 MG: 600 TABLET, EXTENDED RELEASE ORAL at 21:04

## 2022-09-13 RX ADMIN — MYCOPHENOLATE MOFETIL 1000 MG: 500 TABLET, FILM COATED ORAL at 21:04

## 2022-09-13 RX ADMIN — POTASSIUM CHLORIDE 40 MEQ: 10 CAPSULE, COATED, EXTENDED RELEASE ORAL at 08:14

## 2022-09-13 RX ADMIN — IPRATROPIUM BROMIDE AND ALBUTEROL SULFATE 3 ML: 2.5; .5 SOLUTION RESPIRATORY (INHALATION) at 19:19

## 2022-09-13 RX ADMIN — FAMOTIDINE 20 MG: 20 TABLET, FILM COATED ORAL at 17:25

## 2022-09-13 RX ADMIN — TREPROSTINIL 1 DOSE: 16 INHALANT ORAL at 08:16

## 2022-09-13 RX ADMIN — METHYLPREDNISOLONE SODIUM SUCCINATE 40 MG: 40 INJECTION, POWDER, FOR SOLUTION INTRAMUSCULAR; INTRAVENOUS at 20:37

## 2022-09-13 RX ADMIN — TREPROSTINIL 1 DOSE: 16 INHALANT ORAL at 11:19

## 2022-09-13 RX ADMIN — SILDENAFIL CITRATE 20 MG: 20 TABLET ORAL at 14:40

## 2022-09-13 RX ADMIN — CLONAZEPAM 0.5 MG: 0.5 TABLET ORAL at 19:40

## 2022-09-13 RX ADMIN — FUROSEMIDE 20 MG: 10 INJECTION, SOLUTION INTRAMUSCULAR; INTRAVENOUS at 03:49

## 2022-09-13 RX ADMIN — IPRATROPIUM BROMIDE AND ALBUTEROL SULFATE 3 ML: 2.5; .5 SOLUTION RESPIRATORY (INHALATION) at 13:49

## 2022-09-13 RX ADMIN — SILDENAFIL CITRATE 20 MG: 20 TABLET ORAL at 22:20

## 2022-09-13 RX ADMIN — MACITENTAN 10 MG: 10 TABLET, FILM COATED ORAL at 21:04

## 2022-09-13 RX ADMIN — IPRATROPIUM BROMIDE AND ALBUTEROL SULFATE 3 ML: 2.5; .5 SOLUTION RESPIRATORY (INHALATION) at 00:02

## 2022-09-13 RX ADMIN — CEFTRIAXONE 1 G: 1 INJECTION, POWDER, FOR SOLUTION INTRAMUSCULAR; INTRAVENOUS at 10:06

## 2022-09-13 RX ADMIN — METHYLPREDNISOLONE SODIUM SUCCINATE 40 MG: 40 INJECTION, POWDER, FOR SOLUTION INTRAMUSCULAR; INTRAVENOUS at 08:13

## 2022-09-13 RX ADMIN — AZITHROMYCIN DIHYDRATE 500 MG: 500 INJECTION, POWDER, LYOPHILIZED, FOR SOLUTION INTRAVENOUS at 10:06

## 2022-09-13 RX ADMIN — MYCOPHENOLATE MOFETIL 1000 MG: 500 TABLET, FILM COATED ORAL at 08:14

## 2022-09-13 RX ADMIN — VENLAFAXINE HYDROCHLORIDE 75 MG: 37.5 TABLET ORAL at 08:15

## 2022-09-13 RX ADMIN — TREPROSTINIL 1 DOSE: 16 INHALANT ORAL at 21:05

## 2022-09-13 RX ADMIN — GUAIFENESIN 600 MG: 600 TABLET, EXTENDED RELEASE ORAL at 08:14

## 2022-09-13 RX ADMIN — TREPROSTINIL 1 DOSE: 16 INHALANT ORAL at 17:25

## 2022-09-13 RX ADMIN — TRAZODONE HYDROCHLORIDE 100 MG: 100 TABLET ORAL at 21:04

## 2022-09-13 RX ADMIN — Medication 10 ML: at 08:15

## 2022-09-13 RX ADMIN — Medication 10 ML: at 21:05

## 2022-09-13 NOTE — PROGRESS NOTES
[facility]  INPATIENT PROGRESS NOTE    Length of Stay: 2  Date of Admission: 9/11/2022  Primary Care Physician: Aaron Stoddard MD    Subjective   Chief Complaint: Respiratory failure hypercapnia hypoxia/pulmonary fibrosis/CAD syndrome.    HPI   Patient is currently on 8 liter of oxygen, this is her baseline.  Plan to transfer the floor today discussed with patient.  Patient denies any chest pain.  Hypokalemia resolved.    Review of Systems   Constitutional: Positive for activity change, appetite change and fatigue. Negative for chills and fever.   HENT: Negative for hearing loss, nosebleeds, tinnitus and trouble swallowing.    Eyes: Negative for visual disturbance.   Respiratory: Positive for cough, shortness of breath and wheezing. Negative for chest tightness.    Cardiovascular: Negative for chest pain, palpitations and leg swelling.   Gastrointestinal: Negative for abdominal distention, abdominal pain, blood in stool, constipation, diarrhea, nausea and vomiting.   Endocrine: Negative for cold intolerance, heat intolerance, polydipsia, polyphagia and polyuria.   Genitourinary: Negative for decreased urine volume, difficulty urinating, dysuria, flank pain, frequency and hematuria.   Musculoskeletal: Negative for arthralgias, joint swelling and myalgias.   Skin: Negative for rash.   Allergic/Immunologic: Negative for immunocompromised state.   Neurological: Positive for weakness. Negative for dizziness, syncope, light-headedness and headaches.   Hematological: Negative for adenopathy. Does not bruise/bleed easily.   Psychiatric/Behavioral: Negative for confusion and sleep disturbance. The patient is not nervous/anxious.      All pertinent negatives and positives are as above. All other systems have been reviewed and are negative unless otherwise stated.     Objective    Temp:  [97 °F (36.1 °C)-98.4 °F (36.9 °C)] 97 °F (36.1 °C)  Heart Rate:  []  90  Resp:  [19-60] 40  BP: ()/(52-96) 116/80    Intake/Output Summary (Last 24 hours) at 9/13/2022 1223  Last data filed at 9/13/2022 1006  Gross per 24 hour   Intake 1100 ml   Output 1300 ml   Net -200 ml     Physical Exam  Vitals and nursing note reviewed.   Constitutional:       Comments: Chronically ill.   HENT:      Head: Normocephalic.   Eyes:      Conjunctiva/sclera: Conjunctivae normal.      Pupils: Pupils are equal, round, and reactive to light.   Neck:      Vascular: No JVD.   Cardiovascular:      Rate and Rhythm: Normal rate and regular rhythm.      Heart sounds: Normal heart sounds.   Pulmonary:      Effort: No respiratory distress.      Breath sounds: No wheezing or rales.      Comments: On high flow oxygen.  Diminished breath bilateral, clear.  Chest:      Chest wall: No tenderness.   Abdominal:      General: Bowel sounds are normal. There is no distension.      Palpations: Abdomen is soft.      Tenderness: There is no abdominal tenderness.   Musculoskeletal:         General: No tenderness or deformity.      Cervical back: Neck supple.   Skin:     General: Skin is warm and dry.      Capillary Refill: Capillary refill takes 2 to 3 seconds.      Findings: No rash.   Neurological:      Mental Status: She is alert and oriented to person, place, and time.      Cranial Nerves: No cranial nerve deficit.      Motor: Weakness present. No abnormal muscle tone.      Deep Tendon Reflexes: Reflexes normal.   Psychiatric:         Mood and Affect: Mood normal.         Behavior: Behavior normal.         Thought Content: Thought content normal.         Results Review:  Lab Results (last 24 hours)     Procedure Component Value Units Date/Time    Blood Culture - Blood, Arm, Left [[id number]]  (Abnormal) Collected: 09/11/22 1909    Specimen: Blood from Arm, Left Updated: 09/13/22 0518     Blood Culture Staphylococcus, coagulase negative     Isolated from Aerobic and Anaerobic Bottles     Gram Stain Anaerobic Bottle  Gram positive cocci in clusters      Aerobic Bottle Gram positive cocci in clusters    Narrative:      Probable contaminant requires clinical correlation, susceptibility not performed unless requested by physician.      TSH [[id number]]  (Normal) Collected: 09/13/22 0330    Specimen: Blood Updated: 09/13/22 0420     TSH 0.597 uIU/mL     Lipid Panel [[id number]]  (Abnormal) Collected: 09/13/22 0330    Specimen: Blood Updated: 09/13/22 0417     Total Cholesterol 169 mg/dL      Triglycerides 99 mg/dL      HDL Cholesterol 45 mg/dL      LDL Cholesterol  106 mg/dL      VLDL Cholesterol 18 mg/dL      LDL/HDL Ratio 2.32    Narrative:      Cholesterol Reference Ranges  (U.S. Department of Health and Human Services ATP III Classifications)    Desirable          <200 mg/dL  Borderline High    200-239 mg/dL  High Risk          >240 mg/dL      Triglyceride Reference Ranges  (U.S. Department of Health and Human Services ATP III Classifications)    Normal           <150 mg/dL  Borderline High  150-199 mg/dL  High             200-499 mg/dL  Very High        >500 mg/dL    HDL Reference Ranges  (U.S. Department of Health and Human Services ATP III Classifications)    Low     <40 mg/dl (major risk factor for CHD)  High    >60 mg/dl ('negative' risk factor for CHD)        LDL Reference Ranges  (U.S. Department of Health and Human Services ATP III Classifications)    Optimal          <100 mg/dL  Near Optimal     100-129 mg/dL  Borderline High  130-159 mg/dL  High             160-189 mg/dL  Very High        >189 mg/dL    Basic Metabolic Panel [[id number]]  (Abnormal) Collected: 09/13/22 0330    Specimen: Blood Updated: 09/13/22 0414     Glucose 147 mg/dL      BUN 13 mg/dL      Creatinine 0.77 mg/dL      Sodium 144 mmol/L      Potassium 3.9 mmol/L      Chloride 106 mmol/L      CO2 25.0 mmol/L      Calcium 8.8 mg/dL      BUN/Creatinine Ratio 16.9     Anion Gap 13.0 mmol/L      eGFR 86.3 mL/min/1.73      Comment: National Kidney Foundation and  American Society of Nephrology (ASN) Task Force recommended calculation based on the Chronic Kidney Disease Epidemiology Collaboration (CKD-EPI) equation refit without adjustment for race.       Narrative:      GFR Normal >60  Chronic Kidney Disease <60  Kidney Failure <15      CBC (No Diff) [[id number]]  (Abnormal) Collected: 09/13/22 0330    Specimen: Blood Updated: 09/13/22 0411     WBC 7.69 10*3/mm3      RBC 4.36 10*6/mm3      Hemoglobin 11.2 g/dL      Hematocrit 36.6 %      MCV 83.9 fL      MCH 25.7 pg      MCHC 30.6 g/dL      RDW 13.3 %      RDW-SD 40.8 fl      MPV 12.0 fL      Platelets 184 10*3/mm3     Hemoglobin A1c [[id number]]  (Normal) Collected: 09/13/22 0330    Specimen: Blood Updated: 09/13/22 0405     Hemoglobin A1C 5.40 %     Narrative:      Hemoglobin A1C Ranges:    Increased Risk for Diabetes  5.7% to 6.4%  Diabetes                     >= 6.5%  Diabetic Goal                < 7.0%    Blood Culture - Blood, Arm, Right [[id number]]  (Normal) Collected: 09/11/22 1858    Specimen: Blood from Arm, Right Updated: 09/12/22 1947     Blood Culture No growth at 24 hours    MRSA Screen, PCR (Inpatient) - Swab, Nares [[id number]]  (Normal) Collected: 09/12/22 1746    Specimen: Swab from Nares Updated: 09/12/22 1939     MRSA PCR No MRSA Detected    Narrative:      The negative predictive value of this diagnostic test is high and should only be used to consider de-escalating anti-MRSA therapy. A positive result may indicate colonization with MRSA and must be correlated clinically.    Blood Culture ID, PCR - Blood, Arm, Left [[id number]]  (Abnormal) Collected: 09/11/22 1909    Specimen: Blood from Arm, Left Updated: 09/12/22 1530     BCID, PCR Staph spp, not aureus or lugdunesis. Identification by BCID2 PCR.     BOTTLE TYPE Anaerobic Bottle    Respiratory Panel PCR w/COVID-19(SARS-CoV-2) KARISHMA/LUIS ARMANDO/DENYS/PAD/COR/MAD/DAVID In-House, NP Swab in UTM/VTM, 3-4 HR TAT - Swab, Nasopharynx [[id number]]  (Normal) Collected: 09/12/22  1319    Specimen: Swab from Nasopharynx Updated: 09/12/22 1455     ADENOVIRUS, PCR Not Detected     Coronavirus 229E Not Detected     Coronavirus HKU1 Not Detected     Coronavirus NL63 Not Detected     Coronavirus OC43 Not Detected     COVID19 Not Detected     Human Metapneumovirus Not Detected     Human Rhinovirus/Enterovirus Not Detected     Influenza A PCR Not Detected     Influenza B PCR Not Detected     Parainfluenza Virus 1 Not Detected     Parainfluenza Virus 2 Not Detected     Parainfluenza Virus 3 Not Detected     Parainfluenza Virus 4 Not Detected     RSV, PCR Not Detected     Bordetella pertussis pcr Not Detected     Bordetella parapertussis PCR Not Detected     Chlamydophila pneumoniae PCR Not Detected     Mycoplasma pneumo by PCR Not Detected    Narrative:      In the setting of a positive respiratory panel with a viral infection PLUS a negative procalcitonin without other underlying concern for bacterial infection, consider observing off antibiotics or discontinuation of antibiotics and continue supportive care. If the respiratory panel is positive for atypical bacterial infection (Bordetella pertussis, Chlamydophila pneumoniae, or Mycoplasma pneumoniae), consider antibiotic de-escalation to target atypical bacterial infection.           Cultures:  Blood Culture   Date Value Ref Range Status   09/11/2022 Staphylococcus, coagulase negative (C)  Final   09/11/2022 No growth at 24 hours  Preliminary       Radiology Data:    Imaging Results (Last 24 Hours)     ** No results found for the last 24 hours. **          Allergies   Allergen Reactions   • Codeine Nausea And Vomiting   • Latex Rash       Scheduled meds:   azithromycin, 500 mg, Intravenous, Q24H  cefTRIAXone, 1 g, Intravenous, Q24H  enoxaparin, 40 mg, Subcutaneous, Daily  famotidine, 20 mg, Oral, BID AC  guaiFENesin, 600 mg, Oral, Q12H  ipratropium-albuterol, 3 mL, Nebulization, Q6H - RT  Macitentan, 10 mg, Oral, Nightly  methylPREDNISolone sodium  succinate, 40 mg, Intravenous, Q12H  mycophenolate, 1,000 mg, Oral, BID  sildenafil, 20 mg, Oral, Q8H  sodium chloride, 10 mL, Intravenous, Q12H  Treprostinil, 1 dose, Inhalation, 4x Daily  venlafaxine, 75 mg, Oral, Daily        PRN meds:  •  acetaminophen **OR** acetaminophen  •  albuterol  •  busPIRone  •  clonazePAM  •  guaiFENesin-dextromethorphan  •  melatonin  •  ondansetron  •  sodium chloride  •  traZODone    Assessment/Plan       Acute on chronic respiratory failure with hypoxia (HCC)    CREST syndrome, partial     Raynaud's phenomenon    Pulmonary fibrosis prob sec underlying autoimmune disease    PBC (primary biliary cirrhosis)    Obstructive sleep apnea on CPAP    PAH (pulmonary arterial hypertension) with portal hypertension (HCC)    Hypokalemia    Acute-on-chronic respiratory failure (HCC)    Obesity (BMI 30-39.9)      Plan:  Pulmonary fibrosis/pulmonary hypertension/chronic respiratory failure hypoxia/obstructive sleep apnea.  Patient is on chronic 8 L of oxygen at home.  Patient is on chronic CPAP at home.  On Tyvaso.  Patient follows Dr. Griffith outpatient.  Patient was declined for lung transplant at [facility] and currently waiting for interview at [facility].  DuoNebs 4 times daily.  Albuterol every 4 hours as needed.  Pulmicort nebs twice daily.  Solu-Medrol twice daily.    Robitussin as needed.  Pulmonary consult.  Azithromycin . Rocephin.  Mucinex.  DuoNebs.  Sildenafil started by pulmonary. Tyvaso.   CTA of the chest-No pulmonary embolism, Stable appearance of chronic lung changes.  Chest x-ray-Mild cardiac enlargement and mild diffuse infiltrate for which edema  is favored.  On 8 L high flow.     Right-sided heart failure.  BNP 7400.  Troponin negative.  Pulmonary hypertension.    Cardiology consult.  Echocardiogram- ejection fraction 56 to 60%, diastolic dysfunction grade 1, right ventricle cavity severely dilated, severe reduced right ventricle systolic function, right atrial  cavity severely dilated, tricuspid regurgitation, moderate to severe pulm hypertension, small less than 1 cm pericardial effusion-fluid-filled- no evidence of cardiac tamponade.     Hypokalemia.  Resolved.  Magnesium-normal.     History of crest syndrome.  Patient follow with rheumatology outpatient.  On CellCept and prednisone.     Insomnia/depression/anxiety.  Trazodone at night.  Effexor.  BuSpar as needed.  Clonazepam as needed.  Melatonin at night as needed.     Reflux.  Pepcid.  Zofran as needed.     Lovenox prophylaxis.     Nutrition.  Regular diet.     Respiratory panel-negative.  Blood culture 1 out of 2 [id number]-tavctbsn contamination..  COVID-19-negative.     Discharge Planning: 3 to 6 days    Electronically signed by Andreas Whiteside MD, 09/13/22, 12:23 PM CDT.

## 2022-09-13 NOTE — CONSULTS
LOS: 2 days   Patient Care Team:  Aaron Stoddard MD as PCP - General  Aaron Stoddard MD as PCP - Family Medicine  Michael Landin DO as Consulting Physician (Gastroenterology)  Fela Blackman APRN as Nurse Practitioner (Pulmonary Disease)  DEVON  (DME Services)  Antonia Landry MD as Consulting Physician (Rheumatology)  Thong Martin MD as Cardiologist (Cardiology)    Chief Complaint: Shortness of breath     Subjective    Elaine Juárez is a 62 y.o. female who is being seen in consultation.  Patient has presented with acute respiratory failure  Has known pulmonary hypertension  Has been seen in [facility]  Underwent right heart catheterization there  Has been on treatment for pulmonary hypertension  Echocardiogram performed shows right ventricular systolic dysfunction with right ventricular enlargement with pulmonary hypertension  Cardiology was consulted  Overall improving  Denies any significant chest pain  Patient has known crest syndrome with interstitial lung disease with Group 1 pulmonary hypertension  No bleeding issues  No palpitations  No syncope  Has orthopnea  No paroxysmal nocturnal dyspnea  Has obstructive sleep apnea on CPAP therapy  Patient is obese with BMI of 31  Nursing available for discussion  No family member in the room  Telemetry shows sinus rhythm with occasional premature ventricular contractions  BNP is elevated    Troponin is normal  Patient is negative for COVID-19 infection  Chest imaging results as below  EKG as below      Telemetry: no malignant arrhythmia. No significant pauses.    Review of Systems   Constitutional: No chills   Has fatigue   No fever.   HENT: Negative.    Eyes: Negative.    Respiratory: Negative for cough,   No chest wall soreness,   Shortness of breath,   no wheezing, no stridor.    Cardiovascular: As above  Gastrointestinal: Negative for abdominal distention,  No abdominal pain,   No blood in stool,   No constipation,    No diarrhea,   No nausea   No vomiting.   Endocrine: Negative.    Genitourinary: Negative for difficulty urinating, dysuria, flank pain and hematuria.   Musculoskeletal: Negative.    Skin: Negative for rash and wound.   Allergic/Immunologic: Negative.    Neurological: Negative for dizziness, syncope, weakness,   No light-headedness  No  headaches.   Hematological: Does not bruise/bleed easily.   Psychiatric/Behavioral: Negative for agitation or behavioral problems,   No confusion,   the patient is  nervous/anxious.       History:   Past Medical History:   Diagnosis Date   • Allergies    • Arthritis    • BMI 31.0-31.9,adult 06/04/2019   • Calcified granuloma of lung (HCC) 06/04/2019    Right lung   • CHF (congestive heart failure) ([facility])     denies   • Chronic respiratory failure with hypoxia ([facility]) 08/05/2020   • Chronic respiratory failure with hypoxia, on home oxygen therapy ([facility]ly [facility])    • COVID-19 02/01/2022   • CREST syndrome ([facility])    • Environmental allergies 02/01/2022   • Gastroesophageal reflux disease without esophagitis 06/04/2019   • Obstructive sleep apnea on CPAP 06/04/2019   • Pulmonary fibrosis ([facility])    • Pulmonary hypertension ([facility])    • Short-term memory loss      Past Surgical History:   Procedure Laterality Date   • BREAST BIOPSY     • CARDIAC CATHETERIZATION N/A 7/22/2020    Procedure: Right Heart Cath;  Surgeon: Thong Martin MD;  Location: [facility] CATH INVASIVE LOCATION;  Service: Cardiology;  Laterality: N/A;   • CHOLECYSTECTOMY     • COLONOSCOPY  05/11/2015    5 POLYPS   • COLONOSCOPY N/A 8/4/2021    Procedure: COLONOSCOPY WITH ANESTHESIA;  Surgeon: Michael Landin DO;  Location: [facility] ENDOSCOPY;  Service: Gastroenterology;  Laterality: N/A;  pre-hx polyps  post-colon polyps   • ENDOSCOPY N/A 8/4/2021    Procedure: ESOPHAGOGASTRODUODENOSCOPY WITH ANESTHESIA;  Surgeon: Michael Landin DO;  Location: [facility] ENDOSCOPY;  Service: Gastroenterology;  Laterality: N/A;   pre-dysphagia  post-normal  pcp-lanette aguila     Social History     Socioeconomic History   • Marital status:    Tobacco Use   • Smoking status: Never Smoker   • Smokeless tobacco: Never Used   Vaping Use   • Vaping Use: Never used   Substance and Sexual Activity   • Alcohol use: No   • Drug use: No   • Sexual activity: Defer     Family History   Problem Relation Age of Onset   • Cirrhosis Sister    • Liver cancer Brother    • No Known Problems Mother    • No Known Problems Father    • Colon cancer Neg Hx    • Colon polyps Neg Hx        Labs:  WBC WBC   Date Value Ref Range Status   09/13/2022 7.69 3.40 - 10.80 10*3/mm3 Final   09/11/2022 12.51 (H) 3.40 - 10.80 10*3/mm3 Final      HGB Hemoglobin   Date Value Ref Range Status   09/13/2022 11.2 (L) 12.0 - 15.9 g/dL Final   09/11/2022 13.5 12.0 - 15.9 g/dL Final      HCT Hematocrit   Date Value Ref Range Status   09/13/2022 36.6 34.0 - 46.6 % Final   09/11/2022 44.4 34.0 - 46.6 % Final      Platelets Platelets   Date Value Ref Range Status   09/13/2022 184 140 - 450 10*3/mm3 Final   09/11/2022 252 140 - 450 10*3/mm3 Final      MCV MCV   Date Value Ref Range Status   09/13/2022 83.9 79.0 - 97.0 fL Final   09/11/2022 83.5 79.0 - 97.0 fL Final        Results from last 7 days   Lab Units 09/13/22  0330 09/11/22  1858   SODIUM mmol/L 144 140   POTASSIUM mmol/L 3.9 3.2*   CHLORIDE mmol/L 106 103   CO2 mmol/L 25.0 23.0   BUN mg/dL 13 9   CREATININE mg/dL 0.77 0.88   CALCIUM mg/dL 8.8 9.0   BILIRUBIN mg/dL  --  0.4   ALK PHOS U/L  --  105   ALT (SGPT) U/L  --  14   AST (SGOT) U/L  --  20   GLUCOSE mg/dL 147* 134*     Lab Results   Component Value Date    CKTOTAL 63 01/23/2017    TROPONINI <0.012 01/21/2017    TROPONINT <0.010 09/11/2022     PT/INR:  No results found for: PROTIME/No results found for: INR    Imaging Results (Last 72 Hours)     Procedure Component Value Units Date/Time    CT Angiogram Chest [[id number]] Collected: 09/11/22 2024     Updated: 09/11/22 2030     Narrative:      EXAMINATION: CT ANGIOGRAM CHEST-      9/11/2022 8:10 PM CDT     HISTORY: Shortness of breath. Hypoxia. History of pulmonary  hypertension. Pulmonary fibrosis.     In order to have a CT radiation dose as low as reasonably achievable  Automated Exposure Control was utilized for adjustment of the mA and/or  KV according to patient size.     DLP in mGycm= 202.     CT angiogram chest.  CT angiography protocol.   CT imaging with bolus IV contrast injection.   Under concurrent supervision axial, sagittal, coronal, and  three-dimensional data sets were constructed. Borderline cardiomegaly.  Pulmonary artery enlargement.  No pulmonary embolism.     Stable mildly prominent mediastinal lymph nodes.     No thoracic aortic aneurysm or dissection.     Pulmonary fibrosis and bronchiectasis.     No pneumothorax or pleural effusion.     No acute pneumonia is seen.     Summary:  1. No pulmonary embolism.  2. Stable appearance of chronic lung changes.                                   This report was finalized on 09/11/2022 20:26 by Dr. Lorenzo Ruff MD.    XR Chest 1 View [[id number]] Collected: 09/11/22 1948     Updated: 09/11/22 1952    Narrative:      EXAMINATION: XR CHEST 1 VW-     9/11/2022 7:08 PM CDT     HISTORY: Hypoxia.     One view chest x-ray.     Comparison is made with 04/20/2022.     Heart size is magnified though there does appear to be mild cardiomegaly  Mild infiltrate in both lungs compatible with mild diffuse edema.     No focal consolidation.  No pneumothorax.     Summary:  1. Mild cardiac enlargement and mild diffuse infiltrate for which edema  is favored.     This report was finalized on 09/11/2022 19:49 by Dr. Lorenzo Ruff MD.          Objective     Allergies   Allergen Reactions   • Codeine Nausea And Vomiting   • Latex Rash       Medication Review: Performed  Current Facility-Administered Medications   Medication Dose Route Frequency Provider Last Rate Last Admin   • acetaminophen (TYLENOL)  tablet 650 mg  650 mg Oral Q4H PRN Fan Neri MD        Or   • acetaminophen (TYLENOL) suppository 650 mg  650 mg Rectal Q4H PRN Fan Neri MD       • albuterol (PROVENTIL) nebulizer solution 0.083% 2.5 mg/3mL  2.5 mg Nebulization Q4H PRN Fan Neri MD       • azithromycin (ZITHROMAX) 500 mg in sodium chloride 0.9 % 250 mL IVPB-VTB  500 mg Intravenous Q24H Faisal Griffith MD   500 mg at 09/13/22 1006   • busPIRone (BUSPAR) tablet 10 mg  10 mg Oral BID PRN Fan Neri MD   10 mg at 09/12/22 0955   • cefTRIAXone (ROCEPHIN) 1 g in sodium chloride 0.9 % 100 mL IVPB-VTB  1 g Intravenous Q24H Faisal Griffith  mL/hr at 09/13/22 1006 1 g at 09/13/22 1006   • clonazePAM (KlonoPIN) tablet 0.5 mg  0.5 mg Oral BID PRN Fan Neri MD   0.5 mg at 09/12/22 2132   • Enoxaparin Sodium (LOVENOX) syringe 40 mg  40 mg Subcutaneous Daily Fan Neri MD   40 mg at 09/13/22 0813   • famotidine (PEPCID) tablet 20 mg  20 mg Oral BID AC Fan Neri MD   20 mg at 09/13/22 0507   • guaiFENesin (MUCINEX) 12 hr tablet 600 mg  600 mg Oral Q12H Fan Neri MD   600 mg at 09/13/22 0814   • guaiFENesin-dextromethorphan (ROBITUSSIN DM) 100-10 MG/5ML syrup 10 mL  10 mL Oral Q6H PRN Fan Neri MD       • ipratropium-albuterol (DUO-NEB) nebulizer solution 3 mL  3 mL Nebulization Q6H - RT Fan Neri MD   3 mL at 09/13/22 0002   • Macitentan tablet 10 mg (OPSUMIT - PATIENT SUPPLIED - TAKE ONE TABLET NIGHTLY)  10 mg Oral Nightly Faisal Griffith MD   10 mg at 09/12/22 2026   • melatonin tablet 3 mg  3 mg Oral Nightly Fan Matias MD   3 mg at 09/12/22 0324   • methylPREDNISolone sodium succinate (SOLU-Medrol) injection 40 mg  40 mg Intravenous Q12H Faisal Griffith MD   40 mg at 09/13/22 0813   • mycophenolate (CELLCEPT) tablet 1,000 mg - PATIENT SUPPLIED  1,000 mg Oral BID Gladis  "Faisal Underwood MD   1,000 mg at 09/13/22 0814   • ondansetron (ZOFRAN) injection 4 mg  4 mg Intravenous Q6H PRN Fan Neri MD       • sildenafil (REVATIO) tablet 20 mg  20 mg Oral Q8H Faisal Griffith MD       • sodium chloride 0.9 % flush 10 mL  10 mL Intravenous Q12H Fan Neri MD   10 mL at 09/13/22 0815   • sodium chloride 0.9 % flush 10 mL  10 mL Intravenous PRN Fan Neri MD       • traZODone (DESYREL) tablet 100 mg  100 mg Oral Nightly PRN Anderas Whiteside MD   100 mg at 09/12/22 2132   • Treprostinil powder inhaler (PATIENT SUPPLIED TYVASO) - ONE CARTRIDGE FOUR TIMES DAILY  1 dose Inhalation 4x Daily Faisal Griffith MD   1 dose at 09/13/22 1119   • venlafaxine (EFFEXOR) tablet 75 mg  75 mg Oral Daily Fan Neri MD   75 mg at 09/13/22 0815       Vital Sign Min/Max for last 24 hours  Temp  Min: 97 °F (36.1 °C)  Max: 98.4 °F (36.9 °C)   BP  Min: 90/52  Max: 133/85   Pulse  Min: 67  Max: 117   Resp  Min: 19  Max: 60   SpO2  Min: 86 %  Max: 99 %   No data recorded   Weight  Min: 79.6 kg (175 lb 7.8 oz)  Max: 79.6 kg (175 lb 7.8 oz)     Flowsheet Rows    Flowsheet Row First Filed Value   Admission Height 160 cm (63\") Documented at 09/11/2022 1845   Admission Weight 81.5 kg (179 lb 11.2 oz) Documented at 09/11/2022 1845          Results for orders placed during the hospital encounter of 09/11/22    Adult Transthoracic Echo Complete W/ Cont if Necessary Per Protocol    Interpretation Summary  · Left ventricular ejection fraction appears to be 56 - 60%. Left ventricular systolic function is normal.  · Left ventricular diastolic function is consistent with (grade I) impaired relaxation.  · The right ventricular cavity is severely dilated.  · Severely reduced right ventricular systolic function noted.  · The right atrial cavity is severely dilated.  · Estimated right ventricular systolic pressure from tricuspid regurgitation is markedly elevated (>55 " mmHg).  · Moderate to severe pulmonary hypertension is present.  · There is a small (<1cm) pericardial effusion. The effusion is fluid filled. There is no evidence of cardiac tamponade      Physical Exam:    General Appearance: Awake, alert, in no acute distress  Eyes: Pupils equal and reactive    Ears: Appear intact with no abnormalities noted  Nose: Nares normal, no drainage  Neck: supple, trachea midline, no carotid bruit and no JVD  Back: no kyphosis present,    Lungs: respirations regular, respirations even and respirations unlabored  Heart: normal S1, S2, 2/6 systolic murmur left sternal border  No gallops or rubs  no rub and no click  Abdomen: normal bowel sounds, no tenderness   Skin: no bleeding, bruising or rash  Extremities: no cyanosis  Psychiatric/Behavioral: Negative for agitation, behavioral problems, confusion, the patient does  appear to be nervous/anxious.       Results Review:   I reviewed the patient's new clinical results.  I reviewed the patient's new imaging results and agree with the interpretation.  I reviewed the patient's other test results and agree with the interpretation  I personally viewed and interpreted the patient's EKG/Telemetry data    Discussed with patient  Updated patient regarding any new or relevant abnormalities on review of records or any new findings on physical exam.   Mentioned to patient about purpose of visit and desirable health short and long term goals and objectives.     Reviewed available prior notes, consults, prior visits, laboratory findings, radiology and cardiology relevant reports.   Updated chart as applicable.   I have reviewed the patient's medical history in detail and updated the computerized patient record as relevant.          Assessment & Plan       Acute on chronic respiratory failure with hypoxia (HCC)    CREST syndrome, partial     Raynaud's phenomenon    Pulmonary fibrosis prob sec underlying autoimmune disease    PBC (primary biliary  cirrhosis)    Obstructive sleep apnea on CPAP    PAH (pulmonary arterial hypertension) with portal hypertension (HCC)    Hypokalemia    Acute-on-chronic respiratory failure (HCC)    Obesity (BMI 30-39.9)      Plan    Patient already has had right heart catheterization  She has crest syndrome with interstitial lung disease with resultant pulmonary hypertension  Patient is being treated and has been seen in [facility]  She is planning to get a lung transplant evaluation in [facility]  No additional cardiac testing including another right heart catheterization is currently needed  She wants to be seen by cardiology locally here in [place] and has seen both Dr. Thong Martin as well as Juliana OCONNOR  She will require follow-up with Juliana OCONNOR on discharge  Once her pulmonary status improves hopefully right heart failure symptoms will improve further  Ongoing treatment of acute on chronic respiratory failure  Judicious use of diuretics  Physical therapy and rehabilitation  Okay to move to telemetry floor  Discussed with patient as well as with nursing  Keep on telemetry while in hospital  DVT prophylaxis      Andre Alfonso MD  09/13/22  12:12 CDT    EMR Dragon/Transcription was used to dictate part of this note

## 2022-09-13 NOTE — PLAN OF CARE
Continues to be on 10 HFNC. Alert/Oriented x 4. Vital signs stable. SR/ST on monitor throughout shift. Remains tachypnic when awake but maintains O2 sats.  O2 demand increased with any activity. Increased O2 at times to 15L to meet O2 demands. Patient able to get to BSC. Pt appears to recover 1-2 mins to WDL, which is an improvement from previous night shift.  No complaint of CP noted. No other issues or concerns noted.  Will continue to monitor for acute changes in status

## 2022-09-13 NOTE — TELEPHONE ENCOUNTER
"Vidal sent me a secure chat through [facility] website.    \"Jhoan Quach,         This is Vidal RN with [facility].  I Just spoke with . Elaine Juárez ( 1960). She said she’s currently at [facility] in the ICU d/t an infection. She is continuing to take the Tyvaso DPI at the 16 mcg, 4 times a day, and believes it is helping her already. No side effects are noted from the medication.         Typically, I would recommend increasing the dose to 32 mcg, 4 times a day. BUT since she is in the ICU, I am recommending we keep her on the 16 mcg, 4 times a day, until she gets home and settled.         What do you think?         If you agree, I’ll schedule another follow up appointment with her in 1 week and determine if she is ready to titrate up at this point.\"      Per Fela we are to continue the 16 mcg 4 times a day until she gets home and settled and the increase up to 32 mcg 4 times a day.      I have sent Vidal a secure chat back.    "

## 2022-09-13 NOTE — PROGRESS NOTES
PULMONARY AND CRITICAL CARE PROGRESS NOTE - [facility]    Patient: Elaine Juárez  1960   MR# [id number]   Acct# [id number]  09/13/22   08:42 CDT  Referring Provider: Andreas Whiteside MD    Chief Complaint: acute/chronic respiratory failure with hypoxemia   Interval history: The patient is sitting up on bedside commode.  O2 sat 96% on 15L HFNC.  Nursing reports she has been on 10L HFNC, but increased when she gets up to commode.  BC with staph coag neg.     Meds:  azithromycin, 500 mg, Intravenous, Q24H  cefTRIAXone, 1 g, Intravenous, Q24H  enoxaparin, 40 mg, Subcutaneous, Daily  famotidine, 20 mg, Oral, BID AC  guaiFENesin, 600 mg, Oral, Q12H  ipratropium-albuterol, 3 mL, Nebulization, Q6H - RT  Macitentan, 10 mg, Oral, Nightly  methylPREDNISolone sodium succinate, 40 mg, Intravenous, Q12H  mycophenolate, 1,000 mg, Oral, BID  sodium chloride, 10 mL, Intravenous, Q12H  Treprostinil, 1 dose, Inhalation, 4x Daily  venlafaxine, 75 mg, Oral, Daily         Review of Systems:   Review of Systems   Constitutional: Positive for fatigue. Negative for chills and fever.   Respiratory: Positive for shortness of breath.    Cardiovascular: Negative for chest pain.   Gastrointestinal: Negative for vomiting.   Neurological: Positive for weakness.     Physical Exam:  SpO2 Percentage    09/13/22 0700 09/13/22 0723 09/13/22 0800   SpO2: 96% 98% 99%     Body mass index is 31.09 kg/m².   Temp:  [97 °F (36.1 °C)-98.4 °F (36.9 °C)] 97 °F (36.1 °C)  Heart Rate:  [] 92  Resp:  [19-60] 24  BP: ()/(52-96) 93/74    Intake/Output Summary (Last 24 hours) at 9/13/2022 0842  Last data filed at 9/13/2022 0530  Gross per 24 hour   Intake 750 ml   Output 1300 ml   Net -550 ml     Physical Exam  Vitals reviewed.   Constitutional:       Appearance: She is well-developed. She is obese.      Interventions: Nasal cannula in place.      Comments: Up to bedside commode  HFNC   HENT:      Head: Normocephalic and  atraumatic.   Eyes:      General: No scleral icterus.     Conjunctiva/sclera: Conjunctivae normal.      Pupils: Pupils are equal, round, and reactive to light.   Cardiovascular:      Rate and Rhythm: Normal rate.   Pulmonary:      Effort: Tachypnea and respiratory distress present.      Breath sounds: Decreased breath sounds present.   Musculoskeletal:         General: Normal range of motion.      Cervical back: Normal range of motion and neck supple.   Skin:     General: Skin is warm and dry.   Neurological:      Mental Status: She is alert and oriented to person, place, and time.   Psychiatric:         Behavior: Behavior normal.         Electronically signed by ANASTASIA Leigh, 9/13/2022, 08:42 CDT        Physician Substantive Portion: Medical Decision Making    Laboratory Data:  Results from last 7 days   Lab Units 09/13/22  0330 09/11/22  1858   WBC 10*3/mm3 7.69 12.51*   HEMOGLOBIN g/dL 11.2* 13.5   PLATELETS 10*3/mm3 184 252     Results from last 7 days   Lab Units 09/13/22  0330 09/11/22  1858   SODIUM mmol/L 144 140   POTASSIUM mmol/L 3.9 3.2*   BUN mg/dL 13 9   CREATININE mg/dL 0.77 0.88     Results from last 7 days   Lab Units 09/11/22  2218   PH, ARTERIAL pH units 7.430   PCO2, ARTERIAL mm Hg 36.5   PO2 ART mm Hg 65.4*     Blood Culture   Date Value Ref Range Status   09/11/2022 Staphylococcus, coagulase negative (C)  Final   09/11/2022 No growth at 24 hours  Preliminary     Recent films:  Adult Transthoracic Echo Complete W/ Cont if Necessary Per Protocol    Result Date: 9/12/2022  · Left ventricular ejection fraction appears to be 56 - 60%. Left ventricular systolic function is normal. · Left ventricular diastolic function is consistent with (grade I) impaired relaxation. · The right ventricular cavity is severely dilated. · Severely reduced right ventricular systolic function noted. · The right atrial cavity is severely dilated. · Estimated right ventricular systolic pressure from tricuspid  regurgitation is markedly elevated (>55 mmHg). · Moderate to severe pulmonary hypertension is present. · There is a small (<1cm) pericardial effusion. The effusion is fluid filled. There is no evidence of cardiac tamponade      XR Chest 1 View    Result Date: 9/11/2022  EXAMINATION: XR CHEST 1 VW-  9/11/2022 7:08 PM CDT  HISTORY: Hypoxia.  One view chest x-ray.  Comparison is made with 04/20/2022.  Heart size is magnified though there does appear to be mild cardiomegaly Mild infiltrate in both lungs compatible with mild diffuse edema.  No focal consolidation. No pneumothorax.  Summary: 1. Mild cardiac enlargement and mild diffuse infiltrate for which edema is favored.  This report was finalized on 09/11/2022 19:49 by Dr. Lorenzo Ruff MD.    CT Angiogram Chest    Result Date: 9/11/2022  EXAMINATION: CT ANGIOGRAM CHEST-   9/11/2022 8:10 PM CDT  HISTORY: Shortness of breath. Hypoxia. History of pulmonary hypertension. Pulmonary fibrosis.  In order to have a CT radiation dose as low as reasonably achievable Automated Exposure Control was utilized for adjustment of the mA and/or KV according to patient size.  DLP in mGycm= 202.  CT angiogram chest. CT angiography protocol. CT imaging with bolus IV contrast injection. Under concurrent supervision axial, sagittal, coronal, and three-dimensional data sets were constructed. Borderline cardiomegaly. Pulmonary artery enlargement. No pulmonary embolism.  Stable mildly prominent mediastinal lymph nodes.  No thoracic aortic aneurysm or dissection.  Pulmonary fibrosis and bronchiectasis.  No pneumothorax or pleural effusion.  No acute pneumonia is seen.  Summary: 1. No pulmonary embolism. 2. Stable appearance of chronic lung changes.            This report was finalized on 09/11/2022 20:26 by Dr. Lorenzo Ruff MD.     My radiograph interpretation/independent review of imaging: no new films today    Pulmonary Assessment:    1. Acute on chronic resp failure with hypoxia, life  threatening;  Pulmonary system failed  2. Interstitial lung disease, crest  3. Pulmonary hypertension, severe.  Previously evaluated at [place] [facility], last there in 2018 with the following plan per Dr. Thomas and RHC done here:  WHO Functional Class: II  Assessment   1. Pulmonary hypertension  She has evidence for PH by echo with moderate RV dysfunction in the setting of Scleroderma with ILD. Possible etiologies for her PH include PAH related to SSc, or WHO Group 3 PH related to her CTD-ILD. WHO group 2 PH related to HFpEF, common in SSc, also possible. Her ILD is moderate and she does desaturate, however given degree of RV dysfunction, I think it is worth perusing RHC to definitively quantitate whether precapillary PH is present, and if so, I will consider medical therapy for an element of PAH. Her VQ scan today shows no evidence for CTEPH.  Plan:  She will return in next few weeks for RHC with NO and fluid challenge  Pending this, will consider whether PH therapy is indicated  We discussed that treatment with oxygen to keep SpO2 > 90% is important - she has O2 at home  I will arrange follow up with me pending RHC results  2. CTD-ILD  She has an interesting pattern with cylindrical bronchiectasis and HRCT findings not compatible with UIP. Probably in NSIP spectrum. She will continue to see Dr. Hines for this. Her biggest symptom impacting daily life is cough, and unfortunately I don't think any PH therapy will help with this symptom.  3. Scleroderma  Ongoing management by Dr. Finnegan and Dr. Stevens.  RHC done here 7/2020  Right heart catheterization (performed with patient on RA)  Hemodynamics (baseline)  RA: 6  RV: 63/6  PA:  64/19, m38  PCW: 10  Transpulmonary gradient: 28  Oximetry  PA: 61%  Ao: 96% (by pulse oximetry)  Hgb 13.3 g/dL  Cardiac output and index via assumed David method: 3.8 L/min, 2.1 L/min/m2  Pulmonary vascular resistance: 7.4 Wood units      4. Hypokalemia better  5. Cannot rule out  pneumonia; continue empiric treatment; not a candidate for bronchoscopy  6. Probably false positive blood culture    Recommend/plan:   · Continue oxygen and empiric antibiotics  · Continue tyvaso, opsumit.  Has not been on PDE5 inhibitor that I can tell.  Will start sildenafil.    · May need to repeat rhc to guide therapy  · Will ask cardiology to see  · Ultimately needs transplant      This visit was performed by both a physician and an [facility] RN.  I personally evaluated and examined the patient.  I performed all aspects of the medical decision making as documented.  Electronically signed by Faisal Griffith MD, 9/13/2022, 11:06 CDT

## 2022-09-14 LAB
ANION GAP SERPL CALCULATED.3IONS-SCNC: 7 MMOL/L (ref 5–15)
BUN SERPL-MCNC: 15 MG/DL (ref 8–23)
BUN/CREAT SERPL: 20.3 (ref 7–25)
CALCIUM SPEC-SCNC: 8.5 MG/DL (ref 8.6–10.5)
CHLORIDE SERPL-SCNC: 104 MMOL/L (ref 98–107)
CO2 SERPL-SCNC: 28 MMOL/L (ref 22–29)
CREAT SERPL-MCNC: 0.74 MG/DL (ref 0.57–1)
DEPRECATED RDW RBC AUTO: 40.2 FL (ref 37–54)
EGFRCR SERPLBLD CKD-EPI 2021: 90.5 ML/MIN/1.73
ERYTHROCYTE [DISTWIDTH] IN BLOOD BY AUTOMATED COUNT: 13.7 % (ref 12.3–15.4)
GLUCOSE SERPL-MCNC: 155 MG/DL (ref 65–99)
HCT VFR BLD AUTO: 33.9 % (ref 34–46.6)
HGB BLD-MCNC: 10.8 G/DL (ref 12–15.9)
MCH RBC QN AUTO: 26.1 PG (ref 26.6–33)
MCHC RBC AUTO-ENTMCNC: 31.9 G/DL (ref 31.5–35.7)
MCV RBC AUTO: 81.9 FL (ref 79–97)
PLATELET # BLD AUTO: 182 10*3/MM3 (ref 140–450)
PMV BLD AUTO: 11.9 FL (ref 6–12)
POTASSIUM SERPL-SCNC: 4.3 MMOL/L (ref 3.5–5.2)
RBC # BLD AUTO: 4.14 10*6/MM3 (ref 3.77–5.28)
SODIUM SERPL-SCNC: 139 MMOL/L (ref 136–145)
WBC NRBC COR # BLD: 8.61 10*3/MM3 (ref 3.4–10.8)

## 2022-09-14 PROCEDURE — 25010000002 AZITHROMYCIN PER 500 MG: Performed by: INTERNAL MEDICINE

## 2022-09-14 PROCEDURE — 63710000001 MYCOPHENOLATE MOFETIL PER 250 MG: Performed by: INTERNAL MEDICINE

## 2022-09-14 PROCEDURE — 80048 BASIC METABOLIC PNL TOTAL CA: CPT | Performed by: FAMILY MEDICINE

## 2022-09-14 PROCEDURE — 97162 PT EVAL MOD COMPLEX 30 MIN: CPT

## 2022-09-14 PROCEDURE — 99232 SBSQ HOSP IP/OBS MODERATE 35: CPT | Performed by: INTERNAL MEDICINE

## 2022-09-14 PROCEDURE — 94799 UNLISTED PULMONARY SVC/PX: CPT

## 2022-09-14 PROCEDURE — 99222 1ST HOSP IP/OBS MODERATE 55: CPT | Performed by: INTERNAL MEDICINE

## 2022-09-14 PROCEDURE — 85027 COMPLETE CBC AUTOMATED: CPT | Performed by: FAMILY MEDICINE

## 2022-09-14 PROCEDURE — 99233 SBSQ HOSP IP/OBS HIGH 50: CPT | Performed by: INTERNAL MEDICINE

## 2022-09-14 PROCEDURE — 25010000002 ENOXAPARIN PER 10 MG: Performed by: FAMILY MEDICINE

## 2022-09-14 PROCEDURE — 25010000002 CEFTRIAXONE PER 250 MG: Performed by: INTERNAL MEDICINE

## 2022-09-14 RX ORDER — METHYLPREDNISOLONE SODIUM SUCCINATE 40 MG/ML
40 INJECTION, POWDER, LYOPHILIZED, FOR SOLUTION INTRAMUSCULAR; INTRAVENOUS DAILY
Status: DISCONTINUED | OUTPATIENT
Start: 2022-09-15 | End: 2022-09-17

## 2022-09-14 RX ORDER — FUROSEMIDE 20 MG/1
10 TABLET ORAL DAILY
Status: DISCONTINUED | OUTPATIENT
Start: 2022-09-14 | End: 2022-09-17 | Stop reason: HOSPADM

## 2022-09-14 RX ADMIN — VENLAFAXINE HYDROCHLORIDE 75 MG: 37.5 TABLET ORAL at 08:14

## 2022-09-14 RX ADMIN — CLONAZEPAM 0.5 MG: 0.5 TABLET ORAL at 21:10

## 2022-09-14 RX ADMIN — ACETAMINOPHEN 650 MG: 325 TABLET ORAL at 16:26

## 2022-09-14 RX ADMIN — GUAIFENESIN 600 MG: 600 TABLET, EXTENDED RELEASE ORAL at 08:14

## 2022-09-14 RX ADMIN — IPRATROPIUM BROMIDE AND ALBUTEROL SULFATE 3 ML: 2.5; .5 SOLUTION RESPIRATORY (INHALATION) at 13:53

## 2022-09-14 RX ADMIN — SILDENAFIL CITRATE 20 MG: 20 TABLET ORAL at 21:03

## 2022-09-14 RX ADMIN — FUROSEMIDE 10 MG: 20 TABLET ORAL at 11:51

## 2022-09-14 RX ADMIN — GUAIFENESIN 600 MG: 600 TABLET, EXTENDED RELEASE ORAL at 21:02

## 2022-09-14 RX ADMIN — MACITENTAN 10 MG: 10 TABLET, FILM COATED ORAL at 21:01

## 2022-09-14 RX ADMIN — TREPROSTINIL 1 DOSE: 16 INHALANT ORAL at 08:15

## 2022-09-14 RX ADMIN — IPRATROPIUM BROMIDE AND ALBUTEROL SULFATE 3 ML: 2.5; .5 SOLUTION RESPIRATORY (INHALATION) at 00:03

## 2022-09-14 RX ADMIN — AZITHROMYCIN DIHYDRATE 500 MG: 500 INJECTION, POWDER, LYOPHILIZED, FOR SOLUTION INTRAVENOUS at 11:52

## 2022-09-14 RX ADMIN — SILDENAFIL CITRATE 20 MG: 20 TABLET ORAL at 05:43

## 2022-09-14 RX ADMIN — Medication 10 ML: at 21:04

## 2022-09-14 RX ADMIN — ENOXAPARIN SODIUM 40 MG: 40 INJECTION SUBCUTANEOUS at 08:14

## 2022-09-14 RX ADMIN — ACETAMINOPHEN 650 MG: 325 TABLET ORAL at 21:10

## 2022-09-14 RX ADMIN — MYCOPHENOLATE MOFETIL 1000 MG: 500 TABLET, FILM COATED ORAL at 08:14

## 2022-09-14 RX ADMIN — IPRATROPIUM BROMIDE AND ALBUTEROL SULFATE 3 ML: 2.5; .5 SOLUTION RESPIRATORY (INHALATION) at 23:11

## 2022-09-14 RX ADMIN — MYCOPHENOLATE MOFETIL 1000 MG: 500 TABLET, FILM COATED ORAL at 21:02

## 2022-09-14 RX ADMIN — CEFTRIAXONE 1 G: 1 INJECTION, POWDER, FOR SOLUTION INTRAMUSCULAR; INTRAVENOUS at 11:51

## 2022-09-14 RX ADMIN — TREPROSTINIL 32 MCG: 32 INHALANT ORAL at 11:53

## 2022-09-14 RX ADMIN — FAMOTIDINE 20 MG: 20 TABLET, FILM COATED ORAL at 05:44

## 2022-09-14 RX ADMIN — CLONAZEPAM 0.5 MG: 0.5 TABLET ORAL at 09:00

## 2022-09-14 RX ADMIN — FAMOTIDINE 20 MG: 20 TABLET, FILM COATED ORAL at 17:02

## 2022-09-14 RX ADMIN — Medication 10 ML: at 08:15

## 2022-09-14 RX ADMIN — TREPROSTINIL 32 MCG: 32 INHALANT ORAL at 21:03

## 2022-09-14 RX ADMIN — TREPROSTINIL 32 MCG: 32 INHALANT ORAL at 17:02

## 2022-09-14 RX ADMIN — SILDENAFIL CITRATE 20 MG: 20 TABLET ORAL at 13:05

## 2022-09-14 RX ADMIN — IPRATROPIUM BROMIDE AND ALBUTEROL SULFATE 3 ML: 2.5; .5 SOLUTION RESPIRATORY (INHALATION) at 18:34

## 2022-09-14 NOTE — PLAN OF CARE
Goal Outcome Evaluation:              Outcome Evaluation: PT eval complete. Pt AAOx4. Pt reports that prior to symptom exacerbation around 1 month ago she was ind with all ADLs and mobility. She resides at home with her spouse who currently assists her with ADLs including bathing, cleaning, and cooking. Pt reports she has not walked much this last month. Today, pt was on 15 LPM. She was able to complete STS and bsc to bed t/fs ind w/ supervision. Pt's O2 sats dropped to 67% with bsc to bed t/f. Encoruaged pt to breathe deeply until O2 sats improved to 86%. Pt was ind w/ sit to supine t/f and rolling L and R. LE strength: 5/5. B LE ROM and sensation: WNL. Deferred amb at this time due to O2 sats which is her primary limiting factor for mobility. Overall, pt presents with SOB and poor tolerance to activity. Anticipated d/c home with assist pending pt's medical improvement.

## 2022-09-14 NOTE — PROGRESS NOTES
LOS: 3 days   Patient Care Team:  Aaron Stoddard MD as PCP - General  Aaron Stoddard MD as PCP - Family Medicine  Michael Landin DO as Consulting Physician (Gastroenterology)  Fela Blackman APRN as Nurse Practitioner (Pulmonary Disease)  LEGJUAN MANUEL  (DME Services)  Antonia Landry MD as Consulting Physician (Rheumatology)  Thong Martin MD as Cardiologist (Cardiology)    Chief Complaint: Shortness of breath     Subjective    Elaine Juárez is a 62 y.o. female who is being seen in follow-up  Overnight stable  No chest pain  No palpitation  No presyncope  No syncope  Orthopnea  No paroxysmal nocturnal dyspnea  Hemodynamically stable  No new issues or events  Bedside cardiac monitor shows sinus rhythm with occasional premature ventricular contractions  Latest test results reviewed  Shortness of breath is improved    Review of Systems   Constitutional: No chills   Has fatigue   No fever.   HENT: Negative.    Eyes: Negative.    Respiratory: Negative for cough,   No chest wall soreness,   Shortness of breath,   no wheezing, no stridor.    Cardiovascular: As above  Gastrointestinal: Negative for abdominal distention,  No abdominal pain,   No blood in stool,   No constipation,   No diarrhea,   No nausea   No vomiting.   Endocrine: Negative.    Genitourinary: Negative for difficulty urinating, dysuria, flank pain and hematuria.   Musculoskeletal: Negative.    Skin: Negative for rash and wound.   Allergic/Immunologic: Negative.    Neurological: Negative for dizziness, syncope, weakness,   No light-headedness  No  headaches.   Hematological: Does not bruise/bleed easily.   Psychiatric/Behavioral: Negative for agitation or behavioral problems,   No confusion,   the patient is  nervous/anxious.       History:   Past Medical History:   Diagnosis Date   • Allergies    • Arthritis    • BMI 31.0-31.9,adult 06/04/2019   • Calcified granuloma of lung (HCC) 06/04/2019    Right lung   • CHF (congestive heart  failure) (HCC)     denies   • Chronic respiratory failure with hypoxia (HCC) 08/05/2020   • Chronic respiratory failure with hypoxia, on home oxygen therapy (HCC)    • COVID-19 02/01/2022   • CREST syndrome (HCC)    • Environmental allergies 02/01/2022   • Gastroesophageal reflux disease without esophagitis 06/04/2019   • Obstructive sleep apnea on CPAP 06/04/2019   • Pulmonary fibrosis (HCC)    • Pulmonary hypertension (HCC)    • Short-term memory loss      Past Surgical History:   Procedure Laterality Date   • BREAST BIOPSY     • CARDIAC CATHETERIZATION N/A 7/22/2020    Procedure: Right Heart Cath;  Surgeon: Thong Martin MD;  Location: [facility] CATH INVASIVE LOCATION;  Service: Cardiology;  Laterality: N/A;   • CHOLECYSTECTOMY     • COLONOSCOPY  05/11/2015    5 POLYPS   • COLONOSCOPY N/A 8/4/2021    Procedure: COLONOSCOPY WITH ANESTHESIA;  Surgeon: Michael Landin DO;  Location: [facility] ENDOSCOPY;  Service: Gastroenterology;  Laterality: N/A;  pre-hx polyps  post-colon polyps   • ENDOSCOPY N/A 8/4/2021    Procedure: ESOPHAGOGASTRODUODENOSCOPY WITH ANESTHESIA;  Surgeon: Michael Landin DO;  Location: [facility] ENDOSCOPY;  Service: Gastroenterology;  Laterality: N/A;  pre-dysphagia  post-normal  pcp-lanette aguila     Social History     Socioeconomic History   • Marital status:    Tobacco Use   • Smoking status: Never Smoker   • Smokeless tobacco: Never Used   Vaping Use   • Vaping Use: Never used   Substance and Sexual Activity   • Alcohol use: No   • Drug use: No   • Sexual activity: Defer     Family History   Problem Relation Age of Onset   • Cirrhosis Sister    • Liver cancer Brother    • No Known Problems Mother    • No Known Problems Father    • Colon cancer Neg Hx    • Colon polyps Neg Hx        Labs:  WBC WBC   Date Value Ref Range Status   09/14/2022 8.61 3.40 - 10.80 10*3/mm3 Final   09/13/2022 7.69 3.40 - 10.80 10*3/mm3 Final   09/11/2022 12.51 (H) 3.40 - 10.80 10*3/mm3 Final      HGB Hemoglobin    Date Value Ref Range Status   09/14/2022 10.8 (L) 12.0 - 15.9 g/dL Final   09/13/2022 11.2 (L) 12.0 - 15.9 g/dL Final   09/11/2022 13.5 12.0 - 15.9 g/dL Final      HCT Hematocrit   Date Value Ref Range Status   09/14/2022 33.9 (L) 34.0 - 46.6 % Final   09/13/2022 36.6 34.0 - 46.6 % Final   09/11/2022 44.4 34.0 - 46.6 % Final      Platelets Platelets   Date Value Ref Range Status   09/14/2022 182 140 - 450 10*3/mm3 Final   09/13/2022 184 140 - 450 10*3/mm3 Final   09/11/2022 252 140 - 450 10*3/mm3 Final      MCV MCV   Date Value Ref Range Status   09/14/2022 81.9 79.0 - 97.0 fL Final   09/13/2022 83.9 79.0 - 97.0 fL Final   09/11/2022 83.5 79.0 - 97.0 fL Final        Results from last 7 days   Lab Units 09/14/22  0350 09/13/22  0330 09/11/22  1858   SODIUM mmol/L 139 144 140   POTASSIUM mmol/L 4.3 3.9 3.2*   CHLORIDE mmol/L 104 106 103   CO2 mmol/L 28.0 25.0 23.0   BUN mg/dL 15 13 9   CREATININE mg/dL 0.74 0.77 0.88   CALCIUM mg/dL 8.5* 8.8 9.0   BILIRUBIN mg/dL  --   --  0.4   ALK PHOS U/L  --   --  105   ALT (SGPT) U/L  --   --  14   AST (SGOT) U/L  --   --  20   GLUCOSE mg/dL 155* 147* 134*     Lab Results   Component Value Date    CKTOTAL 63 01/23/2017    TROPONINI <0.012 01/21/2017    TROPONINT <0.010 09/11/2022     PT/INR:  No results found for: PROTIME/No results found for: INR    Imaging Results (Last 72 Hours)     Procedure Component Value Units Date/Time    CT Angiogram Chest [[id number]] Collected: 09/11/22 2024     Updated: 09/11/22 2030    Narrative:      EXAMINATION: CT ANGIOGRAM CHEST-      9/11/2022 8:10 PM CDT     HISTORY: Shortness of breath. Hypoxia. History of pulmonary  hypertension. Pulmonary fibrosis.     In order to have a CT radiation dose as low as reasonably achievable  Automated Exposure Control was utilized for adjustment of the mA and/or  KV according to patient size.     DLP in mGycm= 202.     CT angiogram chest.  CT angiography protocol.   CT imaging with bolus IV contrast injection.    Under concurrent supervision axial, sagittal, coronal, and  three-dimensional data sets were constructed. Borderline cardiomegaly.  Pulmonary artery enlargement.  No pulmonary embolism.     Stable mildly prominent mediastinal lymph nodes.     No thoracic aortic aneurysm or dissection.     Pulmonary fibrosis and bronchiectasis.     No pneumothorax or pleural effusion.     No acute pneumonia is seen.     Summary:  1. No pulmonary embolism.  2. Stable appearance of chronic lung changes.                                   This report was finalized on 09/11/2022 20:26 by Dr. Lorenzo Ruff MD.    XR Chest 1 View [[id number]] Collected: 09/11/22 1948     Updated: 09/11/22 1952    Narrative:      EXAMINATION: XR CHEST 1 VW-     9/11/2022 7:08 PM CDT     HISTORY: Hypoxia.     One view chest x-ray.     Comparison is made with 04/20/2022.     Heart size is magnified though there does appear to be mild cardiomegaly  Mild infiltrate in both lungs compatible with mild diffuse edema.     No focal consolidation.  No pneumothorax.     Summary:  1. Mild cardiac enlargement and mild diffuse infiltrate for which edema  is favored.     This report was finalized on 09/11/2022 19:49 by Dr. Lorenzo Ruff MD.          Objective     Allergies   Allergen Reactions   • Codeine Nausea And Vomiting   • Latex Rash       Medication Review: Performed  Current Facility-Administered Medications   Medication Dose Route Frequency Provider Last Rate Last Admin   • acetaminophen (TYLENOL) tablet 650 mg  650 mg Oral Q4H PRN Fan Neri MD        Or   • acetaminophen (TYLENOL) suppository 650 mg  650 mg Rectal Q4H PRN Fan Neri MD       • albuterol (PROVENTIL) nebulizer solution 0.083% 2.5 mg/3mL  2.5 mg Nebulization Q4H PRN Fan Neri MD       • azithromycin (ZITHROMAX) 500 mg in sodium chloride 0.9 % 250 mL IVPB-VTB  500 mg Intravenous Q24H Faisal Griffith MD   500 mg at 09/13/22 1006   • busPIRone (BUSPAR)  tablet 10 mg  10 mg Oral BID PRN Fan Neri MD   10 mg at 09/12/22 0955   • cefTRIAXone (ROCEPHIN) 1 g in sodium chloride 0.9 % 100 mL IVPB-VTB  1 g Intravenous Q24H Faisal Griffith  mL/hr at 09/13/22 1006 1 g at 09/13/22 1006   • clonazePAM (KlonoPIN) tablet 0.5 mg  0.5 mg Oral BID PRN Fan Neri MD   0.5 mg at 09/13/22 1940   • Enoxaparin Sodium (LOVENOX) syringe 40 mg  40 mg Subcutaneous Daily Fan Neri MD   40 mg at 09/13/22 0813   • famotidine (PEPCID) tablet 20 mg  20 mg Oral BID AC Fan Neri MD   20 mg at 09/14/22 0544   • guaiFENesin (MUCINEX) 12 hr tablet 600 mg  600 mg Oral Q12H Fan Neri MD   600 mg at 09/13/22 2104   • guaiFENesin-dextromethorphan (ROBITUSSIN DM) 100-10 MG/5ML syrup 10 mL  10 mL Oral Q6H PRN Fan Neri MD       • ipratropium-albuterol (DUO-NEB) nebulizer solution 3 mL  3 mL Nebulization Q6H - RT Fan Neri MD   3 mL at 09/14/22 0003   • Macitentan tablet 10 mg (OPSUMIT - PATIENT SUPPLIED - TAKE ONE TABLET NIGHTLY)  10 mg Oral Nightly Faisal Griffith MD   10 mg at 09/13/22 2104   • melatonin tablet 3 mg  3 mg Oral Nightly PRN Fan Neri MD   3 mg at 09/12/22 0324   • methylPREDNISolone sodium succinate (SOLU-Medrol) injection 40 mg  40 mg Intravenous Q12H Faisal Griffith MD   40 mg at 09/13/22 2037   • mycophenolate (CELLCEPT) tablet 1,000 mg - PATIENT SUPPLIED  1,000 mg Oral BID Faisal Griffith MD   1,000 mg at 09/13/22 2104   • ondansetron (ZOFRAN) injection 4 mg  4 mg Intravenous Q6H PRN Fan Neri MD       • sildenafil (REVATIO) tablet 20 mg  20 mg Oral Q8H Faisal Griffith MD   20 mg at 09/14/22 0543   • sodium chloride 0.9 % flush 10 mL  10 mL Intravenous Q12H Fan Neri MD   10 mL at 09/13/22 2109   • sodium chloride 0.9 % flush 10 mL  10 mL Intravenous PRN Fan Neri MD       • traZODone (DESYREL)  "tablet 100 mg  100 mg Oral Nightly PRN Andreas Whiteside MD   100 mg at 09/13/22 2104   • Treprostinil powder inhaler (PATIENT SUPPLIED TYVASO) - ONE CARTRIDGE FOUR TIMES DAILY  1 dose Inhalation 4x Daily Faisal Griffith MD   1 dose at 09/13/22 2105   • venlafaxine (EFFEXOR) tablet 75 mg  75 mg Oral Daily Fan Neri MD   75 mg at 09/13/22 0815       Vital Sign Min/Max for last 24 hours  Temp  Min: 97 °F (36.1 °C)  Max: 97.5 °F (36.4 °C)   BP  Min: 93/74  Max: 140/99   Pulse  Min: 67  Max: 126   Resp  Min: 17  Max: 40   SpO2  Min: 89 %  Max: 99 %   No data recorded   No data recorded     Flowsheet Rows    Flowsheet Row First Filed Value   Admission Height 160 cm (63\") Documented at 09/11/2022 1845   Admission Weight 81.5 kg (179 lb 11.2 oz) Documented at 09/11/2022 1845          Results for orders placed during the hospital encounter of 09/11/22    Adult Transthoracic Echo Complete W/ Cont if Necessary Per Protocol    Interpretation Summary  · Left ventricular ejection fraction appears to be 56 - 60%. Left ventricular systolic function is normal.  · Left ventricular diastolic function is consistent with (grade I) impaired relaxation.  · The right ventricular cavity is severely dilated.  · Severely reduced right ventricular systolic function noted.  · The right atrial cavity is severely dilated.  · Estimated right ventricular systolic pressure from tricuspid regurgitation is markedly elevated (>55 mmHg).  · Moderate to severe pulmonary hypertension is present.  · There is a small (<1cm) pericardial effusion. The effusion is fluid filled. There is no evidence of cardiac tamponade      Physical Exam:    General Appearance: Awake, alert, in no acute distress  Eyes: Pupils equal and reactive    Ears: Appear intact with no abnormalities noted  Nose: Nares normal, no drainage  Neck: supple, trachea midline, no carotid bruit and no JVD  Back: no kyphosis present,    Lungs: respirations regular, respirations " even and respirations unlabored  Heart: normal S1, S2, 2/6 systolic murmur left sternal border   no gallops or rubs  no rub and no click  Abdomen: normal bowel sounds, no tenderness   Skin: no bleeding, bruising or rash  Extremities: no cyanosis  Psychiatric/Behavioral: Negative for agitation, behavioral problems, confusion, the patient does  appear to be nervous/anxious.       Results Review:   I reviewed the patient's new clinical results.  I reviewed the patient's new imaging results and agree with the interpretation.  I reviewed the patient's other test results and agree with the interpretation  I personally viewed and interpreted the patient's EKG/Telemetry data    Discussed with patient  Updated patient regarding any new or relevant abnormalities on review of records or any new findings on physical exam.   Mentioned to patient about purpose of visit and desirable health short and long term goals and objectives.     Reviewed available prior notes, consults, prior visits, laboratory findings, radiology and cardiology relevant reports.   Updated chart as applicable.   I have reviewed the patient's medical history in detail and updated the computerized patient record as relevant.          Assessment & Plan       Acute on chronic respiratory failure with hypoxia (HCC)    CREST syndrome, partial     Raynaud's phenomenon    Pulmonary fibrosis prob sec underlying autoimmune disease    PBC (primary biliary cirrhosis)    Obstructive sleep apnea on CPAP    PAH (pulmonary arterial hypertension) with portal hypertension (HCC)    Hypokalemia    Acute-on-chronic respiratory failure (HCC)    Obesity (BMI 30-39.9)      Plan    Reviewed results of echocardiogram with her  Overall patient is improved  Okay to move to telemetry floor from my side  Follow-up with Juliana OCONNOR 2 weeks after discharge  Plans for further evaluation in [facility] in near future per patient regarding lung transplant  No additional cardiac  testing currently required  Treat respiratory failure  Can monitor right heart functions and pressure by serial echocardiogram  No need for right heart catheterization right now  Increase ambulation  DVT prophylaxis  Keep on telemetry while in hospital      Andre Alfonso MD  09/14/22  07:16 CDT    EMR Dragon/Transcription was used to dictate part of this note

## 2022-09-14 NOTE — PROGRESS NOTES
"Pharmacy Dosing Service  Tyvaso (Treprostinil)    Assessment/Action/Plan:  Treprostinil dry powder inhaler increase to 32 mcg four times daily while awake.    Maintenance:  If tolerated, increase each inhaled dose by an additional 16 mcg per treatment session at approximately 1 to 2 week intervals; target dose:  48-64 mcg inhaled per session; continue at highest dose tolerated if adverse effects preclude titration.  Dosing: Hepatic Impairment: Adult   Inhalation:  Mild to moderate impairment: There are no dosage adjustments provided in the 's labeling. However, hepatic impairment increases systemic exposure to treprostinil. Use with caution and titrate slowly.  Severe impairment: There are no dosage adjustments provided in the 's labeling (has not been studied).       LIVER FUNCTION TESTS:      Lab 09/11/22  1858   TOTAL PROTEIN 7.2   ALBUMIN 4.20   GLOBULIN 3.0   ALT (SGPT) 14   AST (SGOT) 20   BILIRUBIN 0.4   ALK PHOS 105       Elaine Juárez is a 62 y.o. female   [Ht: 160 cm (63\"); Wt: 79.6 kg (175 lb 7.8 oz); BMI: Body mass index is 31.09 kg/m².]  Estimated Creatinine Clearance: 76.7 mL/min (by C-G formula based on SCr of 0.74 mg/dL).       Adam Perkins, PharmD  09/14/22 10:15 CDT     "

## 2022-09-14 NOTE — PROGRESS NOTES
PULMONARY AND CRITICAL CARE PROGRESS NOTE - [facility]    Patient: Elaine Juárez  1960   MR# [id number]   Acct# [id number]  09/14/22   07:14 CDT  Referring Provider: Andreas Whiteside MD    Chief Complaint: acute/chronic respiratory failure with hypoxemia     Interval history: The patient is resting in bed with O2 sat 98% on 8L HFNC. She had a coughing spell last night and desated leading to increased HFNC to 13L. Anxiety medication given. Patient reports slept well through night after that. O2 was then weaned down. Dr. Griffith discussed going ahead and increasing her Tyvaso to 32 mcg. A call is placed to pharmacy to review. Dose adjusted on order. No new ABG or imaging. Begin Lasix PC 10 mg daily. Will obtain GI consult. There is a question of billiary cirrhosis which is hindering the transplant evaluation process and she needs additional GI reassessment for that.  May need to transfer to [facility] if no improvement.    Meds:  azithromycin, 500 mg, Intravenous, Q24H  cefTRIAXone, 1 g, Intravenous, Q24H  enoxaparin, 40 mg, Subcutaneous, Daily  famotidine, 20 mg, Oral, BID AC  guaiFENesin, 600 mg, Oral, Q12H  ipratropium-albuterol, 3 mL, Nebulization, Q6H - RT  Macitentan, 10 mg, Oral, Nightly  methylPREDNISolone sodium succinate, 40 mg, Intravenous, Q12H  mycophenolate, 1,000 mg, Oral, BID  sildenafil, 20 mg, Oral, Q8H  sodium chloride, 10 mL, Intravenous, Q12H  Treprostinil, 1 dose, Inhalation, 4x Daily  venlafaxine, 75 mg, Oral, Daily         Review of Systems:   Review of Systems   Constitutional: Positive for fatigue. Negative for chills and fever.   Respiratory: Positive for shortness of breath.    Cardiovascular: Negative for chest pain.   Gastrointestinal: Negative for vomiting.   Neurological: Positive for weakness.     Physical Exam:  SpO2 Percentage    09/14/22 0600 09/14/22 0634 09/14/22 0700   SpO2: 96% 98% 97%     Body mass index is 31.09 kg/m².   Temp:  [97 °F (36.1  °C)-97.5 °F (36.4 °C)] 97.5 °F (36.4 °C)  Heart Rate:  [] 91  Resp:  [17-40] 20  BP: ()/(55-99) 108/62    Intake/Output Summary (Last 24 hours) at 9/14/2022 0714  Last data filed at 9/14/2022 0600  Gross per 24 hour   Intake 550 ml   Output 1100 ml   Net -550 ml     Physical Exam  Vitals reviewed.   Constitutional:       Appearance: She is well-developed. She is obese.      Interventions: Nasal cannula in place.      Comments: HFNC   HENT:      Head: Normocephalic and atraumatic.   Eyes:      General: No scleral icterus.     Conjunctiva/sclera: Conjunctivae normal.      Pupils: Pupils are equal, round, and reactive to light.   Cardiovascular:      Rate and Rhythm: Normal rate.   Pulmonary:      Effort: Tachypnea present.      Breath sounds: Decreased breath sounds present.   Musculoskeletal:         General: Normal range of motion.      Cervical back: Normal range of motion and neck supple.   Skin:     General: Skin is warm and dry.   Neurological:      Mental Status: She is alert and oriented to person, place, and time.   Psychiatric:         Behavior: Behavior normal.         Electronically signed by ANASTASIA Gaines, 9/14/2022, 07:14 CDT        Physician Substantive Portion: Medical Decision Making    Laboratory Data:  Results from last 7 days   Lab Units 09/14/22  0350 09/13/22  0330 09/11/22  1858   WBC 10*3/mm3 8.61 7.69 12.51*   HEMOGLOBIN g/dL 10.8* 11.2* 13.5   PLATELETS 10*3/mm3 182 184 252     Results from last 7 days   Lab Units 09/14/22  0350 09/13/22  0330 09/11/22  1858   SODIUM mmol/L 139 144 140   POTASSIUM mmol/L 4.3 3.9 3.2*   BUN mg/dL 15 13 9   CREATININE mg/dL 0.74 0.77 0.88     Results from last 7 days   Lab Units 09/11/22  2218   PH, ARTERIAL pH units 7.430   PCO2, ARTERIAL mm Hg 36.5   PO2 ART mm Hg 65.4*     Blood Culture   Date Value Ref Range Status   09/11/2022 Staphylococcus, coagulase negative (C)  Final   09/11/2022 No growth at 24 hours  Preliminary     Recent  films:  Adult Transthoracic Echo Complete W/ Cont if Necessary Per Protocol    Result Date: 9/12/2022  · Left ventricular ejection fraction appears to be 56 - 60%. Left ventricular systolic function is normal. · Left ventricular diastolic function is consistent with (grade I) impaired relaxation. · The right ventricular cavity is severely dilated. · Severely reduced right ventricular systolic function noted. · The right atrial cavity is severely dilated. · Estimated right ventricular systolic pressure from tricuspid regurgitation is markedly elevated (>55 mmHg). · Moderate to severe pulmonary hypertension is present. · There is a small (<1cm) pericardial effusion. The effusion is fluid filled. There is no evidence of cardiac tamponade         Pulmonary Assessment:  1. Severe acute on chronic respiratory failure with hypoxia, minimally better.  Life threatening, threatening to pulmonary function, high risk.  Suspect primarily due to worsening pulm htn, aggravated by ILD, possible pneumonia  2. Severe pulmonary hypertension on multiple meds including sildenafil started yesterday, era, and inhaled prostanoid on schedule to up titrate   3. History of elevated alk phosphatase, hx biliary/hepatic disease which precluded transplant eval at [place], status uncertain, without evidence of end stage liver disease 7 yrs after discovery  4. CREST; immune compromised on meds  5. Small pericardial effusion  6. Anemia stable    Recommend/plan:   · Will ask GI to evaluate  · Continue high flow oxygen and target 90% sat  · Uptitrate tyvaso per pharmacy algorithm to 32 mcg 4 times per day today and monitor  · Keep in ICU to monitor above and closely titrate oxygen; currently needing at least as much flow at rest as she needed on exertion in the past  · Needs transplantation if candidate  · Continue empiric antibiotics.  · Taper steroids  · Low dose diuretics    This visit was performed by both a physician and an Advanced Practice  RN.  I personally evaluated and examined the patient.  I performed all aspects of the medical decision making as documented.  Electronically signed by Faisal Griffith MD, 9/14/2022, 08:48 CDT

## 2022-09-14 NOTE — PLAN OF CARE
Goal Outcome Evaluation:  Plan of Care Reviewed With: patient, other (see comments) (RN, Alisia)        Progress: improving  Outcome Evaluation: Pt's appetite has improved; PO intake 62.5%/6meals/2 days. Provided basic healthy eating concepts and encouraged Pt quick/convient ways that require limited energy to fix healthy meals at home. Handouts provided and instructed Pt on available OP opportunities for further learning if she is interested. Encouraged intake and will continue to follow up.

## 2022-09-14 NOTE — PROGRESS NOTES
[facility] Medicine Services  INPATIENT PROGRESS NOTE    Length of Stay: 3  Date of Admission: 9/11/2022  Primary Care Physician: Aaron Stoddard MD    Subjective   Chief Complaint: Respiratory failure hypercapnia hypoxia/pulmonary fibrosis/CAD syndrome.    HPI   Patient is currently a liter of oxygen.  Patient kept in the unit secondary to change in medication by pulmonary.  Patient is afebrile, blood pressure stable.    Review of Systems   Constitutional: Positive for activity change, appetite change and fatigue. Negative for chills and fever.   HENT: Negative for hearing loss, nosebleeds, tinnitus and trouble swallowing.    Eyes: Negative for visual disturbance.   Respiratory: Positive for cough, shortness of breath and wheezing. Negative for chest tightness.    Cardiovascular: Negative for chest pain, palpitations and leg swelling.   Gastrointestinal: Negative for abdominal distention, abdominal pain, blood in stool, constipation, diarrhea, nausea and vomiting.   Endocrine: Negative for cold intolerance, heat intolerance, polydipsia, polyphagia and polyuria.   Genitourinary: Negative for decreased urine volume, difficulty urinating, dysuria, flank pain, frequency and hematuria.   Musculoskeletal: Negative for arthralgias, joint swelling and myalgias.   Skin: Negative for rash.   Allergic/Immunologic: Negative for immunocompromised state.   Neurological: Positive for weakness. Negative for dizziness, syncope, light-headedness and headaches.   Hematological: Negative for adenopathy. Does not bruise/bleed easily.   Psychiatric/Behavioral: Negative for confusion and sleep disturbance. The patient is not nervous/anxious.      All pertinent negatives and positives are as above. All other systems have been reviewed and are negative unless otherwise stated.     Objective    Temp:  [96.2 °F (35.7 °C)-97.5 °F (36.4 °C)] 96.2 °F (35.7 °C)  Heart Rate:  [] 89  Resp:  [17-37]  20  BP: ()/(53-99) 104/66    Intake/Output Summary (Last 24 hours) at 9/14/2022 1329  Last data filed at 9/14/2022 1151  Gross per 24 hour   Intake 550 ml   Output 1100 ml   Net -550 ml     Physical Exam  Vitals and nursing note reviewed.   Constitutional:       Comments: Chronically ill.   HENT:      Head: Normocephalic.   Eyes:      Conjunctiva/sclera: Conjunctivae normal.      Pupils: Pupils are equal, round, and reactive to light.   Neck:      Vascular: No JVD.   Cardiovascular:      Rate and Rhythm: Normal rate and regular rhythm.      Heart sounds: Normal heart sounds.   Pulmonary:      Effort: No respiratory distress.      Breath sounds: No wheezing or rales.      Comments: On high flow oxygen.  Diminished breath bilateral, clear.  Chest:      Chest wall: No tenderness.   Abdominal:      General: Bowel sounds are normal. There is no distension.      Palpations: Abdomen is soft.      Tenderness: There is no abdominal tenderness.   Musculoskeletal:         General: No tenderness or deformity.      Cervical back: Neck supple.   Skin:     General: Skin is warm and dry.      Capillary Refill: Capillary refill takes 2 to 3 seconds.      Findings: No rash.   Neurological:      Mental Status: She is alert and oriented to person, place, and time.      Cranial Nerves: No cranial nerve deficit.      Motor: Weakness present. No abnormal muscle tone.      Deep Tendon Reflexes: Reflexes normal.   Psychiatric:         Mood and Affect: Mood normal.         Behavior: Behavior normal.         Thought Content: Thought content normal.      Results Review:  Lab Results (last 24 hours)     Procedure Component Value Units Date/Time    Basic Metabolic Panel [[id number]]  (Abnormal) Collected: 09/14/22 0350    Specimen: Blood Updated: 09/14/22 0452     Glucose 155 mg/dL      BUN 15 mg/dL      Creatinine 0.74 mg/dL      Sodium 139 mmol/L      Potassium 4.3 mmol/L      Chloride 104 mmol/L      CO2 28.0 mmol/L      Calcium 8.5  mg/dL      BUN/Creatinine Ratio 20.3     Anion Gap 7.0 mmol/L      eGFR 90.5 mL/min/1.73      Comment: National Kidney Foundation and American Society of Nephrology (ASN) Task Force recommended calculation based on the Chronic Kidney Disease Epidemiology Collaboration (CKD-EPI) equation refit without adjustment for race.       Narrative:      GFR Normal >60  Chronic Kidney Disease <60  Kidney Failure <15      CBC (No Diff) [[id number]]  (Abnormal) Collected: 09/14/22 0350    Specimen: Blood Updated: 09/14/22 0433     WBC 8.61 10*3/mm3      RBC 4.14 10*6/mm3      Hemoglobin 10.8 g/dL      Hematocrit 33.9 %      MCV 81.9 fL      MCH 26.1 pg      MCHC 31.9 g/dL      RDW 13.7 %      RDW-SD 40.2 fl      MPV 11.9 fL      Platelets 182 10*3/mm3     Blood Culture - Blood, Arm, Right [[id number]]  (Normal) Collected: 09/11/22 1858    Specimen: Blood from Arm, Right Updated: 09/13/22 1946     Blood Culture No growth at 2 days           Cultures:  Blood Culture   Date Value Ref Range Status   09/11/2022 Staphylococcus, coagulase negative (C)  Final   09/11/2022 No growth at 2 days  Preliminary       Radiology Data:    Imaging Results (Last 24 Hours)     ** No results found for the last 24 hours. **          Allergies   Allergen Reactions   • Codeine Nausea And Vomiting   • Latex Rash       Scheduled meds:   azithromycin, 500 mg, Intravenous, Q24H  cefTRIAXone, 1 g, Intravenous, Q24H  enoxaparin, 40 mg, Subcutaneous, Daily  famotidine, 20 mg, Oral, BID AC  furosemide, 10 mg, Oral, Daily  guaiFENesin, 600 mg, Oral, Q12H  ipratropium-albuterol, 3 mL, Nebulization, Q6H - RT  Macitentan, 10 mg, Oral, Nightly  [START ON 9/15/2022] methylPREDNISolone sodium succinate, 40 mg, Intravenous, Daily  mycophenolate, 1,000 mg, Oral, BID  sildenafil, 20 mg, Oral, Q8H  sodium chloride, 10 mL, Intravenous, Q12H  Treprostinil, 32 mcg, Inhalation, 4x Daily  venlafaxine, 75 mg, Oral, Daily        PRN meds:  •  acetaminophen **OR** acetaminophen  •   albuterol  •  busPIRone  •  clonazePAM  •  guaiFENesin-dextromethorphan  •  melatonin  •  ondansetron  •  Pharmacy Consult - Pharmacy to dose  •  sodium chloride  •  traZODone    Assessment/Plan       Acute on chronic respiratory failure with hypoxia (HCC)    CREST syndrome, partial     Raynaud's phenomenon    Pulmonary fibrosis prob sec underlying autoimmune disease    PBC (primary biliary cirrhosis)    Obstructive sleep apnea on CPAP    PAH (pulmonary arterial hypertension) with portal hypertension (HCC)    Hypokalemia    Acute-on-chronic respiratory failure (HCC)    Obesity (BMI 30-39.9)      Plan:  Pulmonary fibrosis/pulmonary hypertension/chronic respiratory failure hypoxia/obstructive sleep apnea.  Patient is on chronic 8 L of oxygen at home.  Patient is on chronic CPAP at home.  On Tyvaso.  Patient follows Dr. Griffith outpatient.  Patient was declined for lung transplant at [place] and currently waiting for interview at [facility].  DuoNebs 4 times daily.  Albuterol every 4 hours as needed.  Pulmicort nebs twice daily.  Solu-Medrol twice daily.    Robitussin as needed.  Pulmonary consult.  Azithromycin . Rocephin.  Mucinex.  DuoNebs.  Sildenafil started by pulmonary. Tyvaso.   CTA of the chest-No pulmonary embolism, Stable appearance of chronic lung changes.  Chest x-ray-Mild cardiac enlargement and mild diffuse infiltrate for which edema  is favored.  On 8 L high flow.     Right-sided heart failure.  BNP 7400.  Troponin negative.  Pulmonary hypertension.    Cardiology consult.  Echocardiogram- ejection fraction 56 to 60%, diastolic dysfunction grade 1, right ventricle cavity severely dilated, severe reduced right ventricle systolic function, right atrial cavity severely dilated, tricuspid regurgitation, moderate to severe pulm hypertension, small less than 1 cm pericardial effusion-fluid-filled- no evidence of cardiac tamponade.     Hypokalemia.  Resolved.  Magnesium-normal.     History of crest  syndrome.  Patient follow with rheumatology outpatient. On CellCept and prednisone.     Insomnia/depression/anxiety.  Trazodone at night.  Effexor.  BuSpar as needed.  Clonazepam as needed.  Melatonin at night as needed.     Reflux.  Pepcid.  Zofran as needed.     Lovenox prophylaxis.     Nutrition.  Regular diet.     Respiratory panel-negative.  Blood culture 1 out of 2 [id number] contamination..  COVID-19-negative.  MRSA-negative.     Discharge Planning: 3 to 6 days    Electronically signed by Andreas Whiteside MD, 09/14/22, 1:29 PM CDT.

## 2022-09-14 NOTE — THERAPY EVALUATION
Patient Name: Elaine Juárez  : 1960    MRN: [id number]                              Today's Date: 2022       Admit Date: 2022    Visit Dx:     ICD-10-CM ICD-9-CM   1. Hypoxia  R09.02 799.02   2. Impaired mobility  Z74.09 799.89     Patient Active Problem List   Diagnosis   • CREST syndrome, partial    • Raynaud's phenomenon   • Pulmonary fibrosis prob sec underlying autoimmune disease   • PBC (primary biliary cirrhosis)   • Gastroesophageal reflux disease without esophagitis   • BMI 31.0-31.9,adult   • Obstructive sleep apnea on CPAP   • Chronic respiratory failure with hypoxia (HCC)   • History of adenomatous polyp of colon   • Globus sensation   • Environmental allergies   • PAH (pulmonary arterial hypertension) with portal hypertension (HCC)   • Hypokalemia   • Panic attack   • Paranoia (psychosis) ([facility])   • H/O noncompliance with medical treatment, presenting hazards to health   • Bipolar affective disorder, currently manic, moderate ([facility]), suspected   • Acute on chronic respiratory failure with hypoxia (HCC)   • Acute-on-chronic respiratory failure (HCC)   • Obesity (BMI 30-39.9)     Past Medical History:   Diagnosis Date   • Allergies    • Arthritis    • BMI 31.0-31.9,adult 2019   • Calcified granuloma of lung (HCC) 2019    Right lung   • CHF (congestive heart failure) ([facility])     denies   • Chronic respiratory failure with hypoxia (HCC) 2020   • Chronic respiratory failure with hypoxia, on home oxygen therapy (HCC)    • COVID-19 2022   • CREST syndrome (HCC)    • Environmental allergies 2022   • Gastroesophageal reflux disease without esophagitis 2019   • Obstructive sleep apnea on CPAP 2019   • Pulmonary fibrosis (HCC)    • Pulmonary hypertension (HCC)    • Short-term memory loss      Past Surgical History:   Procedure Laterality Date   • BREAST BIOPSY     • CARDIAC CATHETERIZATION N/A 2020    Procedure: Right Heart Cath;  Surgeon: Veronica  Thong VEGA MD;  Location: [facility] CATH INVASIVE LOCATION;  Service: Cardiology;  Laterality: N/A;   • CHOLECYSTECTOMY     • COLONOSCOPY  05/11/2015    5 POLYPS   • COLONOSCOPY N/A 8/4/2021    Procedure: COLONOSCOPY WITH ANESTHESIA;  Surgeon: Michael Landin DO;  Location: [facility] ENDOSCOPY;  Service: Gastroenterology;  Laterality: N/A;  pre-hx polyps  post-colon polyps   • ENDOSCOPY N/A 8/4/2021    Procedure: ESOPHAGOGASTRODUODENOSCOPY WITH ANESTHESIA;  Surgeon: Michael Landin DO;  Location: [facility] ENDOSCOPY;  Service: Gastroenterology;  Laterality: N/A;  pre-dysphagia  post-normal  pcp-lanette aguila      General Information     Row Name 09/14/22 0817          Physical Therapy Time and Intention    Document Type evaluation  -ARMINDA (r) MF (t) ARMINDA (c)     Mode of Treatment physical therapy  -ARMINDA (r) MF (t) ARMINDA (c)     Row Name 09/14/22 0817          General Information    Patient Profile Reviewed yes  Pt presents to [facility] w/ c/o SOB. Dx: acute on chronic RF. PMH: CREST syndrome, pulmonary fibrosis, PAH, PBC.  -ARMINDA (r) MF (t) ARMINDA (c)     Prior Level of Function independent:;dressing;bathing;cleaning;ADL's;all household mobility;gait;transfer  Prior to exacerbation of symptoms one month ago  -ARMINDA (r) MF (t) ARMINDA (c)     Existing Precautions/Restrictions oxygen therapy device and L/min  Stays on 8 L of O2 at home  -ARMINDA (r) MF (t) ARMINDA (c)     Barriers to Rehab medically complex;physical barrier  -ARMINDA (r) MF (t) ARMINDA (c)     Row Name 09/14/22 0817          Living Environment    People in Home spouse  -ARMINDA (r) MF (t) ARMINDA (c)     Row Name 09/14/22 0817          Home Main Entrance    Number of Stairs, Main Entrance one  -ARMINDA (r) MF (t) ARMINDA (c)     Stair Railings, Main Entrance none  -ARMINDA (r) MF (t) ARMINDA (c)     Row Name 09/14/22 0817          Stairs Within Home, Primary    Number of Stairs, Within Home, Primary none  -ARMINDA (r) MF (t) ARMINDA (c)     Row Name 09/14/22 0817          Cognition    Orientation Status (Cognition) oriented x 4  -ARMINDA (r) MF (t)  ARMINDA (c)     Row Name 09/14/22 0817          Safety Issues, Functional Mobility    Impairments Affecting Function (Mobility) endurance/activity tolerance;shortness of breath  -ARMINDA (r) MF (t) ARMINDA (c)           User Key  (r) = Recorded By, (t) = Taken By, (c) = Cosigned By    Initials Name Provider Type    [place], PT DPT Physical Therapist    Jing Muse, PT Student PT Student               Mobility     Row Name 09/14/22 0817          Bed Mobility    Bed Mobility rolling left;rolling right;sit-supine  -ARMINDA     Rolling Left Ralls (Bed Mobility) independent  -ARMINDA (r) MF (t) ARMINDA (c)     Rolling Right Ralls (Bed Mobility) independent  -ARMINDA (r) MF (t) ARMINDA (c)     Sit-Supine Ralls (Bed Mobility) supervision  -ARMINDA     Assistive Device (Bed Mobility) head of bed elevated  -ARMINDA     Row Name 09/14/22 0817          Bed-Chair Transfer    Bed-Chair Ralls (Transfers) modified independence  -ARMINDA     Comment, (Bed-Chair Transfer) Chair to bed t/f: ind to supervision required, no AD  -ARMINDA (r) MF (t) ARMINDA (c)     Row Name 09/14/22 0817          Sit-Stand Transfer    Sit-Stand Ralls (Transfers) modified independence  -ARMINDA           User Key  (r) = Recorded By, (t) = Taken By, (c) = Cosigned By    Initials Name Provider Type    [place], PT DPT Physical Therapist    Jing Muse, PT Student PT Student               Obj/Interventions     Row Name 09/14/22 0817          Range of Motion Comprehensive    General Range of Motion bilateral lower extremity ROM WFL  -ARMINDA (r) MF (t) ARMINDA (c)     Row Name 09/14/22 0817          Strength Comprehensive (MMT)    Comment, General Manual Muscle Testing (MMT) Assessment LE strength: 5/5  -ARMINDA (r) MF (t) ARMINDA (c)     Row Name 09/14/22 0817          Balance    Balance Assessment sitting dynamic balance;standing dynamic balance  -ARMINDA (r) MF (t) ARMINDA (c)     Dynamic Sitting Balance independent  -ARMINDA (r) MF (t) ARMINDA (c)     Position, Sitting Balance  unsupported;sitting edge of bed;sitting in chair  -ARMINDA (r) MF (t) ARMINDA (c)     Dynamic Standing Balance supervision  -ARMINDA (r) MF (t) ARMINDA (c)     Position/Device Used, Standing Balance unsupported  -ARMINDA (r) MF (t) ARMINDA (c)     Row Name 09/14/22 0817          Sensory Assessment (Somatosensory)    Sensory Assessment (Somatosensory) LE sensation intact  -ARMINDA (r) MF (t) ARMINDA (c)           User Key  (r) = Recorded By, (t) = Taken By, (c) = Cosigned By    Initials Name Provider Type    [place], PT DPT Physical Therapist    Jing Muse, PT Student PT Student               Goals/Plan     Row Name 09/14/22 0817          Transfer Goal 1 (PT)    Activity/Assistive Device (Transfer Goal 1, PT) bed-to-chair/chair-to-bed;sit-to-stand/stand-to-sit  -ARMINDA     [place] Level/Cues Needed (Transfer Goal 1, PT) independent  -ARMINDA (r) MF (t) ARMINDA (c)     Time Frame (Transfer Goal 1, PT) 10 days;long term goal (LTG)  -ARMINDA (r) MF (t) ARMINDA (c)     Progress/Outcome (Transfer Goal 1, PT) new goal  -ARMINDA (r) MF (t) ARMINDA (c)     Row Name 09/14/22 0817          Gait Training Goal 1 (PT)    Activity/Assistive Device (Gait Training Goal 1, PT) gait (walking locomotion);increase endurance/gait distance;increase energy conservation  -ARMINDA (r) MF (t) ARMINDA (c)     [place] Level (Gait Training Goal 1, PT) contact guard required;minimum assist (75% or more patient effort)  -ARMINDA (r) MF (t) ARMINDA (c)     Distance (Gait Training Goal 1, PT) 50 ft  -ARMINDA (r) MF (t) ARMINDA (c)     Time Frame (Gait Training Goal 1, PT) long term goal (LTG);10 days  -ARMINDA (r) MF (t) ARMINDA (c)     Progress/Outcome (Gait Training Goal 1, PT) new goal  -ARMINDA (r) MF (t) ARMINDA (c)     Row Name 09/14/22 0817          Therapy Assessment/Plan (PT)    Planned Therapy Interventions (PT) balance training;gait training;transfer training;patient/family education;strengthening;stair training  -ARMINDA           User Key  (r) = Recorded By, (t) = Taken By, (c) = Cosigned By    Initials Name Provider Type     [place], PT DPT Physical Therapist    Jing Muse, PT Student PT Student               Clinical Impression     Row Name 09/14/22 0817          Pain    Pretreatment Pain Rating 0/10 - no pain  -ARMINDA (r) MF (t) ARMINDA (c)     Posttreatment Pain Rating 0/10 - no pain  -ARMINDA (r) MF (t) ARMINDA (c)     Row Name 09/14/22 0817          Plan of Care Review    Plan of Care Reviewed With patient  -ARMINDA     Outcome Evaluation PT eval complete. Pt AAOx4. Pt reports that prior to symptom exacerbation around 1 month ago she was ind with all ADLs and mobility. She resides at home with her spouse who currently assists her with ADLs including bathing, cleaning, and cooking. Pt reports she has not walked much this last month. Today, pt was on 15 LPM. She was able to complete STS and bsc to bed t/fs ind w/ supervision. Pt's O2 sats dropped to 67% with bsc to bed t/f. Encoruaged pt to breathe deeply until O2 sats improved to 86%. Pt was ind w/ sit to supine t/f and rolling L and R. LE strength: 5/5. B LE ROM and sensation: WNL. Deferred amb at this time due to O2 sats which is her primary limiting factor for mobility. Overall, pt presents with SOB and poor tolerance to activity. Anticipated d/c home with assist pending pt's medical improvement.  -ARMINDA (r) MF (t) ARMINDA (c)     Row Name 09/14/22 0817          Therapy Assessment/Plan (PT)    Patient/Family Therapy Goals Statement (PT) to go home  -ARMINDA (r) MF (t) ARMINDA (c)     Rehab Potential (PT) good, to achieve stated therapy goals  -ARMINDA (r) MF (t) ARMINDA (c)     Criteria for Skilled Interventions Met (PT) yes;meets criteria;skilled treatment is necessary  -ARMINDA (r) MF (t) ARMINDA (c)     Therapy Frequency (PT) 2 times/day  -ARMINDA (r) MF (t) ARMINDA (c)     Predicted Duration of Therapy Intervention (PT) until d/c  -ARMINDA (r) MF (t) ARMINDA (c)     Row Name 09/14/22 0817          Vital Signs    Pre SpO2 (%) 85  -ARMINDA (r) MF (t) ARMINDA (c)     O2 Delivery Pre Treatment nasal cannula  -ARMINDA (r) YOLANDA (t) ARMINDA (c)     Intra  SpO2 (%) 67   -ARMINDA (r)  ARMINDA (c)     O2 Delivery Intra Treatment nasal cannula  -ARMINDA (r) MF (t) ARMINDA (c)     Post SpO2 (%) 83  -ARMINDA (r) MF (t) ARMINDA (c)     O2 Delivery Post Treatment nasal cannula  -ARMINDA (r) MF (t) ARMINDA (c)     Pre Patient Position Sitting  -ARMINDA (r) MF (t) ARMINDA (c)     Intra Patient Position Standing  -ARMINDA (r) MF (t) ARMINDA (c)     Post Patient Position Supine  -ARMINDA (r) MF (t) ARMINDA (c)     Row Name 09/14/22 0817          Positioning and Restraints    Pre-Treatment Position bedside commode  -ARMINDA (r) MF (t) ARMINDA (c)     Post Treatment Position bed  -ARMINDA (r) MF (t) ARMINDA (c)     In Bed notified nsg;fowlers;call light within reach;encouraged to call for assist;patient within staff view  -ARMINDA (r) MF (t) ARMINDA (c)           User Key  (r) = Recorded By, (t) = Taken By, (c) = Cosigned By    Initials Name Provider Type    [place], PT DPT Physical Therapist    Jing Muse, PT Student PT Student               Outcome Measures     Row Name 09/14/22 0817          How much help from another person do you currently need...    Turning from your back to your side while in flat bed without using bedrails? 4  -ARMINDA (r) MF (t) ARMINDA (c)     Moving from lying on back to sitting on the side of a flat bed without bedrails? 4  -ARMINDA (r) MF (t) ARMINDA (c)     Moving to and from a bed to a chair (including a wheelchair)? 4  -ARMINDA (r) MF (t) ARMINDA (c)     Standing up from a chair using your arms (e.g., wheelchair, bedside chair)? 4  -ARMINDA (r) MF (t) ARMINDA (c)     Climbing 3-5 steps with a railing? 3  -ARMINDA (r) MF (t) ARMINDA (c)     To walk in hospital room? 4  -ARMINDA (r) MF (t) ARMINDA (c)     AM-PAC 6 Clicks Score (PT) 23  -ARMINDA (r) MF (t)     Highest level of mobility 7 --> Walked 25 feet or more  -ARMINDA (r) MF (t)     Row Name 09/14/22 0817          Functional Assessment    Outcome Measure Options AM-PAC 6 Clicks Basic Mobility (PT)  -ARMINDA (r) MF (t) ARMINDA (c)           User Key  (r) = Recorded By, (t) = Taken By, (c) = Cosigned By    Initials Name Provider Type    ARMINDA  [place], PT DPT Physical Therapist     Jing Cagle, PT Student PT Student                             Physical Therapy Education                 Title: PT OT SLP Therapies (In Progress)     Topic: Physical Therapy (In Progress)     Point: Mobility training (Done)     Learning Progress Summary           Patient Acceptance, VERONICA, VU by  at 9/14/2022 0817    Comment: PT frequency, benefits of activity                   Point: Home exercise program (Not Started)     Learner Progress:  Not documented in this visit.          Point: Body mechanics (Not Started)     Learner Progress:  Not documented in this visit.          Point: Precautions (Not Started)     Learner Progress:  Not documented in this visit.                      User Key     Initials Effective Dates Name Provider Type Discipline     08/29/22 -  Jing Cagle, PT Student PT Student PT              PT Recommendation and Plan     Outcome Evaluation: PT eval complete. Pt AAOx4. Pt reports that prior to symptom exacerbation around 1 month ago she was ind with all ADLs and mobility. She resides at home with her spouse who currently assists her with ADLs including bathing, cleaning, and cooking. Pt reports she has not walked much this last month. Today, pt was on 15 LPM. She was able to complete STS and bsc to bed t/fs ind w/ supervision. Pt's O2 sats dropped to 67% with bsc to bed t/f. Encoruaged pt to breathe deeply until O2 sats improved to 86%. Pt was ind w/ sit to supine t/f and rolling L and R. LE strength: 5/5. B LE ROM and sensation: WNL. Deferred amb at this time due to O2 sats which is her primary limiting factor for mobility. Overall, pt presents with SOB and poor tolerance to activity. Anticipated d/c home with assist pending pt's medical improvement.     Time Calculation:    PT Charges     Row Name 09/14/22 0925             Time Calculation    Start Time 0817  -ARMINDA (r) YOLANDA (t) ARMINDA (c)      Stop Time 0910  -ARMINDA (r) YOLANDA (t) ARMINDA  (c)      Time Calculation (min) 53 min  -ARMINDA (r) MF (t)      PT Received On 09/14/22  -ARMINDA (r) MF (t) ARMINDA (c)      PT Goal Re-Cert Due Date 09/24/22  -AMRINDA (r) MF (t) ARMINDA (c)            User Key  (r) = Recorded By, (t) = Taken By, (c) = Cosigned By    Initials Name Provider Type    ARMINDA [place], PT DPT Physical Therapist    Jing Muse, PT Student PT Student                  PT G-Codes  Outcome Measure Options: AM-PAC 6 Clicks Basic Mobility (PT)  AM-PAC 6 Clicks Score (PT): 23    Jing Huerta, PT Student  9/14/2022

## 2022-09-14 NOTE — PLAN OF CARE
Goal Outcome Evaluation:  Plan of Care Reviewed With: patient        Progress: improving  Outcome Evaluation: Tyvaso titrated up per pulm; pt kept in CCU today to monitor her response. After the first and second doses, pt had severe prolonged coughing fits requiring Klonopin and extended recovery. After the third dose she reported she did much better. She eats 50-75% of her meals. She gets up to BSC independently but does require 15LHFNC to do so. Otherwise, she is on her home dose of 8LNC. BP soft post-sildenafil but MAP maintained >65. C/o headache this evening: Tylenol given with relief. GI consult to help pt achieve liver clearance in hopes that [facility] will approve her for a luncg transplant in the coming weeks.

## 2022-09-15 LAB
ANION GAP SERPL CALCULATED.3IONS-SCNC: 9 MMOL/L (ref 5–15)
BUN SERPL-MCNC: 14 MG/DL (ref 8–23)
BUN/CREAT SERPL: 17.7 (ref 7–25)
CALCIUM SPEC-SCNC: 8.7 MG/DL (ref 8.6–10.5)
CHLORIDE SERPL-SCNC: 103 MMOL/L (ref 98–107)
CO2 SERPL-SCNC: 28 MMOL/L (ref 22–29)
CREAT SERPL-MCNC: 0.79 MG/DL (ref 0.57–1)
DEPRECATED RDW RBC AUTO: 42 FL (ref 37–54)
EGFRCR SERPLBLD CKD-EPI 2021: 83.6 ML/MIN/1.73
ERYTHROCYTE [DISTWIDTH] IN BLOOD BY AUTOMATED COUNT: 13.6 % (ref 12.3–15.4)
GLUCOSE SERPL-MCNC: 86 MG/DL (ref 65–99)
HCT VFR BLD AUTO: 38.5 % (ref 34–46.6)
HGB BLD-MCNC: 11.5 G/DL (ref 12–15.9)
MCH RBC QN AUTO: 25.4 PG (ref 26.6–33)
MCHC RBC AUTO-ENTMCNC: 29.9 G/DL (ref 31.5–35.7)
MCV RBC AUTO: 85 FL (ref 79–97)
PLATELET # BLD AUTO: 182 10*3/MM3 (ref 140–450)
PMV BLD AUTO: 11.4 FL (ref 6–12)
POTASSIUM SERPL-SCNC: 3.6 MMOL/L (ref 3.5–5.2)
RBC # BLD AUTO: 4.53 10*6/MM3 (ref 3.77–5.28)
SODIUM SERPL-SCNC: 140 MMOL/L (ref 136–145)
WBC NRBC COR # BLD: 7.41 10*3/MM3 (ref 3.4–10.8)

## 2022-09-15 PROCEDURE — 25010000002 ENOXAPARIN PER 10 MG: Performed by: FAMILY MEDICINE

## 2022-09-15 PROCEDURE — 63710000001 MYCOPHENOLATE MOFETIL PER 250 MG: Performed by: INTERNAL MEDICINE

## 2022-09-15 PROCEDURE — 97530 THERAPEUTIC ACTIVITIES: CPT

## 2022-09-15 PROCEDURE — 25010000002 CEFTRIAXONE PER 250 MG: Performed by: INTERNAL MEDICINE

## 2022-09-15 PROCEDURE — 99233 SBSQ HOSP IP/OBS HIGH 50: CPT | Performed by: INTERNAL MEDICINE

## 2022-09-15 PROCEDURE — 94799 UNLISTED PULMONARY SVC/PX: CPT

## 2022-09-15 PROCEDURE — 25010000002 METHYLPREDNISOLONE PER 40 MG: Performed by: NURSE PRACTITIONER

## 2022-09-15 PROCEDURE — 94664 DEMO&/EVAL PT USE INHALER: CPT

## 2022-09-15 PROCEDURE — 85027 COMPLETE CBC AUTOMATED: CPT | Performed by: FAMILY MEDICINE

## 2022-09-15 PROCEDURE — 25010000002 AZITHROMYCIN PER 500 MG: Performed by: INTERNAL MEDICINE

## 2022-09-15 PROCEDURE — 80048 BASIC METABOLIC PNL TOTAL CA: CPT | Performed by: FAMILY MEDICINE

## 2022-09-15 PROCEDURE — 94761 N-INVAS EAR/PLS OXIMETRY MLT: CPT

## 2022-09-15 RX ORDER — FAMOTIDINE 10 MG/ML
20 INJECTION, SOLUTION INTRAVENOUS EVERY 12 HOURS SCHEDULED
Status: DISCONTINUED | OUTPATIENT
Start: 2022-09-15 | End: 2022-09-15

## 2022-09-15 RX ORDER — FAMOTIDINE 10 MG/ML
20 INJECTION, SOLUTION INTRAVENOUS EVERY 12 HOURS PRN
Status: DISCONTINUED | OUTPATIENT
Start: 2022-09-15 | End: 2022-09-16

## 2022-09-15 RX ADMIN — TREPROSTINIL 32 MCG: 32 INHALANT ORAL at 20:00

## 2022-09-15 RX ADMIN — METHYLPREDNISOLONE SODIUM SUCCINATE 40 MG: 40 INJECTION, POWDER, FOR SOLUTION INTRAMUSCULAR; INTRAVENOUS at 08:00

## 2022-09-15 RX ADMIN — IPRATROPIUM BROMIDE AND ALBUTEROL SULFATE 3 ML: 2.5; .5 SOLUTION RESPIRATORY (INHALATION) at 14:00

## 2022-09-15 RX ADMIN — Medication 10 ML: at 08:01

## 2022-09-15 RX ADMIN — TREPROSTINIL 32 MCG: 32 INHALANT ORAL at 07:35

## 2022-09-15 RX ADMIN — CLONAZEPAM 0.5 MG: 0.5 TABLET ORAL at 21:57

## 2022-09-15 RX ADMIN — TRAZODONE HYDROCHLORIDE 100 MG: 100 TABLET ORAL at 22:53

## 2022-09-15 RX ADMIN — IPRATROPIUM BROMIDE AND ALBUTEROL SULFATE 3 ML: 2.5; .5 SOLUTION RESPIRATORY (INHALATION) at 18:35

## 2022-09-15 RX ADMIN — SILDENAFIL CITRATE 20 MG: 20 TABLET ORAL at 05:21

## 2022-09-15 RX ADMIN — FAMOTIDINE 20 MG: 20 TABLET, FILM COATED ORAL at 17:28

## 2022-09-15 RX ADMIN — ACETAMINOPHEN 650 MG: 325 TABLET ORAL at 17:31

## 2022-09-15 RX ADMIN — MYCOPHENOLATE MOFETIL 1000 MG: 500 TABLET, FILM COATED ORAL at 08:00

## 2022-09-15 RX ADMIN — SILDENAFIL CITRATE 20 MG: 20 TABLET ORAL at 21:57

## 2022-09-15 RX ADMIN — Medication 10 ML: at 21:48

## 2022-09-15 RX ADMIN — SILDENAFIL CITRATE 20 MG: 20 TABLET ORAL at 13:33

## 2022-09-15 RX ADMIN — FAMOTIDINE 20 MG: 10 INJECTION, SOLUTION INTRAVENOUS at 17:47

## 2022-09-15 RX ADMIN — MYCOPHENOLATE MOFETIL 1000 MG: 500 TABLET, FILM COATED ORAL at 21:44

## 2022-09-15 RX ADMIN — FAMOTIDINE 20 MG: 20 TABLET, FILM COATED ORAL at 05:21

## 2022-09-15 RX ADMIN — TREPROSTINIL 32 MCG: 32 INHALANT ORAL at 17:31

## 2022-09-15 RX ADMIN — FUROSEMIDE 10 MG: 20 TABLET ORAL at 08:01

## 2022-09-15 RX ADMIN — CEFTRIAXONE 1 G: 1 INJECTION, POWDER, FOR SOLUTION INTRAMUSCULAR; INTRAVENOUS at 10:19

## 2022-09-15 RX ADMIN — GUAIFENESIN 600 MG: 600 TABLET, EXTENDED RELEASE ORAL at 08:01

## 2022-09-15 RX ADMIN — ENOXAPARIN SODIUM 40 MG: 40 INJECTION SUBCUTANEOUS at 08:01

## 2022-09-15 RX ADMIN — VENLAFAXINE HYDROCHLORIDE 75 MG: 37.5 TABLET ORAL at 08:00

## 2022-09-15 RX ADMIN — GUAIFENESIN 600 MG: 600 TABLET, EXTENDED RELEASE ORAL at 21:44

## 2022-09-15 RX ADMIN — TREPROSTINIL 32 MCG: 32 INHALANT ORAL at 11:07

## 2022-09-15 RX ADMIN — IPRATROPIUM BROMIDE AND ALBUTEROL SULFATE 3 ML: 2.5; .5 SOLUTION RESPIRATORY (INHALATION) at 06:40

## 2022-09-15 RX ADMIN — AZITHROMYCIN DIHYDRATE 500 MG: 500 INJECTION, POWDER, LYOPHILIZED, FOR SOLUTION INTRAVENOUS at 09:13

## 2022-09-15 RX ADMIN — MACITENTAN 10 MG: 10 TABLET, FILM COATED ORAL at 21:44

## 2022-09-15 RX ADMIN — IPRATROPIUM BROMIDE AND ALBUTEROL SULFATE 3 ML: 2.5; .5 SOLUTION RESPIRATORY (INHALATION) at 23:21

## 2022-09-15 NOTE — PLAN OF CARE
Goal Outcome Evaluation:  Plan of Care Reviewed With: patient        Progress: improving  Outcome Evaluation: Patient titrated down to 8L HFNC, AOx4, CSW attempting to reach out to [facility] to transfer patient for transplant review. Patient had increased anxiety related to family medical issues. No c/o pain, no s/s distress noted. SBA to BSC, transfers with dyspnea on exertion. Possible downgrade to floor once able to ambulate more independently per MD. No new orders

## 2022-09-15 NOTE — DISCHARGE PLACEMENT REQUEST
"To: [facility]      From: Syeda Abreu [phone]              Jose Juárez (62 y.o. Female)             Date of Birth   1960    Social Security Number       Address   [address] [place] [address] [address] [place] [address]    Home Phone   [phone]    MRN   [id number]       [facility]    Marital Status                               Admission Date   9/11/22    Admission Type   Emergency    Admitting Provider   Andreas Whiteside MD    Attending Provider   Andreas Whiteside MD    Department, Room/Bed   [facility] CARDIAC CARE, C010/1       Discharge Date       Discharge Disposition       Discharge Destination                               Attending Provider: Andreas Whiteside MD    Allergies: Codeine, Latex    Isolation: None   Infection: None   Code Status: CPR   Advance Care Planning Activity    Ht: 160 cm (63\")   Wt: 79.6 kg (175 lb 7.8 oz)    Admission Cmt: None   Principal Problem: Acute on chronic respiratory failure with hypoxia (HCC) [J96.21]                 Active Insurance as of 9/11/2022     Primary Coverage     Payor Plan Insurance Group Employer/Plan Group    [employer] HMO [id number]     Payor Plan Address Payor Plan Phone Number Payor Plan Fax Number Effective Dates    [address] [phone]  1/1/2022 - None Entered    [place] [address]       Subscriber Name Subscriber Birth Date Member ID       JOSE JUÁREZ 1960 [id number]                 Emergency Contacts      (Rel.) Home Phone Work Phone Mobile Phone    Garrison Juárez (Spouse) [phone] -- [phone]    elodia figueroa (Daughter) [phone] -- --    francesvipul (Daughter) [phone] -- --            Insurance Information                [employer] HMO Phone: [phone]    Subscriber: Jose Juárez Subscriber#: [id number]    Group#: [id number] Precert#: --          "

## 2022-09-15 NOTE — CONSULTS
[facility]    Inpatient Gastroenterology Service    Report of Consultation    Mynor Muller MD, FACP    2022  20:54 CDT    Patient: Elaine Juárez  : 1960  MRN: [id number]  Date of Admission: 2022    Consultation received from: GI Office    Indicated consult urgency: Routine    Consult received: @[phone]        Patient referred for evaluation of liver status  History is obtained from patient and EMR.  The information obtained appears to be reliable..        Chief Complaint  -no GI complaints at present        History of Present Illness  -62 y.o. female presents with ventilatory insufficiency.  She is desirous of pulmonary transplantation, but was declined at [facility] due to her history of liver disease.  She has a history of PBC and fibrosis, and has been well controlled on brandi.        Gastroenterology Review of Systems:  -denies hematemesis, hematochezia, melena, nausea, emesis, diarrhea, constipation, pyrosis, regurgitation, dysphagia, odynophagia, altered bowel habit, change in stool, choluria, acholic stool, or jaundice        Past Medical History:   Diagnosis Date   • Allergies    • Arthritis    • BMI 31.0-31.9,adult 2019   • Calcified granuloma of lung (HCC) 2019    Right lung   • CHF (congestive heart failure) (HCC)     denies   • Chronic respiratory failure with hypoxia (HCC) 2020   • Chronic respiratory failure with hypoxia, on home oxygen therapy (HCC)    • COVID-19 2022   • CREST syndrome (HCC)    • Environmental allergies 2022   • Gastroesophageal reflux disease without esophagitis 2019   • Obstructive sleep apnea on CPAP 2019   • Pulmonary fibrosis (HCC)    • Pulmonary hypertension (HCC)    • Short-term memory loss            Past Surgical History:   Procedure Laterality Date   • BREAST BIOPSY     • CARDIAC CATHETERIZATION N/A 2020    Procedure: Right Heart Cath;  Surgeon: Thong Martin MD;  Location:   [facility] CATH INVASIVE LOCATION;  Service: Cardiology;  Laterality: N/A;   • CHOLECYSTECTOMY     • COLONOSCOPY  05/11/2015    5 POLYPS   • COLONOSCOPY N/A 8/4/2021    Procedure: COLONOSCOPY WITH ANESTHESIA;  Surgeon: Michael Landin DO;  Location: [facility] ENDOSCOPY;  Service: Gastroenterology;  Laterality: N/A;  pre-hx polyps  post-colon polyps   • ENDOSCOPY N/A 8/4/2021    Procedure: ESOPHAGOGASTRODUODENOSCOPY WITH ANESTHESIA;  Surgeon: Michael Landin DO;  Location: [facility] ENDOSCOPY;  Service: Gastroenterology;  Laterality: N/A;  pre-dysphagia  post-normal  pcp-lanette aguila           Past Endoscopy History  -most recent EGD: 2021: normal  -most recent colonoscopy: 2021: polyps (adenomata)        Past Anesthesia/Sedation History  -no report of prior issues with anesthesia or sedation found        Past Procedural History  -prior heart cath (+)        Family History  -no report of family history of: gastric cancer, pancreatic cancer, colorectal cancer, breast cancer, ovarian cancer, uterine cancer, colorectal polyps, inflammatory bowel disease, Crohn's disease, ulcerative colitis, celiac disease, peptic ulcer disease, pancreatitis, hepatitis, cirrhosis        Social History  -  -tobacco: denies  -ethanol: denies  -illicit/recreational substance use: denies        Medications  -see EMR lists  -Home medications of Record:  Macitentan, Treprostinil, clonazePAM, mycophenolate, omeprazole, predniSONE, traZODone, ursodiol, and venlafaxine        Physical Exam  Vital Signs:  Temp:  [96.2 °F (35.7 °C)-97.5 °F (36.4 °C)] 97 °F (36.1 °C)  Heart Rate:  [] 104  Resp:  [14-27] 24  BP: ()/(53-83) 105/61  Flow (L/min):  [8-10] 10    General  -appears stated age  -(+)respiratory distress  -no outstanding physical abnormality    HEENT  -normocephalic  -atraumatic  -no drainage  -no bleeding  -mucosa appears moist     Neurological  -alert  -oriented  -normal speech  -no slowed response  -no tremor  -no obvious  focal deficit  -moves all extremities without obvious abnormality    Psychiatric  -cooperative  -normal mood  -normal affect  -appropriate responses to questions  -no unusual behaviors noted    Skin (evaluation limited to exposed skin)  -dry  -no diaphoresis  -no icterus  -no rash     Neck  -supple  -normal passive ROM  -no JVD    Pulmonary  -increased respiratory effort  -moderate respiratory distress  -no stridor  -(+)accessory muscle engagement    Cardiovascular  -tachycardic rate  -regular rhythm    Abdomen  -benign  -soft  -protuberant        Laboratory of Note  -see EMR        Imaging of Note  -see EMR          Assessment  -primary biliary cholangitis (PBC) with fibrosis.  Serum liver enzyme levels and liver function parameters are grossly normal at this time.  Her laboratory findings are not suggestive of cirrhosis.  While she does not appear to have ESLD, calculating her scores as though she did suggests a good prognosis:  · MELD = 6 (~1.9% 3 month mortality risk)  · Chlid-Thomas = A  -no clinical evidence of adverse progression of PBC.  In fact, her laboratory parameters are improved over her historical levels.  -this patient is compliant and motivated        Recommendations  -continue brandi  -no invasive studies as regards hepatic status are warranted at this time  -consider annual ultrasound and AFP  -follow-up visit with Dr. Landin's office as previously scheduled  -please re-consult inpatient GI service prn          Thank you for allowing us to participate in the care of this patient.    Sincerely,    SONY Muller MD, [facility]  [facility] Inpatient Gastroenterology Service

## 2022-09-15 NOTE — SIGNIFICANT NOTE
I received call from Dr. Chinyere Ceballos at [facility].  They have declined the patient for transfer for evaluation of her acute condition, and on the basis of what they have reviewed from her referral from [place], they have declined to evaluate her for transplant also.  She did not have other advice for alternate centers to send her to other than [facility], which she indicated would probably decline given hx reflux, and [facility] in [place], about which she gave no additional information.    Electronically signed by Faisal Griffith MD, 9/15/2022, 13:36 CDT

## 2022-09-15 NOTE — CASE MANAGEMENT/SOCIAL WORK
Continued Stay Note   [place]     Patient Name: Elaine Juárez  MRN: [id number]  Today's Date: 9/15/2022    Admit Date: 9/11/2022     Discharge Plan     Row Name 09/15/22 1050       Plan    Plan FREDI has contacted [facility] transfer center to start transfer process [phone].  Provider will contact Dr. Griffith directly.               Discharge Codes    No documentation.               Expected Discharge Date and Time     Expected Discharge Date Expected Discharge Time    Sep 16, 2022             NIDIA Gibbs

## 2022-09-15 NOTE — PROGRESS NOTES
[facility] Medicine Services  INPATIENT PROGRESS NOTE    Length of Stay: 4  Date of Admission: 9/11/2022  Primary Care Physician: Aaron Stoddard MD    Subjective   Chief Complaint: Respiratory failure hypercapnia hypoxia/pulmonary fibrosis/right heart failure    HPI   Patient is currently on 7 L of oxygen.  Afebrile, blood pressure stable, slightly tacky.    Review of Systems   Constitutional: Positive for activity change, appetite change and fatigue. Negative for chills and fever.   HENT: Negative for hearing loss, nosebleeds, tinnitus and trouble swallowing.    Eyes: Negative for visual disturbance.   Respiratory: Positive for cough, shortness of breath and wheezing. Negative for chest tightness.    Cardiovascular: Negative for chest pain, palpitations and leg swelling.   Gastrointestinal: Negative for abdominal distention, abdominal pain, blood in stool, constipation, diarrhea, nausea and vomiting.   Endocrine: Negative for cold intolerance, heat intolerance, polydipsia, polyphagia and polyuria.   Genitourinary: Negative for decreased urine volume, difficulty urinating, dysuria, flank pain, frequency and hematuria.   Musculoskeletal: Negative for arthralgias, joint swelling and myalgias.   Skin: Negative for rash.   Allergic/Immunologic: Negative for immunocompromised state.   Neurological: Positive for weakness. Negative for dizziness, syncope, light-headedness and headaches.   Hematological: Negative for adenopathy. Does not bruise/bleed easily.   Psychiatric/Behavioral: Negative for confusion and sleep disturbance. The patient is not nervous/anxious.         All pertinent negatives and positives are as above. All other systems have been reviewed and are negative unless otherwise stated.     Objective    Temp:  [97 °F (36.1 °C)-98 °F (36.7 °C)] 97.9 °F (36.6 °C)  Heart Rate:  [] 113  Resp:  [18-24] 20  BP: ()/(33-97) 117/77    Intake/Output Summary (Last 24  hours) at 9/15/2022 1602  Last data filed at 9/15/2022 1338  Gross per 24 hour   Intake 1200 ml   Output 1400 ml   Net -200 ml     Physical Exam  Vitals and nursing note reviewed.   Constitutional:       Comments: Chronically ill.   HENT:      Head: Normocephalic.   Eyes:      Conjunctiva/sclera: Conjunctivae normal.      Pupils: Pupils are equal, round, and reactive to light.   Neck:      Vascular: No JVD.   Cardiovascular:      Rate and Rhythm: Normal rate and regular rhythm.      Heart sounds: Normal heart sounds.   Pulmonary:      Effort: No respiratory distress.      Breath sounds: No wheezing or rales.      Comments: On high flow oxygen.  Diminished breath bilateral, clear.  Chest:      Chest wall: No tenderness.   Abdominal:      General: Bowel sounds are normal. There is no distension.      Palpations: Abdomen is soft.      Tenderness: There is no abdominal tenderness.   Musculoskeletal:         General: No tenderness or deformity.      Cervical back: Neck supple.   Skin:     General: Skin is warm and dry.      Capillary Refill: Capillary refill takes 2 to 3 seconds.      Findings: No rash.   Neurological:      Mental Status: She is alert and oriented to person, place, and time.      Cranial Nerves: No cranial nerve deficit.      Motor: Weakness present. No abnormal muscle tone.      Deep Tendon Reflexes: Reflexes normal.   Psychiatric:         Mood and Affect: Mood normal.         Behavior: Behavior normal.         Thought Content: Thought content normal.      Results Review:  Lab Results (last 24 hours)     Procedure Component Value Units Date/Time    Basic Metabolic Panel [[id number]]  (Normal) Collected: 09/15/22 0330    Specimen: Blood Updated: 09/15/22 0433     Glucose 86 mg/dL      BUN 14 mg/dL      Creatinine 0.79 mg/dL      Sodium 140 mmol/L      Potassium 3.6 mmol/L      Chloride 103 mmol/L      CO2 28.0 mmol/L      Calcium 8.7 mg/dL      BUN/Creatinine Ratio 17.7     Anion Gap 9.0 mmol/L      eGFR  83.6 mL/min/1.73      Comment: National Kidney Foundation and American Society of Nephrology (ASN) Task Force recommended calculation based on the Chronic Kidney Disease Epidemiology Collaboration (CKD-EPI) equation refit without adjustment for race.       Narrative:      GFR Normal >60  Chronic Kidney Disease <60  Kidney Failure <15      CBC (No Diff) [[id number]]  (Abnormal) Collected: 09/15/22 0330    Specimen: Blood Updated: 09/15/22 0424     WBC 7.41 10*3/mm3      RBC 4.53 10*6/mm3      Hemoglobin 11.5 g/dL      Hematocrit 38.5 %      MCV 85.0 fL      MCH 25.4 pg      MCHC 29.9 g/dL      RDW 13.6 %      RDW-SD 42.0 fl      MPV 11.4 fL      Platelets 182 10*3/mm3     Blood Culture - Blood, Arm, Right [[id number]]  (Normal) Collected: 09/11/22 1858    Specimen: Blood from Arm, Right Updated: 09/14/22 1947     Blood Culture No growth at 3 days           Cultures:  Blood Culture   Date Value Ref Range Status   09/11/2022 Staphylococcus, coagulase negative (C)  Final   09/11/2022 No growth at 3 days  Preliminary       Radiology Data:    Imaging Results (Last 24 Hours)     ** No results found for the last 24 hours. **          Allergies   Allergen Reactions   • Codeine Nausea And Vomiting   • Latex Rash       Scheduled meds:   azithromycin, 500 mg, Intravenous, Q24H  cefTRIAXone, 1 g, Intravenous, Q24H  enoxaparin, 40 mg, Subcutaneous, Daily  famotidine, 20 mg, Oral, BID AC  furosemide, 10 mg, Oral, Daily  guaiFENesin, 600 mg, Oral, Q12H  ipratropium-albuterol, 3 mL, Nebulization, Q6H - RT  Macitentan, 10 mg, Oral, Nightly  methylPREDNISolone sodium succinate, 40 mg, Intravenous, Daily  mycophenolate, 1,000 mg, Oral, BID  sildenafil, 20 mg, Oral, Q8H  sodium chloride, 10 mL, Intravenous, Q12H  Treprostinil, 32 mcg, Inhalation, 4x Daily  venlafaxine, 75 mg, Oral, Daily        PRN meds:  •  acetaminophen **OR** acetaminophen  •  albuterol  •  busPIRone  •  clonazePAM  •  guaiFENesin-dextromethorphan  •  melatonin  •   ondansetron  •  Pharmacy Consult - Pharmacy to dose  •  sodium chloride  •  traZODone    Assessment/Plan       Acute on chronic respiratory failure with hypoxia (HCC)    CREST syndrome, partial     Raynaud's phenomenon    Pulmonary fibrosis prob sec underlying autoimmune disease    PBC (primary biliary cirrhosis)    Obstructive sleep apnea on CPAP    PAH (pulmonary arterial hypertension) with portal hypertension (HCC)    Hypokalemia    Acute-on-chronic respiratory failure (HCC)    Obesity (BMI 30-39.9)      Plan:  Pulmonary fibrosis/pulmonary hypertension/chronic respiratory failure hypoxia/obstructive sleep apnea.  Patient is on chronic 8 L of oxygen at home.  Patient is on chronic CPAP at home.  On Tyvaso.  Patient follows Dr. Griffith outpatient.  Patient was declined for lung transplant at [place] and currently waiting for interview at [facility].  DuoNebs 4 times daily.  Albuterol every 4 hours as needed.  Pulmicort nebs twice daily.  Solu-Medrol twice daily.    Robitussin as needed.  Pulmonary consult.  Azithromycin . Rocephin.  Mucinex.  DuoNebs.  Sildenafil. Tyvaso.   CTA of the chest-No pulmonary embolism, Stable appearance of chronic lung changes.  Chest x-ray-Mild cardiac enlargement and mild diffuse infiltrate for which edema  is favored.  On 7 L high flow.  From Dr. Griffith's note-patient was declined from [facility] for transplant.     Right-sided heart failure.  BNP 7400.  Troponin negative.  Pulmonary hypertension.    Cardiology consult.  Echocardiogram- ejection fraction 56 to 60%, diastolic dysfunction grade 1, right ventricle cavity severely dilated, severe reduced right ventricle systolic function, right atrial cavity severely dilated, tricuspid regurgitation, moderate to severe pulm hypertension, small less than 1 cm pericardial effusion-fluid-filled- no evidence of cardiac tamponade.     Hypokalemia.  Resolved.  Magnesium-normal.     History of crest syndrome.  Patient follow with  rheumatology outpatient. On CellCept and prednisone.     Insomnia/depression/anxiety.  Trazodone at night.  Effexor.  BuSpar as needed.  Clonazepam as needed.  Melatonin at night as needed.     Reflux.  Pepcid.  Zofran as needed.     Lovenox prophylaxis.     Nutrition.  Regular diet.     Respiratory panel-negative.  Blood culture 1 out of 2 dolyqxjgl-uhrabjwa-wjxfzivo contamination..  COVID-19-negative.  MRSA-negative.     Discharge Planning: 3 to 6 days    Electronically signed by Andreas Whiteside MD, 09/15/22, 4:02 PM CDT.

## 2022-09-15 NOTE — SIGNIFICANT NOTE
09/15/22 1031   Oxygen Therapy   SpO2 100 %   Pulse Oximetry Type Continuous   Device (Oxygen Therapy) high-flow nasal cannula;humidified   Flow (L/min) 8

## 2022-09-15 NOTE — PLAN OF CARE
Goal Outcome Evaluation:           Progress: improving  Outcome Evaluation: No acute events overnight. Pt still on 10L NC. Still alert and oriented. Slept majority of the night. VSS. All safety measures maintained.

## 2022-09-15 NOTE — PROGRESS NOTES
"    PULMONARY AND CRITICAL CARE PROGRESS NOTE - [facility]    Patient: Elaine Juárez  1960   MR# [id number]   Acct# [id number]  09/15/22   08:43 CDT  Referring Provider: Andreas Whiteside MD    Chief Complaint: acute/chronic respiratory failure with hypoxemia     Interval history: The patient is resting in bed with O2 sat 98% on 8L HFNC.  Decreased O2 to 7L.  The patient is tearful today as one of her family members has had a \"heart attack\" and is currently hospitalized at [facility].  Dr. Griffith discussed possible transfer to [facility].  The patient is agreeable.  Case management request placed.  Tyvaso has been increased.  No other aggravating or alleviating factors.     Meds:  azithromycin, 500 mg, Intravenous, Q24H  cefTRIAXone, 1 g, Intravenous, Q24H  enoxaparin, 40 mg, Subcutaneous, Daily  famotidine, 20 mg, Oral, BID AC  furosemide, 10 mg, Oral, Daily  guaiFENesin, 600 mg, Oral, Q12H  ipratropium-albuterol, 3 mL, Nebulization, Q6H - RT  Macitentan, 10 mg, Oral, Nightly  methylPREDNISolone sodium succinate, 40 mg, Intravenous, Daily  mycophenolate, 1,000 mg, Oral, BID  sildenafil, 20 mg, Oral, Q8H  sodium chloride, 10 mL, Intravenous, Q12H  Treprostinil, 32 mcg, Inhalation, 4x Daily  venlafaxine, 75 mg, Oral, Daily      Pharmacy Consult - Pharmacy to dose,       Review of Systems:   Review of Systems   Constitutional: Positive for fatigue. Negative for chills and fever.   Respiratory: Positive for shortness of breath.    Cardiovascular: Negative for chest pain.   Gastrointestinal: Negative for vomiting.   Neurological: Positive for weakness.     Physical Exam:  SpO2 Percentage    09/15/22 0600 09/15/22 0640 09/15/22 0646   SpO2: (!) 81% 90% 96%     Body mass index is 31.09 kg/m².   Temp:  [97 °F (36.1 °C)-97.7 °F (36.5 °C)] 97.7 °F (36.5 °C)  Heart Rate:  [] 78  Resp:  [14-24] 18  BP: ()/(33-97) 97/54    Intake/Output Summary (Last 24 hours) at 9/15/2022 0843  Last data filed " at 9/15/2022 0400  Gross per 24 hour   Intake 830 ml   Output 550 ml   Net 280 ml     Physical Exam  Vitals reviewed.   Constitutional:       Appearance: She is well-developed. She is obese.      Interventions: Nasal cannula in place.      Comments: HFNC   HENT:      Head: Normocephalic and atraumatic.   Eyes:      General: No scleral icterus.     Conjunctiva/sclera: Conjunctivae normal.      Pupils: Pupils are equal, round, and reactive to light.   Cardiovascular:      Rate and Rhythm: Normal rate. Rhythm irregular.   Pulmonary:      Breath sounds: Decreased breath sounds present.   Musculoskeletal:         General: Normal range of motion.      Cervical back: Normal range of motion and neck supple.   Skin:     General: Skin is warm and dry.   Neurological:      Mental Status: She is alert and oriented to person, place, and time.   Psychiatric:         Behavior: Behavior normal.       Electronically signed by ANASTASIA Leigh, 9/15/2022, 08:43 CDT        Physician Substantive Portion: Medical Decision Making    Laboratory Data:  Results from last 7 days   Lab Units 09/15/22  0330 09/14/22  0350 09/13/22  0330   WBC 10*3/mm3 7.41 8.61 7.69   HEMOGLOBIN g/dL 11.5* 10.8* 11.2*   PLATELETS 10*3/mm3 182 182 184     Results from last 7 days   Lab Units 09/15/22  0330 09/14/22  0350 09/13/22  0330   SODIUM mmol/L 140 139 144   POTASSIUM mmol/L 3.6 4.3 3.9   BUN mg/dL 14 15 13   CREATININE mg/dL 0.79 0.74 0.77     Results from last 7 days   Lab Units 09/11/22  2218   PH, ARTERIAL pH units 7.430   PCO2, ARTERIAL mm Hg 36.5   PO2 ART mm Hg 65.4*     Blood Culture   Date Value Ref Range Status   09/11/2022 Staphylococcus, coagulase negative (C)  Final   09/11/2022 No growth at 24 hours  Preliminary     Recent films:  No radiology results from the last 24 hrs     My radiograph interpretation/independent review of imaging: no films today    Pulmonary Assessment:    1. Acute on chronic resp failure with hypoxia, life  threatening  2. CREST  3. Pulmonary hypertension, severe despite muli-agent tx.    Recommend/plan:   · Continue oxygen high flow  · Continue tyvaso 32 mcg  · Continue sildenafil  · [facility] eval;   May be best to transfer to there if possible for reevaluation of pulmonary hypertension, potential need for prostacyclin infusion, and expedite evaluation for transplant concurrently; case management facilitation appreciated  · Continue steroids and slowly taper for any component that ILD is contributing to acute problems.    This visit was performed by both a physician and an Advanced Practice RN.  I personally evaluated and examined the patient.  I performed all aspects of the medical decision making as documented.  Electronically signed by Faisal Griffith MD, 9/15/2022, 10:11 CDT

## 2022-09-15 NOTE — PLAN OF CARE
Goal Outcome Evaluation:         Patient is independent in bed mobility. Supervision for sit to stand and to tr to BSC. Sats on 8lpm in low 90's to mid 80's during sitting ex's and while marching in place. We did work on slow deep breathing during activities. After tr to BSC dropped to 80%.

## 2022-09-16 LAB
ANION GAP SERPL CALCULATED.3IONS-SCNC: 8 MMOL/L (ref 5–15)
BACTERIA SPEC AEROBE CULT: NORMAL
BUN SERPL-MCNC: 13 MG/DL (ref 8–23)
BUN/CREAT SERPL: 17.3 (ref 7–25)
CALCIUM SPEC-SCNC: 8.7 MG/DL (ref 8.6–10.5)
CHLORIDE SERPL-SCNC: 103 MMOL/L (ref 98–107)
CO2 SERPL-SCNC: 29 MMOL/L (ref 22–29)
CREAT SERPL-MCNC: 0.75 MG/DL (ref 0.57–1)
DEPRECATED RDW RBC AUTO: 40.7 FL (ref 37–54)
EGFRCR SERPLBLD CKD-EPI 2021: 89 ML/MIN/1.73
ERYTHROCYTE [DISTWIDTH] IN BLOOD BY AUTOMATED COUNT: 13.7 % (ref 12.3–15.4)
GLUCOSE SERPL-MCNC: 104 MG/DL (ref 65–99)
HCT VFR BLD AUTO: 36.1 % (ref 34–46.6)
HGB BLD-MCNC: 11.5 G/DL (ref 12–15.9)
MCH RBC QN AUTO: 26.3 PG (ref 26.6–33)
MCHC RBC AUTO-ENTMCNC: 31.9 G/DL (ref 31.5–35.7)
MCV RBC AUTO: 82.4 FL (ref 79–97)
PLATELET # BLD AUTO: 197 10*3/MM3 (ref 140–450)
PMV BLD AUTO: 11.2 FL (ref 6–12)
POTASSIUM SERPL-SCNC: 3.6 MMOL/L (ref 3.5–5.2)
RBC # BLD AUTO: 4.38 10*6/MM3 (ref 3.77–5.28)
SODIUM SERPL-SCNC: 140 MMOL/L (ref 136–145)
WBC NRBC COR # BLD: 7.76 10*3/MM3 (ref 3.4–10.8)

## 2022-09-16 PROCEDURE — 80048 BASIC METABOLIC PNL TOTAL CA: CPT | Performed by: FAMILY MEDICINE

## 2022-09-16 PROCEDURE — 94799 UNLISTED PULMONARY SVC/PX: CPT

## 2022-09-16 PROCEDURE — 85027 COMPLETE CBC AUTOMATED: CPT | Performed by: FAMILY MEDICINE

## 2022-09-16 PROCEDURE — 25010000002 CEFTRIAXONE PER 250 MG: Performed by: INTERNAL MEDICINE

## 2022-09-16 PROCEDURE — 25010000002 ENOXAPARIN PER 10 MG: Performed by: FAMILY MEDICINE

## 2022-09-16 PROCEDURE — 94761 N-INVAS EAR/PLS OXIMETRY MLT: CPT

## 2022-09-16 PROCEDURE — 25010000002 AZITHROMYCIN PER 500 MG: Performed by: INTERNAL MEDICINE

## 2022-09-16 PROCEDURE — 97110 THERAPEUTIC EXERCISES: CPT

## 2022-09-16 PROCEDURE — 99232 SBSQ HOSP IP/OBS MODERATE 35: CPT | Performed by: INTERNAL MEDICINE

## 2022-09-16 PROCEDURE — 25010000002 METHYLPREDNISOLONE PER 40 MG: Performed by: NURSE PRACTITIONER

## 2022-09-16 PROCEDURE — 97116 GAIT TRAINING THERAPY: CPT

## 2022-09-16 PROCEDURE — 63710000001 MYCOPHENOLATE MOFETIL PER 250 MG: Performed by: INTERNAL MEDICINE

## 2022-09-16 PROCEDURE — 94664 DEMO&/EVAL PT USE INHALER: CPT

## 2022-09-16 PROCEDURE — 63710000001 MYCOPHENOLATE MOFETIL PER 250 MG: Performed by: FAMILY MEDICINE

## 2022-09-16 RX ORDER — FAMOTIDINE 20 MG/1
20 TABLET, FILM COATED ORAL EVERY 12 HOURS PRN
Status: DISCONTINUED | OUTPATIENT
Start: 2022-09-16 | End: 2022-09-16

## 2022-09-16 RX ORDER — CALCIUM CARBONATE 200(500)MG
2 TABLET,CHEWABLE ORAL 3 TIMES DAILY PRN
Status: DISCONTINUED | OUTPATIENT
Start: 2022-09-16 | End: 2022-09-17 | Stop reason: HOSPADM

## 2022-09-16 RX ORDER — URSODIOL 300 MG/1
300 CAPSULE ORAL 2 TIMES DAILY
Status: DISCONTINUED | OUTPATIENT
Start: 2022-09-16 | End: 2022-09-17 | Stop reason: HOSPADM

## 2022-09-16 RX ORDER — FAMOTIDINE 20 MG/1
20 TABLET, FILM COATED ORAL
Status: DISCONTINUED | OUTPATIENT
Start: 2022-09-16 | End: 2022-09-17

## 2022-09-16 RX ADMIN — CLONAZEPAM 0.5 MG: 0.5 TABLET ORAL at 21:48

## 2022-09-16 RX ADMIN — TREPROSTINIL 32 MCG: 32 INHALANT ORAL at 08:08

## 2022-09-16 RX ADMIN — IPRATROPIUM BROMIDE AND ALBUTEROL SULFATE 3 ML: 2.5; .5 SOLUTION RESPIRATORY (INHALATION) at 23:57

## 2022-09-16 RX ADMIN — CEFTRIAXONE 1 G: 1 INJECTION, POWDER, FOR SOLUTION INTRAMUSCULAR; INTRAVENOUS at 10:13

## 2022-09-16 RX ADMIN — TREPROSTINIL 32 MCG: 32 INHALANT ORAL at 21:36

## 2022-09-16 RX ADMIN — Medication 10 ML: at 21:34

## 2022-09-16 RX ADMIN — MACITENTAN 10 MG: 10 TABLET, FILM COATED ORAL at 21:34

## 2022-09-16 RX ADMIN — SILDENAFIL CITRATE 20 MG: 20 TABLET ORAL at 21:34

## 2022-09-16 RX ADMIN — GUAIFENESIN 600 MG: 600 TABLET, EXTENDED RELEASE ORAL at 08:08

## 2022-09-16 RX ADMIN — URSODIOL 300 MG: 300 CAPSULE ORAL at 21:33

## 2022-09-16 RX ADMIN — TRAZODONE HYDROCHLORIDE 100 MG: 100 TABLET ORAL at 22:58

## 2022-09-16 RX ADMIN — IPRATROPIUM BROMIDE AND ALBUTEROL SULFATE 3 ML: 2.5; .5 SOLUTION RESPIRATORY (INHALATION) at 21:01

## 2022-09-16 RX ADMIN — TREPROSTINIL 32 MCG: 32 INHALANT ORAL at 17:24

## 2022-09-16 RX ADMIN — VENLAFAXINE HYDROCHLORIDE 75 MG: 37.5 TABLET ORAL at 08:08

## 2022-09-16 RX ADMIN — ENOXAPARIN SODIUM 40 MG: 40 INJECTION SUBCUTANEOUS at 08:09

## 2022-09-16 RX ADMIN — FUROSEMIDE 10 MG: 20 TABLET ORAL at 08:08

## 2022-09-16 RX ADMIN — METHYLPREDNISOLONE SODIUM SUCCINATE 40 MG: 40 INJECTION, POWDER, FOR SOLUTION INTRAMUSCULAR; INTRAVENOUS at 08:08

## 2022-09-16 RX ADMIN — GUAIFENESIN 600 MG: 600 TABLET, EXTENDED RELEASE ORAL at 21:34

## 2022-09-16 RX ADMIN — TREPROSTINIL 32 MCG: 32 INHALANT ORAL at 11:04

## 2022-09-16 RX ADMIN — IPRATROPIUM BROMIDE AND ALBUTEROL SULFATE 3 ML: 2.5; .5 SOLUTION RESPIRATORY (INHALATION) at 06:55

## 2022-09-16 RX ADMIN — IPRATROPIUM BROMIDE AND ALBUTEROL SULFATE 3 ML: 2.5; .5 SOLUTION RESPIRATORY (INHALATION) at 14:00

## 2022-09-16 RX ADMIN — AZITHROMYCIN DIHYDRATE 500 MG: 500 INJECTION, POWDER, LYOPHILIZED, FOR SOLUTION INTRAVENOUS at 10:13

## 2022-09-16 RX ADMIN — SILDENAFIL CITRATE 20 MG: 20 TABLET ORAL at 05:30

## 2022-09-16 RX ADMIN — MYCOPHENOLATE MOFETIL 1000 MG: 500 TABLET, FILM COATED ORAL at 08:09

## 2022-09-16 RX ADMIN — SILDENAFIL CITRATE 20 MG: 20 TABLET ORAL at 13:35

## 2022-09-16 RX ADMIN — Medication 3 MG: at 21:48

## 2022-09-16 RX ADMIN — Medication 10 ML: at 08:09

## 2022-09-16 RX ADMIN — MYCOPHENOLATE MOFETIL 1000 MG: 500 TABLET, FILM COATED ORAL at 21:34

## 2022-09-16 NOTE — PLAN OF CARE
Goal Outcome Evaluation:  Plan of Care Reviewed With: patient        Progress: improving  Outcome Evaluation: Pt reports having a headache this morning, but breathing much better. Pt. was Independent with bed mobility and supervision for transfers, including toileting. Pt. participated with LE ex's and walked 40' in room with SBA. Pt's O2 sat dropped to 75% while on 4L following walk. Recovery time was about 4 minutes-up to 92%. Pt. very pleased with her ability this morning. Will continue to work on endurance and activity tolerance.

## 2022-09-16 NOTE — THERAPY TREATMENT NOTE
Acute Care - Physical Therapy Treatment Note  [facility]     Patient Name: Elaine Juárez  : 1960  MRN: [id number]  Today's Date: 2022      Visit Dx:     ICD-10-CM ICD-9-CM   1. Hypoxia  R09.02 799.02   2. Impaired mobility  Z74.09 799.89     Patient Active Problem List   Diagnosis   • CREST syndrome, partial    • Raynaud's phenomenon   • Pulmonary fibrosis prob sec underlying autoimmune disease   • PBC (primary biliary cirrhosis)   • Gastroesophageal reflux disease without esophagitis   • BMI 31.0-31.9,adult   • Obstructive sleep apnea on CPAP   • Chronic respiratory failure with hypoxia (HCC)   • History of adenomatous polyp of colon   • Globus sensation   • Environmental allergies   • PAH (pulmonary arterial hypertension) with portal hypertension (HCC)   • Hypokalemia   • Panic attack   • Paranoia (psychosis) ([facility])   • H/O noncompliance with medical treatment, presenting hazards to health   • Bipolar affective disorder, currently manic, moderate ([facility]), suspected   • Acute on chronic respiratory failure with hypoxia ([facility])   • Acute-on-chronic respiratory failure ([facility])   • Obesity (BMI 30-39.9)     Past Medical History:   Diagnosis Date   • Allergies    • Arthritis    • BMI 31.0-31.9,adult 2019   • Calcified granuloma of lung (HCC) 2019    Right lung   • CHF (congestive heart failure) ([facility])     denies   • Chronic respiratory failure with hypoxia (HCC) 2020   • Chronic respiratory failure with hypoxia, on home oxygen therapy ([facility])    • COVID-19 2022   • CREST syndrome (HCC)    • Environmental allergies 2022   • Gastroesophageal reflux disease without esophagitis 2019   • Obstructive sleep apnea on CPAP 2019   • Pulmonary fibrosis (HCC)    • Pulmonary hypertension (HCC)    • Short-term memory loss      Past Surgical History:   Procedure Laterality Date   • BREAST BIOPSY     • CARDIAC CATHETERIZATION N/A 2020    Procedure: Right Heart Cath;  Surgeon:  Thong Martin MD;  Location: [facility] CATH INVASIVE LOCATION;  Service: Cardiology;  Laterality: N/A;   • CHOLECYSTECTOMY     • COLONOSCOPY  05/11/2015    5 POLYPS   • COLONOSCOPY N/A 8/4/2021    Procedure: COLONOSCOPY WITH ANESTHESIA;  Surgeon: Michael Landin DO;  Location: [facility] ENDOSCOPY;  Service: Gastroenterology;  Laterality: N/A;  pre-hx polyps  post-colon polyps   • ENDOSCOPY N/A 8/4/2021    Procedure: ESOPHAGOGASTRODUODENOSCOPY WITH ANESTHESIA;  Surgeon: Michael Landin DO;  Location: [facility] ENDOSCOPY;  Service: Gastroenterology;  Laterality: N/A;  pre-dysphagia  post-normal  pcp-lanette aguila     PT Assessment (last 12 hours)     PT Evaluation and Treatment     Row Name 09/16/22 1025          Physical Therapy Time and Intention    Subjective Information complains of;dyspnea;pain  -     Document Type therapy note (daily note)  -     Mode of Treatment physical therapy  -     Row Name 09/16/22 1025          General Information    Existing Precautions/Restrictions fall;oxygen therapy device and L/min  -     Row Name 09/16/22 1025          Pain    Pretreatment Pain Rating 5/10  -     Posttreatment Pain Rating 5/10  -     Pain Location - head  -     Pain Intervention(s) Repositioned;Ambulation/increased activity  -     Row Name 09/16/22 1025          Bed Mobility    Supine-Sit [place] (Bed Mobility) independent  -     Row Name 09/16/22 1025          Transfers    Bed-Chair [place] (Transfers) supervision  -     Sit-Stand [place] (Transfers) supervision  -     Stand-Sit [place] (Transfers) supervision  -     [place] Level (Toilet Transfer) supervision  -     Assistive Device (Toilet Transfer) commode, bedside without drop arms  -     Row Name 09/16/22 1025          Toilet Transfer    Type (Toilet Transfer) sit-stand;stand-sit  -     Row Name 09/16/22 1025          Gait/Stairs (Locomotion)    [place] Level (Gait) standby assist  -     Distance  in Feet (Gait) 40  ambulated 2 laps in room  -     Deviations/Abnormal Patterns (Gait) stride length decreased;santa decreased  -     Row Name 09/16/22 1025          Hip (Therapeutic Exercise)    Hip (Therapeutic Exercise) AROM (active range of motion)  -     Hip AROM (Therapeutic Exercise) bilateral;flexion;sitting;15 repititions  -     Row Name 09/16/22 1025          Knee (Therapeutic Exercise)    Knee (Therapeutic Exercise) AROM (active range of motion)  -     Knee AROM (Therapeutic Exercise) bilateral;LAQ (long arc quad);sitting;15 repititions  -     Row Name 09/16/22 1025          Ankle (Therapeutic Exercise)    Ankle (Therapeutic Exercise) AROM (active range of motion)  -     Ankle AROM (Therapeutic Exercise) bilateral;dorsiflexion;plantarflexion;sitting;15 repititions  -     Row Name 09/16/22 1025          Plan of Care Review    Plan of Care Reviewed With patient  -     Progress improving  -     Outcome Evaluation Pt reports having a headache this morning, but breathing much better. Pt. was Independent with bed mobility and supervision for transfers, including toileting. Pt. participated with LE ex's and walked 40' in room with SBA. Pt's O2 sat dropped to 75% while on 4L following walk. Recovery time was about 4 minutes-up to 92%. Pt. very pleased with her ability this morning. Will continue to work on endurance and activity tolerance.  -     Row Name 09/16/22 1025          Vital Signs    Pre SpO2 (%) 95  -MF     O2 Delivery Pre Treatment supplemental O2  4l  -MF     Intra SpO2 (%) 75  -MF     O2 Delivery Intra Treatment supplemental O2  4l  -MF     Post SpO2 (%) 92  -MF     O2 Delivery Post Treatment supplemental O2  4l  -MF     Pre Patient Position Sitting  -     Intra Patient Position Standing  -     Post Patient Position Sitting  -     Row Name 09/16/22 1025          Positioning and Restraints    Pre-Treatment Position in bed  -     Post Treatment Position chair  -     In  Chair reclined;call light within reach;encouraged to call for assist  -           User Key  (r) = Recorded By, (t) = Taken By, (c) = Cosigned By    Initials Name Provider Type     Dafne Licona, ANDER Physical Therapist Assistant                Physical Therapy Education                 Title: PT OT SLP Therapies (Done)     Topic: Physical Therapy (Done)     Point: Mobility training (Done)     Learning Progress Summary           Patient Acceptance, E,D, VU,NR by  at 9/16/2022 1106    Comment: pursed lip breathing    Acceptance, E, VU by [facility] at 9/14/2022 0817    Comment: PT frequency, benefits of activity                   Point: Home exercise program (Done)     Learning Progress Summary           Patient Acceptance, E,D, VU,NR by  at 9/16/2022 1106    Comment: pursed lip breathing    Acceptance, E,D, DU,NR by  at 9/15/2022 1555    Comment: Slow deep breathing                   Point: Body mechanics (Done)     Learning Progress Summary           Patient Acceptance, E,D, VU,NR by  at 9/16/2022 1106    Comment: pursed lip breathing                   Point: Precautions (Done)     Learning Progress Summary           Patient Acceptance, E,D, VU,NR by  at 9/16/2022 1106    Comment: pursed lip breathing                               User Key     Initials Effective Dates Name Provider Type Discipline     08/02/16 -  Jacinta Craven, PTA Physical Therapist Assistant PT     06/16/21 -  Dafne Licona PTA Physical Therapist Assistant PT    [facility] 08/29/22 -  Jing Cagle, PT Student PT Student PT              PT Recommendation and Plan     Plan of Care Reviewed With: patient  Progress: improving  Outcome Evaluation: Pt reports having a headache this morning, but breathing much better. Pt. was Independent with bed mobility and supervision for transfers, including toileting. Pt. participated with LE ex's and walked 40' in room with SBA. Pt's O2 sat dropped to 75% while on 4L following walk.  Recovery time was about 4 minutes-up to 92%. Pt. very pleased with her ability this morning. Will continue to work on endurance and activity tolerance.   Outcome Measures     Row Name 09/16/22 1025 09/15/22 1500          How much help from another person do you currently need...    Turning from your back to your side while in flat bed without using bedrails? 4  -MF 4  -JABARI     Moving from lying on back to sitting on the side of a flat bed without bedrails? 4  -MF 4  -JABARI     Moving to and from a bed to a chair (including a wheelchair)? 4  -MF 4  -JABARI     Standing up from a chair using your arms (e.g., wheelchair, bedside chair)? 4  -MF 4  -JABARI     Climbing 3-5 steps with a railing? 3  -MF 3  -JABARI     To walk in hospital room? 4  -MF 4  -JABARI     AM-PAC 6 Clicks Score (PT) 23  -MF 23  -JABARI            Functional Assessment    Outcome Measure Options AM-PAC 6 Clicks Basic Mobility (PT)  -MF --           User Key  (r) = Recorded By, (t) = Taken By, (c) = Cosigned By    Initials Name Provider Type    Jacinta Lowry, ANDER Physical Therapist Assistant    Dafne Sterling PTA Physical Therapist Assistant                 Time Calculation:    PT Charges     Row Name 09/16/22 1107             Time Calculation    Start Time 1025  -      Stop Time 1055  -MF      Time Calculation (min) 30 min  -      PT Received On 09/16/22  -MF              Time Calculation- PT    Total Timed Code Minutes- PT 30 minute(s)  -MF              Timed Charges    71035 - PT Therapeutic Exercise Minutes 10  -MF      39120 - Gait Training Minutes  20  -MF              Total Minutes    Timed Charges Total Minutes 30  -MF       Total Minutes 30  -MF            User Key  (r) = Recorded By, (t) = Taken By, (c) = Cosigned By    Initials Name Provider Type    Dafne Sterling PTA Physical Therapist Assistant              Therapy Charges for Today     Code Description Service Date Service Provider Modifiers Qty    98990368974  PT THER PROC EA  15 MIN 9/16/2022 Dafne Licona PTA GP 1    01008925370 HC GAIT TRAINING EA 15 MIN 9/16/2022 Dafne Licona PTA GP 1          PT G-Codes  Outcome Measure Options: AM-PAC 6 Clicks Basic Mobility (PT)  AM-PAC 6 Clicks Score (PT): 23    Dafne Licona PTA  9/16/2022

## 2022-09-16 NOTE — SIGNIFICANT NOTE
09/16/22 1017   Oxygen Therapy   SpO2 95 %   Pulse Oximetry Type Continuous   Device (Oxygen Therapy) nasal cannula   Flow (L/min) 4

## 2022-09-16 NOTE — PROGRESS NOTES
Automatic IV to PO Pharmacy Note - General    Elaine Juárez is a 62 y.o. female who meets the following criteria for IV to PO therapy conversion :     Tolerating oral fluids or 40ml/hour of enteral nutrition and oral route not otherwise compromised  Receiving other oral medications on a scheduled basis    Assessment/Plan  Based on this criteria, Famotidine 20 mg IV Q12H prn heartburn has been changed to Famotidine 20 mg PO Q12H PRN heartburn per the directives and guidelines established by [facility] and [facility] Medical Executive Committee .       Adam Perkins, PharmD  09/16/2207:28 CDT

## 2022-09-16 NOTE — PROGRESS NOTES
"    PULMONARY AND CRITICAL CARE PROGRESS NOTE - [facility]    Patient: Elaine Juárez  1960   MR# [id number]   Acct# [id number]  09/16/22   09:04 CDT  Referring Provider: Andreas Whiteside MD    Chief Complaint: acute/chronic respiratory failure with hypoxemia     Interval history: The patient is resting in bed with O2 sat 97% on 5L HFNC.  Decreased O2 to 7L.  She states that she feels \"awsome\" today compared to how she has been feeling.  Continue to titrate tyvaso.  Declined acceptance by [facility].  Dr. Griffith is going possibly talk to [place] today.  No other aggravating or alleviating factors.     Meds:  azithromycin, 500 mg, Intravenous, Q24H  cefTRIAXone, 1 g, Intravenous, Q24H  enoxaparin, 40 mg, Subcutaneous, Daily  furosemide, 10 mg, Oral, Daily  guaiFENesin, 600 mg, Oral, Q12H  ipratropium-albuterol, 3 mL, Nebulization, Q6H - RT  Macitentan, 10 mg, Oral, Nightly  methylPREDNISolone sodium succinate, 40 mg, Intravenous, Daily  mycophenolate, 1,000 mg, Oral, BID  sildenafil, 20 mg, Oral, Q8H  sodium chloride, 10 mL, Intravenous, Q12H  Treprostinil, 32 mcg, Inhalation, 4x Daily  venlafaxine, 75 mg, Oral, Daily      Pharmacy Consult - Pharmacy to dose,       Review of Systems:   Review of Systems   Constitutional: Positive for fatigue. Negative for chills and fever.   Respiratory: Positive for shortness of breath (improving).    Cardiovascular: Negative for chest pain.   Gastrointestinal: Negative for vomiting.   Neurological: Positive for weakness.     Physical Exam:  SpO2 Percentage    09/16/22 0655 09/16/22 0702 09/16/22 0800   SpO2: 98% 100% 96%     Body mass index is 30.81 kg/m².   Temp:  [97.6 °F (36.4 °C)-98.1 °F (36.7 °C)] 97.9 °F (36.6 °C)  Heart Rate:  [] 104  Resp:  [16-22] 20  BP: ()/(54-94) 83/61    Intake/Output Summary (Last 24 hours) at 9/16/2022 0904  Last data filed at 9/16/2022 0800  Gross per 24 hour   Intake 1440 ml   Output 1750 ml   Net -310 ml "     Physical Exam  Vitals reviewed.   Constitutional:       Appearance: She is well-developed. She is obese.      Interventions: Nasal cannula in place.      Comments: HFNC   HENT:      Head: Normocephalic and atraumatic.   Eyes:      General: No scleral icterus.     Conjunctiva/sclera: Conjunctivae normal.      Pupils: Pupils are equal, round, and reactive to light.   Cardiovascular:      Rate and Rhythm: Tachycardia present. Rhythm irregular.   Pulmonary:      Breath sounds: Decreased breath sounds present.   Musculoskeletal:         General: Normal range of motion.      Cervical back: Normal range of motion and neck supple.   Skin:     General: Skin is warm and dry.   Neurological:      Mental Status: She is alert and oriented to person, place, and time.   Psychiatric:         Behavior: Behavior normal.       Electronically signed by ANASTASIA Leigh, 9/16/2022, 09:04 CDT        Physician Substantive Portion: Medical Decision Making    Laboratory Data:  Results from last 7 days   Lab Units 09/16/22  0352 09/15/22  0330 09/14/22  0350   WBC 10*3/mm3 7.76 7.41 8.61   HEMOGLOBIN g/dL 11.5* 11.5* 10.8*   PLATELETS 10*3/mm3 197 182 182     Results from last 7 days   Lab Units 09/16/22  0352 09/15/22  0330 09/14/22  0350   SODIUM mmol/L 140 140 139   POTASSIUM mmol/L 3.6 3.6 4.3   BUN mg/dL 13 14 15   CREATININE mg/dL 0.75 0.79 0.74     Results from last 7 days   Lab Units 09/11/22  2218   PH, ARTERIAL pH units 7.430   PCO2, ARTERIAL mm Hg 36.5   PO2 ART mm Hg 65.4*     Blood Culture   Date Value Ref Range Status   09/11/2022 Staphylococcus, coagulase negative (C)  Final   09/11/2022 No growth at 24 hours  Preliminary     Recent films:  No radiology results from the last 24 hrs     Pulmonary Assessment:    1. Acute on chronic resp failure with hypoxia, improved with supplemental oxygen, tx of possible pneumonia, uptitration of tyvaso dpi.  Notably she feels better now than she did even before onset of the  acute respiratory illness; hopefully this means positive response to augmented PH therapy.  2. CREST manifested by ILD, pulm htn  3. Possible pneumonia, empirically treated  4. Pulmonary hypertension on triple therapy  5. Apparent hx PBC for which she was declined transplant at [facility], however she is without stigmata of cirrhosis  6. Coagulase neg blood cx positivity, false positive    Recommend/plan:   · Continue tyvaso and continue titration per protocol  · Continue oxygen  · Mobilize in room  · Ok to floor later in day today if no instability with more activity  · I discussed with patient that [facility] is not an option  · [facility] has been recommended as another option by both centers which declined her on record review; apparently criteria there are different and may be permissive for eval.  At this point she is improving at least for time being and maybe we can get some mileage out of the triple PH therapies.  Still need to pursue transplant, but may not need to transfer urgently for acute PH management.    This visit was performed by both a physician and an [facility] RN.  I personally evaluated and examined the patient.  I performed all aspects of the medical decision making as documented.  Electronically signed by Faisal Griffith MD, 9/16/2022, 09:09 CDT

## 2022-09-16 NOTE — PROGRESS NOTES
[facility] Medicine Services  INPATIENT PROGRESS NOTE    Length of Stay: 5  Date of Admission: 9/11/2022  Primary Care Physician: Aaron Stoddard MD    Subjective   Chief Complaint: Respiratory failure hypercapnia hypoxia/pulmonary fibrosis/right heart failure    HPI   Patient down to 5 L.  Afebrile, blood pressures on low side.  Plan for transfer the floor today.    Review of Systems   Constitutional: Positive for activity change, appetite change and fatigue. Negative for chills and fever.   HENT: Negative for hearing loss, nosebleeds, tinnitus and trouble swallowing.    Eyes: Negative for visual disturbance.   Respiratory: Positive for cough, shortness of breath and wheezing. Negative for chest tightness.    Cardiovascular: Negative for chest pain, palpitations and leg swelling.   Gastrointestinal: Negative for abdominal distention, abdominal pain, blood in stool, constipation, diarrhea, nausea and vomiting.   Endocrine: Negative for cold intolerance, heat intolerance, polydipsia, polyphagia and polyuria.   Genitourinary: Negative for decreased urine volume, difficulty urinating, dysuria, flank pain, frequency and hematuria.   Musculoskeletal: Negative for arthralgias, joint swelling and myalgias.   Skin: Negative for rash.   Allergic/Immunologic: Negative for immunocompromised state.   Neurological: Positive for weakness. Negative for dizziness, syncope, light-headedness and headaches.   Hematological: Negative for adenopathy. Does not bruise/bleed easily.   Psychiatric/Behavioral: Negative for confusion and sleep disturbance. The patient is not nervous/anxious.      All pertinent negatives and positives are as above. All other systems have been reviewed and are negative unless otherwise stated.     Objective    Temp:  [97.6 °F (36.4 °C)-98.1 °F (36.7 °C)] 97.6 °F (36.4 °C)  Heart Rate:  [] 94  Resp:  [16-22] 18  BP: ()/(54-85) 99/60    Intake/Output Summary  (Last 24 hours) at 9/16/2022 1219  Last data filed at 9/16/2022 1200  Gross per 24 hour   Intake 1440 ml   Output 1400 ml   Net 40 ml     Physical Exam  Vitals and nursing note reviewed.   Constitutional:       Comments: Chronically ill.   HENT:      Head: Normocephalic.   Eyes:      Conjunctiva/sclera: Conjunctivae normal.      Pupils: Pupils are equal, round, and reactive to light.   Neck:      Vascular: No JVD.   Cardiovascular:      Rate and Rhythm: Normal rate and regular rhythm.      Heart sounds: Normal heart sounds.   Pulmonary:      Effort: No respiratory distress.      Breath sounds: No wheezing or rales.      Comments: Currently on 5 L of oxygen.  Diminished breath bilateral, clear.  Chest:      Chest wall: No tenderness.   Abdominal:      General: Bowel sounds are normal. There is no distension.      Palpations: Abdomen is soft.      Tenderness: There is no abdominal tenderness.   Musculoskeletal:         General: No tenderness or deformity.      Cervical back: Neck supple.   Skin:     General: Skin is warm and dry.      Capillary Refill: Capillary refill takes 2 to 3 seconds.      Findings: No rash.   Neurological:      Mental Status: She is alert and oriented to person, place, and time.      Cranial Nerves: No cranial nerve deficit.      Motor: Weakness present. No abnormal muscle tone.      Deep Tendon Reflexes: Reflexes normal.   Psychiatric:         Mood and Affect: Mood normal.         Behavior: Behavior normal.         Thought Content: Thought content normal.   Results Review:  Lab Results (last 24 hours)     Procedure Component Value Units Date/Time    CBC (No Diff) [[id number]]  (Abnormal) Collected: 09/16/22 0352    Specimen: Blood Updated: 09/16/22 0452     WBC 7.76 10*3/mm3      RBC 4.38 10*6/mm3      Hemoglobin 11.5 g/dL      Hematocrit 36.1 %      MCV 82.4 fL      MCH 26.3 pg      MCHC 31.9 g/dL      RDW 13.7 %      RDW-SD 40.7 fl      MPV 11.2 fL      Platelets 197 10*3/mm3     Basic  Metabolic Panel [[id number]]  (Abnormal) Collected: 09/16/22 0352    Specimen: Blood Updated: 09/16/22 0440     Glucose 104 mg/dL      BUN 13 mg/dL      Creatinine 0.75 mg/dL      Sodium 140 mmol/L      Potassium 3.6 mmol/L      Chloride 103 mmol/L      CO2 29.0 mmol/L      Calcium 8.7 mg/dL      BUN/Creatinine Ratio 17.3     Anion Gap 8.0 mmol/L      eGFR 89.0 mL/min/1.73      Comment: National Kidney Foundation and American Society of Nephrology (ASN) Task Force recommended calculation based on the Chronic Kidney Disease Epidemiology Collaboration (CKD-EPI) equation refit without adjustment for race.       Narrative:      GFR Normal >60  Chronic Kidney Disease <60  Kidney Failure <15      Blood Culture - Blood, Arm, Right [[id number]]  (Normal) Collected: 09/11/22 1858    Specimen: Blood from Arm, Right Updated: 09/15/22 1947     Blood Culture No growth at 4 days           Cultures:  Blood Culture   Date Value Ref Range Status   09/11/2022 Staphylococcus, coagulase negative (C)  Final   09/11/2022 No growth at 4 days  Preliminary       Radiology Data:    Imaging Results (Last 24 Hours)     ** No results found for the last 24 hours. **          Allergies   Allergen Reactions   • Codeine Nausea And Vomiting   • Latex Rash       Scheduled meds:   cefTRIAXone, 1 g, Intravenous, Q24H  enoxaparin, 40 mg, Subcutaneous, Daily  furosemide, 10 mg, Oral, Daily  guaiFENesin, 600 mg, Oral, Q12H  ipratropium-albuterol, 3 mL, Nebulization, Q6H - RT  Macitentan, 10 mg, Oral, Nightly  methylPREDNISolone sodium succinate, 40 mg, Intravenous, Daily  mycophenolate, 1,000 mg, Oral, BID  sildenafil, 20 mg, Oral, Q8H  sodium chloride, 10 mL, Intravenous, Q12H  Treprostinil, 32 mcg, Inhalation, 4x Daily  venlafaxine, 75 mg, Oral, Daily        PRN meds:  •  acetaminophen **OR** acetaminophen  •  albuterol  •  busPIRone  •  clonazePAM  •  famotidine  •  guaiFENesin-dextromethorphan  •  melatonin  •  ondansetron  •  Pharmacy Consult -  Pharmacy to dose  •  sodium chloride  •  traZODone    Assessment/Plan       Acute on chronic respiratory failure with hypoxia (HCC)    CREST syndrome, partial     Raynaud's phenomenon    Pulmonary fibrosis prob sec underlying autoimmune disease    PBC (primary biliary cirrhosis)    Obstructive sleep apnea on CPAP    PAH (pulmonary arterial hypertension) with portal hypertension (HCC)    Hypokalemia    Acute-on-chronic respiratory failure (HCC)    Obesity (BMI 30-39.9)      Plan:  Pulmonary fibrosis/pulmonary hypertension/chronic respiratory failure hypoxia/obstructive sleep apnea.  Patient is on chronic 8 L of oxygen at home.  Patient is on chronic CPAP at home.  On Tyvaso.  Patient follows Dr. Griffith outpatient.  Patient was declined for lung transplant at [facility] and currently waiting for interview at [facility].  DuoNebs 4 times daily.  Albuterol every 4 hours as needed.  Pulmicort nebs twice daily.  Solu-Medrol twice daily.    Robitussin as needed.  Pulmonary consult.  Azithromycin . Rocephin.  Mucinex.  DuoNebs.  Sildenafil. Tyvaso.   CTA of the chest-No pulmonary embolism, Stable appearance of chronic lung changes.  Chest x-ray-Mild cardiac enlargement and mild diffuse infiltrate for which edema  is favored.  On 5 L high flow.  From Dr. Griffith's note-patient was declined from [facility] for transplant.     Right-sided heart failure.  BNP 7400.  Troponin negative.  Pulmonary hypertension.    Cardiology consult.  Echocardiogram- ejection fraction 56 to 60%, diastolic dysfunction grade 1, right ventricle cavity severely dilated, severe reduced right ventricle systolic function, right atrial cavity severely dilated, tricuspid regurgitation, moderate to severe pulm hypertension, small less than 1 cm pericardial effusion-fluid-filled- no evidence of cardiac tamponade.     Hypokalemia.  Resolved.  Magnesium-normal.     History of crest syndrome.  Patient follow with rheumatology outpatient. On CellCept  and prednisone.     Insomnia/depression/anxiety.  Trazodone at night.  Effexor.  BuSpar as needed.  Clonazepam as needed.  Melatonin at night as needed.     Reflux.  Pepcid.  Zofran as needed.     Lovenox prophylaxis.     Nutrition.  Regular diet.     Respiratory panel-negative.  Blood culture 1 out of 2 [id number] contamination..  COVID-19-negative.  MRSA-negative.     Discharge Planning: 3 to 6 days    Electronically signed by Andreas Whiteside MD, 09/16/22, 12:19 PM CDT.

## 2022-09-16 NOTE — NURSING NOTE
Report given to receiving nurse, discussed treatment plan, lab values, and O2 requirements. Patient agreeable to transfer. Belongings sent with patient, and medications at bedside sent with patient.

## 2022-09-16 NOTE — CASE MANAGEMENT/SOCIAL WORK
Continued Stay Note   [place]     Patient Name: Elaine Juárez  MRN: [id number]  Today's Date: 9/16/2022    Admit Date: 9/11/2022     Discharge Plan     Row Name 09/16/22 1150       Plan    Plan Home vs other    Plan Comments [facility] did not accept patient for transfer.  Awaiting word from physician regarding possible transfer to [place].  SW will assist as needed.               Discharge Codes    No documentation.                     NIDIA Gibbs

## 2022-09-17 ENCOUNTER — READMISSION MANAGEMENT (OUTPATIENT)
Dept: CALL CENTER | Facility: HOSPITAL | Age: 62
End: 2022-09-17

## 2022-09-17 VITALS
TEMPERATURE: 98.2 F | SYSTOLIC BLOOD PRESSURE: 106 MMHG | HEART RATE: 86 BPM | WEIGHT: 173 LBS | DIASTOLIC BLOOD PRESSURE: 62 MMHG | BODY MASS INDEX: 30.65 KG/M2 | OXYGEN SATURATION: 91 % | HEIGHT: 63 IN | RESPIRATION RATE: 18 BRPM

## 2022-09-17 LAB
ANION GAP SERPL CALCULATED.3IONS-SCNC: 8 MMOL/L (ref 5–15)
BUN SERPL-MCNC: 12 MG/DL (ref 8–23)
BUN/CREAT SERPL: 14.3 (ref 7–25)
CALCIUM SPEC-SCNC: 10 MG/DL (ref 8.6–10.5)
CHLORIDE SERPL-SCNC: 101 MMOL/L (ref 98–107)
CO2 SERPL-SCNC: 32 MMOL/L (ref 22–29)
CREAT SERPL-MCNC: 0.84 MG/DL (ref 0.57–1)
DEPRECATED RDW RBC AUTO: 41 FL (ref 37–54)
EGFRCR SERPLBLD CKD-EPI 2021: 77.7 ML/MIN/1.73
ERYTHROCYTE [DISTWIDTH] IN BLOOD BY AUTOMATED COUNT: 13.9 % (ref 12.3–15.4)
GLUCOSE SERPL-MCNC: 92 MG/DL (ref 65–99)
HCT VFR BLD AUTO: 34.6 % (ref 34–46.6)
HGB BLD-MCNC: 10.8 G/DL (ref 12–15.9)
MCH RBC QN AUTO: 25.8 PG (ref 26.6–33)
MCHC RBC AUTO-ENTMCNC: 31.2 G/DL (ref 31.5–35.7)
MCV RBC AUTO: 82.8 FL (ref 79–97)
PLATELET # BLD AUTO: 176 10*3/MM3 (ref 140–450)
PMV BLD AUTO: 10.9 FL (ref 6–12)
POTASSIUM SERPL-SCNC: 3.7 MMOL/L (ref 3.5–5.2)
RBC # BLD AUTO: 4.18 10*6/MM3 (ref 3.77–5.28)
SODIUM SERPL-SCNC: 141 MMOL/L (ref 136–145)
WBC NRBC COR # BLD: 7.11 10*3/MM3 (ref 3.4–10.8)

## 2022-09-17 PROCEDURE — 94664 DEMO&/EVAL PT USE INHALER: CPT

## 2022-09-17 PROCEDURE — 25010000002 METHYLPREDNISOLONE PER 40 MG: Performed by: NURSE PRACTITIONER

## 2022-09-17 PROCEDURE — 94799 UNLISTED PULMONARY SVC/PX: CPT

## 2022-09-17 PROCEDURE — 94761 N-INVAS EAR/PLS OXIMETRY MLT: CPT

## 2022-09-17 PROCEDURE — 80048 BASIC METABOLIC PNL TOTAL CA: CPT | Performed by: FAMILY MEDICINE

## 2022-09-17 PROCEDURE — 94618 PULMONARY STRESS TESTING: CPT

## 2022-09-17 PROCEDURE — 63710000001 PREDNISONE PER 1 MG: Performed by: NURSE PRACTITIONER

## 2022-09-17 PROCEDURE — 97116 GAIT TRAINING THERAPY: CPT

## 2022-09-17 PROCEDURE — 63710000001 MYCOPHENOLATE MOFETIL PER 250 MG: Performed by: FAMILY MEDICINE

## 2022-09-17 PROCEDURE — 25010000002 CEFTRIAXONE PER 250 MG: Performed by: FAMILY MEDICINE

## 2022-09-17 PROCEDURE — 25010000002 ENOXAPARIN PER 10 MG: Performed by: FAMILY MEDICINE

## 2022-09-17 PROCEDURE — 85027 COMPLETE CBC AUTOMATED: CPT | Performed by: FAMILY MEDICINE

## 2022-09-17 PROCEDURE — 99232 SBSQ HOSP IP/OBS MODERATE 35: CPT | Performed by: INTERNAL MEDICINE

## 2022-09-17 RX ORDER — PREDNISONE 10 MG/1
10 TABLET ORAL
Status: DISCONTINUED | OUTPATIENT
Start: 2022-09-17 | End: 2022-09-17 | Stop reason: HOSPADM

## 2022-09-17 RX ORDER — IPRATROPIUM BROMIDE AND ALBUTEROL SULFATE 2.5; .5 MG/3ML; MG/3ML
3 SOLUTION RESPIRATORY (INHALATION) EVERY 6 HOURS PRN
Status: DISCONTINUED | OUTPATIENT
Start: 2022-09-17 | End: 2022-09-17 | Stop reason: HOSPADM

## 2022-09-17 RX ORDER — FUROSEMIDE 20 MG/1
10 TABLET ORAL DAILY
Qty: 90 TABLET | Refills: 0 | Status: SHIPPED | OUTPATIENT
Start: 2022-09-18 | End: 2022-11-01 | Stop reason: SDUPTHER

## 2022-09-17 RX ORDER — GUAIFENESIN 600 MG/1
600 TABLET, EXTENDED RELEASE ORAL EVERY 12 HOURS SCHEDULED
Qty: 180 TABLET | Refills: 0 | Status: SHIPPED | OUTPATIENT
Start: 2022-09-17 | End: 2022-10-25 | Stop reason: SDUPTHER

## 2022-09-17 RX ORDER — SILDENAFIL CITRATE 20 MG/1
20 TABLET ORAL EVERY 8 HOURS SCHEDULED
Qty: 270 TABLET | Refills: 0 | Status: SHIPPED | OUTPATIENT
Start: 2022-09-17 | End: 2022-09-21 | Stop reason: SDUPTHER

## 2022-09-17 RX ORDER — PANTOPRAZOLE SODIUM 40 MG/1
40 TABLET, DELAYED RELEASE ORAL
Status: DISCONTINUED | OUTPATIENT
Start: 2022-09-17 | End: 2022-09-17 | Stop reason: HOSPADM

## 2022-09-17 RX ORDER — CEFDINIR 300 MG/1
300 CAPSULE ORAL 2 TIMES DAILY
Qty: 6 CAPSULE | Refills: 0 | Status: SHIPPED | OUTPATIENT
Start: 2022-09-17 | End: 2022-09-20

## 2022-09-17 RX ADMIN — SILDENAFIL CITRATE 20 MG: 20 TABLET ORAL at 14:50

## 2022-09-17 RX ADMIN — ENOXAPARIN SODIUM 40 MG: 40 INJECTION SUBCUTANEOUS at 08:16

## 2022-09-17 RX ADMIN — VENLAFAXINE HYDROCHLORIDE 75 MG: 37.5 TABLET ORAL at 08:20

## 2022-09-17 RX ADMIN — IPRATROPIUM BROMIDE AND ALBUTEROL SULFATE 3 ML: 2.5; .5 SOLUTION RESPIRATORY (INHALATION) at 07:37

## 2022-09-17 RX ADMIN — FUROSEMIDE 10 MG: 20 TABLET ORAL at 08:19

## 2022-09-17 RX ADMIN — URSODIOL 300 MG: 300 CAPSULE ORAL at 08:21

## 2022-09-17 RX ADMIN — MYCOPHENOLATE MOFETIL 1000 MG: 500 TABLET, FILM COATED ORAL at 08:19

## 2022-09-17 RX ADMIN — PANTOPRAZOLE SODIUM 40 MG: 40 TABLET, DELAYED RELEASE ORAL at 10:53

## 2022-09-17 RX ADMIN — CEFTRIAXONE 1 G: 1 INJECTION, POWDER, FOR SOLUTION INTRAMUSCULAR; INTRAVENOUS at 10:54

## 2022-09-17 RX ADMIN — GUAIFENESIN 600 MG: 600 TABLET, EXTENDED RELEASE ORAL at 08:20

## 2022-09-17 RX ADMIN — TREPROSTINIL 32 MCG: 32 INHALANT ORAL at 11:20

## 2022-09-17 RX ADMIN — METHYLPREDNISOLONE SODIUM SUCCINATE 40 MG: 40 INJECTION, POWDER, FOR SOLUTION INTRAMUSCULAR; INTRAVENOUS at 08:21

## 2022-09-17 RX ADMIN — PREDNISONE 10 MG: 20 TABLET ORAL at 10:53

## 2022-09-17 RX ADMIN — TREPROSTINIL 32 MCG: 32 INHALANT ORAL at 08:16

## 2022-09-17 RX ADMIN — SILDENAFIL CITRATE 20 MG: 20 TABLET ORAL at 08:15

## 2022-09-17 RX ADMIN — Medication 10 ML: at 08:17

## 2022-09-17 NOTE — DISCHARGE SUMMARY
[facility] Medicine Services  DISCHARGE SUMMARY       Date of Admission: 9/11/2022  Date of Discharge:  9/17/2022  Primary Care Physician: Aaron Stoddard MD    Presenting Problem/Chief Complaint:    Final Discharge Diagnoses:  Active Hospital Problems    Diagnosis    • **Acute on chronic respiratory failure with hypoxia (HCC)    • Acute-on-chronic respiratory failure (HCC)    • Obesity (BMI 30-39.9)    • Hypokalemia    • PAH (pulmonary arterial hypertension) with portal hypertension (HCC)    • Obstructive sleep apnea on CPAP    • PBC (primary biliary cirrhosis)    • CREST syndrome, partial     • Raynaud's phenomenon    • Pulmonary fibrosis prob sec underlying autoimmune disease        Consults: Consult pulmonary .    Pertinent Test Results:   Results for orders placed during the hospital encounter of 09/11/22    Adult Transthoracic Echo Complete W/ Cont if Necessary Per Protocol    Interpretation Summary  · Left ventricular ejection fraction appears to be 56 - 60%. Left ventricular systolic function is normal.  · Left ventricular diastolic function is consistent with (grade I) impaired relaxation.  · The right ventricular cavity is severely dilated.  · Severely reduced right ventricular systolic function noted.  · The right atrial cavity is severely dilated.  · Estimated right ventricular systolic pressure from tricuspid regurgitation is markedly elevated (>55 mmHg).  · Moderate to severe pulmonary hypertension is present.  · There is a small (<1cm) pericardial effusion. The effusion is fluid filled. There is no evidence of cardiac tamponade      Imaging Results (All)     Procedure Component Value Units Date/Time    CT Angiogram Chest [[id number]] Collected: 09/11/22 2024     Updated: 09/11/22 2030    Narrative:      EXAMINATION: CT ANGIOGRAM CHEST-      9/11/2022 8:10 PM CDT     HISTORY: Shortness of breath. Hypoxia. History of pulmonary  hypertension. Pulmonary fibrosis.      In order to have a CT radiation dose as low as reasonably achievable  Automated Exposure Control was utilized for adjustment of the mA and/or  KV according to patient size.     DLP in mGycm= 202.     CT angiogram chest.  CT angiography protocol.   CT imaging with bolus IV contrast injection.   Under concurrent supervision axial, sagittal, coronal, and  three-dimensional data sets were constructed. Borderline cardiomegaly.  Pulmonary artery enlargement.  No pulmonary embolism.     Stable mildly prominent mediastinal lymph nodes.     No thoracic aortic aneurysm or dissection.     Pulmonary fibrosis and bronchiectasis.     No pneumothorax or pleural effusion.     No acute pneumonia is seen.     Summary:  1. No pulmonary embolism.  2. Stable appearance of chronic lung changes.                                   This report was finalized on 09/11/2022 20:26 by Dr. Lorenzo Ruff MD.    XR Chest 1 View [[id number]] Collected: 09/11/22 1948     Updated: 09/11/22 1952    Narrative:      EXAMINATION: XR CHEST 1 VW-     9/11/2022 7:08 PM CDT     HISTORY: Hypoxia.     One view chest x-ray.     Comparison is made with 04/20/2022.     Heart size is magnified though there does appear to be mild cardiomegaly  Mild infiltrate in both lungs compatible with mild diffuse edema.     No focal consolidation.  No pneumothorax.     Summary:  1. Mild cardiac enlargement and mild diffuse infiltrate for which edema  is favored.     This report was finalized on 09/11/2022 19:49 by Dr. Lorenzo Ruff MD.          LAB RESULTS:      Lab 09/17/22  0538 09/16/22  0352 09/15/22  0330 09/14/22  0350 09/13/22  0330 09/12/22  0949 09/11/22  2213 09/11/22  1858   WBC 7.11 7.76 7.41 8.61 7.69  --   --  12.51*   HEMOGLOBIN 10.8* 11.5* 11.5* 10.8* 11.2*  --   --  13.5   HEMATOCRIT 34.6 36.1 38.5 33.9* 36.6  --   --  44.4   PLATELETS 176 197 182 182 184  --   --  252   NEUTROS ABS  --   --   --   --   --   --   --  10.25*   IMMATURE GRANS (ABS)  --   --   --    --   --   --   --  0.07*   LYMPHS ABS  --   --   --   --   --   --   --  1.28   MONOS ABS  --   --   --   --   --   --   --  0.68   EOS ABS  --   --   --   --   --   --   --  0.13   MCV 82.8 82.4 85.0 81.9 83.9  --   --  83.5   LACTATE  --   --   --   --   --  2.2* 1.2 2.4*         Lab 09/17/22  0538 09/16/22  0352 09/15/22  0330 09/14/22  0350 09/13/22  0330 09/11/22  1858   SODIUM 141 140 140 139 144 140   POTASSIUM 3.7 3.6 3.6 4.3 3.9 3.2*   CHLORIDE 101 103 103 104 106 103   CO2 32.0* 29.0 28.0 28.0 25.0 23.0   ANION GAP 8.0 8.0 9.0 7.0 13.0 14.0   BUN 12 13 14 15 13 9   CREATININE 0.84 0.75 0.79 0.74 0.77 0.88   EGFR 77.7 89.0 83.6 90.5 86.3 73.5   GLUCOSE 92 104* 86 155* 147* 134*   CALCIUM 10.0 8.7 8.7 8.5* 8.8 9.0   MAGNESIUM  --   --   --   --   --  2.1   HEMOGLOBIN A1C  --   --   --   --  5.40  --    TSH  --   --   --   --  0.597  --          Lab 09/11/22  1858   TOTAL PROTEIN 7.2   ALBUMIN 4.20   GLOBULIN 3.0   ALT (SGPT) 14   AST (SGOT) 20   BILIRUBIN 0.4   ALK PHOS 105         Lab 09/12/22  1106 09/11/22  1858   PROBNP 7,417.0*  --    TROPONIN T  --  <0.010         Lab 09/13/22  0330   CHOLESTEROL 169   LDL CHOL 106*   HDL CHOL 45   TRIGLYCERIDES 99             Lab 09/11/22  2218   PH, ARTERIAL 7.430   PCO2, ARTERIAL 36.5   PO2 ART 65.4*   O2 SATURATION ART 94.2   HCO3 ART 24.2   BASE EXCESS ART 0.2     Brief Urine Lab Results  (Last result in the past 365 days)      Color   Clarity   Blood   Leuk Est   Nitrite   Protein   CREAT   Urine HCG        09/11/22 2213 Yellow   Clear   Negative   Negative   Negative   Negative               Microbiology Results (last 10 days)     Procedure Component Value - Date/Time    MRSA Screen, PCR (Inpatient) - Swab, Nares [[id number]]  (Normal) Collected: 09/12/22 1746    Lab Status: Final result Specimen: Swab from Nares Updated: 09/12/22 1939     MRSA PCR No MRSA Detected    Narrative:      The negative predictive value of this diagnostic test is high and should only  be used to consider de-escalating anti-MRSA therapy. A positive result may indicate colonization with MRSA and must be correlated clinically.    Respiratory Panel PCR w/COVID-19(SARS-CoV-2) KARISHMA/LUIS ARMANDO/DENYS/PAD/COR/MAD/DAVID In-House, NP Swab in UTM/VTM, 3-4 HR TAT - Swab, Nasopharynx [[id number]]  (Normal) Collected: 09/12/22 1319    Lab Status: Final result Specimen: Swab from Nasopharynx Updated: 09/12/22 1455     ADENOVIRUS, PCR Not Detected     Coronavirus 229E Not Detected     Coronavirus HKU1 Not Detected     Coronavirus NL63 Not Detected     Coronavirus OC43 Not Detected     COVID19 Not Detected     Human Metapneumovirus Not Detected     Human Rhinovirus/Enterovirus Not Detected     Influenza A PCR Not Detected     Influenza B PCR Not Detected     Parainfluenza Virus 1 Not Detected     Parainfluenza Virus 2 Not Detected     Parainfluenza Virus 3 Not Detected     Parainfluenza Virus 4 Not Detected     RSV, PCR Not Detected     Bordetella pertussis pcr Not Detected     Bordetella parapertussis PCR Not Detected     Chlamydophila pneumoniae PCR Not Detected     Mycoplasma pneumo by PCR Not Detected    Narrative:      In the setting of a positive respiratory panel with a viral infection PLUS a negative procalcitonin without other underlying concern for bacterial infection, consider observing off antibiotics or discontinuation of antibiotics and continue supportive care. If the respiratory panel is positive for atypical bacterial infection (Bordetella pertussis, Chlamydophila pneumoniae, or Mycoplasma pneumoniae), consider antibiotic de-escalation to target atypical bacterial infection.    COVID-19,[facility] IN-HOUSE,Nasal Swab No Transport Media 3-4 HR TAT - Swab, Nasal Cavity [[id number]]  (Normal) Collected: 09/11/22 1909    Lab Status: Final result Specimen: Swab from Nasal Cavity Updated: 09/11/22 2028     COVID19 Not Detected    Narrative:      Fact sheet for providers: https://www.fda.gov/media/769682/download      Fact sheet for patients: https://www.fda.gov/media/602851/download    Test performed by PCR.    Consider negative results in combination with clinical observations, patient history, and epidemiological information.    Blood Culture - Blood, Arm, Left [[id number]]  (Abnormal) Collected: 09/11/22 1909    Lab Status: Final result Specimen: Blood from Arm, Left Updated: 09/13/22 0518     Blood Culture Staphylococcus, coagulase negative     Isolated from Aerobic and Anaerobic Bottles     Gram Stain Anaerobic Bottle Gram positive cocci in clusters      Aerobic Bottle Gram positive cocci in clusters    Narrative:      Probable contaminant requires clinical correlation, susceptibility not performed unless requested by physician.      Blood Culture ID, PCR - Blood, Arm, Left [[id number]]  (Abnormal) Collected: 09/11/22 1909    Lab Status: Final result Specimen: Blood from Arm, Left Updated: 09/12/22 1530     BCID, PCR Staph spp, not aureus or lugdunesis. Identification by BCID2 PCR.     BOTTLE TYPE Anaerobic Bottle    Blood Culture - Blood, Arm, Right [[id number]]  (Normal) Collected: 09/11/22 1858    Lab Status: Final result Specimen: Blood from Arm, Right Updated: 09/16/22 1947     Blood Culture No growth at 5 days          Chief Complaint on Day of Discharge: None.    History of Present Illness on Day of Discharge:   62 year old female with PMH of CREST syndrome, pulmonary fibrosis, pulmonary hypertension, chronic respiratory failure high flow 8 lpm home oxygen, CHAYA/CPAP, that presents to the ER with complaints of worsening shortness of breath for over 2 weeks. She has not felt well for about a month. Follow with Dr Griffith whom started her on Tyvaso for PAH/ILD this August. She follow with rheumatology also and is on Cellcept and prednisone. CXR and CT chest show chronic changes.      States was declined from lung transplant at [place] and is currently waiting to interview at [facility]  Course:  The patient is a 62 y.o. female who presented to [facility] with pulmonary fibrosis/pulmonary hypertension/chronic respiratory failure hypoxia/obstructive sleep apnea.      Pulmonary fibrosis/pulmonary hypertension/chronic respiratory failure hypoxia/obstructive sleep apnea.  Patient is on chronic 8 L of oxygen at home.  Patient is on chronic CPAP at home.  On Tyvaso.  Patient follows Dr. Griffith outpatient.  Patient was declined for lung transplant at [place] and currently waiting for interview at [facility].  DuoNebs 4 times daily.  Albuterol every 4 hours as needed.  Pulmicort nebs twice daily.  Solu-Medrol twice daily.    Robitussin as needed.  Pulmonary consult.  Azithromycin . Rocephin.  Mucinex.  DuoNebs.  Sildenafil. Tyvaso.   CTA of the chest-No pulmonary embolism, Stable appearance of chronic lung changes.  Chest x-ray-Mild cardiac enlargement and mild diffuse infiltrate for which edema  is favored.  On 5 L high flow.  From Dr. Griffith's note-patient was declined from [facility] for transplant.  Recommendation from pulmonary- continue CPAP at home,, continue Tyvaso and triple therapy for pulmonary hypertension- mycophenolate and sildenafil.  Symbicort and DuoNebs.  May be discharged on p.o. antibiotics.  Follow-up with Pablo Blackman on October 25, plan to discharge home today.  Baseline prednisone to continue.     Right-sided heart failure.  BNP 7400.  Troponin negative.  Pulmonary hypertension.    Cardiology consult.  Echocardiogram- ejection fraction 56 to 60%, diastolic dysfunction grade 1, right ventricle cavity severely dilated, severe reduced right ventricle systolic function, right atrial cavity severely dilated, tricuspid regurgitation, moderate to severe pulm hypertension, small less than 1 cm pericardial effusion-fluid-filled- no evidence of cardiac tamponade.     Hypokalemia.  Resolved.  Magnesium-normal.     History of crest syndrome.  Patient follow with  "rheumatology outpatient. On CellCept and prednisone.     Insomnia/depression/anxiety.  Trazodone at night.  Effexor.  BuSpar as needed.  Clonazepam as needed.  Melatonin at night as needed.     Reflux.  Pepcid.  Zofran as needed.     Lovenox prophylaxis.     Nutrition.  Regular diet.     Respiratory panel-negative.  Blood culture 1 out of 2 zdylxlhnc-[id number] contamination..  COVID-19-negative.  MRSA-negative.     Vital signs stable, afebrile.  Plan to discharge patient home today.  Keep appointment to follow-up with pulmonary outpatient.  Follow-up with PCP 1 week.      Condition on Discharge: Stable    Physical Exam on Discharge:  /62 (BP Location: Right arm, Patient Position: Lying)   Pulse 86   Temp 98.2 °F (36.8 °C) (Oral)   Resp 18   Ht 160 cm (63\")   Wt 78.5 kg (173 lb)   SpO2 91%   BMI 30.65 kg/m²   Physical Exam  Vitals and nursing note reviewed.   Constitutional:       Comments: Chronically ill.   HENT:      Head: Normocephalic.   Eyes:      Conjunctiva/sclera: Conjunctivae normal.      Pupils: Pupils are equal, round, and reactive to light.   Neck:      Vascular: No JVD.   Cardiovascular:      Rate and Rhythm: Normal rate and regular rhythm.      Heart sounds: Normal heart sounds.   Pulmonary:      Effort: No respiratory distress.      Breath sounds: No wheezing or rales.      Comments: Currently on 5 L of oxygen.  Diminished breath bilateral, clear.  Chest:      Chest wall: No tenderness.   Abdominal:      General: Bowel sounds are normal. There is no distension.      Palpations: Abdomen is soft.      Tenderness: There is no abdominal tenderness.   Musculoskeletal:         General: No tenderness or deformity.      Cervical back: Neck supple.   Skin:     General: Skin is warm and dry.      Capillary Refill: Capillary refill takes 2 to 3 seconds.      Findings: No rash.   Neurological:      Mental Status: She is alert and oriented to person, place, and time.      Cranial Nerves: No " cranial nerve deficit.      Motor: Weakness present. No abnormal muscle tone.      Deep Tendon Reflexes: Reflexes normal.   Psychiatric:         Mood and Affect: Mood normal.         Behavior: Behavior normal.         Thought Content: Thought content normal.     Discharge Disposition: Discharge home with family.      Discharge Medications:     Discharge Medications      New Medications      Instructions Start Date   cefdinir 300 MG capsule  Commonly known as: OMNICEF   300 mg, Oral, 2 Times Daily      furosemide 20 MG tablet  Commonly known as: LASIX   10 mg, Oral, Daily   Start Date: September 18, 2022     guaiFENesin 600 MG 12 hr tablet  Commonly known as: MUCINEX   600 mg, Oral, Every 12 Hours Scheduled      sildenafil 20 MG tablet  Commonly known as: REVATIO   20 mg, Oral, Every 8 Hours Scheduled         Continue These Medications      Instructions Start Date   clonazePAM 0.5 MG tablet  Commonly known as: KlonoPIN   0.5 mg, Oral, 2 Times Daily PRN      mycophenolate 500 MG tablet  Commonly known as: CELLCEPT   1,000 mg, Oral, 2 Times Daily      omeprazole 40 MG capsule  Commonly known as: priLOSEC   40 mg, Oral, Daily      Opsumit 10 MG tablet  Generic drug: Macitentan   1 tablet, Oral, Daily      predniSONE 5 MG tablet  Commonly known as: DELTASONE   5 mg, Oral, Daily      traZODone 100 MG tablet  Commonly known as: DESYREL   100 mg, Oral, Nightly      Treprostinil 16 MCG powder   16 mcg, Inhalation, 4 Times Daily      ursodiol 300 MG capsule  Commonly known as: ACTIGALL   300 mg, Oral, 2 Times Daily      venlafaxine 75 MG tablet  Commonly known as: EFFEXOR   75 mg, Oral, Daily             Discharge Diet:   Diet Instructions     Advance Diet As Tolerated            Activity at Discharge:   Activity Instructions     Activity as Tolerated            Discharge Care Plan/Instructions: Discharged home with family.  Discharge home with home health.    Follow-up Appointments:   Future Appointments   Date Time Provider  Department Center   10/25/2022  3:00 PM Fela Blackman, [facility]   Discharge home with family.  Follow-up with PCP 1 week.  Call pulmonary in 1 week discussed with patient.  Follow-up with pulmonary outpatient.    Electronically signed by Andreas Whiteside MD, 09/17/22, 16:02 CDT.    Time: Greater than 30 minutes.

## 2022-09-17 NOTE — PLAN OF CARE
Goal Outcome Evaluation:  Plan of Care Reviewed With: patient        Progress: improving  Outcome Evaluation: VSS.  Sepsis positive.   Solumedrol continued.  IV abx continued.  4 l/min NC good sats while resting.  O2sats decrease dramatically with any exertion.  Recovers well.  Increase O2 when OOB.  No falls noted.  BNP 7417.  Inhaler bedside.  Lovenox VTE.     NSR/ST

## 2022-09-17 NOTE — PROGRESS NOTES
Exercise Oximetry    Patient Name:Elaine Juárez   MRN: [id number]   Date: 09/17/22             ROOM AIR BASELINE   SpO2%    Heart Rate    Blood Pressure      EXERCISE ON ROOM AIR SpO2% EXERCISE ON O2 @ 8 LPM SpO2%   1 MINUTE  1 MINUTE    2 MINUTES  2 MINUTES    3 MINUTES  3 MINUTES    4 MINUTES  4 MINUTES    5 MINUTES  5 MINUTES    6 MINUTES  6 MINUTES               Distance Walked   Distance Walked   Dyspnea (Cassius Scale)   Dyspnea (Cassius Scale)   Fatigue (Cassius Scale)   Fatigue (Cassius Scale)   SpO2% Post Exercise   SpO2% Post Exercise      92   HR Post Exercise   HR Post Exercise         82   Time to Recovery   Time to Recovery        <3 minutes     Comments: Pt requires baseline 4L NC at rest. Walk ox requested for ambulating O2 titration. Pt ambulated in room and started exercise on 6L NC with SpO2 92%. Increased to 8L when SpO2 decreased to 86%. Pt continued exercise on 8L with SpO2 90-93%. Pt placed back in bed and weaned back to 4L with SpO2 at 92%. 4L NC at rest and 8L NC with exercise/ambulation.

## 2022-09-17 NOTE — CASE MANAGEMENT/SOCIAL WORK
Continued Stay Note  [facility]     Patient Name: Elaine Juárez  MRN: [id number]  Today's Date: 9/17/2022    Admit Date: 9/11/2022     Discharge Plan     Row Name 09/17/22 1620       Plan    Plan Home with [facility]    Patient/Family in Agreement with Plan yes    Provided Post Acute Provider List? Yes    Post Acute Provider List Home Health    Delivered To Patient    Method of Delivery Telephone    Final Discharge Disposition Code 06 - home with home health care    Final Note Referral for hh.   provided pt hh options and [facility] was chosen.  SW has moved to inbox and informed centralized intake.  Pt to [facility] home today.               Discharge Codes    No documentation.               Expected Discharge Date and Time     Expected Discharge Date Expected Discharge Time    Sep 17, 2022             BRYN GillW

## 2022-09-17 NOTE — PROGRESS NOTES
PULMONARY AND CRITICAL CARE PROGRESS NOTE - [facility]    Patient: Elaine Juárez  1960   MR# [id number]   Acct# [id number]  09/17/22   07:15 CDT  Referring Provider: Andreas Whiteside MD    Chief Complaint: Shortness of breath, acute on chronic respiratory failure    Interval history: Afebrile.  Saturation 100 on 4 L.  Creatinine 0.4.  Potassium 3.7.  White count normal.  No x-ray for review.  She is working with therapy.  Still desaturating with exertion which is typical for her.  Sildenafil held this morning due to blood pressure.  Recheck at bedside shows it to be improved.  Orders given to resume.  She complains of reflux.  Pepcid not helping.  On omeprazole at home.  Will change to Protonix.  She complains of headache.  Will change nebs to as needed.  May be side effect of Tyvaso.  She is anxious to go home if able.  We will have therapy assess her exertional O2 need.  Patient also requesting a bedside commode for home.  Okay to home from a pulmonary perspective if stable with therapy efforts today.  She has follow-up with me October 25.  Keep follow-up.  Has weekly follow-ups with specialty nurse in regards to her PAH medicine.  We will plan to continue low-dose diuretic outpatient as well.  Does not need antibiotics outpatient.  Can resume baseline dose of prednisone.  Will need revatio prescribed to continue at home as well.     Meds:  cefTRIAXone, 1 g, Intravenous, Q24H  enoxaparin, 40 mg, Subcutaneous, Daily  famotidine, 20 mg, Oral, BID AC  furosemide, 10 mg, Oral, Daily  guaiFENesin, 600 mg, Oral, Q12H  ipratropium-albuterol, 3 mL, Nebulization, Q6H - RT  Macitentan, 10 mg, Oral, Nightly  methylPREDNISolone sodium succinate, 40 mg, Intravenous, Daily  mycophenolate, 1,000 mg, Oral, BID  sildenafil, 20 mg, Oral, Q8H  sodium chloride, 10 mL, Intravenous, Q12H  Treprostinil, 32 mcg, Inhalation, 4x Daily  ursodiol, 300 mg, Oral, BID  venlafaxine, 75 mg, Oral, Daily      Pharmacy  Consult - Pharmacy to dose,       Review of Systems:   Review of Systems     Constitutional: Positive for fatigue. Negative for chills and fever.   Respiratory: Positive for shortness of breath (improving).    Cardiovascular: Negative for chest pain.   Gastrointestinal: Negative for vomiting.   Neurological: Positive for weakness.     Physical Exam:  SpO2 Percentage    09/17/22 0005 09/17/22 0035 09/17/22 0402   SpO2: 99% 94% 100%     Body mass index is 30.65 kg/m².   Temp:  [97.6 °F (36.4 °C)-98.6 °F (37 °C)] 97.9 °F (36.6 °C)  Heart Rate:  [] 88  Resp:  [16-30] 18  BP: ()/(55-77) 96/55    Intake/Output Summary (Last 24 hours) at 9/17/2022 0715  Last data filed at 9/17/2022 0035  Gross per 24 hour   Intake 960 ml   Output 1500 ml   Net -540 ml     Physical Exam     Vitals reviewed.   Constitutional:       Appearance: She is well-developed. She is obese.      Interventions: Nasal cannula in place.      Comments: nasal cannula  HENT:      Head: Normocephalic and atraumatic.   Eyes:      General: No scleral icterus.     Conjunctiva/sclera: Conjunctivae normal.      Pupils: Pupils are equal, round, and reactive to light.   Cardiovascular:      Rate and Rhythm: Regular rhythm and rate  Pulmonary:      Breath sounds: Velcro rales    Musculoskeletal:         General: Normal range of motion.      Cervical back: Normal range of motion and neck supple.   Skin:     General: Skin is warm and dry. Facial/palmar telangiectasia, digital clubbing  Neurological:      Mental Status: She is alert and oriented to person, place, and time.   Psychiatric:         Behavior: Behavior normal.     Electronically signed by ANASTASIA Briggs, 9/17/2022, 07:15 CDT      Physician Substantive Portion: Medical Decision Making    Laboratory Data:  Results from last 7 days   Lab Units 09/17/22  0538 09/16/22  0352 09/15/22  0330   WBC 10*3/mm3 7.11 7.76 7.41   HEMOGLOBIN g/dL 10.8* 11.5* 11.5*   PLATELETS 10*3/mm3 176 197 182      Results from last 7 days   Lab Units 09/17/22  0538 09/16/22  0352 09/15/22  0330   SODIUM mmol/L 141 140 140   POTASSIUM mmol/L 3.7 3.6 3.6   BUN mg/dL 12 13 14   CREATININE mg/dL 0.84 0.75 0.79     Results from last 7 days   Lab Units 09/11/22  2218   PH, ARTERIAL pH units 7.430   PCO2, ARTERIAL mm Hg 36.5   PO2 ART mm Hg 65.4*     Blood Culture   Date Value Ref Range Status   09/11/2022 Staphylococcus, coagulase negative (C)  Final   09/11/2022 No growth at 5 days  Final     Recent films:  No radiology results from the last 24 hrs     My radiograph interpretation/independent review of imaging: Reviewed and agree with current interpretation    Pulmonary Assessment:  1. Acute on chronic resp failure with hypoxia,   2. On supplemental oxygen.    3. Pneumonia treated with antibiotics   4. Pulmonary hypertension on Tyvaso and triple therapy  5. 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 Pulmonary hypertension on triple therapy  7. Apparent hx PBC for which she was declined transplant at [place], however she is without stigmata of cirrhosis  8. Coagulase neg blood cx positivity, false positive    Recommend/plan:   · Patient seen in the follow-up visit in pulmonary rounds today.  Chart reviewed current events noted.    · Appears comfortable and denies any shortness of breath.  She is afebrile and did not have any acute events overnight.    · Continue supplemental oxygen.  Currently on 4 L at rest and 8 L with activity.  · She is on CPAP at home.  She has primary pulmonary arterial hypertension  · Continue Tyvaso and triple therapy for pulmonary hypertension.  On mycophenolate and sildenafil.  · Bronchodilator treatment and routine respiratory care.  On Symbicort and DuoNeb.  · Completed antibiotics may be discharged home on oral antibiotics for pneumonia  · Follow-up with ANASTASIA Rowland in pulmonary clinic on 25 October.  · Patient was denied pulmonary transplant in [place].  · Plan for discharge home today.  He is  on baseline dose of prednisone which may be continued.  · CODE STATUS: Full.  Overall prognosis: Guarded.  · She is waiting for discharge today if alignments could be made.  We appreciate the consult.    This visit was performed by both a physician and an Advanced Practice RN.  I personally evaluated and examined the patient.  I performed all aspects of the medical decision making as documented.    Electronically signed by     Judith Woodson MD,  Pulmonologist/Intensivist   9/17/2022, 14:41 CDT

## 2022-09-17 NOTE — THERAPY TREATMENT NOTE
Acute Care - Physical Therapy Treatment Note  [facility]     Patient Name: Elaine Juárez  : 1960  MRN: [id number]  Today's Date: 2022      Visit Dx:     ICD-10-CM ICD-9-CM   1. Hypoxia  R09.02 799.02   2. Impaired mobility  Z74.09 799.89     Patient Active Problem List   Diagnosis   • CREST syndrome, partial    • Raynaud's phenomenon   • Pulmonary fibrosis prob sec underlying autoimmune disease   • PBC (primary biliary cirrhosis)   • Gastroesophageal reflux disease without esophagitis   • BMI 31.0-31.9,adult   • Obstructive sleep apnea on CPAP   • Chronic respiratory failure with hypoxia (HCC)   • History of adenomatous polyp of colon   • Globus sensation   • Environmental allergies   • PAH (pulmonary arterial hypertension) with portal hypertension (HCC)   • Hypokalemia   • Panic attack   • Paranoia (psychosis) ([facility])   • H/O noncompliance with medical treatment, presenting hazards to health   • Bipolar affective disorder, currently manic, moderate ([facility]), suspected   • Acute on chronic respiratory failure with hypoxia ([facility])   • Acute-on-chronic respiratory failure ([facility])   • Obesity (BMI 30-39.9)     Past Medical History:   Diagnosis Date   • Allergies    • Arthritis    • BMI 31.0-31.9,adult 2019   • Calcified granuloma of lung (HCC) 2019    Right lung   • CHF (congestive heart failure) ([facility])     denies   • Chronic respiratory failure with hypoxia (HCC) 2020   • Chronic respiratory failure with hypoxia, on home oxygen therapy ([facility])    • COVID-19 2022   • CREST syndrome (HCC)    • Environmental allergies 2022   • Gastroesophageal reflux disease without esophagitis 2019   • Obstructive sleep apnea on CPAP 2019   • Pulmonary fibrosis (HCC)    • Pulmonary hypertension (HCC)    • Short-term memory loss      Past Surgical History:   Procedure Laterality Date   • BREAST BIOPSY     • CARDIAC CATHETERIZATION N/A 2020    Procedure: Right Heart Cath;  Surgeon:  Thong Martin MD;  Location: [facility] CATH INVASIVE LOCATION;  Service: Cardiology;  Laterality: N/A;   • CHOLECYSTECTOMY     • COLONOSCOPY  05/11/2015    5 POLYPS   • COLONOSCOPY N/A 8/4/2021    Procedure: COLONOSCOPY WITH ANESTHESIA;  Surgeon: Michael Landin DO;  Location: [facility] ENDOSCOPY;  Service: Gastroenterology;  Laterality: N/A;  pre-hx polyps  post-colon polyps   • ENDOSCOPY N/A 8/4/2021    Procedure: ESOPHAGOGASTRODUODENOSCOPY WITH ANESTHESIA;  Surgeon: Michael Landin DO;  Location: [facility] ENDOSCOPY;  Service: Gastroenterology;  Laterality: N/A;  pre-dysphagia  post-normal  pcp-lanette aguila     PT Assessment (last 12 hours)     PT Evaluation and Treatment     Row Name 09/17/22 1439          Physical Therapy Time and Intention    Subjective Information no complaints  -AB     Document Type therapy note (daily note)  -AB     Mode of Treatment physical therapy  -AB     Row Name 09/17/22 1439          General Information    Existing Precautions/Restrictions fall;oxygen therapy device and L/min  -AB     Row Name 09/17/22 1439          Bed Mobility    Supine-Sit [place] (Bed Mobility) independent  -AB     Sit-Supine [place] (Bed Mobility) not tested  sittng EOB  -AB     Row Name 09/17/22 1439          Transfers    Sit-Stand [place] (Transfers) supervision  -AB     Stand-Sit [place] (Transfers) supervision  -AB     Row Name 09/17/22 1439          Gait/Stairs (Locomotion)    [place] Level (Gait) standby assist  -AB     Distance in Feet (Gait) 110 x 2 with sitting rest between  -AB     Row Name 09/17/22 1439          Positioning and Restraints    Pre-Treatment Position in bed  -AB     Post Treatment Position bed  -AB     In Bed sitting EOB;call light within reach  -AB           User Key  (r) = Recorded By, (t) = Taken By, (c) = Cosigned By    Initials Name Provider Type    AB Radha Gil PTA Physical Therapist Assistant                Physical Therapy Education                  Title: PT OT SLP Therapies (Done)     Topic: Physical Therapy (Done)     Point: Mobility training (Done)     Learning Progress Summary           Patient Acceptance, E,D, VU,NR by  at 9/16/2022 1106    Comment: pursed lip breathing    Acceptance, E, VU by [facility] at 9/14/2022 0817    Comment: PT frequency, benefits of activity                   Point: Home exercise program (Done)     Learning Progress Summary           Patient Acceptance, E,D, VU,NR by  at 9/16/2022 1106    Comment: pursed lip breathing    Acceptance, E,D, DU,NR by  at 9/15/2022 1555    Comment: Slow deep breathing                   Point: Body mechanics (Done)     Learning Progress Summary           Patient Acceptance, E,D, VU,NR by  at 9/16/2022 1106    Comment: pursed lip breathing                   Point: Precautions (Done)     Learning Progress Summary           Patient Acceptance, E,D, VU,NR by  at 9/16/2022 1106    Comment: pursed lip breathing                               User Key     Initials Effective Dates Name Provider Type Discipline     08/02/16 -  Jacinta Craven, PTA Physical Therapist Assistant PT     06/16/21 -  Dafne Licona PTA Physical Therapist Assistant PT    [facility] 08/29/22 -  Jing Cagle PT Student PT Student PT              PT Recommendation and Plan         Outcome Measures     Row Name 09/16/22 1025 09/15/22 1500          How much help from another person do you currently need...    Turning from your back to your side while in flat bed without using bedrails? 4  - 4  -JABARI     Moving from lying on back to sitting on the side of a flat bed without bedrails? 4  - 4  -JABARI     Moving to and from a bed to a chair (including a wheelchair)? 4  - 4  -JABARI     Standing up from a chair using your arms (e.g., wheelchair, bedside chair)? 4  - 4  -JABARI     Climbing 3-5 steps with a railing? 3  - 3  -JABARI     To walk in hospital room? 4  - 4  -JABARI     AM-PAC 6 Clicks Score (PT) 23  - 23  -JABARI             Functional Assessment    Outcome Measure Options AM-PAC 6 Clicks Basic Mobility (PT)  - --           User Key  (r) = Recorded By, (t) = Taken By, (c) = Cosigned By    Initials Name Provider Type    Jacinta Lowry, ANDER Physical Therapist Assistant    Dafne Sterling PTA Physical Therapist Assistant                 Time Calculation:    PT Charges     Row Name 09/17/22 1439             Time Calculation    Start Time 1439  -AB      Stop Time 1503  -AB      Time Calculation (min) 24 min  -AB      PT Received On 09/17/22  -AB              Time Calculation- PT    Total Timed Code Minutes- PT 24 minute(s)  -AB            User Key  (r) = Recorded By, (t) = Taken By, (c) = Cosigned By    Initials Name Provider Type    Radha Zuniga PTA Physical Therapist Assistant              Therapy Charges for Today     Code Description Service Date Service Provider Modifiers Qty    60153030189 HC GAIT TRAINING EA 15 MIN 9/17/2022 Radha Gil PTA GP 2          PT G-Codes  Outcome Measure Options: AM-PAC 6 Clicks Basic Mobility (PT)  AM-PAC 6 Clicks Score (PT): 22    Radha Gil PTA  9/17/2022

## 2022-09-18 NOTE — OUTREACH NOTE
Prep Survey    Flowsheet Row Responses   [facility] facility patient discharged from? [facility]   Is LACE score < 7 ? No   Emergency Room discharge w/ pulse ox? No   Eligibility Readm Mgmt   Discharge diagnosis Acute on chronic respiratory failure with hypoxia    Does the patient have one of the following disease processes/diagnoses(primary or secondary)? Other   Does the patient have Home health ordered? Yes   What is the Home health agency?  [facility]   Is there a DME ordered? No   Prep survey completed? Yes          LUCAS BOURNE - Registered Nurse

## 2022-09-18 NOTE — THERAPY DISCHARGE NOTE
Acute Care - Physical Therapy Discharge Summary  [facility]       Patient Name: Elaine Juárez  : 1960  MRN: [id number]    Today's Date: 2022                 Admit Date: 2022      PT Recommendation and Plan    Visit Dx:    ICD-10-CM ICD-9-CM   1. Hypoxia  R09.02 799.02   2. Impaired mobility  Z74.09 799.89   3. Obesity (BMI 30-39.9)  E66.9 278.00   4. Acute on chronic respiratory failure, unspecified whether with hypoxia or hypercapnia (HCC)  J96.20 518.84   5. Acute on chronic respiratory failure with hypoxia (HCC)  J96.21 518.84     799.02   6. Bipolar affective disorder, currently manic, moderate ([facility]), suspected  F31.12 296.42   7. H/O noncompliance with medical treatment, presenting hazards to health  Z91.19 V15.81   8. Paranoia (psychosis) ([facility])  F22 297.1   9. Panic attack  F41.0 300.01   10. Hypokalemia  E87.6 276.8   11. PAH (pulmonary arterial hypertension) with portal hypertension (HCC)  I27.21 416.8    K76.6    12. Environmental allergies  Z91.09 V15.09   13. Globus sensation  R09.89 784.99   14. History of adenomatous polyp of colon  Z86.010 V12.72   15. Chronic respiratory failure with hypoxia (HCC)  J96.11 518.83     799.02   16. Obstructive sleep apnea on CPAP  G47.33 327.23    Z99.89 V46.8   17. BMI 31.0-31.9,adult  Z68.31 V85.31   18. Gastroesophageal reflux disease without esophagitis  K21.9 530.81   19. PBC (primary biliary cirrhosis)  K74.3 571.6   20. Pulmonary fibrosis prob sec underlying autoimmune disease  J84.10 515   21. Raynaud's phenomenon without gangrene  I73.00 443.0   22. CREST syndrome, partial   M34.1 710.1        Outcome Measures     Row Name 22 1025 09/15/22 1500          How much help from another person do you currently need...    Turning from your back to your side while in flat bed without using bedrails? 4  -MF 4  -JABARI     Moving from lying on back to sitting on the side of a flat bed without bedrails? 4  -MF 4  -JABARI     Moving to and from a bed to  a chair (including a wheelchair)? 4  - 4  -JABARI     Standing up from a chair using your arms (e.g., wheelchair, bedside chair)? 4  -MF 4  -JABARI     Climbing 3-5 steps with a railing? 3  -MF 3  -JABARI     To walk in hospital room? 4  -MF 4  -JABARI     AM-PAC 6 Clicks Score (PT) 23  - 23  -JABARI            Functional Assessment    Outcome Measure Options AM-PAC 6 Clicks Basic Mobility (PT)  - --           User Key  (r) = Recorded By, (t) = Taken By, (c) = Cosigned By    Initials Name Provider Type    JABARI Jacinta Craven, ANDER Physical Therapist Assistant    Dafne Sterling PTA Physical Therapist Assistant                     PT Rehab Goals     Row Name 09/18/22 0654             Transfer Goal 1 (PT)    Activity/Assistive Device (Transfer Goal 1, PT) bed-to-chair/chair-to-bed;sit-to-stand/stand-to-sit  -AB      [place] Level/Cues Needed (Transfer Goal 1, PT) independent  -AB      Time Frame (Transfer Goal 1, PT) 10 days;long term goal (LTG)  -AB      Progress/Outcome (Transfer Goal 1, PT) goal not met  -AB              Gait Training Goal 1 (PT)    Activity/Assistive Device (Gait Training Goal 1, PT) gait (walking locomotion);increase endurance/gait distance;increase energy conservation  -AB      [place] Level (Gait Training Goal 1, PT) contact guard required;minimum assist (75% or more patient effort)  -AB      Distance (Gait Training Goal 1, PT) 50 ft  -AB      Time Frame (Gait Training Goal 1, PT) long term goal (LTG);10 days  -AB      Progress/Outcome (Gait Training Goal 1, PT) goal met  -AB            User Key  (r) = Recorded By, (t) = Taken By, (c) = Cosigned By    Initials Name Provider Type Discipline    AB Radha Gil PTA Physical Therapist Assistant PT                Therapy Charges for Today     Code Description Service Date Service Provider Modifiers Qty    77888890337 HC GAIT TRAINING EA 15 MIN 9/17/2022 Radha Gil PTA GP 2          PT Discharge Summary  Anticipated Discharge  Disposition (PT): home with assist  Reason for Discharge: Discharge from facility  Outcomes Achieved: Refer to plan of care for updates on goals achieved  Discharge Destination: Home with assist      Radha Gil, PTA   9/18/2022

## 2022-09-19 ENCOUNTER — HOME HEALTH ADMISSION (OUTPATIENT)
Dept: HOME HEALTH SERVICES | Facility: HOME HEALTHCARE | Age: 62
End: 2022-09-19

## 2022-09-19 ENCOUNTER — TELEPHONE (OUTPATIENT)
Dept: GASTROENTEROLOGY | Facility: CLINIC | Age: 62
End: 2022-09-19

## 2022-09-19 NOTE — DISCHARGE PLACEMENT REQUEST
"Jose Juárez (62 y.o. Female)             Date of Birth   1960    Social Security Number       Address   [address] [place] [address] [address] [place] [address]    Home Phone   [phone]    MRN   [id number]       [facility]    Marital Status                               Admission Date   9/11/22    Admission Type   Emergency    Admitting Provider   Andreas Whiteside MD    Attending Provider       Department, Room/Bed   [facility] 4B, 446/1       Discharge Date   9/17/2022    Discharge Disposition   Home or Self Care    Discharge Destination                               Attending Provider: (none)   Allergies: Codeine, Latex    Isolation: None   Infection: None   Code Status: Prior   Advance Care Planning Activity    Ht: 160 cm (63\")   Wt: 78.5 kg (173 lb)    Admission Cmt: None   Principal Problem: Acute on chronic respiratory failure with hypoxia (HCC) [J96.21]                 Active Insurance as of 9/11/2022     Primary Coverage     Payor Plan Insurance Group Employer/Plan Group    [employer] HMO [id number]     Payor Plan Address Payor Plan Phone Number Payor Plan Fax Number Effective Dates    [address] [phone]  1/1/2022 - None Entered    [place] [address]       Subscriber Name Subscriber Birth Date Member ID       JOSE JUÁREZ 1960 [id number]                 Emergency Contacts      (Rel.) Home Phone Work Phone Mobile Phone    JuárezGarrison (Spouse) [phone] -- [phone]    elodia figueroa (Daughter) [phone] -- --    vipul daniels (Daughter) [phone] -- --            Insurance Information                [employer] HMO Phone: [phone]    Subscriber: Jose Juárez Subscriber#: [id number]    Group#: [id number] Precert#: --             History & Physical      Fan Neri MD at 09/11/22 2148              [facility] Medicine Services  HISTORY " AND PHYSICAL    Date of Admission: 9/11/2022  Primary Care Physician: Aaron Stoddard MD    Subjective     Chief Complaint: Shortness of breath    History of Present Illness  62 year old female with PMH of CREST syndrome, pulmonary fibrosis, pulmonary hypertension, chronic respiratory failure high flow 8 lpm home oxygen, CHAYA/CPAP, that presents to the ER with complaints of worsening shortness of breath for over 2 weeks. She has not felt well for about a month. Follow with Dr Griffith whom started her on Tyvaso for PAH/ILD this August. She follow with rheumatology also and is on Cellcept and prednisone. CXR and CT chest show chronic changes.     States was declined from lung transplant at [facility] and is currently waiting to interview at [facility].     Review of Systems   Otherwise complete ROS reviewed and negative except as mentioned in the HPI.    Past Medical History:   Past Medical History:   Diagnosis Date   • Allergies    • Arthritis    • BMI 31.0-31.9,adult 06/04/2019   • Calcified granuloma of lung (HCC) 06/04/2019    Right lung   • CHF (congestive heart failure) (HCC)     denies   • Chronic respiratory failure with hypoxia (HCC) 08/05/2020   • Chronic respiratory failure with hypoxia, on home oxygen therapy (HCC)    • COVID-19 02/01/2022   • CREST syndrome (HCC)    • Environmental allergies 02/01/2022   • Gastroesophageal reflux disease without esophagitis 06/04/2019   • Obstructive sleep apnea on CPAP 06/04/2019   • Pulmonary fibrosis (HCC)    • Pulmonary hypertension (HCC)    • Short-term memory loss      Past Surgical History:  Past Surgical History:   Procedure Laterality Date   • BREAST BIOPSY     • CARDIAC CATHETERIZATION N/A 7/22/2020    Procedure: Right Heart Cath;  Surgeon: Thong Martin MD;  Location:  [facility]ATH [facility];  Service: Cardiology;  Laterality: N/A;   • CHOLECYSTECTOMY     • COLONOSCOPY  05/11/2015    5 POLYPS   • COLONOSCOPY N/A 8/4/2021    Procedure:  COLONOSCOPY WITH ANESTHESIA;  Surgeon: Michael Landin DO;  Location: [facility] ENDOSCOPY;  Service: Gastroenterology;  Laterality: N/A;  pre-hx polyps  post-colon polyps   • ENDOSCOPY N/A 8/4/2021    Procedure: ESOPHAGOGASTRODUODENOSCOPY WITH ANESTHESIA;  Surgeon: Michael Landin DO;  Location: [facility] ENDOSCOPY;  Service: Gastroenterology;  Laterality: N/A;  pre-dysphagia  post-normal  pcp-lanette aguila     Social History:  reports that she has never smoked. She has never used smokeless tobacco. She reports that she does not drink alcohol and does not use drugs.    Family History: family history includes Cirrhosis in her sister; Liver cancer in her brother; No Known Problems in her father and mother.       Allergies:  Allergies   Allergen Reactions   • Codeine Nausea And Vomiting   • Latex Rash       Medications:  Prior to Admission medications    Medication Sig Start Date End Date Taking? Authorizing Provider   busPIRone (BUSPAR) 10 MG tablet Take 10 mg by mouth 2 (Two) Times a Day As Needed.    ProviderLatanya MD   clonazePAM (KlonoPIN) 0.5 MG tablet Take 0.5 mg by mouth 2 (Two) Times a Day As Needed for Anxiety.    ProviderLatanya MD   fexofenadine (ALLEGRA) 180 MG tablet Take 180 mg by mouth Daily As Needed (allergies).    ProviderLatanya MD   ipratropium-albuterol (DUO-NEB) 0.5-2.5 mg/3 ml nebulizer Take 3 mL by nebulization 4 (Four) Times a Day As Needed for Wheezing or Shortness of Air. 9/8/22   Fela Blackman APRN   Macitentan (Opsumit) 10 MG tablet Take 1 tablet by mouth Daily. 8/16/22   Fela Blackman APRN   mycophenolate (CELLCEPT) 500 MG tablet Take 1,000 mg by mouth 2 (Two) Times a Day.    ProviderLatanya MD   predniSONE (DELTASONE) 5 MG tablet Take 1 tablet by mouth Daily for 90 days. 8/25/22 11/23/22  Fela Blackman APRN   traZODone (DESYREL) 100 MG tablet Take 1 tablet by mouth Every Night for 30 days. 4/22/22 5/22/22  Raffi Cotter MD   Treprostinil 16 MCG  "powder Inhale 16 mcg 4 (Four) Times a Day.    Provider, MD Latanya   ursodiol (ACTIGALL) 300 MG capsule Take 1 capsule by mouth 2 (Two) Times a Day. 7/21/22   Micah Barton APRN   venlafaxine (EFFEXOR) 75 MG tablet Take 75 mg by mouth Daily.    Provider, MD EMILIA Pelaez have utilized all available immediate resources to obtain, update, and review the patient's current medications.    Objective     Vital Signs: /78   Pulse (!) 129   Temp 97.9 °F (36.6 °C) (Oral)   Resp (!) 40   Ht 160 cm (63\")   Wt 81.5 kg (179 lb 11.2 oz)   SpO2 100%   BMI 31.83 kg/m²   Physical Exam  Vitals reviewed.   Constitutional:       General: She is not in acute distress.     Appearance: She is well-developed. She is ill-appearing. She is not toxic-appearing.   HENT:      Head: Normocephalic and atraumatic.      Right Ear: External ear normal.      Left Ear: External ear normal.      Mouth/Throat:      Mouth: Mucous membranes are dry.      Pharynx: Oropharynx is clear.   Eyes:      General:         Right eye: No discharge.         Left eye: No discharge.      Extraocular Movements: Extraocular movements intact.      Conjunctiva/sclera: Conjunctivae normal.      Pupils: Pupils are equal, round, and reactive to light.   Neck:      Vascular: No JVD.   Cardiovascular:      Rate and Rhythm: Regular rhythm. Tachycardia present.      Pulses: Normal pulses.      Heart sounds: Normal heart sounds. No murmur heard.    No friction rub. No gallop.   Pulmonary:      Effort: No respiratory distress.      Breath sounds: No stridor. Rhonchi and rales present. No wheezing.   Chest:      Chest wall: No tenderness.   Abdominal:      General: Bowel sounds are normal. There is no distension.      Palpations: Abdomen is soft.      Tenderness: There is no abdominal tenderness. There is no guarding or rebound.      Hernia: No hernia is present.   Musculoskeletal:         General: No swelling, tenderness or deformity. Normal range of " motion.      Cervical back: Normal range of motion and neck supple. No rigidity or tenderness. No muscular tenderness.      Right lower leg: No edema.      Left lower leg: No edema.   Skin:     General: Skin is warm and dry.      Findings: Lesion (hypervascular macules in cheeks) present. No erythema or rash.   Neurological:      General: No focal deficit present.      Mental Status: She is alert and oriented to person, place, and time.      Cranial Nerves: No cranial nerve deficit.      Sensory: No sensory deficit.      Motor: No weakness or abnormal muscle tone.      Deep Tendon Reflexes: Reflexes normal.   Psychiatric:         Mood and Affect: Mood normal.         Behavior: Behavior normal.     Results Reviewed:  Lab Results (last 24 hours)     Procedure Component Value Units Date/Time    COVID-19,[facility] IN-HOUSE,Nasal Swab No Transport Media 3-4 HR TAT - Swab, Nasal Cavity [[id number]]  (Normal) Collected: 09/11/22 1909    Specimen: Swab from Nasal Cavity Updated: 09/11/22 2028     COVID19 Not Detected    Narrative:      Fact sheet for providers: https://www.fda.gov/media/950926/download     Fact sheet for patients: https://www.fda.gov/media/049736/download    Test performed by PCR.    Consider negative results in combination with clinical observations, patient history, and epidemiological information.    Blood Culture - Blood, Arm, Left [[id number]] Collected: 09/11/22 1909    Specimen: Blood from Arm, Left Updated: 09/11/22 2008    [place] Draw [[id number]] Collected: 09/11/22 1858    Specimen: Blood from Arm, Right Updated: 09/11/22 2002    Narrative:      The following orders were created for panel order [place] Draw.  Procedure                               Abnormality         Status                     ---------                               -----------         ------                     Green Top (Gel)[[id number]]                                  Final result               Lavender Top[[id number]]                                      Final result               Red Top[[id number]]                                          Final result               [place] Blood Culture Willie...[[id number]]                      Final result               Gray Top[[id number]]                                         In process                 Light Blue Top[[id number]]                                   Final result                 Please view results for these tests on the individual orders.    Green Top (Gel) [[id number]] Collected: 09/11/22 1858    Specimen: Blood from Arm, Right Updated: 09/11/22 2002     Extra Tube Hold for add-ons.     Comment: Auto resulted.       [place] Blood Culture Bottle Set [[id number]] Collected: 09/11/22 1858    Specimen: Blood from Arm, Right Updated: 09/11/22 2002     Extra Tube Hold for add-ons.     Comment: Auto resulted.       Lavender Top [[id number]] Collected: 09/11/22 1858    Specimen: Blood from Arm, Right Updated: 09/11/22 2002     Extra Tube hold for add-on     Comment: Auto resulted       Red Top [[id number]] Collected: 09/11/22 1858    Specimen: Blood from Arm, Right Updated: 09/11/22 2002     Extra Tube Hold for add-ons.     Comment: Auto resulted.       Light Blue Top [[id number]] Collected: 09/11/22 1858    Specimen: Blood from Arm, Right Updated: 09/11/22 2002     Extra Tube Hold for add-ons.     Comment: Auto resulted       Blood Culture - Blood, Arm, Right [[id number]] Collected: 09/11/22 1858    Specimen: Blood from Arm, Right Updated: 09/11/22 1942    Lactic Acid, Plasma [[id number]]  (Abnormal) Collected: 09/11/22 1858    Specimen: Blood from Arm, Right Updated: 09/11/22 1937     Lactate 2.4 mmol/L     Comprehensive Metabolic Panel [[id number]]  (Abnormal) Collected: 09/11/22 1858    Specimen: Blood from Arm, Right Updated: 09/11/22 1928     Glucose 134 mg/dL      BUN 9 mg/dL      Creatinine 0.88 mg/dL      Sodium 140 mmol/L      Potassium 3.2 mmol/L      Chloride 103 mmol/L      CO2 23.0 mmol/L       Calcium 9.0 mg/dL      Total Protein 7.2 g/dL      Albumin 4.20 g/dL      ALT (SGPT) 14 U/L      AST (SGOT) 20 U/L      Alkaline Phosphatase 105 U/L      Total Bilirubin 0.4 mg/dL      Globulin 3.0 gm/dL      A/G Ratio 1.4 g/dL      BUN/Creatinine Ratio 10.2     Anion Gap 14.0 mmol/L      eGFR 73.5 mL/min/1.73      Comment: National Kidney Foundation and American Society of Nephrology (ASN) Task Force recommended calculation based on the Chronic Kidney Disease Epidemiology Collaboration (CKD-EPI) equation refit without adjustment for race.       Narrative:      GFR Normal >60  Chronic Kidney Disease <60  Kidney Failure <15      Troponin [[id number]]  (Normal) Collected: 09/11/22 1858    Specimen: Blood from Arm, Right Updated: 09/11/22 1925     Troponin T <0.010 ng/mL     Narrative:      Troponin T Reference Range:  <= 0.03 ng/mL-   Negative for AMI  >0.03 ng/mL-     Abnormal for myocardial necrosis.  Clinicians would have to utilize clinical acumen, EKG, Troponin and serial changes to determine if it is an Acute Myocardial Infarction or myocardial injury due to an underlying chronic condition.       Results may be falsely decreased if patient taking Biotin.      Magnesium [[id number]]  (Normal) Collected: 09/11/22 1858    Specimen: Blood from Arm, Right Updated: 09/11/22 1922     Magnesium 2.1 mg/dL     CBC & Differential [[id number]]  (Abnormal) Collected: 09/11/22 1858    Specimen: Blood from Arm, Right Updated: 09/11/22 1912    Narrative:      The following orders were created for panel order CBC & Differential.  Procedure                               Abnormality         Status                     ---------                               -----------         ------                     CBC Auto Differential[[id number]]        Abnormal            Final result                 Please view results for these tests on the individual orders.    CBC Auto Differential [[id number]]  (Abnormal) Collected: 09/11/22 1858     Specimen: Blood from Arm, Right Updated: 09/11/22 1912     WBC 12.51 10*3/mm3      RBC 5.32 10*6/mm3      Hemoglobin 13.5 g/dL      Hematocrit 44.4 %      MCV 83.5 fL      MCH 25.4 pg      MCHC 30.4 g/dL      RDW 13.6 %      RDW-SD 41.1 fl      MPV 11.5 fL      Platelets 252 10*3/mm3      Neutrophil % 82.0 %      Lymphocyte % 10.2 %      Monocyte % 5.4 %      Eosinophil % 1.0 %      Basophil % 0.8 %      Immature Grans % 0.6 %      Neutrophils, Absolute 10.25 10*3/mm3      Lymphocytes, Absolute 1.28 10*3/mm3      Monocytes, Absolute 0.68 10*3/mm3      Eosinophils, Absolute 0.13 10*3/mm3      Basophils, Absolute 0.10 10*3/mm3      Immature Grans, Absolute 0.07 10*3/mm3      nRBC 0.0 /100 WBC     [place] Top [[id number]] Collected: 09/11/22 1858    Specimen: Blood from Arm, Right Updated: 09/11/22 1906        Imaging Results (Last 24 Hours)     Procedure Component Value Units Date/Time    CT Angiogram Chest [[id number]] Collected: 09/11/22 2024     Updated: 09/11/22 2030    Narrative:      EXAMINATION: CT ANGIOGRAM CHEST-      9/11/2022 8:10 PM CDT     HISTORY: Shortness of breath. Hypoxia. History of pulmonary  hypertension. Pulmonary fibrosis.     In order to have a CT radiation dose as low as reasonably achievable  Automated Exposure Control was utilized for adjustment of the mA and/or  KV according to patient size.     DLP in mGycm= 202.     CT angiogram chest.  CT angiography protocol.   CT imaging with bolus IV contrast injection.   Under concurrent supervision axial, sagittal, coronal, and  three-dimensional data sets were constructed. Borderline cardiomegaly.  Pulmonary artery enlargement.  No pulmonary embolism.     Stable mildly prominent mediastinal lymph nodes.     No thoracic aortic aneurysm or dissection.     Pulmonary fibrosis and bronchiectasis.     No pneumothorax or pleural effusion.     No acute pneumonia is seen.     Summary:  1. No pulmonary embolism.  2. Stable appearance of chronic lung changes.                                    This report was finalized on 09/11/2022 20:26 by Dr. Lorenzo Ruff MD.    XR Chest 1 View [[id number]] Collected: 09/11/22 1948     Updated: 09/11/22 1952    Narrative:      EXAMINATION: XR CHEST 1 VW-     9/11/2022 7:08 PM CDT     HISTORY: Hypoxia.     One view chest x-ray.     Comparison is made with 04/20/2022.     Heart size is magnified though there does appear to be mild cardiomegaly  Mild infiltrate in both lungs compatible with mild diffuse edema.     No focal consolidation.  No pneumothorax.     Summary:  1. Mild cardiac enlargement and mild diffuse infiltrate for which edema  is favored.     This report was finalized on 09/11/2022 19:49 by Dr. Lorenzo Ruff MD.        I have personally reviewed and interpreted the radiology studies and ECG obtained at time of admission.     Assessment / Plan     Assessment:   Active Hospital Problems    Diagnosis    • **Acute on chronic respiratory failure with hypoxia (HCC)    • Hypokalemia    • PAH (pulmonary arterial hypertension) with portal hypertension (HCC)    • Obstructive sleep apnea on CPAP    • PBC (primary biliary cirrhosis)    • CREST syndrome, partial     • Raynaud's phenomenon    • Pulmonary fibrosis prob sec underlying autoimmune disease      Plan:   Admit to critical care  Vitals per protocol  Diet as tolerated  Oxygen high flow to maintain saturation 88-92%  IVF saline lock    Douneb QID   Albuterol q4h prn  Pulmicort nebulized BID  Solumedrol 62.5 mg IVP BID  Cefepime/Vancomycin given empirically in EM  Pulmonology consult in AM      Replace potassium    NIV > High flow oxygen  Check Abg     DVT prophylaxis > Lovenox 40 mg SQ daily    Code Status/Advanced Care Plan: Full    The patient's surrogate decision maker is see records.     I discussed my findings and recommendations with the patient and daughters at bedside.    Estimated length of stay is over 2 midnights.     The patient was seen and examined by me on 9/11/2022 at  2032.      Electronically signed by Fan Neri MD, 09/11/22, 22:06 CDT.              Electronically signed by Fan Neri MD at 09/11/22 2206       Referral Orders (last 72 hours) (72h ago, onward)     Start     Ordered    09/17/22 0000  Ambulatory Referral to Home Health        Question Answer Comment   Face to Face Visit Date: 9/17/2022    Follow-up provider for Plan of Care? I treated the patient in an acute care facility and will not continue treatment after discharge.    Follow-up provider: ARNOLD STODDARD    Reason/Clinical Findings Weakness/pulmonary hypertension/pulmonary fibrosis    Describe mobility limitations that make leaving home difficult: Weakness    Nursing/Therapeutic Services Requested Skilled Nursing    Nursing/Therapeutic Services Requested Physical Therapy    Nursing/Therapeutic Services Requested Occupational Therapy    Skilled nursing orders: Medication education    Skilled nursing orders: O2 instruction    Skilled nursing orders: COPD management    Skilled nursing orders: Cardiopulmonary assessments    Skilled nursing orders: Neurovascular assessments    PT orders: Total joint pathway    PT orders: Therapeutic exercise    PT orders: Gait Training    PT orders: Transfer training    PT orders: Strengthening    PT orders: Home safety assessment    Weight Bearing Status As Tolerated    Occupational orders: Activities of daily living    Occupational orders: Energy conservation    Occupational orders: Strengthening    Occupational orders: Cognition    Occupational orders: Fine motor    Occupational orders: Home safety assessment    Frequency: 1 Week 1        09/17/22 1602                     Discharge Summary      Andreas Whiteside MD at 09/17/22 1545              [facility] Medicine Services  DISCHARGE SUMMARY       Date of Admission: 9/11/2022  Date of Discharge:  9/17/2022  Primary Care Physician: Arnold Stoddard MD    Presenting  Problem/Chief Complaint:    Final Discharge Diagnoses:  Active Hospital Problems    Diagnosis    • **Acute on chronic respiratory failure with hypoxia (HCC)    • Acute-on-chronic respiratory failure (HCC)    • Obesity (BMI 30-39.9)    • Hypokalemia    • PAH (pulmonary arterial hypertension) with portal hypertension (HCC)    • Obstructive sleep apnea on CPAP    • PBC (primary biliary cirrhosis)    • CREST syndrome, partial     • Raynaud's phenomenon    • Pulmonary fibrosis prob sec underlying autoimmune disease        Consults: Consult pulmonary .    Pertinent Test Results:   Results for orders placed during the hospital encounter of 09/11/22    Adult Transthoracic Echo Complete W/ Cont if Necessary Per Protocol    Interpretation Summary  · Left ventricular ejection fraction appears to be 56 - 60%. Left ventricular systolic function is normal.  · Left ventricular diastolic function is consistent with (grade I) impaired relaxation.  · The right ventricular cavity is severely dilated.  · Severely reduced right ventricular systolic function noted.  · The right atrial cavity is severely dilated.  · Estimated right ventricular systolic pressure from tricuspid regurgitation is markedly elevated (>55 mmHg).  · Moderate to severe pulmonary hypertension is present.  · There is a small (<1cm) pericardial effusion. The effusion is fluid filled. There is no evidence of cardiac tamponade      Imaging Results (All)     Procedure Component Value Units Date/Time    CT Angiogram Chest [[id number]] Collected: 09/11/22 2024     Updated: 09/11/22 2030    Narrative:      EXAMINATION: CT ANGIOGRAM CHEST-      9/11/2022 8:10 PM CDT     HISTORY: Shortness of breath. Hypoxia. History of pulmonary  hypertension. Pulmonary fibrosis.     In order to have a CT radiation dose as low as reasonably achievable  Automated Exposure Control was utilized for adjustment of the mA and/or  KV according to patient size.     DLP in mGycm= 202.     CT  angiogram chest.  CT angiography protocol.   CT imaging with bolus IV contrast injection.   Under concurrent supervision axial, sagittal, coronal, and  three-dimensional data sets were constructed. Borderline cardiomegaly.  Pulmonary artery enlargement.  No pulmonary embolism.     Stable mildly prominent mediastinal lymph nodes.     No thoracic aortic aneurysm or dissection.     Pulmonary fibrosis and bronchiectasis.     No pneumothorax or pleural effusion.     No acute pneumonia is seen.     Summary:  1. No pulmonary embolism.  2. Stable appearance of chronic lung changes.                                   This report was finalized on 09/11/2022 20:26 by Dr. Lorenzo Ruff MD.    XR Chest 1 View [[id number]] Collected: 09/11/22 1948     Updated: 09/11/22 1952    Narrative:      EXAMINATION: XR CHEST 1 VW-     9/11/2022 7:08 PM CDT     HISTORY: Hypoxia.     One view chest x-ray.     Comparison is made with 04/20/2022.     Heart size is magnified though there does appear to be mild cardiomegaly  Mild infiltrate in both lungs compatible with mild diffuse edema.     No focal consolidation.  No pneumothorax.     Summary:  1. Mild cardiac enlargement and mild diffuse infiltrate for which edema  is favored.     This report was finalized on 09/11/2022 19:49 by Dr. Lorenzo Ruff MD.          LAB RESULTS:      Lab 09/17/22  0538 09/16/22  0352 09/15/22  0330 09/14/22  0350 09/13/22  0330 09/12/22  0949 09/11/22  2213 09/11/22  1858   WBC 7.11 7.76 7.41 8.61 7.69  --   --  12.51*   HEMOGLOBIN 10.8* 11.5* 11.5* 10.8* 11.2*  --   --  13.5   HEMATOCRIT 34.6 36.1 38.5 33.9* 36.6  --   --  44.4   PLATELETS 176 197 182 182 184  --   --  252   NEUTROS ABS  --   --   --   --   --   --   --  10.25*   IMMATURE GRANS (ABS)  --   --   --   --   --   --   --  0.07*   LYMPHS ABS  --   --   --   --   --   --   --  1.28   MONOS ABS  --   --   --   --   --   --   --  0.68   EOS ABS  --   --   --   --   --   --   --  0.13   MCV 82.8 82.4 85.0  81.9 83.9  --   --  83.5   LACTATE  --   --   --   --   --  2.2* 1.2 2.4*         Lab 09/17/22  0538 09/16/22  0352 09/15/22  0330 09/14/22  0350 09/13/22  0330 09/11/22  1858   SODIUM 141 140 140 139 144 140   POTASSIUM 3.7 3.6 3.6 4.3 3.9 3.2*   CHLORIDE 101 103 103 104 106 103   CO2 32.0* 29.0 28.0 28.0 25.0 23.0   ANION GAP 8.0 8.0 9.0 7.0 13.0 14.0   BUN 12 13 14 15 13 9   CREATININE 0.84 0.75 0.79 0.74 0.77 0.88   EGFR 77.7 89.0 83.6 90.5 86.3 73.5   GLUCOSE 92 104* 86 155* 147* 134*   CALCIUM 10.0 8.7 8.7 8.5* 8.8 9.0   MAGNESIUM  --   --   --   --   --  2.1   HEMOGLOBIN A1C  --   --   --   --  5.40  --    TSH  --   --   --   --  0.597  --          Lab 09/11/22  1858   TOTAL PROTEIN 7.2   ALBUMIN 4.20   GLOBULIN 3.0   ALT (SGPT) 14   AST (SGOT) 20   BILIRUBIN 0.4   ALK PHOS 105         Lab 09/12/22  1106 09/11/22  1858   PROBNP 7,417.0*  --    TROPONIN T  --  <0.010         Lab 09/13/22  0330   CHOLESTEROL 169   LDL CHOL 106*   HDL CHOL 45   TRIGLYCERIDES 99             Lab 09/11/22 2218   PH, ARTERIAL 7.430   PCO2, ARTERIAL 36.5   PO2 ART 65.4*   O2 SATURATION ART 94.2   HCO3 ART 24.2   BASE EXCESS ART 0.2     Brief Urine Lab Results  (Last result in the past 365 days)      Color   Clarity   Blood   Leuk Est   Nitrite   Protein   CREAT   Urine HCG        09/11/22 2213 Yellow   Clear   Negative   Negative   Negative   Negative               Microbiology Results (last 10 days)     Procedure Component Value - Date/Time    MRSA Screen, PCR (Inpatient) - Swab, Nares [[id number]]  (Normal) Collected: 09/12/22 1746    Lab Status: Final result Specimen: Swab from Nares Updated: 09/12/22 1939     MRSA PCR No MRSA Detected    Narrative:      The negative predictive value of this diagnostic test is high and should only be used to consider de-escalating anti-MRSA therapy. A positive result may indicate colonization with MRSA and must be correlated clinically.    Respiratory Panel PCR w/COVID-19(SARS-CoV-2)  KARISHMA/LUIS ARMANDO/DENYS/PAD/COR/MAD/DAVID In-House, NP Swab in UTM/VTM, 3-4 HR TAT - Swab, Nasopharynx [[id number]]  (Normal) Collected: 09/12/22 1319    Lab Status: Final result Specimen: Swab from Nasopharynx Updated: 09/12/22 1455     ADENOVIRUS, PCR Not Detected     Coronavirus 229E Not Detected     Coronavirus HKU1 Not Detected     Coronavirus NL63 Not Detected     Coronavirus OC43 Not Detected     COVID19 Not Detected     Human Metapneumovirus Not Detected     Human Rhinovirus/Enterovirus Not Detected     Influenza A PCR Not Detected     Influenza B PCR Not Detected     Parainfluenza Virus 1 Not Detected     Parainfluenza Virus 2 Not Detected     Parainfluenza Virus 3 Not Detected     Parainfluenza Virus 4 Not Detected     RSV, PCR Not Detected     Bordetella pertussis pcr Not Detected     Bordetella parapertussis PCR Not Detected     Chlamydophila pneumoniae PCR Not Detected     Mycoplasma pneumo by PCR Not Detected    Narrative:      In the setting of a positive respiratory panel with a viral infection PLUS a negative procalcitonin without other underlying concern for bacterial infection, consider observing off antibiotics or discontinuation of antibiotics and continue supportive care. If the respiratory panel is positive for atypical bacterial infection (Bordetella pertussis, Chlamydophila pneumoniae, or Mycoplasma pneumoniae), consider antibiotic de-escalation to target atypical bacterial infection.    COVID-19,[facility] IN-HOUSE,Nasal Swab No Transport Media 3-4 HR TAT - Swab, Nasal Cavity [[id number]]  (Normal) Collected: 09/11/22 1909    Lab Status: Final result Specimen: Swab from Nasal Cavity Updated: 09/11/22 2028     COVID19 Not Detected    Narrative:      Fact sheet for providers: https://www.fda.gov/media/840698/download     Fact sheet for patients: https://www.fda.gov/media/706145/download    Test performed by PCR.    Consider negative results in combination with clinical observations, patient history, and  epidemiological information.    Blood Culture - Blood, Arm, Left [[id number]]  (Abnormal) Collected: 09/11/22 1909    Lab Status: Final result Specimen: Blood from Arm, Left Updated: 09/13/22 0518     Blood Culture Staphylococcus, coagulase negative     Isolated from Aerobic and Anaerobic Bottles     Gram Stain Anaerobic Bottle Gram positive cocci in clusters      Aerobic Bottle Gram positive cocci in clusters    Narrative:      Probable contaminant requires clinical correlation, susceptibility not performed unless requested by physician.      Blood Culture ID, PCR - Blood, Arm, Left [[id number]]  (Abnormal) Collected: 09/11/22 1909    Lab Status: Final result Specimen: Blood from Arm, Left Updated: 09/12/22 1530     BCID, PCR Staph spp, not aureus or lugdunesis. Identification by BCID2 PCR.     BOTTLE TYPE Anaerobic Bottle    Blood Culture - Blood, Arm, Right [[id number]]  (Normal) Collected: 09/11/22 1858    Lab Status: Final result Specimen: Blood from Arm, Right Updated: 09/16/22 1947     Blood Culture No growth at 5 days          Chief Complaint on Day of Discharge: None.    History of Present Illness on Day of Discharge:   62 year old female with PMH of CREST syndrome, pulmonary fibrosis, pulmonary hypertension, chronic respiratory failure high flow 8 lpm home oxygen, CHAYA/CPAP, that presents to the ER with complaints of worsening shortness of breath for over 2 weeks. She has not felt well for about a month. Follow with Dr Griffith whom started her on Tyvaso for PAH/ILD this August. She follow with rheumatology also and is on Cellcept and prednisone. CXR and CT chest show chronic changes.      States was declined from lung transplant at [place] and is currently waiting to interview at [facility].     Hospital Course:  The patient is a 62 y.o. female who presented to [facility] with pulmonary fibrosis/pulmonary hypertension/chronic respiratory failure hypoxia/obstructive sleep apnea.       Pulmonary fibrosis/pulmonary hypertension/chronic respiratory failure hypoxia/obstructive sleep apnea.  Patient is on chronic 8 L of oxygen at home.  Patient is on chronic CPAP at home.  On Tyvaso.  Patient follows Dr. Griffith outpatient.  Patient was declined for lung transplant at [place] and currently waiting for interview at [facility].  DuoNebs 4 times daily.  Albuterol every 4 hours as needed.  Pulmicort nebs twice daily.  Solu-Medrol twice daily.    Robitussin as needed.  Pulmonary consult.  Azithromycin . Rocephin.  Mucinex.  DuoNebs.  Sildenafil. Tyvaso.   CTA of the chest-No pulmonary embolism, Stable appearance of chronic lung changes.  Chest x-ray-Mild cardiac enlargement and mild diffuse infiltrate for which edema  is favored.  On 5 L high flow.  From Dr. Griffith's note-patient was declined from [facility] for transplant.  Recommendation from pulmonary- continue CPAP at home,, continue Tyvaso and triple therapy for pulmonary hypertension- mycophenolate and sildenafil.  Symbicort and DuoNebs.  May be discharged on p.o. antibiotics.  Follow-up with Pablo Blackman on October 25, plan to discharge home today.  Baseline prednisone to continue.     Right-sided heart failure.  BNP 7400.  Troponin negative.  Pulmonary hypertension.    Cardiology consult.  Echocardiogram- ejection fraction 56 to 60%, diastolic dysfunction grade 1, right ventricle cavity severely dilated, severe reduced right ventricle systolic function, right atrial cavity severely dilated, tricuspid regurgitation, moderate to severe pulm hypertension, small less than 1 cm pericardial effusion-fluid-filled- no evidence of cardiac tamponade.     Hypokalemia.  Resolved.  Magnesium-normal.     History of crest syndrome.  Patient follow with rheumatology outpatient. On CellCept and prednisone.     Insomnia/depression/anxiety.  Trazodone at night.  Effexor.  BuSpar as needed.  Clonazepam as needed.  Melatonin at night as  "needed.     Reflux.  Pepcid.  Zofran as needed.     Lovenox prophylaxis.     Nutrition.  Regular diet.     Respiratory panel-negative.  Blood culture 1 out of 2 jjbqghrko-cuthojjw-szaqeapd contamination..  COVID-19-negative.  MRSA-negative.     Vital signs stable, afebrile.  Plan to discharge patient home today.  Keep appointment to follow-up with pulmonary outpatient.  Follow-up with PCP 1 week.      Condition on Discharge: Stable    Physical Exam on Discharge:  /62 (BP Location: Right arm, Patient Position: Lying)   Pulse 86   Temp 98.2 °F (36.8 °C) (Oral)   Resp 18   Ht 160 cm (63\")   Wt 78.5 kg (173 lb)   SpO2 91%   BMI 30.65 kg/m²   Physical Exam  Vitals and nursing note reviewed.   Constitutional:       Comments: Chronically ill.   HENT:      Head: Normocephalic.   Eyes:      Conjunctiva/sclera: Conjunctivae normal.      Pupils: Pupils are equal, round, and reactive to light.   Neck:      Vascular: No JVD.   Cardiovascular:      Rate and Rhythm: Normal rate and regular rhythm.      Heart sounds: Normal heart sounds.   Pulmonary:      Effort: No respiratory distress.      Breath sounds: No wheezing or rales.      Comments: Currently on 5 L of oxygen.  Diminished breath bilateral, clear.  Chest:      Chest wall: No tenderness.   Abdominal:      General: Bowel sounds are normal. There is no distension.      Palpations: Abdomen is soft.      Tenderness: There is no abdominal tenderness.   Musculoskeletal:         General: No tenderness or deformity.      Cervical back: Neck supple.   Skin:     General: Skin is warm and dry.      Capillary Refill: Capillary refill takes 2 to 3 seconds.      Findings: No rash.   Neurological:      Mental Status: She is alert and oriented to person, place, and time.      Cranial Nerves: No cranial nerve deficit.      Motor: Weakness present. No abnormal muscle tone.      Deep Tendon Reflexes: Reflexes normal.   Psychiatric:         Mood and Affect: Mood normal. "         Behavior: Behavior normal.         Thought Content: Thought content normal.     Discharge Disposition: Discharge home with family.      Discharge Medications:     Discharge Medications      New Medications      Instructions Start Date   cefdinir 300 MG capsule  Commonly known as: OMNICEF   300 mg, Oral, 2 Times Daily      furosemide 20 MG tablet  Commonly known as: LASIX   10 mg, Oral, Daily   Start Date: September 18, 2022     guaiFENesin 600 MG 12 hr tablet  Commonly known as: MUCINEX   600 mg, Oral, Every 12 Hours Scheduled      sildenafil 20 MG tablet  Commonly known as: REVATIO   20 mg, Oral, Every 8 Hours Scheduled         Continue These Medications      Instructions Start Date   clonazePAM 0.5 MG tablet  Commonly known as: KlonoPIN   0.5 mg, Oral, 2 Times Daily PRN      mycophenolate 500 MG tablet  Commonly known as: CELLCEPT   1,000 mg, Oral, 2 Times Daily      omeprazole 40 MG capsule  Commonly known as: priLOSEC   40 mg, Oral, Daily      Opsumit 10 MG tablet  Generic drug: Macitentan   1 tablet, Oral, Daily      predniSONE 5 MG tablet  Commonly known as: DELTASONE   5 mg, Oral, Daily      traZODone 100 MG tablet  Commonly known as: DESYREL   100 mg, Oral, Nightly      Treprostinil 16 MCG powder   16 mcg, Inhalation, 4 Times Daily      ursodiol 300 MG capsule  Commonly known as: ACTIGALL   300 mg, Oral, 2 Times Daily      venlafaxine 75 MG tablet  Commonly known as: EFFEXOR   75 mg, Oral, Daily             Discharge Diet:   Diet Instructions     Advance Diet As Tolerated            Activity at Discharge:   Activity Instructions     Activity as Tolerated            Discharge Care Plan/Instructions: Discharged home with family.  Discharge home with home health.    Follow-up Appointments:   Future Appointments   Date Time Provider Department Center   10/25/2022  3:00 PM Fela Blackman APRN [facility]   Discharge home with family.  Follow-up with PCP 1 week.  Call pulmonary in 1 week discussed  with patient.  Follow-up with pulmonary outpatient.    Electronically signed by Andreas Whiteside MD, 09/17/22, 16:02 CDT.    Time: Greater than 30 minutes.        Electronically signed by Andreas Whiteside MD at 09/17/22 2558

## 2022-09-19 NOTE — CASE MANAGEMENT/SOCIAL WORK
Continued Stay Note  [facility]     Patient Name: Elaine Juárez  MRN: [id number]  Today's Date: 9/19/2022    Admit Date: 9/11/2022     Discharge Plan     Row Name 09/19/22 0922       Plan    Plan Referral to [facility]    Final Discharge Disposition Code 06 - home with home health care    Final Note [facility] cannot take patient's insurance. Only other option for  is [facility].  faxed referral to [facility] at [phone].                            Expected Discharge Date and Time     Expected Discharge Date Expected Discharge Time    Sep 17, 2022             JAUN Metzger

## 2022-09-19 NOTE — PAYOR COMM NOTE
"REF [id number]    [facility]  GIRISH,    [phone]  OR   FAX  [phone]      Jose Juárez (62 y.o. Female)             Date of Birth   1960    Social Security Number       Address   [address] [place] [address] [address] [address]    Home Phone   [phone]    MRN   [id number]       M[employer]on   [facility]    Marital Status                               Admission Date   9/11/22    Admission Type   Emergency    Admitting Provider   Andreas Whiteside MD    Attending Provider       Department, Room/Bed   [facility] 4B, 446/1       Discharge Date   9/17/2022    Discharge Disposition   Home or Self Care    Discharge Destination                               Attending Provider: (none)   Allergies: Codeine, Latex    Isolation: None   Infection: None   Code Status: Prior   Advance Care Planning Activity    Ht: 160 cm (63\")   Wt: 78.5 kg (173 lb)    Admission Cmt: None   Principal Problem: Acute on chronic respiratory failure with hypoxia (HCC) [J96.21]                 Active Insurance as of 9/11/2022     Primary Coverage     Payor Plan Insurance Group Employer/Plan Group    [employer] [address] [employer] [id number]     Payor Plan Address Payor Plan Phone Number Payor Plan Fax Number Effective Dates    [address] [phone]  1/1/2022 - None Entered    [place] [address]       Subscriber Name Subscriber Birth Date Member ID       JOSE JUÁREZ 1960 [id number]                 Emergency Contacts      (Rel.) Home Phone Work Phone Mobile Phone    Garrison Juárez (Spouse) [phone] -- [phone]    elodia figueroa (Daughter) [phone] -- --    vipul daniels (Daughter) [phone] -- --               Discharge Summary      Andreas Whiteside MD at 09/17/22 1545              [facility] Medicine Services  DISCHARGE SUMMARY       Date of Admission: 9/11/2022  Date of Discharge:  9/17/2022  Primary Care Physician: Richi" Aaron BRADEN MD    Presenting Problem/Chief Complaint:    Final Discharge Diagnoses:  Active Hospital Problems    Diagnosis    • **Acute on chronic respiratory failure with hypoxia (HCC)    • Acute-on-chronic respiratory failure (HCC)    • Obesity (BMI 30-39.9)    • Hypokalemia    • PAH (pulmonary arterial hypertension) with portal hypertension (HCC)    • Obstructive sleep apnea on CPAP    • PBC (primary biliary cirrhosis)    • CREST syndrome, partial     • Raynaud's phenomenon    • Pulmonary fibrosis prob sec underlying autoimmune disease        Consults: Consult pulmonary .    Pertinent Test Results:   Results for orders placed during the hospital encounter of 09/11/22    Adult Transthoracic Echo Complete W/ Cont if Necessary Per Protocol    Interpretation Summary  · Left ventricular ejection fraction appears to be 56 - 60%. Left ventricular systolic function is normal.  · Left ventricular diastolic function is consistent with (grade I) impaired relaxation.  · The right ventricular cavity is severely dilated.  · Severely reduced right ventricular systolic function noted.  · The right atrial cavity is severely dilated.  · Estimated right ventricular systolic pressure from tricuspid regurgitation is markedly elevated (>55 mmHg).  · Moderate to severe pulmonary hypertension is present.  · There is a small (<1cm) pericardial effusion. The effusion is fluid filled. There is no evidence of cardiac tamponade      Imaging Results (All)     Procedure Component Value Units Date/Time    CT Angiogram Chest [[id number]] Collected: 09/11/22 2024     Updated: 09/11/22 2030    Narrative:      EXAMINATION: CT ANGIOGRAM CHEST-      9/11/2022 8:10 PM CDT     HISTORY: Shortness of breath. Hypoxia. History of pulmonary  hypertension. Pulmonary fibrosis.     In order to have a CT radiation dose as low as reasonably achievable  Automated Exposure Control was utilized for adjustment of the mA and/or  KV according to patient size.     DLP in  mGycm= 202.     CT angiogram chest.  CT angiography protocol.   CT imaging with bolus IV contrast injection.   Under concurrent supervision axial, sagittal, coronal, and  three-dimensional data sets were constructed. Borderline cardiomegaly.  Pulmonary artery enlargement.  No pulmonary embolism.     Stable mildly prominent mediastinal lymph nodes.     No thoracic aortic aneurysm or dissection.     Pulmonary fibrosis and bronchiectasis.     No pneumothorax or pleural effusion.     No acute pneumonia is seen.     Summary:  1. No pulmonary embolism.  2. Stable appearance of chronic lung changes.                                   This report was finalized on 09/11/2022 20:26 by Dr. Lorenzo Ruff MD.    XR Chest 1 View [[id number]] Collected: 09/11/22 1948     Updated: 09/11/22 1952    Narrative:      EXAMINATION: XR CHEST 1 VW-     9/11/2022 7:08 PM CDT     HISTORY: Hypoxia.     One view chest x-ray.     Comparison is made with 04/20/2022.     Heart size is magnified though there does appear to be mild cardiomegaly  Mild infiltrate in both lungs compatible with mild diffuse edema.     No focal consolidation.  No pneumothorax.     Summary:  1. Mild cardiac enlargement and mild diffuse infiltrate for which edema  is favored.     This report was finalized on 09/11/2022 19:49 by Dr. Lorenzo Ruff MD.          LAB RESULTS:      Lab 09/17/22  0538 09/16/22  0352 09/15/22  0330 09/14/22  0350 09/13/22  0330 09/12/22  0949 09/11/22  2213 09/11/22  1858   WBC 7.11 7.76 7.41 8.61 7.69  --   --  12.51*   HEMOGLOBIN 10.8* 11.5* 11.5* 10.8* 11.2*  --   --  13.5   HEMATOCRIT 34.6 36.1 38.5 33.9* 36.6  --   --  44.4   PLATELETS 176 197 182 182 184  --   --  252   NEUTROS ABS  --   --   --   --   --   --   --  10.25*   IMMATURE GRANS (ABS)  --   --   --   --   --   --   --  0.07*   LYMPHS ABS  --   --   --   --   --   --   --  1.28   MONOS ABS  --   --   --   --   --   --   --  0.68   EOS ABS  --   --   --   --   --   --   --  0.13    MCV 82.8 82.4 85.0 81.9 83.9  --   --  83.5   LACTATE  --   --   --   --   --  2.2* 1.2 2.4*         Lab 09/17/22  0538 09/16/22  0352 09/15/22  0330 09/14/22  0350 09/13/22  0330 09/11/22  1858   SODIUM 141 140 140 139 144 140   POTASSIUM 3.7 3.6 3.6 4.3 3.9 3.2*   CHLORIDE 101 103 103 104 106 103   CO2 32.0* 29.0 28.0 28.0 25.0 23.0   ANION GAP 8.0 8.0 9.0 7.0 13.0 14.0   BUN 12 13 14 15 13 9   CREATININE 0.84 0.75 0.79 0.74 0.77 0.88   EGFR 77.7 89.0 83.6 90.5 86.3 73.5   GLUCOSE 92 104* 86 155* 147* 134*   CALCIUM 10.0 8.7 8.7 8.5* 8.8 9.0   MAGNESIUM  --   --   --   --   --  2.1   HEMOGLOBIN A1C  --   --   --   --  5.40  --    TSH  --   --   --   --  0.597  --          Lab 09/11/22  1858   TOTAL PROTEIN 7.2   ALBUMIN 4.20   GLOBULIN 3.0   ALT (SGPT) 14   AST (SGOT) 20   BILIRUBIN 0.4   ALK PHOS 105         Lab 09/12/22  1106 09/11/22  1858   PROBNP 7,417.0*  --    TROPONIN T  --  <0.010         Lab 09/13/22  0330   CHOLESTEROL 169   LDL CHOL 106*   HDL CHOL 45   TRIGLYCERIDES 99             Lab 09/11/22 2218   PH, ARTERIAL 7.430   PCO2, ARTERIAL 36.5   PO2 ART 65.4*   O2 SATURATION ART 94.2   HCO3 ART 24.2   BASE EXCESS ART 0.2     Brief Urine Lab Results  (Last result in the past 365 days)      Color   Clarity   Blood   Leuk Est   Nitrite   Protein   CREAT   Urine HCG        09/11/22 2213 Yellow   Clear   Negative   Negative   Negative   Negative               Microbiology Results (last 10 days)     Procedure Component Value - Date/Time    MRSA Screen, PCR (Inpatient) - Swab, Nares [[id number]]  (Normal) Collected: 09/12/22 1746    Lab Status: Final result Specimen: Swab from Nares Updated: 09/12/22 1939     MRSA PCR No MRSA Detected    Narrative:      The negative predictive value of this diagnostic test is high and should only be used to consider de-escalating anti-MRSA therapy. A positive result may indicate colonization with MRSA and must be correlated clinically.    Respiratory Panel PCR  w/COVID-19(SARS-CoV-2) KARISHMA/LUIS ARMANDO/DENYS/PAD/COR/MAD/DAVID In-House, NP Swab in UTM/VTM, 3-4 HR TAT - Swab, Nasopharynx [[id number]]  (Normal) Collected: 09/12/22 1319    Lab Status: Final result Specimen: Swab from Nasopharynx Updated: 09/12/22 1455     ADENOVIRUS, PCR Not Detected     Coronavirus 229E Not Detected     Coronavirus HKU1 Not Detected     Coronavirus NL63 Not Detected     Coronavirus OC43 Not Detected     COVID19 Not Detected     Human Metapneumovirus Not Detected     Human Rhinovirus/Enterovirus Not Detected     Influenza A PCR Not Detected     Influenza B PCR Not Detected     Parainfluenza Virus 1 Not Detected     Parainfluenza Virus 2 Not Detected     Parainfluenza Virus 3 Not Detected     Parainfluenza Virus 4 Not Detected     RSV, PCR Not Detected     Bordetella pertussis pcr Not Detected     Bordetella parapertussis PCR Not Detected     Chlamydophila pneumoniae PCR Not Detected     Mycoplasma pneumo by PCR Not Detected    Narrative:      In the setting of a positive respiratory panel with a viral infection PLUS a negative procalcitonin without other underlying concern for bacterial infection, consider observing off antibiotics or discontinuation of antibiotics and continue supportive care. If the respiratory panel is positive for atypical bacterial infection (Bordetella pertussis, Chlamydophila pneumoniae, or Mycoplasma pneumoniae), consider antibiotic de-escalation to target atypical bacterial infection.    COVID-19,[facility] IN-HOUSE,Nasal Swab No Transport Media 3-4 HR TAT - Swab, Nasal Cavity [[id number]]  (Normal) Collected: 09/11/22 1909    Lab Status: Final result Specimen: Swab from Nasal Cavity Updated: 09/11/22 2028     COVID19 Not Detected    Narrative:      Fact sheet for providers: https://www.fda.gov/media/519755/download     Fact sheet for patients: https://www.fda.gov/media/941698/download    Test performed by PCR.    Consider negative results in combination with clinical observations,  patient history, and epidemiological information.    Blood Culture - Blood, Arm, Left [[id number]]  (Abnormal) Collected: 09/11/22 1909    Lab Status: Final result Specimen: Blood from Arm, Left Updated: 09/13/22 0518     Blood Culture Staphylococcus, coagulase negative     Isolated from Aerobic and Anaerobic Bottles     Gram Stain Anaerobic Bottle Gram positive cocci in clusters      Aerobic Bottle Gram positive cocci in clusters    Narrative:      Probable contaminant requires clinical correlation, susceptibility not performed unless requested by physician.      Blood Culture ID, PCR - Blood, Arm, Left [[id number]]  (Abnormal) Collected: 09/11/22 1909    Lab Status: Final result Specimen: Blood from Arm, Left Updated: 09/12/22 1530     BCID, PCR Staph spp, not aureus or lugdunesis. Identification by BCID2 PCR.     BOTTLE TYPE Anaerobic Bottle    Blood Culture - Blood, Arm, Right [[id number]]  (Normal) Collected: 09/11/22 1858    Lab Status: Final result Specimen: Blood from Arm, Right Updated: 09/16/22 1947     Blood Culture No growth at 5 days          Chief Complaint on Day of Discharge: None.    History of Present Illness on Day of Discharge:   62 year old female with PMH of CREST syndrome, pulmonary fibrosis, pulmonary hypertension, chronic respiratory failure high flow 8 lpm home oxygen, CHAYA/CPAP, that presents to the ER with complaints of worsening shortness of breath for over 2 weeks. She has not felt well for about a month. Follow with Dr Griffith whom started her on Tyvaso for PAH/ILD this August. She follow with rheumatology also and is on Cellcept and prednisone. CXR and CT chest show chronic changes.      States was declined from lung transplant at [place] and is currently waiting to interview at [facility].     Hospital Course:  The patient is a 62 y.o. female who presented to [facility] with pulmonary fibrosis/pulmonary hypertension/chronic respiratory failure hypoxia/obstructive  sleep apnea.      Pulmonary fibrosis/pulmonary hypertension/chronic respiratory failure hypoxia/obstructive sleep apnea.  Patient is on chronic 8 L of oxygen at home.  Patient is on chronic CPAP at home.  On Tyvaso.  Patient follows Dr. Griffith outpatient.  Patient was declined for lung transplant at [facility] and currently waiting for interview at [facility].  DuoNebs 4 times daily.  Albuterol every 4 hours as needed.  Pulmicort nebs twice daily.  Solu-Medrol twice daily.    Robitussin as needed.  Pulmonary consult.  Azithromycin . Rocephin.  Mucinex.  DuoNebs.  Sildenafil. Tyvaso.   CTA of the chest-No pulmonary embolism, Stable appearance of chronic lung changes.  Chest x-ray-Mild cardiac enlargement and mild diffuse infiltrate for which edema  is favored.  On 5 L high flow.  From Dr. Griffith's note-patient was declined from [facility] for transplant.  Recommendation from pulmonary- continue CPAP at home,, continue Tyvaso and triple therapy for pulmonary hypertension- mycophenolate and sildenafil.  Symbicort and DuoNebs.  May be discharged on p.o. antibiotics.  Follow-up with Pablo Blackman on October 25, plan to discharge home today.  Baseline prednisone to continue.     Right-sided heart failure.  BNP 7400.  Troponin negative.  Pulmonary hypertension.    Cardiology consult.  Echocardiogram- ejection fraction 56 to 60%, diastolic dysfunction grade 1, right ventricle cavity severely dilated, severe reduced right ventricle systolic function, right atrial cavity severely dilated, tricuspid regurgitation, moderate to severe pulm hypertension, small less than 1 cm pericardial effusion-fluid-filled- no evidence of cardiac tamponade.     Hypokalemia.  Resolved.  Magnesium-normal.     History of crest syndrome.  Patient follow with rheumatology outpatient. On CellCept and prednisone.     Insomnia/depression/anxiety.  Trazodone at night.  Effexor.  BuSpar as needed.  Clonazepam as needed.  Melatonin at night as  "needed.     Reflux.  Pepcid.  Zofran as needed.     Lovenox prophylaxis.     Nutrition.  Regular diet.     Respiratory panel-negative.  Blood culture 1 out of 2 jdqcpbagn-lvzrzylv-dsecjlct contamination..  COVID-19-negative.  MRSA-negative.     Vital signs stable, afebrile.  Plan to discharge patient home today.  Keep appointment to follow-up with pulmonary outpatient.  Follow-up with PCP 1 week.      Condition on Discharge: Stable    Physical Exam on Discharge:  /62 (BP Location: Right arm, Patient Position: Lying)   Pulse 86   Temp 98.2 °F (36.8 °C) (Oral)   Resp 18   Ht 160 cm (63\")   Wt 78.5 kg (173 lb)   SpO2 91%   BMI 30.65 kg/m²   Physical Exam  Vitals and nursing note reviewed.   Constitutional:       Comments: Chronically ill.   HENT:      Head: Normocephalic.   Eyes:      Conjunctiva/sclera: Conjunctivae normal.      Pupils: Pupils are equal, round, and reactive to light.   Neck:      Vascular: No JVD.   Cardiovascular:      Rate and Rhythm: Normal rate and regular rhythm.      Heart sounds: Normal heart sounds.   Pulmonary:      Effort: No respiratory distress.      Breath sounds: No wheezing or rales.      Comments: Currently on 5 L of oxygen.  Diminished breath bilateral, clear.  Chest:      Chest wall: No tenderness.   Abdominal:      General: Bowel sounds are normal. There is no distension.      Palpations: Abdomen is soft.      Tenderness: There is no abdominal tenderness.   Musculoskeletal:         General: No tenderness or deformity.      Cervical back: Neck supple.   Skin:     General: Skin is warm and dry.      Capillary Refill: Capillary refill takes 2 to 3 seconds.      Findings: No rash.   Neurological:      Mental Status: She is alert and oriented to person, place, and time.      Cranial Nerves: No cranial nerve deficit.      Motor: Weakness present. No abnormal muscle tone.      Deep Tendon Reflexes: Reflexes normal.   Psychiatric:         Mood and Affect: Mood normal. "         Behavior: Behavior normal.         Thought Content: Thought content normal.     Discharge Disposition: Discharge home with family.      Discharge Medications:     Discharge Medications      New Medications      Instructions Start Date   cefdinir 300 MG capsule  Commonly known as: OMNICEF   300 mg, Oral, 2 Times Daily      furosemide 20 MG tablet  Commonly known as: LASIX   10 mg, Oral, Daily   Start Date: September 18, 2022     guaiFENesin 600 MG 12 hr tablet  Commonly known as: MUCINEX   600 mg, Oral, Every 12 Hours Scheduled      sildenafil 20 MG tablet  Commonly known as: REVATIO   20 mg, Oral, Every 8 Hours Scheduled         Continue These Medications      Instructions Start Date   clonazePAM 0.5 MG tablet  Commonly known as: KlonoPIN   0.5 mg, Oral, 2 Times Daily PRN      mycophenolate 500 MG tablet  Commonly known as: CELLCEPT   1,000 mg, Oral, 2 Times Daily      omeprazole 40 MG capsule  Commonly known as: priLOSEC   40 mg, Oral, Daily      Opsumit 10 MG tablet  Generic drug: Macitentan   1 tablet, Oral, Daily      predniSONE 5 MG tablet  Commonly known as: DELTASONE   5 mg, Oral, Daily      traZODone 100 MG tablet  Commonly known as: DESYREL   100 mg, Oral, Nightly      Treprostinil 16 MCG powder   16 mcg, Inhalation, 4 Times Daily      ursodiol 300 MG capsule  Commonly known as: ACTIGALL   300 mg, Oral, 2 Times Daily      venlafaxine 75 MG tablet  Commonly known as: EFFEXOR   75 mg, Oral, Daily             Discharge Diet:   Diet Instructions     Advance Diet As Tolerated            Activity at Discharge:   Activity Instructions     Activity as Tolerated            Discharge Care Plan/Instructions: Discharged home with family.  Discharge home with home health.    Follow-up Appointments:   Future Appointments   Date Time Provider Department Center   10/25/2022  3:00 PM Fela Blackman APRN [facility]   Discharge home with family.  Follow-up with PCP 1 week.  Call pulmonary in 1 week discussed  with patient.  Follow-up with pulmonary outpatient.    Electronically signed by Roverto RICHARDS. MD Stevo, 09/17/22, 16:02 CDT.    Time: Greater than 30 minutes.        Electronically signed by Roverto Crawley MD at 09/17/22 1605       Discharge Order (From admission, onward)     Start     Ordered    09/17/22 1553  Discharge patient  Once        Expected Discharge Date: 09/17/22    Discharge Disposition: Home or Self Care    Physician of Record for Attribution - Please select from Treatment Team: ROVERTO CRAWLEY [[id number]]    Review needed by CMO to determine Physician of Record: No       Question Answer Comment   Physician of Record for Attribution - Please select from Treatment Team ROVERTO CRAWLEY    Review needed by CMO to determine Physician of Record No        09/17/22 1600

## 2022-09-19 NOTE — TELEPHONE ENCOUNTER
PER THE PATIENT IS STILL REALLY SICK/IS GETTING HOME HEALTH TO COME TO SEE HER. SHE WILL CONTACT THE OFFICE WHEN SHE IS ABLE TO MAKE APPT

## 2022-09-21 ENCOUNTER — READMISSION MANAGEMENT (OUTPATIENT)
Dept: CALL CENTER | Facility: HOSPITAL | Age: 62
End: 2022-09-21

## 2022-09-21 DIAGNOSIS — J96.11 CHRONIC RESPIRATORY FAILURE WITH HYPOXIA: Primary | ICD-10-CM

## 2022-09-21 RX ORDER — SILDENAFIL CITRATE 20 MG/1
20 TABLET ORAL EVERY 8 HOURS SCHEDULED
Qty: 270 TABLET | Refills: 3 | Status: SHIPPED | OUTPATIENT
Start: 2022-09-21

## 2022-09-21 NOTE — OUTREACH NOTE
Medical Week 1 Survey    Flowsheet Row Responses   [facility] patient discharged from? [place]   Does the patient have one of the following disease processes/diagnoses(primary or secondary)? Other   Week 1 attempt successful? Yes   Call start time 1313   Call end time 1316   Discharge diagnosis Acute on chronic respiratory failure with hypoxia    Does the patient have all medications ordered at discharge? Yes   Is the patient taking all medications as directed (includes completed medication regime)? Yes   Does the patient have a primary care provider?  Yes   Does the patient have an appointment with their PCP within 7 days of discharge? Yes   Comments regarding PCP will see PCP on 9/23   Has the patient kept scheduled appointments due by today? N/A   What is the Home health agency?  [facility]   Has home health visited the patient within 72 hours of discharge? Yes   Psychosocial issues? No   Did the patient receive a copy of their discharge instructions? Yes   What is the patient's perception of their health status since discharge? Improving   Is the patient/caregiver able to teach back the hierarchy of who to call/visit for symptoms/problems? PCP, Specialist, Home health nurse, Urgent Care, ED, 911 Yes   Week 1 call completed? Yes   Wrap up additional comments Doing well, all questions addressed.          LEYDI GONZALES - Registered Nurse

## 2022-09-21 NOTE — TELEPHONE ENCOUNTER
Patient's daughter called stating this needed a PA and that it needed to go to [facility] rather than [facility].  Is this something we will take over?  The hospitalist sent in in for her when she was admitted.  The PA has been completed and approved:  PA Case # [id number], Start date 09/21/2022 through 09/21/20223.

## 2022-09-22 ENCOUNTER — TELEPHONE (OUTPATIENT)
Dept: PULMONOLOGY | Facility: CLINIC | Age: 62
End: 2022-09-22

## 2022-09-22 NOTE — TELEPHONE ENCOUNTER
"This is an email I received from [facility].      \"Jhoan Quach, I spoke with Elaine Juárez,  1960. She is currently on Tyvaso DPI 32 mcg, 4 times daily.    She says she is starting to feel better after getting out of the hospital.    She is having Headache as a side effect, but states it is manageable.         /74, HR 88, Spo2 93% on 5LPM O2.         I recommend increasing her dose to 48 mcg, 4 times daily. What does Fela think?         Also, Elaine wants me to come out next week just to make sure she is doing the breathing techniques correctly and for teaching reinforcement.    I do plan on staying on her case until she reaches her goal due to all the difficulties she’s been having.\"        Fela is out of office  Please advise.   "

## 2022-09-23 RX ORDER — VENLAFAXINE 75 MG/1
TABLET ORAL
COMMUNITY

## 2022-09-23 RX ORDER — IPRATROPIUM BROMIDE AND ALBUTEROL SULFATE 2.5; .5 MG/3ML; MG/3ML
SOLUTION RESPIRATORY (INHALATION)
COMMUNITY

## 2022-09-23 RX ORDER — TRAZODONE HYDROCHLORIDE 150 MG/1
100 TABLET ORAL NIGHTLY
COMMUNITY

## 2022-09-23 RX ORDER — CLONAZEPAM 0.5 MG/1
TABLET ORAL
COMMUNITY

## 2022-09-23 RX ORDER — OMEPRAZOLE 40 MG/1
CAPSULE, DELAYED RELEASE ORAL
COMMUNITY

## 2022-09-23 RX ORDER — MACITENTAN 10 MG/1
1 TABLET, FILM COATED ORAL DAILY
COMMUNITY
Start: 2022-08-16

## 2022-09-23 RX ORDER — FEXOFENADINE HCL 180 MG/1
TABLET ORAL
COMMUNITY

## 2022-09-23 RX ORDER — BUSPIRONE HYDROCHLORIDE 10 MG/1
TABLET ORAL
COMMUNITY
Start: 2022-04-26 | End: 2022-09-27 | Stop reason: ALTCHOICE

## 2022-09-27 ENCOUNTER — TRANSCRIBE ORDERS (OUTPATIENT)
Dept: ADMINISTRATIVE | Facility: HOSPITAL | Age: 62
End: 2022-09-27

## 2022-09-27 ENCOUNTER — TELEPHONE (OUTPATIENT)
Dept: PULMONOLOGY | Facility: CLINIC | Age: 62
End: 2022-09-27

## 2022-09-27 ENCOUNTER — OFFICE VISIT (OUTPATIENT)
Dept: GASTROENTEROLOGY | Age: 62
End: 2022-09-27
Payer: COMMERCIAL

## 2022-09-27 VITALS
SYSTOLIC BLOOD PRESSURE: 98 MMHG | HEIGHT: 64 IN | BODY MASS INDEX: 29.3 KG/M2 | WEIGHT: 171.6 LBS | OXYGEN SATURATION: 90 % | HEART RATE: 105 BPM | DIASTOLIC BLOOD PRESSURE: 68 MMHG

## 2022-09-27 DIAGNOSIS — K74.3 PRIMARY BILIARY CHOLANGITIS (HCC): Primary | ICD-10-CM

## 2022-09-27 DIAGNOSIS — K74.3 PRIMARY BILIARY CHOLANGITIS (HCC): ICD-10-CM

## 2022-09-27 DIAGNOSIS — K74.3 PRIMARY BILIARY CHOLANGITIS: Primary | ICD-10-CM

## 2022-09-27 LAB
INR BLD: 1.05 (ref 0.88–1.18)
PROTHROMBIN TIME: 13.6 SEC (ref 12–14.6)

## 2022-09-27 PROCEDURE — 99205 OFFICE O/P NEW HI 60 MIN: CPT | Performed by: INTERNAL MEDICINE

## 2022-09-27 RX ORDER — GUAIFENESIN 600 MG/1
1200 TABLET, EXTENDED RELEASE ORAL 2 TIMES DAILY
COMMUNITY

## 2022-09-27 RX ORDER — TREPROSTINIL 48 UG/1
INHALANT ORAL
COMMUNITY

## 2022-09-27 RX ORDER — SILDENAFIL CITRATE 20 MG/1
20 TABLET ORAL EVERY 8 HOURS
COMMUNITY

## 2022-09-27 RX ORDER — FUROSEMIDE 20 MG/1
10 TABLET ORAL DAILY
COMMUNITY

## 2022-09-27 NOTE — PROGRESS NOTES
'    Robert Roberson  Primary Care Provider Julio Augustin MD  Referral Source No ref. provider found    Robert Roberson is a 57 y.o. Chief Complaint  Chief Complaint   Patient presents with    PBC     Diarrhea- likely from meds                 ASSESSMENT/PLAN:  1. Primary biliary cholangitis (HCC) - well controlled with no evidence of cirrhosis or decompensated liver disease at present. 2. Chronic lung disease - awaiting evaluation at transplant center. - Mitochondrial Antibodies, M2; Future  - PAMELA Screen with Reflex; Future  - Protime-INR; Future  - US ORGAN ELASTOGRAPHY; Future  - US LIVER; Future      At the current time, I see no evidence of significant liver fibrosis. Patient does not have evidence of cirrhosis. Her platelet count is normal, her synthetic function is completely normal with normal albumin, protein, bilirubin. Her INR is normal.    She did have a liver biopsy greater than 5 years ago which showed no evidence of significant fibrosis. She has had noninvasive testing with ultrasound elastography showing F1 disease in 2020 and F3 disease in 2021. That needs to be repeated. Her meld score if calculated would be normal.    I think it is prudent to repeat her elastography to evaluate for any evidence of significant fibrosis on that scan. If the diagnosis of cirrhosis remains in question and would be a hindrance to evaluation and work-up of her chronic lung disease, then a repeat biopsy may be warranted-however at the current time I see no evidence of portal hypertension or other signs of significant chronic liver disease that should hinder her further evaluation for surgery. - Check labs as ordered above  - Await US Elastography results. HPI    58 yo pleasant WF presenting with her daughter for evaluation of her history of liver disease. Currently patient is dealing with chronic lung disease and is followed by her local respiratory clinic.   She has been told that she has chronic lung disease and would deserve evaluation for possible lung transplant. 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 In regards to her liver, patient states she was diagnosed with PBC in . She had chronic elevation in her LFTs and action underwent a cholecystectomy. A surgical biopsy of her liver was obtained at that time and showed the followin. Gallbladder:     Chronic cholecystitis and cholesterolosis with   cholelithiasis. 2.     Liver, needle biopsy:             A.     Mild fatty metamorphosis. B.     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 C.     Scattered noncaseating granulomas within the hepatic lobules. D.     Iron staining is absent. E.     GMS stain and Kinyoun stain are negative for organisms. Based off of these findings as well as elevation in her alkaline phosphatase and bilirubin. It appears she was placed on ursodiol by her previous GI physician. She responded extremely well with normalization of her labs over the next 12 to 24 months. Per my review it appears that her LFTs remain normal since 2018. Currently her CMP is normal.  Her INR is remain normal.  She is currently on low-dose ursodiol at 600 mg daily divided into 2 doses    She does have some occasional diarrhea which is thought to be due to ursodiol but overall tolerates it well. I reviewed her records. She does have a positive PAMELA. Her transferrin saturation is within normal limits. Her ceruloplasmin is normal.  I do not see a mitochondrial antibody that is positive    She was followed by her previous gastroenterologist with ultrasounds and elastography's. She had F1 disease in  with increased F3 disease in .   Again her transaminases and liver function test have remained normal.  She is not had a repeat biopsy    In regards to family history she has a sister who had RIOS cirrhosis and a brother with alcohol-related cirrhosis. The patient herself has no sequelae of chronic liver disease. She denies any portal hypertension, no ascites no significant lower extremity edema. She does suffer from pulmonary hypertension and is following with respiratory clinic.      Past Medical History:   Diagnosis Date    Allergies     Arthritis     Calcified granuloma of lung (HCC)     Right    Chronic respiratory failure with hypoxia, on home O2 therapy (HCC)     Colon polyps     CREST syndrome (HCC)     scleroderma    Gastroesophageal reflux disease without esophagitis     Kidney calculi     Lupus (HCC)     RIOS (nonalcoholic steatohepatitis)     LORETTA on CPAP     Primary biliary cirrhosis (HCC)     Pulmonary fibrosis (HCC)     Pulmonary hypertension ([address]     Short-term memory loss       Past Surgical History:   Procedure Laterality Date    BREAST BIOPSY      x 3, all benign    CARDIAC CATHETERIZATION  07/22/2020    Dr Moscoso Hippo  07/13/2015    Dr Carver-Chronic cholecystitis and cholesterolosis with cholelithiasis    COLONOSCOPY  08/04/2021    Dr Donna Foreman, 5 yr recall    COLONOSCOPY  05/11/2015    Dr Donna Formean    LIVER BIOPSY  07/13/2015    Dr Kiley Garsia metamorphosis, marked portal triaditis w/focal periportal inflammation, scattered noncaseating granulomas within the hepatic lobules, iron staining absent, GMS stain and Kinyoun stain negative for organisms    UPPER GASTROINTESTINAL ENDOSCOPY  08/04/2021    Dr Robb Echeverria ENDOSCOPY  08/10/2016    Urease neg      Family History   Problem Relation Age of Onset    Colon Polyps Sister     Cirrhosis Sister     Colon Polyps Sister     Cancer Brother     Cirrhosis Brother     Colon Cancer Neg Hx     Esophageal Cancer Neg Hx     Rectal Cancer Neg Hx     Stomach Cancer Neg Hx       Current Outpatient Medications   Medication Sig Dispense Refill    Treprostinil (TYVASO DPI MAINTENANCE KIT) 50 MCG POWD Inhale into the lungs      furosemide (LASIX) 20 MG tablet Take 10 mg by mouth daily      guaiFENesin (MUCINEX) 600 MG extended release tablet Take 1,200 mg by mouth 2 times daily      sildenafil (REVATIO) 20 MG tablet Take 20 mg by mouth in the morning and 20 mg at noon and 20 mg in the evening. clonazePAM (KLONOPIN) 0.5 MG tablet clonazepam 0.5 mg tablet      fexofenadine (ALLEGRA) 180 MG tablet Allegra Allergy 180 mg tablet   Take 1 tablet every day by oral route for 90 days. ipratropium-albuterol (DUONEB) 0.5-2.5 (3) MG/3ML SOLN nebulizer solution ipratropium 0.5 mg-albuterol 3 mg (2.5 mg base)/3 mL nebulization soln      macitentan (OPSUMIT) 10 MG TABS Take 1 tablet by mouth daily      omeprazole (PRILOSEC) 40 MG delayed release capsule omeprazole 40 mg capsule,delayed release      traZODone (DESYREL) 150 MG tablet Take 100 mg by mouth nightly      venlafaxine (EFFEXOR) 75 MG tablet venlafaxine 75 mg tablet   Take 1 tablet every day by oral route for 30 days. predniSONE (DELTASONE) 10 MG tablet Take 2 tabs daily for 3 days, then 1 tab daily for 3 days, then 1/2 tab daily for 3 days, then resume the usual 2.5 dose she is chronically taking. (Patient taking differently: Take 5 mg by mouth daily Take 2 tabs daily for 3 days, then 1 tab daily for 3 days, then 1/2 tab daily for 3 days, then resume the usual 2.5 dose she is chronically taking.) 12 tablet 0    mycophenolate (CELLCEPT) 500 MG tablet Take by mouth 2 times daily      ursodiol (ACTIGALL) 300 MG capsule Take 300 mg by mouth 2 times daily       No current facility-administered medications for this visit. Allergies   Allergen Reactions    Latex Rash    Codeine Nausea And Vomiting         Review of Systems   Constitutional:  Positive for activity change, fatigue and unexpected weight change. Negative for appetite change and fever. HENT:  Negative for sore throat, trouble swallowing and voice change. Eyes: Negative.     Respiratory: Positive for cough and shortness of breath. Negative for chest tightness. Cardiovascular:  Negative for chest pain, palpitations and leg swelling. Gastrointestinal:  Positive for diarrhea (occasional) and nausea. Negative for abdominal pain, blood in stool, constipation, rectal pain and vomiting. Heartburn (taking PPI)     Genitourinary:  Negative for dysuria, flank pain and hematuria. Musculoskeletal:  Negative for arthralgias, back pain and gait problem. Skin:  Negative for pallor, rash and wound. Allergic/Immunologic: Negative. Neurological:  Negative for dizziness, tremors, weakness and light-headedness. Hematological: Negative. Psychiatric/Behavioral:  Negative for confusion and dysphoric mood. The patient is nervous/anxious (over her new respiratory disease). Physical Exam  Vitals reviewed. Constitutional:       General: She is not in acute distress. Appearance: Normal appearance. She is well-developed. Comments: BP 98/68 (Site: Left Upper Arm, Position: Sitting, Cuff Size: Medium Adult)   Pulse (!) 105   Ht 5' 4\" (1.626 m)   Wt 171 lb 9.6 oz (77.8 kg)   SpO2 90%   BMI 29.46 kg/m²       Seen in Exam Room. Receiving continuous O2 by NC. Daughter at bedside. HENT:      Head: Normocephalic and atraumatic. No abrasion or contusion. Eyes:      General: Lids are normal. No scleral icterus. Conjunctiva/sclera: Conjunctivae normal.      Pupils: Pupils are equal, round, and reactive to light. Neck:      Trachea: Trachea normal. No tracheal deviation. Cardiovascular:      Rate and Rhythm: Normal rate and regular rhythm. Heart sounds: Normal heart sounds. No murmur heard. No friction rub. No gallop. Pulmonary:      Effort: Pulmonary effort is normal. No accessory muscle usage or respiratory distress. Breath sounds: Normal breath sounds. Chest:      Chest wall: No deformity or tenderness.    Abdominal:      General: Bowel sounds are normal. There is no distension. Palpations: Abdomen is soft. There is no hepatomegaly or mass. Tenderness: There is no abdominal tenderness. There is no guarding or rebound. Musculoskeletal:         General: Normal range of motion. Cervical back: Normal range of motion and neck supple. Right ankle: No swelling. Left ankle: No swelling. Lymphadenopathy:      Cervical: No cervical adenopathy. Upper Body:      Right upper body: No supraclavicular adenopathy. Left upper body: No supraclavicular adenopathy. Skin:     General: Skin is warm and dry. Coloration: Skin is not pale. Findings: No bruising, lesion or rash. Nails: There is no clubbing. Neurological:      Mental Status: She is alert and oriented to person, place, and time. Gait: Gait normal.   Psychiatric:         Speech: Speech normal.         Behavior: Behavior normal. Behavior is cooperative. Labs:  9/11/22   Ref Range & Units 2 wk ago Comments   Glucose 65 - 99 mg/dL 134 High      BUN 8 - 23 mg/dL 9     Creatinine 0.57 - 1.00 mg/dL 0.88     Sodium 136 - 145 mmol/L 140     Potassium 3.5 - 5.2 mmol/L 3.2 Low      Chloride 98 - 107 mmol/L 103     CO2 22.0 - 29.0 mmol/L 23.0     Calcium 8.6 - 10.5 mg/dL 9.0     Total Protein 6.0 - 8.5 g/dL 7.2     Albumin 3.50 - 5.20 g/dL 4.20     ALT (SGPT) 1 - 33 U/L 14     AST (SGOT) 1 - 32 U/L 20     Alkaline Phosphatase 39 - 117 U/L 105     Total Bilirubin 0.0 - 1.2 mg/dL 0.4     Globulin gm/dL 3.0     A/G Ratio g/dL 1.4     BUN/Creatinine Ratio 7.0 - 25.0 10.2     Anion Gap 5.0 - 15.0 mmol/L 14.0     eGFR >60.0 mL/min/1.73 73.5               Greater than 65 minutes spent on this encounter, >50% of which was face-to-face with patient regarding counseling and discussion of treatment.  Total time includes but is not limited to reviewing referral notes, reviewing labs/imaging and notes both in our EMR and through outside records, evaluating and examining the patient, discussing and formulating treatment, and writing the note.      (Please note that portions of this note were completed with a voice recognition program. Efforts were made to edit the dictations but occasionally words are mis-transcribed.)

## 2022-09-27 NOTE — TELEPHONE ENCOUNTER
"\"Jhoan Quach, I visited with Elaine Juárez,  1960. She is currently on Tyvaso DPI 48 mcg, 4 times daily.       She is still having Headache and diarrhea as a side effect, but states it is manageable. When I questioned her about the complaints, she says that these complaints have been around since prior to initiation of the DPI treatments. She believes they are from the Opsumit.    She doesn’t want to stop increasing the dose of DPI because she believes it is truly helping her.      /78; ; SPO2 100% on 8L. (Turned o2 down to 5L after seeing vitals).    I reinforced breathing technique and she is doing well with inhaling the medication.       I recommend increasing her dose to 64 mcg, 4 times daily on Saturday if no other complaints are brought to my attention before that. What does Fela think?\"  "

## 2022-09-27 NOTE — PATIENT INSTRUCTIONS
Check Bloodwork as ordered : AMA, PAMELA and INR  Check Elastography and RUQ U/s  (to be done at [facility] if possible)  RTC 3-4 months.

## 2022-09-29 ENCOUNTER — READMISSION MANAGEMENT (OUTPATIENT)
Dept: CALL CENTER | Facility: HOSPITAL | Age: 62
End: 2022-09-29

## 2022-09-29 ASSESSMENT — ENCOUNTER SYMPTOMS
ALLERGIC/IMMUNOLOGIC NEGATIVE: 1
RECTAL PAIN: 0
SHORTNESS OF BREATH: 1
BLOOD IN STOOL: 0
VOMITING: 0
BACK PAIN: 0
EYES NEGATIVE: 1
NAUSEA: 1
TROUBLE SWALLOWING: 0
ABDOMINAL PAIN: 0
DIARRHEA: 1
CHEST TIGHTNESS: 0
SORE THROAT: 0
COUGH: 1
VOICE CHANGE: 0
CONSTIPATION: 0

## 2022-09-29 NOTE — OUTREACH NOTE
Medical Week 2 Survey    Flowsheet Row Responses   [facility] patient discharged from? [facility]   Does the patient have one of the following disease processes/diagnoses(primary or secondary)? Other   Week 2 attempt successful? Yes   Call start time 1428   Discharge diagnosis Acute on chronic respiratory failure with hypoxia    Call end time 1429   Is the patient taking all medications as directed (includes completed medication regime)? Yes   Does the patient have a primary care provider?  Yes   Has the patient kept scheduled appointments due by today? Yes   What is the Home health agency?  [facility]   Home health comments home health is still coming   Psychosocial issues? No   What is the patient's perception of their health status since discharge? New symptoms unrelated to diagnosis  [says she is having some symptoms from a new medication, she has been in touch with her doctors]   Is the patient/caregiver able to teach back the hierarchy of who to call/visit for symptoms/problems? PCP, Specialist, Home health nurse, Urgent Care, ED, 911 Yes   Week 2 Call Completed? Yes   Wrap up additional comments Says she is having some issues from a new medication but her doctors are aware.          LEYDI GONZALES - Registered Nurse

## 2022-09-30 ENCOUNTER — TELEPHONE (OUTPATIENT)
Dept: PULMONOLOGY | Facility: CLINIC | Age: 62
End: 2022-09-30

## 2022-09-30 LAB
ANA IGG, ELISA: DETECTED
MITOCHONDRIAL M2 AB, IGG: 8.2 UNITS (ref 0–24.9)

## 2022-09-30 NOTE — TELEPHONE ENCOUNTER
Patient is on prednisone 5mg daily and Tyvaso 48mcg. Her Tyvaso has not been increased yet. It is supposed to be increased tomorrow. She states she has not slept since yesterday morning.     Patient also states she did get approval from her insurance to be transported to the ER at . She said right now she was planning on going on Sunday after [facility] but might go tonight.    She said her daughter is a nurse practitioner and would probably text Fela. Patient is aware that Fela OCONNOR is not in the office at this time and this message will be forwarded to Dr. Griffith.    Please advise.

## 2022-09-30 NOTE — TELEPHONE ENCOUNTER
Stay on 48 for another day if she has a supply, hopefully she can sleep tonight.  Getting up to the next dose is really helpful in terms of how well the drug helps, as long as side effects are tolerable.

## 2022-10-02 LAB
ANA PATTERN: ABNORMAL
ANA TITER: ABNORMAL
ANTINUCLEAR AB INTERPRETIVE COMMENT: ABNORMAL
ANTINUCLEAR ANTIBODY, HEP-2, IGG: DETECTED

## 2022-10-03 ENCOUNTER — HOSPITAL ENCOUNTER (OUTPATIENT)
Dept: ULTRASOUND IMAGING | Facility: HOSPITAL | Age: 62
Discharge: HOME | End: 2022-10-03

## 2022-10-03 DIAGNOSIS — K74.3 PRIMARY BILIARY CHOLANGITIS (HCC): ICD-10-CM

## 2022-10-03 DIAGNOSIS — K74.3 PRIMARY BILIARY CHOLANGITIS: ICD-10-CM

## 2022-10-03 PROBLEM — R09.A2 GLOBUS SENSATION: Status: RESOLVED | Noted: 2021-07-21 | Resolved: 2022-10-03

## 2022-10-03 PROBLEM — J96.20 ACUTE-ON-CHRONIC RESPIRATORY FAILURE: Status: RESOLVED | Noted: 2022-09-11 | Resolved: 2022-10-03

## 2022-10-03 PROBLEM — R09.89 GLOBUS SENSATION: Status: RESOLVED | Noted: 2021-07-21 | Resolved: 2022-10-03

## 2022-10-03 PROBLEM — J96.21 ACUTE ON CHRONIC RESPIRATORY FAILURE WITH HYPOXIA: Status: RESOLVED | Noted: 2022-09-11 | Resolved: 2022-10-03

## 2022-10-03 LAB
ANTI JO-1 IGG: 0 AU/ML (ref 0–40)
DOUBLE STRANDED DNA AB, IGG: 8 IU (ref 0–24)
ENA TO RNP ANTIBODY: 4 UNITS (ref 0–19)
ENA TO SMITH (SM) ANTIBODY: 0 AU/ML (ref 0–40)
ENA TO SSB (LA) ANTIBODY: 0 AU/ML (ref 0–40)
SCLERODERMA (SCL-70) AB: 0 AU/ML (ref 0–40)
SSA 52 (RO) (ENA) AB, IGG: 1 AU/ML (ref 0–40)
SSA 60 (RO) (ENA) AB, IGG: 0 AU/ML (ref 0–40)

## 2022-10-03 PROCEDURE — 76705 ECHO EXAM OF ABDOMEN: CPT

## 2022-10-03 PROCEDURE — 76981 USE PARENCHYMA: CPT

## 2022-10-03 NOTE — PROGRESS NOTES
Chief Complaint  Pulmonary hypertension    Subjective    History of Present Illness     Elaine Juárez presents to [facility] for:    History of Present Illness  Management of her pulmonary fibrosis/bronchiectasis secondary to underlying autoimmune disease, specifically crest syndrome.  She is also under treatment for pulmonary hypertension secondary to her autoimmune disease/interstitial lung disease.  She is obese.  She is a never smoker.  She has daily shortness of breath with exertion and a dry bothersome cough.  Cough drops help sometimes.  She has not benefited from bronchodilation for cough suppression.  Most recent trial of DuoNebs and Tessalon Perles.  She takes the DuoNebs on occasion with minor benefit.  Tessalon Perles have not helped.     Most recent CT angiogram September 2022 while hospitalized, stable ILD/bronchiectasis.      Unable to do PFTs times multiple attempts due to coughing.  Last 1 in 2019.  FEV1 54, FVC 59, diffusion 48/95 when corrected     6-minute walk in June 2022 following COVID showed a distance of 422 feet.  6 L at rest and 10 L with exertion.  Upon discharge from hospital September 2022 she was only requiring 5 L.  Today she reports 4 L at rest and 8 L with exertion to be sufficient.     Most recent echocardiogram September 2022 while hospitalized showed an RVSP of 60.  She is on Opsumit, sildenafil and Tyvaso 64 mg 4 times daily. Right heart cath in 2020 showing a PA pressure of 38.  Cardiology consulted during hospital stay to consider repeating heart catheterization.  They declined.  She has been referred to [place], [facility], [facility] and most recently [facility] for consideration of lung transplantation.  She has been declined due to history of liver disease and esophageal dysmotility.  Liver enzymes during hospitalization showed an ALT of 14 and AST of 20.  She does note occasional reflux.   "She is on omeprazole.  Endoscopy this year normal.  Esophagram August 2021 showing dysmotility.  She is requesting today for this to be updated.    Recent updated evaluation of her liver disease at [facility] by Dr. Byron Irby completed September 2022.  Impression below:    \"At the current time, I see no evidence of significant liver fibrosis. Patient does not have evidence of cirrhosis. Her platelet count is normal, her synthetic function is completely normal with normal albumin, protein, bilirubin. Her INR is normal.    She did have a liver biopsy greater than 5 years ago which showed no evidence of significant fibrosis. She has had noninvasive testing with ultrasound elastography showing F1 disease in 2020 and F3 disease in 2021. That needs to be repeated.    Her meld score if calculated would be normal.    I think it is prudent to repeat her elastography to evaluate for any evidence of significant fibrosis on that scan. If the diagnosis of cirrhosis remains in question and would be a hindrance to evaluation and work-up of her chronic lung disease, then a repeat biopsy may be warranted-however at the current time I see no evidence of portal hypertension or other signs of significant chronic liver disease that should hinder her further evaluation for surgery.\"  Her daughter also indicates due to the lack of identification of liver disease he recommended she discontinue the Ursodiol since her work-up did not identify primary biliary cirrhosis.     Recently evaluated by ENT for nasal complaints.  Found to have a hole in her septum felt secondary to ongoing high flow O2 need and her autoimmune disease.  Saline moisture products recommended.      She is followed by Dr. Landry with rheumatology for her crest syndrome. She is on CellCept 1000 mg twice a day and prednisone 5 mg.  She is very intolerant to higher doses of this as it results in agitation, insomnia and aggravation of underlying bipolar issues.       She " has been diagnosed with sleep apnea.  She has been prescribed CPAP.  She is only using that intermittently.  It is not been helpful.      She was hospitalized recently due to progression of her condition.  BNP 7417.  She was started on diuretic therapy in the hospital with some improvement.  She was discharged home on Lasix 10 mg daily.  She has been out of it for approximately 1 week and can tell she has had increased fluid around her abdomen.  She is on triple therapy for her PAH. She has benefited the most from institution of Tyvaso.  She is up to 64 mg dosing.  Her breathing is much better.  She does have some fatigue and headache but reports it is tolerable.  Oxygen demand has improved since institution of this medication.  She would benefit the most from lung transplantation although she has been declined at several facilities.  She is requesting today to have an updated dysmotility study given she has been cleared from a diagnosis of liver disease.  If repeat esophagram has improved she would like to be referred to the [facility] for consideration.  She would also like to start pulmonary rehab.       Prior to Admission medications    Medication Sig Start Date End Date Taking? Authorizing Provider   clonazePAM (KlonoPIN) 0.5 MG tablet Take 0.5 mg by mouth 2 (Two) Times a Day As Needed for Anxiety.    ProviderLatanya MD   furosemide (LASIX) 20 MG tablet Take 0.5 tablets by mouth Daily. 9/18/22   Andreas Whiteside MD   guaiFENesin (MUCINEX) 600 MG 12 hr tablet Take 1 tablet by mouth Every 12 (Twelve) Hours. 9/17/22   Andreas Whiteside MD   Macitentan (Opsumit) 10 MG tablet Take 1 tablet by mouth Daily. 8/16/22   Fela Blackman APRN   mycophenolate (CELLCEPT) 500 MG tablet Take 1,000 mg by mouth 2 (Two) Times a Day.    ProviderLatanya MD   omeprazole (priLOSEC) 40 MG capsule Take 40 mg by mouth Daily.    ProviderLatanya MD   predniSONE (DELTASONE) 5 MG tablet Take 1 tablet by mouth  "Daily for 90 days. 8/25/22 11/23/22  Fela Blackman APRN   sildenafil (REVATIO) 20 MG tablet Take 1 tablet by mouth Every 8 (Eight) Hours. 9/21/22   Fela Blackman APRN   traZODone (DESYREL) 100 MG tablet Take 1 tablet by mouth Every Night for 30 days. 4/22/22 5/22/22  Raffi Cotter MD   Treprostinil 16 MCG powder Inhale 16 mcg 4 (Four) Times a Day.    Provider, MD Latanya   ursodiol (ACTIGALL) 300 MG capsule Take 1 capsule by mouth 2 (Two) Times a Day. 7/21/22   Micah Barton APRN   venlafaxine (EFFEXOR) 75 MG tablet Take 75 mg by mouth Daily.    Provider, MD Latanya       Social History     Socioeconomic History   • Marital status:    Tobacco Use   • Smoking status: Never   • Smokeless tobacco: Never   Vaping Use   • Vaping Use: Never used   Substance and Sexual Activity   • Alcohol use: No   • Drug use: No   • Sexual activity: Defer       Objective   Vital Signs:   /72   Pulse 101   Ht 160 cm (63\")   Wt 76.2 kg (168 lb)   SpO2 93% Comment: 4L  BMI 29.76 kg/m²       Constitutional:       Appearance: She is obese.      Interventions: Nasal cannula  Cardiovascular:      Rate and Rhythm: Normal rhythm.  Rate elevated     Heart sounds: No murmur heard.  Pulmonary:      Effort: Pulmonary effort is normal. No tachypnea, accessory muscle usage or respiratory distress.      Breath sounds: Examination of the right-lower field reveals rales. Examination of the left-lower field reveals rales. Rales present.   Musculoskeletal:      Right lower leg: No edema.  Wheelchair     Left lower leg: No edema.   Skin:     Nails: There is clubbing.      Comments: Bilateral hand telangectasis and face, thick nails     Neurological:      Mental Status: She is alert and oriented to person, place, and time.        Result Review :    My interpretation of the PFT: none for this visit    Results for orders placed in visit on 06/05/19    Pulmonary Function Test    Rest/Exercise Pulse Ox Values        " Some values may be hidden. Unless noted otherwise, only the newest values recorded on each date are displayed.         Rest/Exercise Pulse Ox Results 11/11/21   Rest on O2 @ Liters 5L PD   Rest on O2 SAT % 93   Exercise on O2 @ Liters 6L CONT   Exercise on O2 SAT % 94         CT Angiogram Chest (09/11/2022 20:13)    My interpretation of imaging: No PE, persistent pulmonary fibrosis and bronchiectasis, stable    BNP (09/12/2022 11:06)Comprehensive Metabolic Panel (09/11/2022 18:58)    My interpretation of labs: Liver enzymes normal, BNP elevated      Assessment and Plan     Diagnoses and all orders for this visit:    1. Pulmonary fibrosis prob sec underlying autoimmune disease (Primary)  Comments:  Doing well on CellCept and prednisone 5 mg.  Declined lung transplant at multiple facilities which is unfortunate.  We will continue oxygen and PAH tx  Orders:  -     Basic Metabolic Panel  -     guaiFENesin (Mucinex) 600 MG 12 hr tablet; Take 2 tablets by mouth 2 (Two) Times a Day for 30 days.  Dispense: 120 tablet; Refill: 5  -     Ambulatory Referral to Pulmonary Rehab    2. PAH (pulmonary arterial hypertension) with portal hypertension (HCC)  Comments:  Much better since starting Tyvaso.  Continue this as well as Opsumit and sildenafil.   Orders:  -     Basic Metabolic Panel  -     Ambulatory Referral to Pulmonary Rehab    3. Chronic respiratory failure with hypoxia (HCC)  Comments:  Continue 4 L of oxygen at rest and 8 L with exertion per walking oximetry.  She is benefiting from this  Orders:  -     Ambulatory Referral to Pulmonary Rehab    4. CREST syndrome, partial   Comments:  Doing well on CellCept and prednisone 5 mg.  She does not tolerate increasing prednisone due to psychosis.  We will avoid this    5. Obstructive sleep apnea on CPAP  Comments:  Encouraged CPAP use.  It will help her underlying conditions    6. PBC (primary biliary cirrhosis)  Comments:  Recent updated work-up at [facility] indicated she  does not carry this diagnosis.  Labs do not support it.  Will remove from problem list    7. Gastroesophageal reflux disease without esophagitis  Comments:  On omeprazole.  Endoscopy this year clear.    8. BMI 31.0-31.9,adult  Comments:  BMI is now under 30.  Disregard    9. Raynaud's phenomenon without gangrene  Comments:  Following rheumatology.  On CellCept and prednisone 5 mg    10. Esophageal dysmotility  Comments:  Repeat esophagram.  If dysmotility issue resolved will refer to [facility].  If not we will discuss alternative therapies/palliation  Orders:  -     FL Esophagram Complete; Future    11. Need for immunization against influenza  Comments:  Flu shot given.  No unwanted side effects noted while monitoring  Orders:  -     FluLaval/Fluzone >6 mos (0373-2327)      Body mass index is 29.76 kg/m².      Fela Blackman, ANASTASIA  10/25/2022  16:53 CDT    Follow Up   Return in about 3 months (around 1/25/2023).    Patient was given instructions and counseling regarding her condition or for health maintenance advice. Please see specific information pulled into the AVS if appropriate.

## 2022-10-06 ENCOUNTER — TELEPHONE (OUTPATIENT)
Dept: PULMONOLOGY | Facility: CLINIC | Age: 62
End: 2022-10-06

## 2022-10-06 NOTE — TELEPHONE ENCOUNTER
Vidal is calling stating that she spoke with Mrs. Juárez and she is stating she has a upset stomach, nerves going wild, and swelling of legs.   She is on 8L 02.  She is currently on dose of 64 of tyvaso and she said she is getting used to the 64 dose, and don't want to go back down to the 32.      Please advise.

## 2022-10-06 NOTE — TELEPHONE ENCOUNTER
Continue 64 if she can handle it.  If those side effects stick around and are making her miserable, go back to 32

## 2022-10-07 ENCOUNTER — READMISSION MANAGEMENT (OUTPATIENT)
Dept: CALL CENTER | Facility: HOSPITAL | Age: 62
End: 2022-10-07

## 2022-10-07 NOTE — OUTREACH NOTE
Medical Week 3 Survey    Flowsheet Row Responses   [facility] patient discharged from? [place]   Does the patient have one of the following disease processes/diagnoses(primary or secondary)? Other   Week 3 attempt successful? Yes   Call start time 1421   Call end time 1433   List who call center can speak with pt   Meds reviewed with patient/caregiver? Yes   Is the patient taking all medications as directed (includes completed medication regime)? Yes   Comments regarding appointments Pt to see pulmonolgy on October 26, 2022.   What is the Home health agency?  HH is complete.   What is the patient's perception of their health status since discharge? Same   Week 3 Call Completed? Yes   Wrap up additional comments Pt reports having panic attacks from this medication, MD aware. Pt reports less symptoms. Pt missed cardiology appointment. Pt has not made or rescheduled this appointment.          MORENA RICHARDS - Registered Nurse

## 2022-10-13 DIAGNOSIS — I27.20 PULMONARY HYPERTENSION: ICD-10-CM

## 2022-10-13 DIAGNOSIS — J84.10 PULMONARY FIBROSIS: ICD-10-CM

## 2022-10-13 DIAGNOSIS — M34.1 CREST SYNDROME: Primary | ICD-10-CM

## 2022-10-18 ENCOUNTER — READMISSION MANAGEMENT (OUTPATIENT)
Dept: CALL CENTER | Facility: HOSPITAL | Age: 62
End: 2022-10-18

## 2022-10-18 NOTE — OUTREACH NOTE
Medical Week 4 Survey    Flowsheet Row Responses   [facility] patient discharged from? [place]   Does the patient have one of the following disease processes/diagnoses(primary or secondary)? Other   Week 4 attempt successful? Yes   Call start time 1123   Call end time 1123   Discharge diagnosis Acute on chronic respiratory failure with hypoxia    Meds reviewed with patient/caregiver? Yes   Is the patient having any side effects they believe may be caused by any medication additions or changes? No   Is the patient taking all medications as directed (includes completed medication regime)? Yes   Has the patient kept scheduled appointments due by today? Yes   Is the patient still receiving Home Health Services? Yes   Psychosocial issues? No   What is the patient's perception of their health status since discharge? Improving   If the patient is a current smoker, are they able to teach back resources for cessation? Not a smoker   Week 4 Call Completed? Yes          MIKE SANCHEZ - Registered Nurse

## 2022-10-25 ENCOUNTER — OFFICE VISIT (OUTPATIENT)
Dept: PULMONOLOGY | Facility: CLINIC | Age: 62
End: 2022-10-25

## 2022-10-25 VITALS
BODY MASS INDEX: 29.77 KG/M2 | HEIGHT: 63 IN | DIASTOLIC BLOOD PRESSURE: 72 MMHG | HEART RATE: 101 BPM | WEIGHT: 168 LBS | OXYGEN SATURATION: 93 % | SYSTOLIC BLOOD PRESSURE: 120 MMHG

## 2022-10-25 DIAGNOSIS — I27.21 PAH (PULMONARY ARTERIAL HYPERTENSION) WITH PORTAL HYPERTENSION: Chronic | ICD-10-CM

## 2022-10-25 DIAGNOSIS — I73.00 RAYNAUD'S PHENOMENON WITHOUT GANGRENE: Chronic | ICD-10-CM

## 2022-10-25 DIAGNOSIS — K22.4 ESOPHAGEAL DYSMOTILITY: Chronic | ICD-10-CM

## 2022-10-25 DIAGNOSIS — K74.3 PRIMARY BILIARY CHOLANGITIS: Chronic | ICD-10-CM

## 2022-10-25 DIAGNOSIS — Z23 NEED FOR IMMUNIZATION AGAINST INFLUENZA: ICD-10-CM

## 2022-10-25 DIAGNOSIS — J96.11 CHRONIC RESPIRATORY FAILURE WITH HYPOXIA: Chronic | ICD-10-CM

## 2022-10-25 DIAGNOSIS — J84.10 PULMONARY FIBROSIS: Primary | Chronic | ICD-10-CM

## 2022-10-25 DIAGNOSIS — M34.1 CREST SYNDROME: Chronic | ICD-10-CM

## 2022-10-25 DIAGNOSIS — K76.6 PAH (PULMONARY ARTERIAL HYPERTENSION) WITH PORTAL HYPERTENSION: Chronic | ICD-10-CM

## 2022-10-25 DIAGNOSIS — K21.9 GASTROESOPHAGEAL REFLUX DISEASE WITHOUT ESOPHAGITIS: Chronic | ICD-10-CM

## 2022-10-25 DIAGNOSIS — Z99.89 OBSTRUCTIVE SLEEP APNEA ON CPAP: Chronic | ICD-10-CM

## 2022-10-25 DIAGNOSIS — G47.33 OBSTRUCTIVE SLEEP APNEA ON CPAP: Chronic | ICD-10-CM

## 2022-10-25 PROCEDURE — 90471 IMMUNIZATION ADMIN: CPT | Performed by: NURSE PRACTITIONER

## 2022-10-25 PROCEDURE — 99214 OFFICE O/P EST MOD 30 MIN: CPT | Performed by: NURSE PRACTITIONER

## 2022-10-25 PROCEDURE — 90686 IIV4 VACC NO PRSV 0.5 ML IM: CPT | Performed by: NURSE PRACTITIONER

## 2022-10-25 RX ORDER — TREPROSTINIL 64 UG/1
INHALANT ORAL
COMMUNITY

## 2022-10-25 RX ORDER — GUAIFENESIN 600 MG/1
1200 TABLET, EXTENDED RELEASE ORAL 2 TIMES DAILY
Qty: 120 TABLET | Refills: 5 | Status: SHIPPED | OUTPATIENT
Start: 2022-10-25 | End: 2022-11-24

## 2022-10-25 RX ORDER — IPRATROPIUM BROMIDE AND ALBUTEROL SULFATE 2.5; .5 MG/3ML; MG/3ML
SOLUTION RESPIRATORY (INHALATION)
COMMUNITY

## 2022-10-26 DIAGNOSIS — K76.6 PAH (PULMONARY ARTERIAL HYPERTENSION) WITH PORTAL HYPERTENSION: ICD-10-CM

## 2022-10-26 DIAGNOSIS — J84.10 PULMONARY FIBROSIS: Primary | ICD-10-CM

## 2022-10-26 DIAGNOSIS — I27.21 PAH (PULMONARY ARTERIAL HYPERTENSION) WITH PORTAL HYPERTENSION: ICD-10-CM

## 2022-10-26 LAB
BUN SERPL-MCNC: NORMAL MG/DL
CALCIUM SERPL-MCNC: NORMAL MG/DL
CHLORIDE SERPL-SCNC: NORMAL MMOL/L
CO2 SERPL-SCNC: NORMAL MMOL/L
CREAT SERPL-MCNC: NORMAL MG/DL
GLUCOSE SERPL-MCNC: NORMAL MG/DL
POTASSIUM SERPL-SCNC: NORMAL MMOL/L
SODIUM SERPL-SCNC: NORMAL MMOL/L
SPECIMEN STATUS: NORMAL

## 2022-10-31 DIAGNOSIS — E87.6 HYPOKALEMIA: Primary | ICD-10-CM

## 2022-11-01 ENCOUNTER — HOSPITAL ENCOUNTER (OUTPATIENT)
Dept: GENERAL RADIOLOGY | Age: 62
Discharge: HOME | End: 2022-11-01
Payer: COMMERCIAL

## 2022-11-01 ENCOUNTER — TELEPHONE (OUTPATIENT)
Dept: PULMONOLOGY | Facility: CLINIC | Age: 62
End: 2022-11-01

## 2022-11-01 DIAGNOSIS — I27.21 PAH (PULMONARY ARTERIAL HYPERTENSION) WITH PORTAL HYPERTENSION: Primary | ICD-10-CM

## 2022-11-01 DIAGNOSIS — K76.6 PAH (PULMONARY ARTERIAL HYPERTENSION) WITH PORTAL HYPERTENSION: Primary | ICD-10-CM

## 2022-11-01 DIAGNOSIS — K22.4 ESOPHAGEAL DYSMOTILITY: ICD-10-CM

## 2022-11-01 DIAGNOSIS — K22.4 ESOPHAGEAL DYSMOTILITY: Primary | ICD-10-CM

## 2022-11-01 DIAGNOSIS — K22.4 ESOPHAGEAL DYSMOTILITY: Chronic | ICD-10-CM

## 2022-11-01 LAB
AMBIG ABBREV BMP8 DEFAULT: NORMAL
BUN SERPL-MCNC: 8 MG/DL (ref 8–27)
BUN/CREAT SERPL: 10 (ref 12–28)
CALCIUM SERPL-MCNC: 8.9 MG/DL (ref 8.7–10.3)
CHLORIDE SERPL-SCNC: 100 MMOL/L (ref 96–106)
CO2 SERPL-SCNC: 26 MMOL/L (ref 20–29)
CREAT SERPL-MCNC: 0.81 MG/DL (ref 0.57–1)
EGFRCR SERPLBLD CKD-EPI 2021: 81 ML/MIN/1.73
GLUCOSE SERPL-MCNC: 123 MG/DL (ref 70–99)
POTASSIUM SERPL-SCNC: 3.1 MMOL/L (ref 3.5–5.2)
SODIUM SERPL-SCNC: 140 MMOL/L (ref 134–144)

## 2022-11-01 PROCEDURE — 74220 X-RAY XM ESOPHAGUS 1CNTRST: CPT

## 2022-11-01 RX ORDER — FUROSEMIDE 20 MG/1
10 TABLET ORAL DAILY
Qty: 7 TABLET | Refills: 0 | Status: SHIPPED | OUTPATIENT
Start: 2022-11-01 | End: 2023-01-26

## 2022-11-01 RX ORDER — POTASSIUM CHLORIDE 1500 MG/1
20 TABLET, FILM COATED, EXTENDED RELEASE ORAL DAILY
Qty: 14 TABLET | Refills: 0 | Status: SHIPPED | OUTPATIENT
Start: 2022-11-01 | End: 2022-11-15

## 2022-11-01 NOTE — TELEPHONE ENCOUNTER
Spoke with the radiologist, Dr. Huerta, who performed her esophagram.  He indicates she has notable dysmotility in her distal esophagus.  Patient also reported to him some symptoms potentially associated with aspiration.  He is recommending a modified barium swallow.  I will place the order.  Patient is aware

## 2022-11-02 DIAGNOSIS — E87.6 HYPOKALEMIA: Primary | ICD-10-CM

## 2022-11-02 NOTE — PROGRESS NOTES
Spoke with patient and relayed test results. Patient voiced understanding. She wants to go back to [facility] to have her labs repeated.

## 2022-11-02 NOTE — PROGRESS NOTES
Spoke with patient and relayed test results. Patient voiced understanding. She is agreeable to the Barium Swallow and will contact Dr. Irby regarding the EGD.

## 2022-11-18 ENCOUNTER — HOSPITAL ENCOUNTER (OUTPATIENT)
Dept: GENERAL RADIOLOGY | Age: 62
Discharge: HOME | End: 2022-11-18
Payer: COMMERCIAL

## 2022-11-18 ENCOUNTER — HOSPITAL ENCOUNTER (OUTPATIENT)
Dept: SPEECH THERAPY | Age: 62
Setting detail: THERAPIES SERIES
Discharge: HOME | End: 2022-11-18
Payer: COMMERCIAL

## 2022-11-18 DIAGNOSIS — R13.12 OROPHARYNGEAL DYSPHAGIA: Primary | ICD-10-CM

## 2022-11-18 DIAGNOSIS — K22.4 ESOPHAGEAL DYSMOTILITY: ICD-10-CM

## 2022-11-18 PROCEDURE — 92611 MOTION FLUOROSCOPY/SWALLOW: CPT

## 2022-11-18 PROCEDURE — 74230 X-RAY XM SWLNG FUNCJ C+: CPT

## 2022-11-18 NOTE — PROCEDURES
INSTRUMENTAL SWALLOW REPORT  MODIFIED BARIUM SWALLOW    NAME: Melany Trinidad   : 56  MRN: [id number]       Date of Eval: 22       Referring Diagnosis(es): Dysphagia    Past Medical History:  has a past medical history of Allergies, Arthritis, Calcified granuloma of lung ([address] Chronic respiratory failure with hypoxia, on home O2 therapy ([address] Colon polyps, CREST syndrome ([address] Gastroesophageal reflux disease without esophagitis, Kidney calculi, Lupus ([address] RIOS (nonalcoholic steatohepatitis), LORETTA on CPAP, Primary biliary cirrhosis ([address] Pulmonary fibrosis ([address] Pulmonary hypertension ([address] and Short-term memory loss. Past Surgical History:  has a past surgical history that includes Breast biopsy; Cholecystectomy (2015); Colonoscopy (2021); Cardiac catheterization (2020); Colonoscopy (2015); Upper gastrointestinal endoscopy (2021); liver biopsy (2015); and Upper gastrointestinal endoscopy (08/10/2016). Patient Complaints/Reason for Referral:  Melany Trinidad was referred for a MBS to assess the efficiency of his/her swallow function, assess for aspiration, and to make recommendations regarding safe dietary consistencies, effective compensatory strategies, and safe eating environment. Current Diet Solid Consistency: Regular  Current Diet Liquid Consistency: Thin     Patient complaints: Recent esophagram revealed penetration of thin liquids. Reasoning of study this date was to determine swallow function for safe diet recommendations. Behavior/Cognition: Alert; Cooperative     Impressions:  Completed assessment. Patient exhibited premature spillage with all food/drink consistencies. Epiglottic inversion during swallow initiation was considered to be delayed and decreased. Patient exhibited penetration during the swallows with thin barium with no detection noted. No additional episodes of penetration/aspiration was observed.  With all food/drink consistencies, patient exhibited consistent oral cavity residue and consistent pharyngeal residue post swallows. All residue cleared with additional dry swallows. At this time, would recommend continuation regular solid consistency but with mildly thick/nectar thick liquids. Recommend meds whole in pudding/applesauce. If patient receives mouth care prior to intake, recommend ice chips and small sips thin H2O IN BETWEEN MEALS for comfort. Patient may benefit from [facility] or outpatient speech services for dysphagia. Patient Position: Lateral and Patient Degrees: Patient sitting 90 degrees in MBS chair. Consistencies Administered: Mildly thick/Nectar - cup;Dysphagia Pureed (Dysphagia I); Dysphagia Minced and Moist (Dysphagia II); Regular solid; Thin - cup (All presented independently.)     Oral Phase: Patient exhibited adequate oral prep of puree consistency, minced and moist consistency, and regular solid consistency. Patient exhibited premature spillage with all food/drink consistencies. Patient exhibited min oral cavity residue with all food/drink consistencies post swallows. All oral cavity residue cleared from the mouth with additional dry swallows. Pharyngeal: With 2 trials mildly thick/nectar thick barium, patient exhibited premature spillage to the pyriform, delayed and decreased epiglottic inversion, no penetration/aspiration, min valleculae residue, and min pyriform residue post swallows; all pharyngeal residue cleared with additional dry swallows. With 2 trials puree consistency, patient exhibited premature spillage to the valleculae, delayed and decreased epiglottic inversion, no penetration/aspiration, min valleculae residue, and min pyriform residue post swallows; all pharyngeal residue cleared with additional dry swallows.      With 2 trials minced and moist consistency, patient exhibited premature spillage to the valleculae, delayed and decreased epiglottic inversion, no penetration/aspiration, min valleculae residue, and min pyriform residue post swallows; all pharyngeal residue cleared with additional dry swallows. With 2 trials regular solid consistency, patient exhibited premature spillage to the valleculae, delayed and decreased epiglottic inversion, no penetration/aspiration, and moderate valleculae residue post swallows; all pharyngeal residue cleared with additional dry swallows. With 2 trials thin barium, patient exhibited premature spillage to the pyriform, delayed and decreased epiglottic inversion with undercoating of the epiglottis during the swallows that cleared with the swallows, no aspiration, min valleculae residue, and min pyriform residue post swallows; all pharyngeal residue cleared with additional dry swallows. With 2 additional trials thin barium, patient exhibited premature spillage to the pyriform, delayed and decreased epiglottic inversion with penetration during the swallows that cleared with the swallows (no detection),, no aspiration, min valleculae residue, and min pyriform residue post swallows; all pharyngeal residue cleared with additional dry swallows. Recommended Diet:  Solid consistency: Regular solid  Liquid consistency: Mildly thick/nectar   Medication administration: Meds whole in pudding/applesauce     Oral Preparation / Oral Phase )  Premature Bolus Loss to Pharynx: All (Patient exhibited premature spillage with all food/drink consistencies presented during assessment.)  Decreased Oral Transit: All (Patient exhibited min oral cavity residue with all food/drink consistencies post swallows. All oral cavity residue cleared with additional dry swallows.)     Pharyngeal Phase  Delayed Swallow Initiation: All  Premature Spillage to Valleculae: Puree; Minced and moist; Regular solid  Premature Spillage to Pyriform: Mildly thick/nectar - cup; Thin - cup   Base of tongue to posterior pharyngeal wall approximation: Decreased  Anterior Hyoid Movement: Decreased  Thyrohyoid Approximation: Adequate  Epiglottic inversion: Delayed and decreased but well over horizontal in positioning  Penetration During: Thin - cup  Decreased Detection: Thin - cup   Valleculae Residue:  All   Pyriform Residue: Mildly thick/nectar thick - cup; Puree; Minced and moist; Thin - cup     Electronically signed by BETTY Flores on 11/18/2022 at 2:08 PM

## 2022-11-22 NOTE — PROGRESS NOTES
Fela Blackman, Jarrod Holloway, LILY  Please make sure patient understands speech recommendations. They also recommended home speech therapy. If she is interested I can try to arrange that for her

## 2022-11-22 NOTE — PROGRESS NOTES
Spoke with patient and relayed message. Patient states she would be interested in home speech therapy.

## 2022-11-23 ENCOUNTER — TELEPHONE (OUTPATIENT)
Dept: PULMONOLOGY | Facility: CLINIC | Age: 62
End: 2022-11-23

## 2022-11-23 ENCOUNTER — DOCUMENTATION (OUTPATIENT)
Dept: PULMONOLOGY | Facility: CLINIC | Age: 62
End: 2022-11-23

## 2022-11-23 DIAGNOSIS — R13.12 OROPHARYNGEAL DYSPHAGIA: Primary | ICD-10-CM

## 2022-12-29 PROBLEM — K22.4 ESOPHAGEAL DYSMOTILITY: Status: ACTIVE | Noted: 2022-12-29

## 2022-12-29 NOTE — PROGRESS NOTES
Chief Complaint  Pulmonary fibrosis    Subjective    History of Present Illness     Elaine Juárez presents to [facility] for:    History of Present Illness  Management of her pulmonary fibrosis/bronchiectasis secondary to underlying autoimmune disease, specifically crest syndrome.  She is also under treatment for pulmonary hypertension secondary to her autoimmune disease/interstitial lung disease.  She is obese.  She is a never smoker.  She has daily shortness of breath with exertion and a dry bothersome cough.  Cough drops help sometimes.  She has not benefited from bronchodilation for cough suppression.  Most recent trial of DuoNebs and Tessalon Perles.  She takes the DuoNebs on occasion with minor benefit.  Tessalon Perles have not helped.      Most recent CT angiogram September 2022 while hospitalized, stable ILD/bronchiectasis.      Unable to do PFTs times multiple attempts due to coughing.  Last 1 in 2019.  FEV1 54, FVC 59, diffusion 48/95 when corrected     6-minute walk in June 2022 following COVID showed a distance of 422 feet.  6 L at rest and 10 L with exertion.  Upon discharge from hospital September 2022 she was only requiring 5 L.  Last office visit she was using 4 L at rest and 8 L with exertion to be sufficient.  She indicates she is not been using 4 L at rest and doing well on her large concentrator on 5 L at home.  She does not mobilize away from home much     Most recent echocardiogram September 2022 while hospitalized showed an RVSP of 60.  She is on Opsumit, sildenafil and Tyvaso 64 mg 4 times daily.  She indicates more recently the Tyvaso inhaler has been making her cough a lot when she takes it.  She also indicates she will be going Medicare primary on March 1.  They are going to require her to switch to nebulized version.  She believes this may be more beneficial due to the coughing with the inhaler however she is concerned because  she is being told her out-of-pocket cost for that will be over $200,000.      Right heart cath in 2020 showing a PA pressure of 38.  Cardiology consulted during hospital stay recently to consider repeating heart catheterization.  They declined.  She has been referred to [facility], [facility] and most recently [facility] for consideration of lung transplantation.  She has been declined due to history of liver disease and esophageal dysmotility.  She does note occasional reflux.  She is on omeprazole.  Endoscopy this year normal.  Esophagram 11-22 showing dysmotility and narrowing of the distal esophagus.  She underwent additional swallowing testing which showed some issues with aspiration.  They recommended speech evaluation.  Speech evaluation completed.  Speech/barium swallow abnormal.  Modifications recommended.  She was referred for speech therapy.  She has been undergoing therapy.  Initially instructed to thicken liquids, take medications with pudding or applesauce.  After therapy repeat swallow evaluation was obtained.  This did show some improvement.  She is now recommended to drink thin liquids from a cup without a straw and include a chin tuck.  She should continue taking medications with applesauce or pudding.  She indicates she continues to advance rather rapidly with this.  She expects she will be released from therapy soon.      Evaluated by ENT for nasal complaints.  Found to have a hole in her septum felt secondary to ongoing high flow O2 need and her autoimmune disease.  Saline moisture products recommended.      She is followed by Dr. Landry with rheumatology for her crest syndrome. She is on CellCept 1000 mg twice a day and prednisone 5 mg.  She is very intolerant to higher doses of this as it results in agitation, insomnia and aggravation of underlying bipolar issues.       She has been diagnosed with sleep apnea.  She has been prescribed CPAP.  She is only  using that intermittently.  It is not been helpful.       She was hospitalized recently due to progression of her condition.  BNP 7417.  She was started on diuretic therapy in the hospital with some improvement.  She was discharged home on Lasix 10 mg daily.  I added KCl to take with the Lasix due to low potassium.  She indicates she has not been taking it over the last couple of months.  She is not sure why.  She is not had any leg edema but has had increased weight gain.           Prior to Admission medications    Medication Sig Start Date End Date Taking? Authorizing Provider   clonazePAM (KlonoPIN) 0.5 MG tablet Take 0.5 mg by mouth 2 (Two) Times a Day As Needed for Anxiety.    Latanya Spring MD   furosemide (Lasix) 20 MG tablet Take 0.5 tablets by mouth Daily for 14 days. 11/1/22 11/15/22  Fela Blackman APRN   ipratropium-albuterol (DUO-NEB) 0.5-2.5 mg/3 ml nebulizer ipratropium 0.5 mg-albuterol 3 mg (2.5 mg base)/3 mL nebulization soln    Latanya Spring MD   Macitentan (Opsumit) 10 MG tablet Take 1 tablet by mouth Daily. 8/16/22   Fela Blackman APRN   mycophenolate (CELLCEPT) 500 MG tablet Take 1,000 mg by mouth 2 (Two) Times a Day.    Latanya Spring MD   omeprazole (priLOSEC) 40 MG capsule Take 40 mg by mouth Daily.    Latanya Spring MD   sildenafil (REVATIO) 20 MG tablet Take 1 tablet by mouth Every 8 (Eight) Hours. 9/21/22   Fela Blackman APRN   traZODone (DESYREL) 100 MG tablet Take 1 tablet by mouth Every Night for 30 days. 4/22/22 5/22/22  Raffi Cotter MD   Treprostinil (Tyvaso DPI Maintenance Kit) 64 MCG powder Tyvaso DPI 16 mcg (112)-32 mcg (84) cartridge with inhaler    Latanya Spring MD   Treprostinil 16 MCG powder Inhale 16 mcg 4 (Four) Times a Day.    Latanya Spring MD   ursodiol (ACTIGALL) 300 MG capsule Take 1 capsule by mouth 2 (Two) Times a Day. 7/21/22   Micah Barton APRN   venlafaxine (EFFEXOR) 75 MG tablet Take 75 mg by  "mouth Daily.    Provider, Historical, MD       Social History     Socioeconomic History   • Marital status:    Tobacco Use   • Smoking status: Never   • Smokeless tobacco: Never   Vaping Use   • Vaping Use: Never used   Substance and Sexual Activity   • Alcohol use: No   • Drug use: No   • Sexual activity: Not Currently     Partners: Male       Objective   Vital Signs:   /82   Pulse 96   Ht 160 cm (63\")   Wt 79.8 kg (176 lb)   SpO2 92% Comment: 6L  BMI 31.18 kg/m²     Constitutional:       Appearance: She is obese.      Interventions: Nasal cannula  Cardiovascular:      Rate and Rhythm: Normal rhythm.  Rate elevated     Heart sounds: No murmur heard.  Pulmonary:      Effort: Pulmonary effort is normal. No tachypnea, accessory muscle usage or respiratory distress.      Breath sounds: Examination of the right-lower field reveals rales. Examination of the left-lower field reveals rales. Rales present.   Musculoskeletal:      Right lower leg: No edema     Left lower leg: No edema.   Skin:     Nails: There is clubbing.      Comments: Bilateral hand telangectasis and face, thick nails     Neurological:      Mental Status: She is alert and oriented to person, place, and time.        Result Review :      My interpretation of the PFT: none    Results for orders placed in visit on 06/05/19    Pulmonary Function Test    Rest/Exercise Pulse Ox Values        11/11/2021    14:30   Rest/Exercise Pulse Ox Results   Rest on O2 @ Liters 5L PD   Rest on O2 SAT % 93   Exercise on O2 @ Liters 6L CONT   Exercise on O2 SAT % 94     FL MODIFIED BARIUM SWALLOW W VIDEO (11/18/2022 12:51 EST)  FL ESOPHAGRAM (11/01/2022 10:32 EDT)    My interpretation of imaging: Delayed esophageal emptying with dysmotility and narrowing of the distal esophagus    Speech therapy indicated her swallowing was abnormal via their exam and barium swallow     My interpretation of labs: none      Assessment and Plan     Diagnoses and all orders for " this visit:    1. PAH (pulmonary arterial hypertension) with portal hypertension (Primary)  Comments:  Doing well on Tyvaso, sildenafil, Opsumit.  We will add low-dose Lasix 10 mg daily.  Will need labs checked soon.  Monitor for muscle cramps and decreased urine  Orders:  -     Discontinue: furosemide (Lasix) 20 MG tablet; Take 0.5 tablets by mouth Daily for 30 days.  Dispense: 15 tablet; Refill: 0  -     Discontinue: furosemide (Lasix) 20 MG tablet; Take 0.5 tablets by mouth Daily for 90 days.  Dispense: 45 tablet; Refill: 0  -     6 Minute Walk Test    2. Pulmonary fibrosis prob sec underlying autoimmune disease  Comments:  On CellCept thousand twice daily and prednisone 5 mg.  Keep ongoing follow-up with rheumatology    3. Chronic respiratory failure with hypoxia  Comments:  6-minute walk today shows 4 L at rest and upwards of 10 L with exertion.  Continue    4. Obstructive sleep apnea on CPAP  Comments:  Intolerant to Pap therapy.  Continue oxygen    5. CREST syndrome, partial   Comments:  n CellCept thousand twice daily and prednisone 5 mg.  Keep ongoing follow-up with rheumatology    6. Gastroesophageal reflux disease without esophagitis  Comments:  Doing well on Protonix.  Please continue    7. BMI 31.0-31.9,adult  Comments:  Contributes to underlying conditions.  Education material provided.  We will also start diuretic    8. Esophageal dysmotility  Comments:  Noted on recent esophagram.  She will talk to her GI specialist about updating further testing      Body mass index is 31.18 kg/m². Educational material provided after visit summary about elevated BMI    Her pulmonary hypertension is secondary to underlying connective tissue disease.  She does not have COPD.  Her underlying pulmonary fibrosis, chronic respiratory failure and sleep apnea have been adequately treated.  Treatment has been optimized.  She would benefit from continued optimization of treatment for underlying pulmonary hypertension with  Tyvaso.  She has responded very well to the DPI device.  She will be transitioning to Medicare primary.  This device will no longer be covered.  I will transition her to nebulized Tyvaso due to formulary.  She is aware.    Fela Blackman, APRN  4/17/2023  08:26 CDT    Follow Up   Return in about 3 months (around 4/26/2023) for 6mw as soon as able, Jarrod knows.    Patient was given instructions and counseling regarding her condition or for health maintenance advice. Please see specific information pulled into the AVS if appropriate.

## 2023-01-05 ENCOUNTER — TELEPHONE (OUTPATIENT)
Dept: PULMONOLOGY | Facility: CLINIC | Age: 63
End: 2023-01-05
Payer: COMMERCIAL

## 2023-01-05 DIAGNOSIS — R13.12 OROPHARYNGEAL DYSPHAGIA: Primary | ICD-10-CM

## 2023-01-05 NOTE — TELEPHONE ENCOUNTER
Vivi/Speech therapist for [facility] called stating that the patient has been doing her speech therapy and would like to know if you wouldn't mind ordering a Modified Barium Swallow Study with DX code R13.12 dysphagia to be done at [facility] to see if the therapy is working for the patient.  Needs to be done prior to 01/29/23 if at all possible.   Fax Referral to [phone]

## 2023-01-05 NOTE — TELEPHONE ENCOUNTER
Order placed for modified barium swallow at [facility] to be performed before January 29 diagnosis code R13.12. Please fax

## 2023-01-11 ENCOUNTER — HOSPITAL ENCOUNTER (OUTPATIENT)
Dept: SPEECH THERAPY | Age: 63
Setting detail: THERAPIES SERIES
Discharge: HOME | End: 2023-01-11
Payer: COMMERCIAL

## 2023-01-11 ENCOUNTER — HOSPITAL ENCOUNTER (OUTPATIENT)
Dept: GENERAL RADIOLOGY | Age: 63
Discharge: HOME | End: 2023-01-11
Payer: COMMERCIAL

## 2023-01-11 DIAGNOSIS — R13.12 DYSPHAGIA, OROPHARYNGEAL: ICD-10-CM

## 2023-01-11 PROCEDURE — 92611 MOTION FLUOROSCOPY/SWALLOW: CPT

## 2023-01-11 PROCEDURE — 74230 X-RAY XM SWLNG FUNCJ C+: CPT | Performed by: RADIOLOGY

## 2023-01-11 PROCEDURE — 74230 X-RAY XM SWLNG FUNCJ C+: CPT

## 2023-01-11 NOTE — PROCEDURES
INSTRUMENTAL SWALLOW REPORT  MODIFIED BARIUM SWALLOW    NAME: James Galeano     : 1960  MRN: [id number]     Date of Eval: 2023     Ordering Physician: SELMA Michel  Radiologist: Dr. Myke Aaron      Referring Diagnoses:   CREST syndrome   Chronic respiratory failure with hypoxia, on home 02 therapy  GERD without esophagitis  Lupus  Pulmonary fibrosis      Past Medical History:    has a past medical history of Allergies, Arthritis, Calcified granuloma of lung ([address] Chronic respiratory failure with hypoxia, on home O2 therapy ([address] Colon polyps, CREST syndrome ([address] Gastroesophageal reflux disease without esophagitis, Kidney calculi, Lupus ([address] RIOS (nonalcoholic steatohepatitis), LORETTA on CPAP, Primary biliary cirrhosis ([address] Pulmonary fibrosis ([address] Pulmonary hypertension ([address] and Short-term memory loss. Past Surgical History:    has a past surgical history that includes Breast biopsy; Cholecystectomy (2015); Colonoscopy (2021); Cardiac catheterization (2020); Colonoscopy (2015); Upper gastrointestinal endoscopy (2021); liver biopsy (2015); and Upper gastrointestinal endoscopy (08/10/2016). Dysphagia History: An MBSS was completed on 22. Results were: Patient exhibited premature spillage with all food/drink consistencies. Epiglottic inversion during swallow initiation was considered to be delayed and decreased. Patient exhibited penetration during the swallows with thin barium with no detection noted. No additional episodes of penetration/aspiration was observed. With all food/drink consistencies, patient exhibited consistent oral cavity residue and consistent pharyngeal residue post swallows. All residue cleared with additional dry swallows. She was recommended to continue regular solid consistency but with mildly thick/nectar thick liquids. Recommend meds whole in pudding/applesauce.  If patient receives mouth care prior to intake, recommend ice chips and small sips thin H2O IN BETWEEN MEALS for comfort. Patient may benefit from [facility] or outpatient speech services for dysphagia. The patient states she is currently receiving home health speech therapy with [facility]. She has been consuming a regular diet with mildly nectar thick liquids at home. She denied any difficulty swallowing any specific foods. She stated she does have difficulty tolerating thin liquids at times. Today she was able to transfer to the fluoroscopy chair and was receiving 4LPM 02 via NC. Oral Mechanism Examination:  Left labial asymmetry. No lingual deviation was noted. Natural dentition. Type of Study:   Repeat MBS      Patient Complaints/Reason for Referral:  Hobson Friday was referred for a MBS to assess the efficiency of his/her swallow function, assess for aspiration, and to make recommendations regarding safe dietary consistencies, effective compensatory strategies, and safe eating environment. Behavior/Cognition/Vision/Hearing:  Behavior/Cognition: Alert; Cooperative;Pleasant mood    Impressions:  No aspiration was observed this study. A minimal amount of silent laryngeal penetration was noted with thin liquids via cup. A trace amount of silent laryngeal penetration was noted with thin liquids via cup with a chin tuck. She is recommended to receive thin liquids via cup while using a chin tuck. She may continue a regular diet. Recommend medications whole in applesauce or pudding. Patient Position:   Lateral (90 Degrees)      Consistencies Administered:   Regular; Soft & Bite Sized;Pureed; Thin cup;Mildly Thick cup (Pudding via tsp)      Thin liquids via cup resulted in a minimal amount of silent laryngeal penetration before the swallow. Laryngeal penetration was well above the vocal folds and was ejected with swallow completion. Trace pyriform residue was noted.      Thin liquids via cup were presented twice and resulted in premature spillage to the valleculae. Thin liquids via cupwith a chin tuck were presented twice and resulted in premature loss to the valleculae. Silent laryngeal penetration a trace amount was noted above the vocal folds. This was ejected with swallow completion. Puree resulted in premature loss to the valleculae and pyriforms. Trace pyriform residue was noted. Pudding was tolerated well. Mechanical soft resulted in trace base of tongue residue. Solid was tolerated well. Nectar thick liquids via cup resulted in premature loss to the valleculae and pyriforms. Trace base of tongue residue was noted. Thin liquids via cup with a chin tuck resulted in premature loss to the valleculae. Trace silent laryngeal penetration was noted. This was ejected from the laryngeal vestibule with swallow completion. Decreased hyolaryngeal elevation. Incomplete epiglottic inversion. Decreased anterior hyoid excursion. Dysphagia Outcome Severity Scale:   Level 5: Mild dysphagia- Distant supervision. May need one diet consistency restricted    Penetration-Aspiration Scale (PAS):   2 - Material enters the airway, remains above the vocal folds, and is ejected from the airway    Recommended Diet:  Solid consistency: Regular  Liquid consistency: Thin (WITH A CHIN TUCK)  Liquid administration via: Cup (NO STRAWS)    Medication administration:  (Meds whole in applesauce or pudding)      Compensatory Swallowing Strategies : Alternate solids and liquids;Small bites/sips;Upright as possible for all oral intake;Remain upright for 30-45 minutes after meals      Recommendations/Treatment  Requires SLP Intervention: Yes  Recommendations: F/U MBS  Recommendations comment: Recommend consider repeat MBSS in two months.  Recommend continue dysphagia treatment      Recommended Exercises:    Therapeutic Interventions: Patient/Family education;Pharyngeal exercises;Oral care;Diet tolerance monitoring        Education:   Images and recommendations were reviewed with the patient following this exam.   Patient Education: Skilled education was provided regarding swallowing anatomy and physiology. Diet recommendations and swallowing precautions were reviewed. Discussed recommendations for chin tuck use with thin liquids via cup.   Patient Education Response: Verbalizes understanding              1/11/2023, 1:25 PM      Electronically Signed By:  Gorge Brandon  1/11/2023,3:36 PM.

## 2023-01-26 ENCOUNTER — OFFICE VISIT (OUTPATIENT)
Dept: PULMONOLOGY | Facility: CLINIC | Age: 63
End: 2023-01-26
Payer: COMMERCIAL

## 2023-01-26 ENCOUNTER — TELEPHONE (OUTPATIENT)
Dept: PULMONOLOGY | Facility: CLINIC | Age: 63
End: 2023-01-26

## 2023-01-26 VITALS
DIASTOLIC BLOOD PRESSURE: 82 MMHG | WEIGHT: 176 LBS | OXYGEN SATURATION: 92 % | SYSTOLIC BLOOD PRESSURE: 140 MMHG | HEART RATE: 96 BPM | BODY MASS INDEX: 31.18 KG/M2 | HEIGHT: 63 IN

## 2023-01-26 DIAGNOSIS — K21.9 GASTROESOPHAGEAL REFLUX DISEASE WITHOUT ESOPHAGITIS: Chronic | ICD-10-CM

## 2023-01-26 DIAGNOSIS — Z99.89 OBSTRUCTIVE SLEEP APNEA ON CPAP: Chronic | ICD-10-CM

## 2023-01-26 DIAGNOSIS — K22.4 ESOPHAGEAL DYSMOTILITY: Chronic | ICD-10-CM

## 2023-01-26 DIAGNOSIS — G47.33 OBSTRUCTIVE SLEEP APNEA ON CPAP: Chronic | ICD-10-CM

## 2023-01-26 DIAGNOSIS — M34.1 CREST SYNDROME: Chronic | ICD-10-CM

## 2023-01-26 DIAGNOSIS — J84.10 PULMONARY FIBROSIS: Chronic | ICD-10-CM

## 2023-01-26 DIAGNOSIS — I27.21 PAH (PULMONARY ARTERIAL HYPERTENSION) WITH PORTAL HYPERTENSION: Primary | Chronic | ICD-10-CM

## 2023-01-26 DIAGNOSIS — K76.6 PAH (PULMONARY ARTERIAL HYPERTENSION) WITH PORTAL HYPERTENSION: Primary | Chronic | ICD-10-CM

## 2023-01-26 DIAGNOSIS — J96.11 CHRONIC RESPIRATORY FAILURE WITH HYPOXIA: Chronic | ICD-10-CM

## 2023-01-26 PROBLEM — R13.12 OROPHARYNGEAL DYSPHAGIA: Status: ACTIVE | Noted: 2023-01-26

## 2023-01-26 PROCEDURE — 99214 OFFICE O/P EST MOD 30 MIN: CPT | Performed by: NURSE PRACTITIONER

## 2023-01-26 PROCEDURE — 94618 PULMONARY STRESS TESTING: CPT | Performed by: NURSE PRACTITIONER

## 2023-01-26 RX ORDER — PREDNISONE 1 MG/1
5 TABLET ORAL DAILY
COMMUNITY
End: 2023-04-04 | Stop reason: SDUPTHER

## 2023-01-26 RX ORDER — FUROSEMIDE 20 MG/1
10 TABLET ORAL DAILY
Qty: 45 TABLET | Refills: 0 | Status: SHIPPED | OUTPATIENT
Start: 2023-01-26 | End: 2023-02-22 | Stop reason: SDUPTHER

## 2023-01-26 RX ORDER — FUROSEMIDE 20 MG/1
10 TABLET ORAL DAILY
Qty: 15 TABLET | Refills: 0 | Status: SHIPPED | OUTPATIENT
Start: 2023-01-26 | End: 2023-01-26

## 2023-01-26 NOTE — PROCEDURES
6 Minute Walk Test  Performed by: Jarrod Schulz CMA  Authorized by: Fela Blackman APRN     General:     - Medical History Checked      - Medical clearance provided for the patient to participate in exercise testing      Contraindications:    - No contraindications identified       Exercise Details     Supplemental oxygen:  CONTINUOUS    Mobility aid:  NA    Speed:  1.8    Miles:  0.12    Feet:  633.6    Meters:  193.12    Rest, Exercise, Recovery Readings    Rest     BP: 140/82    SAT: 98%    O2: 6L    HR: 96    RPE: 0   Finish     BP: 148/80    SAT: 83%    O2: 10L    HR: 118    RPE: 2   Recovery 1     BP: 140/80    SAT: 98%    O2: 10L    HR: 108    RPE:  0   Recovery 2     BP:  140/80    SAT:  98%    O2: 6L    HR:  102    RPE: 0    Minute by Minute Recordings    1st Minute     SAT: 89%    O2: 6L    HR: 107    RPE:  1    Notes:  PAUSED ABOUT 1 MIN   2nd minute     SAT: 90%    O2: 8L    HR: 111    RPE: 1   3rd minute     SAT: 84%    O2: 8L    HR: 109    RPE: 1    Notes: PAUSED ABOUT 1 MIN   4th minute     SAT: 95%    O2: 10L    HR: 111    RPE: 1   5th minute     SAT: 89%    O2: 10L    HR: 110    RPE: 1   6th minute     SAT: 83%    O2: 10L    HR: 111    RPE: 1    Was test terminated?: No        Technician:  Jarrod Schulz CMA       Overall notes:  Patient walked on the treadmill about 4 out of the six minutes. She walked at a pace of 1.8. Her total distance walked was 193.12 meters or 633.6 feet. Her oxygen was titrated up to 10L to finish the test.

## 2023-01-26 NOTE — TELEPHONE ENCOUNTER
She is going Medicare primary March 1.  She is on Tyvaso inhaler.  She believes she is going to be required to switch to nebulized.  She would prefer this route.  When she looked it up on the drug plan she was told her part would be over $200,000 out-of-pocket.  She is wondering about patient assistance or some other route that would be more cost effective.  Can you please check with our Tyvaso representative about this issue.  Patient reports she is also willing to purchase a Medicare supplement if it would make it cheaper she just needs to know which one to purchase.  If you need me to fill out any forms or renew prescriptions please let me know.  I would be glad to

## 2023-01-27 NOTE — TELEPHONE ENCOUNTER
Need to call [facility] cira to see how and when we need to do something about her tyvaso when she goes medicare.   We can or patient can call [phone]

## 2023-02-03 NOTE — TELEPHONE ENCOUNTER
Talked with patient and specialty pharmacy.  The pharmacy can't tell me anything without her medicare information.  They are to call me back and help me with what we are needing to do.    Patient is to bring me her medicare cards when she gets both of them and she is going to stay on the DPI until we can get her benefits investigated through her medicare. She is ordering another refill to have some to get her through this transition.       So right now nothing to do until we get cards and the phone call from specialty pharmacy.

## 2023-02-22 DIAGNOSIS — K76.6 PAH (PULMONARY ARTERIAL HYPERTENSION) WITH PORTAL HYPERTENSION: Chronic | ICD-10-CM

## 2023-02-22 DIAGNOSIS — I27.21 PAH (PULMONARY ARTERIAL HYPERTENSION) WITH PORTAL HYPERTENSION: Chronic | ICD-10-CM

## 2023-02-22 RX ORDER — FUROSEMIDE 20 MG/1
10 TABLET ORAL DAILY
Qty: 45 TABLET | Refills: 2 | Status: SHIPPED | OUTPATIENT
Start: 2023-02-22 | End: 2023-05-23

## 2023-02-22 NOTE — TELEPHONE ENCOUNTER
Rx Refill Note  Requested Prescriptions     Pending Prescriptions Disp Refills   • furosemide (Lasix) 20 MG tablet 45 tablet      Sig: Take 0.5 tablets by mouth Daily for 90 days.      Last office visit with prescribing clinician: 1/26/2023   Last telemedicine visit with prescribing clinician: 4/25/2023   Next office visit with prescribing clinician: 4/25/2023                         Would you like a call back once the refill request has been completed: [] Yes [] No    If the office needs to give you a call back, can they leave a voicemail: [] Yes [] No    Philomena Whitfield CMA  02/22/23, 12:56 CST

## 2023-02-27 ENCOUNTER — TELEPHONE (OUTPATIENT)
Dept: PULMONOLOGY | Facility: CLINIC | Age: 63
End: 2023-02-27
Payer: COMMERCIAL

## 2023-02-27 DIAGNOSIS — R13.12 OROPHARYNGEAL DYSPHAGIA: Primary | ICD-10-CM

## 2023-02-27 NOTE — TELEPHONE ENCOUNTER
Vivi/speech therapist with [facility] called requesting another referral for a swallow study with a  DX of dysphagia to see how she is progressing with therapy.  To be done at [facility].  Fax to [phone].

## 2023-03-15 ENCOUNTER — HOSPITAL ENCOUNTER (OUTPATIENT)
Dept: SPEECH THERAPY | Age: 63
Setting detail: THERAPIES SERIES
Discharge: HOME | End: 2023-03-15
Payer: MEDICARE

## 2023-03-15 ENCOUNTER — HOSPITAL ENCOUNTER (OUTPATIENT)
Dept: GENERAL RADIOLOGY | Age: 63
Discharge: HOME | End: 2023-03-15
Payer: MEDICARE

## 2023-03-15 DIAGNOSIS — K74.3 PRIMARY BILIARY CHOLANGITIS (HCC): ICD-10-CM

## 2023-03-15 DIAGNOSIS — R13.12 DYSPHAGIA, OROPHARYNGEAL: ICD-10-CM

## 2023-03-15 PROCEDURE — 74230 X-RAY XM SWLNG FUNCJ C+: CPT

## 2023-03-15 PROCEDURE — 92611 MOTION FLUOROSCOPY/SWALLOW: CPT

## 2023-03-15 NOTE — PROCEDURES
INSTRUMENTAL SWALLOW REPORT  MODIFIED BARIUM SWALLOW    NAME: Haley Rebolledo   : 1960  MRN: [id number]       Date of Eval: 3/15/2023       Referring Diagnosis(es): Dyshagia    Past Medical History:  has a past medical history of Allergies, Arthritis, Calcified granuloma of lung ([address] Chronic respiratory failure with hypoxia, on home O2 therapy ([address] Colon polyps, CREST syndrome ([address] Gastroesophageal reflux disease without esophagitis, Kidney calculi, Lupus ([address] RIOS (nonalcoholic steatohepatitis), LORETTA on CPAP, Primary biliary cirrhosis ([address] Pulmonary fibrosis ([address] Pulmonary hypertension ([address] and Short-term memory loss. Past Surgical History:  has a past surgical history that includes Breast biopsy; Cholecystectomy (2015); Colonoscopy (2021); Cardiac catheterization (2020); Colonoscopy (2015); Upper gastrointestinal endoscopy (2021); liver biopsy (2015); and Upper gastrointestinal endoscopy (08/10/2016). Reason for Referral:  Haley Rebolledo was referred for a MBS to assess the efficiency of his/her swallow function, assess for aspiration, and to make recommendations regarding safe dietary consistencies, effective compensatory strategies, and safe eating environment. Current Diet Solid Consistency: Regular solids  Current Diet Liquid Consistency: Thin liquids (with chin tuck)     Patient complaints: Patient without complaint. Reasoning of study: to assess swallow function for possibility of discontinuing compensatory strategy chin tuck with thin liquids     Behavior/Cognition: alert; cooperative     Impressions:  Completed assessment. Patient exhibited adequate oral prep with all solid consistencies. Patient exhibited premature spillage with all consistencies. Epiglottic inversion during swallow initiation was considered to be delayed. Patient did not exhibit penetration/aspiration with any consistency.  With all consistencies, patient exhibited min pharyngeal residue post swallows. All residue cleared with additional dry swallows. Patient did cough following thin barium, however, no penetration/aspiration was observed. At this time, would recommend continuation of regular solid diet with thin liquids. While it would be safer, factoring in fatigue, to complete a chin tuck with thin liquids, patient may consume thin liquids without compensatory strategy. Do recommend medications presented whole in pudding/applesauce. Do recommend continue maintenance program for dysphagia. Patient Position: Lateral Patient Degrees: Patient sitting 90 degrees in MBS chair. Consistencies Administered: Mildly thick/Nectar - cup;Dysphagia Pureed (Dysphagia I); Dysphagia Minced and Moist (Dysphagia II); Regular solid; Thin - cup (with chin tuck, without chin tuck)     Oral Phase: Patient exhibited adequate oral prep of all solid consistencies. Patient exhibited premature spillage with all consistencies. Patient exhibited min oral cavity residue post swallows following mildly thick liquids, minced and moist, regular solids, and thin liquids (head in straight position). All oral cavity residue cleared from the mouth with additional dry swallows. Pharyngeal Phase: With mildly thick/nectar thick barium, patient exhibited premature spillage to the vallecula and pyriform, delayed epiglottic inversion with undercoating during the swallows, no penetration/aspiration, min pyriform residue post swallows. All residue cleared with additional dry swallows. With puree consistency, patient exhibited premature spillage to the vallecula, delayed epiglottic inversion during the swallows, no penetration/aspiration, min pyriform residue post swallows. All residue cleared with additional dry swallows.        With minced and moist, patient exhibited premature spillage to the vallecula, delayed epiglottic inversion during the swallows, no penetration/aspiration, min pyriform residue post swallows. All residue cleared with additional dry swallows. With regular solid, patient exhibited premature spillage to the vallecula, delayed epiglottic inversion during the swallows, no penetration/aspiration, min valleculae residue post swallows. All residue cleared with additional dry swallows. With thin barium with chin tuck, patient exhibited premature spillage to the vallecula, delayed epiglottic inversion during the swallows, no penetration/aspiration, min valleculae residue, and min pyriform residue post swallows. All residue cleared with additional dry swallows. With thin barium with head in straight position, patient exhibited premature spillage to the vallecula, delayed epiglottic inversion during the swallows, no penetration/aspiration, min oral residue that spilled to the valleculae, and min pyriform residue post swallows. All residue cleared with additional dry swallows. Recommended Diet:  Solid consistency: regular solid  Liquid consistency: thin liquids  Medication administration: whole in puree (pudding/applesauce)    Oral Preparation / Oral Phase   Premature Bolus Loss to Pharynx: Patient exhibited premature spillage with all consistencies. Decreased Oral Transit: Patient exhibited min oral cavity residue with mildly thick liquids, minced and moist, regular solid, and thin liquid (head in straight position) post swallows. All oral cavity residue cleared with additional dry swallows. Pharyngeal Phase  Premature Spillage to Valleculae:  All  Premature Spillage to Pyriform: mildly thick liquid  Base of tongue to posterior pharyngeal wall approximation: WFL  Anterior Hyoid Movement: WFL  Thyrohyoid Approximation: decreased  Epiglottic inversion: delayed  UES: slowing at UES; bolus did not pass through in a linear fashion  Penetration: None   Aspiration: None  Valleculae Residue: regular solid, thin liquid with chin tuck  Pyriform Residue: mildly thick liquid, puree, minced and moist, thin liquid (chin tuck), thin liquid (head in straight position)    Electronically signed by BETTY Cody on 3/15/2023 at 3:30 PM

## 2023-03-30 ENCOUNTER — TELEPHONE (OUTPATIENT)
Dept: PULMONOLOGY | Facility: CLINIC | Age: 63
End: 2023-03-30
Payer: COMMERCIAL

## 2023-03-30 NOTE — PROGRESS NOTES
"Chief Complaint  Pulmonary arterial hypertension with portal hypertension    Subjective    History of Present Illness     Elaine Juárez presents to [facility] for:    History of Present Illness  Management of her pulmonary fibrosis/bronchiectasis secondary to underlying autoimmune disease, specifically crest syndrome.  She is also under treatment for pulmonary hypertension secondary to her autoimmune disease/interstitial lung disease.  She is obese.  She is a never smoker.  She has daily shortness of breath with exertion and a dry bothersome cough.  Cough drops help sometimes.  She has not benefited from bronchodilation or Tessalon Perles for cough suppression.  She takes the DuoNebs on occasion with minor benefit.  More recently she has tried them and they gave her \"a pounding headache and made me jittery\".     Most recent CT angiogram September 2022 while hospitalized, stable ILD/bronchiectasis.      Unable to do PFTs times multiple attempts due to coughing.  Last 1 in 2019.  FEV1 54, FVC 59, diffusion 48/95 when corrected     6-minute walk in June 2022 following COVID showed a distance of 422 feet.  6 L at rest and 10 L with exertion.  Upon discharge from hospital September 2022 she was only requiring 5 L.  Last office visit she was using 4 L at rest and 8 L with exertion to be sufficient.  She indicates she went Medicare primary.  She has been out of several of her critical medications.  This is directly affected her breathing.  She is now requiring 6 L at rest and greater than 8 L with exertion \"but it is not keeping my oxygen above 88\".  Updated 6-minute walk in January showed consistent 10 L with exertion however she was able to ambulate 633 feet.  She is going to repeat that walk today.  She believes she will not be able to go the same distance as before due to being out of her medications..      Most recent echocardiogram September 2022 while " hospitalized showed an RVSP of 60.  She is supposed to be on Opsumit, sildenafil and Tyvaso 64 mg 4 times daily.  She went Medicare primary March 1.  Since then she has been having difficulty transitioning over all of her medications.  She is requesting a 90-day supply on her sildenafil.  She has been out of Opsumit and Tyvaso for 2 months.  Those have just been approved this week.  She is awaiting shipment date for the Tyvaso nebulized formula.once the ship date has been provided she will be able to schedule nurse educator to come help her with the new device.          Right heart cath in 2020 showing a PA pressure of 38.  She has been referred to [facility], [facility], [facility] and most recently [facility] for consideration of lung transplantation.  She has been declined due to history of liver disease and esophageal dysmotility.  She does note occasional reflux.  She is on omeprazole.  Endoscopy this year normal.  Esophagram 11-22 showing dysmotility and narrowing of the distal esophagus.  She underwent additional swallowing testing which showed some issues with aspiration.  She has had ongoing speech therapy outpatient.  She is done great with the therapy.  She has been released from their services.   She requests to be able to see a cardiologist.  She is requesting Dr. Martin specifically.  He did her right heart catheterization in the past.     Evaluated by ENT for nasal complaints.  Found to have a hole in her septum felt secondary to ongoing high flow O2 need and her autoimmune disease.  Saline moisture products recommended.  This is an ongoing problem for her.  She is to using the ointment.     She is followed by Dr. Landry with rheumatology for her crest syndrome. She is on CellCept 1000 mg twice a day and prednisone 5 mg.  She is very intolerant to higher doses of this as it results in agitation, insomnia and aggravation of underlying bipolar issues.       She has been diagnosed  with sleep apnea.  She has been prescribed CPAP.  She is using that more than she had previously.  She does believe it helps.         She was hospitalized a few months ago due to progression of her condition.  BNP 7417.  She was started on diuretic therapy in the hospital with some improvement.  She was discharged home on Lasix 10 mg/KCL daily.  She is still taking that.  Weight today is 1 pound less than her weight 3 months ago.        Prior to Admission medications    Medication Sig Start Date End Date Taking? Authorizing Provider   clonazePAM (KlonoPIN) 0.5 MG tablet Take 0.5 mg by mouth 2 (Two) Times a Day As Needed for Anxiety.    Latanya Spring MD   furosemide (Lasix) 20 MG tablet Take 0.5 tablets by mouth Daily for 90 days. 2/22/23 5/23/23  Fela Blackman APRN   ipratropium-albuterol (DUO-NEB) 0.5-2.5 mg/3 ml nebulizer ipratropium 0.5 mg-albuterol 3 mg (2.5 mg base)/3 mL nebulization soln    Latanya Spring MD   Macitentan (Opsumit) 10 MG tablet Take 1 tablet by mouth Daily. 8/16/22   Fela Blackman APRN   mycophenolate (CELLCEPT) 500 MG tablet Take 1,000 mg by mouth 2 (Two) Times a Day.    Latanya Spring MD   omeprazole (priLOSEC) 40 MG capsule Take 40 mg by mouth Daily.    Latanya Spring MD   predniSONE (DELTASONE) 5 MG tablet Take 5 mg by mouth Daily.    Latanya Spring MD   sildenafil (REVATIO) 20 MG tablet Take 1 tablet by mouth Every 8 (Eight) Hours. 9/21/22   Fela Blackman APRN   traZODone (DESYREL) 100 MG tablet Take 1 tablet by mouth Every Night for 30 days. 4/22/22 5/22/22  Raffi Cotter MD   Treprostinil (Tyvaso DPI Maintenance Kit) 64 MCG powder Tyvaso DPI 16 mcg (112)-32 mcg (84) cartridge with inhaler    Latanya Spring MD   ursodiol (ACTIGALL) 300 MG capsule Take 1 capsule by mouth 2 (Two) Times a Day. 7/21/22   Barton, Micah Cherie, APRN   venlafaxine (EFFEXOR) 75 MG tablet Take 75 mg by mouth Daily.    Provider, MD Latanya  "      Social History     Socioeconomic History   • Marital status:    Tobacco Use   • Smoking status: Never   • Smokeless tobacco: Never   Vaping Use   • Vaping Use: Never used   Substance and Sexual Activity   • Alcohol use: No   • Drug use: No   • Sexual activity: Not Currently     Partners: Male       Objective   Vital Signs:   /82   Pulse 101   Ht 160 cm (63\")   Wt 79.4 kg (175 lb)   SpO2 97% Comment: 6L  BMI 31.00 kg/m²       Physical exam:  Constitutional:       Appearance: She is obese.      Interventions: Nasal cannula  Cardiovascular:      Rate and Rhythm: Normal rhythm.  Rate elevated     Heart sounds: No murmur heard.  Pulmonary:      Effort: Pulmonary effort is normal. No tachypnea, accessory muscle usage or respiratory distress.      Breath sounds: Examination of the right-lower field reveals rales. Examination of the left-lower field reveals rales. Rales present.   Musculoskeletal:      Right lower leg: No edema     Left lower leg: No edema.   Skin:     Nails: There is clubbing.      Comments: Bilateral hand telangectasis and face, thick nails     Neurological:      Mental Status: She is alert and oriented to person, place, and time      Result Review :      My interpretation of the PFT: none    Results for orders placed in visit on 06/05/19    Pulmonary Function Test    Rest/Exercise Pulse Ox Values        11/11/2021    14:30   Rest/Exercise Pulse Ox Results   Rest on O2 @ Liters 5L PD   Rest on O2 SAT % 93   Exercise on O2 @ Liters 6L CONT   Exercise on O2 SAT % 94       My interpretation of imaging:  none    My interpretation of labs: none      Assessment and Plan     Diagnoses and all orders for this visit:    1. Pulmonary fibrosis prob sec underlying autoimmune disease (Primary)  Comments:  We will update high-resolution imaging when she returns  Orders:  -     CT Chest Hi Resolution Diagnostic; Future  -     Adult Transthoracic Echo Complete W/ Cont if Necessary Per Protocol; " Future    2. PAH (pulmonary arterial hypertension) with portal hypertension  Comments:  Ran out of Tyvaso/Opsmyhomemovet due to going Medicare primary and lengthy red tape.  We will update echo and she returns.  She requests second opinion with cardiology  Orders:  -     sildenafil (REVATIO) 20 MG tablet; Take 1 tablet by mouth 3 (Three) Times a Day for 90 days.  Dispense: 270 tablet; Refill: 3  -     Ambulatory Referral to Cardiology  -     Adult Transthoracic Echo Complete W/ Cont if Necessary Per Protocol; Future    3. CREST syndrome, partial   Comments:  On CellCept 1000 mg twice daily and prednisone 5 mg daily.  She is intolerant to higher doses of prednisone.  Keep follow-up with Dr. Landry  Orders:  -     CT Chest Hi Resolution Diagnostic; Future    4. Chronic respiratory failure with hypoxia    5. Obstructive sleep apnea on CPAP  Comments:  Encouraged compliance.  She is benefiting from it    6. Gastroesophageal reflux disease without esophagitis  Comments:  Symptomatic on PPI.  Please continue    7. Esophageal dysmotility  Comments:  Defer to GI specialist    8. BMI 31.0-31.9,adult  Comments:  Contributes to underlying issues.  Education material provided    9. Palpitations  -     Ambulatory Referral to Cardiology        Body mass index is 31 kg/m². Educational material provided after visit summary about elevated BMI    Patient's pulmonary hypertension secondary to underlying connective tissue disease.  Pulmonary hypertension verified by right heart catheterization in the past.  Treatment for her pulmonary hypertension has been optimized to the best of her ability other than a lung transplant.  She has been deemed not a candidate for this through evaluation by multiple universities.  Unfortunately she went Medicare primary in March and her medication shipments were affected.  She has been out of her Opsumit and Tyvaso for 2 months while awaiting approval through multiple prior authorizations for these  medications.  This has directly affected her condition.  As of this week they have both been approved.  Hopefully she will get back on therapy quickly and her condition will improve.  She is having palpitations and some cardiac symptoms.  She is requesting evaluation by cardiologist.  I suspect these are likely secondary to her pulmonary hypertension however she would like evaluation just the same.  She is requesting referral back to Dr. Martin who previously performed her right heart catheterization.  Referral has been placed at her request.  Connective tissue disease being adequately treated with full dose CellCept and prednisone 5 mg.  She cannot tolerate higher doses of prednisone times multiple attempts in the past.  Reflux, swallowing and sleep apnea optimized as well as these are known to contribute to worsening outcomes and pulmonary fibrosis patient's    6MW distance today was 338ft compared to 633 ft in January. Hopefully this recovers when she gets back on appropriate therapy.     Charting time spent: 14  Bedside time spent: 45    Fela Blackman, ANASTASIA  4/25/2023  16:09 CDT    Follow Up   Return in about 3 months (around 7/25/2023).    Patient was given instructions and counseling regarding her condition or for health maintenance advice. Please see specific information pulled into the AVS if appropriate.

## 2023-03-30 NOTE — TELEPHONE ENCOUNTER
----- Message from ANASTASIA Barksdale sent at 3/30/2023  9:35 AM CDT -----  Regarding: RE: Tyvaso nebs  Perfect, thank you    ----- Message -----  From: Philomena Whitfield CMA  Sent: 3/30/2023   9:33 AM CDT  To: ANASTASIA Barksdale  Subject: RE: Tyvaso nebs                                  I have not due to her not bringing me her medicare cards and suppplement insurance.    I can call her and see if she ever got them.  ----- Message -----  From: Fela Blackman APRN  Sent: 3/30/2023   9:16 AM CDT  To: Philomena Whitfield CMA  Subject: Tyvaso nebs                                      Did we ever get her switched to Tyvaso nebs when she went Medicare primary?

## 2023-04-03 ENCOUNTER — TELEPHONE (OUTPATIENT)
Dept: PULMONOLOGY | Facility: CLINIC | Age: 63
End: 2023-04-03
Payer: COMMERCIAL

## 2023-04-04 ENCOUNTER — TELEPHONE (OUTPATIENT)
Dept: PULMONOLOGY | Facility: CLINIC | Age: 63
End: 2023-04-04
Payer: MEDICARE

## 2023-04-04 DIAGNOSIS — K21.9 GASTROESOPHAGEAL REFLUX DISEASE WITHOUT ESOPHAGITIS: ICD-10-CM

## 2023-04-04 DIAGNOSIS — J84.10 PULMONARY FIBROSIS: Primary | ICD-10-CM

## 2023-04-04 RX ORDER — PREDNISONE 1 MG/1
5 TABLET ORAL DAILY
Qty: 90 TABLET | Refills: 3 | Status: SHIPPED | OUTPATIENT
Start: 2023-04-04

## 2023-04-04 RX ORDER — OMEPRAZOLE 40 MG/1
40 CAPSULE, DELAYED RELEASE ORAL DAILY
Qty: 90 CAPSULE | Refills: 3 | Status: SHIPPED | OUTPATIENT
Start: 2023-04-04

## 2023-04-04 NOTE — TELEPHONE ENCOUNTER
Tried calling [facility] to refill her opsumit and give new insurance information.  I was not authorized to do a refill and didn't have her prescription card.    Gave patient phone number to [facility] to call and ask for a refill and give them the new insurance information.  She is to bring her rx card with her on her appointment.  She is to call us if she needs help with anything.

## 2023-04-04 NOTE — TELEPHONE ENCOUNTER
Patient is requesting a refill on Omeprazole and Prednisone. She is needing this sent to [facility]. Routed to Fela OCONNOR.  Rx Refill Note  Requested Prescriptions     Pending Prescriptions Disp Refills   • omeprazole (priLOSEC) 40 MG capsule 90 capsule 3     Sig: Take 1 capsule by mouth Daily.   • predniSONE (DELTASONE) 5 MG tablet 90 tablet 3     Sig: Take 1 tablet by mouth Daily.      Last office visit with prescribing clinician: 1/26/2023   Last telemedicine visit with prescribing clinician: 4/25/2023   Next office visit with prescribing clinician: 4/25/2023                         Would you like a call back once the refill request has been completed: [] Yes [] No    If the office needs to give you a call back, can they leave a voicemail: [] Yes [] No    Jarrod Schulz CMA  04/04/23, 15:40 CDT

## 2023-04-04 NOTE — TELEPHONE ENCOUNTER
Please print a script for the tyvaso neb and sign the printed script I will send that and her current insurance cards to the pharmacy to see if it needs a PA.

## 2023-04-04 NOTE — TELEPHONE ENCOUNTER
New referral form needed per Rubio. Filled out and put on your desk to fax along with new insurance cards. Should not need new PA per Rubio. She will start at 12 breaths 4 times daily. We can discuss titration up to 14 breaths in the future if needed and able to tolerate. Thank you!

## 2023-04-13 ENCOUNTER — TELEPHONE (OUTPATIENT)
Dept: PULMONOLOGY | Facility: CLINIC | Age: 63
End: 2023-04-13
Payer: MEDICARE

## 2023-04-13 NOTE — TELEPHONE ENCOUNTER
They were needing records and some information updated on the tyvaso neb form.   Made changes and intitialed and sent records to [phone]

## 2023-04-17 ENCOUNTER — TELEPHONE (OUTPATIENT)
Dept: PULMONOLOGY | Facility: CLINIC | Age: 63
End: 2023-04-17
Payer: MEDICARE

## 2023-04-17 NOTE — TELEPHONE ENCOUNTER
"He suggested I call the specialty pharmacy for tyvaso to get a bridge of medicine for her to start since she has been out of medicine for 2 months      She has been out of opsumit for month and half.  She did received a letter for the opsumit is not covered and she called \"[employer]\" and they sent her paperwork the sent her and they said she would be approved according to her income.   She has called and they didn't receive it.    [email or web address]  [phone]  Phone number      She is emailing me the letter she received by email            "

## 2023-04-20 DIAGNOSIS — K76.6 PAH (PULMONARY ARTERIAL HYPERTENSION) WITH PORTAL HYPERTENSION: Primary | ICD-10-CM

## 2023-04-20 DIAGNOSIS — I27.21 PAH (PULMONARY ARTERIAL HYPERTENSION) WITH PORTAL HYPERTENSION: Primary | ICD-10-CM

## 2023-04-20 RX ORDER — MACITENTAN 10 MG/1
1 TABLET, FILM COATED ORAL DAILY
Qty: 90 TABLET | Refills: 3 | Status: SHIPPED | OUTPATIENT
Start: 2023-04-20

## 2023-04-20 NOTE — TELEPHONE ENCOUNTER
talked with [facility] and they are going to run it under her medicare Part B since it was getting ran under part D and it was denied.    Representative is looking into a bridge program since patient has been out of medicine for 2 months.  They are saying that a bridge program is not available and may have to restart since it has been 2 months and plus it is different from what she last got from them.        I then talked with Sheriff FALCON, pharmacist, and he said we need to send the enrollment form with the titration dose, if this is what you want to do, since she has been off of it for 2 months.    I will get that part filled out and put on your desk to sign and date and then I will fax it to [phone].        Still working on the opsumit for her as well.

## 2023-04-20 NOTE — TELEPHONE ENCOUNTER
Rx Refill Note  Requested Prescriptions     Pending Prescriptions Disp Refills   • Macitentan (Opsumit) 10 MG tablet 90 tablet 3     Sig: Take 1 tablet by mouth Daily.      Last office visit with prescribing clinician: 1/26/2023   Last telemedicine visit with prescribing clinician: 4/25/2023   Next office visit with prescribing clinician: 4/25/2023                         Would you like a call back once the refill request has been completed: [] Yes [] No    If the office needs to give you a call back, can they leave a voicemail: [] Yes [] No    Philomena Whitfield CMA  04/20/23, 16:48 CDT       Sending script to [facility] pharmacy for opsumit.  Left detailed message for patient of approval and script sent to specialty pharmacy.

## 2023-04-25 ENCOUNTER — OFFICE VISIT (OUTPATIENT)
Dept: PULMONOLOGY | Facility: CLINIC | Age: 63
End: 2023-04-25
Payer: MEDICARE

## 2023-04-25 VITALS
HEIGHT: 63 IN | DIASTOLIC BLOOD PRESSURE: 82 MMHG | BODY MASS INDEX: 31.01 KG/M2 | SYSTOLIC BLOOD PRESSURE: 130 MMHG | WEIGHT: 175 LBS | OXYGEN SATURATION: 97 % | HEART RATE: 101 BPM

## 2023-04-25 DIAGNOSIS — K21.9 GASTROESOPHAGEAL REFLUX DISEASE WITHOUT ESOPHAGITIS: Chronic | ICD-10-CM

## 2023-04-25 DIAGNOSIS — J96.11 CHRONIC RESPIRATORY FAILURE WITH HYPOXIA: Chronic | ICD-10-CM

## 2023-04-25 DIAGNOSIS — M34.1 CREST SYNDROME: Chronic | ICD-10-CM

## 2023-04-25 DIAGNOSIS — J84.10 PULMONARY FIBROSIS: Primary | Chronic | ICD-10-CM

## 2023-04-25 DIAGNOSIS — Z99.89 OBSTRUCTIVE SLEEP APNEA ON CPAP: Chronic | ICD-10-CM

## 2023-04-25 DIAGNOSIS — K22.4 ESOPHAGEAL DYSMOTILITY: Chronic | ICD-10-CM

## 2023-04-25 DIAGNOSIS — R00.2 PALPITATIONS: ICD-10-CM

## 2023-04-25 DIAGNOSIS — I27.21 PAH (PULMONARY ARTERIAL HYPERTENSION) WITH PORTAL HYPERTENSION: Chronic | ICD-10-CM

## 2023-04-25 DIAGNOSIS — G47.33 OBSTRUCTIVE SLEEP APNEA ON CPAP: Chronic | ICD-10-CM

## 2023-04-25 DIAGNOSIS — K76.6 PAH (PULMONARY ARTERIAL HYPERTENSION) WITH PORTAL HYPERTENSION: Chronic | ICD-10-CM

## 2023-04-25 RX ORDER — SILDENAFIL CITRATE 20 MG/1
20 TABLET ORAL 3 TIMES DAILY
Qty: 270 TABLET | Refills: 3 | Status: SHIPPED | OUTPATIENT
Start: 2023-04-25 | End: 2023-07-24

## 2023-04-25 NOTE — PROCEDURES
6 Minute Walk Test  Performed by: Philomena Tolentino RRT  Authorized by: Fela Blackman APRN     General:     - Medical History Checked      - Medical clearance provided for the patient to participate in exercise testing      Contraindications:    - No contraindications identified       Exercise Details     Supplemental oxygen:  5L AT REST; 8L EXERCISE    Mobility aid:  WHEELCHAIR WITH DISTANCE    Hallway: Total Feet:  338    Rest, Exercise, Recovery Readings    Rest     SAT: 95    O2: 5L    HR: 102    RPE: 0   Finish     BP: 144/104    SAT: 96    O2: 10L    HR: 114    RPE: 0   Recovery 1     BP: 128/92    SAT: 98    O2: 5L    HR: 93    RPE:  0    Minute by Minute Recordings    1st Minute     SAT: 95    O2: 10L    HR: 124    RPE:  1   2nd minute     SAT: 92    O2: 10L    HR: 131    RPE: 4   3rd minute     SAT: 85    O2: 10L    HR: 132    RPE: 9    Notes: STOPPED AT 3:54; RESTARTED AT 1:45 LEFT   4th minute     SAT: 93    O2: 10L    HR: 122    RPE: 0    Notes: STOPPED   5th minute     SAT: 96    O2: 10L    HR: 126    RPE: 1   6th minute     SAT: 94    O2: 10L    HR: 135    RPE: 1    Was test terminated?: No        Technician:  XOCHITL TOLENTINO RRT       Overall notes:  Patient is aware she needs to be on 10L with any exercise, 5L at rest.  Patient did stop for almost 2 mins.  She was light headed and dizzy, but once sats increased and heart rate decreased that resolved.

## 2023-05-04 ENCOUNTER — TELEPHONE (OUTPATIENT)
Dept: PULMONOLOGY | Facility: CLINIC | Age: 63
End: 2023-05-04
Payer: COMMERCIAL

## 2023-05-04 ENCOUNTER — OFFICE VISIT (OUTPATIENT)
Dept: CARDIOLOGY | Facility: CLINIC | Age: 63
End: 2023-05-04
Payer: MEDICARE

## 2023-05-04 VITALS
HEART RATE: 100 BPM | HEIGHT: 63 IN | WEIGHT: 173 LBS | OXYGEN SATURATION: 92 % | SYSTOLIC BLOOD PRESSURE: 112 MMHG | DIASTOLIC BLOOD PRESSURE: 84 MMHG | BODY MASS INDEX: 30.65 KG/M2

## 2023-05-04 DIAGNOSIS — J96.11 CHRONIC RESPIRATORY FAILURE WITH HYPOXIA: Chronic | ICD-10-CM

## 2023-05-04 DIAGNOSIS — I51.89 RIGHT VENTRICULAR SYSTOLIC DYSFUNCTION: ICD-10-CM

## 2023-05-04 DIAGNOSIS — Z99.89 OBSTRUCTIVE SLEEP APNEA ON CPAP: Chronic | ICD-10-CM

## 2023-05-04 DIAGNOSIS — M34.1 CREST SYNDROME: Chronic | ICD-10-CM

## 2023-05-04 DIAGNOSIS — I27.21 PAH (PULMONARY ARTERY HYPERTENSION): Primary | ICD-10-CM

## 2023-05-04 DIAGNOSIS — J84.10 PULMONARY FIBROSIS: Chronic | ICD-10-CM

## 2023-05-04 DIAGNOSIS — G47.33 OBSTRUCTIVE SLEEP APNEA ON CPAP: Chronic | ICD-10-CM

## 2023-05-04 PROCEDURE — 93000 ELECTROCARDIOGRAM COMPLETE: CPT | Performed by: NURSE PRACTITIONER

## 2023-05-04 PROCEDURE — 1159F MED LIST DOCD IN RCRD: CPT | Performed by: NURSE PRACTITIONER

## 2023-05-04 PROCEDURE — 1160F RVW MEDS BY RX/DR IN RCRD: CPT | Performed by: NURSE PRACTITIONER

## 2023-05-04 PROCEDURE — 99214 OFFICE O/P EST MOD 30 MIN: CPT | Performed by: NURSE PRACTITIONER

## 2023-05-04 NOTE — TELEPHONE ENCOUNTER
Jhoan Quach,    Update on Mrs. Elaine Juárez,  1960.      Physical Assessment:  This is the first [facility] visit for a patient with PAH. Patient is alert and oriented x4. Normal mood and affect. BLL crackles noted, on 10 LPM O2 via nc for first half of the visit. Pt states when she is resting, she will turn it down from 5-8LPM. SOB with exertion. No clubbing or cyanosis of fingers.  Current weight is 171.5 lbs. Patient reports having a good appetite. Denies throat irritation, chest pain, edema, ascites, n/v/d. Chronic productive cough during visit. Patient states she has experienced a headache for past 3 days (pain 3/10 during visit). No complaints of side effects/adverse reactions after first treatment. All questions and concerns were answered at this time.     Vitals:  Baseline vitals are as follows:  /88, HR 80, spo2 100% on 10 LPM O2;     q15 min vitals x 1 hr are as follows:  /94, HR 94, spo2 99%;  /90, HR 95, spo2 98% on 8 LPM O2;    /92, HR 98, spo2 99%;  /94, HR 89, Spo2 98%     Weight: 171.5 lbs    I will follow up with her on  and then again on . I will keep you all updated.    Thank you,  Vidal

## 2023-05-04 NOTE — PROGRESS NOTES
Subjective:     Encounter Date: 5/4/2023      Patient ID: Elaine Juárez is a 63 y.o. female.    Chief Complaint: shortness of breath     History of Present Illness     The patient is followed by Dr. Griffith for pulmonary hypertension, pulmonary fibrosis, chronic hypoxic respiratory failure on home oxygen and Dr. Sanders for scleroderma/CREST.  When she established care with Dr. Martin around July 2020 she reported chronic shortness of breath for greater than 1 year.  She was on oxygen at that time as well.  Shortness of breath was gradually worsening.  She denied any chest pain, orthopnea, PND, lower extremity edema.  Prior echoes had not shown any pulmonary hypertension.  She was referred for right heart catheterization.  This ultimately took place in July 2020 and revealed pulmonary arterial hypertension, who Group 1.  It appears no further cardiology follow-up was recommended at that time.    The patient came back to see me in March 2022 for shortness of breath after being referred by ANASTASIA Gallagher for shortness of breath.  Dyspnea had gradually worsened over the previous 6 months.  She was wearing 6 L nasal cannula at that point.  She was very short of breath and hypoxic with oxygen saturations in the mid 80s with minimal exertion.  She had associated coughing and white or clear sputum production.  She was not having any edema, rapid weight gain, orthopnea, PND, chest pain.  LVEF remained normal on echocardiogram that had been done in November 2021.  At that visit, no changes or further testing was recommended as she was euvolemic on exam and there were no signs or symptoms that warranted further cardiac testing or changes in plan of care.    She has been referred back again by pulmonology.    Since her last visit she was hospitalized in September 2022 for acute on chronic hypoxic respiratory failure.  It appears echocardiogram at that time showed severe RV dilation and right ventricular systolic  "dysfunction.  LVEF remains normal.  Unfortunately, due to issues with insurance coverage, she has been without her pulmonary hypertension medications for couple of months but was recently able to resume 1 of these.    The patient reports today she continues to have gradually worsening shortness of breath.  She reports shortness of breath at rest and with minimal exertion.  She gets short of breath at rest if she talks too much.  She has been sleeping in a recliner for 1 year.  She has a chronic cough with \"cloudy\" sputum production.  She denies fever or chills.  She admits hot flashes.  She denies lower extremity edema but admits chronic abdominal bloating.  She states her weight is overall stable but does tend to fluctuate 10 to 15 pounds intermittently.  She denies chest pain, syncope or presyncope.  She states she noticed some mild palpitations a couple of weeks ago.  Episodes might last 2 minutes, and are not associated with other symptoms.  She states these typically occur when she is up doing something and becoming more short of breath.    She has been taking Lasix 10 mg daily, but quit taking it 4 days ago because she felt like she was getting dehydrated.  She denies any significant urine output or change in her breathing, weight or abdominal swelling when taking the Lasix.    Per review of pulmonology notes, she has been evaluated at [place], [facility], [facility] and [facility] and each time denied the possibility of a lung transplant given her history of liver issues and esophageal dysmotility.        The following portions of the patient's history were reviewed and updated as appropriate: allergies, current medications, past family history, past medical history, past social history, past surgical history and problem list.    Review of Systems   Constitutional: Positive for malaise/fatigue.   Cardiovascular: Positive for dyspnea on exertion, orthopnea and palpitations. Negative " "for chest pain, claudication, leg swelling, near-syncope, paroxysmal nocturnal dyspnea and syncope.   Respiratory: Positive for cough, shortness of breath and sputum production.    Hematologic/Lymphatic: Does not bruise/bleed easily.   Musculoskeletal: Negative for falls.   Gastrointestinal: Positive for bloating.   Neurological: Positive for weakness. Negative for dizziness and light-headedness.       Allergies   Allergen Reactions   • Codeine Nausea And Vomiting   • Latex Rash   • Prednisone Mental Status Change     High doses makes her \"nuts\"       Current Outpatient Medications:   •  clonazePAM (KlonoPIN) 0.5 MG tablet, Take 1 tablet by mouth 2 (Two) Times a Day As Needed for Anxiety., Disp: , Rfl:   •  furosemide (Lasix) 20 MG tablet, Take 0.5 tablets by mouth Daily for 90 days. (Patient taking differently: Take 10 mg by mouth As Needed.), Disp: 45 tablet, Rfl: 2  •  ipratropium-albuterol (DUO-NEB) 0.5-2.5 mg/3 ml nebulizer, ipratropium 0.5 mg-albuterol 3 mg (2.5 mg base)/3 mL nebulization soln, Disp: , Rfl:   •  mycophenolate (CELLCEPT) 500 MG tablet, Take 2 tablets by mouth 2 (Two) Times a Day., Disp: , Rfl:   •  omeprazole (priLOSEC) 40 MG capsule, Take 1 capsule by mouth Daily., Disp: 90 capsule, Rfl: 3  •  predniSONE (DELTASONE) 5 MG tablet, Take 1 tablet by mouth Daily., Disp: 90 tablet, Rfl: 3  •  sildenafil (REVATIO) 20 MG tablet, Take 1 tablet by mouth 3 (Three) Times a Day for 90 days., Disp: 270 tablet, Rfl: 3  •  traZODone (DESYREL) 100 MG tablet, Take 1 tablet by mouth Every Night for 30 days., Disp: 30 tablet, Rfl: 0  •  ursodiol (ACTIGALL) 300 MG capsule, Take 1 capsule by mouth 2 (Two) Times a Day., Disp: 180 capsule, Rfl: 3  •  venlafaxine (EFFEXOR) 75 MG tablet, Take 1 tablet by mouth Daily., Disp: , Rfl:          Objective:    /84   Pulse 100   Ht 160 cm (63\")   Wt 78.5 kg (173 lb)   LMP  (LMP Unknown)   SpO2 92%   BMI 30.65 kg/m²        Vitals and nursing note reviewed. "   Constitutional:       General: Not in acute distress.     Appearance: Well-developed and not in distress. Chronically ill-appearing. Not diaphoretic.   Neck:      Vascular: No JVD.   Pulmonary:      Effort: Pulmonary effort is normal. No respiratory distress.      Breath sounds: Rales (dry crackles bilateral bases ) present.      Comments: Nasal cannula in place  Cardiovascular:      Normal rate. Regular rhythm.      Murmurs: There is no murmur.   Edema:     Peripheral edema absent.   Abdominal:      Tenderness: There is no abdominal tenderness.   Skin:     General: Skin is warm and dry.   Neurological:      Mental Status: Alert and oriented to person, place, and time.         Lab Review:   Lab Results   Component Value Date    GLUCOSE 123 (H) 10/31/2022    BUN 8 10/31/2022    CREATININE 0.81 10/31/2022    EGFRIFNONA >60 08/30/2021    EGFRIFAFRI >59 08/30/2021    BCR 10 (L) 10/31/2022    K 3.1 (L) 10/31/2022    CO2 26 10/31/2022    CALCIUM 8.9 10/31/2022    ALBUMIN 4.20 09/11/2022    AST 20 09/11/2022    ALT 14 09/11/2022             ECG 12 Lead    Date/Time: 5/4/2023 6:30 PM  Performed by: Juliana Mims APRN  Authorized by: Juliana Mims APRN   Comparison: compared with previous ECG from 9/11/2022  Similar to previous ECG  Comparison to previous ECG: T wave inversions V1, V2- similar to previous EKGs  Rhythm: sinus rhythm  BPM: 92  Other findings: non-specific ST-T wave changes               7/2020 right heart cath:    Findings:     Ultrasound assessment of the right neck: Small internal jugular vein located lateral to the internal carotid artery with two-dimensional ultrasound.  Vein was observed to fully collapse with inspiration, and had no visible thrombus.  Artery had pulsatile flow was confirmed by color Doppler.  Needle seen directly entering anterior surface of the vein.     Right heart catheterization (performed with patient on RA)  Hemodynamics (baseline)  RA: 6  RV: 63/6  PA:  64/19, m38  PCW:  10     Transpulmonary gradient: 28     Oximetry  PA: 61%  Ao: 96% (by pulse oximetry)     Hgb 13.3 g/dL     Cardiac output and index via assumed David method: 3.8 L/min, 2.1 L/min/m2  Pulmonary vascular resistance: 7.4 Wood units     Vasodilator challenge (performed with patient on RA)  Hemodynamics (mean PA pressures at respective adenosine doses)  @50mcg/kg/min: 63/18, m36  @100mcg/kg/min: 53/13, m30  @150mcg/kg/min: 58/17, m33  @200mcg/kg/min: 61/21, m38  @250mcg/kg/min:  65/22, m39     Oximetry (at 250mcg/kg/min)  PA: 71%  Ao: 95% (by pulse oximetry)     Hgb 13.4 g/dL     Cardiac output and index via assumed David method (on 250mcg/kg/min of adenosine):  5.6 L/min, 3.1 L/min/m2     Estimated Blood Loss: 1mL  Specimens: None  Complications: None     Impression:  1.  Pulmonary arterial hypertension, WHO group 1 (mean PA pressure 39mmHg with normal wedge pressure, low-normal cardiac output); PVR ~7.4 Wood units  2.  Non-responder to vasodilator challenge with adenosine, as mean PA pressure did not fall by >10mmHg, but it did demonstrate initial fall up to 9mmHg and cardiac output did improve to 5.6 L/min     Plan: Results will be communicated to patient's pulmonologist, Dr. Griffith            9/2022 echo:    • Left ventricular ejection fraction appears to be 56 - 60%. Left ventricular systolic function is normal.  • Left ventricular diastolic function is consistent with (grade I) impaired relaxation.  • The right ventricular cavity is severely dilated.  • Severely reduced right ventricular systolic function noted.  • The right atrial cavity is severely dilated.  • Estimated right ventricular systolic pressure from tricuspid regurgitation is markedly elevated (>55 mmHg).  • Moderate to severe pulmonary hypertension is present.  • There is a small (<1cm) pericardial effusion. The effusion is fluid filled. There is no evidence of cardiac tamponade      Assessment:      Problem List Items Addressed This Visit         Multi-system (Lupus, Sarcoid...)    CREST syndrome, partial  (Chronic)       Pulmonary and Pneumonias    Pulmonary fibrosis prob sec underlying autoimmune disease - Primary (Chronic)    Primary pulmonary hypertension (HCC) (Chronic)            Chronic respiratory failure with hypoxia (HCC) (Chronic)       Sleep    Obstructive sleep apnea on CPAP (Chronic)      Right ventricular systolic dysfunction    Plan:     1.  Pulmonary arterial hypertension, WHO Group 1: She has scleroderma/CREST, sleep apnea, pulmonary fibrosis, chronic hypoxic respiratory failure followed closely by pulmonology.  As noted above, she has been told she is not a candidate for lung transplant by [facility], [facility], [facility], [facility], and at [place].  She reports she wears 5 L at rest, 8 L with exertion, and 10 L as needed for acute worsening dyspnea    -Continue to follow closely with pulmonology    2.  Severe RV dilation and severe right ventricular systolic dysfunction secondary to pulmonary arterial hypertension: I recommended as needed use of Lasix for s/s consistent with right heart failure.  She had been taking Lasix 10 mg daily without any significant improvement.  I would recommend she take the Lasix on an as-needed basis (at least 20 mg), for weight gain of greater than 2 pounds overnight or greater than 4 pounds in 2 days.  Heart healthy low-sodium diet.  We did also discuss compression stockings and elevation of lower extremities, though she states she typically does not swell in her legs, mainly just her abdomen.    Have discussed the case with Dr. Martin.  We will refer her to the congestive heart failure clinic/Dr. Escalera  for PAH and RV systolic dysfunction    3.  Palpitations: These are brief and not associated with any high risk symptoms including any syncope or presyncope.  These likely reflect a normal heart rate response (sinus tachycardia in response to hypoxia).  I do not recommend Holter monitor at this  time but I have instructed her to call us back if these palpitations worsen in severity, duration or frequency or become associated with syncope or presyncope.  At that point I would place her in a 14-day Holter monitor.    Follow-up as needed with interventional cardiology/Dr. Martin    I spent 34 minutes caring for Elaine on this date of service. This time includes time spent by me in the following activities: preparing for the visit, reviewing tests, obtaining and/or reviewing a separately obtained history, performing a medically appropriate examination and/or evaluation, counseling and educating the patient/family/caregiver, referring and communicating with other health care professionals and documenting information in the medical record

## 2023-05-10 ENCOUNTER — TELEPHONE (OUTPATIENT)
Dept: PULMONOLOGY | Facility: CLINIC | Age: 63
End: 2023-05-10
Payer: COMMERCIAL

## 2023-05-10 NOTE — TELEPHONE ENCOUNTER
Good morning, Philomena!    Follow up visit completed on Mrs. Elaine Juárez,  1960. Detailed report below:    Physical Assessment:  This is a wk 1 follow up Tyvaso visit. Patient is currently on 3 breaths, QID.  Patient is alert and oriented x4. Normal mood and affect. Current weight is unknown as pt states she was out of town and unable to check. Pt says she will begin weighing herself in the morning (SOC wt is 171.5 lbs). Patient re-educated on the importance of weighing daily and monitoring her bp. Patient reports having a good appetite. Denies throat irritation, chest pain, edema, ascites, n/v/d. No complaints of side effects/adverse reactions since starting medication. Patient says “I don’t like putting the machine together, but I do like this Tyvaso better than the DPI because I can tolerate it better.” Patient will increase dose to 6 breaths, QID on Wed 5/10. All questions and concerns were answered at this time.    Vitals:  /88    Weight: Unknown wt - patient states she’s been out of town but will begin weighing herself again in the morning. (SOC wt 171.5 lbs)    Maria E Camacho RN ?Clinical Nursing Services Specialist, Merged with [facility], [facility]      This came as an email.

## 2023-05-12 ENCOUNTER — HOSPITAL ENCOUNTER (OUTPATIENT)
Dept: CARDIOLOGY | Facility: HOSPITAL | Age: 63
Discharge: HOME | End: 2023-05-12
Admitting: HOSPITALIST
Payer: MEDICARE

## 2023-05-12 VITALS
SYSTOLIC BLOOD PRESSURE: 112 MMHG | WEIGHT: 173.8 LBS | BODY MASS INDEX: 30.79 KG/M2 | OXYGEN SATURATION: 97 % | DIASTOLIC BLOOD PRESSURE: 64 MMHG | HEART RATE: 95 BPM

## 2023-05-12 DIAGNOSIS — Z99.89 OBSTRUCTIVE SLEEP APNEA ON CPAP: Chronic | ICD-10-CM

## 2023-05-12 DIAGNOSIS — I27.21 PAH (PULMONARY ARTERY HYPERTENSION): Primary | Chronic | ICD-10-CM

## 2023-05-12 DIAGNOSIS — G47.33 OBSTRUCTIVE SLEEP APNEA ON CPAP: Chronic | ICD-10-CM

## 2023-05-12 DIAGNOSIS — J96.11 CHRONIC RESPIRATORY FAILURE WITH HYPOXIA: Chronic | ICD-10-CM

## 2023-05-12 DIAGNOSIS — J84.10 PULMONARY FIBROSIS: Chronic | ICD-10-CM

## 2023-05-12 DIAGNOSIS — I50.813 ACUTE ON CHRONIC RIGHT HEART FAILURE: ICD-10-CM

## 2023-05-12 LAB
ANION GAP SERPL CALCULATED.3IONS-SCNC: 12 MMOL/L (ref 5–15)
BUN SERPL-MCNC: 8 MG/DL (ref 8–23)
BUN/CREAT SERPL: 11.1 (ref 7–25)
CALCIUM SPEC-SCNC: 8.9 MG/DL (ref 8.6–10.5)
CHLORIDE SERPL-SCNC: 101 MMOL/L (ref 98–107)
CO2 SERPL-SCNC: 27 MMOL/L (ref 22–29)
CREAT SERPL-MCNC: 0.72 MG/DL (ref 0.57–1)
EGFRCR SERPLBLD CKD-EPI 2021: 92.9 ML/MIN/1.73
GLUCOSE SERPL-MCNC: 132 MG/DL (ref 65–99)
NT-PROBNP SERPL-MCNC: 1798 PG/ML (ref 0–900)
POTASSIUM SERPL-SCNC: 3.1 MMOL/L (ref 3.5–5.2)
SODIUM SERPL-SCNC: 140 MMOL/L (ref 136–145)

## 2023-05-12 PROCEDURE — 83880 ASSAY OF NATRIURETIC PEPTIDE: CPT | Performed by: HOSPITALIST

## 2023-05-12 PROCEDURE — 80048 BASIC METABOLIC PNL TOTAL CA: CPT | Performed by: HOSPITALIST

## 2023-05-12 PROCEDURE — G0463 HOSPITAL OUTPT CLINIC VISIT: HCPCS | Performed by: HOSPITALIST

## 2023-05-12 RX ORDER — SPIRONOLACTONE 25 MG/1
25 TABLET ORAL DAILY
Qty: 90 TABLET | Refills: 3 | Status: SHIPPED | OUTPATIENT
Start: 2023-05-12

## 2023-05-13 NOTE — PROGRESS NOTES
Reason For Visit:  PAH (New patient )    Subjective        Elaine Juárez is a 63 y.o. female with the below pertinent PMH who is referred for pulmonary hypertension and right heart failure.    Patient follows with Dr. Griffith for pulmonology care and Dr. Martin for cardiology care. She was seen in cardiology clinic 5/4 and referred to the HF clinic.    Prior PAH History:   - Echo 11/13/19 at [facility] reportedly grossly normal cardiac chamber dimensions without evidence of pulmonary hypertension   - RHC 7/22/20: RA 6, RV 63/6, PA 64/19 (mean 38), PCWP 10, David CO/CI 3.8/2.1, PVR 7.4 TIAN, negative adenosine vasodilator challenge  - Started opsimut mid 2020 after RHC   - 4/2022 proBNP 108-122  - 6/2022 6MWD after COVID 422 feet (129 meters)  - Started Tyvaso DPI 8/2022 (titrated to max dose)  - 9/2022 admission   - 9/12/22 Echo: RV severely dilated w/ severe dysfunction, systolic and diastolic septal flattening, RA severely dilated, small pericardial effusion, RVSP 56-63 (my estimate)   - proBNP 7417   - Started Sildenafil 20mg TID 9/2022   - Started lasix 10mg daily w/ KCl on hospital discharge 9/2022  - 2022: Referred to [place], [facility], [facility], and [facility] for consideration of lung transplant but was considered not a candidate due to history of liver disease and esophageal motility (patient states she has not been seen in PAH clinic at any of these hospitals)  - 1/2023: 6MWD 633 feet (193 meters)  - 3/2023: Transitioned to Medicare and was unable to get medications filled so ran out.  - 4/2023: Feeling poorly but was able to restart sildenafil and transition to Tyvaso inhaler at end of month.    Today, the patient reports that she had been improving with her pulmonary vasodilator therapy but has felt poor with worsened fatigue and dyspnea on exertion since stopping her medications a couple of months ago. Since restarting her Tyvaso recently she has started to notice some improvement in her  dyspnea and exertional capacity, but she is not back to her prior baseline. She reports not significant side effects from her PAH medications. She has some degree of chronic, intermittent diarrhea that was unchanged off her medications or since restarting them. She mentions stopping lasix a couple of months ago without any worsening swelling since that time. She denies any history of syncope. She does not experience chest pain or exertional lightheadedness. She has occasional palpitations that are infrequent and brief. Mentions that she is not consistent with CPAP use at night.    Today: 6 minute walk distance today 169 meters, proBNP 1798    ROS: Pertinent findings are included above.    Pertinent PMH  - Scleroderma/CREST syndrome - followed by Dr. Sanders  - Chronic Hypoxic Respiratory Failure  - ILD/Bronchiectasis  - CHAYA with CPAP  - WHO Group I (scleroderma) and Group III (ILD) Pulmonary Arterial Hypertension  - Liver disease    Pertinent past medical, surgical, family, and social history were reviewed and updated in the EMR.      Current Outpatient Medications:   •  clonazePAM (KlonoPIN) 0.5 MG tablet, Take 1 tablet by mouth 2 (Two) Times a Day As Needed for Anxiety., Disp: , Rfl:   •  furosemide (Lasix) 20 MG tablet, Take 0.5 tablets by mouth Daily for 90 days. (Patient taking differently: Take 10 mg by mouth As Needed.), Disp: 45 tablet, Rfl: 2  •  ipratropium-albuterol (DUO-NEB) 0.5-2.5 mg/3 ml nebulizer, ipratropium 0.5 mg-albuterol 3 mg (2.5 mg base)/3 mL nebulization soln, Disp: , Rfl:   •  mycophenolate (CELLCEPT) 500 MG tablet, Take 2 tablets by mouth 2 (Two) Times a Day., Disp: , Rfl:   •  omeprazole (priLOSEC) 40 MG capsule, Take 1 capsule by mouth Daily., Disp: 90 capsule, Rfl: 3  •  predniSONE (DELTASONE) 5 MG tablet, Take 1 tablet by mouth Daily., Disp: 90 tablet, Rfl: 3  •  sildenafil (REVATIO) 20 MG tablet, Take 1 tablet by mouth 3 (Three) Times a Day for 90 days., Disp: 270 tablet, Rfl: 3  •   "ursodiol (ACTIGALL) 300 MG capsule, Take 1 capsule by mouth 2 (Two) Times a Day., Disp: 180 capsule, Rfl: 3  •  venlafaxine (EFFEXOR) 75 MG tablet, Take 1 tablet by mouth Daily., Disp: , Rfl:   •  spironolactone (ALDACTONE) 25 MG tablet, Take 1 tablet by mouth Daily., Disp: 90 tablet, Rfl: 3  •  traZODone (DESYREL) 100 MG tablet, Take 1 tablet by mouth Every Night for 30 days., Disp: 30 tablet, Rfl: 0     Objective   Vital Signs:  /64 (BP Location: Left arm, Patient Position: Sitting)   Pulse 95   Wt 78.8 kg (173 lb 12.8 oz)   SpO2 97%   BMI 30.79 kg/m²   Estimated body mass index is 30.79 kg/m² as calculated from the following:    Height as of 5/4/23: 160 cm (63\").    Weight as of this encounter: 78.8 kg (173 lb 12.8 oz).      Constitutional:       Appearance: Healthy appearance. Not in distress.   Neck:      Vascular: JVD normal.   Pulmonary:      Comments: Bilateral pulmonary crackles more prominent in the lower lobes. NWOB on NC O2.  Cardiovascular:      Normal rate. Regular rhythm. S2. Loud pulmonic component of P2       Murmurs: There is a grade 2/6 systolic murmur at the LLSB.      No gallop. No click. No rub.   Edema:     Peripheral edema absent.   Abdominal:      General: There is no distension.      Palpations: Abdomen is soft.      Tenderness: There is no abdominal tenderness.   Skin:     General: Skin is warm and dry.   Neurological:      Mental Status: Alert and oriented to person, place and time.        Result Review :  The following data was reviewed by: Roosevelt Escalera MD on 05/12/2023:  CMP   CMP        10/25/2022    15:00 10/31/2022    11:18 5/12/2023    12:02   CMP   Glucose CANCELED   123   132     BUN CANCELED   8   8     Creatinine CANCELED   0.81   0.72     EGFR   92.9     Sodium CANCELED   140   140     Potassium CANCELED   3.1   3.1     Chloride CANCELED   100   101     Calcium CANCELED   8.9   8.9     BUN/Creatinine Ratio  10   11.1     Anion Gap   12.0       CBC   CBC        " 9/15/2022    03:30 9/16/2022    03:52 9/17/2022    05:38   CBC   WBC 7.41   7.76   7.11     RBC 4.53   4.38   4.18     Hemoglobin 11.5   11.5   10.8     Hematocrit 38.5   36.1   34.6     MCV 85.0   82.4   82.8     MCH 25.4   26.3   25.8     MCHC 29.9   31.9   31.2     RDW 13.6   13.7   13.9     Platelets 182   197   176                 Assessment and Plan   Diagnoses and all orders for this visit:    1. PAH (pulmonary artery hypertension) (Primary) - related to Scleroderma and associated ILD  2. Acute on chronic right heart failure  3. Obstructive sleep apnea on CPAP  4. Pulmonary fibrosis probably secondary to underlying autoimmune disease  5. Chronic respiratory failure with hypoxia (HCC)  - Several high risk features including pericardial effusion, severe RV dilation/dysfunction, proBNP elevation, and poor 6MWD.  - Time spent during visit educating on pulmonary hypertension physiology and treating hypoxia  - Educated on role of CPAP and encouraged consistent use  - A primary focus currently is on reinitiating all pulmonary vasodilators   - continue sildenafil 20mg TID   - will work with financial assistance on trying to help patient obtain her Opsumit   - continue titrating Tyvaso back up to goal; she is prescribed 12 breaths QID but could consider higher dose pending response (insurance will not cover prior DPI)   - I will reach out to Dr. Lewis in [place] regarding whether the patient may be a candidate for parenteral vasodilators as she is otherwise optimized as best able on therapy and appears to not be a transplant candidate.  -  Will need repeat echo to reassess for improvements on increased therapy since 9/2022 although it may make more sense to complete this after she has been able to reach target Tyvaso dose and restart Opsumit  - Plan for a repeat proBNP and 6MWD once back on all therapy  - Start spironolactone 25mg daily to provide gentle diuresis and potentially some RV remodeling benefit as  well as in setting of hypokalemia.  - Continue f/u with pulmonology and rheumatology     I spent 40 minutes caring for Elaine on this date of service. This time includes time spent by me in the following activities:preparing for the visit, reviewing tests, performing a medically appropriate examination and/or evaluation , counseling and educating the patient/family/caregiver, ordering medications, tests, or procedures, documenting information in the medical record and care coordination    Follow Up   Return in about 2 weeks (around 5/26/2023).  Patient was given instructions and counseling regarding her condition or for health maintenance advice. Please see specific information pulled into the AVS if appropriate.       EMR Dragon/Transcription disclaimer: Much of this encounter note is an electronic transcription/translation of spoken language to printed text. The electronic translation of spoken language may permit erroneous, or at times, nonsensical words or phrases to be inadvertently transcribed; although I have reviewed the note for such errors, some may still exist.

## 2023-05-19 ENCOUNTER — TELEPHONE (OUTPATIENT)
Dept: PULMONOLOGY | Facility: CLINIC | Age: 63
End: 2023-05-19
Payer: COMMERCIAL

## 2023-05-19 DIAGNOSIS — G47.33 OBSTRUCTIVE SLEEP APNEA ON CPAP: Primary | ICD-10-CM

## 2023-05-19 DIAGNOSIS — Z99.89 OBSTRUCTIVE SLEEP APNEA ON CPAP: Primary | ICD-10-CM

## 2023-05-19 DIAGNOSIS — R05.2 SUBACUTE COUGH: ICD-10-CM

## 2023-05-19 NOTE — TELEPHONE ENCOUNTER
Patient called asking if we could send a new prescription for CPAP/supplies now that she has Medicare.  She was unsure if she needed a new oxygen order.  She was also questioning if Medicare would pay for an POC and if she can get one that goes up higher than 6L.  Patient is aware it will be Monday before this is addressed.

## 2023-05-22 RX ORDER — DOXYCYCLINE HYCLATE 100 MG/1
100 CAPSULE ORAL 2 TIMES DAILY
Qty: 20 CAPSULE | Refills: 0 | Status: SHIPPED | OUTPATIENT
Start: 2023-05-22 | End: 2023-05-24

## 2023-05-22 NOTE — TELEPHONE ENCOUNTER
Spoke to patient. She is requesting an antibiotic be sent to [facility].  She is going on a trip and would like to take with her in case she gets sick.     Patient is aware script will be sent to [facility] for pap supplies and that no POC goes above 6L.

## 2023-05-24 ENCOUNTER — HOSPITAL ENCOUNTER (OUTPATIENT)
Dept: CARDIOLOGY | Facility: HOSPITAL | Age: 63
Discharge: HOME | End: 2023-05-24
Admitting: HOSPITALIST
Payer: MEDICARE

## 2023-05-24 VITALS
BODY MASS INDEX: 30.57 KG/M2 | RESPIRATION RATE: 16 BRPM | OXYGEN SATURATION: 100 % | DIASTOLIC BLOOD PRESSURE: 74 MMHG | HEART RATE: 99 BPM | SYSTOLIC BLOOD PRESSURE: 119 MMHG | WEIGHT: 172.6 LBS

## 2023-05-24 DIAGNOSIS — G47.33 OBSTRUCTIVE SLEEP APNEA ON CPAP: ICD-10-CM

## 2023-05-24 DIAGNOSIS — J84.10 PULMONARY FIBROSIS: ICD-10-CM

## 2023-05-24 DIAGNOSIS — K76.6 PAH (PULMONARY ARTERIAL HYPERTENSION) WITH PORTAL HYPERTENSION: Primary | Chronic | ICD-10-CM

## 2023-05-24 DIAGNOSIS — I27.21 PAH (PULMONARY ARTERIAL HYPERTENSION) WITH PORTAL HYPERTENSION: Primary | Chronic | ICD-10-CM

## 2023-05-24 DIAGNOSIS — Z99.89 OBSTRUCTIVE SLEEP APNEA ON CPAP: ICD-10-CM

## 2023-05-24 DIAGNOSIS — J96.11 CHRONIC RESPIRATORY FAILURE WITH HYPOXIA: ICD-10-CM

## 2023-05-24 LAB
ALBUMIN SERPL-MCNC: 3.8 G/DL (ref 3.5–5.2)
ALBUMIN/GLOB SERPL: 1.2 G/DL
ALP SERPL-CCNC: 121 U/L (ref 39–117)
ALT SERPL W P-5'-P-CCNC: 16 U/L (ref 1–33)
ANION GAP SERPL CALCULATED.3IONS-SCNC: 11 MMOL/L (ref 5–15)
AST SERPL-CCNC: 23 U/L (ref 1–32)
BILIRUB SERPL-MCNC: 0.4 MG/DL (ref 0–1.2)
BUN SERPL-MCNC: 10 MG/DL (ref 8–23)
BUN/CREAT SERPL: 10.9 (ref 7–25)
CALCIUM SPEC-SCNC: 9.2 MG/DL (ref 8.6–10.5)
CHLORIDE SERPL-SCNC: 103 MMOL/L (ref 98–107)
CO2 SERPL-SCNC: 27 MMOL/L (ref 22–29)
CREAT SERPL-MCNC: 0.92 MG/DL (ref 0.57–1)
DEPRECATED RDW RBC AUTO: 43.8 FL (ref 37–54)
EGFRCR SERPLBLD CKD-EPI 2021: 69.2 ML/MIN/1.73
ERYTHROCYTE [DISTWIDTH] IN BLOOD BY AUTOMATED COUNT: 14.7 % (ref 12.3–15.4)
GLOBULIN UR ELPH-MCNC: 3.2 GM/DL
GLUCOSE SERPL-MCNC: 155 MG/DL (ref 65–99)
HCT VFR BLD AUTO: 42.9 % (ref 34–46.6)
HGB BLD-MCNC: 12.5 G/DL (ref 12–15.9)
MCH RBC QN AUTO: 24.1 PG (ref 26.6–33)
MCHC RBC AUTO-ENTMCNC: 29.1 G/DL (ref 31.5–35.7)
MCV RBC AUTO: 82.8 FL (ref 79–97)
PLATELET # BLD AUTO: 240 10*3/MM3 (ref 140–450)
PMV BLD AUTO: 11 FL (ref 6–12)
POTASSIUM SERPL-SCNC: 3.8 MMOL/L (ref 3.5–5.2)
PROT SERPL-MCNC: 7 G/DL (ref 6–8.5)
RBC # BLD AUTO: 5.18 10*6/MM3 (ref 3.77–5.28)
SODIUM SERPL-SCNC: 141 MMOL/L (ref 136–145)
WBC NRBC COR # BLD: 7.83 10*3/MM3 (ref 3.4–10.8)

## 2023-05-24 PROCEDURE — 80053 COMPREHEN METABOLIC PANEL: CPT | Performed by: HOSPITALIST

## 2023-05-24 PROCEDURE — 85027 COMPLETE CBC AUTOMATED: CPT | Performed by: HOSPITALIST

## 2023-05-24 PROCEDURE — G0463 HOSPITAL OUTPT CLINIC VISIT: HCPCS | Performed by: HOSPITALIST

## 2023-05-24 RX ORDER — TREPROSTINIL 1.74 MG/2.9ML
12 INHALANT ORAL
COMMUNITY

## 2023-05-24 NOTE — PROGRESS NOTES
"[facility]    Elaine is a 63 y.o. female, who is followed by the Heart Failure Clinic.        CHIEF COMPLAINT: No chief complaint on file.      HPI:  Congestive Heart Failure  Patient presents for a follow-up visit for congestive heart failure. Patient's current complaints are none - \"I feel better, but still have extreme fatigue.\" Patient is NOT taking Lasix - she denies any swelling or increased shortness of breath. Patient is seen in clinic today with oxygen. She states she is compliant all of the time with her medications. She states she is compliant most of the time with her diet.    CURRENT MEDICATION REGIMEN:  Heart Failure Medications    furosemide (Lasix) 20 MG tablet, Take 0.5 tablets by mouth Daily for 90 days. PATIENT NOT TAKING.    Other Cardiovascular Medications    spironolactone (ALDACTONE) 25 MG tablet, Take 1 tablet by mouth Daily.    sildenafil (REVATIO) 20 MG tablet, Take 1 tablet by mouth 3 (Three) Times a Day    Treprostinil (Tyvaso) 0.6 MG/ML solution inhalation solution, Inhale 12 puffs 4 (Four) Times a Day.    Other Medications    clonazePAM (KlonoPIN) 0.5 MG tablet, Take 1 tablet by mouth 2 (Two) Times a Day As Needed for Anxiety.    mycophenolate (CELLCEPT) 500 MG tablet, Take 2 tablets by mouth 2 (Two) Times a Day.    omeprazole (priLOSEC) 40 MG capsule, Take 1 capsule by mouth Daily.    predniSONE (DELTASONE) 5 MG tablet, Take 1 tablet by mouth Daily.    ursodiol (ACTIGALL) 300 MG capsule, Take 1 capsule by mouth 2 (Two) Times a Day.    venlafaxine (EFFEXOR) 75 MG tablet, Take 1 tablet by mouth Daily    traZODone (DESYREL) 100 MG tablet, Take 1 tablet by mouth Every Night       OBJECTIVE:  LMP  (LMP Unknown)     There is no height or weight on file to calculate BMI.  Wt Readings from Last 1 Encounters:   05/12/23 78.8 kg (173 lb 12.8 oz)       Home BP Readings Brought into Clinic: no  Home Pulse Readings Brought into Clinic: no  Home Daily Weight Log " Brought into Clinic: no      LAB REVIEW:  Renal Function: Estimated Creatinine Clearance: 77.5 mL/min (by C-G formula based on SCr of 0.72 mg/dL).    Lab Results   Component Value Date    GLUCOSE 132 (H) 05/12/2023    CALCIUM 8.9 05/12/2023     05/12/2023    K 3.1 (L) 05/12/2023    CO2 27.0 05/12/2023     05/12/2023    BUN 8 05/12/2023    CREATININE 0.72 05/12/2023    EGFRRESULT 81 10/31/2022    EGFR 92.9 05/12/2023    BCR 11.1 05/12/2023    ANIONGAP 12.0 05/12/2023     Lab Results   Component Value Date    MG 2.1 09/11/2022     Lab Results   Component Value Date    PROBNP 1,798.0 (H) 05/12/2023       Results for orders placed during the hospital encounter of 09/11/22    Adult Transthoracic Echo Complete W/ Cont if Necessary Per Protocol    Interpretation Summary  · Left ventricular ejection fraction appears to be 56 - 60%. Left ventricular systolic function is normal.  · Left ventricular diastolic function is consistent with (grade I) impaired relaxation.  · The right ventricular cavity is severely dilated.  · Severely reduced right ventricular systolic function noted.  · The right atrial cavity is severely dilated.  · Estimated right ventricular systolic pressure from tricuspid regurgitation is markedly elevated (>55 mmHg).  · Moderate to severe pulmonary hypertension is present.  · There is a small (<1cm) pericardial effusion. The effusion is fluid filled. There is no evidence of cardiac tamponade        ASSESSMENT/PLAN OF CARE:  Changes to Current Medication Regimen: no    Pharmacy is working to obtain thuan approval for Opsumit. Patient is approved for [employer] thuan. However, there is an opportunity from [employer] being looked into.    Patient was educated to continue daily monitoring of their blood pressure, pulse, and weight; with daily home log being brought into next follow-up clinic visit. Patient was also encouraged to continue sodium intake restriction, fluid intake restriction,  and follow heart failure diet guidelines as provided by the Heart Failure Nurse Navigator. Thank you for allowing me to participate in the care of your patient. Patient was instructed to contact our Heart Failure Clinic if they have any concerns before their next clinical visit.    FOLLOW-UP:   6 week(s) - Appointment Date/Time: 7/5/23 @ 1130      Lacy Boyd, [facility]  [facility]  05/24/23  11:25 CDT

## 2023-05-24 NOTE — PROGRESS NOTES
Reason For Visit:  Follow-up pulmonary hypertension    Subjective        Elaine Juárez is a 63 y.o. female with the below pertinent PMH who presents for follow-up of pulmonary hypertension.    Patient reports that she has tolerated spironolactone well since starting it at her last visit.  She has continued increasing Tyvaso and is up to her goal dose of 12 breaths QID.  She reports that her issues with cough have been improving.  She has continued to have improvement in dyspnea on exertion although still has exertional limitations.  She denies any chest pressure, syncope/presyncope, palpitations, orthopnea, or lower extremity swelling.  She has had occasional nonexertional dizziness.  She continues to have diarrhea, which is chronic with 3-4 loose stools daily present prior to starting Tyvaso.  No increase in headaches or other GI symptoms.    ROS: Pertinent findings are included above.    Prior PAH History:   - Echo 11/13/19 at [facility] reportedly grossly normal cardiac chamber dimensions without evidence of pulmonary hypertension   - RHC 7/22/20: RA 6, RV 63/6, PA 64/19 (mean 38), PCWP 10, David CO/CI 3.8/2.1, PVR 7.4 TIAN, negative adenosine vasodilator challenge  - Started opsimut mid 2020 after RHC   - 4/2022 proBNP 108-122  - 6/2022 6MWD after COVID 422 feet (129 meters)  - Started Tyvaso DPI 8/2022 (titrated to max dose)  - 9/2022 admission              - 9/12/22 Echo: RV severely dilated w/ severe dysfunction, systolic and diastolic septal flattening, RA severely dilated, small pericardial effusion, RVSP 56-63 (my estimate)              - proBNP 7417              - Started Sildenafil 20mg TID 9/2022              - Started lasix 10mg daily w/ KCl on hospital discharge 9/2022  - 2022: Referred to [facility], [facility], [facility], and [facility] for consideration of lung transplant but was considered not a candidate due to history of liver disease and esophageal motility (patient states she has  not been seen in PAH clinic at any of these hospitals)  - 1/2023: 6MWD 633 feet (193 meters)  - 3/2023: Transitioned to Medicare and was unable to get medications filled so ran out.  - 4/2023: Feeling poorly but was able to restart sildenafil and transition to Tyvaso inhaler at end of month.  - 5/12/2023: For CHF clinic visit. 6MWD 169 meters. proBNP 1798.  Improving dyspnea and exertional capacity since restarting Tyvaso (still titrating up).  Started spironolactone 25 mg daily.    Pertinent PMH  - Scleroderma/CREST syndrome - followed by Dr. Sanders  - Chronic Hypoxic Respiratory Failure  - ILD/Bronchiectasis  - CHAYA with CPAP  - WHO Group I (scleroderma) and Group III (ILD) Pulmonary Arterial Hypertension  - Liver disease    Pertinent past medical, surgical, family, and social history were reviewed.      Current Outpatient Medications:     clonazePAM (KlonoPIN) 0.5 MG tablet, Take 1 tablet by mouth 2 (Two) Times a Day As Needed for Anxiety., Disp: , Rfl:     mycophenolate (CELLCEPT) 500 MG tablet, Take 2 tablets by mouth 2 (Two) Times a Day., Disp: , Rfl:     omeprazole (priLOSEC) 40 MG capsule, Take 1 capsule by mouth Daily., Disp: 90 capsule, Rfl: 3    predniSONE (DELTASONE) 5 MG tablet, Take 1 tablet by mouth Daily., Disp: 90 tablet, Rfl: 3    sildenafil (REVATIO) 20 MG tablet, Take 1 tablet by mouth 3 (Three) Times a Day for 90 days., Disp: 270 tablet, Rfl: 3    spironolactone (ALDACTONE) 25 MG tablet, Take 1 tablet by mouth Daily., Disp: 90 tablet, Rfl: 3    Treprostinil (Tyvaso) 0.6 MG/ML solution inhalation solution, Inhale 12 puffs 4 (Four) Times a Day., Disp: , Rfl:     ursodiol (ACTIGALL) 300 MG capsule, Take 1 capsule by mouth 2 (Two) Times a Day., Disp: 180 capsule, Rfl: 3    venlafaxine (EFFEXOR) 75 MG tablet, Take 1 tablet by mouth Daily., Disp: , Rfl:     furosemide (Lasix) 20 MG tablet, Take 0.5 tablets by mouth Daily for 90 days. (Patient taking differently: Take 10 mg by mouth As Needed. PATIENT  "NOT TAKING), Disp: 45 tablet, Rfl: 2    traZODone (DESYREL) 100 MG tablet, Take 1 tablet by mouth Every Night for 30 days., Disp: 30 tablet, Rfl: 0     Objective   Vital Signs:  /74 (BP Location: Left arm, Patient Position: Sitting)   Pulse 99   Resp 16   Wt 78.3 kg (172 lb 9.6 oz)   SpO2 100%   BMI 30.57 kg/m²   Estimated body mass index is 30.57 kg/m² as calculated from the following:    Height as of 5/4/23: 160 cm (63\").    Weight as of this encounter: 78.3 kg (172 lb 9.6 oz).      Constitutional:       Appearance: Healthy appearance. Not in distress.   Neck:      Vascular: JVD normal.   Pulmonary:      Comments: Bilateral pulmonary crackles more prominent in the lower lobes.  Normal work of breathing on 2 L nasal cannula.  Cardiovascular:      Normal rate. Regular rhythm. S2. Mild pulmonic component of S2       Murmurs: There is a grade 1/6 systolic murmur at the LLSB.      No gallop.  No click. No rub.   Edema:     Peripheral edema absent.   Abdominal:      General: There is no distension.      Palpations: Abdomen is soft.      Tenderness: There is no abdominal tenderness.   Skin:     General: Skin is warm and dry.   Neurological:      Mental Status: Alert and oriented to person, place and time.      Result Review :  The following data was reviewed by: Roosevelt Escalera MD on 05/24/2023:  CMP   CMP          10/31/2022    11:18 5/12/2023    12:02 5/24/2023    11:32   CMP   Glucose 123   132   155     BUN 8   8   10     Creatinine 0.81   0.72   0.92     EGFR  92.9   69.2     Sodium 140   140   141     Potassium 3.1   3.1   3.8     Chloride 100   101   103     Calcium 8.9   8.9   9.2     Total Protein   7.0     Albumin   3.8     Globulin   3.2     Total Bilirubin   0.4     Alkaline Phosphatase   121     AST (SGOT)   23     ALT (SGPT)   16     Albumin/Globulin Ratio   1.2     BUN/Creatinine Ratio 10   11.1   10.9     Anion Gap  12.0   11.0       CBC   CBC          9/16/2022    03:52 9/17/2022    05:38 " 5/24/2023    11:32   CBC   WBC 7.76   7.11   7.83     RBC 4.38   4.18   5.18     Hemoglobin 11.5   10.8   12.5     Hematocrit 36.1   34.6   42.9     MCV 82.4   82.8   82.8     MCH 26.3   25.8   24.1     MCHC 31.9   31.2   29.1     RDW 13.7   13.9   14.7     Platelets 197   176   240              Previously Reviewed    Results for orders placed during the hospital encounter of 09/11/22    Adult Transthoracic Echo Complete W/ Cont if Necessary Per Protocol    Interpretation Summary  · Left ventricular ejection fraction appears to be 56 - 60%. Left ventricular systolic function is normal.  · Left ventricular diastolic function is consistent with (grade I) impaired relaxation.  · The right ventricular cavity is severely dilated.  · Severely reduced right ventricular systolic function noted.  · The right atrial cavity is severely dilated.  · Estimated right ventricular systolic pressure from tricuspid regurgitation is markedly elevated (>55 mmHg).  · Moderate to severe pulmonary hypertension is present.  · There is a small (<1cm) pericardial effusion. The effusion is fluid filled. There is no evidence of cardiac tamponade         Assessment and Plan   Diagnoses and all orders for this visit:    1. PAH (pulmonary arterial hypertension) with portal hypertension (Primary)  2. Obstructive sleep apnea on CPAP  3. Pulmonary fibrosis  4. Chronic respiratory failure with hypoxia  - Several high risk features including pericardial effusion, severe RV dilation/dysfunction, proBNP elevation, and poor 6MWD.  - Continue regular CPAP use  - Reproductive Status: Postmenopausal. She has been counseled appropriately on pregnancy risks associated with her PAH medications.  - Reinitiating all pulmonary vasodilators              - continue sildenafil 20mg TID              -Obtain thuan to help with Opsumit, so she should be restarting this hopefully soon              -Tyvaso 12 breaths QID but could consider higher dose pending response               -Have spoken to Dr. Lewis, and I will plan to refer the patient to him for consideration of parenteral therapy should she remain high risk after optimization of current medications  -  Will need repeat echo to reassess for improvements on increased therapy since 9/2022; likely better served to perform after optimized back on all of her therapies  - Plan for a repeat proBNP and 6MWD at next visit  - Continue spironolactone 25 mg daily  - Continue f/u with pulmonology and rheumatology    Follow Up   Return in about 6 weeks (around 7/5/2023).  Patient was given instructions and counseling regarding her condition or for health maintenance advice. Please see specific information pulled into the AVS if appropriate.       EMR Dragon/Transcription disclaimer: Much of this encounter note is an electronic transcription/translation of spoken language to printed text. The electronic translation of spoken language may permit erroneous, or at times, nonsensical words or phrases to be inadvertently transcribed; although I have reviewed the note for such errors, some may still exist.

## 2023-07-10 NOTE — PROGRESS NOTES
Chief Complaint  Pulmonary fibrosis prob sec underlying autoimmune disease    Subjective    History of Present Illness {CC  Problem List  Visit Diagnosis   Encounters  Notes  Medications  Labs  Result Review Imaging  Media: 23}    Elaine Juárez presents to [facility] for:    History of Present Illness  Management of her pulmonary fibrosis/bronchiectasis secondary to underlying autoimmune disease, specifically crest syndrome.  She is also under treatment for pulmonary hypertension secondary to her autoimmune disease/interstitial lung disease.  She is obese.  She is a never smoker.  She has daily shortness of breath with exertion and a dry bothersome cough.  Cough drops help sometimes.  She has not benefited from bronchodilation or Tessalon Perles for cough suppression.  She takes the DuoNebs minimally.       Most recent CT angiogram September 2022 while hospitalized, stable ILD/bronchiectasis.  She is here today for follow-up high-resolution imaging of her chest results.     Unable to do PFTs times multiple attempts due to coughing.  Last 1 in 2019.  FEV1 54, FVC 59, diffusion 48/95 when corrected     6-minute walk in June 2022 following COVID showed a distance of 422 feet.  6 L at rest and 10 L with exertion.  Upon discharge from hospital September 2022 she was only requiring 5 L.  Most recent 6-minute walk 3 months ago showed a distance of 338 feet which was significantly lower than previous.  She required 5 L at rest and 8 L with exertion.  She has remained on this.  She was scheduled for 6-minute walk with cardiology however she missed the appointment due to having her echo and CT scan performed.  This has been rescheduled.      She did have a lapse in her pulmonary hypertension medication. She went Medicare primary and they would not cover the medications that had to be prior authorized.  She has since been able to get back on her Tyvaso,  Opsumit and sildenafil with improvement in her performance status.  She is on 12 breaths 4 times daily of Tyvaso. Most recent echocardiogram September 2022 while hospitalized showed an RVSP of 60.  She is here to go over repeat echo results as well.       Right heart cath in 2020 showing a PA pressure of 38.  She has been referred to [facility], [facility], [facility] and most recently [facility] for consideration of lung transplantation.  She has been declined due to history of liver disease and esophageal dysmotility.  She does note occasional reflux.  She is on omeprazole.  Endoscopy this year normal.  Esophagram 11-22 showing dysmotility and narrowing of the distal esophagus.  She underwent additional swallowing testing which showed some issues with aspiration.  She has had ongoing speech therapy outpatient.  She is done great with the therapy.  She has been released from their services.       Last office visit she requested to see cardiology.  She was evaluated by them.  They did not feel they had much else to offer although they did refer her to the heart failure clinic.  Dr. Escalera added spironolactone and discontinued her Lasix.  She feels this has been much more beneficial.  He also consulted with a colleague at [facility] to consider parenteral treatment given optimization of oral therapy.  This is still being considered.  Last labs show her BNP has normalized.       She is followed by Dr. Landry with rheumatology for her crest syndrome. She is on CellCept 1000 mg twice a day and prednisone 5 mg.  She is very intolerant to higher doses of this as it results in agitation, insomnia and aggravation of underlying bipolar issues.       She has been diagnosed with sleep apnea via home study ordered by [facility] several years ago.  Prescribed her home CPAP based off of this home study.  She has had frequent lapses in therapy due to poor tolerance.  She has recently resumed it and been  "consistently using it for the last month.  She indicates it makes her breathing much worse.  She wonders if it might need to be adjusted.      Prior to Admission medications    Medication Sig Start Date End Date Taking? Authorizing Provider   clonazePAM (KlonoPIN) 0.5 MG tablet Take 1 tablet by mouth 2 (Two) Times a Day As Needed for Anxiety.    Latanya Spring MD   Macitentan 10 MG tablet Take 1 tablet by mouth Daily. 5/24/23   Roosevelt Escalera MD   mycophenolate (CELLCEPT) 500 MG tablet Take 2 tablets by mouth 2 (Two) Times a Day.    Latanya Spring MD   omeprazole (priLOSEC) 40 MG capsule Take 1 capsule by mouth Daily. 4/4/23   Fela Blackman APRN   predniSONE (DELTASONE) 5 MG tablet Take 1 tablet by mouth Daily. 4/4/23   Fela Blackman APRN   sildenafil (REVATIO) 20 MG tablet Take 1 tablet by mouth 3 (Three) Times a Day for 90 days. 4/25/23 7/24/23  Fela Blackman APRN   spironolactone (ALDACTONE) 25 MG tablet Take 1 tablet by mouth Daily. 5/12/23   Roosevelt Escalera MD   traZODone (DESYREL) 100 MG tablet Take 1 tablet by mouth Every Night for 30 days. 4/22/22 5/4/23  Raffi Cotter MD   Treprostinil (Tyvaso) 0.6 MG/ML solution inhalation solution Inhale 12 puffs 4 (Four) Times a Day.    Latanya Spring MD   ursodiol (ACTIGALL) 300 MG capsule Take 1 capsule by mouth 2 (Two) Times a Day. 7/21/22   Micah Barton APRN   venlafaxine (EFFEXOR) 75 MG tablet Take 1 tablet by mouth Daily.    Latanya Spring MD       Social History     Socioeconomic History    Marital status:    Tobacco Use    Smoking status: Never    Smokeless tobacco: Never   Vaping Use    Vaping Use: Never used   Substance and Sexual Activity    Alcohol use: No    Drug use: No    Sexual activity: Not Currently     Partners: Male       Objective   Vital Signs:   /80   Pulse 102   Ht 160 cm (63\")   Wt 75.8 kg (167 lb)   SpO2 95% Comment: 6L  BMI 29.58 kg/m²     Physical Exam  Constitutional:  "      Interventions: Nasal cannula in place.   Cardiovascular:      Rate and Rhythm: Normal rate and regular rhythm.      Heart sounds: No murmur heard.     Comments: Prominent S2  Pulmonary:      Effort: Pulmonary effort is normal. No tachypnea, prolonged expiration, respiratory distress or retractions.      Breath sounds: Normal breath sounds. Examination of the right-lower field reveals rales. Examination of the left-lower field reveals rales.   Musculoskeletal:      Right lower leg: No edema.      Left lower leg: No edema.   Skin:     Nails: There is clubbing.      Comments: Bilateral hand telangectasis and face, thick nails      Neurological:      Mental Status: She is alert and oriented to person, place, and time.      Result Review :      My interpretation of the PFT : none    Results for orders placed in visit on 06/05/19    Pulmonary Function Test    Rest/Exercise Pulse Ox Values          11/11/2021    14:30   Rest/Exercise Pulse Ox Results   Rest on O2 @ Liters 5L PD   Rest on O2 SAT % 93   Exercise on O2 @ Liters 6L CONT   Exercise on O2 SAT % 94     CT Chest Hi Resolution Diagnostic (07/24/2023 14:57)     My interpretation of imaging: Persistent basilar and peripheral bronchiectasis, slightly progressed in the left lower lobe compared to 1 year ago, fluid in the esophagus    BNP (07/05/2023 11:43)     My interpretation of labs: BNP normalized      Assessment and Plan {CC Problem List  Visit Diagnosis  ROS  Review (Popup)  Health Maintenance  Quality  BestPractice  Medications  SmartSets  SnapShot Encounters  Media : 23}    Diagnoses and all orders for this visit:    1. Pulmonary fibrosis prob sec underlying autoimmune disease (Primary)  Comments:  No progression on CT scan.  Continue CellCept and prednisone.  Follow-up with rheumatology    2. PAH (pulmonary arterial hypertension) with portal hypertension  Comments:  Better after resuming sildenafil, Opsumit and Tyvaso.  Will optimize sleep  apnea therapy and diuresis.  Echo not useful, follow-up cardiology    3. Chronic respiratory failure with hypoxia  Comments:  Continue 5 L at rest and 8 L with exertion.  Upcoming 6-minute walk due with cardiology    4. Obstructive sleep apnea on CPAP  Comments:  Compliant.  Not benefiting from CPAP recommended on home study.  Will order a titration study  Orders:  -     Polysomnography 4 or More Parameters With CPAP; Future    5. CREST syndrome, partial   Comments:  Continue CellCept and prednisone. Follow-up with rheumatology    6. Gastroesophageal reflux disease without esophagitis  Comments:  Continue Prilosec.  She is benefiting from it    7. Esophageal dysmotility  Comments:  Contributes to underlying conditions.  Continue reflux therapy        Body mass index is 29.58 kg/m².   Fela Blackman, APRN  7/27/2023  15:52 CDT    Follow Up   Return in about 3 months (around 10/27/2023).    Patient was given instructions and counseling regarding her condition or for health maintenance advice. Please see specific information pulled into the AVS if appropriate.

## 2023-07-24 ENCOUNTER — HOSPITAL ENCOUNTER (OUTPATIENT)
Dept: CT IMAGING | Facility: HOSPITAL | Age: 63
Discharge: HOME | End: 2023-07-24
Payer: MEDICARE

## 2023-07-24 ENCOUNTER — HOSPITAL ENCOUNTER (OUTPATIENT)
Dept: CARDIOLOGY | Facility: HOSPITAL | Age: 63
Discharge: HOME | End: 2023-07-24
Payer: MEDICARE

## 2023-07-24 VITALS
SYSTOLIC BLOOD PRESSURE: 116 MMHG | BODY MASS INDEX: 30.65 KG/M2 | HEIGHT: 63 IN | DIASTOLIC BLOOD PRESSURE: 66 MMHG | WEIGHT: 173 LBS

## 2023-07-24 DIAGNOSIS — J84.10 PULMONARY FIBROSIS: Chronic | ICD-10-CM

## 2023-07-24 DIAGNOSIS — M34.1 CREST SYNDROME: Chronic | ICD-10-CM

## 2023-07-24 DIAGNOSIS — I27.21 PAH (PULMONARY ARTERIAL HYPERTENSION) WITH PORTAL HYPERTENSION: Chronic | ICD-10-CM

## 2023-07-24 DIAGNOSIS — K76.6 PAH (PULMONARY ARTERIAL HYPERTENSION) WITH PORTAL HYPERTENSION: Chronic | ICD-10-CM

## 2023-07-24 PROCEDURE — 93306 TTE W/DOPPLER COMPLETE: CPT

## 2023-07-24 PROCEDURE — 93306 TTE W/DOPPLER COMPLETE: CPT | Performed by: HOSPITALIST

## 2023-07-24 PROCEDURE — 71250 CT THORAX DX C-: CPT

## 2023-07-25 LAB
BH CV ECHO MEAS - AO MAX PG: 5.9 MMHG
BH CV ECHO MEAS - AO MEAN PG: 3 MMHG
BH CV ECHO MEAS - AO ROOT DIAM: 3.4 CM
BH CV ECHO MEAS - AO V2 MAX: 121 CM/SEC
BH CV ECHO MEAS - AO V2 VTI: 22.4 CM
BH CV ECHO MEAS - AVA(I,D): 2.6 CM2
BH CV ECHO MEAS - EDV(CUBED): 50.7 ML
BH CV ECHO MEAS - EDV(MOD-SP2): 52 ML
BH CV ECHO MEAS - EDV(MOD-SP4): 73 ML
BH CV ECHO MEAS - EF(MOD-BP): 65 %
BH CV ECHO MEAS - EF(MOD-SP2): 67.3 %
BH CV ECHO MEAS - EF(MOD-SP4): 63 %
BH CV ECHO MEAS - ESV(CUBED): 13.8 ML
BH CV ECHO MEAS - ESV(MOD-SP2): 17 ML
BH CV ECHO MEAS - ESV(MOD-SP4): 27 ML
BH CV ECHO MEAS - FS: 35.1 %
BH CV ECHO MEAS - IVS/LVPW: 1.08 CM
BH CV ECHO MEAS - IVSD: 1.3 CM
BH CV ECHO MEAS - LA DIMENSION: 4 CM
BH CV ECHO MEAS - LV DIASTOLIC VOL/BSA (35-75): 40.1 CM2
BH CV ECHO MEAS - LV MASS(C)D: 156.7 GRAMS
BH CV ECHO MEAS - LV MAX PG: 3.7 MMHG
BH CV ECHO MEAS - LV MEAN PG: 2 MMHG
BH CV ECHO MEAS - LV SYSTOLIC VOL/BSA (12-30): 14.8 CM2
BH CV ECHO MEAS - LV V1 MAX: 96.1 CM/SEC
BH CV ECHO MEAS - LV V1 VTI: 15.4 CM
BH CV ECHO MEAS - LVIDD: 3.7 CM
BH CV ECHO MEAS - LVIDS: 2.4 CM
BH CV ECHO MEAS - LVOT AREA: 3.8 CM2
BH CV ECHO MEAS - LVOT DIAM: 2.2 CM
BH CV ECHO MEAS - LVPWD: 1.2 CM
BH CV ECHO MEAS - MV A MAX VEL: 87.9 CM/SEC
BH CV ECHO MEAS - MV DEC TIME: 0.21 MSEC
BH CV ECHO MEAS - MV E MAX VEL: 58.7 CM/SEC
BH CV ECHO MEAS - MV E/A: 0.67
BH CV ECHO MEAS - MV MAX PG: 4.5 MMHG
BH CV ECHO MEAS - MV MEAN PG: 2 MMHG
BH CV ECHO MEAS - MV V2 VTI: 18.7 CM
BH CV ECHO MEAS - MVA(VTI): 3.1 CM2
BH CV ECHO MEAS - PA V2 MAX: 66.6 CM/SEC
BH CV ECHO MEAS - PI END-D VEL: 203 CM/SEC
BH CV ECHO MEAS - RV MAX PG: 2.5 MMHG
BH CV ECHO MEAS - RV V1 MAX: 79.7 CM/SEC
BH CV ECHO MEAS - RV V1 VTI: 16.1 CM
BH CV ECHO MEAS - RVDD: 4.5 CM
BH CV ECHO MEAS - SI(MOD-SP2): 19.2 ML/M2
BH CV ECHO MEAS - SI(MOD-SP4): 25.3 ML/M2
BH CV ECHO MEAS - SV(LVOT): 58.5 ML
BH CV ECHO MEAS - SV(MOD-SP2): 35 ML
BH CV ECHO MEAS - SV(MOD-SP4): 46 ML
BH CV ECHO MEAS - TAPSE (>1.6): 1.01 CM
BH CV XLRA - RV BASE: 4.2 CM
LEFT ATRIUM VOLUME INDEX: 23.1 ML/M2
LEFT ATRIUM VOLUME: 46 ML

## 2023-07-27 ENCOUNTER — OFFICE VISIT (OUTPATIENT)
Dept: PULMONOLOGY | Facility: CLINIC | Age: 63
End: 2023-07-27
Payer: MEDICARE

## 2023-07-27 VITALS
HEIGHT: 63 IN | WEIGHT: 167 LBS | DIASTOLIC BLOOD PRESSURE: 80 MMHG | SYSTOLIC BLOOD PRESSURE: 124 MMHG | BODY MASS INDEX: 29.59 KG/M2 | HEART RATE: 102 BPM | OXYGEN SATURATION: 95 %

## 2023-07-27 DIAGNOSIS — J84.10 PULMONARY FIBROSIS: Primary | Chronic | ICD-10-CM

## 2023-07-27 DIAGNOSIS — J96.11 CHRONIC RESPIRATORY FAILURE WITH HYPOXIA: Chronic | ICD-10-CM

## 2023-07-27 DIAGNOSIS — I27.21 PAH (PULMONARY ARTERIAL HYPERTENSION) WITH PORTAL HYPERTENSION: Chronic | ICD-10-CM

## 2023-07-27 DIAGNOSIS — M34.1 CREST SYNDROME: Chronic | ICD-10-CM

## 2023-07-27 DIAGNOSIS — K22.4 ESOPHAGEAL DYSMOTILITY: Chronic | ICD-10-CM

## 2023-07-27 DIAGNOSIS — G47.33 OBSTRUCTIVE SLEEP APNEA ON CPAP: Chronic | ICD-10-CM

## 2023-07-27 DIAGNOSIS — Z99.89 OBSTRUCTIVE SLEEP APNEA ON CPAP: Chronic | ICD-10-CM

## 2023-07-27 DIAGNOSIS — K76.6 PAH (PULMONARY ARTERIAL HYPERTENSION) WITH PORTAL HYPERTENSION: Chronic | ICD-10-CM

## 2023-07-27 DIAGNOSIS — K21.9 GASTROESOPHAGEAL REFLUX DISEASE WITHOUT ESOPHAGITIS: Chronic | ICD-10-CM

## 2023-07-27 PROCEDURE — 1160F RVW MEDS BY RX/DR IN RCRD: CPT | Performed by: NURSE PRACTITIONER

## 2023-07-27 PROCEDURE — 99214 OFFICE O/P EST MOD 30 MIN: CPT | Performed by: NURSE PRACTITIONER

## 2023-07-27 PROCEDURE — 1159F MED LIST DOCD IN RCRD: CPT | Performed by: NURSE PRACTITIONER

## 2023-07-27 RX ORDER — ERGOCALCIFEROL 1.25 MG/1
1 CAPSULE ORAL WEEKLY
COMMUNITY
Start: 2023-05-10

## 2023-07-27 RX ORDER — SILDENAFIL CITRATE 20 MG/1
1 TABLET ORAL EVERY 8 HOURS
COMMUNITY

## 2023-07-31 ENCOUNTER — OFFICE VISIT (OUTPATIENT)
Dept: GASTROENTEROLOGY | Facility: CLINIC | Age: 63
End: 2023-07-31
Payer: MEDICARE

## 2023-07-31 VITALS
OXYGEN SATURATION: 94 % | BODY MASS INDEX: 29.77 KG/M2 | HEIGHT: 63 IN | WEIGHT: 168 LBS | TEMPERATURE: 98 F | SYSTOLIC BLOOD PRESSURE: 120 MMHG | DIASTOLIC BLOOD PRESSURE: 70 MMHG | HEART RATE: 90 BPM

## 2023-07-31 DIAGNOSIS — M34.1 CREST SYNDROME: Chronic | ICD-10-CM

## 2023-07-31 DIAGNOSIS — K21.9 GASTROESOPHAGEAL REFLUX DISEASE WITHOUT ESOPHAGITIS: ICD-10-CM

## 2023-07-31 DIAGNOSIS — K74.3 PRIMARY BILIARY CHOLANGITIS: Primary | ICD-10-CM

## 2023-07-31 PROCEDURE — 99213 OFFICE O/P EST LOW 20 MIN: CPT | Performed by: NURSE PRACTITIONER

## 2023-07-31 PROCEDURE — 1160F RVW MEDS BY RX/DR IN RCRD: CPT | Performed by: NURSE PRACTITIONER

## 2023-07-31 PROCEDURE — 1159F MED LIST DOCD IN RCRD: CPT | Performed by: NURSE PRACTITIONER

## 2023-07-31 RX ORDER — URSODIOL 300 MG/1
300 CAPSULE ORAL 2 TIMES DAILY WITH MEALS
Qty: 60 CAPSULE | Refills: 11 | Status: SHIPPED | OUTPATIENT
Start: 2023-07-31

## 2023-08-02 RX ORDER — URSODIOL 300 MG/1
300 CAPSULE ORAL 2 TIMES DAILY WITH MEALS
Qty: 60 CAPSULE | Refills: 11 | OUTPATIENT
Start: 2023-08-02

## 2023-08-08 DIAGNOSIS — G47.33 OBSTRUCTIVE SLEEP APNEA ON CPAP: Primary | ICD-10-CM

## 2023-08-08 DIAGNOSIS — Z99.89 OBSTRUCTIVE SLEEP APNEA ON CPAP: Primary | ICD-10-CM

## 2023-08-08 NOTE — PROGRESS NOTES
Original sleep study not on file.  Needed for doing just titration sleep study.  Split night ordered.

## 2023-08-18 ENCOUNTER — HOSPITAL ENCOUNTER (OUTPATIENT)
Dept: CARDIOLOGY | Facility: HOSPITAL | Age: 63
Discharge: HOME | End: 2023-08-18
Payer: MEDICARE

## 2023-08-18 VITALS
OXYGEN SATURATION: 99 % | SYSTOLIC BLOOD PRESSURE: 116 MMHG | HEART RATE: 99 BPM | DIASTOLIC BLOOD PRESSURE: 70 MMHG | WEIGHT: 167.2 LBS | BODY MASS INDEX: 29.62 KG/M2

## 2023-08-18 DIAGNOSIS — G47.30 SLEEP APNEA, UNSPECIFIED TYPE: ICD-10-CM

## 2023-08-18 DIAGNOSIS — J96.11 CHRONIC RESPIRATORY FAILURE WITH HYPOXIA: ICD-10-CM

## 2023-08-18 DIAGNOSIS — I27.20 PULMONARY HYPERTENSION: Primary | ICD-10-CM

## 2023-08-18 DIAGNOSIS — J84.10 PULMONARY FIBROSIS: ICD-10-CM

## 2023-08-18 DIAGNOSIS — I50.812 CHRONIC RIGHT-SIDED CHF (CONGESTIVE HEART FAILURE): ICD-10-CM

## 2023-08-18 LAB
ANION GAP SERPL CALCULATED.3IONS-SCNC: 8 MMOL/L (ref 5–15)
BUN SERPL-MCNC: 6 MG/DL (ref 8–23)
BUN/CREAT SERPL: 7.2 (ref 7–25)
CALCIUM SPEC-SCNC: 8.7 MG/DL (ref 8.6–10.5)
CHLORIDE SERPL-SCNC: 102 MMOL/L (ref 98–107)
CO2 SERPL-SCNC: 29 MMOL/L (ref 22–29)
CREAT SERPL-MCNC: 0.83 MG/DL (ref 0.57–1)
EGFRCR SERPLBLD CKD-EPI 2021: 78.3 ML/MIN/1.73
GLUCOSE SERPL-MCNC: 108 MG/DL (ref 65–99)
NT-PROBNP SERPL-MCNC: 208.3 PG/ML (ref 0–900)
POTASSIUM SERPL-SCNC: 3.4 MMOL/L (ref 3.5–5.2)
SODIUM SERPL-SCNC: 139 MMOL/L (ref 136–145)

## 2023-08-18 PROCEDURE — 80048 BASIC METABOLIC PNL TOTAL CA: CPT | Performed by: HOSPITALIST

## 2023-08-18 PROCEDURE — 83880 ASSAY OF NATRIURETIC PEPTIDE: CPT | Performed by: HOSPITALIST

## 2023-08-18 PROCEDURE — G0463 HOSPITAL OUTPT CLINIC VISIT: HCPCS | Performed by: HOSPITALIST

## 2023-08-18 RX ORDER — SPIRONOLACTONE 50 MG/1
50 TABLET, FILM COATED ORAL DAILY
Qty: 90 TABLET | Refills: 3 | Status: SHIPPED | OUTPATIENT
Start: 2023-08-18

## 2023-08-18 RX ORDER — AMOXICILLIN AND CLAVULANATE POTASSIUM 500; 125 MG/1; MG/1
1 TABLET, FILM COATED ORAL 3 TIMES DAILY
COMMUNITY
Start: 2023-08-11

## 2023-08-18 NOTE — PROGRESS NOTES
Reason For Visit:  Follow-up pulmonary hypertension    Subjective        Elaine Juárez is a 63 y.o. female with the below pertinent PMH who presents for follow-up of pulmonary hypertension.    Today, the patient reports that she has continued to to improve.  She is quite pleased with how she is doing from a functional perspective now.  She states that she is able to do most of the activities that she wants although does sometimes have to stop to catch her breath.  She is not having significant lightheadedness.  She gets occasional headaches, which she attributes to her recent tooth issue.  She denies any significant lower extremity swelling although does think that he may have mild abdominal distention.  She denies significant GI symptoms/side effects from her PAH medications.  She did have several questions regarding lung transplant, which we discussed at length today; she has previously been considered not a candidate for transplant at [place] and was turned down for evaluation at other institutions is not below.  Of note, she had been scheduled to come back for a 6-minute walk test after her last visit, but she was unable to make it.  She did complete an echo after her last visit, which showed improvement in RV size and systolic function with insufficient TR jet to assess for RVSP.    ROS: Findings are included above.    Prior PAH History:   - Echo 11/13/19 at [facility] reportedly grossly normal cardiac chamber dimensions without evidence of pulmonary hypertension   - RHC 7/22/20: RA 6, RV 63/6, PA 64/19 (mean 38), PCWP 10, David CO/CI 3.8/2.1, PVR 7.4 TIAN, negative adenosine vasodilator challenge  - Started opsimut mid 2020 after RHC   - 4/2022 proBNP 108-122  - 6/2022 6MWD after COVID 422 feet (129 meters)  - Started Tyvaso DPI 8/2022 (titrated to max dose)  - 9/2022 admission              - 9/12/22 Echo: RV severely dilated w/ severe dysfunction, systolic and diastolic septal flattening, RA severely  dilated, small pericardial effusion, RVSP 56-63 (my estimate)              - proBNP 7417              - Started Sildenafil 20mg TID 9/2022              - Started lasix 10mg daily w/ KCl on hospital discharge 9/2022  - 2022: Referred to [place], [facility], [facility], and [facility] for consideration of lung transplant but was considered not a candidate due to history of liver disease and esophageal motility (patient states she has not been seen in PAH clinic at any of these hospitals)  - 1/2023: 6MWD 633 feet (193 meters)  - 3/2023: Transitioned to Medicare and was unable to get medications filled so ran out.  - 4/2023: Feeling poorly but was able to restart sildenafil and transition to Tyvaso inhaler at end of month.  - 5/12/2023: For CHF clinic visit. 6MWD 169 meters. proBNP 1798.  Improving dyspnea and exertional capacity since restarting Tyvaso (still titrating up).  Started spironolactone 25 mg daily.  - 5/24: Tyvaso up to goal dose of 12 breaths QID and being tolerated well.  Dyspnea improving.  Had obtained a thuan to hopefully restart Opsumit soon (did start at end of May).  - 7/5: Patient had restarted Opsumit and reported continual improvement in her breathing.  proBNP improved to 212.       Pertinent PMH  - Scleroderma/CREST syndrome - followed by Dr. Sanders  - Chronic Hypoxic Respiratory Failure  - ILD/Bronchiectasis  - CHAYA with CPAP  - WHO Group I (scleroderma) and Group III (ILD) Pulmonary Arterial Hypertension  - Liver disease    Pertinent past medical, surgical, family, and social history were reviewed.      Current Outpatient Medications:     amoxicillin-clavulanate (AUGMENTIN) 500-125 MG per tablet, Take 1 tablet by mouth 3 (Three) Times a Day., Disp: , Rfl:     clonazePAM (KlonoPIN) 0.5 MG tablet, Take 1 tablet by mouth 2 (Two) Times a Day As Needed for Anxiety., Disp: , Rfl:     Macitentan 10 MG tablet, Take 1 tablet by mouth Daily., Disp: 90 tablet, Rfl: 4    mycophenolate  "(CELLCEPT) 500 MG tablet, Take 2 tablets by mouth 2 (Two) Times a Day., Disp: , Rfl:     O2 (OXYGEN), Inhale 6 L/min 1 (One) Time., Disp: , Rfl:     omeprazole (priLOSEC) 40 MG capsule, Take 1 capsule by mouth Daily., Disp: 90 capsule, Rfl: 3    predniSONE (DELTASONE) 5 MG tablet, Take 1 tablet by mouth Daily., Disp: 90 tablet, Rfl: 3    sildenafil (REVATIO) 20 MG tablet, Take 1 tablet by mouth Every 8 (Eight) Hours., Disp: , Rfl:     spironolactone (ALDACTONE) 50 MG tablet, Take 1 tablet by mouth Daily., Disp: 90 tablet, Rfl: 3    Treprostinil (Tyvaso) 0.6 MG/ML solution inhalation solution, Inhale 12 puffs 4 (Four) Times a Day., Disp: , Rfl:     ursodiol (ACTIGALL) 300 MG capsule, Take 1 capsule by mouth 2 (Two) Times a Day With Meals., Disp: 60 capsule, Rfl: 11    venlafaxine (EFFEXOR) 75 MG tablet, Take 1 tablet by mouth Daily., Disp: , Rfl:     vitamin D (ERGOCALCIFEROL) 1.25 MG (70258 UT) capsule capsule, Take 1 capsule by mouth 1 (One) Time Per Week., Disp: , Rfl:     traZODone (DESYREL) 100 MG tablet, Take 1 tablet by mouth Every Night for 30 days., Disp: 30 tablet, Rfl: 0     Objective   Vital Signs:  /70 (BP Location: Left arm, Patient Position: Sitting)   Pulse 99   Wt 75.8 kg (167 lb 3.2 oz)   SpO2 99%   BMI 29.62 kg/mý   Estimated body mass index is 29.62 kg/mý as calculated from the following:    Height as of 7/31/23: 160 cm (63\").    Weight as of this encounter: 75.8 kg (167 lb 3.2 oz).      Constitutional:       Appearance: Healthy appearance. Not in distress.   Neck:      Vascular: JVD normal.   Pulmonary:      Effort: Pulmonary effort is normal.      Breath sounds: Normal breath sounds.   Cardiovascular:      Normal rate. Regular rhythm. S2. Loud pulmonic component of S2       Murmurs: There is no murmur.      No gallop.  No click. No rub.   Edema:     Peripheral edema absent.   Abdominal:      General: There is no distension.      Palpations: Abdomen is soft.      Tenderness: There is no " abdominal tenderness.   Skin:     General: Skin is warm and dry.   Neurological:      Mental Status: Alert and oriented to person, place and time.      Result Review :  The following data was reviewed by: Roosevelt Escalera MD on 08/18/2023:  BMP   BMP          5/24/2023    11:32 7/5/2023    11:43 8/18/2023    12:34   BMP   BUN 10  11  6    Creatinine 0.92  0.84  0.83    Sodium 141  139  139    Potassium 3.8  3.6  3.4    Chloride 103  101  102    CO2 27.0  26.0  29.0    Calcium 9.2  9.3  8.7      Pro-.3           Results for orders placed during the hospital encounter of 07/24/23    Adult Transthoracic Echo Complete W/ Cont if Necessary Per Protocol    Interpretation Summary    Left ventricular systolic function is normal. Left ventricular ejection fraction appears to be 61 - 65%.    Left ventricular wall thickness is mildly increased, and mass is consistent with concentric remodeling.    The right ventricular cavity is mildly dilated with mildly to moderately reduced systolic function.    There is mild systolic septal flattening of the interventricular septum consistent with RV pressure overload.    Normal right atrial cavity size.    Trace tricuspid valve regurgitation wiht insufficient TR velocity to estimate RVSP.           Assessment and Plan   Diagnoses and all orders for this visit:    1. Pulmonary hypertension (Primary)  2. Chronic respiratory failure with hypoxia  3. Chronic right-sided CHF (congestive heart failure)  4. Pulmonary fibrosis  5. Sleep apnea, unspecified type  - Previously several high risk features including pericardial effusion, severe RV dilation/dysfunction, proBNP elevation, and poor 6MWD.  Most recent proBNP has significantly improved.  Echo still shows mild RV dilation with mild to moderate RV systolic dysfunction, but this is overall significantly improved.  There is no pericardial effusion on most recent echo.  6MWD is pending.  Overall, the patient is shown significant clinical  improvement.  - She does endorse mild abdominal distention that could suggest subtle fluid retention (no overt signs of hypervolemia), and she has low potassium on labs today.  I think she would benefit from increasing the dose of her spironolactone.  - Encourage CPAP use  - Reproductive Status: Postmenopausal.   - Continue sildenafil 20mg TID  - Continue Tyvaso 12 breathis QID  - Continue Opsumit 10mg daily  - Previously discussed with Dr. Lewis; plan to refer to him for consideration of parenteral therapy should patient remain high risk on optimized therapies.  - Previously declined for lung transplant consideration at several institutions reportedly due to concerns about her liver; could reconsider this in future if needed  - Increase spironolactone to 50 mg daily  - Patient to return in 2 weeks for repeat BMP and 6MWD test  - Extensive time spent today counseling the patient on various markers of pulmonary hypertension severity that are being monitored as well as early signs of deterioration.  - Patient counseled on the role of avoiding hypoxia.  - Continue f/u with pulmonology and rheumatology    Other orders  -     spironolactone (ALDACTONE) 50 MG tablet; Take 1 tablet by mouth Daily.  Dispense: 90 tablet; Refill: 3    I spent 45 minutes caring for Elaine on this date of service. This time includes time spent by me in the following activities:preparing for the visit, reviewing tests, performing a medically appropriate examination and/or evaluation , counseling and educating the patient/family/caregiver, ordering medications, tests, or procedures, documenting information in the medical record, and care coordination    Follow Up   Return in about 3 months (around 11/18/2023).  Patient was given instructions and counseling regarding her condition or for health maintenance advice. Please see specific information pulled into the AVS if appropriate.       EMR Dragon/Transcription disclaimer: Much of this encounter  note is an electronic transcription/translation of spoken language to printed text. The electronic translation of spoken language may permit erroneous, or at times, nonsensical words or phrases to be inadvertently transcribed; although I have reviewed the note for such errors, some may still exist.

## 2023-08-31 ENCOUNTER — HOSPITAL ENCOUNTER (OUTPATIENT)
Dept: CARDIOLOGY | Facility: HOSPITAL | Age: 63
Discharge: HOME | End: 2023-08-31
Payer: MEDICARE

## 2023-08-31 VITALS — SYSTOLIC BLOOD PRESSURE: 123 MMHG | WEIGHT: 170 LBS | BODY MASS INDEX: 30.11 KG/M2 | DIASTOLIC BLOOD PRESSURE: 76 MMHG

## 2023-08-31 DIAGNOSIS — I50.812 CHRONIC RIGHT-SIDED CHF (CONGESTIVE HEART FAILURE): Primary | ICD-10-CM

## 2023-08-31 LAB
ANION GAP SERPL CALCULATED.3IONS-SCNC: 14 MMOL/L (ref 5–15)
BUN SERPL-MCNC: 15 MG/DL (ref 8–23)
BUN/CREAT SERPL: 17.4 (ref 7–25)
CALCIUM SPEC-SCNC: 9.4 MG/DL (ref 8.6–10.5)
CHLORIDE SERPL-SCNC: 101 MMOL/L (ref 98–107)
CO2 SERPL-SCNC: 24 MMOL/L (ref 22–29)
CREAT SERPL-MCNC: 0.86 MG/DL (ref 0.57–1)
DEPRECATED RDW RBC AUTO: 44.9 FL (ref 37–54)
EGFRCR SERPLBLD CKD-EPI 2021: 75.1 ML/MIN/1.73
ERYTHROCYTE [DISTWIDTH] IN BLOOD BY AUTOMATED COUNT: 14.8 % (ref 12.3–15.4)
GLUCOSE SERPL-MCNC: 118 MG/DL (ref 65–99)
HCT VFR BLD AUTO: 40.1 % (ref 34–46.6)
HGB BLD-MCNC: 11.9 G/DL (ref 12–15.9)
MCH RBC QN AUTO: 24.5 PG (ref 26.6–33)
MCHC RBC AUTO-ENTMCNC: 29.7 G/DL (ref 31.5–35.7)
MCV RBC AUTO: 82.7 FL (ref 79–97)
PLATELET # BLD AUTO: 269 10*3/MM3 (ref 140–450)
PMV BLD AUTO: 10.2 FL (ref 6–12)
POTASSIUM SERPL-SCNC: 4.2 MMOL/L (ref 3.5–5.2)
RBC # BLD AUTO: 4.85 10*6/MM3 (ref 3.77–5.28)
SODIUM SERPL-SCNC: 139 MMOL/L (ref 136–145)
WBC NRBC COR # BLD: 9.43 10*3/MM3 (ref 3.4–10.8)

## 2023-08-31 PROCEDURE — G0463 HOSPITAL OUTPT CLINIC VISIT: HCPCS | Performed by: HOSPITALIST

## 2023-08-31 PROCEDURE — 85027 COMPLETE CBC AUTOMATED: CPT | Performed by: HOSPITALIST

## 2023-08-31 PROCEDURE — 80048 BASIC METABOLIC PNL TOTAL CA: CPT | Performed by: HOSPITALIST

## 2023-09-06 ENCOUNTER — HOSPITAL ENCOUNTER (OUTPATIENT)
Dept: CARDIOLOGY | Facility: HOSPITAL | Age: 63
Discharge: HOME | End: 2023-09-06
Payer: MEDICARE

## 2023-10-03 NOTE — PROGRESS NOTES
Chief Complaint  Pulmonary fibrosis prob sec underlying autoimmune disease    Subjective    History of Present Illness {CC  Problem List  Visit Diagnosis   Encounters  Notes  Medications  Labs  Result Review Imaging  Media: 23}    Elaine Juárez presents to [facility] for:    History of Present Illness  Management of her pulmonary fibrosis/bronchiectasis secondary to underlying autoimmune disease, specifically crest syndrome.  She is also under treatment for pulmonary hypertension secondary to her autoimmune disease/interstitial lung disease.  She is obese.  She is a never smoker.  She has daily shortness of breath with exertion and a dry bothersome cough.  Cough drops help sometimes.  She has not benefited from bronchodilation or Tessalon Perles for cough suppression.  She takes the DuoNebs minimally.       Most recent high-resolution CT scan July 2023 showing stable ILD/bronchiectasis.     Unable to do PFTs times multiple attempts due to coughing.  Last 1 in 2019.  FEV1 54, FVC 59, diffusion 48/95 when corrected     6-minute walk in June 2022 following COVID showed a distance of 422 feet.  6 L at rest and 10 L with exertion.  Upon discharge from hospital September 2022 she was only requiring 5 L.  Most recent 6-minute walk April 2023 showed a distance of 338 feet which was significantly lower than previous.  She required 5 L at rest and 8 L with exertion.  She has remained on this.  She was scheduled for 6-minute walk with cardiology however she missed the appointment due to having her echo and CT scan performed.  She says it was rescheduled for September.  Her daughter, who is a nurse practitioner, is here with her today and indicates she did take her for that visit in September.  She does not recall the distance but indicates they did not adjust the oxygen.  She remains on 5 at rest and 8 with activity..        She did have a lapse in her  "pulmonary hypertension medication with decline in her condition.. She went Medicare primary and they would not cover the medications that had to be prior authorized.  She has since been able to get back on her Tyvaso, Opsumit and sildenafil with improvement in her performance status.  She is on 12 breaths 4 times daily of Tyvaso.  She does cough when she uses it.  She has not pretreating with bronchodilators prior to the Tyvaso.  Most recent echocardiogram September 2023 improved.  She indicates it will be time for prior authorization for Tyvaso January 1.  She would like Dr. Escalera's office to take that over.         Right heart cath in 2020 showing a PA pressure of 38.  She has been referred to [place], [facility], [facility] and most recently [facility] for consideration of lung transplantation.  She has been declined due to history of liver disease and esophageal dysmotility.    Esophagram 11-22 showing dysmotility and narrowing of the distal esophagus.  She underwent additional swallowing testing which showed some issues with aspiration.  She has had ongoing speech therapy outpatient.  She is done great with the therapy.  She has been released from their services.  She feels her cough is coming from \"my esophagus\".  She denies reflux symptoms however.  She is compliant with her omeprazole.       She is now being followed by Dr. Escalera at the heart failure clinic.  He added spironolactone and discontinued her Lasix.  She feels this has been much more beneficial.  She does still have occasional fluid retention around her abdomen but no worse than before.  Weight unchanged.  He also consulted with a colleague at [facility] to consider parenteral treatment given optimization of oral therapy.  This is still being considered although she has shown improvement. Last labs show her BNP has normalized.  She believes she is stable at this time and does not need it.     She is followed by Dr." Gris with rheumatology for her crest syndrome. She is on CellCept 1000 mg twice a day and prednisone 5 mg.  She is very intolerant to higher doses of this as it results in agitation, insomnia and aggravation of underlying bipolar issues.       She has been diagnosed with sleep apnea via home study ordered by [place] several years ago.  Prescribed her home CPAP based off of this home study.  She has had frequent lapses in therapy due to poor tolerance.  She has recently resumed it and was using it consistently but not benefiting.  I ordered a CPAP titration.  This was completed on October 16.  As of noon today, the study is still not been read.  We will continue to reach out daily for these results.        Prior to Admission medications    Medication Sig Start Date End Date Taking? Authorizing Provider   amoxicillin-clavulanate (AUGMENTIN) 500-125 MG per tablet Take 1 tablet by mouth 3 (Three) Times a Day. 8/11/23   Latanya Spring MD   clonazePAM (KlonoPIN) 0.5 MG tablet Take 1 tablet by mouth 2 (Two) Times a Day As Needed for Anxiety.    Latanya Spring MD   Macitentan 10 MG tablet Take 1 tablet by mouth Daily. 5/24/23   Roosevelt Escalera MD   mycophenolate (CELLCEPT) 500 MG tablet Take 2 tablets by mouth 2 (Two) Times a Day.    Latanya Spring MD   O2 (OXYGEN) Inhale 6 L/min 1 (One) Time.    Latanya Spring MD   omeprazole (priLOSEC) 40 MG capsule Take 1 capsule by mouth Daily. 4/4/23   Fela Blackman APRN   predniSONE (DELTASONE) 5 MG tablet Take 1 tablet by mouth Daily. 4/4/23   Fela Blackman APRN   sildenafil (REVATIO) 20 MG tablet Take 1 tablet by mouth Every 8 (Eight) Hours.    Latanya Spring MD   spironolactone (ALDACTONE) 50 MG tablet Take 1 tablet by mouth Daily. 8/18/23   Roosevelt Escalera MD   traZODone (DESYREL) 100 MG tablet Take 1 tablet by mouth Every Night for 30 days. 4/22/22 5/4/23  Raffi Cotter MD   Treprostinil (Tyvaso) 0.6 MG/ML solution  "inhalation solution Inhale 12 puffs 4 (Four) Times a Day.    Provider, MD Latanya   ursodiol (ACTIGALL) 300 MG capsule Take 1 capsule by mouth 2 (Two) Times a Day With Meals. 7/31/23   Micah Barton APRN   venlafaxine (EFFEXOR) 75 MG tablet Take 1 tablet by mouth Daily.    Provider, MD Latanya   vitamin D (ERGOCALCIFEROL) 1.25 MG (89389 UT) capsule capsule Take 1 capsule by mouth 1 (One) Time Per Week. 5/10/23   Provider, MD Latanya       Social History     Socioeconomic History    Marital status:    Tobacco Use    Smoking status: Never    Smokeless tobacco: Never   Vaping Use    Vaping Use: Never used   Substance and Sexual Activity    Alcohol use: No    Drug use: No    Sexual activity: Not Currently     Partners: Male       Objective   Vital Signs:   /72   Pulse 101   Ht 160 cm (63\")   Wt 76.7 kg (169 lb)   SpO2 93% Comment: 6L  BMI 29.94 kg/m²       Physical Exam   Constitutional:       Interventions: Nasal cannula in place.   Cardiovascular:      Rate and Rhythm: Normal rate and regular rhythm.      Heart sounds: No murmur heard.     Comments: Prominent S2  Pulmonary:      Effort: Pulmonary effort is normal. No tachypnea, prolonged expiration, respiratory distress or retractions.      Breath sounds: Normal breath sounds. Examination of the right-lower field reveals rales. Examination of the left-lower field reveals rales.   Musculoskeletal:      Right lower leg: No edema.      Left lower leg: No edema.   Skin:     Nails: There is clubbing.      Comments: Bilateral hand telangectasis and face, thick nails      Neurological:      Mental Status: She is alert and oriented to person, place, and time.      Result Review :      My interpretation of the PFT : none    Results for orders placed in visit on 06/05/19    Pulmonary Function Test    Rest/Exercise Pulse Ox Values          11/11/2021    14:30   Rest/Exercise Pulse Ox Results   Rest on O2 @ Liters 5L PD   Rest on O2 SAT % 93 "   Exercise on O2 @ Liters 6L CONT   Exercise on O2 SAT % 94     My interpretation of imaging:  none    My interpretation of labs: none      Assessment and Plan {CC Problem List  Visit Diagnosis  ROS  Review (Popup)  [facility] Maintenance  Quality  BestPractice  Medications  SmartSets  SnapShot Encounters  Media : 23}    Diagnoses and all orders for this visit:    1. Pulmonary fibrosis prob sec underlying autoimmune disease (Primary)  Comments:  CT July 2023 stable.  Continue CellCept and prednisone.  Keep follow-up with rheumatology    2. CREST syndrome, partial   Comments:  Continue CellCept and prednisone. Keep follow-up with rheumatology    3. PAH (pulmonary arterial hypertension) with portal hypertension  Comments:  Stable on sildenafil, Opsumit and Tyvaso 12 breaths 4 times daily.  Consider pretreating with DuoNeb prior to Tyvaso to help with cough.  Echo 2023    4. Chronic respiratory failure with hypoxia  Comments:  Continue 5 L at rest and 8 L with exertion.  Did not find 6-minute walk documentation from September.  We will reach out to Dr. Escalera's office    5. Obstructive sleep apnea on CPAP  Comments:  Awaiting sleep study results from October 16.  We will continue to reach out to [facility] [employer]    6. Gastroesophageal reflux disease without esophagitis  Comments:  Continue omeprazole    7. Esophageal dysmotility  Comments:  Released from speech.  Continue omeprazole.  Continue swallowing maneuvers to prevent aspiration    8. Encounter for immunization  Comments:  Preventative maintenance discussed.  Flu shot done.  She will obtain COVID booster.  Like to have RSV vaccine.  Prescription given.  Risk and benefit discussed  Orders:  -     RSVPreF3 Vac Recomb Adjuvanted (AREXVY) 120 MCG/0.5ML reconstituted suspension injection; Inject 0.5 mL into the appropriate muscle as directed by prescriber 1 (One) Time for 1 dose.  Dispense: 0.5 mL; Refill: 0        Body mass index is 29.94 kg/m².    Fela GREEN  ANASTASIA Blackman  10/31/2023  16:30 CDT    Follow Up   Return in about 3 months (around 1/31/2024).    Patient was given instructions and counseling regarding her condition or for health maintenance advice. Please see specific information pulled into the AVS if appropriate.

## 2023-10-16 ENCOUNTER — HOSPITAL ENCOUNTER (OUTPATIENT)
Dept: SLEEP CENTER | Age: 63
Discharge: HOME | End: 2023-10-18

## 2023-10-17 NOTE — PROGRESS NOTES
[facility]  [address]  Phone [phone] Fax [phone]     Sleep Study Technician Review    Patient Name:  Emre Dobson  :   1960  Referring Provider: SELMA Ortega    Brief History: Emre Dobson is a 70 y.o. Female with a history of Pulmonary fibrosis, Raynauds, LORETTA, HTN who is referred for a over night PSG/split study. Height:  5' 3\"  Weight: 171lbs  BMI:  29.7  Neck Circ: 16.5\"  Mallampati      4  ESS:  6    Type of Study: PSG  Time Stage Position Snore Hypopnea Obs Apnea Lucía Apnea PAP O2   2200  supine No No No No  5   2300  right lateral No No No No  5   2400  supine No No No No  5   0100  supine No No No No  5   0200  supine No No No No  5   0300  supine No No No No  5   0400  supine No No No No  5   0500        RA   0600        RA     Summary: This pt arrived on time to the sleep center for a over night PSG/ Split study and was shown to her room. The pt slept on the right, left lateral and supine positions. Pt stayed on 5 LPM  O2. The study was reviewed briefly with Emre Davidcar. Patient will be notified of the formal results and recommendations after the study is scored and interpreted. The report will be sent to his referring provider.     Technician: KRISHNA Sandhu

## 2023-10-31 ENCOUNTER — OFFICE VISIT (OUTPATIENT)
Dept: PULMONOLOGY | Facility: CLINIC | Age: 63
End: 2023-10-31
Payer: MEDICARE

## 2023-10-31 VITALS
OXYGEN SATURATION: 93 % | HEART RATE: 101 BPM | WEIGHT: 169 LBS | SYSTOLIC BLOOD PRESSURE: 124 MMHG | BODY MASS INDEX: 29.95 KG/M2 | DIASTOLIC BLOOD PRESSURE: 72 MMHG | HEIGHT: 63 IN

## 2023-10-31 DIAGNOSIS — J96.11 CHRONIC RESPIRATORY FAILURE WITH HYPOXIA: Chronic | ICD-10-CM

## 2023-10-31 DIAGNOSIS — K21.9 GASTROESOPHAGEAL REFLUX DISEASE WITHOUT ESOPHAGITIS: Chronic | ICD-10-CM

## 2023-10-31 DIAGNOSIS — K22.4 ESOPHAGEAL DYSMOTILITY: Chronic | ICD-10-CM

## 2023-10-31 DIAGNOSIS — I27.21 PAH (PULMONARY ARTERIAL HYPERTENSION) WITH PORTAL HYPERTENSION: Chronic | ICD-10-CM

## 2023-10-31 DIAGNOSIS — G47.33 OBSTRUCTIVE SLEEP APNEA ON CPAP: Chronic | ICD-10-CM

## 2023-10-31 DIAGNOSIS — Z23 ENCOUNTER FOR IMMUNIZATION: ICD-10-CM

## 2023-10-31 DIAGNOSIS — K76.6 PAH (PULMONARY ARTERIAL HYPERTENSION) WITH PORTAL HYPERTENSION: Chronic | ICD-10-CM

## 2023-10-31 DIAGNOSIS — M34.1 CREST SYNDROME: Chronic | ICD-10-CM

## 2023-10-31 DIAGNOSIS — J84.10 PULMONARY FIBROSIS: Primary | Chronic | ICD-10-CM

## 2023-10-31 PROCEDURE — 1160F RVW MEDS BY RX/DR IN RCRD: CPT | Performed by: NURSE PRACTITIONER

## 2023-10-31 PROCEDURE — 99214 OFFICE O/P EST MOD 30 MIN: CPT | Performed by: NURSE PRACTITIONER

## 2023-10-31 PROCEDURE — 1159F MED LIST DOCD IN RCRD: CPT | Performed by: NURSE PRACTITIONER

## 2023-10-31 RX ORDER — FUROSEMIDE 20 MG/1
10 TABLET ORAL DAILY
COMMUNITY
End: 2023-10-31

## 2023-11-01 ENCOUNTER — FOLLOWUP TELEPHONE ENCOUNTER (OUTPATIENT)
Dept: SLEEP CENTER | Age: 63
End: 2023-11-01

## 2023-11-03 DIAGNOSIS — G47.33 OBSTRUCTIVE SLEEP APNEA ON CPAP: ICD-10-CM

## 2023-11-20 ENCOUNTER — APPOINTMENT (OUTPATIENT)
Dept: CARDIOLOGY | Facility: HOSPITAL | Age: 63
End: 2023-11-20
Payer: MEDICARE

## 2023-11-20 ENCOUNTER — HOSPITAL ENCOUNTER (OUTPATIENT)
Dept: CARDIOLOGY | Facility: HOSPITAL | Age: 63
Discharge: HOME | End: 2023-11-20
Admitting: HOSPITALIST
Payer: MEDICARE

## 2023-11-20 VITALS
HEART RATE: 119 BPM | SYSTOLIC BLOOD PRESSURE: 129 MMHG | OXYGEN SATURATION: 95 % | BODY MASS INDEX: 29.65 KG/M2 | RESPIRATION RATE: 16 BRPM | WEIGHT: 167.4 LBS | DIASTOLIC BLOOD PRESSURE: 80 MMHG

## 2023-11-20 DIAGNOSIS — I50.812 CHRONIC RIGHT HEART FAILURE: Chronic | ICD-10-CM

## 2023-11-20 DIAGNOSIS — J84.10 PULMONARY FIBROSIS: Chronic | ICD-10-CM

## 2023-11-20 DIAGNOSIS — K76.6 PAH (PULMONARY ARTERIAL HYPERTENSION) WITH PORTAL HYPERTENSION: Primary | Chronic | ICD-10-CM

## 2023-11-20 DIAGNOSIS — R06.02 SHORTNESS OF BREATH: ICD-10-CM

## 2023-11-20 DIAGNOSIS — R00.0 SINUS TACHYCARDIA: ICD-10-CM

## 2023-11-20 DIAGNOSIS — I27.21 PAH (PULMONARY ARTERIAL HYPERTENSION) WITH PORTAL HYPERTENSION: Primary | Chronic | ICD-10-CM

## 2023-11-20 LAB
ANION GAP SERPL CALCULATED.3IONS-SCNC: 10 MMOL/L (ref 5–15)
BUN SERPL-MCNC: 12 MG/DL (ref 8–23)
BUN/CREAT SERPL: 14.8 (ref 7–25)
CALCIUM SPEC-SCNC: 9.1 MG/DL (ref 8.6–10.5)
CHLORIDE SERPL-SCNC: 103 MMOL/L (ref 98–107)
CO2 SERPL-SCNC: 29 MMOL/L (ref 22–29)
CREAT SERPL-MCNC: 0.81 MG/DL (ref 0.57–1)
EGFRCR SERPLBLD CKD-EPI 2021: 80.7 ML/MIN/1.73
GLUCOSE SERPL-MCNC: 199 MG/DL (ref 65–99)
NT-PROBNP SERPL-MCNC: 54.8 PG/ML (ref 0–900)
POTASSIUM SERPL-SCNC: 3.8 MMOL/L (ref 3.5–5.2)
SODIUM SERPL-SCNC: 142 MMOL/L (ref 136–145)

## 2023-11-20 PROCEDURE — 80048 BASIC METABOLIC PNL TOTAL CA: CPT

## 2023-11-20 PROCEDURE — G0463 HOSPITAL OUTPT CLINIC VISIT: HCPCS | Performed by: HOSPITALIST

## 2023-11-20 PROCEDURE — 93005 ELECTROCARDIOGRAM TRACING: CPT | Performed by: HOSPITALIST

## 2023-11-20 PROCEDURE — 83880 ASSAY OF NATRIURETIC PEPTIDE: CPT

## 2023-11-20 RX ORDER — METOPROLOL SUCCINATE 25 MG/1
TABLET, EXTENDED RELEASE ORAL
Qty: 40 TABLET | Refills: 9 | Status: SHIPPED | OUTPATIENT
Start: 2023-11-20 | End: 2024-12-04

## 2023-11-20 NOTE — PROGRESS NOTES
Reason For Visit:  Pulmonary Hypertension    Subjective        Elaine Juárez is a 63 y.o. female with the below pertinent PMH who presents for follow-up of pulmonary hypertension.    Ms. Juárez was last seen in CHF clinic on 8/18/2023 at which time she was quite pleased with how she was continuing to improve.    Today, Ms. Juárez reports that her symptoms have remained overall stable.  She has stable dyspnea on exertion and does need to stop to rest when doing activities.  She monitors her oxygen level and keeps it within the normal limits with her supplemental oxygen use.  She did complete a sleep study and reports that it was not consistent with sleep apnea, so she is no longer using her CPAP.  She has noticed that her HR is always elevated even when at rest; she denies any palpitations with this and states that this is not a new thing.  She denies any chest pain, lightheadedness, orthopnea, or peripheral edema.    ROS: Pertinent findings are included above.    Prior PAH History:   - Echo 11/13/19 at [facility] reportedly grossly normal cardiac chamber dimensions without evidence of pulmonary hypertension   - RHC 7/22/20: RA 6, RV 63/6, PA 64/19 (mean 38), PCWP 10, David CO/CI 3.8/2.1, PVR 7.4 TIAN, negative adenosine vasodilator challenge  - Started opsimut mid 2020 after RHC   - 4/2022 proBNP 108-122  - 6/2022 6MWD after COVID 422 feet (129 meters)  - Started Tyvaso DPI 8/2022 (titrated to max dose)  - 9/2022 admission              - 9/12/22 Echo: RV severely dilated w/ severe dysfunction, systolic and diastolic septal flattening, RA severely dilated, small pericardial effusion, RVSP 56-63 (my estimate)              - proBNP 7417              - Started Sildenafil 20mg TID 9/2022              - Started lasix 10mg daily w/ KCl on hospital discharge 9/2022  - 2022: Referred to [place], [facility], [facility], and [facility] for consideration of lung transplant but was considered not a candidate due  to history of liver disease and esophageal motility (patient states she has not been seen in PAH clinic at any of these hospitals)  - 1/2023: 6MWD 633 feet (193 meters)  - 3/2023: Transitioned to Medicare and was unable to get medications filled so ran out.  - 4/2023: Feeling poorly but was able to restart sildenafil and transition to Tyvaso inhaler at end of month.  - 5/12/2023: For CHF clinic visit. 6MWD 169 meters. proBNP 1798.  Improving dyspnea and exertional capacity since restarting Tyvaso (still titrating up).  Started spironolactone 25 mg daily.  - 5/24: Tyvaso up to goal dose of 12 breaths QID and being tolerated well.  Dyspnea improving.  Had obtained a thuan to hopefully restart Opsumit soon (did start at end of May).  - 7/5: Patient had restarted Opsumit and reported continual improvement in her breathing.  proBNP improved to 212.  - 7/25/2023 echo: LVEF 61-65%, mild LVH with concentric remodeling, RV mildly dilated with mildly to moderately reduced systolic function, mild systolic septal flattening of the IV septum consistent with RV pressure overload, normal RA size, insufficient TR velocity to estimate RVSP.  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      Current Outpatient Medications:     clonazePAM (KlonoPIN) 0.5 MG tablet, Take 1 tablet by mouth 2 (Two) Times a Day As Needed for Anxiety., Disp: , Rfl:     Macitentan 10 MG tablet, Take 1 tablet by mouth Daily., Disp: 90 tablet, Rfl: 4    mycophenolate (CELLCEPT) 500 MG tablet, Take 2 tablets by mouth 2 (Two) Times a Day., Disp: , Rfl:     O2 (OXYGEN), Inhale 6 L/min 1 (One) Time., Disp: , Rfl:     omeprazole (priLOSEC) 40 MG capsule, Take 1 capsule by  "mouth Daily., Disp: 90 capsule, Rfl: 3    predniSONE (DELTASONE) 5 MG tablet, Take 1 tablet by mouth Daily., Disp: 90 tablet, Rfl: 3    sildenafil (REVATIO) 20 MG tablet, Take 1 tablet by mouth Every 8 (Eight) Hours., Disp: , Rfl:     spironolactone (ALDACTONE) 50 MG tablet, Take 1 tablet by mouth Daily., Disp: 90 tablet, Rfl: 3    traZODone (DESYREL) 100 MG tablet, Take 1 tablet by mouth Every Night for 30 days., Disp: 30 tablet, Rfl: 0    Treprostinil (Tyvaso) 0.6 MG/ML solution inhalation solution, Inhale 12 puffs 4 (Four) Times a Day., Disp: , Rfl:     ursodiol (ACTIGALL) 300 MG capsule, Take 1 capsule by mouth 2 (Two) Times a Day With Meals., Disp: 60 capsule, Rfl: 11    venlafaxine (EFFEXOR) 75 MG tablet, Take 1 tablet by mouth Daily., Disp: , Rfl:     vitamin D (ERGOCALCIFEROL) 1.25 MG (00181 UT) capsule capsule, Take 1 capsule by mouth 1 (One) Time Per Week., Disp: , Rfl:      Objective   Vital Signs:  /80 (BP Location: Right arm, Patient Position: Sitting)   Pulse 119   Resp 16   Wt 75.9 kg (167 lb 6.4 oz)   SpO2 95%   BMI 29.65 kg/m²   Estimated body mass index is 29.65 kg/m² as calculated from the following:    Height as of 10/31/23: 160 cm (63\").    Weight as of this encounter: 75.9 kg (167 lb 6.4 oz).      Constitutional:       Appearance: Healthy appearance. Not in distress.   Neck:      Vascular: JVD normal.   Pulmonary:      Comments: Bilateral pulmonary crackles with normal work of breathing  Cardiovascular:      Normal rate. Regular rhythm. loud S2.       Murmurs: There is no murmur.      No gallop.  No click. No rub.   Edema:     Peripheral edema absent.   Abdominal:      General: There is no distension.      Palpations: Abdomen is soft.      Tenderness: There is no abdominal tenderness.   Skin:     General: Skin is warm and dry.   Neurological:      Mental Status: Alert and oriented to person, place and time.        Result Review :  The following data was reviewed by: Roosevelt Escalera MD " on 11/20/2023:  BMP   BMP          8/18/2023    12:34 8/31/2023    13:14 11/20/2023    11:40   BMP   BUN 6  15  12    Creatinine 0.83  0.86  0.81    Sodium 139  139  142    Potassium 3.4  4.2  3.8    Chloride 102  101  103    CO2 29.0  24.0  29.0    Calcium 8.7  9.4  9.1      Pro-BNP 54.8            Assessment and Plan   Diagnoses and all orders for this visit:    1. PAH (pulmonary arterial hypertension) with portal hypertension (Primary)  -     Basic Metabolic Panel  -     BNP    2. Chronic right heart failure  -     Basic Metabolic Panel  -     BNP    3. Shortness of breath  -     Basic Metabolic Panel  -     BNP    4. Pulmonary fibrosis    5. Sinus tachycardia    Other orders  -     ECG 12 Lead Rhythm Change; Standing  -     ECG 12 Lead Rhythm Change  -     metoprolol succinate XL (TOPROL-XL) 25 MG 24 hr tablet; Take 0.5 tablets by mouth Daily for 20 days, THEN 1 tablet Daily for 360 days.  Dispense: 40 tablet; Refill: 9      Overall clinically improved with initiation of her pulmonary vasodilator therapy; she is stable since her last visit.  Her 6-minute walk distance has improved, and her proBNP is now within the normal range.  Findings today most notable for sinus tachycardia that is asymptomatic.  Plan to start Toprol-XL 12.5 mg daily for 3 weeks and then increase to 25 mg daily to help with HR control and hopefully to promote positive RV remodeling.  Likely will obtain a repeat echo and 6-minute walk distance after her next visit.  We did discuss the possibility of increasing her Tyvaso breaths at a future visit pending follow-up results.    Follow Up   Return in about 7 weeks (around 1/8/2024).  Patient was given instructions and counseling regarding her condition or for health maintenance advice. Please see specific information pulled into the AVS if appropriate.       EMR Dragon/Transcription disclaimer: Much of this encounter note is an electronic transcription/translation of spoken language to printed  text. The electronic translation of spoken language may permit erroneous, or at times, nonsensical words or phrases to be inadvertently transcribed; although I have reviewed the note for such errors, some may still exist.

## 2023-11-21 LAB
QT INTERVAL: 320 MS
QTC INTERVAL: 463 MS

## 2024-01-08 PROBLEM — G47.33 OBSTRUCTIVE SLEEP APNEA ON CPAP: Chronic | Status: RESOLVED | Noted: 2019-06-04 | Resolved: 2024-01-08

## 2024-01-08 NOTE — PROGRESS NOTES
Chief Complaint  Pulmonary fibrosis prob sec underlying autoimmune disease    Subjective    History of Present Illness {CC  Problem List  Visit Diagnosis   Encounters  Notes  Medications  Labs  Result Review Imaging  Media: 23}    Elaine Juárez presents to [facility] for:    History of Present Illness  Management of her pulmonary fibrosis/bronchiectasis secondary to underlying autoimmune disease, specifically crest syndrome.  She is also under treatment for pulmonary hypertension secondary to her autoimmune disease/interstitial lung disease.  She is obese.  She is a never smoker.  She has daily shortness of breath with exertion and a dry bothersome cough.  She has not benefited from bronchodilation or Tessalon Perles for cough suppression.  She has DuoNebs but does not use them.        She is followed by Dr. Landry with rheumatology for her crest syndrome. She is on CellCept 1000 mg twice a day and prednisone 5 mg.  She is very intolerant to higher doses of this as it results in agitation, insomnia and aggravation of underlying bipolar issues.  She has not required any over the last year.     Most recent high-resolution CT scan July 2023 showing stable ILD/bronchiectasis.     Unable to do PFTs times multiple attempts due to coughing.  Last 1 in 2019.  FEV1 54, FVC 59, diffusion 48/95 when corrected    Right heart cath in 2020 showing a PA pressure of 38.  She has been referred to [place], [facility], [facility] and most recently [facility] for consideration of lung transplantation.  She has been declined due to history of liver disease and esophageal dysmotility.        Esophagram 11-22 showing dysmotility and narrowing of the distal esophagus.  She underwent additional swallowing testing which showed some issues with aspiration.  She has had speech therapy outpatient with good result. She denies reflux symptoms.  She  remains on Prilosec 40 daily with no symptoms.     Last 6-minute walk in November 2023 stable.  She reports her breathing is better.  She is able to rest on 3 to 4 L of oxygen.  When she exerts she will use 8 L.  When she sits down she will use 6 L until she recovers fully.  Recent sleep study obtained showing no obstructive sleep apnea.  She is now sleeping on 5 L and doing well with this.    Last echo February 2024.  She is still waiting on those results. She is now being followed by Dr. Escalera at the heart failure clinic.  He added spironolactone and discontinued her Lasix.  She feels this has been much more beneficial.  Last labs show her BNP has normalized.  She remains on sildenafil, Opsumit and Tyvaso 12 breaths 4 times daily.  They were discussing increasing the Tyvaso recently.  She indicates he wanted her to try the beta-blocker first however they would consider adding the Tyvaso if symptoms were not improved.  She indicates she has tolerated half a tablet very well.  She had severe fatigue going to a full tablet.  She is only been taking half a tablet.'s fatigue has resolved.  She has follow-up with him again in March.         Prior to Admission medications    Medication Sig Start Date End Date Taking? Authorizing Provider   clonazePAM (KlonoPIN) 0.5 MG tablet Take 1 tablet by mouth 2 (Two) Times a Day As Needed for Anxiety.    ProviderLatanya MD   Macitentan 10 MG tablet Take 1 tablet by mouth Daily. 5/24/23   Roosevelt Escalera MD   metoprolol succinate XL (TOPROL-XL) 25 MG 24 hr tablet Take 0.5 tablets by mouth Daily for 20 days, THEN 1 tablet Daily for 360 days. 11/20/23 12/4/24  Roosevelt Escalera MD   mycophenolate (CELLCEPT) 500 MG tablet Take 2 tablets by mouth 2 (Two) Times a Day.    ProviderLatanya MD   O2 (OXYGEN) Inhale 6 L/min 1 (One) Time.    ProviderLatanya MD   omeprazole (priLOSEC) 40 MG capsule Take 1 capsule by mouth Daily. 4/4/23   Fela Blackman APRN   predniSONE  "(DELTASONE) 5 MG tablet Take 1 tablet by mouth Daily. 4/4/23   Fela Blackman APRN   sildenafil (REVATIO) 20 MG tablet Take 1 tablet by mouth Every 8 (Eight) Hours.    Latanya Spring MD   spironolactone (ALDACTONE) 50 MG tablet Take 1 tablet by mouth Daily. 8/18/23   Roosevelt Escalera MD   traZODone (DESYREL) 100 MG tablet Take 1 tablet by mouth Every Night for 30 days. 4/22/22 10/31/23  Raffi Cotter MD   Treprostinil (Tyvaso) 0.6 MG/ML solution inhalation solution Inhale 12 puffs 4 (Four) Times a Day.    Latanya Spring MD   ursodiol (ACTIGALL) 300 MG capsule Take 1 capsule by mouth 2 (Two) Times a Day With Meals. 7/31/23   Micah Barton APRN   venlafaxine (EFFEXOR) 75 MG tablet Take 1 tablet by mouth Daily.    Latanya pSring MD   vitamin D (ERGOCALCIFEROL) 1.25 MG (36258 UT) capsule capsule Take 1 capsule by mouth 1 (One) Time Per Week. 5/10/23   Latanya Spring MD       Social History     Socioeconomic History    Marital status:    Tobacco Use    Smoking status: Never    Smokeless tobacco: Never   Vaping Use    Vaping Use: Never used   Substance and Sexual Activity    Alcohol use: No    Drug use: No    Sexual activity: Not Currently     Partners: Male       Objective   Vital Signs:   /84   Pulse 103   Ht 160 cm (63\")   Wt 76.2 kg (168 lb)   SpO2 93% Comment: 6L  BMI 29.76 kg/m²       Physical Exam   Constitutional:       Interventions: Nasal cannula in place.   Cardiovascular:      Rate and Rhythm: Normal rate and regular rhythm.      Heart sounds: No murmur heard.     Comments: Prominent S2  Pulmonary:      Effort: Pulmonary effort is normal. No tachypnea, prolonged expiration, respiratory distress or retractions.      Breath sounds: Normal breath sounds. Examination of the right-lower field reveals rales. Examination of the left-lower field reveals rales.   Musculoskeletal:      Right lower leg: No edema.      Left lower leg: No edema.   Skin:     " Nails: There is clubbing.      Comments: Bilateral hand telangectasis and face, thick nails      Neurological:      Mental Status: She is alert and oriented to person, place, and time.        Result Review :      My interpretation of the PFT : none    Results for orders placed in visit on 06/05/19    Pulmonary Function Test        My interpretation of imaging:  none    My interpretation of labs: none      Assessment and Plan {CC Problem List  Visit Diagnosis  ROS  Review (Popup)  Health Maintenance  Quality  BestPractice  Medications  SmartSets  SnapShot Encounters  Media : 23}    Diagnoses and all orders for this visit:    1. PAH (pulmonary arterial hypertension) with portal hypertension (Primary)  Comments:  Echo February 2024.  Follow-up with cardiology in March.  6-minute walk stable.  Agree with increasing Tyvaso.  Continue provide audio and Opsumit as well    2. Pulmonary fibrosis prob sec underlying autoimmune disease  Comments:  Doing very well with CellCept and 5 mg of prednisone. Keep follow-up with rheumatology    3. Chronic respiratory failure with hypoxia  Comments:  Continue 4 L at rest, 5 L with sleep and 8 L with exertion.  She is compliant with this and benefiting from it    4. CREST syndrome, partial   Comments:  Doing very well with CellCept and 5 mg of prednisone.  Keep follow-up with rheumatology    5. Gastroesophageal reflux disease without esophagitis  Comments:  Well-controlled on Prilosec.  Please continue    6. Esophageal dysmotility  Comments:  She remains on Prilosec.  She is having no swallowing difficulties        Body mass index is 29.76 kg/m².    Fela Blackman, ANASTASIA  2/6/2024  15:54 CST    Follow Up   Return in about 3 months (around 5/6/2024).    Patient was given instructions and counseling regarding her condition or for health maintenance advice. Please see specific information pulled into the AVS if appropriate.

## 2024-01-11 ENCOUNTER — HOSPITAL ENCOUNTER (OUTPATIENT)
Dept: CARDIOLOGY | Facility: HOSPITAL | Age: 64
Discharge: HOME | End: 2024-01-11
Admitting: HOSPITALIST
Payer: MEDICARE

## 2024-01-11 VITALS
WEIGHT: 169.4 LBS | HEART RATE: 92 BPM | OXYGEN SATURATION: 96 % | SYSTOLIC BLOOD PRESSURE: 114 MMHG | DIASTOLIC BLOOD PRESSURE: 74 MMHG | BODY MASS INDEX: 30.01 KG/M2

## 2024-01-11 DIAGNOSIS — J96.11 CHRONIC RESPIRATORY FAILURE WITH HYPOXIA: Chronic | ICD-10-CM

## 2024-01-11 DIAGNOSIS — I50.812 CHRONIC RIGHT HEART FAILURE: Primary | ICD-10-CM

## 2024-01-11 DIAGNOSIS — I27.20 PULMONARY HYPERTENSION: ICD-10-CM

## 2024-01-11 DIAGNOSIS — J84.10 PULMONARY FIBROSIS: ICD-10-CM

## 2024-01-11 DIAGNOSIS — R06.02 SHORTNESS OF BREATH: ICD-10-CM

## 2024-01-11 LAB
ANION GAP SERPL CALCULATED.3IONS-SCNC: 9 MMOL/L (ref 5–15)
BUN SERPL-MCNC: 15 MG/DL (ref 8–23)
BUN/CREAT SERPL: 17.6 (ref 7–25)
CALCIUM SPEC-SCNC: 9.9 MG/DL (ref 8.6–10.5)
CHLORIDE SERPL-SCNC: 101 MMOL/L (ref 98–107)
CO2 SERPL-SCNC: 30 MMOL/L (ref 22–29)
CREAT SERPL-MCNC: 0.85 MG/DL (ref 0.57–1)
EGFRCR SERPLBLD CKD-EPI 2021: 76.1 ML/MIN/1.73
GLUCOSE SERPL-MCNC: 110 MG/DL (ref 65–99)
NT-PROBNP SERPL-MCNC: 111.3 PG/ML (ref 0–900)
POTASSIUM SERPL-SCNC: 4.2 MMOL/L (ref 3.5–5.2)
SODIUM SERPL-SCNC: 140 MMOL/L (ref 136–145)

## 2024-01-11 PROCEDURE — 80048 BASIC METABOLIC PNL TOTAL CA: CPT | Performed by: HOSPITALIST

## 2024-01-11 PROCEDURE — 94618 PULMONARY STRESS TESTING: CPT | Performed by: HOSPITALIST

## 2024-01-11 PROCEDURE — G0463 HOSPITAL OUTPT CLINIC VISIT: HCPCS | Performed by: HOSPITALIST

## 2024-01-11 PROCEDURE — 83880 ASSAY OF NATRIURETIC PEPTIDE: CPT | Performed by: HOSPITALIST

## 2024-01-11 RX ORDER — METOPROLOL SUCCINATE 25 MG/1
12.5 TABLET, EXTENDED RELEASE ORAL DAILY
Qty: 45 TABLET | Refills: 3 | Status: SHIPPED | OUTPATIENT
Start: 2024-01-11

## 2024-01-11 NOTE — PROGRESS NOTES
Reason For Visit:  Pulmonary Hypertension    Subjective        Elaine Juárez is a 63 y.o. female with the below pertinent PMH who presents for follow-up of pulmonary hypertension.    Since her last visit, Ms. Juárez had been feeling well until the last several days.  She states that she noticed an improvement in her HR and palpitations after starting metoprolol, but when she increased to the 25 mg dose she started to feel more fatigued.  She has had some increased shortness of breath the last couple of days.  She denies any new cough, peripheral edema, or other changes.  No chest pain or significant lightheadedness.    ROS: Pertinent findings are included above.    Prior PAH History:   - Echo 11/13/19 at [facility] [place] reportedly grossly normal cardiac chamber dimensions without evidence of pulmonary hypertension   - RHC 7/22/20: RA 6, RV 63/6, PA 64/19 (mean 38), PCWP 10, David CO/CI 3.8/2.1, PVR 7.4 TIAN, negative adenosine vasodilator challenge  - Started opsimut mid 2020 after RHC   - 4/2022 proBNP 108-122  - 6/2022 6MWD after COVID 422 feet (129 meters)  - Started Tyvaso DPI 8/2022 (titrated to max dose)  - 9/2022 admission              - 9/12/22 Echo: RV severely dilated w/ severe dysfunction, systolic and diastolic septal flattening, RA severely dilated, small pericardial effusion, RVSP 56-63 (my estimate)              - proBNP 7417              - Started Sildenafil 20mg TID 9/2022              - Started lasix 10mg daily w/ KCl on hospital discharge 9/2022  - 2022: Referred to [place], [facility], [facility], and [facility] for consideration of lung transplant but was considered not a candidate due to history of liver disease and esophageal motility (patient states she has not been seen in PAH clinic at any of these hospitals)  - 1/2023: 6MWD 633 feet (193 meters)  - 3/2023: Transitioned to Medicare and was unable to get medications filled so ran out.   4/2023: Feeling poorly but was able to  restart sildenafil and transition to Tyvaso inhaler at end of month.  - 5/12/2023: For CHF clinic visit. 6MWD 169 meters. proBNP 1798.  Improving dyspnea and exertional capacity since restarting Tyvaso (still titrating up).  Started spironolactone 25 mg daily.  - 5/24: Tyvaso up to goal dose of 12 breaths QID and being tolerated well.  Dyspnea improving.  Had obtained a thuan to hopefully restart Opsumit soon (did start at end of May).  - 7/5: Patient had restarted Opsumit and reported continual improvement in her breathing.  proBNP improved to 212.  - 7/25/2023 echo: LVEF 61-65%, mild LVH with concentric remodeling, RV mildly dilated with mildly to moderately reduced systolic function, mild systolic septal flattening of the IV septum consistent with RV pressure overload, normal RA size, insufficient TR velocity to estimate RVSP.  - 9/6/2022 6-minute walk distance: 234 m  - 11/20/23: Started Toprol XL  12.5 mg daily for 3 weeks then 25 mg daily.  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      Current Outpatient Medications:     Macitentan 10 MG tablet, Take 1 tablet by mouth Daily., Disp: 90 tablet, Rfl: 4    metoprolol succinate XL (TOPROL-XL) 25 MG 24 hr tablet, Take 0.5 tablets by mouth Daily for 20 days, THEN 1 tablet Daily for 360 days., Disp: 40 tablet, Rfl: 9    mycophenolate (CELLCEPT) 500 MG tablet, Take 2 tablets by mouth 2 (Two) Times a Day., Disp: , Rfl:     O2 (OXYGEN), Inhale 6-8 L/min 1 (One) Time. 6 at rest, 8 during exertion, Disp: , Rfl:     omeprazole (priLOSEC) 40 MG capsule, Take 1 capsule by mouth Daily., Disp: 90 capsule, Rfl: 3    predniSONE (DELTASONE) 5 MG tablet, Take 1 tablet by mouth Daily.,  "Disp: 90 tablet, Rfl: 3    sildenafil (REVATIO) 20 MG tablet, Take 1 tablet by mouth Every 8 (Eight) Hours., Disp: , Rfl:     spironolactone (ALDACTONE) 50 MG tablet, Take 1 tablet by mouth Daily., Disp: 90 tablet, Rfl: 3    traZODone (DESYREL) 100 MG tablet, Take 1 tablet by mouth Every Night for 30 days., Disp: 30 tablet, Rfl: 0    Treprostinil (Tyvaso) 0.6 MG/ML solution inhalation solution, Inhale 12 puffs 4 (Four) Times a Day., Disp: , Rfl:     ursodiol (ACTIGALL) 300 MG capsule, Take 1 capsule by mouth 2 (Two) Times a Day With Meals. (Patient taking differently: Take 1 capsule by mouth Daily With Dinner.), Disp: 60 capsule, Rfl: 11    venlafaxine (EFFEXOR) 75 MG tablet, Take 1 tablet by mouth Daily., Disp: , Rfl:     vitamin D (ERGOCALCIFEROL) 1.25 MG (65821 UT) capsule capsule, Take 1 capsule by mouth 1 (One) Time Per Week., Disp: , Rfl:     clonazePAM (KlonoPIN) 0.5 MG tablet, Take 1 tablet by mouth 2 (Two) Times a Day As Needed for Anxiety. (Patient not taking: Reported on 1/11/2024), Disp: , Rfl:      Objective   Vital Signs:  /74 (BP Location: Right arm, Patient Position: Sitting)   Pulse 92   Wt 76.8 kg (169 lb 6.4 oz)   SpO2 96% Comment: 6L  BMI 30.01 kg/m²   Estimated body mass index is 30.01 kg/m² as calculated from the following:    Height as of 10/31/23: 160 cm (63\").    Weight as of this encounter: 76.8 kg (169 lb 6.4 oz).      Constitutional:       Appearance: Healthy appearance. Not in distress.   Neck:      Vascular: JVD normal.   Pulmonary:      Comments: Mild crackles  Cardiovascular:      Normal rate. Regular rhythm. loud S2.       Murmurs: There is a grade 1/6 systolic murmur at the LLSB.      No gallop.  No click. No rub.   Edema:     Peripheral edema absent.   Abdominal:      General: There is no distension.      Palpations: Abdomen is soft.      Tenderness: There is no abdominal tenderness.   Skin:     General: Skin is warm and dry.   Neurological:      Mental Status: Alert and " oriented to person, place and time.        Result Review :  The following data was reviewed by: Roosevelt Escalera MD on 01/11/2024:  BMP   BMP          8/31/2023    13:14 11/20/2023    11:40 1/11/2024    12:47   BMP   BUN 15  12  15    Creatinine 0.86  0.81  0.85    Sodium 139  142  140    Potassium 4.2  3.8  4.2    Chloride 101  103  101    CO2 24.0  29.0  30.0    Calcium 9.4  9.1  9.9      Pro-.3            Assessment and Plan   Diagnoses and all orders for this visit:    1. Chronic right heart failure (Primary)  2. Chronic respiratory failure with hypoxia  3. Shortness of breath  4. Pulmonary hypertension  5. Pulmonary fibrosis  -proBNP slightly more elevated although still remains in a decent range  - 6-minute walk distance 234 m (stable)  - Continue Tyvaso 12 breaths QID; consider increase pending echo  - Continue sildenafil 20 mg 3 times daily although consider transitioning to tadalafil in future (ultimately could consider including this in combination with Opsumit once available)  - Continue Opsumit 10 mg daily  - Continue spironolactone 50 mg daily  - Decrease Toprol-XL to 12.5 mg qpm given fatigue    Follow Up   Return in about 2 months (around 3/11/2024).  Patient was given instructions and counseling regarding her condition or for health maintenance advice. Please see specific information pulled into the AVS if appropriate.       EMR Dragon/Transcription disclaimer: Much of this encounter note is an electronic transcription/translation of spoken language to printed text. The electronic translation of spoken language may permit erroneous, or at times, nonsensical words or phrases to be inadvertently transcribed; although I have reviewed the note for such errors, some may still exist.

## 2024-01-23 ENCOUNTER — TELEPHONE (OUTPATIENT)
Dept: PULMONOLOGY | Facility: CLINIC | Age: 64
End: 2024-01-23
Payer: MEDICARE

## 2024-01-23 NOTE — TELEPHONE ENCOUNTER
[facility] needed a new prior auth for sildenafil citrate 20mg.  PA submitted on cover my meds.

## 2024-02-05 ENCOUNTER — HOSPITAL ENCOUNTER (OUTPATIENT)
Dept: CARDIOLOGY | Facility: HOSPITAL | Age: 64
Discharge: HOME | End: 2024-02-05
Admitting: HOSPITALIST
Payer: MEDICARE

## 2024-02-05 VITALS
WEIGHT: 169 LBS | SYSTOLIC BLOOD PRESSURE: 124 MMHG | DIASTOLIC BLOOD PRESSURE: 72 MMHG | HEIGHT: 63 IN | BODY MASS INDEX: 29.95 KG/M2

## 2024-02-05 DIAGNOSIS — I27.20 PULMONARY HYPERTENSION: ICD-10-CM

## 2024-02-05 PROCEDURE — 93308 TTE F-UP OR LMTD: CPT

## 2024-02-05 PROCEDURE — 93321 DOPPLER ECHO F-UP/LMTD STD: CPT

## 2024-02-05 PROCEDURE — 93308 TTE F-UP OR LMTD: CPT | Performed by: EMERGENCY MEDICINE

## 2024-02-05 PROCEDURE — 93321 DOPPLER ECHO F-UP/LMTD STD: CPT | Performed by: EMERGENCY MEDICINE

## 2024-02-05 PROCEDURE — 93325 DOPPLER ECHO COLOR FLOW MAPG: CPT

## 2024-02-05 PROCEDURE — 93325 DOPPLER ECHO COLOR FLOW MAPG: CPT | Performed by: EMERGENCY MEDICINE

## 2024-02-06 ENCOUNTER — OFFICE VISIT (OUTPATIENT)
Dept: PULMONOLOGY | Facility: CLINIC | Age: 64
End: 2024-02-06
Payer: MEDICARE

## 2024-02-06 VITALS
SYSTOLIC BLOOD PRESSURE: 132 MMHG | BODY MASS INDEX: 29.77 KG/M2 | OXYGEN SATURATION: 93 % | HEART RATE: 103 BPM | DIASTOLIC BLOOD PRESSURE: 84 MMHG | WEIGHT: 168 LBS | HEIGHT: 63 IN

## 2024-02-06 DIAGNOSIS — K22.4 ESOPHAGEAL DYSMOTILITY: Chronic | ICD-10-CM

## 2024-02-06 DIAGNOSIS — I27.21 PAH (PULMONARY ARTERIAL HYPERTENSION) WITH PORTAL HYPERTENSION: Primary | Chronic | ICD-10-CM

## 2024-02-06 DIAGNOSIS — K76.6 PAH (PULMONARY ARTERIAL HYPERTENSION) WITH PORTAL HYPERTENSION: Primary | Chronic | ICD-10-CM

## 2024-02-06 DIAGNOSIS — J84.10 PULMONARY FIBROSIS: Chronic | ICD-10-CM

## 2024-02-06 DIAGNOSIS — J96.11 CHRONIC RESPIRATORY FAILURE WITH HYPOXIA: Chronic | ICD-10-CM

## 2024-02-06 DIAGNOSIS — M34.1 CREST SYNDROME: Chronic | ICD-10-CM

## 2024-02-06 DIAGNOSIS — K21.9 GASTROESOPHAGEAL REFLUX DISEASE WITHOUT ESOPHAGITIS: Chronic | ICD-10-CM

## 2024-02-06 LAB
BH CV ECHO MEAS - EDV(MOD-SP4): 53.5 ML
BH CV ECHO MEAS - EF(MOD-SP4): 50.7 %
BH CV ECHO MEAS - ESV(MOD-SP4): 26.4 ML
BH CV ECHO MEAS - LV DIASTOLIC VOL/BSA (35-75): 29.7 CM2
BH CV ECHO MEAS - LV SYSTOLIC VOL/BSA (12-30): 14.7 CM2
BH CV ECHO MEAS - PI END-D VEL: 226 CM/SEC
BH CV ECHO MEAS - RAP SYSTOLE: 5 MMHG
BH CV ECHO MEAS - RVDD: 3.2 CM
BH CV ECHO MEAS - RVSP: 18.8 MMHG
BH CV ECHO MEAS - SI(MOD-SP4): 15.1 ML/M2
BH CV ECHO MEAS - SV(MOD-SP4): 27.1 ML
BH CV ECHO MEAS - TR MAX PG: 13.8 MMHG
BH CV ECHO MEAS - TR MAX VEL: 186 CM/SEC
BH CV XLRA - RV BASE: 3.6 CM
BH CV XLRA - RV LENGTH: 5.4 CM
BH CV XLRA - RV MID: 2.7 CM
LEFT ATRIUM VOLUME INDEX: 20.2 ML/M2
LEFT ATRIUM VOLUME: 36.4 ML

## 2024-02-06 PROCEDURE — 99214 OFFICE O/P EST MOD 30 MIN: CPT | Performed by: NURSE PRACTITIONER

## 2024-02-06 PROCEDURE — 1160F RVW MEDS BY RX/DR IN RCRD: CPT | Performed by: NURSE PRACTITIONER

## 2024-02-06 PROCEDURE — 1159F MED LIST DOCD IN RCRD: CPT | Performed by: NURSE PRACTITIONER

## 2024-02-12 ENCOUNTER — TELEPHONE (OUTPATIENT)
Dept: CARDIOLOGY | Facility: CLINIC | Age: 64
End: 2024-02-12
Payer: MEDICARE

## 2024-02-13 NOTE — TELEPHONE ENCOUNTER
Please notify Ms. Juárez of her recent echo result. I reviewed the images, and RV size still appears mildly dilated with mildly to moderately reduced systolic function and mild systolic septal flattening that overall is similar to the prior echo from 7/2023. She should continue current medications until her next appointment. Thank you!

## 2024-03-11 ENCOUNTER — HOSPITAL ENCOUNTER (OUTPATIENT)
Dept: CARDIOLOGY | Facility: HOSPITAL | Age: 64
Discharge: HOME | End: 2024-03-11
Payer: MEDICARE

## 2024-03-11 VITALS
WEIGHT: 167.6 LBS | OXYGEN SATURATION: 98 % | DIASTOLIC BLOOD PRESSURE: 63 MMHG | BODY MASS INDEX: 29.69 KG/M2 | SYSTOLIC BLOOD PRESSURE: 112 MMHG | HEART RATE: 92 BPM

## 2024-03-11 DIAGNOSIS — J84.10 PULMONARY FIBROSIS: ICD-10-CM

## 2024-03-11 DIAGNOSIS — K76.6 PAH (PULMONARY ARTERIAL HYPERTENSION) WITH PORTAL HYPERTENSION: Primary | Chronic | ICD-10-CM

## 2024-03-11 DIAGNOSIS — J96.11 CHRONIC RESPIRATORY FAILURE WITH HYPOXIA: ICD-10-CM

## 2024-03-11 DIAGNOSIS — I27.21 PAH (PULMONARY ARTERIAL HYPERTENSION) WITH PORTAL HYPERTENSION: Primary | Chronic | ICD-10-CM

## 2024-03-11 DIAGNOSIS — I50.812 CHRONIC RIGHT HEART FAILURE: ICD-10-CM

## 2024-03-11 DIAGNOSIS — R06.02 SHORTNESS OF BREATH: ICD-10-CM

## 2024-03-11 LAB
ANION GAP SERPL CALCULATED.3IONS-SCNC: 9 MMOL/L (ref 5–15)
BUN SERPL-MCNC: 14 MG/DL (ref 8–23)
BUN/CREAT SERPL: 14.7 (ref 7–25)
CALCIUM SPEC-SCNC: 8.8 MG/DL (ref 8.6–10.5)
CHLORIDE SERPL-SCNC: 103 MMOL/L (ref 98–107)
CO2 SERPL-SCNC: 26 MMOL/L (ref 22–29)
CREAT SERPL-MCNC: 0.95 MG/DL (ref 0.57–1)
EGFRCR SERPLBLD CKD-EPI 2021: 66.2 ML/MIN/1.73
GLUCOSE SERPL-MCNC: 93 MG/DL (ref 65–99)
POTASSIUM SERPL-SCNC: 3.7 MMOL/L (ref 3.5–5.2)
SODIUM SERPL-SCNC: 138 MMOL/L (ref 136–145)

## 2024-03-11 PROCEDURE — 80048 BASIC METABOLIC PNL TOTAL CA: CPT

## 2024-03-11 PROCEDURE — G0463 HOSPITAL OUTPT CLINIC VISIT: HCPCS

## 2024-03-11 PROCEDURE — 99214 OFFICE O/P EST MOD 30 MIN: CPT

## 2024-03-11 RX ORDER — TADALAFIL 20 MG/1
40 TABLET, FILM COATED ORAL
Qty: 60 TABLET | Refills: 11 | Status: SHIPPED | OUTPATIENT
Start: 2024-03-11 | End: 2025-03-11

## 2024-03-11 NOTE — PROGRESS NOTES
"[facility]  [facility] Failure [facility]  Subjective:     Encounter Date:03/11/2024      Patient ID: Elaine Juárez is a 64 y.o. female     Chief Complaint:  History of Present Illness  Elaine Juárez is a 64 y.o. female with the below pertinent PMH who presents for follow-up of pulmonary hypertension.    Patient was last seen 1/11/2024.  At that time she was complaining some worsening shortness of breath.  proBNP was slightly more elevated.  Ultimately the Toprol-XL was decreased to 12.5 mg given the patient's fatigue.  Diuretic therapy was not changed.    Overall the patient is been stable these last 2 months.  She is reports that last week she had a hard time with her breathing, however this is improved back to her baseline.  She is maintained on her 6 L nasal cannula.  She had to take 1 break on her way into the clinic today.  Chest pain or excessive shortness of breath beyond her baseline.  Denies any lower extremity edema.  Denies any significant dizziness or lightheadedness.  She is currently tolerating her medication regimen well without any significant side effects.      ROS: Pertinent findings as noted above    Pertinent PMH  Scleroderma/CREST syndrome - followed by Dr. Sanders  Chronic Hypoxic Respiratory Failure  ILD/Bronchiectasis  WHO Group I (scleroderma) and Group III (ILD) Pulmonary Arterial Hypertension  Liver disease    The following portions of the patient's history were reviewed and updated as appropriate: allergies, current medications, past medical history, past social history, past and problem list.    Allergies   Allergen Reactions    Codeine Nausea And Vomiting    Latex Rash    Prednisone Mental Status Change     High doses makes her \"nuts\"       Current Outpatient Medications:     clonazePAM (KlonoPIN) 0.5 MG tablet, Take 1 tablet by mouth 2 (Two) Times a Day As Needed for Anxiety., Disp: , Rfl:     Macitentan 10 MG tablet, Take 1 tablet by mouth Daily., Disp: 90 tablet, Rfl: " 4    metoprolol succinate XL (TOPROL-XL) 25 MG 24 hr tablet, Take 0.5 tablets by mouth Daily., Disp: 45 tablet, Rfl: 3    mycophenolate (CELLCEPT) 500 MG tablet, Take 2 tablets by mouth 2 (Two) Times a Day., Disp: , Rfl:     O2 (OXYGEN), Inhale 6-8 L/min 1 (One) Time. 6 at rest, 8 during exertion, Disp: , Rfl:     omeprazole (priLOSEC) 40 MG capsule, Take 1 capsule by mouth Daily., Disp: 90 capsule, Rfl: 3    predniSONE (DELTASONE) 5 MG tablet, Take 1 tablet by mouth Daily., Disp: 90 tablet, Rfl: 3    spironolactone (ALDACTONE) 50 MG tablet, Take 1 tablet by mouth Daily., Disp: 90 tablet, Rfl: 3    tadalafil 20 MG tablet tablet, Take 2 tablets by mouth Daily., Disp: 60 tablet, Rfl: 11    traZODone (DESYREL) 100 MG tablet, Take 1 tablet by mouth Every Night for 30 days., Disp: 30 tablet, Rfl: 0    Treprostinil (Tyvaso) 0.6 MG/ML solution inhalation solution, Inhale 12 puffs 4 (Four) Times a Day., Disp: , Rfl:     ursodiol (ACTIGALL) 300 MG capsule, Take 1 capsule by mouth 2 (Two) Times a Day With Meals. (Patient taking differently: Take 1 capsule by mouth Daily With Dinner.), Disp: 60 capsule, Rfl: 11    venlafaxine (EFFEXOR) 75 MG tablet, Take 1 tablet by mouth Daily., Disp: , Rfl:     vitamin D (ERGOCALCIFEROL) 1.25 MG (26511 UT) capsule capsule, Take 1 capsule by mouth 1 (One) Time Per Week., Disp: , Rfl:     Social History     Socioeconomic History    Marital status:    Tobacco Use    Smoking status: Never    Smokeless tobacco: Never   Vaping Use    Vaping status: Never Used   Substance and Sexual Activity    Alcohol use: No    Drug use: No    Sexual activity: Not Currently     Partners: Male                Objective:     Constitutional:       Appearance: Healthy appearance. Not in distress.   Pulmonary:      Effort: Pulmonary effort is normal.      Breath sounds: Examination of the right-lower field reveals rales. Examination of the left-lower field reveals rales. Rales present.   Cardiovascular:      PMI  at left midclavicular line. Normal rate. Regular rhythm.      Murmurs: There is no murmur.      No gallop.  No click. No rub.   Edema:     Peripheral edema absent.   Abdominal:      General: Bowel sounds are normal.   Musculoskeletal: Normal range of motion.      Cervical back: Normal range of motion and neck supple. Skin:     General: Skin is warm.   Neurological:      Mental Status: Alert and oriented to person, place and time.           Procedures  /63 (BP Location: Right arm, Patient Position: Sitting)   Pulse 92   Wt 76 kg (167 lb 9.6 oz)   SpO2 98% Comment: 6L  BMI 29.69 kg/m²       03/11/24  1312   Weight: 76 kg (167 lb 9.6 oz)      Lab Review:   I have reviewed      BMP          11/20/2023    11:40 1/11/2024    12:47 3/11/2024    13:04   BMP   BUN 12  15  14    Creatinine 0.81  0.85  0.95    Sodium 142  140  138    Potassium 3.8  4.2  3.7    Chloride 103  101  103    CO2 29.0  30.0  26.0    Calcium 9.1  9.9  8.8       Lab Results   Component Value Date    CHOL 169 09/13/2022    CHLPL 187 08/30/2021    TRIG 99 09/13/2022    HDL 45 09/13/2022     (H) 09/13/2022      Results for orders placed during the hospital encounter of 02/05/24    Adult Transthoracic Echo Limited W/ Cont if Necessary Per Protocol    Interpretation Summary    Left ventricular systolic function is normal. Left ventricular ejection fraction appears to be 51 - 55%.    Normal right ventricular cavity size noted. Moderately reduced right ventricular systolic function noted.    There is mild calcification of the aortic valve. No aortic valve regurgitation is present. No hemodynamically significant aortic valve stenosis is present.    Mild mitral annular calcification is present. Trace mitral valve regurgitation is present.    There is trace pulmonic valve regurgitation present.       Assessment and Plan   Diagnoses and all orders for this visit:    1. PAH (pulmonary arterial hypertension) with portal hypertension (Primary)  2.  Chronic right heart failure  3. Chronic respiratory failure with hypoxia  4. Shortness of breath  5. Pulmonary fibrosis  Recent echocardiogram shows that the RV size is still mildly dilated with mild to moderately reduced systolic function.  There is also noted to be mild systolic septal flattening that is very similar to her previous echo in August 2023.  Because of this and how she is tolerating her current regimen we will try to transition her over to tadalafil to see if we can have better results.  Will start tadalafil 40 mg daily.  I advised the patient to continue on her sildenafil 20 mg 3 times daily until tadalafil is approved and in hand.  - Continue Toprol-XL 12.5 mg daily (could not tolerate 25 mg as it made her excessively fatigued)  - Continue Tyvaso 12 breaths 4 times daily  - Continue Opsumit 10 mg daily  - Continue spironolactone 50 mg daily  - Will follow-up with patient in 1 month for reevaluation of toleration of tadalafil              Follow Up   No follow-ups on file.  Patient was given instructions and counseling regarding her condition or for health maintenance advice. Please see specific information pulled into the AVS if appropriate.     Part of this note may be an electronic transcription/translation of spoken language to printed text using the Dragon Dictation System.

## 2024-03-29 DIAGNOSIS — I27.21 PAH (PULMONARY ARTERIAL HYPERTENSION) WITH PORTAL HYPERTENSION: Chronic | ICD-10-CM

## 2024-03-29 DIAGNOSIS — K76.6 PAH (PULMONARY ARTERIAL HYPERTENSION) WITH PORTAL HYPERTENSION: Chronic | ICD-10-CM

## 2024-03-29 RX ORDER — TADALAFIL 20 MG/1
40 TABLET, FILM COATED ORAL
Qty: 60 TABLET | Refills: 11 | Status: SHIPPED | OUTPATIENT
Start: 2024-03-29 | End: 2025-03-29

## 2024-03-29 NOTE — TELEPHONE ENCOUNTER
Ms. Juárez was supposed to start tadalafil 40mg once daily per Nile's last note on 3/11. She has not received this med and is still taking sildenafil 20mg 3x daily. Do you still want to see her on 4/18 or push this out until she has been on tadalafil for a while?    I did the PA for this med through covermymeds and it has been approved per her insurance. I called the pharmacy to see how much it will be for her to  and they do not have a script on file for it. I think something happened and the script didn't go through the way it should have. Will you approve a script so she can start this med before her next appointment?

## 2024-04-10 DIAGNOSIS — I27.21 PAH (PULMONARY ARTERIAL HYPERTENSION) WITH PORTAL HYPERTENSION: Chronic | ICD-10-CM

## 2024-04-10 DIAGNOSIS — K76.6 PAH (PULMONARY ARTERIAL HYPERTENSION) WITH PORTAL HYPERTENSION: Chronic | ICD-10-CM

## 2024-04-10 RX ORDER — TADALAFIL 20 MG/1
40 TABLET, FILM COATED ORAL
Qty: 60 TABLET | Refills: 11 | Status: SHIPPED | OUTPATIENT
Start: 2024-04-10 | End: 2025-04-10

## 2024-04-11 DIAGNOSIS — J84.10 PULMONARY FIBROSIS: ICD-10-CM

## 2024-04-11 RX ORDER — PREDNISONE 5 MG/1
5 TABLET ORAL DAILY
Qty: 90 TABLET | Refills: 3 | Status: SHIPPED | OUTPATIENT
Start: 2024-04-11

## 2024-04-11 NOTE — TELEPHONE ENCOUNTER
Received a fax from [facility] requesting refills on prednisone 5mg.    Rx Refill Note  Requested Prescriptions     Pending Prescriptions Disp Refills    predniSONE (DELTASONE) 5 MG tablet 90 tablet 3     Sig: Take 1 tablet by mouth Daily.      Last office visit with prescribing clinician: 2/6/2024   Last telemedicine visit with prescribing clinician: Visit date not found   Next office visit with prescribing clinician: 5/9/2024                         Would you like a call back once the refill request has been completed: [] Yes [] No    If the office needs to give you a call back, can they leave a voicemail: [] Yes [] No    Sugar Lion MA  04/11/24, 15:56 CDT

## 2024-04-16 ENCOUNTER — TELEPHONE (OUTPATIENT)
Dept: CARDIOLOGY | Facility: CLINIC | Age: 64
End: 2024-04-16
Payer: MEDICARE

## 2024-04-16 DIAGNOSIS — K21.9 GASTROESOPHAGEAL REFLUX DISEASE WITHOUT ESOPHAGITIS: ICD-10-CM

## 2024-04-16 RX ORDER — OMEPRAZOLE 40 MG/1
40 CAPSULE, DELAYED RELEASE ORAL DAILY
Qty: 90 CAPSULE | Refills: 3 | Status: SHIPPED | OUTPATIENT
Start: 2024-04-16

## 2024-04-16 NOTE — TELEPHONE ENCOUNTER
Received a fax from [employer] requesting refills on omeprazole 40mg.    Per protocol no cosign is required, script sent to pharmacy.  Rx Refill Note  Requested Prescriptions     Pending Prescriptions Disp Refills    omeprazole (priLOSEC) 40 MG capsule 90 capsule 3     Sig: Take 1 capsule by mouth Daily.      Last office visit with prescribing clinician: 2/6/2024   Last telemedicine visit with prescribing clinician: Visit date not found   Next office visit with prescribing clinician: 5/9/2024                         Would you like a call back once the refill request has been completed: [] Yes [] No    If the office needs to give you a call back, can they leave a voicemail: [] Yes [] No    Sugar Lion MA  04/16/24, 16:02 CDT

## 2024-05-01 RX ORDER — TREPROSTINIL 1.74 MG/2.9ML
12 INHALANT ORAL
Qty: 15 ML | Refills: 11 | Status: SHIPPED | OUTPATIENT
Start: 2024-05-01

## 2024-05-01 NOTE — TELEPHONE ENCOUNTER
Caller: JOSE    Relationship: SELF    Best call back number: [phone]    Requested Prescriptions:   Requested Prescriptions     Pending Prescriptions Disp Refills    Treprostinil (Tyvaso) 0.6 MG/ML solution inhalation solution       Sig: Inhale 12 puffs 4 (Four) Times a Day.        [facility] - [address] [facility] AT [address]AL TO [facility] - [phone] [facility] [phone]      Last office visit with prescribing clinician: Visit date not found   Last telemedicine visit with prescribing clinician: Visit date not found   Next office visit with prescribing clinician: Visit date not found     Additional details provided by patient: THANK YOU! PATIENT WOULD LIKE A CALL AS WELL PLEASE.     Does the patient have less than a 3 day supply:  [x] Yes  [] No    Would you like a call back once the refill request has been completed: [] Yes [] No    If the office needs to give you a call back, can they leave a voicemail: [] Yes [] No    Ari Plata Rep   05/01/24 09:26 CDT

## 2024-05-06 ENCOUNTER — APPOINTMENT (OUTPATIENT)
Dept: CT IMAGING | Facility: HOSPITAL | Age: 64
End: 2024-05-06
Payer: MEDICARE

## 2024-05-06 ENCOUNTER — APPOINTMENT (OUTPATIENT)
Dept: GENERAL RADIOLOGY | Facility: HOSPITAL | Age: 64
End: 2024-05-06
Payer: MEDICARE

## 2024-05-06 ENCOUNTER — HOSPITAL ENCOUNTER (EMERGENCY)
Facility: HOSPITAL | Age: 64
Discharge: HOME | End: 2024-05-06
Attending: FAMILY MEDICINE
Payer: MEDICARE

## 2024-05-06 VITALS
SYSTOLIC BLOOD PRESSURE: 134 MMHG | DIASTOLIC BLOOD PRESSURE: 88 MMHG | HEART RATE: 113 BPM | RESPIRATION RATE: 27 BRPM | OXYGEN SATURATION: 98 % | HEIGHT: 62 IN | WEIGHT: 167.55 LBS | BODY MASS INDEX: 30.83 KG/M2 | TEMPERATURE: 98.7 F

## 2024-05-06 DIAGNOSIS — E87.6 HYPOKALEMIA: ICD-10-CM

## 2024-05-06 DIAGNOSIS — R06.02 SHORTNESS OF BREATH: Primary | ICD-10-CM

## 2024-05-06 DIAGNOSIS — G47.00 INSOMNIA, UNSPECIFIED TYPE: ICD-10-CM

## 2024-05-06 DIAGNOSIS — F41.0 PANIC ATTACKS: ICD-10-CM

## 2024-05-06 LAB
ALBUMIN SERPL-MCNC: 4 G/DL (ref 3.5–5.2)
ALBUMIN/GLOB SERPL: 1.3 G/DL
ALP SERPL-CCNC: 97 U/L (ref 39–117)
ALT SERPL W P-5'-P-CCNC: 11 U/L (ref 1–33)
ANION GAP SERPL CALCULATED.3IONS-SCNC: 14 MMOL/L (ref 5–15)
APTT PPP: 27.4 SECONDS (ref 24.5–36)
ARTERIAL PATENCY WRIST A: POSITIVE
AST SERPL-CCNC: 19 U/L (ref 1–32)
ATMOSPHERIC PRESS: 751 MMHG
B PARAPERT DNA SPEC QL NAA+PROBE: NOT DETECTED
B PERT DNA SPEC QL NAA+PROBE: NOT DETECTED
BASE EXCESS BLDA CALC-SCNC: 0.6 MMOL/L (ref 0–2)
BASOPHILS # BLD AUTO: 0.08 10*3/MM3 (ref 0–0.2)
BASOPHILS NFR BLD AUTO: 0.8 % (ref 0–1.5)
BDY SITE: ABNORMAL
BILIRUB SERPL-MCNC: 0.4 MG/DL (ref 0–1.2)
BODY TEMPERATURE: 37
BUN SERPL-MCNC: 9 MG/DL (ref 8–23)
BUN/CREAT SERPL: 11.1 (ref 7–25)
C PNEUM DNA NPH QL NAA+NON-PROBE: NOT DETECTED
CA-I BLD-MCNC: 4.67 MG/DL (ref 4.6–5.4)
CALCIUM SPEC-SCNC: 9.3 MG/DL (ref 8.6–10.5)
CHLORIDE SERPL-SCNC: 102 MMOL/L (ref 98–107)
CO2 SERPL-SCNC: 24 MMOL/L (ref 22–29)
COHGB MFR BLD: 0.9 % (ref 0–5)
CREAT SERPL-MCNC: 0.81 MG/DL (ref 0.57–1)
DEPRECATED RDW RBC AUTO: 42.5 FL (ref 37–54)
EGFRCR SERPLBLD CKD-EPI 2021: 80.2 ML/MIN/1.73
EOSINOPHIL # BLD AUTO: 0.22 10*3/MM3 (ref 0–0.4)
EOSINOPHIL NFR BLD AUTO: 2.2 % (ref 0.3–6.2)
ERYTHROCYTE [DISTWIDTH] IN BLOOD BY AUTOMATED COUNT: 14.6 % (ref 12.3–15.4)
FLUAV SUBTYP SPEC NAA+PROBE: NOT DETECTED
FLUBV RNA ISLT QL NAA+PROBE: NOT DETECTED
GAS FLOW AIRWAY: 10 LPM
GEN 5 2HR TROPONIN T REFLEX: 14 NG/L
GLOBULIN UR ELPH-MCNC: 3.2 GM/DL
GLUCOSE SERPL-MCNC: 101 MG/DL (ref 65–99)
HADV DNA SPEC NAA+PROBE: NOT DETECTED
HCO3 BLDA-SCNC: 25.5 MMOL/L (ref 20–26)
HCOV 229E RNA SPEC QL NAA+PROBE: NOT DETECTED
HCOV HKU1 RNA SPEC QL NAA+PROBE: NOT DETECTED
HCOV NL63 RNA SPEC QL NAA+PROBE: NOT DETECTED
HCOV OC43 RNA SPEC QL NAA+PROBE: NOT DETECTED
HCT VFR BLD AUTO: 42.4 % (ref 34–46.6)
HCT VFR BLD CALC: 38.5 % (ref 38–51)
HGB BLD-MCNC: 13 G/DL (ref 12–15.9)
HGB BLDA-MCNC: 12.6 G/DL (ref 12–16)
HMPV RNA NPH QL NAA+NON-PROBE: NOT DETECTED
HOLD SPECIMEN: NORMAL
HPIV1 RNA ISLT QL NAA+PROBE: NOT DETECTED
HPIV2 RNA SPEC QL NAA+PROBE: NOT DETECTED
HPIV3 RNA NPH QL NAA+PROBE: NOT DETECTED
HPIV4 P GENE NPH QL NAA+PROBE: NOT DETECTED
IMM GRANULOCYTES # BLD AUTO: 0.06 10*3/MM3 (ref 0–0.05)
IMM GRANULOCYTES NFR BLD AUTO: 0.6 % (ref 0–0.5)
INR PPP: 0.9 (ref 0.91–1.09)
LYMPHOCYTES # BLD AUTO: 2.32 10*3/MM3 (ref 0.7–3.1)
LYMPHOCYTES NFR BLD AUTO: 23.2 % (ref 19.6–45.3)
Lab: ABNORMAL
M PNEUMO IGG SER IA-ACNC: NOT DETECTED
MAGNESIUM SERPL-MCNC: 2.2 MG/DL (ref 1.6–2.4)
MCH RBC QN AUTO: 25 PG (ref 26.6–33)
MCHC RBC AUTO-ENTMCNC: 30.7 G/DL (ref 31.5–35.7)
MCV RBC AUTO: 81.5 FL (ref 79–97)
METHGB BLD QL: 0.2 % (ref 0–3)
MODALITY: ABNORMAL
MONOCYTES # BLD AUTO: 0.78 10*3/MM3 (ref 0.1–0.9)
MONOCYTES NFR BLD AUTO: 7.8 % (ref 5–12)
NEUTROPHILS NFR BLD AUTO: 6.55 10*3/MM3 (ref 1.7–7)
NEUTROPHILS NFR BLD AUTO: 65.4 % (ref 42.7–76)
NRBC BLD AUTO-RTO: 0 /100 WBC (ref 0–0.2)
NT-PROBNP SERPL-MCNC: 196.4 PG/ML (ref 0–900)
OXYHGB MFR BLDV: 99 % (ref 94–99)
PCO2 BLDA: 41.2 MM HG (ref 35–45)
PCO2 TEMP ADJ BLD: 41.2 MM HG (ref 35–45)
PH BLDA: 7.4 PH UNITS (ref 7.35–7.45)
PH, TEMP CORRECTED: 7.4 PH UNITS (ref 7.35–7.45)
PLATELET # BLD AUTO: 287 10*3/MM3 (ref 140–450)
PMV BLD AUTO: 10.5 FL (ref 6–12)
PO2 BLDA: 155 MM HG (ref 83–108)
PO2 TEMP ADJ BLD: 155 MM HG (ref 83–108)
POTASSIUM BLDA-SCNC: 3.3 MMOL/L (ref 3.5–5.2)
POTASSIUM SERPL-SCNC: 3.3 MMOL/L (ref 3.5–5.2)
PROT SERPL-MCNC: 7.2 G/DL (ref 6–8.5)
PROTHROMBIN TIME: 12.5 SECONDS (ref 11.8–14.8)
RBC # BLD AUTO: 5.2 10*6/MM3 (ref 3.77–5.28)
RHINOVIRUS RNA SPEC NAA+PROBE: NOT DETECTED
RSV RNA NPH QL NAA+NON-PROBE: NOT DETECTED
SAO2 % BLDCOA: 100 % (ref 94–99)
SARS-COV-2 RNA NPH QL NAA+NON-PROBE: NOT DETECTED
SODIUM BLDA-SCNC: 143 MMOL/L (ref 136–145)
SODIUM SERPL-SCNC: 140 MMOL/L (ref 136–145)
TROPONIN T DELTA: 1 NG/L
TROPONIN T SERPL HS-MCNC: 13 NG/L
VENTILATOR MODE: ABNORMAL
WBC NRBC COR # BLD AUTO: 10.01 10*3/MM3 (ref 3.4–10.8)
WHOLE BLOOD HOLD COAG: NORMAL
WHOLE BLOOD HOLD SPECIMEN: NORMAL

## 2024-05-06 PROCEDURE — 25010000002 MAGNESIUM SULFATE 2 GM/50ML SOLUTION: Performed by: FAMILY MEDICINE

## 2024-05-06 PROCEDURE — 93005 ELECTROCARDIOGRAM TRACING: CPT | Performed by: FAMILY MEDICINE

## 2024-05-06 PROCEDURE — 85025 COMPLETE CBC W/AUTO DIFF WBC: CPT | Performed by: FAMILY MEDICINE

## 2024-05-06 PROCEDURE — 0202U NFCT DS 22 TRGT SARS-COV-2: CPT | Performed by: FAMILY MEDICINE

## 2024-05-06 PROCEDURE — 96365 THER/PROPH/DIAG IV INF INIT: CPT

## 2024-05-06 PROCEDURE — 83050 HGB METHEMOGLOBIN QUAN: CPT

## 2024-05-06 PROCEDURE — 82805 BLOOD GASES W/O2 SATURATION: CPT

## 2024-05-06 PROCEDURE — 25010000002 ONDANSETRON PER 1 MG: Performed by: FAMILY MEDICINE

## 2024-05-06 PROCEDURE — 82375 ASSAY CARBOXYHB QUANT: CPT

## 2024-05-06 PROCEDURE — 71045 X-RAY EXAM CHEST 1 VIEW: CPT

## 2024-05-06 PROCEDURE — 96375 TX/PRO/DX INJ NEW DRUG ADDON: CPT

## 2024-05-06 PROCEDURE — 94799 UNLISTED PULMONARY SVC/PX: CPT

## 2024-05-06 PROCEDURE — 83735 ASSAY OF MAGNESIUM: CPT | Performed by: FAMILY MEDICINE

## 2024-05-06 PROCEDURE — 80053 COMPREHEN METABOLIC PANEL: CPT | Performed by: FAMILY MEDICINE

## 2024-05-06 PROCEDURE — 70450 CT HEAD/BRAIN W/O DYE: CPT

## 2024-05-06 PROCEDURE — 83880 ASSAY OF NATRIURETIC PEPTIDE: CPT | Performed by: FAMILY MEDICINE

## 2024-05-06 PROCEDURE — 85610 PROTHROMBIN TIME: CPT | Performed by: FAMILY MEDICINE

## 2024-05-06 PROCEDURE — 84484 ASSAY OF TROPONIN QUANT: CPT | Performed by: FAMILY MEDICINE

## 2024-05-06 PROCEDURE — 99284 EMERGENCY DEPT VISIT MOD MDM: CPT

## 2024-05-06 PROCEDURE — 36415 COLL VENOUS BLD VENIPUNCTURE: CPT

## 2024-05-06 PROCEDURE — 85730 THROMBOPLASTIN TIME PARTIAL: CPT | Performed by: FAMILY MEDICINE

## 2024-05-06 PROCEDURE — 36600 WITHDRAWAL OF ARTERIAL BLOOD: CPT

## 2024-05-06 RX ORDER — SODIUM CHLORIDE 0.9 % (FLUSH) 0.9 %
10 SYRINGE (ML) INJECTION AS NEEDED
Status: DISCONTINUED | OUTPATIENT
Start: 2024-05-06 | End: 2024-05-06 | Stop reason: HOSPADM

## 2024-05-06 RX ORDER — MAGNESIUM SULFATE HEPTAHYDRATE 40 MG/ML
2 INJECTION, SOLUTION INTRAVENOUS ONCE
Status: COMPLETED | OUTPATIENT
Start: 2024-05-06 | End: 2024-05-06

## 2024-05-06 RX ORDER — ZOLPIDEM TARTRATE 6.25 MG/1
6.25 TABLET, FILM COATED, EXTENDED RELEASE ORAL NIGHTLY PRN
Qty: 5 TABLET | Refills: 0 | Status: SHIPPED | OUTPATIENT
Start: 2024-05-06 | End: 2024-05-11

## 2024-05-06 RX ORDER — ONDANSETRON 2 MG/ML
4 INJECTION INTRAMUSCULAR; INTRAVENOUS ONCE
Status: COMPLETED | OUTPATIENT
Start: 2024-05-06 | End: 2024-05-06

## 2024-05-06 RX ADMIN — ONDANSETRON 4 MG: 2 INJECTION INTRAMUSCULAR; INTRAVENOUS at 10:56

## 2024-05-06 RX ADMIN — MAGNESIUM SULFATE HEPTAHYDRATE 2 G: 2 INJECTION, SOLUTION INTRAVENOUS at 09:22

## 2024-05-06 NOTE — DISCHARGE INSTRUCTIONS
To the fact that you are walking around with 50 foot cord of oxygen cords you are not getting your for 8 L of oxygen when the tubing is that long.  You may need to discuss with your primary care provider or whoever is writing the prescription for your oxygen to increase the amount while you are up walking with a tube that long.  You are likely getting 2 to 3 L instead of the 7 or 8 that you require while walking around.  No pneumonia was noted on your chest x-ray and your heart exam labs were stable x 2.  Your viral swab was also negative for viral illnesses including bacterial mycoplasma.  Due to the insomnia will attempt to try long-acting Ambien 6.25 mg at bedtime.  Will prescribe the 5 pills.  If this is effective would recommend having a primary care provider to write this in the future.  Over-the-counter melatonin is also known to help with sleep.  Otherwise for your anxiety and panic attacks would recommend either another medication to control your panic attacks or consideration of increasing your current Effexor.

## 2024-05-06 NOTE — ED PROVIDER NOTES
"HPI:    Patient is a 64-year-old white female with known history of pulmonary fibrosis who is on home high flow oxygen typically 6-7 L who presents to the emergency room with worsening shortness of breath.  Patient states she also is having left-sided chest discomfort that is nonradiating.  Patient states that part of this could be because she is having panic attacks and not sleeping very well.  She has had insomnia and only had 3 good nights of sleep for this past month according to the patient.  She also sees cardiologist Dr. Escalera and has recently had change in medication.  She is supposed to follow-up on 5/6/2024 which is today with multiple providers however she is here in the emergency room.  Patient has not followed up with Dr. Escalera stating that her daughter is a nurse practitioner and she felt that she was okay for moment.  However she states she cannot take it anymore because of the shortness of breath and insomnia.  Patient was as low as 58% upon arriving in the emergency room and 61% upon reaching the room.  Patient was increased on her high flow to 10 L.  Patient states she can \"survive\" in the 80s on her oxygen 87 and above.  And has been getting treated for a \"sinus infection\".  Patient is currently on Augmentin.  States she is also continued to have a worsening headache.      REVIEW OF SYSTEMS  CONSTITUTIONAL:  No complaints of fever, chills,or weakness  EYES:  No complaints of discharge   ENT: No complaints of sore throat or ear pain  CARDIOVASCULAR: As of for palpitations and left-sided chest pain   RESPIRATORY: Positive for shortness of breath   GI:  No complaints of abdominal pain, nausea, vomiting, or diarrhea  MUSCULOSKELETAL:  No complaints of back pain  SKIN:  No complaints of rash  NEUROLOGIC: Positive for headache ENDOCRINE:  No complaints of polyuria or polydipsia  LYMPHATIC:  No complaints of swollen glands  GENITOURINARY: No complaints of urinary frequency or hematuria        PAST " MEDICAL HISTORY  Past Medical History:   Diagnosis Date    Allergies     Arthritis     BMI 31.0-31.9,adult 06/04/2019    Calcified granuloma of lung 06/04/2019    Right lung    CHF (congestive heart failure)     denies    Chronic idiopathic fibrosing alveolitis     Chronic respiratory failure with hypoxia 08/05/2020    Chronic respiratory failure with hypoxia, on home oxygen therapy     COVID-19 02/01/2022    CREST syndrome     Environmental allergies 02/01/2022    Gastroesophageal reflux disease without esophagitis 06/04/2019    Interstitial lung disease     Obstructive sleep apnea on CPAP 06/04/2019    Oropharyngeal dysphagia 01/26/2023    Primary central sleep apnea     Pulmonary fibrosis     Pulmonary hypertension     Rheumatoid arthritis     Right ventricular systolic dysfunction 05/04/2023    Short-term memory loss        FAMILY HISTORY  Family History   Problem Relation Age of Onset    Cirrhosis Sister     Liver cancer Brother     No Known Problems Mother     No Known Problems Father     Colon cancer Neg Hx     Colon polyps Neg Hx        SOCIAL HISTORY  Social History     Socioeconomic History    Marital status:    Tobacco Use    Smoking status: Never    Smokeless tobacco: Never   Vaping Use    Vaping status: Never Used   Substance and Sexual Activity    Alcohol use: No    Drug use: No    Sexual activity: Not Currently     Partners: Male       IMMUNIZATION HISTORY  Deferred to primary care physician.    SURGICAL HISTORY  Past Surgical History:   Procedure Laterality Date    BREAST BIOPSY      CARDIAC CATHETERIZATION N/A 07/22/2020    Procedure: Right Heart Cath;  Surgeon: Thong Martin MD;  Location: [facility] CATH INVASIVE LOCATION;  Service: Cardiology;  Laterality: N/A;    CHOLECYSTECTOMY      COLONOSCOPY  05/11/2015    5 POLYPS    COLONOSCOPY N/A 08/04/2021    Procedure: COLONOSCOPY WITH ANESTHESIA;  Surgeon: Michael Landin DO;  Location: [facility] ENDOSCOPY;  Service: Gastroenterology;   Laterality: N/A;  pre-hx polyps  post-colon polyps    ENDOSCOPY N/A 08/04/2021    Procedure: ESOPHAGOGASTRODUODENOSCOPY WITH ANESTHESIA;  Surgeon: Michael Landin DO;  Location: [facility] ENDOSCOPY;  Service: Gastroenterology;  Laterality: N/A;  pre-dysphagia  post-normal  pcp-lanette aguila       CURRENT MEDICATIONS    Current Facility-Administered Medications:     [COMPLETED] Insert Peripheral IV, , , Once **AND** sodium chloride 0.9 % flush 10 mL, 10 mL, Intravenous, PRN, Luis Lundberg Jr., MD    Current Outpatient Medications:     clonazePAM (KlonoPIN) 0.5 MG tablet, Take 1 tablet by mouth 2 (Two) Times a Day As Needed for Anxiety., Disp: , Rfl:     Macitentan 10 MG tablet, Take 1 tablet by mouth Daily., Disp: 90 tablet, Rfl: 4    metoprolol succinate XL (TOPROL-XL) 25 MG 24 hr tablet, Take 0.5 tablets by mouth Daily., Disp: 45 tablet, Rfl: 3    mycophenolate (CELLCEPT) 500 MG tablet, Take 2 tablets by mouth 2 (Two) Times a Day., Disp: , Rfl:     O2 (OXYGEN), Inhale 6-8 L/min 1 (One) Time. 6 at rest, 8 during exertion, Disp: , Rfl:     omeprazole (priLOSEC) 40 MG capsule, Take 1 capsule by mouth Daily., Disp: 90 capsule, Rfl: 3    predniSONE (DELTASONE) 5 MG tablet, Take 1 tablet by mouth Daily., Disp: 90 tablet, Rfl: 3    spironolactone (ALDACTONE) 50 MG tablet, Take 1 tablet by mouth Daily., Disp: 90 tablet, Rfl: 3    tadalafil 20 MG tablet tablet, Take 2 tablets by mouth Daily., Disp: 60 tablet, Rfl: 11    traZODone (DESYREL) 100 MG tablet, Take 1 tablet by mouth Every Night for 30 days., Disp: 30 tablet, Rfl: 0    Treprostinil (Tyvaso) 0.6 MG/ML solution inhalation solution, Inhale 12 puffs 4 (Four) Times a Day., Disp: 15 mL, Rfl: 11    ursodiol (ACTIGALL) 300 MG capsule, Take 1 capsule by mouth 2 (Two) Times a Day With Meals. (Patient taking differently: Take 1 capsule by mouth Daily With Dinner.), Disp: 60 capsule, Rfl: 11    venlafaxine (EFFEXOR) 75 MG tablet, Take 1 tablet by mouth Daily., Disp: ,  "Rfl:     vitamin D (ERGOCALCIFEROL) 1.25 MG (07278 UT) capsule capsule, Take 1 capsule by mouth 1 (One) Time Per Week., Disp: , Rfl:     zolpidem CR (Ambien CR) 6.25 MG CR tablet, Take 1 tablet by mouth At Night As Needed for Sleep for up to 5 days., Disp: 5 tablet, Rfl: 0    ALLERGIES  Allergies   Allergen Reactions    Codeine Nausea And Vomiting    Latex Rash    Prednisone Mental Status Change     High doses makes her \"nuts\"         Respiratory Exam    VITAL SIGNS:   /90   Pulse 117   Temp 98.4 °F (36.9 °C) (Oral)   Resp 28   Ht 157.5 cm (62\")   Wt 76 kg (167 lb 8.8 oz)   SpO2 100%   BMI 30.65 kg/m²     Constitutional: Patient is alert and in mild to moderate respiratory distress.  Patient with left-sided chest discomfort.    ENT: There is a normal pharynx with no acute erythema or exudate and oral mucosa is moist.  Nose is clear with no drainage.  Tympanic membranes intact and non-erythemic    Cardiovascular: S1 S2 tachycardic with a diastolic murmur 2 out of 6.    Respiratory: Decreased breath sounds in both lung fields with soft crackles mid lung fields bilaterally left greater than right    Abdomen: Soft nontender bowel sounds are normal in all 4 quadrants there is no rebound or guarding noted.  There is no abdominal distention or hepatosplenomegaly.    Genitourinary: Patient is voiding appropriately.    Integument: No acute lesions noted color appears to be normal.    [place] Coma Scale: Total score 15    Neurological: Patient is alert and oriented x4 in no acute findings noted.  Speech is fluent and cognition is normal.  No evidence of acute CVA.  Cranial nerves II through XII intact.  Patient with normal motor function as well as reflexes and sensation    Psychiatric: Normal affect and mood      RADIOLOGY/PROCEDURES    CT Head Without Contrast   Final Result   Impression:         No acute intracranial abnormality.       This report was signed and finalized on 5/6/2024 10:56 AM by Cyril"   Carlos.          XR Chest 1 View   Final Result       No new acute cardiopulmonary abnormality.       Cardiomegaly with mild chronic appearing interstitial opacities,   suggestive of chronic mild edema versus chronic interstitial change.               This report was signed and finalized on 5/6/2024 10:00 AM by Cyril Gonzalez.                FUTURE APPOINTMENTS     Future Appointments   Date Time Provider Department Center   5/9/2024  2:30 PM Fela Blackman, APRN [facility]   5/16/2024  1:00 PM [facility] FAILURE [facility] - MD [facility] None          EKG (reviewed and interpreted by me):  Sinus tachycardia with a rate of 115.  There is a left fascicular fascicular block.  No acute ST segment elevation or depression appreciated.      COURSE & MEDICAL DECISION MAKING     Patient's partial differential diagnosis can include:    Pulmonary embolism, pneumonia, pneumonitis, bronchitis, pneumothorax, acute respiratory failure, COPD exacerbation, unstable angina, angina, panic attack, respiratory failure with hypoxia, electrolyte abnormality, and others      HEART SCORE     Patient history  1      Moderately suspicious (1 point)    2   ECG     Non specific repolarisation disturbance (1 point)    3   Patient age     Equal or higher than 65 (2 points)    4   Risk factors (Hypercholesterolemia, Hypertension, diabetes, smoking, obesity)     More than 3 risk factors or atherosclerosis history (2 points)    5   Troponin       Less than normal limit (0 points)     6     TOTAL RISK NUMBER: 6    The three risk categories are described below:  Heart score MACE risk Recommendation  0 - 3 Low (1.7%) Discharge can be an option.  4 - 6 Intermediate (20.3%) Clinical observation and further investigations.  7 - 10 High (72.2%) Immediate invasive treatment       Discussed the results with the patient and the daughter who is at the bedside.  Patient is very anxious and panicky.  Patient partially refusion ulcer intervention such  as medication for insomnia.  However the daughter who is the power of  wants the patient to have a prescription of Ambien CR to attempt to help her with her sleep.  She feels also that a lot of the patient's irritability and anger is related to insomnia.  Patient on her oxygen at home but at rest is stable including here however when she gets up her oxygen drops.  A lot of this is related to the fact that the patient has a 50 foot oxygen cord.  When walking with that amount of oxygen she is not getting the full dose once is going through that tubing.    Patient's level of risk: Moderate        CRITICAL CARE    CRITICAL CARE: No    CRITICAL CARE TIME: None      Also Old charts were reviewed per [employer] EMR.  Pertinent details are summarized above.  All laboratory, radiologic, and EKG studies that were performed in the Emergency Department were a necessary part of the evaluation needed to exclude unstable or  emergent medical conditions:     Patient was hemodynamically and neurologically stable in the ED.   Pertinent studies were reviewed as above.     Recent Results (from the past 24 hour(s))   Green Top (Gel)    Collection Time: 05/06/24  8:59 AM   Result Value Ref Range    Extra Tube Hold for add-ons.    Lavender Top    Collection Time: 05/06/24  8:59 AM   Result Value Ref Range    Extra Tube hold for add-on    Red Top    Collection Time: 05/06/24  8:59 AM   Result Value Ref Range    Extra Tube Hold for add-ons.    [place] Blood Culture Bottle Set    Collection Time: 05/06/24  8:59 AM    Specimen: Arm, Right; Blood   Result Value Ref Range    Extra Tube Hold for add-ons.    Gray Top    Collection Time: 05/06/24  8:59 AM   Result Value Ref Range    Extra Tube Hold for add-ons.    Light Blue Top    Collection Time: 05/06/24  8:59 AM   Result Value Ref Range    Extra Tube Hold for add-ons.    Protime-INR    Collection Time: 05/06/24  8:59 AM    Specimen: Blood   Result Value Ref Range    Protime 12.5 11.8 - 14.8  Seconds    INR 0.90 (L) 0.91 - 1.09   aPTT    Collection Time: 05/06/24  8:59 AM    Specimen: Blood   Result Value Ref Range    PTT 27.4 24.5 - 36.0 seconds   Comprehensive Metabolic Panel    Collection Time: 05/06/24  8:59 AM    Specimen: Blood   Result Value Ref Range    Glucose 101 (H) 65 - 99 mg/dL    BUN 9 8 - 23 mg/dL    Creatinine 0.81 0.57 - 1.00 mg/dL    Sodium 140 136 - 145 mmol/L    Potassium 3.3 (L) 3.5 - 5.2 mmol/L    Chloride 102 98 - 107 mmol/L    CO2 24.0 22.0 - 29.0 mmol/L    Calcium 9.3 8.6 - 10.5 mg/dL    Total Protein 7.2 6.0 - 8.5 g/dL    Albumin 4.0 3.5 - 5.2 g/dL    ALT (SGPT) 11 1 - 33 U/L    AST (SGOT) 19 1 - 32 U/L    Alkaline Phosphatase 97 39 - 117 U/L    Total Bilirubin 0.4 0.0 - 1.2 mg/dL    Globulin 3.2 gm/dL    A/G Ratio 1.3 g/dL    BUN/Creatinine Ratio 11.1 7.0 - 25.0    Anion Gap 14.0 5.0 - 15.0 mmol/L    eGFR 80.2 >60.0 mL/min/1.73   Magnesium    Collection Time: 05/06/24  8:59 AM    Specimen: Blood   Result Value Ref Range    Magnesium 2.2 1.6 - 2.4 mg/dL   High Sensitivity Troponin T    Collection Time: 05/06/24  8:59 AM    Specimen: Blood   Result Value Ref Range    HS Troponin T 13 <14 ng/L   BNP    Collection Time: 05/06/24  8:59 AM    Specimen: Blood   Result Value Ref Range    proBNP 196.4 0.0 - 900.0 pg/mL   CBC Auto Differential    Collection Time: 05/06/24  8:59 AM    Specimen: Blood   Result Value Ref Range    WBC 10.01 3.40 - 10.80 10*3/mm3    RBC 5.20 3.77 - 5.28 10*6/mm3    Hemoglobin 13.0 12.0 - 15.9 g/dL    Hematocrit 42.4 34.0 - 46.6 %    MCV 81.5 79.0 - 97.0 fL    MCH 25.0 (L) 26.6 - 33.0 pg    MCHC 30.7 (L) 31.5 - 35.7 g/dL    RDW 14.6 12.3 - 15.4 %    RDW-SD 42.5 37.0 - 54.0 fl    MPV 10.5 6.0 - 12.0 fL    Platelets 287 140 - 450 10*3/mm3    Neutrophil % 65.4 42.7 - 76.0 %    Lymphocyte % 23.2 19.6 - 45.3 %    Monocyte % 7.8 5.0 - 12.0 %    Eosinophil % 2.2 0.3 - 6.2 %    Basophil % 0.8 0.0 - 1.5 %    Immature Grans % 0.6 (H) 0.0 - 0.5 %    Neutrophils, Absolute  6.55 1.70 - 7.00 10*3/mm3    Lymphocytes, Absolute 2.32 0.70 - 3.10 10*3/mm3    Monocytes, Absolute 0.78 0.10 - 0.90 10*3/mm3    Eosinophils, Absolute 0.22 0.00 - 0.40 10*3/mm3    Basophils, Absolute 0.08 0.00 - 0.20 10*3/mm3    Immature Grans, Absolute 0.06 (H) 0.00 - 0.05 10*3/mm3    nRBC 0.0 0.0 - 0.2 /100 WBC   ECG 12 Lead Dyspnea    Collection Time: 05/06/24  9:07 AM   Result Value Ref Range    QT Interval 328 ms    QTC Interval 453 ms   Respiratory Panel PCR w/COVID-19(SARS-CoV-2) KARISHMA/LUIS ARMANDO/DENYS/PAD/COR/DAVID In-House, NP Swab in [place], 2 HR TAT - Swab, Nasopharynx    Collection Time: 05/06/24  9:27 AM    Specimen: Nasopharynx; Swab   Result Value Ref Range    ADENOVIRUS, PCR Not Detected Not Detected    Coronavirus 229E Not Detected Not Detected    Coronavirus HKU1 Not Detected Not Detected    Coronavirus NL63 Not Detected Not Detected    Coronavirus OC43 Not Detected Not Detected    COVID19 Not Detected Not Detected - Ref. Range    Human Metapneumovirus Not Detected Not Detected    Human Rhinovirus/Enterovirus Not Detected Not Detected    Influenza A PCR Not Detected Not Detected    Influenza B PCR Not Detected Not Detected    Parainfluenza Virus 1 Not Detected Not Detected    Parainfluenza Virus 2 Not Detected Not Detected    Parainfluenza Virus 3 Not Detected Not Detected    Parainfluenza Virus 4 Not Detected Not Detected    RSV, PCR Not Detected Not Detected    Bordetella pertussis pcr Not Detected Not Detected    Bordetella parapertussis PCR Not Detected Not Detected    Chlamydophila pneumoniae PCR Not Detected Not Detected    Mycoplasma pneumo by PCR Not Detected Not Detected   Blood Gas, Arterial With Co-Ox    Collection Time: 05/06/24  9:27 AM    Specimen: Arterial Blood   Result Value Ref Range    Site Right Radial     Tony's Test Positive     pH, Arterial 7.400 7.350 - 7.450 pH units    pCO2, Arterial 41.2 35.0 - 45.0 mm Hg    pO2, Arterial 155.0 (H) 83.0 - 108.0 mm Hg    HCO3, Arterial 25.5 20.0 -  26.0 mmol/L    Base Excess, Arterial 0.6 0.0 - 2.0 mmol/L    O2 Saturation, Arterial 100.0 (H) 94.0 - 99.0 %    Hemoglobin, Blood Gas 12.6 12 - 16 g/dL    Hematocrit, Blood Gas 38.5 38.0 - 51.0 %    Oxyhemoglobin 99.0 94 - 99 %    Methemoglobin 0.20 0.00 - 3.00 %    Carboxyhemoglobin 0.9 0 - 5 %    Temperature 37.0     Sodium, Arterial 143 136 - 145 mmol/L    Potassium, Arterial 3.3 (L) 3.5 - 5.2 mmol/L    Ionized Calcium 4.67 4.60 - 5.40 mg/dL    Barometric Pressure for Blood Gas 751 mmHg    Modality HFNC     Flow Rate 10.0 lpm    Ventilator Mode NA     Collected by [id number]     pH, Temp Corrected 7.400 7.350 - 7.450 pH Units    pCO2, Temperature Corrected 41.2 35 - 45 mm Hg    pO2, Temperature Corrected 155 (H) 83 - 108 mm Hg   High Sensitivity Troponin T 2Hr    Collection Time: 05/06/24 11:05 AM    Specimen: Blood   Result Value Ref Range    HS Troponin T 14 (H) <14 ng/L    Troponin T Delta 1 >=-4 - <+4 ng/L       The patient received:  Medications   sodium chloride 0.9 % flush 10 mL (has no administration in time range)   magnesium sulfate 2g/50 mL (PREMIX) infusion (0 g Intravenous Stopped 5/6/24 0988)   ondansetron (ZOFRAN) injection 4 mg (4 mg Intravenous Given 5/6/24 1056)            ED Disposition       ED Disposition   Discharge    Condition   Stable    Comment   --                   Dragon disclaimer:  Part of this note may be an electronic transcription/translation of spoken language to printed text using the Dragon Dictation System.     I have reviewed the patient’s prescription history via a prescription monitoring program.  This information is consistent with my knowledge of the patient’s controlled substance use history.    Patient evaluate during Coronavirus Pandemic. Isolation practices followed according to [facility] policy.    FINAL IMPRESSION   Diagnosis Plan   1. Shortness of breath        2. Insomnia, unspecified type  zolpidem CR (Ambien CR) 6.25 MG CR tablet      3. Hypokalemia        4.  Panic attacks              MD Toño Wadsworth Jr, Thomas Mark Jr., MD  05/06/24 3541

## 2024-05-07 LAB
QT INTERVAL: 328 MS
QTC INTERVAL: 453 MS

## 2024-05-14 ENCOUNTER — HOME HEALTH ADMISSION (OUTPATIENT)
Dept: HOME HEALTH SERVICES | Facility: HOME HEALTHCARE | Age: 64
End: 2024-05-14
Payer: MEDICARE

## 2024-05-14 ENCOUNTER — TRANSCRIBE ORDERS (OUTPATIENT)
Dept: HOME HEALTH SERVICES | Facility: HOME HEALTHCARE | Age: 64
End: 2024-05-14
Payer: MEDICARE

## 2024-05-14 DIAGNOSIS — J84.10 PULMONARY FIBROSIS: Primary | ICD-10-CM

## 2024-05-16 ENCOUNTER — HOSPITAL ENCOUNTER (OUTPATIENT)
Dept: CARDIOLOGY | Facility: HOSPITAL | Age: 64
Discharge: HOME | End: 2024-05-16
Payer: MEDICARE

## 2024-05-16 VITALS
HEART RATE: 78 BPM | WEIGHT: 167.4 LBS | OXYGEN SATURATION: 96 % | BODY MASS INDEX: 30.62 KG/M2 | SYSTOLIC BLOOD PRESSURE: 99 MMHG | DIASTOLIC BLOOD PRESSURE: 58 MMHG

## 2024-05-16 DIAGNOSIS — J84.10 PULMONARY FIBROSIS: ICD-10-CM

## 2024-05-16 DIAGNOSIS — I27.21 PAH (PULMONARY ARTERIAL HYPERTENSION) WITH PORTAL HYPERTENSION: Primary | Chronic | ICD-10-CM

## 2024-05-16 DIAGNOSIS — J96.11 CHRONIC RESPIRATORY FAILURE WITH HYPOXIA: ICD-10-CM

## 2024-05-16 DIAGNOSIS — K76.6 PAH (PULMONARY ARTERIAL HYPERTENSION) WITH PORTAL HYPERTENSION: Primary | Chronic | ICD-10-CM

## 2024-05-16 DIAGNOSIS — I50.812 CHRONIC RIGHT HEART FAILURE: ICD-10-CM

## 2024-05-16 DIAGNOSIS — R06.02 SHORTNESS OF BREATH: ICD-10-CM

## 2024-05-16 LAB
ANION GAP SERPL CALCULATED.3IONS-SCNC: 7 MMOL/L (ref 5–15)
BUN SERPL-MCNC: 10 MG/DL (ref 8–23)
BUN/CREAT SERPL: 10.9 (ref 7–25)
CALCIUM SPEC-SCNC: 8.8 MG/DL (ref 8.6–10.5)
CHLORIDE SERPL-SCNC: 101 MMOL/L (ref 98–107)
CO2 SERPL-SCNC: 32 MMOL/L (ref 22–29)
CREAT SERPL-MCNC: 0.92 MG/DL (ref 0.57–1)
EGFRCR SERPLBLD CKD-EPI 2021: 68.8 ML/MIN/1.73
GLUCOSE SERPL-MCNC: 130 MG/DL (ref 65–99)
NT-PROBNP SERPL-MCNC: 187.7 PG/ML (ref 0–900)
POTASSIUM SERPL-SCNC: 3.8 MMOL/L (ref 3.5–5.2)
SODIUM SERPL-SCNC: 140 MMOL/L (ref 136–145)

## 2024-05-16 PROCEDURE — 94618 PULMONARY STRESS TESTING: CPT | Performed by: HOSPITALIST

## 2024-05-16 PROCEDURE — 80048 BASIC METABOLIC PNL TOTAL CA: CPT | Performed by: HOSPITALIST

## 2024-05-16 PROCEDURE — 83880 ASSAY OF NATRIURETIC PEPTIDE: CPT | Performed by: HOSPITALIST

## 2024-05-16 RX ORDER — PROMETHAZINE HYDROCHLORIDE 25 MG/1
25 TABLET ORAL EVERY 6 HOURS PRN
COMMUNITY

## 2024-05-16 RX ORDER — LOPERAMIDE HYDROCHLORIDE 2 MG/1
2 CAPSULE ORAL 4 TIMES DAILY PRN
COMMUNITY

## 2024-05-16 NOTE — PROGRESS NOTES
Reason For Visit:  Pulmonary hypertension    Subjective        Elaine Juárez is a 64 y.o. female with the below pertinent PMH who presents for follow-up of pulmonary hypertension.    Ms. Juárez reports that she tolerated the transition to tadalafil well without any significant issues.  She states that her breathing has overall remained good; she has dyspnea on exertion if she really pushes herself, but for the most part she does not notice much trouble with shortness of breath that she does limit her activity.  She has not had any significant weight gain, swelling, or palpitations.  Since her last visit she did have issues with a sinus infection associated with some increased shortness of breath and some chest discomfort for which she was evaluated in the ED with somewhat reassuring workup.  She was noted to have exertional hypoxia but good oxygenation at rest.  There was mention in the notes of issues with insomnia, which the patient reiterates today.  She also has had waxing/waning issues with nausea and diarrhea, which she states is a chronic issue.    ROS: Pertinent findings are included above.      Prior PAH History:   - Echo 11/13/19 at [facility] reportedly grossly normal cardiac chamber dimensions without evidence of pulmonary hypertension   - RHC 7/22/20: RA 6, RV 63/6, PA 64/19 (mean 38), PCWP 10, David CO/CI 3.8/2.1, PVR 7.4 TIAN, negative adenosine vasodilator challenge  - Started opsimut mid 2020 after RHC   - 4/2022 proBNP 108-122  - 6/2022 6MWD after COVID 422 feet (129 meters)  - Started Tyvaso DPI 8/2022 (titrated to max dose)  - 9/2022 admission              - 9/12/22 Echo: RV severely dilated w/ severe dysfunction, systolic and diastolic septal flattening, RA severely dilated, small pericardial effusion, RVSP 56-63 (my estimate)              - proBNP 7417              - Started Sildenafil 20mg TID 9/2022              - Started lasix 10mg daily w/ KCl on hospital discharge 9/2022  - 2022: Referred  to [place], [facility], [facility], and [facility] for consideration of lung transplant but was considered not a candidate due to history of liver disease and esophageal motility (patient states she has not been seen in PAH clinic at any of these hospitals)  - 1/2023: 6MWD 633 feet (193 meters)  - 3/2023: Transitioned to Medicare and was unable to get medications filled so ran out.   4/2023: Feeling poorly but was able to restart sildenafil and transition to Tyvaso inhaler at end of month.  - 5/12/2023: For CHF clinic visit. 6MWD 169 meters. proBNP 1798.  Improving dyspnea and exertional capacity since restarting Tyvaso (still titrating up).  Started spironolactone 25 mg daily.  - 5/24: Tyvaso up to goal dose of 12 breaths QID and being tolerated well.  Dyspnea improving.  Had obtained a thuan to hopefully restart Opsumit soon (did start at end of May).  - 7/5: Patient had restarted Opsumit and reported continual improvement in her breathing.  proBNP improved to 212.  - 7/25/2023 echo: LVEF 61-65%, mild LVH with concentric remodeling, RV mildly dilated with mildly to moderately reduced systolic function, mild systolic septal flattening of the IV septum consistent with RV pressure overload, normal RA size, insufficient TR velocity to estimate RVSP.  - 9/6/2022 6-minute walk distance: 234 m  - 11/20/23: Started Toprol XL  12.5 mg daily for 3 weeks then 25 mg daily.  - 1/11/2024: 6-minute walk distance 234 m  - Echo 2/6/2024: My review, RV mildly dilated with mildly or moderately reduced systolic function mild systolic septal flattening overall similar to prior echo from 7/2023.  - 3/2024: Transition from sildenafil to tadalafil  - 5/16/2024: 6-minute walk distance 234 m     Pertinent PMH  Scleroderma/CREST syndrome - followed by Dr. Sanders  Chronic Hypoxic Respiratory Failure  ILD/Bronchiectasis  WHO Group I (scleroderma) and Group III (ILD) Pulmonary Arterial Hypertension  Liver disease    Pertinent  past medical, surgical, family, and social history were reviewed.      Current Outpatient Medications:     clonazePAM (KlonoPIN) 0.5 MG tablet, Take 1 tablet by mouth 2 (Two) Times a Day As Needed for Anxiety., Disp: , Rfl:     loperamide (IMODIUM) 2 MG capsule, Take 1 capsule by mouth 4 (Four) Times a Day As Needed for Diarrhea., Disp: , Rfl:     Macitentan 10 MG tablet, Take 1 tablet by mouth Daily., Disp: 90 tablet, Rfl: 3    metoprolol succinate XL (TOPROL-XL) 25 MG 24 hr tablet, Take 0.5 tablets by mouth Daily., Disp: 45 tablet, Rfl: 3    mycophenolate (CELLCEPT) 500 MG tablet, Take 2 tablets by mouth 2 (Two) Times a Day., Disp: , Rfl:     O2 (OXYGEN), Inhale 6-8 L/min 1 (One) Time. 6 at rest, 8 during exertion, Disp: , Rfl:     omeprazole (priLOSEC) 40 MG capsule, Take 1 capsule by mouth Daily., Disp: 90 capsule, Rfl: 3    predniSONE (DELTASONE) 5 MG tablet, Take 1 tablet by mouth Daily., Disp: 90 tablet, Rfl: 3    promethazine (PHENERGAN) 25 MG tablet, Take 1 tablet by mouth Every 6 (Six) Hours As Needed for Nausea or Vomiting., Disp: , Rfl:     spironolactone (ALDACTONE) 50 MG tablet, Take 1 tablet by mouth Daily., Disp: 90 tablet, Rfl: 3    tadalafil 20 MG tablet tablet, Take 2 tablets by mouth Daily., Disp: 60 tablet, Rfl: 11    traZODone (DESYREL) 100 MG tablet, Take 1 tablet by mouth Every Night for 30 days., Disp: 30 tablet, Rfl: 0    Treprostinil (Tyvaso) 0.6 MG/ML solution inhalation solution, Inhale 12 puffs 4 (Four) Times a Day., Disp: 15 mL, Rfl: 11    ursodiol (ACTIGALL) 300 MG capsule, Take 1 capsule by mouth 2 (Two) Times a Day With Meals. (Patient taking differently: Take 1 capsule by mouth Daily With Dinner.), Disp: 60 capsule, Rfl: 11    venlafaxine (EFFEXOR) 75 MG tablet, Take 1 tablet by mouth Daily., Disp: , Rfl:     vitamin D (ERGOCALCIFEROL) 1.25 MG (45645 UT) capsule capsule, Take 1 capsule by mouth 1 (One) Time Per Week., Disp: , Rfl:      Objective   Vital Signs:  BP 99/58 (BP  "Location: Right arm, Patient Position: Sitting)   Pulse 78   Wt 75.9 kg (167 lb 6.4 oz)   SpO2 96%   BMI 30.62 kg/m²   Estimated body mass index is 30.62 kg/m² as calculated from the following:    Height as of 5/6/24: 157.5 cm (62\").    Weight as of this encounter: 75.9 kg (167 lb 6.4 oz).      Constitutional:       Appearance: Not in distress.   Neck:      Vascular: JVD normal.   Pulmonary:      Comments: Bilateral lower lobe crackles with normal work of breathing  Cardiovascular:      Normal rate. Regular rhythm.      Murmurs: There is a grade 1/6 systolic murmur at the LLSB.      No gallop.  No click. No rub.   Edema:     Peripheral edema absent.   Abdominal:      General: There is no distension.      Palpations: Abdomen is soft.      Tenderness: There is no abdominal tenderness.   Skin:     General: Skin is warm and dry.   Neurological:      Mental Status: Alert and oriented to person, place and time.        Result Review :  The following data was reviewed by: Roosevelt Escalera MD on 05/16/2024:  BMP   BMP          3/11/2024    13:04 5/6/2024    08:59 5/6/2024    09:27 5/16/2024    11:19   BMP   BUN 14  9   10    Creatinine 0.95  0.81   0.92    Sodium 138  140  143  140    Potassium 3.7  3.3   3.8    Chloride 103  102   101    CO2 26.0  24.0   32.0    Calcium 8.8  9.3   8.8      Pro-BNP       Lab 05/16/24  1119   PROBNP 187.7            Assessment and Plan   Diagnoses and all orders for this visit:    1. PAH (pulmonary arterial hypertension) with portal hypertension (Primary)  2. Chronic right heart failure  3. Shortness of breath  4. Pulmonary fibrosis  5. Chronic respiratory failure with hypoxia  Still with relatively poor and surprisingly quite stable 6-minute walk distance.  proBNP still remains within a reasonable range.  She does not feel like shortness of breath is a significant symptom for her that is worse than baseline at this time.  She is more concerned about other recent symptoms/issues, so she " favored not making any changes to her pulmonary hypertension regimen today.  She is following up with rheumatology in a couple of days, so we agreed to continue her current regimen of Opsumit, tadalafil, and Tyvaso.  She is continuing to use her supplemental oxygen.  We discussed plan for potentially increasing Tyvaso up toward 14 breaths QID at follow-up pending how she is doing.  She will contact our clinic should she have any worsening shortness of breath or other concerns.    Follow Up   Return in about 3 months (around 8/16/2024).  Patient was given instructions and counseling regarding her condition or for health maintenance advice. Please see specific information pulled into the AVS if appropriate.       EMR Dragon/Transcription disclaimer: Much of this encounter note is an electronic transcription/translation of spoken language to printed text. The electronic translation of spoken language may permit erroneous, or at times, nonsensical words or phrases to be inadvertently transcribed; although I have reviewed the note for such errors, some may still exist.

## 2024-05-17 ENCOUNTER — HOME HEALTH ADMISSION (OUTPATIENT)
Dept: HOME HEALTH SERVICES | Facility: HOME HEALTHCARE | Age: 64
End: 2024-05-17
Payer: MEDICARE

## 2024-05-17 ENCOUNTER — TRANSCRIBE ORDERS (OUTPATIENT)
Dept: HOME HEALTH SERVICES | Facility: HOME HEALTHCARE | Age: 64
End: 2024-05-17
Payer: MEDICARE

## 2024-05-17 DIAGNOSIS — J84.10 PULMONARY FIBROSIS: Primary | ICD-10-CM

## 2024-05-21 ENCOUNTER — TELEPHONE (OUTPATIENT)
Dept: PULMONOLOGY | Facility: CLINIC | Age: 64
End: 2024-05-21
Payer: MEDICARE

## 2024-05-21 NOTE — PROGRESS NOTES
Chief Complaint  PAH (pulmonary arterial hypertension) with portal hypertens    Subjective    History of Present Illness {CC  Problem List  Visit Diagnosis   Encounters  Notes  Medications  Labs  Result Review Imaging  Media: 23}    Elaine Juárez presents to [facility] for:    History of Present Illness  Management of her pulmonary fibrosis/bronchiectasis secondary to underlying autoimmune disease, specifically crest syndrome.  She is also under treatment for pulmonary hypertension secondary to her autoimmune disease/interstitial lung disease.      She is obese.  She is a never smoker.  She has daily shortness of breath with exertion and a dry bothersome cough.  She has not benefited from bronchodilation or Tessalon Perles for cough suppression.  She has DuoNebs but does not use them.  They make her feel jittery.    Unable to do PFTs times multiple attempts due to coughing.  Last 1 in 2019.  FEV1 54, FVC 59, diffusion 48/95 when corrected     She is followed by Dr. Landry with rheumatology for her crest syndrome. She is on CellCept 1000 mg twice a day and prednisone 5 mg.  She is very intolerant to higher doses of this as it results in agitation, insomnia and aggravation of underlying bipolar issues.  She has not required any over the last year.  She indicates she was seen by her cardiologist recently.  He wanted her to request whether or not she may benefit from a different medication for her crest syndrome with her rheumatologist.  She has not done that yet.  She will see them next week.     Most recent high-resolution CT scan July 2023 showing stable ILD/bronchiectasis.      Right heart cath in 2020 showing a PA pressure of 38.  She has been referred to [place], [facility], [facility] access hospital and most recently [facility] for consideration of lung transplantation.  She has been declined due to history of liver disease and  esophageal dysmotility.         Esophagram 11-22 showing dysmotility and narrowing of the distal esophagus.  She underwent additional swallowing testing which showed some issues with aspiration.  She has had speech therapy outpatient with good result. She denies reflux symptoms.  She remains on Prilosec 40 daily with no symptoms.     Last 6-minute walk May 2024, stable at 234 m.  BNP normal.    She is able to rest on 3 to 4 L of oxygen.  When she exerts she will use 8 L.  When she sits down she will use 6 L until she recovers fully.  Recent sleep study obtained showing no obstructive sleep apnea.  She is now sleeping on 5 L and doing well with this.     Last echo February 2024, stable.    She is now being followed by Dr. Escalera at the heart failure clinic.  She was on sildenafil, Opsumit and Tyvaso 12 breaths 4 times daily.  At her most recent follow-up they decreased her beta-blocker to 12.5 mg daily and switched her sildenafil to tadalafil.  She was also taken off of her Lasix and placed on Aldactone.  She continues to do well with the Aldactone.  She is tolerating the Tyvaso.  It does make her cough.  We previously discussed taking a breathing treatment prior to the Tyvaso if needed.  She has not had to do that yet.  She indicates it took 3 months to get the tadalafil approved.  She took it for 1 month.  Her pharmacy has been unable to get that medication in stock for her.  She has been out of it for a week..    She was just in the emergency room for left-sided chest pain, panic attacks and shortness of breath.  She was found to have a sat in the 50s on 6 L.  ABG on 10 L shows pO2 155.  She related her issues to insomnia.  They prescribed her Ambien.  She has not taken it.  She indicates she actually ended up in the emergency room because she ran out of her prednisone, tadalafil and her Effexor because of her pharmacy.  She has been able to resume all the medications except for the tadalafil.  Her anxiety has been  much better.  Her breathing has been back to baseline.       Prior to Admission medications    Medication Sig Start Date End Date Taking? Authorizing Provider   clonazePAM (KlonoPIN) 0.5 MG tablet Take 1 tablet by mouth 2 (Two) Times a Day As Needed for Anxiety.    Provider, MD Latanya   Macitentan 10 MG tablet Take 1 tablet by mouth Daily. 5/24/23   Roosevelt Escalera MD   metoprolol succinate XL (TOPROL-XL) 25 MG 24 hr tablet Take 0.5 tablets by mouth Daily. 1/11/24   Roosevelt Escalera MD   mycophenolate (CELLCEPT) 500 MG tablet Take 2 tablets by mouth 2 (Two) Times a Day.    Antonia Landry MD   O2 (OXYGEN) Inhale 6-8 L/min 1 (One) Time. 6 at rest, 8 during exertion    Provider, MD Latanya   omeprazole (priLOSEC) 40 MG capsule Take 1 capsule by mouth Daily. 4/4/23   Fela Blackman APRN   predniSONE (DELTASONE) 5 MG tablet Take 1 tablet by mouth Daily. 4/4/23   Fela Blackman APRN   spironolactone (ALDACTONE) 50 MG tablet Take 1 tablet by mouth Daily. 8/18/23   Roosevelt Escalera MD   tadalafil 20 MG tablet tablet Take 2 tablets by mouth Daily. 4/10/24 4/10/25  Nile Banegas APRN   traZODone (DESYREL) 100 MG tablet Take 1 tablet by mouth Every Night for 30 days. 4/22/22 1/11/24  Raffi Cotter MD   Treprostinil (Tyvaso) 0.6 MG/ML solution inhalation solution Inhale 12 puffs 4 (Four) Times a Day.    Fela Blackman APRN   ursodiol (ACTIGALL) 300 MG capsule Take 1 capsule by mouth 2 (Two) Times a Day With Meals.  Patient taking differently: Take 1 capsule by mouth Daily With Dinner. 7/31/23   Micah Barton APRN   venlafaxine (EFFEXOR) 75 MG tablet Take 1 tablet by mouth Daily.    Farida Cano APRN   vitamin D (ERGOCALCIFEROL) 1.25 MG (86955 UT) capsule capsule Take 1 capsule by mouth 1 (One) Time Per Week. 5/10/23   Aaron Stoddard MD   tadalafil 20 MG tablet tablet Take 2 tablets by mouth Daily. 3/29/24 4/10/24  Roosevelt Escalera MD       Social History     Socioeconomic History     "Marital status:    Tobacco Use    Smoking status: Never    Smokeless tobacco: Never   Vaping Use    Vaping status: Never Used   Substance and Sexual Activity    Alcohol use: No    Drug use: No    Sexual activity: Not Currently     Partners: Male       Objective   Vital Signs:   /80   Pulse 86   Ht 157.5 cm (62\")   Wt 73.9 kg (163 lb)   SpO2 95% Comment: 6PD  BMI 29.81 kg/m²     Result Review :  Physical exam:  Constitutional:       Interventions: Nasal cannula in place.   Cardiovascular:      Rate and Rhythm: Normal rate and regular rhythm.      Heart sounds: No murmur heard.     Comments: Prominent S2  Pulmonary:      Effort: Pulmonary effort is normal. No tachypnea, prolonged expiration, respiratory distress or retractions.      Breath sounds: Normal breath sounds. Examination of the right-lower field reveals rales. Examination of the left-lower field reveals rales.   Musculoskeletal:      Right lower leg: No edema.      Left lower leg: No edema.   Skin:     Nails: There is clubbing.      Comments: Bilateral hand telangectasis and face, thick nails      Neurological:      Mental Status: She is alert and oriented to person, place, and time.       My interpretation of the PFT : none    Results for orders placed in visit on 06/05/19    Pulmonary Function Test    My interpretation of imaging:  none    My interpretation of labs: none      Assessment and Plan {CC Problem List  Visit Diagnosis  ROS  Review (Popup)  Health Maintenance  Quality  BestPractice  Medications  SmartSets  SnapShot Encounters  Media : 23}    Diagnoses and all orders for this visit:    1. Pulmonary fibrosis prob sec underlying autoimmune disease (Primary)  Comments:  Unable to perform PFTs times numerous attempts.  Continue oxygen.  Will update imaging at some point    2. Environmental allergies  Comments:  Likely contributing.  She is doing saline nasal rinses and Mucinex.  Consider azelastine if worse    3. " Esophageal dysmotility  Comments:  She is aware.  She has been through speech therapy.  She adheres to diet modifications    4. Gastroesophageal reflux disease without esophagitis  Comments:  Doing well on Prilosec daily.  Please continue    5. CREST syndrome, partial   Comments:  On CellCept 1000 mg twice daily and prednisone daily.  Cardiology has requested consideration for alternative therapy, defer    6. Chronic respiratory failure with hypoxia  Comments:  Continue 4 L at rest, 5 L with sleep and 8 L with exertion.  She is compliant with this and benefiting from it    7. PAH (pulmonary arterial hypertension) with portal hypertension  Comments:  Continue Tyvaso, Opsumit and tadalafil per cardiology's discretion.  If pharmacy unable to fill tadalafil, resume sildenafil in the interim          Body mass index is 29.81 kg/m².    Fela Blackman, APRN  5/28/2024  15:19 CDT    Follow Up   Return in about 3 months (around 8/28/2024).    Patient was given instructions and counseling regarding her condition or for health maintenance advice. Please see specific information pulled into the AVS if appropriate.

## 2024-05-21 NOTE — TELEPHONE ENCOUNTER
Called patient to reschedule their no show appointment that was originally scheduled on 05/09/2024 .      Unable to contact patient after trying all available phone numbers. No Show letter was mailed, which includes [employer] No Show Policy.

## 2024-05-22 ENCOUNTER — HOME CARE VISIT (OUTPATIENT)
Dept: HOME HEALTH SERVICES | Facility: CLINIC | Age: 64
End: 2024-05-22
Payer: MEDICARE

## 2024-05-22 NOTE — CASE COMMUNICATION
Patient missed a HH PT SOC  visit from [facility] on 5-22-24    Reason: Pt to be estbalished with [facility] per her request       For your records only.   Per CMS Guidance, MD must be notified of missed/cancelled visits; therefore the prescribed frequency was not met.

## 2024-05-28 ENCOUNTER — OFFICE VISIT (OUTPATIENT)
Dept: PULMONOLOGY | Facility: CLINIC | Age: 64
End: 2024-05-28
Payer: MEDICARE

## 2024-05-28 VITALS
HEIGHT: 62 IN | WEIGHT: 163 LBS | DIASTOLIC BLOOD PRESSURE: 80 MMHG | HEART RATE: 86 BPM | SYSTOLIC BLOOD PRESSURE: 122 MMHG | OXYGEN SATURATION: 95 % | BODY MASS INDEX: 30 KG/M2

## 2024-05-28 DIAGNOSIS — K76.6 PAH (PULMONARY ARTERIAL HYPERTENSION) WITH PORTAL HYPERTENSION: Chronic | ICD-10-CM

## 2024-05-28 DIAGNOSIS — M34.1 CREST SYNDROME: Chronic | ICD-10-CM

## 2024-05-28 DIAGNOSIS — J96.11 CHRONIC RESPIRATORY FAILURE WITH HYPOXIA: Chronic | ICD-10-CM

## 2024-05-28 DIAGNOSIS — K22.4 ESOPHAGEAL DYSMOTILITY: Chronic | ICD-10-CM

## 2024-05-28 DIAGNOSIS — I27.21 PAH (PULMONARY ARTERIAL HYPERTENSION) WITH PORTAL HYPERTENSION: Chronic | ICD-10-CM

## 2024-05-28 DIAGNOSIS — K21.9 GASTROESOPHAGEAL REFLUX DISEASE WITHOUT ESOPHAGITIS: Chronic | ICD-10-CM

## 2024-05-28 DIAGNOSIS — Z91.09 ENVIRONMENTAL ALLERGIES: Chronic | ICD-10-CM

## 2024-05-28 DIAGNOSIS — J84.10 PULMONARY FIBROSIS: Primary | Chronic | ICD-10-CM

## 2024-05-28 PROCEDURE — 1160F RVW MEDS BY RX/DR IN RCRD: CPT | Performed by: NURSE PRACTITIONER

## 2024-05-28 PROCEDURE — 99214 OFFICE O/P EST MOD 30 MIN: CPT | Performed by: NURSE PRACTITIONER

## 2024-05-28 PROCEDURE — 1159F MED LIST DOCD IN RCRD: CPT | Performed by: NURSE PRACTITIONER

## 2024-05-28 RX ORDER — BUSPIRONE HYDROCHLORIDE 10 MG/1
TABLET ORAL
COMMUNITY
Start: 2024-04-29

## 2024-05-28 RX ORDER — ONDANSETRON 8 MG/1
TABLET, ORALLY DISINTEGRATING ORAL
COMMUNITY

## 2024-05-28 RX ORDER — ALENDRONATE SODIUM 70 MG/1
1 TABLET ORAL WEEKLY
COMMUNITY
Start: 2024-04-25

## 2024-05-31 NOTE — PLAN OF CARE
Previous breast pump order forms had incorrect information and will need to be resigned. Jeb from [employer] will refax it to us. Fax number provided. If needed, Jeb can be contacted on .    Problem: Falls - Risk of:  Goal: Will remain free from falls  Description  Will remain free from falls  2/27/2020 0939 by Elvis Bennett RN  Outcome: Completed  2/27/2020 0315 by Jeanna Morales RN  Outcome: Ongoing  Goal: Absence of physical injury  Description  Absence of physical injury  2/27/2020 0939 by Elvis Bennett RN  Outcome: Completed  2/27/2020 0315 by Jeanna Morales RN  Outcome: Ongoing

## 2024-07-24 NOTE — PROGRESS NOTES
Chief Complaint  Pulmonary fibrosis prob sec underlying autoimmune disease    Subjective    History of Present Illness {CC  Problem List  Visit Diagnosis   Encounters  Notes  Medications  Labs  Result Review Imaging  Media: 23}    Elaine Juárez presents to [facility] for:    History of Present Illness  Management of her pulmonary fibrosis/bronchiectasis secondary to underlying autoimmune disease, specifically crest syndrome.  She is also under treatment for pulmonary hypertension secondary to her autoimmune disease/interstitial lung disease.       She is obese.  She is a never smoker.  She has daily shortness of breath with exertion and a dry bothersome cough.  She has not benefited from bronchodilation or Tessalon Perles for cough suppression.  She has DuoNebs but does not use them.  They make her feel jittery.     Unable to do PFTs times multiple attempts due to coughing.  Last 1 in 2019.  FEV1 54, FVC 59, diffusion 48/95 when corrected     She is followed by Dr. Landry with rheumatology for her crest syndrome. She is on CellCept 1000 mg twice a day and prednisone 5 mg.  She is very intolerant to higher doses of this as it results in agitation, insomnia and aggravation of underlying bipolar issues.  She has not required any over the last year.       Most recent high-resolution CT scan July 2023 showing stable ILD/bronchiectasis.      Right heart cath in 2020 showing a PA pressure of 38.  She has been referred to [facility], [facility], [facility] and most recently [facility] for consideration of lung transplantation.  She has been declined due to history of liver disease and esophageal dysmotility.         Esophagram 11-22 showing dysmotility and narrowing of the distal esophagus.  She underwent additional swallowing testing which showed some issues with aspiration.  She has had speech therapy outpatient with good result. She  denies reflux symptoms.  She remains on Prilosec 40 daily with no symptoms.     Last 6-minute walk May 2024, stable at 234 m.  BNP normal.Next scheduled on 9-17.     She is able to rest on 3 to 4 L of oxygen.  When she exerts she will use 8 L.  When she sits down she will use 6 L until she recovers fully.  Recent sleep study obtained showing no obstructive sleep apnea.  She is now sleeping on 6 L and doing well with this.     Last echo February 2024, stable.     She is now being followed by Dr. Escalera at the heart failure clinic.  She is on tadalafil, Opsumit and Tyvaso which they recently increased to 14 breaths 4 times daily.  They want to put her on Opsynvi in place of her opsumit and tadalafil but they are waiting on her to sign to patient portion. She is wondering if she can sign it here and us fax it over. She is also on 12.5 mg of beta-blocker and Aldactone 50 mg.  She is no longer on Lasix.  Fluid has not been an issue. He is considering adding Sotatercept at her next appointment.      She is out of her trazodone. She is requesting a refill. She has been on it for greater than 1 year with no issue.       Prior to Admission medications    Medication Sig Start Date End Date Taking? Authorizing Provider   alendronate (FOSAMAX) 70 MG tablet Take 1 tablet by mouth 1 (One) Time Per Week. 4/25/24   Latanya Spring MD   busPIRone (BUSPAR) 10 MG tablet Take 1 tablet 3 times a day by oral route as needed for 30 days, for anxiety. 4/29/24   Latanya Spring MD   clonazePAM (KlonoPIN) 0.5 MG tablet Take 1 tablet by mouth 2 (Two) Times a Day As Needed for Anxiety.    Latanya Spring MD   loperamide (IMODIUM) 2 MG capsule Take 1 capsule by mouth 4 (Four) Times a Day As Needed for Diarrhea.    Latanya Spring MD   Macitentan 10 MG tablet Take 1 tablet by mouth Daily. 5/10/24   Roosevelt Escalera MD   metoprolol succinate XL (TOPROL-XL) 25 MG 24 hr tablet Take 0.5 tablets by mouth Daily. 1/11/24   Jw  Roosevelt SANDOVAL MD   mycophenolate (CELLCEPT) 500 MG tablet Take 2 tablets by mouth 2 (Two) Times a Day.    Antonia Landry MD   O2 (OXYGEN) Inhale 6-8 L/min 1 (One) Time. 6 at rest, 8 during exertion    ProviderLatanya MD   omeprazole (priLOSEC) 40 MG capsule Take 1 capsule by mouth Daily. 4/16/24   Fela Blackman APRN   ondansetron ODT (ZOFRAN-ODT) 8 MG disintegrating tablet Place 1 tablet every 8 hours by translingual route as needed for 10 days, for nausea.    Latanya Spring MD   predniSONE (DELTASONE) 5 MG tablet Take 1 tablet by mouth Daily. 4/11/24   Fela Blackman APRN   promethazine (PHENERGAN) 25 MG tablet Take 1 tablet by mouth Every 6 (Six) Hours As Needed for Nausea or Vomiting.    ProviderLatanya MD   spironolactone (ALDACTONE) 50 MG tablet Take 1 tablet by mouth Daily. 8/18/23   Roosevelt Escalera MD   tadalafil 20 MG tablet tablet Take 2 tablets by mouth Daily. 4/10/24 4/10/25  Nile Banegas APRN   traZODone (DESYREL) 100 MG tablet Take 1 tablet by mouth Every Night for 30 days. 4/22/22 5/16/24  Raffi Cotter MD   Treprostinil (Tyvaso) 0.6 MG/ML solution inhalation solution Inhale 12 puffs 4 (Four) Times a Day. 5/1/24   Roosevelt Escalera MD   ursodiol (ACTIGALL) 300 MG capsule Take 1 capsule by mouth 2 (Two) Times a Day With Meals.  Patient taking differently: Take 1 capsule by mouth Daily With Dinner. 7/31/23   Micah Barton APRN   venlafaxine (EFFEXOR) 75 MG tablet Take 1 tablet by mouth Daily.    aFrida Cano APRN   vitamin D (ERGOCALCIFEROL) 1.25 MG (92419 UT) capsule capsule Take 1 capsule by mouth 1 (One) Time Per Week. 5/10/23   Aaron Stoddard MD       Social History     Socioeconomic History    Marital status:    Tobacco Use    Smoking status: Never    Smokeless tobacco: Never   Vaping Use    Vaping status: Never Used   Substance and Sexual Activity    Alcohol use: No    Drug use: No    Sexual activity: Not Currently     Partners: Male  "      Objective   Vital Signs:   /80   Pulse 97   Ht 157.5 cm (62\")   Wt 73.9 kg (163 lb)   SpO2 90% Comment: 5L  BMI 29.81 kg/m²     Physical Exam  Constitutional:       Interventions: Nasal cannula in place.   Cardiovascular:      Rate and Rhythm: Normal rate and regular rhythm.      Heart sounds: No murmur heard.  Pulmonary:      Effort: Pulmonary effort is normal. No tachypnea, accessory muscle usage, prolonged expiration or respiratory distress.      Breath sounds: Normal breath sounds.      Comments: Fine basilar rales  Musculoskeletal:      Right lower leg: No edema.      Left lower leg: No edema.   Skin:     Nails: There is clubbing.   Neurological:      Mental Status: She is alert and oriented to person, place, and time.        Result Review :      My interpretation of the PFT : none    Results for orders placed in visit on 06/05/19    Pulmonary Function Test      My interpretation of imaging:  none    My interpretation of labs: none      Assessment and Plan {CC Problem List  Visit Diagnosis  ROS  Review (Popup)  Health Maintenance  Quality  BestPractice  Medications  SmartSets  SnapShot Encounters  Media : 23}    Diagnoses and all orders for this visit:    1. Pulmonary fibrosis prob sec underlying autoimmune disease (Primary)  Comments:  Does not benefit from bronchodilators.  Continue CellCept and prednisone.  Continue oxygen    2. Esophageal dysmotility  Comments:  Can contribute to congestion.  She has worked with speech already    3. Gastroesophageal reflux disease without esophagitis  Comments:  Can contribute to cough and congestion.  She is on Prilosec and doing well    4. CREST syndrome, partial   Comments:  She is doing well on CellCept and low-dose prednisone.  Intolerant to high doses    5. Chronic respiratory failure with hypoxia  Comments:  Continue 4 L at rest, 6 L with sleep and 8 L with exertion.  She is compliant with this and benefiting    6. PAH (pulmonary arterial " hypertension) with portal hypertension  Comments:  Currently on tadalafil, Opsumit , Tyvaso 14 breaths and Aldactone.  Awaiting 6-minute walk on September 17. ? Sotatercept being considered    7. Other insomnia  Comments:  She is out of her trazodone.  She is requesting a refill.  She has been on it for greater than 1 year with no issue.  Prescription sent  Orders:  -     traZODone (DESYREL) 100 MG tablet; Take 1 tablet by mouth Every Night for 90 days.  Dispense: 90 tablet; Refill: 3        Body mass index is 29.81 kg/m².    Fela Blackman, APRN  8/28/2024  16:34 CDT    Follow Up   Return in about 3 months (around 11/28/2024).    Patient was given instructions and counseling regarding her condition or for health maintenance advice. Please see specific information pulled into the AVS if appropriate.

## 2024-08-09 ENCOUNTER — HOSPITAL ENCOUNTER (OUTPATIENT)
Dept: CARDIOLOGY | Facility: HOSPITAL | Age: 64
Discharge: HOME | End: 2024-08-09
Payer: MEDICARE

## 2024-08-09 VITALS
SYSTOLIC BLOOD PRESSURE: 116 MMHG | RESPIRATION RATE: 16 BRPM | BODY MASS INDEX: 30.14 KG/M2 | OXYGEN SATURATION: 94 % | DIASTOLIC BLOOD PRESSURE: 62 MMHG | HEART RATE: 70 BPM | WEIGHT: 164.8 LBS

## 2024-08-09 DIAGNOSIS — I50.812 CHRONIC RIGHT HEART FAILURE: ICD-10-CM

## 2024-08-09 DIAGNOSIS — R06.02 SOB (SHORTNESS OF BREATH): ICD-10-CM

## 2024-08-09 DIAGNOSIS — I27.20 PULMONARY HYPERTENSION: Primary | ICD-10-CM

## 2024-08-09 DIAGNOSIS — J96.11 CHRONIC RESPIRATORY FAILURE WITH HYPOXIA: ICD-10-CM

## 2024-08-09 LAB
ANION GAP SERPL CALCULATED.3IONS-SCNC: 10 MMOL/L (ref 5–15)
BUN SERPL-MCNC: 9 MG/DL (ref 8–23)
BUN/CREAT SERPL: 12.5 (ref 7–25)
CALCIUM SPEC-SCNC: 9.2 MG/DL (ref 8.6–10.5)
CHLORIDE SERPL-SCNC: 101 MMOL/L (ref 98–107)
CO2 SERPL-SCNC: 28 MMOL/L (ref 22–29)
CREAT SERPL-MCNC: 0.72 MG/DL (ref 0.57–1)
EGFRCR SERPLBLD CKD-EPI 2021: 92.3 ML/MIN/1.73
GLUCOSE SERPL-MCNC: 105 MG/DL (ref 65–99)
NT-PROBNP SERPL-MCNC: 125.4 PG/ML (ref 0–900)
POTASSIUM SERPL-SCNC: 3.7 MMOL/L (ref 3.5–5.2)
SODIUM SERPL-SCNC: 139 MMOL/L (ref 136–145)

## 2024-08-09 PROCEDURE — 80048 BASIC METABOLIC PNL TOTAL CA: CPT | Performed by: HOSPITALIST

## 2024-08-09 PROCEDURE — G0463 HOSPITAL OUTPT CLINIC VISIT: HCPCS | Performed by: HOSPITALIST

## 2024-08-09 PROCEDURE — 83880 ASSAY OF NATRIURETIC PEPTIDE: CPT | Performed by: HOSPITALIST

## 2024-08-09 RX ORDER — TREPROSTINIL 1.74 MG/2.9ML
14 INHALANT ORAL
Qty: 15 ML | Refills: 11 | Status: SHIPPED | OUTPATIENT
Start: 2024-08-09

## 2024-08-09 NOTE — PROGRESS NOTES
Reason For Visit:  Pulmonary hypertension    Subjective        Elaine Juárez is a 64 y.o. female with the below pertinent PMH who presents for follow-up of pulmonary hypertension.    Elaine Juárez was most recently seen by me in clinic on 5/16/2024 at which time she felt like she was at her baseline without any significant/abnormal shortness of breath.  That being said, she did have a relatively poor and stable 6-minute walk distance of 234 m.  We discussed increasing her Tyvaso, but she had other issues she wanted to focus on and favored continuing her current pulmonary hypertension regimen for the time being.    Today, the patient reports that her significant nausea and diarrhea that she was having at the time of her last visit have improved.  She does still have her baseline, chronic diarrhea related to her ursodiol.  She feels like she has been having worsened dyspnea on exertion in the last several months to the point where she cannot do much activity at all without becoming shortness of breath.  She does feel like her breathing has been better in the last couple of days, and the temperature has come down some shortly thinks that humidity may be a contributing factor to her shortness of breath.  She reports otherwise being stable without any new concerns.    ROS: Pertinent findings are included above.    Prior PAH History:   - Echo 11/13/19 at [facility] [place][note 2] reportedly grossly normal cardiac chamber dimensions without evidence of pulmonary hypertension   - [facility] 7/22/20: RA 6, RV 63/6, PA 64/19 (mean 38), PCWP 10, David CO/CI 3.8/2.1, PVR 7.4 TIAN, negative adenosine vasodilator challenge  - Started opsimut mid 2020 after [facility]   - 4/2022 proBNP 108-122  - 6/2022 6MWD after COVID 422 feet (129 meters)  - Started Tyvaso DPI 8/2022 (titrated to max dose)  - 9/2022 admission              - 9/12/22 Echo: RV severely dilated w/ severe dysfunction, systolic and diastolic septal flattening, RA severely dilated, small  pericardial effusion, RVSP 56-63 (my estimate)              - proBNP 7417              - Started Sildenafil 20mg TID 9/2022              - Started lasix 10mg daily w/ KCl on hospital discharge 9/2022  - 2022: Referred to [facility], [facility], [facility], and [facility] for consideration of lung transplant but was considered not a candidate due to history of liver disease and esophageal motility (patient states she has not been seen in PAH clinic at any of these hospitals)  - 1/2023: 6MWD 633 feet (193 meters)  - 3/2023: Transitioned to Medicare and was unable to get medications filled so ran out.   4/2023: Feeling poorly but was able to restart sildenafil and transition to Tyvaso inhaler at end of month.  - 5/12/2023: For CHF clinic visit. 6MWD 169 meters. proBNP 1798.  Improving dyspnea and exertional capacity since restarting Tyvaso (still titrating up).  Started spironolactone 25 mg daily.  - 5/24: Tyvaso up to goal dose of 12 breaths QID and being tolerated well.  Dyspnea improving.  Had obtained a thuan to hopefully restart Opsumit soon (did start at end of May).  - 7/5: Patient had restarted Opsumit and reported continual improvement in her breathing.  proBNP improved to 212.  - 7/25/2023 echo: LVEF 61-65%, mild LVH with concentric remodeling, RV mildly dilated with mildly to moderately reduced systolic function, mild systolic septal flattening of the IV septum consistent with RV pressure overload, normal RA size, insufficient TR velocity to estimate RVSP.  - 9/6/2022 6-minute walk distance: 234 m  - 11/20/23: Started Toprol XL  12.5 mg daily for 3 weeks then 25 mg daily.  - 1/11/2024: 6-minute walk distance 234 m  - Echo 2/6/2024: My review, RV mildly dilated with mildly or moderately reduced systolic function mild systolic septal flattening overall similar to prior echo from 7/2023.  - 3/2024: Transition from sildenafil to tadalafil  - 5/16/2024: 6-minute walk distance 234 m     Pertinent  PMH  Scleroderma/CREST syndrome - followed by Dr. Sanders  Chronic Hypoxic Respiratory Failure  ILD/Bronchiectasis  WHO Group I (scleroderma) and Group III (ILD) Pulmonary Arterial Hypertension  Liver disease    Pertinent past medical, surgical, family, and social history were reviewed.      Current Outpatient Medications:     alendronate (FOSAMAX) 70 MG tablet, Take 1 tablet by mouth 1 (One) Time Per Week., Disp: , Rfl:     busPIRone (BUSPAR) 10 MG tablet, Take 1 tablet 3 times a day by oral route as needed for 30 days, for anxiety., Disp: , Rfl:     Macitentan 10 MG tablet, Take 1 tablet by mouth Daily., Disp: 90 tablet, Rfl: 3    metoprolol succinate XL (TOPROL-XL) 25 MG 24 hr tablet, Take 0.5 tablets by mouth Daily., Disp: 45 tablet, Rfl: 3    mycophenolate (CELLCEPT) 500 MG tablet, Take 2 tablets by mouth 2 (Two) Times a Day., Disp: , Rfl:     O2 (OXYGEN), Inhale 6-8 L/min 1 (One) Time. 6 at rest, 8 during exertion, Disp: , Rfl:     omeprazole (priLOSEC) 40 MG capsule, Take 1 capsule by mouth Daily., Disp: 90 capsule, Rfl: 3    ondansetron ODT (ZOFRAN-ODT) 8 MG disintegrating tablet, Place 1 tablet every 8 hours by translingual route as needed for 10 days, for nausea., Disp: , Rfl:     predniSONE (DELTASONE) 5 MG tablet, Take 1 tablet by mouth Daily., Disp: 90 tablet, Rfl: 3    promethazine (PHENERGAN) 25 MG tablet, Take 1 tablet by mouth Every 6 (Six) Hours As Needed for Nausea or Vomiting., Disp: , Rfl:     spironolactone (ALDACTONE) 50 MG tablet, Take 1 tablet by mouth Daily., Disp: 90 tablet, Rfl: 3    tadalafil 20 MG tablet tablet, Take 2 tablets by mouth Daily., Disp: 60 tablet, Rfl: 11    Treprostinil (Tyvaso) 0.6 MG/ML solution inhalation solution, Inhale 12 puffs 4 (Four) Times a Day., Disp: 15 mL, Rfl: 11    ursodiol (ACTIGALL) 300 MG capsule, Take 1 capsule by mouth 2 (Two) Times a Day With Meals. (Patient taking differently: Take 1 capsule by mouth Daily With Dinner.), Disp: 60 capsule, Rfl: 11     "venlafaxine (EFFEXOR) 75 MG tablet, Take 1 tablet by mouth Daily., Disp: , Rfl:     vitamin D (ERGOCALCIFEROL) 1.25 MG (52102 UT) capsule capsule, Take 1 capsule by mouth 1 (One) Time Per Week., Disp: , Rfl:     clonazePAM (KlonoPIN) 0.5 MG tablet, Take 1 tablet by mouth 2 (Two) Times a Day As Needed for Anxiety. (Patient not taking: Reported on 8/9/2024), Disp: , Rfl:     loperamide (IMODIUM) 2 MG capsule, Take 1 capsule by mouth 4 (Four) Times a Day As Needed for Diarrhea. (Patient not taking: Reported on 8/9/2024), Disp: , Rfl:     traZODone (DESYREL) 100 MG tablet, Take 1 tablet by mouth Every Night for 30 days., Disp: 30 tablet, Rfl: 0     Objective   Vital Signs:  /62 (BP Location: Right arm)   Pulse 70   Resp 16   Wt 74.8 kg (164 lb 12.8 oz)   SpO2 94% Comment: 5L O2  BMI 30.14 kg/m²   Estimated body mass index is 30.14 kg/m² as calculated from the following:    Height as of 5/28/24: 157.5 cm (62\").    Weight as of this encounter: 74.8 kg (164 lb 12.8 oz).      Constitutional:       Appearance: Healthy appearance. Not in distress.   Neck:      Vascular: JVD normal.   Pulmonary:      Comments: Bilateral pulmonary crackles predominantly in the lower lobes  Cardiovascular:      Normal rate. Regular rhythm.      Murmurs: There is a grade 1/6 systolic murmur at the LLSB.      No gallop.  No click. No rub.   Edema:     Peripheral edema absent.   Abdominal:      General: There is no distension.      Palpations: Abdomen is soft.      Tenderness: There is no abdominal tenderness.   Skin:     General: Skin is warm and dry.   Neurological:      Mental Status: Alert and oriented to person, place and time.        Result Review :  The following data was reviewed by: Roosevelt Escalera MD on 08/09/2024:  BMP   BMP          5/6/2024    08:59 5/6/2024    09:27 5/16/2024    11:19 8/9/2024    13:04   BMP   BUN 9   10  9    Creatinine 0.81   0.92  0.72    Sodium 140  143  140  139    Potassium 3.3   3.8  3.7    Chloride " 102   101  101    CO2 24.0   32.0  28.0    Calcium 9.3   8.8  9.2      Pro-BNP       Lab 08/09/24  1304   PROBNP 125.4          Assessment and Plan   Diagnoses and all orders for this visit:    1. Pulmonary hypertension (Primary)  2. SOB (shortness of breath)  3. Chronic right heart failure  4. Chronic respiratory failure with hypoxia  - proBNP is actually little better today, but symptomatically she is doing a little worse.  - Increase Tyvaso to 14 breaths QID  - Continue Opsumit and tadalafil with plan to try switching her to the new combination pill if able and it is affordable  - Continue spironolactone 50 mg daily  - Continue Toprol-XL 12.5 mg daily  - We did discuss the possibility of starting sotatercept, which we will further consider at her follow-up visit.  - Plan to obtain a 6-minute walk distance around the time of her next visit    Follow Up   No follow-ups on file.  Patient was given instructions and counseling regarding her condition or for health maintenance advice. Please see specific information pulled into the AVS if appropriate.       EMR Dragon/Transcription disclaimer: Much of this encounter note is an electronic transcription/translation of spoken language to printed text. The electronic translation of spoken language may permit erroneous, or at times, nonsensical words or phrases to be inadvertently transcribed; although I have reviewed the note for such errors, some may still exist.

## 2024-08-09 NOTE — PATIENT INSTRUCTIONS
Increase Tyvaso to 13 breaths 4 times daily for 1 week and then increase to 15 breaths 4 times daily

## 2024-08-13 RX ORDER — URSODIOL 300 MG/1
300 CAPSULE ORAL 2 TIMES DAILY WITH MEALS
Qty: 60 CAPSULE | Refills: 11 | OUTPATIENT
Start: 2024-08-13

## 2024-08-28 ENCOUNTER — OFFICE VISIT (OUTPATIENT)
Dept: PULMONOLOGY | Facility: CLINIC | Age: 64
End: 2024-08-28
Payer: MEDICARE

## 2024-08-28 VITALS
OXYGEN SATURATION: 90 % | HEART RATE: 97 BPM | DIASTOLIC BLOOD PRESSURE: 80 MMHG | WEIGHT: 163 LBS | HEIGHT: 62 IN | SYSTOLIC BLOOD PRESSURE: 130 MMHG | BODY MASS INDEX: 30 KG/M2

## 2024-08-28 DIAGNOSIS — K22.4 ESOPHAGEAL DYSMOTILITY: Chronic | ICD-10-CM

## 2024-08-28 DIAGNOSIS — M34.1 CREST SYNDROME: Chronic | ICD-10-CM

## 2024-08-28 DIAGNOSIS — I27.21 PAH (PULMONARY ARTERIAL HYPERTENSION) WITH PORTAL HYPERTENSION: Chronic | ICD-10-CM

## 2024-08-28 DIAGNOSIS — J84.10 PULMONARY FIBROSIS: Primary | Chronic | ICD-10-CM

## 2024-08-28 DIAGNOSIS — G47.09 OTHER INSOMNIA: ICD-10-CM

## 2024-08-28 DIAGNOSIS — K21.9 GASTROESOPHAGEAL REFLUX DISEASE WITHOUT ESOPHAGITIS: Chronic | ICD-10-CM

## 2024-08-28 DIAGNOSIS — J96.11 CHRONIC RESPIRATORY FAILURE WITH HYPOXIA: Chronic | ICD-10-CM

## 2024-08-28 DIAGNOSIS — K76.6 PAH (PULMONARY ARTERIAL HYPERTENSION) WITH PORTAL HYPERTENSION: Chronic | ICD-10-CM

## 2024-08-28 PROCEDURE — 99214 OFFICE O/P EST MOD 30 MIN: CPT | Performed by: NURSE PRACTITIONER

## 2024-08-28 RX ORDER — TRAZODONE HYDROCHLORIDE 100 MG/1
100 TABLET ORAL NIGHTLY
Qty: 90 TABLET | Refills: 3 | Status: SHIPPED | OUTPATIENT
Start: 2024-08-28 | End: 2024-11-26

## 2024-09-17 ENCOUNTER — HOSPITAL ENCOUNTER (OUTPATIENT)
Dept: CARDIOLOGY | Facility: HOSPITAL | Age: 64
Discharge: HOME | End: 2024-09-17
Payer: MEDICARE

## 2024-09-17 VITALS
OXYGEN SATURATION: 100 % | DIASTOLIC BLOOD PRESSURE: 74 MMHG | WEIGHT: 165 LBS | BODY MASS INDEX: 30.18 KG/M2 | SYSTOLIC BLOOD PRESSURE: 120 MMHG | HEART RATE: 80 BPM

## 2024-10-01 ENCOUNTER — OFFICE VISIT (OUTPATIENT)
Dept: GASTROENTEROLOGY | Facility: CLINIC | Age: 64
End: 2024-10-01
Payer: MEDICARE

## 2024-10-01 ENCOUNTER — TELEPHONE (OUTPATIENT)
Dept: GASTROENTEROLOGY | Facility: CLINIC | Age: 64
End: 2024-10-01

## 2024-10-01 VITALS
BODY MASS INDEX: 28.88 KG/M2 | SYSTOLIC BLOOD PRESSURE: 124 MMHG | TEMPERATURE: 97 F | DIASTOLIC BLOOD PRESSURE: 80 MMHG | HEIGHT: 63 IN | WEIGHT: 163 LBS | HEART RATE: 100 BPM | OXYGEN SATURATION: 94 %

## 2024-10-01 DIAGNOSIS — M34.1 CREST SYNDROME: Chronic | ICD-10-CM

## 2024-10-01 DIAGNOSIS — K21.9 GASTROESOPHAGEAL REFLUX DISEASE WITHOUT ESOPHAGITIS: Primary | Chronic | ICD-10-CM

## 2024-10-01 DIAGNOSIS — K74.3 PRIMARY BILIARY CIRRHOSIS: ICD-10-CM

## 2024-10-01 DIAGNOSIS — J84.10 PULMONARY FIBROSIS: Chronic | ICD-10-CM

## 2024-10-01 PROCEDURE — 1159F MED LIST DOCD IN RCRD: CPT | Performed by: NURSE PRACTITIONER

## 2024-10-01 PROCEDURE — 1160F RVW MEDS BY RX/DR IN RCRD: CPT | Performed by: NURSE PRACTITIONER

## 2024-10-01 PROCEDURE — 99213 OFFICE O/P EST LOW 20 MIN: CPT | Performed by: NURSE PRACTITIONER

## 2024-10-01 NOTE — PROGRESS NOTES
Chief Complaint   Patient presents with    Cirrhosis     Yearly follow up needs omeprazole 40 mg        PCP: Aaron Stoddard MD  REFER: No ref. provider found    Subjective     HPI    Elaine Juárez presents to office with known PMH of PBC and GERD as well as CREST/scleroderma.  She is compliant with brandi and daily Omeprazole.  Denies heartburn, no dysphagia.             UPPER GI ENDOSCOPY (08/04/2021 10:55)     COLONOSCOPY (08/04/2021 10:54)  Tissue Pathology Exam (08/04/2021 11:29)tubular adenoma (5 years)        Past Medical History:   Diagnosis Date    Allergies     Arthritis     BMI 31.0-31.9,adult 06/04/2019    Calcified granuloma of lung 06/04/2019    Right lung    CHF (congestive heart failure)     denies    Chronic idiopathic fibrosing alveolitis     Chronic respiratory failure with hypoxia 08/05/2020    Chronic respiratory failure with hypoxia, on home oxygen therapy     COVID-19 02/01/2022    CREST syndrome     Environmental allergies 02/01/2022    Gastroesophageal reflux disease without esophagitis 06/04/2019    Interstitial lung disease     Obstructive sleep apnea on CPAP 06/04/2019    Oropharyngeal dysphagia 01/26/2023    Primary central sleep apnea     Pulmonary fibrosis     Pulmonary hypertension     Rheumatoid arthritis     Right ventricular systolic dysfunction 05/04/2023    Short-term memory loss        Past Surgical History:   Procedure Laterality Date    BREAST BIOPSY      CARDIAC CATHETERIZATION N/A 07/22/2020    Procedure: Right Heart Cath;  Surgeon: Thong Martin MD;  Location: [facility] CATH INVASIVE LOCATION;  Service: Cardiology;  Laterality: N/A;    CHOLECYSTECTOMY      COLONOSCOPY  05/11/2015    5 POLYPS    COLONOSCOPY N/A 08/04/2021    Procedure: COLONOSCOPY WITH ANESTHESIA;  Surgeon: Michael Landin DO;  Location: [facility] ENDOSCOPY;  Service: Gastroenterology;  Laterality: N/A;  pre-hx polyps  post-colon polyps    ENDOSCOPY N/A 08/04/2021    Procedure:  "ESOPHAGOGASTRODUODENOSCOPY WITH ANESTHESIA;  Surgeon: Michael Landin DO;  Location: [facility] ENDOSCOPY;  Service: Gastroenterology;  Laterality: N/A;  pre-dysphagia  post-normal  pcp-lanette aguila       Outpatient Medications Marked as Taking for the 10/1/24 encounter (Office Visit) with Micah Barton APRN   Medication Sig Dispense Refill    alendronate (FOSAMAX) 70 MG tablet Take 1 tablet by mouth 1 (One) Time Per Week.      busPIRone (BUSPAR) 10 MG tablet Take 1 tablet 3 times a day by oral route as needed for 30 days, for anxiety.      Macitentan 10 MG tablet Take 1 tablet by mouth Daily. 90 tablet 3    metoprolol succinate XL (TOPROL-XL) 25 MG 24 hr tablet Take 0.5 tablets by mouth Daily. 45 tablet 3    mycophenolate (CELLCEPT) 500 MG tablet Take 2 tablets by mouth 2 (Two) Times a Day.      omeprazole (priLOSEC) 40 MG capsule Take 1 capsule by mouth Daily. 90 capsule 3    ondansetron ODT (ZOFRAN-ODT) 8 MG disintegrating tablet Place 1 tablet every 8 hours by translingual route as needed for 10 days, for nausea.      predniSONE (DELTASONE) 5 MG tablet Take 1 tablet by mouth Daily. 90 tablet 3    promethazine (PHENERGAN) 25 MG tablet Take 1 tablet by mouth Every 6 (Six) Hours As Needed for Nausea or Vomiting.      spironolactone (ALDACTONE) 50 MG tablet Take 1 tablet by mouth Daily. 90 tablet 3    tadalafil 20 MG tablet tablet Take 2 tablets by mouth Daily. 60 tablet 11    traZODone (DESYREL) 100 MG tablet Take 1 tablet by mouth Every Night for 90 days. 90 tablet 3    Treprostinil (Tyvaso) 0.6 MG/ML solution inhalation solution Inhale 14 puffs 4 (Four) Times a Day. 15 mL 11    venlafaxine (EFFEXOR) 75 MG tablet Take 1 tablet by mouth Daily.      vitamin D (ERGOCALCIFEROL) 1.25 MG (77697 UT) capsule capsule Take 1 capsule by mouth 1 (One) Time Per Week.         Allergies   Allergen Reactions    Codeine Nausea And Vomiting    Latex Rash    Prednisone Mental Status Change     High doses makes her \"nuts\" " "      Social History     Socioeconomic History    Marital status:    Tobacco Use    Smoking status: Never    Smokeless tobacco: Never   Vaping Use    Vaping status: Never Used   Substance and Sexual Activity    Alcohol use: No    Drug use: No    Sexual activity: Not Currently     Partners: Male       Review of Systems   Constitutional:  Negative for fever and unexpected weight change.   HENT:  Negative for trouble swallowing.    Respiratory:  Negative for shortness of breath.    Cardiovascular:  Negative for chest pain.   Gastrointestinal:  Negative for abdominal pain and anal bleeding.       Objective     Vitals:    10/01/24 1315   BP: 124/80   Pulse: 100   Temp: 97 °F (36.1 °C)   SpO2: 94%   Weight: 73.9 kg (163 lb)   Height: 160 cm (63\")     Body mass index is 28.87 kg/m².    Physical Exam  Constitutional:       Appearance: Normal appearance. She is well-developed.      Comments: Thin, no distress, in wheelchair   Eyes:      General: No scleral icterus.  Cardiovascular:      Heart sounds: Normal heart sounds. No murmur heard.  Pulmonary:      Effort: Pulmonary effort is normal.      Comments: O2 NC  Abdominal:      General: Bowel sounds are normal. There is no distension.      Palpations: Abdomen is soft.      Tenderness: There is no abdominal tenderness. There is no guarding.   Skin:     General: Skin is warm and dry.      Coloration: Skin is not jaundiced.   Neurological:      Mental Status: She is alert.   Psychiatric:         Behavior: Behavior is cooperative.         Imaging Results (Most Recent)       None            Body mass index is 28.87 kg/m².    Assessment & Plan     Diagnoses and all orders for this visit:    1. Gastroesophageal reflux disease without esophagitis (Primary)    2. CREST syndrome, partial     3. Pulmonary fibrosis prob sec underlying autoimmune disease    4. Primary biliary cirrhosis         * Surgery not found *    We will request most recent labs from Aaron Stoddard MD " office    Continue medications as currently prescribed (she will call office when refills are needed)        Micah Barton, ANASTASIA  10/01/24          There are no Patient Instructions on file for this visit.

## 2024-10-14 ENCOUNTER — HOSPITAL ENCOUNTER (OUTPATIENT)
Dept: CARDIOLOGY | Facility: HOSPITAL | Age: 64
Discharge: HOME | End: 2024-10-14
Admitting: NURSE PRACTITIONER
Payer: MEDICARE

## 2024-10-14 VITALS
BODY MASS INDEX: 29.72 KG/M2 | OXYGEN SATURATION: 100 % | DIASTOLIC BLOOD PRESSURE: 61 MMHG | SYSTOLIC BLOOD PRESSURE: 107 MMHG | HEART RATE: 81 BPM | WEIGHT: 167.8 LBS

## 2024-10-14 DIAGNOSIS — I50.812 CHRONIC RIGHT HEART FAILURE: Primary | ICD-10-CM

## 2024-10-14 DIAGNOSIS — R06.02 SHORTNESS OF BREATH: ICD-10-CM

## 2024-10-14 DIAGNOSIS — J84.10 PULMONARY FIBROSIS: ICD-10-CM

## 2024-10-14 DIAGNOSIS — I27.20 PULMONARY HYPERTENSION: ICD-10-CM

## 2024-10-14 LAB
ALBUMIN SERPL-MCNC: 3.6 G/DL (ref 3.5–5.2)
ALBUMIN/GLOB SERPL: 1.2 G/DL
ALP SERPL-CCNC: 101 U/L (ref 39–117)
ALT SERPL W P-5'-P-CCNC: 7 U/L (ref 1–33)
ANION GAP SERPL CALCULATED.3IONS-SCNC: 8 MMOL/L (ref 5–15)
AST SERPL-CCNC: 13 U/L (ref 1–32)
BILIRUB SERPL-MCNC: 0.2 MG/DL (ref 0–1.2)
BUN SERPL-MCNC: 9 MG/DL (ref 8–23)
BUN/CREAT SERPL: 12.3 (ref 7–25)
CALCIUM SPEC-SCNC: 8.9 MG/DL (ref 8.6–10.5)
CHLORIDE SERPL-SCNC: 102 MMOL/L (ref 98–107)
CO2 SERPL-SCNC: 30 MMOL/L (ref 22–29)
CREAT SERPL-MCNC: 0.73 MG/DL (ref 0.57–1)
DEPRECATED RDW RBC AUTO: 44.3 FL (ref 37–54)
EGFRCR SERPLBLD CKD-EPI 2021: 90.8 ML/MIN/1.73
ERYTHROCYTE [DISTWIDTH] IN BLOOD BY AUTOMATED COUNT: 14.6 % (ref 12.3–15.4)
GLOBULIN UR ELPH-MCNC: 2.9 GM/DL
GLUCOSE SERPL-MCNC: 109 MG/DL (ref 65–99)
HCT VFR BLD AUTO: 37.6 % (ref 34–46.6)
HGB BLD-MCNC: 11.3 G/DL (ref 12–15.9)
MCH RBC QN AUTO: 25.1 PG (ref 26.6–33)
MCHC RBC AUTO-ENTMCNC: 30.1 G/DL (ref 31.5–35.7)
MCV RBC AUTO: 83.4 FL (ref 79–97)
NT-PROBNP SERPL-MCNC: 248.7 PG/ML (ref 0–900)
PLATELET # BLD AUTO: 233 10*3/MM3 (ref 140–450)
PMV BLD AUTO: 10.3 FL (ref 6–12)
POTASSIUM SERPL-SCNC: 3.5 MMOL/L (ref 3.5–5.2)
PROT SERPL-MCNC: 6.5 G/DL (ref 6–8.5)
RBC # BLD AUTO: 4.51 10*6/MM3 (ref 3.77–5.28)
SODIUM SERPL-SCNC: 140 MMOL/L (ref 136–145)
WBC NRBC COR # BLD AUTO: 8.67 10*3/MM3 (ref 3.4–10.8)

## 2024-10-14 PROCEDURE — 80053 COMPREHEN METABOLIC PANEL: CPT | Performed by: NURSE PRACTITIONER

## 2024-10-14 PROCEDURE — 83880 ASSAY OF NATRIURETIC PEPTIDE: CPT | Performed by: NURSE PRACTITIONER

## 2024-10-14 PROCEDURE — G0463 HOSPITAL OUTPT CLINIC VISIT: HCPCS | Performed by: NURSE PRACTITIONER

## 2024-10-14 PROCEDURE — 85027 COMPLETE CBC AUTOMATED: CPT | Performed by: NURSE PRACTITIONER

## 2024-10-14 NOTE — PATIENT INSTRUCTIONS
Continue Tyvaso 14 breaths 4 times daily  Continue Tadalafil 20mg twice daily  Continue Opsumit 10mg daily  Continue Toprol-XL 25mg daily  Continue spironolactone 50 mg daily

## 2024-10-14 NOTE — PROGRESS NOTES
Heart Failure Clinic Progress Note  Reason For Visit:  Pulmonary hypertension    Subjective        Elaine Juárez is a 64 y.o. female with the below pertinent PMH who presents for follow-up of pulmonary hypertension.    Elaine Juárez was most recently seen in heart failure clinic on 8/9/2024 by Dr. Roosevelt Escalera MD.  At that time she had been reporting some significant nausea and diarrhea that had been improving.  She was having some worsening dyspnea on exertion to the point that she was unable to do any significant activities.  At that time he increased her Tyvaso up to 14 breaths 4 times daily.  The possibility of sotatercept was again discussed.  A 6-minute walk was performed which revealed less distance at 143 m walked.    She notes that increase of Tyvaso hasn't really seemed to do significant amount of improvement however she does note that there has been some improvement. She continues with fatigue and shortness of breath.  She denies any chest pain or lightheadedness or dizziness.  She does describe some headaches. She notes that she saw her pulmonary APRN long ago.  She has not been doing a nebulizer treatment due to not feeling that it was effective.  However, after having been off of this she is now hoping to get back on it as she may be doing better with it.  She is going to work with pulmonology regarding this.     Review of Systems   Constitutional:  Positive for fatigue.   Respiratory:  Positive for shortness of breath.       Prior PAH History:   - Echo 11/13/19 at [facility] [place] reportedly grossly normal cardiac chamber dimensions without evidence of pulmonary hypertension   - [facility] 7/22/20: RA 6, RV 63/6, PA 64/19 (mean 38), PCWP 10, David CO/CI 3.8/2.1, PVR 7.4 TIAN, negative adenosine vasodilator challenge  - Started opsimut mid 2020 after [facility]HC   - 4/2022 proBNP 108-122  - 6/2022 6MWD after COVID 422 feet (129 meters)  - Started Tyvaso DPI 8/2022 (titrated to max dose)  - 9/2022 admission               - 9/12/22 Echo: RV severely dilated w/ severe dysfunction, systolic and diastolic septal flattening, RA severely dilated, small pericardial effusion, RVSP 56-63 (my estimate)              - proBNP 7417              - Started Sildenafil 20mg TID 9/2022              - Started lasix 10mg daily w/ KCl on hospital discharge 9/2022  - 2022: Referred to [facility], [facility], [facility], and [facility] for consideration of lung transplant but was considered not a candidate due to history of liver disease and esophageal motility (patient states she has not been seen in PAH clinic at any of these hospitals)  - 1/2023: 6MWD 633 feet (193 meters)  - 3/2023: Transitioned to Medicare and was unable to get medications filled so ran out.   4/2023: Feeling poorly but was able to restart sildenafil and transition to Tyvaso inhaler at end of month.  - 5/12/2023: For CHF clinic visit. 6MWD 169 meters. proBNP 1798.  Improving dyspnea and exertional capacity since restarting Tyvaso (still titrating up).  Started spironolactone 25 mg daily.  - 5/24: Tyvaso up to goal dose of 12 breaths QID and being tolerated well.  Dyspnea improving.  Had obtained a thuan to hopefully restart Opsumit soon (did start at end of May).  - 7/5: Patient had restarted Opsumit and reported continual improvement in her breathing.  proBNP improved to 212.  - 7/25/2023 echo: LVEF 61-65%, mild LVH with concentric remodeling, RV mildly dilated with mildly to moderately reduced systolic function, mild systolic septal flattening of the IV septum consistent with RV pressure overload, normal RA size, insufficient TR velocity to estimate RVSP.  - 9/6/2022 6-minute walk distance: 234 m  - 11/20/23: Started Toprol XL  12.5 mg daily for 3 weeks then 25 mg daily.  - 1/11/2024: 6-minute walk distance 234 m  - Echo 2/6/2024: My review, RV mildly dilated with mildly or moderately reduced systolic function mild systolic septal flattening overall similar to prior  echo from 7/2023.  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      Current Outpatient Medications:     alendronate (FOSAMAX) 70 MG tablet, Take 1 tablet by mouth 1 (One) Time Per Week., Disp: , Rfl:     busPIRone (BUSPAR) 10 MG tablet, Take 1 tablet 3 times a day by oral route as needed for 30 days, for anxiety., Disp: , Rfl:     Macitentan 10 MG tablet, Take 1 tablet by mouth Daily., Disp: 90 tablet, Rfl: 3    metoprolol succinate XL (TOPROL-XL) 25 MG 24 hr tablet, Take 0.5 tablets by mouth Daily., Disp: 45 tablet, Rfl: 3    mycophenolate (CELLCEPT) 500 MG tablet, Take 2 tablets by mouth 2 (Two) Times a Day., Disp: , Rfl:     O2 (OXYGEN), Inhale 6-8 L/min 1 (One) Time. 6 at rest, 8 during exertion, Disp: , Rfl:     omeprazole (priLOSEC) 40 MG capsule, Take 1 capsule by mouth Daily., Disp: 90 capsule, Rfl: 3    predniSONE (DELTASONE) 5 MG tablet, Take 1 tablet by mouth Daily., Disp: 90 tablet, Rfl: 3    spironolactone (ALDACTONE) 50 MG tablet, Take 1 tablet by mouth Daily., Disp: 90 tablet, Rfl: 3    tadalafil 20 MG tablet tablet, Take 2 tablets by mouth Daily., Disp: 60 tablet, Rfl: 11    traZODone (DESYREL) 100 MG tablet, Take 1 tablet by mouth Every Night for 90 days., Disp: 90 tablet, Rfl: 3    Treprostinil (Tyvaso) 0.6 MG/ML solution inhalation solution, Inhale 14 puffs 4 (Four) Times a Day., Disp: 15 mL, Rfl: 11    venlafaxine (EFFEXOR) 75 MG tablet, Take 1 tablet by mouth Daily., Disp: , Rfl:     vitamin D (ERGOCALCIFEROL) 1.25 MG (14463 UT) capsule capsule, Take 1 capsule by mouth 1 (One) Time Per Week., Disp: , Rfl:      Objective   Vital Signs:  /61 (BP  "Location: Left arm, Patient Position: Sitting)   Pulse 81   Wt 76.1 kg (167 lb 12.8 oz)   SpO2 100% Comment: 8L  BMI 29.72 kg/m²   Estimated body mass index is 29.72 kg/m² as calculated from the following:    Height as of 10/1/24: 160 cm (63\").    Weight as of this encounter: 76.1 kg (167 lb 12.8 oz).    Neck:      Vascular: No JVD.   Pulmonary:      Effort: Pulmonary effort is normal.      Breath sounds: Normal breath sounds.      Comments: Oxygen in place  Cardiovascular:      Normal rate. Regular rhythm. Normal S1. Normal S2.       Murmurs: There is no murmur.      No gallop.  No click. No rub.   Pulses:     Intact distal pulses.   Edema:     Peripheral edema absent.   Abdominal:      Palpations: Abdomen is soft.      Tenderness: There is no abdominal tenderness.   Skin:     General: Skin is warm and dry.   Neurological:      General: No focal deficit present.      Mental Status: Alert and oriented to person, place and time.   Psychiatric:         Behavior: Behavior is cooperative.        Result Review :  The following data was reviewed by: ANASTASIA Ward on 10/14/2024:  BMP   BMP          5/16/2024    11:19 8/9/2024    13:04 10/14/2024    14:30   BMP   BUN 10  9  9    Creatinine 0.92  0.72  0.73    Sodium 140  139  140    Potassium 3.8  3.7  3.5    Chloride 101  101  102    CO2 32.0  28.0  30.0    Calcium 8.8  9.2  8.9      CBC   CBC          5/6/2024    08:59 10/14/2024    14:30   CBC   WBC 10.01  8.67    RBC 5.20  4.51    Hemoglobin 13.0  11.3    Hematocrit 42.4  37.6    MCV 81.5  83.4    MCH 25.0  25.1    MCHC 30.7  30.1    RDW 14.6  14.6    Platelets 287  233      Pro-BNP       Lab 10/14/24  1430   PROBNP 248.7         Assessment and Plan   Diagnoses and all orders for this visit:    1. Chronic right heart failure (Primary)  2. Shortness of breath  3. Pulmonary hypertension  4. Pulmonary fibrosis  Stable at this time without any significant worsening.  Since increasing Tyvaso she may have had some " mild improvements.  She notes that she is going to speak with pulmonology regarding restarting a that she feels she may have been doing better when she was taking this.  However, she does remain significantly activity intolerance secondary to her pulmonary hypertension and interstitial lung disease.  At this point moving forward sotatercept may be the best option for her.  We will continue to work on this.  Her BNP is a little worse today.  - Continue Tyvaso 14 breaths 4 times daily  - Continue Opsumit 10 mg daily  - Continue tadalafil 40 mg daily  - Continue spironolactone 50 mg daily  - Continue Toprol-XL 12.5 mg daily  - Will continue to work on sotatercept  - Continue to work with pulmonology       Follow Up   Return in about 3 months (around 1/14/2025) for recheck.    Patient was given instructions and counseling regarding her condition or for health maintenance advice. Please see specific information pulled into the AVS if appropriate.       Toby Nath, ANASTASIA  10/14/24  16:44 CDT

## 2024-10-24 DIAGNOSIS — J41.0 BRONCHITIS, CHRONIC, SIMPLE: ICD-10-CM

## 2024-10-24 DIAGNOSIS — J84.10 PULMONARY FIBROSIS: Primary | ICD-10-CM

## 2024-10-24 RX ORDER — IPRATROPIUM BROMIDE AND ALBUTEROL SULFATE 2.5; .5 MG/3ML; MG/3ML
3 SOLUTION RESPIRATORY (INHALATION) 4 TIMES DAILY PRN
Qty: 360 ML | Refills: 5 | Status: SHIPPED | OUTPATIENT
Start: 2024-10-24 | End: 2024-11-23

## 2024-10-30 ENCOUNTER — TELEPHONE (OUTPATIENT)
Dept: CARDIOLOGY | Facility: HOSPITAL | Age: 64
End: 2024-10-30

## 2024-10-30 NOTE — TELEPHONE ENCOUNTER
Called to let her know that Opsynvi has been approved by [facility]. A 30 day free trial will be sent to her by mail order pharmacy [facility], then [facility] specialty pharmacy will take over all future shipments that are billed to her insurance. [facility] will call her to arrange free trial shipment in the next few days. I asked her to call me when she receives her medication so that we can again go over instructions for stopping macitentan + tadalafil and starting Opsynvi alone. She verbalized understanding.

## 2024-10-30 NOTE — PROGRESS NOTES
Chief Complaint  Pulmonary fibrosis prob sec underlying autoimmune disease    Subjective    History of Present Illness {CC  Problem List  Visit Diagnosis   Encounters  Notes  Medications  Labs  Result Review Imaging  Media: 23}    Elaine Juárez presents to [facility] for:    History of Present Illness  Management of her pulmonary fibrosis/bronchiectasis secondary to underlying autoimmune disease, specifically crest syndrome.  She is also under treatment for pulmonary hypertension secondary to her autoimmune disease/interstitial lung disease.       She is obese.  She is a never smoker.  She has daily shortness of breath with exertion and a dry bothersome cough.  She takes DuoNebs on occasion with benefit.  They do make her jittery sometimes.  She indicates insurance would not pay for them.  She would like us to send them again.     Unable to do PFTs times multiple attempts due to coughing.  Last 1 in 2019.  FEV1 54, FVC 59, diffusion 48/95 when corrected     She is followed by Dr. Landry with rheumatology for her crest syndrome. She is on CellCept 1000 mg twice a day and prednisone 5 mg.  She is very intolerant to higher doses of this as it results in agitation, insomnia and aggravation of underlying bipolar issues.  She has not required any over the last year.       Most recent high-resolution CT scan July 2023 showing stable ILD/bronchiectasis.      Right heart cath in 2020 showing a PA pressure of 38.      She has been referred to [place], [facility], [facility] and [facility] for consideration of lung transplantation.  She has been declined due to history of liver disease and esophageal dysmotility.         Esophagram 11-22 showing dysmotility and narrowing of the distal esophagus.  She underwent additional swallowing testing which showed some issues with aspiration.  She has had speech therapy outpatient with good  result. She denies reflux symptoms.  She remains on Prilosec 40 daily with no symptoms.     Last 6-minute walk September 2024 showing a decline.  She previously walked 234 m.  Last walk 143 m.  She required 8 L.  She indicates that day she ate a large meal before going to the appointment.  She believes that affected her walk distance.  She is breathing much better lately.    BNP was normal although higher than previous.       She is able to rest on 3 to 4 L of oxygen.  When she exerts she will use 8 L.  When she sits down she will use 6 L until she recovers fully.  Recent sleep study obtained showing no obstructive sleep apnea.  She is now sleeping on 6 L and doing well with this.     Last echo February 2024, stable.     She is now being followed by Dr. Escalera at the heart failure clinic.  She was on tadalafil, Opsumit and Tyvaso.  She is on 14 breaths 4 times a day of the Tyvaso.  They recently switched her to  Opsynvi combination therapy as opposed to the Opsumit and tadalafil separate.  She is also on 12.5 mg of beta-blocker and Aldactone 50 mg.  She is no longer on Lasix.  They initiated paperwork for adding Sotatercept.  This is still pending.         Prior to Admission medications    Medication Sig Start Date End Date Taking? Authorizing Provider   alendronate (FOSAMAX) 70 MG tablet Take 1 tablet by mouth 1 (One) Time Per Week. 4/25/24   Latanya Spring MD   busPIRone (BUSPAR) 10 MG tablet Take 1 tablet 3 times a day by oral route as needed for 30 days, for anxiety. 4/29/24   Latanya Spring MD   ipratropium-albuterol (DUO-NEB) 0.5-2.5 mg/3 ml nebulizer Take 3 mL by nebulization 4 (Four) Times a Day As Needed for Wheezing for up to 30 days. 10/24/24 11/23/24  Fela Blackman APRN   Macitentan 10 MG tablet Take 1 tablet by mouth Daily. 5/10/24   Roosevelt Escalera MD   Macitentan-Tadalafil 10-40 MG tablet Take 1 tablet by mouth Daily. 10/30/24   Roosevelt Escalera MD   metoprolol succinate XL (TOPROL-XL)  "25 MG 24 hr tablet Take 0.5 tablets by mouth Daily. 1/11/24   Roosevelt Escalera MD   mycophenolate (CELLCEPT) 500 MG tablet Take 2 tablets by mouth 2 (Two) Times a Day.    Antonia Landry MD   O2 (OXYGEN) Inhale 6-8 L/min 1 (One) Time. 6 at rest, 8 during exertion    Provider, MD Latanya   omeprazole (priLOSEC) 40 MG capsule Take 1 capsule by mouth Daily. 4/16/24   Fela Blackman APRN   predniSONE (DELTASONE) 5 MG tablet Take 1 tablet by mouth Daily. 4/11/24   Fela Blackman APRN   spironolactone (ALDACTONE) 50 MG tablet Take 1 tablet by mouth Daily. 8/18/23   Roosevelt Escalera MD   tadalafil 20 MG tablet tablet Take 2 tablets by mouth Daily. 4/10/24 4/10/25  Nile Banegas APRN   traZODone (DESYREL) 100 MG tablet Take 1 tablet by mouth Every Night for 90 days. 8/28/24 11/26/24  Fela Blackman APRN   Treprostinil (Tyvaso) 0.6 MG/ML solution inhalation solution Inhale 14 puffs 4 (Four) Times a Day. 8/9/24   Roosevelt Escalera MD   venlafaxine (EFFEXOR) 75 MG tablet Take 1 tablet by mouth Daily.    Farida Cano APRN   vitamin D (ERGOCALCIFEROL) 1.25 MG (86107 UT) capsule capsule Take 1 capsule by mouth 1 (One) Time Per Week. 5/10/23   Aaron Stoddard MD       Social History     Socioeconomic History    Marital status:    Tobacco Use    Smoking status: Never     Passive exposure: Past    Smokeless tobacco: Never   Vaping Use    Vaping status: Never Used   Substance and Sexual Activity    Alcohol use: No    Drug use: No    Sexual activity: Not Currently     Partners: Male       Objective   Vital Signs:   /90   Pulse 90   Ht 160 cm (63\")   Wt 74.6 kg (164 lb 8 oz) Comment: Pt stated  SpO2 97% Comment: 5L  BMI 29.14 kg/m²     Physical Exam  Constitutional:       Interventions: Nasal cannula in place.   Cardiovascular:      Rate and Rhythm: Normal rate and regular rhythm.      Heart sounds: No murmur heard.  Pulmonary:      Effort: Pulmonary effort is normal. No tachypnea, accessory muscle " usage, prolonged expiration or respiratory distress.      Breath sounds: Normal breath sounds. Examination of the right-lower field reveals rales. Examination of the left-lower field reveals rales.   Musculoskeletal:      Right lower leg: No edema.      Left lower leg: No edema.   Skin:     Nails: There is clubbing.      Comments: Bilateral hand telangectasis and face, thick nails     Neurological:      Mental Status: She is alert and oriented to person, place, and time.        Result Review :      My interpretation of the PFT : none    Results for orders placed in visit on 06/05/19    Pulmonary Function Test      My interpretation of imaging:  none    BNP (10/14/2024 14:30)     My interpretation of labs: Normal but slightly higher than previous      Assessment and Plan {CC Problem List  Visit Diagnosis  ROS  Review (Popup)  Lâ€™[place] Maintenance  Quality  BestPractice  Medications  SmartSets  SnapShot Encounters  Media : 23}    Diagnoses and all orders for this visit:    1. Pulmonary fibrosis prob sec underlying autoimmune disease (Primary)  Comments:  Unable to do PFTs at this point.  Annual HRCT due July 2025.  Order placed.  Continue DuoNebs as needed    2. Esophageal dysmotility  Comments:  Contributes to her cough.  Somewhat improved with speech therapy    3. Gastroesophageal reflux disease without esophagitis  Comments:  Contributes to her cough.  Continue Prilosec daily    4. CREST syndrome, partial   Comments:  Doing well on CellCept 1 g twice daily and prednisone 5 mg.  Keep follow-up with Dr. Landry    5. Chronic respiratory failure with hypoxia  Comments:  Continue 3 to 4 L at rest, 6 L with sleep and 8 L when she exerts.  She is compliant with this and benefiting    6. PAH (pulmonary arterial hypertension) with portal hypertension  Comments:  Continue Opsynvi, Tyvaso and Aldactone.  Awaiting approval for Sotatercept given worsening 6-minute walk.  Defer follow-up echo/6MW to cardiology    7.  Simple chronic bronchitis  -     ipratropium-albuterol (DUO-NEB) 0.5-2.5 mg/3 ml nebulizer; Take 3 mL by nebulization 4 (Four) Times a Day for 30 days.  Dispense: 360 mL; Refill: 5  -     Home Nebulizer Accessories        Body mass index is 29.14 kg/m².    ANASTASIA Briggs  12/3/2024  15:57 CST    Follow Up   Return in about 3 months (around 3/3/2025).    Patient was given instructions and counseling regarding her condition or for health maintenance advice. Please see specific information pulled into the AVS if appropriate.

## 2024-12-03 ENCOUNTER — OFFICE VISIT (OUTPATIENT)
Dept: PULMONOLOGY | Facility: CLINIC | Age: 64
End: 2024-12-03
Payer: MEDICARE

## 2024-12-03 VITALS
DIASTOLIC BLOOD PRESSURE: 90 MMHG | BODY MASS INDEX: 29.15 KG/M2 | WEIGHT: 164.5 LBS | HEART RATE: 90 BPM | OXYGEN SATURATION: 97 % | SYSTOLIC BLOOD PRESSURE: 128 MMHG | HEIGHT: 63 IN

## 2024-12-03 DIAGNOSIS — K22.4 ESOPHAGEAL DYSMOTILITY: Chronic | ICD-10-CM

## 2024-12-03 DIAGNOSIS — J96.11 CHRONIC RESPIRATORY FAILURE WITH HYPOXIA: Chronic | ICD-10-CM

## 2024-12-03 DIAGNOSIS — J84.10 PULMONARY FIBROSIS: Primary | Chronic | ICD-10-CM

## 2024-12-03 DIAGNOSIS — K21.9 GASTROESOPHAGEAL REFLUX DISEASE WITHOUT ESOPHAGITIS: Chronic | ICD-10-CM

## 2024-12-03 DIAGNOSIS — K76.6 PAH (PULMONARY ARTERIAL HYPERTENSION) WITH PORTAL HYPERTENSION: Chronic | ICD-10-CM

## 2024-12-03 DIAGNOSIS — I27.21 PAH (PULMONARY ARTERIAL HYPERTENSION) WITH PORTAL HYPERTENSION: Chronic | ICD-10-CM

## 2024-12-03 DIAGNOSIS — M34.1 CREST SYNDROME: Chronic | ICD-10-CM

## 2024-12-03 DIAGNOSIS — J41.0 SIMPLE CHRONIC BRONCHITIS: ICD-10-CM

## 2024-12-03 PROCEDURE — 1159F MED LIST DOCD IN RCRD: CPT | Performed by: NURSE PRACTITIONER

## 2024-12-03 PROCEDURE — 1160F RVW MEDS BY RX/DR IN RCRD: CPT | Performed by: NURSE PRACTITIONER

## 2024-12-03 PROCEDURE — 99214 OFFICE O/P EST MOD 30 MIN: CPT | Performed by: NURSE PRACTITIONER

## 2024-12-03 RX ORDER — IPRATROPIUM BROMIDE AND ALBUTEROL SULFATE 2.5; .5 MG/3ML; MG/3ML
3 SOLUTION RESPIRATORY (INHALATION)
Qty: 360 ML | Refills: 5 | Status: SHIPPED | OUTPATIENT
Start: 2024-12-03 | End: 2025-01-02

## 2025-01-01 ENCOUNTER — OUTSIDE FACILITY SERVICE (OUTPATIENT)
Age: 65
End: 2025-01-01
Payer: MEDICARE

## 2025-01-01 ENCOUNTER — TELEPHONE (OUTPATIENT)
Age: 65
End: 2025-01-01
Payer: MEDICARE

## 2025-01-01 ENCOUNTER — OUTSIDE FACILITY SERVICE (OUTPATIENT)
Age: 65
End: 2025-01-01

## 2025-01-01 PROCEDURE — OUTSIDEPOS PR OUTSIDE POS PLACEHOLDER: Performed by: INTERNAL MEDICINE

## 2025-01-08 ENCOUNTER — TELEPHONE (OUTPATIENT)
Dept: CARDIOLOGY | Facility: CLINIC | Age: 65
End: 2025-01-08
Payer: MEDICARE

## 2025-01-08 DIAGNOSIS — K21.9 GASTROESOPHAGEAL REFLUX DISEASE WITHOUT ESOPHAGITIS: ICD-10-CM

## 2025-01-08 RX ORDER — OMEPRAZOLE 40 MG/1
40 CAPSULE, DELAYED RELEASE ORAL DAILY
Qty: 90 CAPSULE | Refills: 3 | Status: SHIPPED | OUTPATIENT
Start: 2025-01-08

## 2025-01-08 NOTE — TELEPHONE ENCOUNTER
Received a fax from [employer] requesting refills on omeprazole 40mg capsules.    Per protocol no cosign required, script sent to pharmacy.    Rx Refill Note  Requested Prescriptions     Pending Prescriptions Disp Refills    omeprazole (priLOSEC) 40 MG capsule 90 capsule 3     Sig: Take 1 capsule by mouth Daily.      Last office visit with prescribing clinician: 12/3/2024   Last telemedicine visit with prescribing clinician: Visit date not found   Next office visit with prescribing clinician: 3/6/2025                         Would you like a call back once the refill request has been completed: [] Yes [] No    If the office needs to give you a call back, can they leave a voicemail: [] Yes [] No    [place] MA  01/08/25, 08:22 CST

## 2025-01-08 NOTE — TELEPHONE ENCOUNTER
Caller: Elaine Juárez    Relationship: Self    Best call back number: [phone]     What is the best time to reach you: ANYTIME     Who are you requesting to speak with (clinical staff, provider,  specific staff member): CLINICAL       What was the call regarding: PATIENT WOULD LIKE TO SEE IF THERE ARE ANY PROGRAMS SHE CAN APPLY FOR ASSISTANCE WITH MEDICATION OPSYNVBI 10 MG.

## 2025-01-09 NOTE — TELEPHONE ENCOUNTER
I called pt to find out what this medication was for because I was unfamiliar of the medication.   She said Dr. Escalera gave it to her in the Heart Failure Clinic.  I found the form on [employer] and filled out what I could.  I will take it to the Heart Failure Clinic and give it to Florecita.  I told the pt she will need to fill out her part so we can get it faxed in.  She stated understanding.  Darrel Cortes, CMA

## 2025-01-28 ENCOUNTER — HOSPITAL ENCOUNTER (OUTPATIENT)
Dept: CARDIOLOGY | Facility: HOSPITAL | Age: 65
Discharge: HOME | End: 2025-01-28
Admitting: NURSE PRACTITIONER
Payer: MEDICARE

## 2025-01-28 VITALS
OXYGEN SATURATION: 98 % | HEART RATE: 87 BPM | BODY MASS INDEX: 29.05 KG/M2 | DIASTOLIC BLOOD PRESSURE: 60 MMHG | SYSTOLIC BLOOD PRESSURE: 103 MMHG | WEIGHT: 164 LBS

## 2025-01-28 DIAGNOSIS — R06.02 SHORTNESS OF BREATH: Primary | ICD-10-CM

## 2025-01-28 DIAGNOSIS — I27.20 PULMONARY HYPERTENSION: ICD-10-CM

## 2025-01-28 DIAGNOSIS — J84.10 PULMONARY FIBROSIS: ICD-10-CM

## 2025-01-28 DIAGNOSIS — I50.812 CHRONIC RIGHT HEART FAILURE: ICD-10-CM

## 2025-01-28 LAB
ALBUMIN SERPL-MCNC: 3.7 G/DL (ref 3.5–5.2)
ALBUMIN/GLOB SERPL: 1.2 G/DL
ALP SERPL-CCNC: 94 U/L (ref 39–117)
ALT SERPL W P-5'-P-CCNC: 8 U/L (ref 1–33)
ANION GAP SERPL CALCULATED.3IONS-SCNC: 11 MMOL/L (ref 5–15)
AST SERPL-CCNC: 15 U/L (ref 1–32)
BILIRUB SERPL-MCNC: 0.2 MG/DL (ref 0–1.2)
BUN SERPL-MCNC: 9 MG/DL (ref 8–23)
BUN/CREAT SERPL: 10.7 (ref 7–25)
CALCIUM SPEC-SCNC: 8.5 MG/DL (ref 8.6–10.5)
CHLORIDE SERPL-SCNC: 101 MMOL/L (ref 98–107)
CO2 SERPL-SCNC: 27 MMOL/L (ref 22–29)
CREAT SERPL-MCNC: 0.84 MG/DL (ref 0.57–1)
DEPRECATED RDW RBC AUTO: 42.5 FL (ref 37–54)
EGFRCR SERPLBLD CKD-EPI 2021: 76.8 ML/MIN/1.73
ERYTHROCYTE [DISTWIDTH] IN BLOOD BY AUTOMATED COUNT: 14.3 % (ref 12.3–15.4)
GLOBULIN UR ELPH-MCNC: 3.2 GM/DL
GLUCOSE SERPL-MCNC: 128 MG/DL (ref 65–99)
HCT VFR BLD AUTO: 40.5 % (ref 34–46.6)
HGB BLD-MCNC: 12.2 G/DL (ref 12–15.9)
MCH RBC QN AUTO: 24.8 PG (ref 26.6–33)
MCHC RBC AUTO-ENTMCNC: 30.1 G/DL (ref 31.5–35.7)
MCV RBC AUTO: 82.5 FL (ref 79–97)
NT-PROBNP SERPL-MCNC: 158.4 PG/ML (ref 0–900)
PLATELET # BLD AUTO: 246 10*3/MM3 (ref 140–450)
PMV BLD AUTO: 10.7 FL (ref 6–12)
POTASSIUM SERPL-SCNC: 3.2 MMOL/L (ref 3.5–5.2)
PROT SERPL-MCNC: 6.9 G/DL (ref 6–8.5)
RBC # BLD AUTO: 4.91 10*6/MM3 (ref 3.77–5.28)
SODIUM SERPL-SCNC: 139 MMOL/L (ref 136–145)
WBC NRBC COR # BLD AUTO: 8.29 10*3/MM3 (ref 3.4–10.8)

## 2025-01-28 PROCEDURE — 80053 COMPREHEN METABOLIC PANEL: CPT | Performed by: NURSE PRACTITIONER

## 2025-01-28 PROCEDURE — 99214 OFFICE O/P EST MOD 30 MIN: CPT | Performed by: NURSE PRACTITIONER

## 2025-01-28 PROCEDURE — 94726 PLETHYSMOGRAPHY LUNG VOLUMES: CPT | Performed by: NURSE PRACTITIONER

## 2025-01-28 PROCEDURE — 83880 ASSAY OF NATRIURETIC PEPTIDE: CPT | Performed by: NURSE PRACTITIONER

## 2025-01-28 PROCEDURE — 85027 COMPLETE CBC AUTOMATED: CPT | Performed by: NURSE PRACTITIONER

## 2025-01-28 PROCEDURE — G2211 COMPLEX E/M VISIT ADD ON: HCPCS | Performed by: NURSE PRACTITIONER

## 2025-01-28 NOTE — PROGRESS NOTES
Heart Failure Clinic Progress Note  Reason For Visit:  Pulmonary hypertension    Subjective    Elaine Juárez is a 64 y.o. female with the below pertinent PMH who presents for follow-up of pulmonary hypertension.    Elaine Juárez was most recently seen in the heart failure clinic on 10/14/2024.  At that time she remained overall stable but noted the increased dosing of Tyvaso did not provide significant amount of improvement to her symptoms.  She is working with pulmonology on some new nebulizer treatments at that time.  Her BNP was overall stable and we were potentially down be working on sotatercept for further management of her PAH.  Since last being seen she was stopped on her Opsumit and started on Opsynvi being for further management.    Since her last visit she continues to report that she has worsening shortness of breath despite starting Opsynvi.  She notes worsening activity intolerance and shortness of breath at baseline.  She denies any lower extremity edema.  She continues to work with pulmonology without any concerns at this point.  Her weight is overall stable and down 3 pounds since last being seen.  Her BNP is a little better than her last visit at 158.4.    Review of Systems   Constitutional: Positive for malaise/fatigue.   Cardiovascular:  Positive for dyspnea on exertion and orthopnea. Negative for chest pain and leg swelling.   Respiratory:  Positive for shortness of breath.    Neurological:  Negative for dizziness and light-headedness.     Prior PAH History:   - Echo 11/13/19 at [facility] [place] reportedly grossly normal cardiac chamber dimensions without evidence of pulmonary hypertension   - [facility]HC 7/22/20: RA 6, RV 63/6, PA 64/19 (mean 38), PCWP 10, David CO/CI 3.8/2.1, PVR 7.4 TIAN, negative adenosine vasodilator challenge  - Started opsimut mid 2020 after RHC   - 4/2022 proBNP 108-122  - 6/2022 6MWD after COVID 422 feet (129 meters)  - Started Tyvaso DPI 8/2022 (titrated to max dose)  -  9/2022 admission              - 9/12/22 Echo: RV severely dilated w/ severe dysfunction, systolic and diastolic septal flattening, RA severely dilated, small pericardial effusion, RVSP 56-63 (my estimate)              - proBNP 7417              - Started Sildenafil 20mg TID 9/2022              - Started lasix 10mg daily w/ KCl on hospital discharge 9/2022  - 2022: Referred to [facility], [facility], [facility], and [facility] for consideration of lung transplant but was considered not a candidate due to history of liver disease and esophageal motility (patient states she has not been seen in PAH clinic at any of these hospitals)  - 1/2023: 6MWD 633 feet (193 meters)  - 3/2023: Transitioned to Medicare and was unable to get medications filled so ran out.   4/2023: Feeling poorly but was able to restart sildenafil and transition to Tyvaso inhaler at end of month.  - 5/12/2023: For CHF clinic visit. 6MWD 169 meters. proBNP 1798.  Improving dyspnea and exertional capacity since restarting Tyvaso (still titrating up).  Started spironolactone 25 mg daily.  - 5/24: Tyvaso up to goal dose of 12 breaths QID and being tolerated well.  Dyspnea improving.  Had obtained a thuan to hopefully restart Opsumit soon (did start at end of May).  - 7/5: Patient had restarted Opsumit and reported continual improvement in her breathing.  proBNP improved to 212.  - 7/25/2023 echo: LVEF 61-65%, mild LVH with concentric remodeling, RV mildly dilated with mildly to moderately reduced systolic function, mild systolic septal flattening of the IV septum consistent with RV pressure overload, normal RA size, insufficient TR velocity to estimate RVSP.  - 9/6/2022 6-minute walk distance: 234 m  - 11/20/23: Started Toprol XL  12.5 mg daily for 3 weeks then 25 mg daily.  - 1/11/2024: 6-minute walk distance 234 m  - Echo 2/6/2024: My review, RV mildly dilated with mildly or moderately reduced systolic function mild systolic septal flattening  "overall similar to prior echo from 7/2023.  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    Current Outpatient Medications   Medication Instructions    alendronate (FOSAMAX) 70 MG tablet 1 tablet, Weekly    busPIRone (BUSPAR) 10 MG tablet Take 1 tablet 3 times a day by oral route as needed for 30 days, for anxiety.    ipratropium-albuterol (DUO-NEB) 0.5-2.5 mg/3 ml nebulizer 3 mL, Nebulization, 4 Times Daily - RT    Macitentan-Tadalafil 10-40 MG tablet 1 tablet, Oral, Daily    metoprolol succinate XL (TOPROL-XL) 12.5 mg, Oral, Daily    mycophenolate (CELLCEPT) 1,000 mg, 2 Times Daily    O2 (OXYGEN) 6-8 L/min, Once    omeprazole (PRILOSEC) 40 mg, Oral, Daily    predniSONE (DELTASONE) 5 mg, Oral, Daily    spironolactone (ALDACTONE) 50 mg, Oral, Daily    traZODone (DESYREL) 100 mg, Oral, Nightly    Treprostinil (Tyvaso) 0.6 MG/ML solution inhalation solution 14 puffs, Inhalation, 4 Times Daily - RT    venlafaxine (EFFEXOR) 75 mg, Daily    vitamin D (ERGOCALCIFEROL) 50,000 Units, Weekly        Objective   Vital Signs:  /60 (BP Location: Left arm, Patient Position: Sitting)   Pulse 87   Wt 74.4 kg (164 lb)   SpO2 98%   BMI 29.05 kg/m²   Estimated body mass index is 29.05 kg/m² as calculated from the following:    Height as of 12/3/24: 160 cm (63\").    Weight as of this encounter: 74.4 kg (164 lb).    Neck:      Vascular: No JVD.   Pulmonary:      Effort: Pulmonary effort is normal.      Breath sounds: Normal breath sounds.   Cardiovascular:      Normal rate. Regular rhythm. Normal S1. Normal S2.       Murmurs: There is a systolic murmur.      No gallop.  No click. No " rub.   Pulses:     Intact distal pulses.   Edema:     Peripheral edema absent.   Abdominal:      Palpations: Abdomen is soft.      Tenderness: There is no abdominal tenderness.   Skin:     General: Skin is warm and dry.   Neurological:      General: No focal deficit present.      Mental Status: Alert and oriented to person, place and time.   Psychiatric:         Behavior: Behavior is cooperative.        The following data was reviewed by myself, ANASTASIA Sandoval  BMP   BMP          8/9/2024    13:04 10/14/2024    14:30 1/28/2025    14:31   BMP   BUN 9  9  9    Creatinine 0.72  0.73  0.84    Sodium 139  140  139    Potassium 3.7  3.5  3.2    Chloride 101  102  101    CO2 28.0  30.0  27.0    Calcium 9.2  8.9  8.5      CBC   CBC          5/6/2024    08:59 10/14/2024    14:30 1/28/2025    14:31   CBC   WBC 10.01  8.67  8.29    RBC 5.20  4.51  4.91    Hemoglobin 13.0  11.3  12.2    Hematocrit 42.4  37.6  40.5    MCV 81.5  83.4  82.5    MCH 25.0  25.1  24.8    MCHC 30.7  30.1  30.1    RDW 14.6  14.6  14.3    Platelets 287  233  246      Pro-BNP       Lab 01/28/25  1431   PROBNP 158.4       Results for orders placed during the hospital encounter of 02/05/24    Adult Transthoracic Echo Limited W/ Cont if Necessary Per Protocol    Interpretation Summary    Left ventricular systolic function is normal. Left ventricular ejection fraction appears to be 51 - 55%.    Normal right ventricular cavity size noted. Moderately reduced right ventricular systolic function noted.    There is mild calcification of the aortic valve. No aortic valve regurgitation is present. No hemodynamically significant aortic valve stenosis is present.    Mild mitral annular calcification is present. Trace mitral valve regurgitation is present.    There is trace pulmonic valve regurgitation present.       Assessment   Assessment and Plan  Diagnoses and all orders for this visit:    1. Shortness of breath (Primary)  2. Pulmonary hypertension  3. Chronic  right heart failure  4. Pulmonary fibrosis  Overall, it appears that her symptoms with her PAH are worsening.  She notes worsening activity tolerance and shortness of breath.  However, her BNP is actually better than her last visit which does give some indication for stability of overall disease progression.  - Given she has not seen any drastic improvement with the addition of Opsynvi, we will proceed with sotatercept.  We did check CBC today and will continue to check CBC prior to each dose of sotatercept.  - Tyvaso 14 breaths 4 times daily  - Spironolactone 50 mg daily  - Toprol-XL 12.5 mg daily  - Continue with [id number] with Dr. Escalera at her next visit.       Follow Up:  Return in about 4 weeks (around 2/25/2025) for recheck.    Patient was given instructions and counseling regarding her condition or for health maintenance advice.      Toby Nath, APRN  01/28/25  15:59 CST

## 2025-02-07 RX ORDER — METOPROLOL SUCCINATE 25 MG/1
12.5 TABLET, EXTENDED RELEASE ORAL DAILY
Qty: 45 TABLET | Refills: 3 | Status: SHIPPED | OUTPATIENT
Start: 2025-02-07

## 2025-02-20 ENCOUNTER — TELEPHONE (OUTPATIENT)
Dept: CARDIOLOGY | Facility: CLINIC | Age: 65
End: 2025-02-20
Payer: MEDICARE

## 2025-02-20 NOTE — TELEPHONE ENCOUNTER
Spoke with patient she took her first shot on 2/10. Labs are due within 3 weeks. She has a follow up on 2/27. Labs will be drawn then.

## 2025-02-27 ENCOUNTER — HOSPITAL ENCOUNTER (OUTPATIENT)
Dept: CARDIOLOGY | Facility: HOSPITAL | Age: 65
Discharge: HOME | End: 2025-02-27
Admitting: HOSPITALIST
Payer: MEDICARE

## 2025-02-27 VITALS
BODY MASS INDEX: 28.7 KG/M2 | HEART RATE: 90 BPM | WEIGHT: 162 LBS | OXYGEN SATURATION: 96 % | SYSTOLIC BLOOD PRESSURE: 114 MMHG | DIASTOLIC BLOOD PRESSURE: 67 MMHG

## 2025-02-27 DIAGNOSIS — I27.20 PULMONARY HYPERTENSION: Primary | ICD-10-CM

## 2025-02-27 DIAGNOSIS — J84.10 PULMONARY FIBROSIS: ICD-10-CM

## 2025-02-27 DIAGNOSIS — R06.02 SHORTNESS OF BREATH: ICD-10-CM

## 2025-02-27 DIAGNOSIS — I50.812 CHRONIC RIGHT HEART FAILURE: ICD-10-CM

## 2025-02-27 LAB
ANION GAP SERPL CALCULATED.3IONS-SCNC: 13 MMOL/L (ref 5–15)
BUN SERPL-MCNC: 13 MG/DL (ref 8–23)
BUN/CREAT SERPL: 15.5 (ref 7–25)
CALCIUM SPEC-SCNC: 9.1 MG/DL (ref 8.6–10.5)
CHLORIDE SERPL-SCNC: 100 MMOL/L (ref 98–107)
CO2 SERPL-SCNC: 28 MMOL/L (ref 22–29)
CREAT SERPL-MCNC: 0.84 MG/DL (ref 0.57–1)
DEPRECATED RDW RBC AUTO: 41.2 FL (ref 37–54)
EGFRCR SERPLBLD CKD-EPI 2021: 76.8 ML/MIN/1.73
ERYTHROCYTE [DISTWIDTH] IN BLOOD BY AUTOMATED COUNT: 14.5 % (ref 12.3–15.4)
GLUCOSE SERPL-MCNC: 128 MG/DL (ref 65–99)
HCT VFR BLD AUTO: 40.8 % (ref 34–46.6)
HGB BLD-MCNC: 12.8 G/DL (ref 12–15.9)
MCH RBC QN AUTO: 24.9 PG (ref 26.6–33)
MCHC RBC AUTO-ENTMCNC: 31.4 G/DL (ref 31.5–35.7)
MCV RBC AUTO: 79.4 FL (ref 79–97)
NT-PROBNP SERPL-MCNC: 213 PG/ML (ref 0–900)
PLATELET # BLD AUTO: 244 10*3/MM3 (ref 140–450)
PMV BLD AUTO: 11 FL (ref 6–12)
POTASSIUM SERPL-SCNC: 3.2 MMOL/L (ref 3.5–5.2)
RBC # BLD AUTO: 5.14 10*6/MM3 (ref 3.77–5.28)
SODIUM SERPL-SCNC: 141 MMOL/L (ref 136–145)
WBC NRBC COR # BLD AUTO: 8.49 10*3/MM3 (ref 3.4–10.8)

## 2025-02-27 PROCEDURE — 94726 PLETHYSMOGRAPHY LUNG VOLUMES: CPT | Performed by: HOSPITALIST

## 2025-02-27 PROCEDURE — 85027 COMPLETE CBC AUTOMATED: CPT | Performed by: HOSPITALIST

## 2025-02-27 PROCEDURE — G0463 HOSPITAL OUTPT CLINIC VISIT: HCPCS | Performed by: HOSPITALIST

## 2025-02-27 PROCEDURE — 80048 BASIC METABOLIC PNL TOTAL CA: CPT | Performed by: HOSPITALIST

## 2025-02-27 PROCEDURE — 83880 ASSAY OF NATRIURETIC PEPTIDE: CPT | Performed by: HOSPITALIST

## 2025-02-27 RX ORDER — SOTATERCEPT-CSRK 60 MG
KIT SUBCUTANEOUS
COMMUNITY

## 2025-02-27 RX ORDER — URSODIOL 300 MG/1
300 CAPSULE ORAL 2 TIMES DAILY
COMMUNITY

## 2025-02-27 NOTE — PROGRESS NOTES
Reason For Visit:  Pulmonary Hypertension    Subjective        Elaine Juárez is a 64 y.o. female with the below pertinent PMH who presents for follow-up of pulmonary hypertension.    Elaine Juárez was most recently seen in CHF clinic 1/28/2025 at which time she was still feeling short of breath and fatigued, and the plan was to start her on sotatercept.    Today, the patient reports that she got her first dose of sotatercept on 2/10/25.  Since that time, she does feel like she is starting to see some improvement.  She has a little bit more energy and better activity tolerance with slightly improved breathing.  She overall does still feel fatigued but she mentions that she previously was having some issues with diarrhea that is improving and she thinks that she had been sick with something that she is getting over.  She denies chest pain, palpitations, lightheadedness, and peripheral edema.    ROS: Pertinent findings are included above.    Prior PAH History:   - Echo 11/13/19 at [facility] reportedly grossly normal cardiac chamber dimensions without evidence of pulmonary hypertension   - RHC 7/22/20: RA 6, RV 63/6, PA 64/19 (mean 38), PCWP 10, David CO/CI 3.8/2.1, PVR 7.4 TIAN, negative adenosine vasodilator challenge  - Started opsimut mid 2020 after RHC   - 4/2022 proBNP 108-122  - 6/2022 6MWD after COVID 422 feet (129 meters)  - Started Tyvaso DPI 8/2022 (titrated to max dose)  - 9/2022 admission              - 9/12/22 Echo: RV severely dilated w/ severe dysfunction, systolic and diastolic septal flattening, RA severely dilated, small pericardial effusion, RVSP 56-63 (my estimate)              - proBNP 7417              - Started Sildenafil 20mg TID 9/2022              - Started lasix 10mg daily w/ KCl on hospital discharge 9/2022  - 2022: Referred to [facility], [facility], [facility], and [facility] for consideration of lung transplant but was considered not a candidate due to history of liver  disease and esophageal motility (patient states she has not been seen in PAH clinic at any of these hospitals)  - 1/2023: 6MWD 633 feet (193 meters)  - 3/2023: Transitioned to Medicare and was unable to get medications filled so ran out.   4/2023: Feeling poorly but was able to restart sildenafil and transition to Tyvaso inhaler at end of month.  - 5/12/2023: For CHF clinic visit. 6MWD 169 meters. proBNP 1798.  Improving dyspnea and exertional capacity since restarting Tyvaso (still titrating up).  Started spironolactone 25 mg daily.  - 5/24: Tyvaso up to goal dose of 12 breaths QID and being tolerated well.  Dyspnea improving.  Had obtained a thuan to hopefully restart Opsumit soon (did start at end of May).  - 7/5: Patient had restarted Opsumit and reported continual improvement in her breathing.  proBNP improved to 212.  - 7/25/2023 echo: LVEF 61-65%, mild LVH with concentric remodeling, RV mildly dilated with mildly to moderately reduced systolic function, mild systolic septal flattening of the IV septum consistent with RV pressure overload, normal RA size, insufficient TR velocity to estimate RVSP.  - 9/6/2022 6-minute walk distance: 234 m  - 11/20/23: Started Toprol XL  12.5 mg daily for 3 weeks then 25 mg daily.  - 1/11/2024: 6-minute walk distance 234 m  - Echo 2/6/2024: My review, RV mildly dilated with mildly or moderately reduced systolic function mild systolic septal flattening overall similar to prior echo from 7/2023.  - 3/2024: Transition from sildenafil to tadalafil  - 5/16/2024: 6-minute walk distance 234 m  - 8/9/2024: Increase Tyvaso to 14 breaths QID.  6-minute walk distance 143 m.  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      Current Outpatient Medications:     alendronate (FOSAMAX) 70 MG tablet, Take 1 tablet by mouth 1 (One) Time Per Week., Disp: , Rfl:     busPIRone (BUSPAR) 10 MG tablet, Take 1 tablet 3 times a day by oral route as needed for 30 days, for anxiety., Disp: , Rfl:     Macitentan-Tadalafil 10-40 MG tablet, Take 1 tablet by mouth Daily., Disp: 30 tablet, Rfl: 11    metoprolol succinate XL (TOPROL-XL) 25 MG 24 hr tablet, Take 0.5 tablets by mouth Daily., Disp: 45 tablet, Rfl: 3    mycophenolate (CELLCEPT) 500 MG tablet, Take 2 tablets by mouth 2 (Two) Times a Day., Disp: , Rfl:     O2 (OXYGEN), Inhale 6-8 L/min 1 (One) Time. 6 at rest, 8 during exertion, Disp: , Rfl:     omeprazole (priLOSEC) 40 MG capsule, Take 1 capsule by mouth Daily., Disp: 90 capsule, Rfl: 3    predniSONE (DELTASONE) 5 MG tablet, Take 1 tablet by mouth Daily., Disp: 90 tablet, Rfl: 3    Sotatercept-csrk (Winrevair) 60 MG kit, Inject  under the skin into the appropriate area as directed., Disp: , Rfl:     spironolactone (ALDACTONE) 50 MG tablet, Take 1 tablet by mouth Daily., Disp: 90 tablet, Rfl: 3    traZODone (DESYREL) 100 MG tablet, Take 1 tablet by mouth Every Night for 90 days., Disp: 90 tablet, Rfl: 3    Treprostinil (Tyvaso) 0.6 MG/ML solution inhalation solution, Inhale 14 puffs 4 (Four) Times a Day., Disp: 15 mL, Rfl: 11    venlafaxine (EFFEXOR) 75 MG tablet, Take 1 tablet by mouth Daily., Disp: , Rfl:     vitamin D (ERGOCALCIFEROL) 1.25 MG (68108 UT) capsule capsule, Take 1 capsule by mouth 1 (One) Time Per Week., Disp: , Rfl:     ipratropium-albuterol (DUO-NEB) 0.5-2.5 mg/3 ml nebulizer, Take 3 mL by nebulization 4 (Four) Times a Day for 30 days. (Patient not taking: Reported on 2/27/2025), Disp: 360 mL, Rfl: 5     Objective   Vital Signs:  /67 (BP Location: Left arm, Patient Position: Sitting)   Pulse 90   Wt 73.5 kg (162 lb)   SpO2 96%   BMI 28.70 kg/m²   Estimated body mass index is 28.7 kg/m² as  "calculated from the following:    Height as of 12/3/24: 160 cm (63\").    Weight as of this encounter: 73.5 kg (162 lb).      Constitutional:       Appearance: Not in distress.   Neck:      Vascular: JVD normal.   Pulmonary:      Comments: Bilateral lower lobe predominant pulmonary crackles  Cardiovascular:      Normal rate. Regular rhythm.      Murmurs: There is a grade 1/6 systolic murmur at the LLSB.      No gallop.  No click. No rub.   Edema:     Peripheral edema absent.   Abdominal:      General: There is no distension.      Palpations: Abdomen is soft.      Tenderness: There is no abdominal tenderness.   Skin:     General: Skin is warm and dry.   Neurological:      Mental Status: Alert and oriented to person, place and time.        Result Review :  The following data was reviewed by: Roosevelt Escalera MD on 02/27/2025:  BMP   BMP          10/14/2024    14:30 1/28/2025    14:31 2/27/2025    14:45   BMP   BUN 9  9  13    Creatinine 0.73  0.84  0.84    Sodium 140  139  141    Potassium 3.5  3.2  3.2    Chloride 102  101  100    CO2 30.0  27.0  28.0    Calcium 8.9  8.5  9.1      CBC   CBC          10/14/2024    14:30 1/28/2025    14:31 2/27/2025    14:45   CBC   WBC 8.67  8.29  8.49    RBC 4.51  4.91  5.14    Hemoglobin 11.3  12.2  12.8    Hematocrit 37.6  40.5  40.8    MCV 83.4  82.5  79.4    MCH 25.1  24.8  24.9    MCHC 30.1  30.1  31.4    RDW 14.6  14.3  14.5    Platelets 233  246  244      Pro-BNP       Lab 02/27/25  1445   PROBNP 213.0        Assessment and Plan   Diagnoses and all orders for this visit:    1. Pulmonary hypertension (Primary)  2. Shortness of breath  3. Chronic right heart failure  4. Pulmonary fibrosis  - Starting to see some improvement in symptoms since starting sotatercept.  She has only been on this for short period of time, so hopefully we will see further improvement.  I did have discussions with her about restratification for pulmonary hypertension and consideration for referral to [facility] " potentially to start IV vasodilator therapy if she remains high risk or quite symptomatic despite her current therapy.  - Continue sotatercept, macitentan, tadalafil, Tyvaso (14 breaths QID)  - Continue spironolactone 50 mg daily  - Providing education on increasing potassium in her diet given slightly low potassium level  - Continue Toprol-XL 12.5 mg daily  - Echo prior to her follow-up appointment    Follow Up   Return in about 3 months (around 5/27/2025).  Patient was given instructions and counseling regarding her condition or for health maintenance advice. Please see specific information pulled into the AVS if appropriate.       EMR Dragon/Transcription disclaimer: Much of this encounter note is an electronic transcription/translation of spoken language to printed text. The electronic translation of spoken language may permit erroneous, or at times, nonsensical words or phrases to be inadvertently transcribed; although I have reviewed the note for such errors, some may still exist.

## 2025-02-27 NOTE — PROGRESS NOTES
Patient instructed on heart healthy diet including low sodium and low fat diets. Patient given instructions and infographics on both diets. Patient v/u.

## 2025-03-20 ENCOUNTER — APPOINTMENT (OUTPATIENT)
Dept: GENERAL RADIOLOGY | Facility: HOSPITAL | Age: 65
DRG: 189 | End: 2025-03-20
Payer: MEDICARE

## 2025-03-20 ENCOUNTER — APPOINTMENT (OUTPATIENT)
Dept: CT IMAGING | Facility: HOSPITAL | Age: 65
DRG: 189 | End: 2025-03-20
Payer: MEDICARE

## 2025-03-20 ENCOUNTER — HOSPITAL ENCOUNTER (OUTPATIENT)
Dept: CARDIOLOGY | Facility: HOSPITAL | Age: 65
Discharge: HOME | End: 2025-03-20
Admitting: HOSPITALIST
Payer: MEDICARE

## 2025-03-20 ENCOUNTER — HOSPITAL ENCOUNTER (INPATIENT)
Facility: HOSPITAL | Age: 65
LOS: 2 days | Discharge: HOME | DRG: 189 | End: 2025-03-22
Attending: FAMILY MEDICINE | Admitting: STUDENT IN AN ORGANIZED HEALTH CARE EDUCATION/TRAINING PROGRAM
Payer: MEDICARE

## 2025-03-20 VITALS
BODY MASS INDEX: 27.95 KG/M2 | HEART RATE: 93 BPM | WEIGHT: 157.8 LBS | DIASTOLIC BLOOD PRESSURE: 66 MMHG | OXYGEN SATURATION: 87 % | SYSTOLIC BLOOD PRESSURE: 121 MMHG

## 2025-03-20 DIAGNOSIS — J18.9 PNEUMONIA OF LEFT LOWER LOBE DUE TO INFECTIOUS ORGANISM: ICD-10-CM

## 2025-03-20 DIAGNOSIS — R13.10 DYSPHAGIA, UNSPECIFIED TYPE: ICD-10-CM

## 2025-03-20 DIAGNOSIS — R68.89 DECREASED FUNCTIONAL ACTIVITY TOLERANCE: ICD-10-CM

## 2025-03-20 DIAGNOSIS — I50.812 CHRONIC RIGHT HEART FAILURE: Primary | ICD-10-CM

## 2025-03-20 DIAGNOSIS — J96.01 ACUTE RESPIRATORY FAILURE WITH HYPOXIA: Primary | ICD-10-CM

## 2025-03-20 DIAGNOSIS — I27.21 PAH (PULMONARY ARTERIAL HYPERTENSION) WITH PORTAL HYPERTENSION: Chronic | ICD-10-CM

## 2025-03-20 DIAGNOSIS — K76.6 PAH (PULMONARY ARTERIAL HYPERTENSION) WITH PORTAL HYPERTENSION: Chronic | ICD-10-CM

## 2025-03-20 DIAGNOSIS — R41.82 ALTERED MENTAL STATUS, UNSPECIFIED ALTERED MENTAL STATUS TYPE: ICD-10-CM

## 2025-03-20 DIAGNOSIS — J84.10 PULMONARY FIBROSIS: Chronic | ICD-10-CM

## 2025-03-20 DIAGNOSIS — R53.1 GENERALIZED WEAKNESS: ICD-10-CM

## 2025-03-20 PROBLEM — J96.91 RESPIRATORY FAILURE WITH HYPOXIA: Status: ACTIVE | Noted: 2025-03-20

## 2025-03-20 LAB
AMMONIA BLD-SCNC: 11 UMOL/L (ref 11–51)
ANION GAP SERPL CALCULATED.3IONS-SCNC: 11 MMOL/L (ref 5–15)
ARTERIAL PATENCY WRIST A: POSITIVE
ATMOSPHERIC PRESS: 754 MMHG
B PARAPERT DNA SPEC QL NAA+PROBE: NOT DETECTED
B PERT DNA SPEC QL NAA+PROBE: NOT DETECTED
BASE EXCESS BLDA CALC-SCNC: 4.3 MMOL/L (ref 0–2)
BDY SITE: ABNORMAL
BILIRUB UR QL STRIP: NEGATIVE
BODY TEMPERATURE: 37
BUN SERPL-MCNC: 7 MG/DL (ref 8–23)
BUN/CREAT SERPL: 9.1 (ref 7–25)
C PNEUM DNA NPH QL NAA+NON-PROBE: NOT DETECTED
CALCIUM SPEC-SCNC: 9 MG/DL (ref 8.6–10.5)
CHLORIDE SERPL-SCNC: 100 MMOL/L (ref 98–107)
CLARITY UR: CLEAR
CO2 SERPL-SCNC: 28 MMOL/L (ref 22–29)
COHGB MFR BLD: 1.6 % (ref 0–5)
COLOR UR: YELLOW
CREAT SERPL-MCNC: 0.77 MG/DL (ref 0.57–1)
D-LACTATE SERPL-SCNC: 1.1 MMOL/L (ref 0.5–2)
DEPRECATED RDW RBC AUTO: 43.1 FL (ref 37–54)
EGFRCR SERPLBLD CKD-EPI 2021: 84.7 ML/MIN/1.73
ERYTHROCYTE [DISTWIDTH] IN BLOOD BY AUTOMATED COUNT: 14.9 % (ref 12.3–15.4)
FLUAV RNA RESP QL NAA+PROBE: NOT DETECTED
FLUAV SUBTYP SPEC NAA+PROBE: NOT DETECTED
FLUBV RNA ISLT QL NAA+PROBE: NOT DETECTED
FLUBV RNA RESP QL NAA+PROBE: NOT DETECTED
GAS FLOW AIRWAY: 5.5 LPM
GEN 5 1HR TROPONIN T REFLEX: 9 NG/L
GLUCOSE SERPL-MCNC: 94 MG/DL (ref 65–99)
GLUCOSE UR STRIP-MCNC: NEGATIVE MG/DL
HADV DNA SPEC NAA+PROBE: NOT DETECTED
HCO3 BLDA-SCNC: 29.4 MMOL/L (ref 20–26)
HCOV 229E RNA SPEC QL NAA+PROBE: NOT DETECTED
HCOV HKU1 RNA SPEC QL NAA+PROBE: NOT DETECTED
HCOV NL63 RNA SPEC QL NAA+PROBE: NOT DETECTED
HCOV OC43 RNA SPEC QL NAA+PROBE: NOT DETECTED
HCT VFR BLD AUTO: 43 % (ref 34–46.6)
HCT VFR BLD CALC: 40.9 % (ref 38–51)
HGB BLD-MCNC: 13.1 G/DL (ref 12–15.9)
HGB BLDA-MCNC: 13.3 G/DL (ref 12–16)
HGB UR QL STRIP.AUTO: NEGATIVE
HMPV RNA NPH QL NAA+NON-PROBE: NOT DETECTED
HPIV1 RNA ISLT QL NAA+PROBE: NOT DETECTED
HPIV2 RNA SPEC QL NAA+PROBE: NOT DETECTED
HPIV3 RNA NPH QL NAA+PROBE: NOT DETECTED
HPIV4 P GENE NPH QL NAA+PROBE: NOT DETECTED
KETONES UR QL STRIP: ABNORMAL
LEUKOCYTE ESTERASE UR QL STRIP.AUTO: NEGATIVE
LIPASE SERPL-CCNC: 70 U/L (ref 13–60)
Lab: ABNORMAL
M PNEUMO IGG SER IA-ACNC: NOT DETECTED
MAGNESIUM SERPL-MCNC: 1.9 MG/DL (ref 1.6–2.4)
MCH RBC QN AUTO: 24.7 PG (ref 26.6–33)
MCHC RBC AUTO-ENTMCNC: 30.5 G/DL (ref 31.5–35.7)
MCV RBC AUTO: 81 FL (ref 79–97)
METHGB BLD QL: 0 % (ref 0–3)
MODALITY: ABNORMAL
NITRITE UR QL STRIP: NEGATIVE
NT-PROBNP SERPL-MCNC: 92.2 PG/ML (ref 0–900)
OXYHGB MFR BLDV: 91.2 % (ref 94–99)
PCO2 BLDA: 44.7 MM HG (ref 35–45)
PCO2 TEMP ADJ BLD: 44.7 MM HG (ref 35–45)
PH BLDA: 7.43 PH UNITS (ref 7.35–7.45)
PH UR STRIP.AUTO: 6.5 [PH] (ref 5–8)
PH, TEMP CORRECTED: 7.43 PH UNITS (ref 7.35–7.45)
PLATELET # BLD AUTO: 267 10*3/MM3 (ref 140–450)
PMV BLD AUTO: 10.8 FL (ref 6–12)
PO2 BLDA: 61 MM HG (ref 83–108)
PO2 TEMP ADJ BLD: 61 MM HG (ref 83–108)
POTASSIUM BLDA-SCNC: 3.5 MMOL/L (ref 3.5–5.2)
POTASSIUM SERPL-SCNC: 3.2 MMOL/L (ref 3.5–5.2)
PROT UR QL STRIP: NEGATIVE
RBC # BLD AUTO: 5.31 10*6/MM3 (ref 3.77–5.28)
RHINOVIRUS RNA SPEC NAA+PROBE: NOT DETECTED
RSV RNA NPH QL NAA+NON-PROBE: NOT DETECTED
RSV RNA RESP QL NAA+PROBE: NOT DETECTED
SAO2 % BLDCOA: 92.7 % (ref 94–99)
SARS-COV-2 RNA RESP QL NAA+PROBE: NOT DETECTED
SARS-COV-2 RNA RESP QL NAA+PROBE: NOT DETECTED
SODIUM BLDA-SCNC: 141 MMOL/L (ref 136–145)
SODIUM SERPL-SCNC: 139 MMOL/L (ref 136–145)
SP GR UR STRIP: 1.01 (ref 1–1.03)
TROPONIN T NUMERIC DELTA: -1 NG/L
TROPONIN T SERPL HS-MCNC: 10 NG/L
UROBILINOGEN UR QL STRIP: ABNORMAL
VENTILATOR MODE: ABNORMAL
WBC NRBC COR # BLD AUTO: 8.54 10*3/MM3 (ref 3.4–10.8)

## 2025-03-20 PROCEDURE — 93005 ELECTROCARDIOGRAM TRACING: CPT | Performed by: FAMILY MEDICINE

## 2025-03-20 PROCEDURE — 94799 UNLISTED PULMONARY SVC/PX: CPT

## 2025-03-20 PROCEDURE — 25810000003 SODIUM CHLORIDE 0.9 % SOLUTION 250 ML FLEX CONT: Performed by: FAMILY MEDICINE

## 2025-03-20 PROCEDURE — 83880 ASSAY OF NATRIURETIC PEPTIDE: CPT | Performed by: FAMILY MEDICINE

## 2025-03-20 PROCEDURE — 82805 BLOOD GASES W/O2 SATURATION: CPT

## 2025-03-20 PROCEDURE — 94640 AIRWAY INHALATION TREATMENT: CPT

## 2025-03-20 PROCEDURE — 71045 X-RAY EXAM CHEST 1 VIEW: CPT

## 2025-03-20 PROCEDURE — 83690 ASSAY OF LIPASE: CPT | Performed by: FAMILY MEDICINE

## 2025-03-20 PROCEDURE — 93010 ELECTROCARDIOGRAM REPORT: CPT | Performed by: EMERGENCY MEDICINE

## 2025-03-20 PROCEDURE — 36415 COLL VENOUS BLD VENIPUNCTURE: CPT

## 2025-03-20 PROCEDURE — 25010000002 CEFTRIAXONE PER 250 MG: Performed by: FAMILY MEDICINE

## 2025-03-20 PROCEDURE — 82375 ASSAY CARBOXYHB QUANT: CPT

## 2025-03-20 PROCEDURE — 84484 ASSAY OF TROPONIN QUANT: CPT | Performed by: FAMILY MEDICINE

## 2025-03-20 PROCEDURE — 70450 CT HEAD/BRAIN W/O DYE: CPT

## 2025-03-20 PROCEDURE — 25010000002 AZITHROMYCIN PER 500 MG: Performed by: FAMILY MEDICINE

## 2025-03-20 PROCEDURE — 83735 ASSAY OF MAGNESIUM: CPT | Performed by: FAMILY MEDICINE

## 2025-03-20 PROCEDURE — 94660 CPAP INITIATION&MGMT: CPT

## 2025-03-20 PROCEDURE — 0202U NFCT DS 22 TRGT SARS-COV-2: CPT

## 2025-03-20 PROCEDURE — 83605 ASSAY OF LACTIC ACID: CPT | Performed by: FAMILY MEDICINE

## 2025-03-20 PROCEDURE — 82140 ASSAY OF AMMONIA: CPT | Performed by: FAMILY MEDICINE

## 2025-03-20 PROCEDURE — 83050 HGB METHEMOGLOBIN QUAN: CPT

## 2025-03-20 PROCEDURE — 36600 WITHDRAWAL OF ARTERIAL BLOOD: CPT

## 2025-03-20 PROCEDURE — 80048 BASIC METABOLIC PNL TOTAL CA: CPT | Performed by: HOSPITALIST

## 2025-03-20 PROCEDURE — 81003 URINALYSIS AUTO W/O SCOPE: CPT | Performed by: FAMILY MEDICINE

## 2025-03-20 PROCEDURE — 25010000002 ONDANSETRON PER 1 MG: Performed by: FAMILY MEDICINE

## 2025-03-20 PROCEDURE — 25010000002 METHYLPREDNISOLONE PER 125 MG: Performed by: FAMILY MEDICINE

## 2025-03-20 PROCEDURE — 85027 COMPLETE CBC AUTOMATED: CPT | Performed by: HOSPITALIST

## 2025-03-20 PROCEDURE — 99285 EMERGENCY DEPT VISIT HI MDM: CPT

## 2025-03-20 RX ORDER — SODIUM CHLORIDE 0.9 % (FLUSH) 0.9 %
10 SYRINGE (ML) INJECTION AS NEEDED
Status: DISCONTINUED | OUTPATIENT
Start: 2025-03-20 | End: 2025-03-22 | Stop reason: HOSPADM

## 2025-03-20 RX ORDER — METOPROLOL SUCCINATE 25 MG/1
12.5 TABLET, EXTENDED RELEASE ORAL DAILY
Status: DISCONTINUED | OUTPATIENT
Start: 2025-03-21 | End: 2025-03-20

## 2025-03-20 RX ORDER — VENLAFAXINE 37.5 MG/1
75 TABLET ORAL DAILY
Status: DISCONTINUED | OUTPATIENT
Start: 2025-03-21 | End: 2025-03-22 | Stop reason: HOSPADM

## 2025-03-20 RX ORDER — NITROGLYCERIN 0.4 MG/1
0.4 TABLET SUBLINGUAL
Status: DISCONTINUED | OUTPATIENT
Start: 2025-03-20 | End: 2025-03-21

## 2025-03-20 RX ORDER — METHYLPREDNISOLONE SODIUM SUCCINATE 125 MG/2ML
125 INJECTION, POWDER, LYOPHILIZED, FOR SOLUTION INTRAMUSCULAR; INTRAVENOUS ONCE
Status: COMPLETED | OUTPATIENT
Start: 2025-03-20 | End: 2025-03-20

## 2025-03-20 RX ORDER — ENOXAPARIN SODIUM 100 MG/ML
40 INJECTION SUBCUTANEOUS DAILY
Status: DISCONTINUED | OUTPATIENT
Start: 2025-03-21 | End: 2025-03-22 | Stop reason: HOSPADM

## 2025-03-20 RX ORDER — IPRATROPIUM BROMIDE AND ALBUTEROL SULFATE 2.5; .5 MG/3ML; MG/3ML
3 SOLUTION RESPIRATORY (INHALATION)
Status: DISCONTINUED | OUTPATIENT
Start: 2025-03-20 | End: 2025-03-21

## 2025-03-20 RX ORDER — SODIUM CHLORIDE 0.9 % (FLUSH) 0.9 %
10 SYRINGE (ML) INJECTION EVERY 12 HOURS SCHEDULED
Status: DISCONTINUED | OUTPATIENT
Start: 2025-03-20 | End: 2025-03-22 | Stop reason: HOSPADM

## 2025-03-20 RX ORDER — IPRATROPIUM BROMIDE AND ALBUTEROL SULFATE 2.5; .5 MG/3ML; MG/3ML
3 SOLUTION RESPIRATORY (INHALATION) EVERY 4 HOURS PRN
Status: DISCONTINUED | OUTPATIENT
Start: 2025-03-20 | End: 2025-03-22 | Stop reason: HOSPADM

## 2025-03-20 RX ORDER — POTASSIUM CHLORIDE 1.5 G/1.58G
40 POWDER, FOR SOLUTION ORAL ONCE
Status: COMPLETED | OUTPATIENT
Start: 2025-03-20 | End: 2025-03-21

## 2025-03-20 RX ORDER — ONDANSETRON 2 MG/ML
4 INJECTION INTRAMUSCULAR; INTRAVENOUS ONCE
Status: COMPLETED | OUTPATIENT
Start: 2025-03-20 | End: 2025-03-20

## 2025-03-20 RX ORDER — TRAZODONE HYDROCHLORIDE 100 MG/1
100 TABLET ORAL NIGHTLY
Status: DISCONTINUED | OUTPATIENT
Start: 2025-03-20 | End: 2025-03-22 | Stop reason: HOSPADM

## 2025-03-20 RX ORDER — IPRATROPIUM BROMIDE AND ALBUTEROL SULFATE 2.5; .5 MG/3ML; MG/3ML
3 SOLUTION RESPIRATORY (INHALATION) ONCE
Status: COMPLETED | OUTPATIENT
Start: 2025-03-20 | End: 2025-03-20

## 2025-03-20 RX ORDER — METOPROLOL SUCCINATE 25 MG/1
12.5 TABLET, EXTENDED RELEASE ORAL NIGHTLY
Status: DISCONTINUED | OUTPATIENT
Start: 2025-03-21 | End: 2025-03-22 | Stop reason: HOSPADM

## 2025-03-20 RX ORDER — MYCOPHENOLATE MOFETIL 500 MG/1
1000 TABLET ORAL 2 TIMES DAILY
Status: DISCONTINUED | OUTPATIENT
Start: 2025-03-20 | End: 2025-03-22 | Stop reason: HOSPADM

## 2025-03-20 RX ORDER — SODIUM CHLORIDE 9 MG/ML
40 INJECTION, SOLUTION INTRAVENOUS AS NEEDED
Status: DISCONTINUED | OUTPATIENT
Start: 2025-03-20 | End: 2025-03-22 | Stop reason: HOSPADM

## 2025-03-20 RX ORDER — FAMOTIDINE 10 MG/ML
20 INJECTION, SOLUTION INTRAVENOUS ONCE
Status: COMPLETED | OUTPATIENT
Start: 2025-03-20 | End: 2025-03-20

## 2025-03-20 RX ORDER — PANTOPRAZOLE SODIUM 40 MG/1
40 TABLET, DELAYED RELEASE ORAL
Status: DISCONTINUED | OUTPATIENT
Start: 2025-03-21 | End: 2025-03-22 | Stop reason: HOSPADM

## 2025-03-20 RX ADMIN — AZITHROMYCIN DIHYDRATE 500 MG: 500 INJECTION, POWDER, LYOPHILIZED, FOR SOLUTION INTRAVENOUS at 20:00

## 2025-03-20 RX ADMIN — ONDANSETRON 4 MG: 2 INJECTION INTRAMUSCULAR; INTRAVENOUS at 16:58

## 2025-03-20 RX ADMIN — FAMOTIDINE 20 MG: 10 INJECTION INTRAVENOUS at 16:58

## 2025-03-20 RX ADMIN — SODIUM CHLORIDE 1000 MG: 900 INJECTION INTRAVENOUS at 20:00

## 2025-03-20 RX ADMIN — METHYLPREDNISOLONE SODIUM SUCCINATE 125 MG: 125 INJECTION, POWDER, FOR SOLUTION INTRAMUSCULAR; INTRAVENOUS at 19:58

## 2025-03-20 RX ADMIN — IPRATROPIUM BROMIDE AND ALBUTEROL SULFATE 3 ML: .5; 3 SOLUTION RESPIRATORY (INHALATION) at 18:26

## 2025-03-20 NOTE — ED PROVIDER NOTES
HPI:    Patient is a 65-year-old white female who presents to the emergency room with worsening confusion.  Patient also with some leg swelling and vomiting more than her baseline.  She denies any recent trauma.  Patient does wear oxygen 6 L nasal cannula at home alternative.  However while on 6 days a nasal cannula which she went to her PCPs office earlier today could not get a reading above 86%.  Due to confusion, hypoxia and worsening nausea vomiting patient was sent here to the emergency room for further evaluation of despite having blood initially drawn in the clinic by the PCP prior to arrival.      REVIEW OF SYSTEMS    CONSTITUTIONAL:  No complaints of fever, chills,or weakness  EYES:  No complaints of discharge   ENT: No complaints of sore throat or ear pain  CARDIOVASCULAR: Positive bilateral lower extremity edema   RESPIRATORY:  No complaints of cough or shortness of breath  GI: Positive for nausea and vomiting more than usual with some increased diarrhea MUSCULOSKELETAL:  No complaints of back pain  SKIN:  No complaints of rash  NEUROLOGIC: Positive for confusion and dizziness and headache   ENDOCRINE:  No complaints of polyuria or polydipsia  LYMPHATIC:  No complaints of swollen glands  GENITOURINARY: No complaints of urinary frequency or hematuria        PAST MEDICAL HISTORY  Past Medical History:   Diagnosis Date    Allergies     Arthritis     BMI 31.0-31.9,adult 06/04/2019    Calcified granuloma of lung 06/04/2019    Right lung    CHF (congestive heart failure)     denies    Chronic idiopathic fibrosing alveolitis     Chronic respiratory failure with hypoxia 08/05/2020    Chronic respiratory failure with hypoxia, on home oxygen therapy     COVID-19 02/01/2022    CREST syndrome     Environmental allergies 02/01/2022    Gastroesophageal reflux disease without esophagitis 06/04/2019    Interstitial lung disease     Obstructive sleep apnea on CPAP 06/04/2019    Oropharyngeal dysphagia 01/26/2023     Primary central sleep apnea     Pulmonary fibrosis     Pulmonary hypertension     Rheumatoid arthritis     Right ventricular systolic dysfunction 05/04/2023    Short-term memory loss        FAMILY HISTORY  Family History   Problem Relation Age of Onset    Cirrhosis Sister     Liver cancer Brother     No Known Problems Mother     No Known Problems Father     Colon cancer Neg Hx     Colon polyps Neg Hx        SOCIAL HISTORY  Social History     Socioeconomic History    Marital status:    Tobacco Use    Smoking status: Never     Passive exposure: Past    Smokeless tobacco: Never   Vaping Use    Vaping status: Never Used   Substance and Sexual Activity    Alcohol use: No    Drug use: No    Sexual activity: Not Currently     Partners: Male       IMMUNIZATION HISTORY  Deferred to primary care physician.    SURGICAL HISTORY  Past Surgical History:   Procedure Laterality Date    BREAST BIOPSY      CARDIAC CATHETERIZATION N/A 07/22/2020    Procedure: Right Heart Cath;  Surgeon: Thong Martin MD;  Location: [facility] CATH INVASIVE LOCATION;  Service: Cardiology;  Laterality: N/A;    CHOLECYSTECTOMY      COLONOSCOPY  05/11/2015    5 POLYPS    COLONOSCOPY N/A 08/04/2021    Procedure: COLONOSCOPY WITH ANESTHESIA;  Surgeon: Michael Landni DO;  Location: [facility] ENDOSCOPY;  Service: Gastroenterology;  Laterality: N/A;  pre-hx polyps  post-colon polyps    ENDOSCOPY N/A 08/04/2021    Procedure: ESOPHAGOGASTRODUODENOSCOPY WITH ANESTHESIA;  Surgeon: Michael Landin DO;  Location: [facility] ENDOSCOPY;  Service: Gastroenterology;  Laterality: N/A;  pre-dysphagia  post-normal  pcp-lanette aguila       CURRENT MEDICATIONS    Current Facility-Administered Medications:     azithromycin (ZITHROMAX) 500 mg in sodium chloride 0.9 % 250 mL IVPB-VTB, 500 mg, Intravenous, Once, Luis Lundberg Jr., MD    cefTRIAXone (ROCEPHIN) 1,000 mg in sodium chloride 0.9 % 100 mL MBP, 1,000 mg, Intravenous, Once, Luis Lundberg Jr., MD     "methylPREDNISolone sodium succinate (SOLU-Medrol) injection 125 mg, 125 mg, Intravenous, Once, Luis Lundberg Jr., MD    Current Outpatient Medications:     alendronate (FOSAMAX) 70 MG tablet, Take 1 tablet by mouth 1 (One) Time Per Week., Disp: , Rfl:     busPIRone (BUSPAR) 10 MG tablet, Take 1 tablet 3 times a day by oral route as needed for 30 days, for anxiety., Disp: , Rfl:     Macitentan-Tadalafil 10-40 MG tablet, Take 1 tablet by mouth Daily., Disp: 30 tablet, Rfl: 11    metoprolol succinate XL (TOPROL-XL) 25 MG 24 hr tablet, Take 0.5 tablets by mouth Daily., Disp: 45 tablet, Rfl: 3    mycophenolate (CELLCEPT) 500 MG tablet, Take 2 tablets by mouth 2 (Two) Times a Day., Disp: , Rfl:     O2 (OXYGEN), Inhale 6-8 L/min 1 (One) Time. 6 at rest, 8 during exertion, Disp: , Rfl:     omeprazole (priLOSEC) 40 MG capsule, Take 1 capsule by mouth Daily., Disp: 90 capsule, Rfl: 3    predniSONE (DELTASONE) 5 MG tablet, Take 1 tablet by mouth Daily., Disp: 90 tablet, Rfl: 3    Sotatercept-csrk (Winrevair) 60 MG kit, Inject 60 mg under the skin into the appropriate area as directed Every 21 (Twenty-One) Days., Disp: , Rfl:     spironolactone (ALDACTONE) 50 MG tablet, Take 1 tablet by mouth Daily., Disp: 90 tablet, Rfl: 3    traZODone (DESYREL) 100 MG tablet, Take 1 tablet by mouth Every Night for 90 days., Disp: 90 tablet, Rfl: 3    Treprostinil (Tyvaso) 0.6 MG/ML solution inhalation solution, Inhale 14 puffs 4 (Four) Times a Day., Disp: 15 mL, Rfl: 11    ursodiol (ACTIGALL) 300 MG capsule, Take 1 capsule by mouth 2 (Two) Times a Day., Disp: , Rfl:     venlafaxine (EFFEXOR) 75 MG tablet, Take 1 tablet by mouth Daily., Disp: , Rfl:     vitamin D (ERGOCALCIFEROL) 1.25 MG (74915 UT) capsule capsule, Take 1 capsule by mouth 1 (One) Time Per Week., Disp: , Rfl:     ALLERGIES  Allergies   Allergen Reactions    Codeine Nausea And Vomiting    Latex Rash    Prednisone Mental Status Change     High doses makes her \"nuts\" " "      Neuro exam    VITAL SIGNS:   /87   Pulse 100   Temp 98.1 °F (36.7 °C) (Oral)   Resp (!) 33   Ht 160 cm (63\")   Wt 71.2 kg (157 lb)   SpO2 (!) 88%   BMI 27.81 kg/m²     Constitutional: Patient is alert and in no distress.  Patient with mild head and breathing discomfort.    ENT: There is a normal pharynx with no acute erythema or exudate and oral mucosa is moist.  Nose is clear with no drainage.  Tympanic membranes intact and non-erythemic.    Cardiovascular: S1-S2 regular rate and rhythm.  No murmurs, rubs or gallops are noted.  Trace bilateral lower extremity pitting edema.    Respiratory: Decreased breath sounds in both lung bases.  Chest wall is nontender.  There are no external lesions on the chest.  There is no crepitance.    Abdomen: Soft, nontender.  Bowel sounds are normal in all 4 quadrants. There is no rebound or guarding noted.  There is no abdominal distention or hepatosplenomegaly.    Genitourinary: Patient is voiding appropriately.    Integument: No acute lesions noted.  Color appears to be normal.    Neurological: CN's 2-12 is grossly intact there is no focal deficits.  Patient cognition intact.  Patient's speech is slow but appropriate.    [place] Coma Scale: 15    NIH SCORE:       RADIOLOGY/PROCEDURES      CT Head Without Contrast   Final Result       No acute intracranial findings.           This report was signed and finalized on 3/20/2025 5:00 PM by Dr. Adam Castillo MD.          XR Chest 1 View   Final Result       1.  Shallow inspiration with bibasilar opacities, LEFT greater than   RIGHT. I suspect that much of the interstitial opacities seen   bilaterally related to underlying fibrosis and interstitial changes seen   on the CT from 7/24/2023. Superimposed pneumonia is not excluded at the   LEFT lung base.               This report was signed and finalized on 3/20/2025 4:19 PM by Dr. Ernesto Camacho MD.                  FUTURE APPOINTMENTS     Future Appointments "   Date Time Provider Department Center   4/7/2025  1:00 PM Fela Blackman, APRN [facility]   4/10/2025  3:00 PM DIEURESIS ROOM  PAD HEART FAIL  [facility] None   4/17/2025  2:15 PM  [facility] HE[facility] FAILURE CLINIC - MD  [facility] None   5/12/2025  2:00 PM PAD ECHO ROOM 2  PAD CARDI PAD   5/29/2025  2:45 PM  [facility] HEART FAILURE CLINIC - MD  PAD [facility] None        COURSE & MEDICAL DECISION MAKING     Patient's partial differential diagnosis can include:    Respiratory hypoxia, COPD exacerbation,UTI, TIA, ischemic stroke, hemorrhagic stroke, electrolyte abnormality, , brain tumor, atypical migraine, hemiplegic migraine, other headache, and others    Patient continues to have low oxygen levels.  Will place her on high flow oxygen.  Her 6 L is not maintaining her sats.  Patient will need to be admitted to the hospital for respiratory hypoxia.  Awaiting CT scan of the head and ABG    ABG shows low O2 but no obvious hypercapnia.  Patient may benefit from BiPAP later if she does not improve on the high flow oxygen.  CT of the head does not show any acute intracranial process.  Chest x-ray shows pulmonary fibrosis and scarring in the lower lungs and a possible left lower lobe pneumonia.Will treat with Rocephin and Zithromax.    Patient breathing mildly better after the DuoNeb and IV Solu-Medrol.  Patient will be admitted to the hospital after speaking with the hospitalist.    Spoke with hospitalist Dr. Blanc who states he will admit the patient and his medical service on remote telemetry.    Patient's oxygen is low will place on room    Patient's level of risk: Moderate        CRITICAL CARE    CRITICAL CARE: No  CRITICAL CARE TIME: none         Also Old charts were reviewed per [employer] EMR.  Pertinent details are summarized above.  All laboratory, radiologic, and EKG studies that were performed in the Emergency Department were a necessary part of the evaluation needed to exclude unstable or  emergent medical conditions:      Patient was hemodynamically and neurologically stable in the ED.   Pertinent studies were reviewed as above.     Recent Results (from the past 24 hours)   CBC (No Diff)    Collection Time: 03/20/25  2:42 PM    Specimen: Arm, Right; Blood   Result Value Ref Range    WBC 8.54 3.40 - 10.80 10*3/mm3    RBC 5.31 (H) 3.77 - 5.28 10*6/mm3    Hemoglobin 13.1 12.0 - 15.9 g/dL    Hematocrit 43.0 34.0 - 46.6 %    MCV 81.0 79.0 - 97.0 fL    MCH 24.7 (L) 26.6 - 33.0 pg    MCHC 30.5 (L) 31.5 - 35.7 g/dL    RDW 14.9 12.3 - 15.4 %    RDW-SD 43.1 37.0 - 54.0 fl    MPV 10.8 6.0 - 12.0 fL    Platelets 267 140 - 450 10*3/mm3   Basic Metabolic Panel    Collection Time: 03/20/25  2:42 PM    Specimen: Arm, Right; Blood   Result Value Ref Range    Glucose 94 65 - 99 mg/dL    BUN 7 (L) 8 - 23 mg/dL    Creatinine 0.77 0.57 - 1.00 mg/dL    Sodium 139 136 - 145 mmol/L    Potassium 3.2 (L) 3.5 - 5.2 mmol/L    Chloride 100 98 - 107 mmol/L    CO2 28.0 22.0 - 29.0 mmol/L    Calcium 9.0 8.6 - 10.5 mg/dL    BUN/Creatinine Ratio 9.1 7.0 - 25.0    Anion Gap 11.0 5.0 - 15.0 mmol/L    eGFR 84.7 >60.0 mL/min/1.73   Magnesium    Collection Time: 03/20/25  2:42 PM    Specimen: Arm, Right; Blood   Result Value Ref Range    Magnesium 1.9 1.6 - 2.4 mg/dL   High Sensitivity Troponin T    Collection Time: 03/20/25  2:42 PM    Specimen: Arm, Right; Blood   Result Value Ref Range    HS Troponin T 10 <14 ng/L   Lipase    Collection Time: 03/20/25  2:42 PM    Specimen: Arm, Right; Blood   Result Value Ref Range    Lipase 70 (H) 13 - 60 U/L   BNP    Collection Time: 03/20/25  2:42 PM    Specimen: Arm, Right; Blood   Result Value Ref Range    proBNP 92.2 0.0 - 900.0 pg/mL   Blood Gas, Arterial With Co-Ox    Collection Time: 03/20/25  4:18 PM    Specimen: Arterial Blood   Result Value Ref Range    Site Left Radial     Tony's Test Positive     pH, Arterial 7.427 7.350 - 7.450 pH units    pCO2, Arterial 44.7 35.0 - 45.0 mm Hg    pO2, Arterial 61.0 (L) 83.0 - 108.0  mm Hg    HCO3, Arterial 29.4 (H) 20.0 - 26.0 mmol/L    Base Excess, Arterial 4.3 (H) 0.0 - 2.0 mmol/L    O2 Saturation, Arterial 92.7 (L) 94.0 - 99.0 %    Hemoglobin, Blood Gas 13.3 12 - 16 g/dL    Hematocrit, Blood Gas 40.9 38.0 - 51.0 %    Oxyhemoglobin 91.2 (L) 94 - 99 %    Methemoglobin 0.00 0.00 - 3.00 %    Carboxyhemoglobin 1.6 0 - 5 %    Temperature 37.0     Sodium, Arterial 141 136 - 145 mmol/L    Potassium, Arterial 3.5 3.5 - 5.2 mmol/L    Barometric Pressure for Blood Gas 754 mmHg    Modality Nasal Cannula     Flow Rate 5.5 lpm    Ventilator Mode NA     Collected by [id number]     pH, Temp Corrected 7.427 7.350 - 7.450 pH Units    pCO2, Temperature Corrected 44.7 35 - 45 mm Hg    pO2, Temperature Corrected 61.0 (L) 83 - 108 mm Hg   ECG 12 Lead Altered Mental Status    Collection Time: 03/20/25  4:24 PM   Result Value Ref Range    QT Interval 398 ms    QTC Interval 453 ms   Ammonia    Collection Time: 03/20/25  4:32 PM    Specimen: Blood   Result Value Ref Range    Ammonia 11 11 - 51 umol/L   High Sensitivity Troponin T 1Hr    Collection Time: 03/20/25  4:32 PM    Specimen: Blood   Result Value Ref Range    HS Troponin T 9 <14 ng/L    Troponin T Numeric Delta -1 Abnormal if >/=3 ng/L   COVID-19, FLU A/B, RSV PCR 1 HR TAT - Swab, Nasopharynx    Collection Time: 03/20/25  4:35 PM    Specimen: Nasopharynx; Swab   Result Value Ref Range    COVID19 Not Detected Not Detected - Ref. Range    Influenza A PCR Not Detected Not Detected    Influenza B PCR Not Detected Not Detected    RSV, PCR Not Detected Not Detected         The patient received:  Medications   methylPREDNISolone sodium succinate (SOLU-Medrol) injection 125 mg (has no administration in time range)   cefTRIAXone (ROCEPHIN) 1,000 mg in sodium chloride 0.9 % 100 mL MBP (has no administration in time range)   azithromycin (ZITHROMAX) 500 mg in sodium chloride 0.9 % 250 mL IVPB-VTB (has no administration in time range)   famotidine (PEPCID) injection 20 mg  (20 mg Intravenous Given 3/20/25 1658)   ondansetron (ZOFRAN) injection 4 mg (4 mg Intravenous Given 3/20/25 1658)   ipratropium-albuterol (DUO-NEB) nebulizer solution 3 mL (3 mL Nebulization Given 3/20/25 1826)              ED Disposition       ED Disposition   Decision to Admit    Condition   --    Comment   Level of Care: Remote Telemetry [26]   Diagnosis: Respiratory failure with hypoxia [951356]   Admitting Physician: TILA WADDELL [[id number]]   Attending Physician: TILA WADDELL [[id number]]   Certification: I Certify That Inpatient Hospital Services Are Medically Necessary For Greater Than 2 Midnights                     Dragon disclaimer:  Part of this note may be an electronic transcription/translation of spoken language to printed text using the Dragon Dictation System.     This information is consistent with my knowledge of the patient’s controlled substance use history.      FINAL IMPRESSION   Diagnosis Plan   1. Acute respiratory failure with hypoxia        2. Altered mental status, unspecified altered mental status type        3. Pneumonia of left lower lobe due to infectious organism              MD Toño Wadsworth Jr, Thomas Mark Jr., MD  03/20/25 1919

## 2025-03-21 PROBLEM — R53.1 GENERALIZED WEAKNESS: Status: ACTIVE | Noted: 2025-03-21

## 2025-03-21 PROBLEM — G93.41 METABOLIC ENCEPHALOPATHY: Status: ACTIVE | Noted: 2025-03-21

## 2025-03-21 PROBLEM — R41.0 CONFUSION: Status: ACTIVE | Noted: 2025-03-21

## 2025-03-21 LAB
ANION GAP SERPL CALCULATED.3IONS-SCNC: 10 MMOL/L (ref 5–15)
BACTERIA SPEC RESP CULT: NORMAL
BUN SERPL-MCNC: 7 MG/DL (ref 8–23)
BUN/CREAT SERPL: 9.3 (ref 7–25)
CALCIUM SPEC-SCNC: 8.5 MG/DL (ref 8.6–10.5)
CHLORIDE SERPL-SCNC: 103 MMOL/L (ref 98–107)
CO2 SERPL-SCNC: 24 MMOL/L (ref 22–29)
CREAT SERPL-MCNC: 0.75 MG/DL (ref 0.57–1)
DEPRECATED RDW RBC AUTO: 43.4 FL (ref 37–54)
EGFRCR SERPLBLD CKD-EPI 2021: 87.4 ML/MIN/1.73
ERYTHROCYTE [DISTWIDTH] IN BLOOD BY AUTOMATED COUNT: 14.5 % (ref 12.3–15.4)
GLUCOSE SERPL-MCNC: 150 MG/DL (ref 65–99)
GRAM STN SPEC: NORMAL
HBA1C MFR BLD: 5.5 % (ref 4.8–5.6)
HCT VFR BLD AUTO: 44.3 % (ref 34–46.6)
HGB BLD-MCNC: 12.9 G/DL (ref 12–15.9)
L PNEUMO1 AG UR QL IA: NEGATIVE
MCH RBC QN AUTO: 24.1 PG (ref 26.6–33)
MCHC RBC AUTO-ENTMCNC: 29.1 G/DL (ref 31.5–35.7)
MCV RBC AUTO: 82.6 FL (ref 79–97)
PLATELET # BLD AUTO: 221 10*3/MM3 (ref 140–450)
PMV BLD AUTO: 11.1 FL (ref 6–12)
POTASSIUM SERPL-SCNC: 4.5 MMOL/L (ref 3.5–5.2)
PROCALCITONIN SERPL-MCNC: 0.06 NG/ML (ref 0–0.25)
RBC # BLD AUTO: 5.36 10*6/MM3 (ref 3.77–5.28)
S PNEUM AG SPEC QL LA: NEGATIVE
SODIUM SERPL-SCNC: 137 MMOL/L (ref 136–145)
WBC NRBC COR # BLD AUTO: 5.87 10*3/MM3 (ref 3.4–10.8)

## 2025-03-21 PROCEDURE — 99222 1ST HOSP IP/OBS MODERATE 55: CPT | Performed by: HOSPITALIST

## 2025-03-21 PROCEDURE — 80048 BASIC METABOLIC PNL TOTAL CA: CPT

## 2025-03-21 PROCEDURE — 94799 UNLISTED PULMONARY SVC/PX: CPT

## 2025-03-21 PROCEDURE — 94660 CPAP INITIATION&MGMT: CPT

## 2025-03-21 PROCEDURE — 87899 AGENT NOS ASSAY W/OPTIC: CPT | Performed by: INTERNAL MEDICINE

## 2025-03-21 PROCEDURE — 25010000002 ENOXAPARIN PER 10 MG

## 2025-03-21 PROCEDURE — 63710000001 PREDNISONE PER 5 MG: Performed by: INTERNAL MEDICINE

## 2025-03-21 PROCEDURE — 87449 NOS EACH ORGANISM AG IA: CPT | Performed by: INTERNAL MEDICINE

## 2025-03-21 PROCEDURE — 87205 SMEAR GRAM STAIN: CPT | Performed by: INTERNAL MEDICINE

## 2025-03-21 PROCEDURE — 97165 OT EVAL LOW COMPLEX 30 MIN: CPT | Performed by: OCCUPATIONAL THERAPIST

## 2025-03-21 PROCEDURE — 99222 1ST HOSP IP/OBS MODERATE 55: CPT | Performed by: INTERNAL MEDICINE

## 2025-03-21 PROCEDURE — 83036 HEMOGLOBIN GLYCOSYLATED A1C: CPT | Performed by: STUDENT IN AN ORGANIZED HEALTH CARE EDUCATION/TRAINING PROGRAM

## 2025-03-21 PROCEDURE — 92610 EVALUATE SWALLOWING FUNCTION: CPT

## 2025-03-21 PROCEDURE — 84145 PROCALCITONIN (PCT): CPT | Performed by: STUDENT IN AN ORGANIZED HEALTH CARE EDUCATION/TRAINING PROGRAM

## 2025-03-21 PROCEDURE — 97161 PT EVAL LOW COMPLEX 20 MIN: CPT | Performed by: PHYSICAL THERAPIST

## 2025-03-21 PROCEDURE — 85027 COMPLETE CBC AUTOMATED: CPT

## 2025-03-21 PROCEDURE — 63710000001 MYCOPHENOLATE MOFETIL PER 250 MG

## 2025-03-21 PROCEDURE — 25010000002 CEFTRIAXONE PER 250 MG

## 2025-03-21 RX ORDER — AZITHROMYCIN 250 MG/1
500 TABLET, FILM COATED ORAL
Status: DISCONTINUED | OUTPATIENT
Start: 2025-03-21 | End: 2025-03-22 | Stop reason: HOSPADM

## 2025-03-21 RX ORDER — IPRATROPIUM BROMIDE AND ALBUTEROL SULFATE 2.5; .5 MG/3ML; MG/3ML
3 SOLUTION RESPIRATORY (INHALATION)
Status: DISCONTINUED | OUTPATIENT
Start: 2025-03-21 | End: 2025-03-22 | Stop reason: HOSPADM

## 2025-03-21 RX ORDER — BUDESONIDE 0.5 MG/2ML
0.5 INHALANT ORAL
Status: DISCONTINUED | OUTPATIENT
Start: 2025-03-21 | End: 2025-03-22 | Stop reason: HOSPADM

## 2025-03-21 RX ORDER — PREDNISONE 5 MG/1
5 TABLET ORAL
Status: DISCONTINUED | OUTPATIENT
Start: 2025-03-21 | End: 2025-03-22 | Stop reason: HOSPADM

## 2025-03-21 RX ORDER — FAMOTIDINE 20 MG/1
20 TABLET, FILM COATED ORAL
Status: DISCONTINUED | OUTPATIENT
Start: 2025-03-21 | End: 2025-03-22 | Stop reason: HOSPADM

## 2025-03-21 RX ORDER — BUSPIRONE HYDROCHLORIDE 10 MG/1
10 TABLET ORAL 3 TIMES DAILY PRN
Status: CANCELLED | OUTPATIENT
Start: 2025-03-21

## 2025-03-21 RX ORDER — PREDNISONE 5 MG/1
5 TABLET ORAL DAILY
Status: CANCELLED | OUTPATIENT
Start: 2025-03-21

## 2025-03-21 RX ORDER — SPIRONOLACTONE 50 MG/1
50 TABLET, FILM COATED ORAL DAILY
Status: CANCELLED | OUTPATIENT
Start: 2025-03-21

## 2025-03-21 RX ORDER — BUSPIRONE HYDROCHLORIDE 10 MG/1
10 TABLET ORAL 3 TIMES DAILY PRN
Status: DISCONTINUED | OUTPATIENT
Start: 2025-03-21 | End: 2025-03-22 | Stop reason: HOSPADM

## 2025-03-21 RX ADMIN — BUDESONIDE 0.5 MG: 0.5 INHALANT RESPIRATORY (INHALATION) at 19:35

## 2025-03-21 RX ADMIN — AZITHROMYCIN 500 MG: 250 TABLET, FILM COATED ORAL at 20:55

## 2025-03-21 RX ADMIN — IPRATROPIUM BROMIDE AND ALBUTEROL SULFATE 3 ML: .5; 3 SOLUTION RESPIRATORY (INHALATION) at 11:25

## 2025-03-21 RX ADMIN — TRAZODONE HYDROCHLORIDE 100 MG: 100 TABLET ORAL at 00:19

## 2025-03-21 RX ADMIN — Medication 10 ML: at 09:31

## 2025-03-21 RX ADMIN — PREDNISONE 5 MG: 5 TABLET ORAL at 14:58

## 2025-03-21 RX ADMIN — IPRATROPIUM BROMIDE AND ALBUTEROL SULFATE 3 ML: .5; 3 SOLUTION RESPIRATORY (INHALATION) at 19:35

## 2025-03-21 RX ADMIN — PANTOPRAZOLE SODIUM 40 MG: 40 TABLET, DELAYED RELEASE ORAL at 06:12

## 2025-03-21 RX ADMIN — Medication 10 ML: at 20:58

## 2025-03-21 RX ADMIN — SODIUM CHLORIDE 1000 MG: 900 INJECTION INTRAVENOUS at 20:54

## 2025-03-21 RX ADMIN — TRAZODONE HYDROCHLORIDE 100 MG: 100 TABLET ORAL at 20:55

## 2025-03-21 RX ADMIN — POTASSIUM CHLORIDE 40 MEQ: 1.5 POWDER, FOR SOLUTION ORAL at 00:19

## 2025-03-21 RX ADMIN — ENOXAPARIN SODIUM 40 MG: 100 INJECTION SUBCUTANEOUS at 09:30

## 2025-03-21 RX ADMIN — FAMOTIDINE 20 MG: 20 TABLET, FILM COATED ORAL at 17:34

## 2025-03-21 RX ADMIN — MYCOPHENOLATE MOFETIL 1000 MG: 500 TABLET ORAL at 09:30

## 2025-03-21 RX ADMIN — MYCOPHENOLATE MOFETIL 1000 MG: 500 TABLET ORAL at 00:19

## 2025-03-21 RX ADMIN — MYCOPHENOLATE MOFETIL 1000 MG: 500 TABLET ORAL at 20:55

## 2025-03-21 RX ADMIN — METOPROLOL SUCCINATE 12.5 MG: 25 TABLET, EXTENDED RELEASE ORAL at 00:19

## 2025-03-21 RX ADMIN — BUDESONIDE 0.5 MG: 0.5 INHALANT RESPIRATORY (INHALATION) at 11:25

## 2025-03-21 RX ADMIN — IPRATROPIUM BROMIDE AND ALBUTEROL SULFATE 3 ML: .5; 3 SOLUTION RESPIRATORY (INHALATION) at 06:35

## 2025-03-21 RX ADMIN — BUSPIRONE HYDROCHLORIDE 10 MG: 10 TABLET ORAL at 20:55

## 2025-03-21 RX ADMIN — VENLAFAXINE 75 MG: 37.5 TABLET ORAL at 09:30

## 2025-03-21 NOTE — PLAN OF CARE
Goal Outcome Evaluation:                    New admit. O2- 8L humidified high flow NC. LS diminished. Up with SBA to BSC.IV antibiotics. Safety maintained. No falls or injuries this shift. Call light within reach.

## 2025-03-21 NOTE — THERAPY EVALUATION
"Acute Care - Speech Language Pathology   Swallow Initial Evaluation [facility]     Patient Name: Elaine Juárez  : 1960  MRN: [id number]  Today's Date: 3/21/2025               Admit Date: 3/20/2025  Clinical bedside swallow evaluation complete. The pt is noted to be alert and oriented x4 with daughter and granddaughter present at bedside. The pt reports she has had swallowing difficulties for years and has undergone multiple \"barium swallow studies.\" SLP reviewed last study which was complete at [facility] in 2023. This study did not show any penetration or aspiration throughout. She reports some concerns with aspiration at this time. She completed a full range of PO consistencies with 1-3 multiple swallows noted at times with all consistencies. SLP also notes decreased superior laryngeal movement with palpation during swallow completion. 1x significantly delayed cough following regular solid trials. She also reports sensation of food hanging in throat with majority of trials. No vocal change noted with trials on this date.    Recommendations:  Ok to continue a regular diet with thin liquids.  Ok for meds to be administered whole with thin liquids as tolerated.  Possible MBSS to objectively assess swallow function early next weak if further concerns arise.  SLP will continue to follow and treat in order to ensure diet tolerance.      Cabrera Diane, VALENTIN-SLP 3/21/2025 14:46 CDT    Visit Dx:     ICD-10-CM ICD-9-CM   1. Acute respiratory failure with hypoxia  J96.01 518.81   2. Altered mental status, unspecified altered mental status type  R41.82 780.97   3. Pneumonia of left lower lobe due to infectious organism  J18.9 486   4. Dysphagia, unspecified type  R13.10 787.20     Patient Active Problem List   Diagnosis    CREST syndrome, partial     Raynaud's phenomenon    Pulmonary fibrosis prob sec underlying autoimmune disease    Gastroesophageal reflux disease without esophagitis    BMI 31.0-31.9,adult    " Spoke with pt and she wishes her info and orders be sent to [id number] [facility]--appt was made for 12/15 for compliance check Chronic respiratory failure with hypoxia    History of adenomatous polyp of colon    Environmental allergies    PAH (pulmonary arterial hypertension) with portal hypertension    Hypokalemia    Panic attack    Paranoia (psychosis)    H/O noncompliance with medical treatment, presenting hazards to health    Bipolar affective disorder, currently manic, moderate    Obesity (BMI 30-39.9)    Esophageal dysmotility    Oropharyngeal dysphagia    Right ventricular systolic dysfunction    Respiratory failure with hypoxia    Metabolic encephalopathy    Generalized weakness     Past Medical History:   Diagnosis Date    Allergies     Arthritis     BMI 31.0-31.9,adult 06/04/2019    Calcified granuloma of lung 06/04/2019    Right lung    CHF (congestive heart failure)     denies    Chronic idiopathic fibrosing alveolitis     Chronic respiratory failure with hypoxia 08/05/2020    Chronic respiratory failure with hypoxia, on home oxygen therapy     COVID-19 02/01/2022    CREST syndrome     Environmental allergies 02/01/2022    Gastroesophageal reflux disease without esophagitis 06/04/2019    Interstitial lung disease     Obstructive sleep apnea on CPAP 06/04/2019    Oropharyngeal dysphagia 01/26/2023    Primary central sleep apnea     Pulmonary fibrosis     Pulmonary hypertension     Rheumatoid arthritis     Right ventricular systolic dysfunction 05/04/2023    Short-term memory loss      Past Surgical History:   Procedure Laterality Date    BREAST BIOPSY      CARDIAC CATHETERIZATION N/A 07/22/2020    Procedure: Right Heart Cath;  Surgeon: Thong Martin MD;  Location: [facility] CATH INVASIVE LOCATION;  Service: Cardiology;  Laterality: N/A;    CHOLECYSTECTOMY      COLONOSCOPY  05/11/2015    5 POLYPS    COLONOSCOPY N/A 08/04/2021    Procedure: COLONOSCOPY WITH ANESTHESIA;  Surgeon: Michael Landin DO;  Location: [facility] ENDOSCOPY;  Service: Gastroenterology;  Laterality: N/A;  pre-hx polyps  post-colon polyps    ENDOSCOPY N/A  08/04/2021    Procedure: ESOPHAGOGASTRODUODENOSCOPY WITH ANESTHESIA;  Surgeon: Michael Landin DO;  Location: [facility] ENDOSCOPY;  Service: Gastroenterology;  Laterality: N/A;  pre-dysphagia  post-normal  pcp-lanette aguila       SLP Recommendation and Plan  SLP Swallowing Diagnosis: mild, suspected pharyngeal dysphagia, R/O pharyngeal dysphagia (03/21/25 1342)  SLP Diet Recommendation: regular textures, thin liquids (03/21/25 1342)  Recommended Precautions and Strategies: upright posture during/after eating, small bites of food and sips of liquid (03/21/25 1342)  SLP Rec. for Method of Medication Administration: meds whole, with thin liquids, with puree, as tolerated (03/21/25 1342)     Monitor for Signs of Aspiration: yes, notify SLP if any concerns (03/21/25 1342)  Recommended Diagnostics: VFSS (MBS) (03/21/25 1342)  Swallow Criteria for Skilled Therapeutic Interventions Met: demonstrates skilled criteria (03/21/25 1342)  Anticipated Discharge Disposition (SLP): unknown (03/21/25 1342)  Rehab Potential/Prognosis, Swallowing: good, to achieve stated therapy goals (03/21/25 1342)  Therapy Frequency (Swallow): at least, 2 days per week (03/21/25 1342)  Predicted Duration Therapy Intervention (Days): until discharge (03/21/25 1342)  Oral Care Recommendations: Oral Care BID/PRN (03/21/25 1342)                                        Progress: no change      SWALLOW EVALUATION (Last 72 Hours)       SLP Adult Swallow Evaluation       Row Name 03/21/25 1342                   Rehab Evaluation    Document Type evaluation  -CS        Subjective Information complains of;weakness;fatigue  -CS        Patient Observations alert;cooperative;agree to therapy  -CS        Patient/Family/Caregiver Comments/Observations Daughter and grandaughter  -CS        Patient Effort good  -CS           General Information    Patient Profile Reviewed yes  -CS        Pertinent History Of Current Problem crest syndrome, interstitial lung disease,  pulmonary arterial hypertension (on tadalafil and macitentan), chronic respiratory failure  -CS        Current Method of Nutrition regular textures;thin liquids  -CS        Precautions/Limitations, Vision WFL  -CS        Precautions/Limitations, Hearing WFL  -CS        Prior Level of Function-Communication WFL  -CS        Prior Level of Function-Swallowing no diet consistency restrictions  -CS        Plans/Goals Discussed with patient and family  -CS        Barriers to Rehab none identified  -CS        Patient's Goals for Discharge return to all previous roles/activities  -CS           Pain    Pretreatment Pain Rating 0/10 - no pain  -CS        Posttreatment Pain Rating 0/10 - no pain  -CS           Oral Motor Structure and Function    Dentition Assessment natural, present and adequate  -CS        Secretion Management WNL/WFL  -CS        Mucosal Quality moist, healthy  -CS        Volitional Swallow weak  -CS        Volitional Cough WFL  -CS           Oral Musculature and Cranial Nerve Assessment    Oral Motor General Assessment WFL  -CS           General Eating/Swallowing Observations    Respiratory Support Currently in Use nasal cannula  -CS        Eating/Swallowing Skills self-fed;fed by SLP  -CS        Positioning During Eating upright in bed  -CS        Utensils Used spoon;cup;straw  -CS        Consistencies Trialed regular textures;honey-thick liquids;nectar/syrup-thick liquids;thin liquids;pureed  -CS           Clinical Swallow Eval    Oral Prep Phase WFL  -CS        Oral Transit WFL  -CS        Oral Residue WFL  -CS        Pharyngeal Phase suspected pharyngeal impairment  -CS        Esophageal Phase suspected esophageal impairment  -CS        Clinical Swallow Evaluation Summary See note  -CS           Pharyngeal Phase Concerns    Pharyngeal Phase Concerns multiple swallows;cough  -CS        Multiple Swallows all consistencies  -CS        Cough regular consistencies  -CS           Esophageal Phase Concerns     Esophageal Phase Concerns globus  -CS           SLP Evaluation Clinical Impression    SLP Swallowing Diagnosis mild;suspected pharyngeal dysphagia;R/O pharyngeal dysphagia  -CS        Functional Impact risk of aspiration/pneumonia  -CS        Rehab Potential/Prognosis, Swallowing good, to achieve stated therapy goals  -CS        Swallow Criteria for Skilled Therapeutic Interventions Met demonstrates skilled criteria  -CS           Recommendations    Therapy Frequency (Swallow) at least;2 days per week  -CS        Predicted Duration Therapy Intervention (Days) until discharge  -CS        SLP Diet Recommendation regular textures;thin liquids  -CS        Recommended Diagnostics VFSS (MBS)  -CS        Recommended Precautions and Strategies upright posture during/after eating;small bites of food and sips of liquid  -CS        Oral Care Recommendations Oral Care BID/PRN  -CS        SLP Rec. for Method of Medication Administration meds whole;with thin liquids;with puree;as tolerated  -CS        Monitor for Signs of Aspiration yes;notify SLP if any concerns  -CS        Anticipated Discharge Disposition (SLP) unknown  -CS           Swallow Goals (SLP)    Swallow LTGs Swallow Long Term Goal (free text)  -CS        Swallow STGs diet tolerance goal selection (SLP)  -CS        Diet Tolerance Goal Selection (SLP) Patient will tolerate trials of  -CS           (LTG) Swallow    (LTG) Swallow Pt will tolerate LRD w/o any overt s/s of aspiration.  -CS        [place] (Swallow Long Term Goal) with minimal cues (75-90% accuracy)  -CS        Time Frame (Swallow Long Term Goal) by discharge  -CS        Barriers (Swallow Long Term Goal) n/a  -CS        Progress/Outcomes (Swallow Long Term Goal) new goal  -CS           (STG) Patient will tolerate trials of    Consistencies Trialed (Tolerate trials) regular textures;thin liquids  -CS        Desired Outcome (Tolerate trials) without signs of distress;without signs/symptoms of aspiration   -CS        [place] (Tolerate trials) with minimal cues (75-90% accuracy)  -CS        Time Frame (Tolerate trials) by discharge  -CS        Progress/Outcomes (Tolerate trials) new goal  -CS                  User Key  (r) = Recorded By, (t) = Taken By, (c) = Cosigned By      Initials Name Effective Dates    Cabrera Benites CCC-SLP 05/07/24 -                     EDUCATION  The patient has been educated in the following areas:   Dysphagia (Swallowing Impairment).        SLP GOALS       Row Name 03/21/25 1342             (LTG) Swallow    (LTG) Swallow Pt will tolerate LRD w/o any overt s/s of aspiration.  -CS      [place] (Swallow Long Term Goal) with minimal cues (75-90% accuracy)  -CS      Time Frame (Swallow Long Term Goal) by discharge  -CS      Barriers (Swallow Long Term Goal) n/a  -CS      Progress/Outcomes (Swallow Long Term Goal) new goal  -CS         (STG) Patient will tolerate trials of    Consistencies Trialed (Tolerate trials) regular textures;thin liquids  -CS      Desired Outcome (Tolerate trials) without signs of distress;without signs/symptoms of aspiration  -CS      [place] (Tolerate trials) with minimal cues (75-90% accuracy)  -CS      Time Frame (Tolerate trials) by discharge  -CS      Progress/Outcomes (Tolerate trials) new goal  -CS                User Key  (r) = Recorded By, (t) = Taken By, (c) = Cosigned By      Initials Name Provider Type    Cabrera Benites CCC-SLP Speech and Language Pathologist                         Time Calculation:    Time Calculation- SLP       Row Name 03/21/25 1444             Time Calculation- SLP    SLP Start Time 1342  -      SLP Stop Time 1445  -      SLP Time Calculation (min) 63 min  -CS      SLP Received On 03/21/25  -      SLP Goal Re-Cert Due Date 03/31/25  -CS         Untimed Charges    SLP Eval/Re-eval  ST Eval Oral Pharyng Swallow - 45264  -CS      23538-ET Eval Oral Pharyng Swallow Minutes 63  -CS         Total Minutes    Untimed  Charges Total Minutes 63  -CS       Total Minutes 63  -CS                User Key  (r) = Recorded By, (t) = Taken By, (c) = Cosigned By      Initials Name Provider Type    CS Cabrera Diane, VALENTIN-SLP Speech and Language Pathologist                    Therapy Charges for Today       Code Description Service Date Service Provider Modifiers Qty    [id number]  ST EVAL ORAL PHARYNG SWALLOW 4 3/21/2025 Cabrera Diane CCC-SLP GN 1                 LITTLE Marie  3/21/2025

## 2025-03-21 NOTE — PLAN OF CARE
"Goal Outcome Evaluation:  Plan of Care Reviewed With: patient        Progress: no change  Outcome Evaluation: OT eval complete.  Pt. is AxOx4 on 6L O2.  Ms. Juárez reports she \"feels better so much better,\" and is up ad miya in room.  Ms. Juárez feels she is at her baseline & was able to LBD, t/f, and amb short dist in room at Mod I.  Pt.'s greatest limiting factor is her ability to maintain SATs during ambulation.  Defer to PT for long dist amb & RT for O2 walk.  Pt does not feel she requires cont'd OT tx.  Agree with plan to DC home.  Rec S assist from family with S during showering.    Anticipated Discharge Disposition (OT): home with assist                        "

## 2025-03-21 NOTE — PLAN OF CARE
Goal Outcome Evaluation:  Plan of Care Reviewed With: patient, family        Progress: improving  Outcome Evaluation: (P) PT evaluation completed. Pt was plesant, agreeable to therapy, orientedx4, with no c/o of pain. Pt states that prior to admission that she was ambulating around home with oxygen in home with portable at home and performing ADLs with Fabián for showering. Pt performed all t/fs with CGA and bed mobility supine <>sit with head of the bed elevated Mod I throughout session. During muscle testing pt was noted to demo bilateral LE weaknessand demo de-sating requiring breaks between muscle testing. Pt was able to ambulate CGA/SBA with 8L of O2 multiple times during session with the longest distance being 50' before requiring a rest break at the end of hallway due to SOB and fatigue with O2 sat reading range from 97-89 during rest break before ambalating 50' back to room with CGA/SBA with O2 reading 89 on return. Throughout the evaluation upon any activity with excertion the pt demo diffculty maintaining O2 sat and demo SOB requiring breaks during seated strength testing, long, and short distances of walking. Pt was left in fowlers with 6L of O2, family, call light, and nursing staff. Recommended that during stay pt stay recieve PT to continue to improve LE strength, activity tolerance/endurance. After discussion with the pt it is recommended that pt discharge to SNF to improve cardiovascular endurance, improve functional mobility tolerance, and increase LE strength with the pt recommending to discharge to [facility].    Anticipated Discharge Disposition (PT): skilled nursing facility

## 2025-03-21 NOTE — PROGRESS NOTES
Elaine Juárez is a 65 y.o. female who qualifies for IV to PO therapy conversion.    Zithromax 500 mg IV every 24 hours x 2 doses for the treatment of pneumonia has been changed from IV to PO per System Policy.       Miguel Moreno, PharmD

## 2025-03-21 NOTE — PLAN OF CARE
Goal Outcome Evaluation:  Plan of Care Reviewed With: patient        Progress: no change  Outcome Evaluation: Pt denies pain so far this shift; O2@6L currently; up with standby; voiding-samples sent per order; IID; safety maintained.

## 2025-03-21 NOTE — PROGRESS NOTES
Attempted bipap as ordered, pt unable to tolerate at this time. When placed on bipap pt began to continuously cough and rr increased to 36. Bipap currently on standby, pt placed back on 8L [place], wears 6L at baseline.

## 2025-03-21 NOTE — CONSULTS
Inpatient Consult Note            NAME: Elaine Juárez  MRN: [id number]  AGE: 65 y.o.            : 1960  ADMISSION DATE: 3/20/2025  PRIMARY CARE PROVIDER: Aaron Stoddard MD  ATTENDING PHYSICIAN: Kaden Arthur MD               Reason for Consult:  We have been consulted by Kaden Arthur MD to see Elaine Juárez for concern for pneumonia.    HPI:    Mrs. Juárez is a 65-year-old female admitted to [facility] with worsening respiratory status and encephalopathy.  Past medical history includes CREST syndrome with resultant pulmonary fibrosis, pulmonary hypertension    History is provided by the patient.  She notes that she has been feeling generally unwell since the end of December.  Symptoms include fatigue, fever, nausea.  More recently her symptoms worsened after starting since starting a new pulmonary hypertension in March.  Notably, the most recent pulmonary hypertension clinic note was 2025.  The patient notes since March she has been experiencing significant nausea and vomiting.  She is concerned for possible aspiration.  Also during this time she has noted frequent desaturations.  She uses 5 L of supplemental oxygen at rest and 8 L with exertion.  Despite the 8 L with exertion she will still desat into the 70s.  When asked if this was new over the past several months, the patient states that this is not unusual for her.  In addition to her increased shortness of breath she does note a chronic cough.  She is unsure if it is more productive or frequent.    The patient is known to our clinic and follows with [facility] [place].  She is currently managed with as needed DuoNeb.  She has a high-res CT due in July of this year.  Follows with speech therapy given her dysmotility due to her CREST syndrome.  She is on CellCept 1 g twice daily and prednisone 5 mg daily per rheumatology, Dr. Landry.  She follows with Dr. Escalera for her pulmonary hypertension and currently on  sotatercept, macitentan, tadalafil, Tyvaso.    Review of Systems:  A full 12-point review of systems was performed and was negative except as documented in the HPI.               Social History:  Social History     Socioeconomic History    Marital status:    Tobacco Use    Smoking status: Never     Passive exposure: Past    Smokeless tobacco: Never   Vaping Use    Vaping status: Never Used   Substance and Sexual Activity    Alcohol use: No    Drug use: No    Sexual activity: Not Currently     Partners: Male               Physical Exam:  General: Well appearing, in no respiratory distress  Head: Normocephalic, atraumatic  Eyes: Conjunctiva clear, sclera non-icteric  Teeth/Gums: Normal inspection  Neck: Supple without stridor  Heart: Regular rate and rhythm, no murmur  Lungs: Clear to auscultation  Abdomen: Soft, nontender  MSK/extremities: No cyanosis or edema  Neurologic: AOx3, grossly no focal deficits      Imaging Review:  I personally reviewed the chest x-ray done on on admission in May of last year:  Chest x-ray elevation similar to May of last year with chronic interstitial findings.      PFTs Reivew:  June 2019: Ratio 87, FEV1 59, DLCO 48            Problem List:  Acute on chronic hypoxic respiratory failure  CREST syndrome  Pulmonary fibrosis  Pulmonary hypertension  Concern for pneumonia      Pulmonary Assessment:  Patient is a 65-year-old female who presents to clinic with multiple symptoms.  Symptoms been ongoing for the past several months.  Symptoms include general unwell feeling, nausea, vomiting, fever, increased dyspnea and hypoxia.  She has been admitted for concern for pneumonia.  In reviewing her chest x-ray that shows similar chronic interstitial markings compared to previous imaging.  Given her normal white count, afebrile, similar imaging, no obvious productive cough unsure of acute infection/pneumonia.  If the patient had obvious signs of pneumonia or develops obvious signs of pneumonia  we can consider bronchoscopy given her immune suppressed state.      Recommendations:   -Continue supplemental oxygen as needed and wean as tolerated, goal O2 sat of 88-92%.  I was able to titrate her down to her baseline 5 L today  -Currently on Rocephin and azithromycin, okay to continue and complete the course for empiric treatment  -Restart home prednisone 5 mg  -Decrease frequency of DuoNeb  -Cardiology consult, Dr. Escalera, for pulmonary hypertension management.  The patient has been restarted on her macitentan/tadalafil, will defer further management to Dr. Escalera  -Frequent incentive spirometer, PT/OT/ST      Thank you for allowing us to participate in this patient's care. We will continue to follow.              Past Medical History:   Past Medical History:   Diagnosis Date    Allergies     Arthritis     BMI 31.0-31.9,adult 06/04/2019    Calcified granuloma of lung 06/04/2019    Right lung    CHF (congestive heart failure)     denies    Chronic idiopathic fibrosing alveolitis     Chronic respiratory failure with hypoxia 08/05/2020    Chronic respiratory failure with hypoxia, on home oxygen therapy     COVID-19 02/01/2022    CREST syndrome     Environmental allergies 02/01/2022    Gastroesophageal reflux disease without esophagitis 06/04/2019    Interstitial lung disease     Obstructive sleep apnea on CPAP 06/04/2019    Oropharyngeal dysphagia 01/26/2023    Primary central sleep apnea     Pulmonary fibrosis     Pulmonary hypertension     Rheumatoid arthritis     Right ventricular systolic dysfunction 05/04/2023    Short-term memory loss          Past Surgical History:   Past Surgical History:   Procedure Laterality Date    BREAST BIOPSY      CARDIAC CATHETERIZATION N/A 07/22/2020    Procedure: Right Heart Cath;  Surgeon: Thong Martin MD;  Location:  [facility];  Service: Cardiology;  Laterality: N/A;    CHOLECYSTECTOMY      COLONOSCOPY  05/11/2015    5 POLYPS    COLONOSCOPY N/A 08/04/2021     Procedure: COLONOSCOPY WITH ANESTHESIA;  Surgeon: Michael Landin DO;  Location:  [facility] ENDOSCOPY;  Service: Gastroenterology;  Laterality: N/A;  pre-hx polyps  post-colon polyps    ENDOSCOPY N/A 08/04/2021    Procedure: ESOPHAGOGASTRODUODENOSCOPY WITH ANESTHESIA;  Surgeon: Michael Landin DO;  Location:  [facility];  Service: Gastroenterology;  Laterality: N/A;  pre-dysphagia  post-normal  pcp-lantete aguila         Family History:   Family History   Problem Relation Age of Onset    Cirrhosis Sister     Liver cancer Brother     No Known Problems Mother     No Known Problems Father     Colon cancer Neg Hx     Colon polyps Neg Hx          Medications:   Prior to Admission medications    Medication Sig Start Date End Date Taking? Authorizing Provider   Macitentan-Tadalafil 10-40 MG tablet Take 1 tablet by mouth Daily. 10/30/24  Yes Roosevelt Escalera MD   metoprolol succinate XL (TOPROL-XL) 25 MG 24 hr tablet Take 0.5 tablets by mouth Daily. 2/7/25  Yes Roosevelt Escalera MD   mycophenolate (CELLCEPT) 500 MG tablet Take 2 tablets by mouth 2 (Two) Times a Day.   Yes Antonia Landry MD   predniSONE (DELTASONE) 5 MG tablet Take 1 tablet by mouth Daily. 4/11/24  Yes Fela Blackman APRN   alendronate (FOSAMAX) 70 MG tablet Take 1 tablet by mouth 1 (One) Time Per Week. 4/25/24   Latanya Spring MD   busPIRone (BUSPAR) 10 MG tablet Take 1 tablet by mouth 3 (Three) Times a Day As Needed (anxiety). 4/29/24   Latanya Spring MD   O2 (OXYGEN) Inhale 6-8 L/min 1 (One) Time. 6 at rest, 8 during exertion    Latanya Spring MD   omeprazole (priLOSEC) 40 MG capsule Take 1 capsule by mouth Daily. 1/8/25   Fela Blackman APRN   Sotatercept-csrk (Winrevair) 60 MG kit Inject 60 mg under the skin into the appropriate area as directed Every 21 (Twenty-One) Days.    Latanya Spring MD   spironolactone (ALDACTONE) 50 MG tablet Take 1 tablet by mouth Daily. 8/18/23   Roosevelt Escalera MD   traZODonmitchell  "(DESYREL) 100 MG tablet Take 1 tablet by mouth Every Night for 90 days. 8/28/24 12/31/25  Fela Blackman APRN   Treprostinil (Tyvaso) 0.6 MG/ML solution inhalation solution Inhale 14 puffs 4 (Four) Times a Day.  Patient taking differently: Inhale 12 puffs 4 (Four) Times a Day. 8/9/24   Roosevelt Escalera MD   venlafaxine (EFFEXOR) 75 MG tablet Take 1 tablet by mouth Daily.    Farida Cano APRN   vitamin D (ERGOCALCIFEROL) 1.25 MG (53193 UT) capsule capsule Take 1 capsule by mouth 1 (One) Time Per Week. 5/10/23   Aaron Stoddard MD   ursodiol (ACTIGALL) 300 MG capsule Take 1 capsule by mouth 2 (Two) Times a Day.  Patient not taking: Reported on 3/21/2025  3/21/25  Provider, MD Latanya         Allergies:   Codeine, Latex, and Prednisone      Results Review:   Results from last 7 days   Lab Units 03/21/25  0308 03/20/25  1618 03/20/25  1442   SODIUM mmol/L 137  --  139   SODIUM, ARTERIAL mmol/L  --  141  --    POTASSIUM mmol/L 4.5  --  3.2*   CHLORIDE mmol/L 103  --  100   CO2 mmol/L 24.0  --  28.0   BUN mg/dL 7*  --  7*   CALCIUM mg/dL 8.5*  --  9.0     Results from last 7 days   Lab Units 03/21/25  0308 03/20/25  1442   WBC 10*3/mm3 5.87 8.54   HEMOGLOBIN g/dL 12.9 13.1   HEMATOCRIT % 44.3 43.0   PLATELETS 10*3/mm3 221 267       Visit Vitals  /55 (BP Location: Right arm, Patient Position: Lying)   Pulse 79   Temp 97.3 °F (36.3 °C) (Oral)   Resp 16   Ht 160 cm (63\")   Wt 70.1 kg (154 lb 9.6 oz)   SpO2 98%   BMI 27.39 kg/m²                Alicia Keane DO  Pulmonary/Critical Care  3/21/2025  12:27 CDT  "

## 2025-03-21 NOTE — THERAPY DISCHARGE NOTE
Acute Care - Occupational Therapy Discharge Summary  [facility]     Patient Name: Elaine Juárez  : 1960  MRN: [id number]    Today's Date: 3/21/2025                 Admit Date: 3/20/2025        OT Recommendation and Plan    Visit Dx:    ICD-10-CM ICD-9-CM   1. Acute respiratory failure with hypoxia  J96.01 518.81   2. Altered mental status, unspecified altered mental status type  R41.82 780.97   3. Pneumonia of left lower lobe due to infectious organism  J18.9 486          Time Calculation- OT       Row Name 25 0755             Time Calculation- OT    OT Start Time 0755  -CH      OT Stop Time 0836  -CH      OT Time Calculation (min) 41 min  -CH      OT Received On 25  -CH         Untimed Charges    OT Eval/Re-eval Minutes 41  -CH         Total Minutes    Untimed Charges Total Minutes 41  -CH       Total Minutes 41  -CH                User Key  (r) = Recorded By, (t) = Taken By, (c) = Cosigned By      Initials Name Provider Type     Latseha Palma OTR/L Occupational Therapist                                  Therapy Charges for Today       Code Description Service Date Service Provider Modifiers Qty    09198593895 HC OT EVAL LOW COMPLEXITY 3 3/21/2025 Latesha Palma OTR/L GO 1            OT Discharge Summary  Anticipated Discharge Disposition (OT): home with assist  Reason for Discharge: At baseline function  Outcomes Achieved: Refer to plan of care for updates on goals achieved  Discharge Destination: Home with assist      THERON Luna/SAMPSON  3/21/2025

## 2025-03-21 NOTE — PLAN OF CARE
"Clinical bedside swallow evaluation complete. The pt is noted to be alert and oriented x4 with daughter and granddaughter present at bedside. The pt reports she has had swallowing difficulties for years and has undergone multiple \"barium swallow studies.\" SLP reviewed last study which was complete at [facility] in March of 2023. This study did not show any penetration or aspiration throughout. She reports some concerns with aspiration at this time. She completed a full range of PO consistencies with 1-3 multiple swallows noted at times with all consistencies. SLP also notes decreased superior laryngeal movement with palpation during swallow completion. 1x significantly delayed cough following regular solid trials. She also reports sensation of food hanging in throat with majority of trials. No vocal change noted with trials on this date.    Recommendations:  Ok to continue a regular diet with thin liquids.  Ok for meds to be administered whole with thin liquids as tolerated.  Possible MBSS to objectively assess swallow function early next weak if further concerns arise.  SLP will continue to follow and treat in order to ensure diet tolerance.      "

## 2025-03-21 NOTE — CASE MANAGEMENT/SOCIAL WORK
Discharge Planning Assessment  [facility]     Patient Name: Elaine Juárez  MRN: [id number]  Today's Date: 3/21/2025    Admit Date: 3/20/2025        Discharge Needs Assessment       Row Name 03/21/25 0855       Living Environment    People in Home spouse    Name(s) of People in Home Garrison Juárez  [phone]  -     Current Living Arrangements home    Potentially Unsafe Housing Conditions none    In the past 12 months has the electric, gas, oil, or water company threatened to shut off services in your home? No    Primary Care Provided by self    Provides Primary Care For no one    Family Caregiver if Needed child(aida), adult;spouse    Quality of Family Relationships helpful;involved;supportive    Able to Return to Prior Arrangements yes       Resource/Environmental Concerns    Resource/Environmental Concerns none    Transportation Concerns none       Transportation Needs    In the past 12 months, has lack of transportation kept you from medical appointments or from getting medications? no    In the past 12 months, has lack of transportation kept you from meetings, work, or from getting things needed for daily living? No       Food Insecurity    Within the past 12 months, you worried that your food would run out before you got the money to buy more. Never true    Within the past 12 months, the food you bought just didn't last and you didn't have money to get more. Never true       Transition Planning    Patient/Family Anticipates Transition to home with family    Patient/Family Anticipated Services at Transition durable medical equipment;respiratory services    Transportation Anticipated family or friend will provide       Discharge Needs Assessment    Equipment Currently Used at Home nebulizer;oxygen;cpap;power chair,(recliner lift);respiratory supplies    Concerns to be Addressed discharge planning    Do you want help finding or keeping work or a job? I do not need or want help    Do you want help with  school or training? For example, starting or completing job training or getting a high school diploma, GED or equivalent No    Anticipated Changes Related to Illness none    Equipment Needed After Discharge none    Discharge Coordination/Progress Lives at home with . Daughters are helpful and involved. Has needed DME (listed above). Oxygen 4 liters at rest, 8 with exertion, and 6 with sleep (Provider is [facility]).  Has established care with needed providers to manage her chronic illness. Has had [facility] in past.  CM/SS will closely follow during this admission and be available to assist with any discharge planning needs or arrangements.                   Discharge Plan    No documentation.                      Demographic Summary    No documentation.                  Functional Status    No documentation.                  Psychosocial    No documentation.                  Abuse/Neglect    No documentation.                  Legal    No documentation.                  Substance Abuse    No documentation.                  Patient Forms    No documentation.                     Ernestine Diane RN

## 2025-03-21 NOTE — H&P
[facility]  HISTORY AND PHYSICAL    Date of Admission: 3/20/2025  Primary Care Physician: Aaron Stoddard MD    Subjective   Primary Historian: Patient    Chief Complaint: Shortness of breath and confusion    History of Present Illness  This is a 65-year-old female patient with a medical history of crest syndrome, interstitial lung disease, pulmonary arterial hypertension on macitentan and tadalafil, chronic respiratory failure on home oxygen, presenting to the ED for the evaluation of shortness of breath.  As reported, patient has been having progressive shortness of breath with hypoxia for the last several days, this was associated with a mild confusion.  Additionally, it was reported that the patient had nausea and vomiting.  She was seen by her PCP prior to arrival to the ED.  At the moment she denies chest pain, and fever or chills.        Review of Systems   Otherwise complete ROS reviewed and negative except as mentioned in the HPI.    Past Medical History:   Past Medical History:   Diagnosis Date    Allergies     Arthritis     BMI 31.0-31.9,adult 06/04/2019    Calcified granuloma of lung 06/04/2019    Right lung    CHF (congestive heart failure)     denies    Chronic idiopathic fibrosing alveolitis     Chronic respiratory failure with hypoxia 08/05/2020    Chronic respiratory failure with hypoxia, on home oxygen therapy     COVID-19 02/01/2022    CREST syndrome     Environmental allergies 02/01/2022    Gastroesophageal reflux disease without esophagitis 06/04/2019    Interstitial lung disease     Obstructive sleep apnea on CPAP 06/04/2019    Oropharyngeal dysphagia 01/26/2023    Primary central sleep apnea     Pulmonary fibrosis     Pulmonary hypertension     Rheumatoid arthritis     Right ventricular systolic dysfunction 05/04/2023    Short-term memory loss      Past Surgical History:  Past Surgical History:   Procedure Laterality Date    BREAST  "BIOPSY      CARDIAC CATHETERIZATION N/A 07/22/2020    Procedure: Right Heart Cath;  Surgeon: Thong Martin MD;  Location:  [facility];  Service: Cardiology;  Laterality: N/A;    CHOLECYSTECTOMY      COLONOSCOPY  05/11/2015    5 POLYPS    COLONOSCOPY N/A 08/04/2021    Procedure: COLONOSCOPY WITH ANESTHESIA;  Surgeon: Michael Landin DO;  Location:  [facility];  Service: Gastroenterology;  Laterality: N/A;  pre-hx polyps  post-colon polyps    ENDOSCOPY N/A 08/04/2021    Procedure: ESOPHAGOGASTRODUODENOSCOPY WITH ANESTHESIA;  Surgeon: Michael Landin DO;  Location:  [facility];  Service: Gastroenterology;  Laterality: N/A;  pre-dysphagia  post-normal  pcp-lanette aguila     Social History:  reports that she has never smoked. She has been exposed to tobacco smoke. She has never used smokeless tobacco. She reports that she does not drink alcohol and does not use drugs.    Family History: family history includes Cirrhosis in her sister; Liver cancer in her brother; No Known Problems in her father and mother.       Allergies:  Allergies   Allergen Reactions    Codeine Nausea And Vomiting    Latex Rash    Prednisone Mental Status Change     High doses makes her \"nuts\"       Medications:  Prior to Admission medications    Medication Sig Start Date End Date Taking? Authorizing Provider   alendronate (FOSAMAX) 70 MG tablet Take 1 tablet by mouth 1 (One) Time Per Week. 4/25/24   Latanya Spring MD   busPIRone (BUSPAR) 10 MG tablet Take 1 tablet 3 times a day by oral route as needed for 30 days, for anxiety. 4/29/24   Latanya Spring MD   Macitentan-Tadalafil 10-40 MG tablet Take 1 tablet by mouth Daily. 10/30/24   Roosevelt Escalera MD   metoprolol succinate XL (TOPROL-XL) 25 MG 24 hr tablet Take 0.5 tablets by mouth Daily. 2/7/25   Roosevelt Escalera MD   mycophenolate (CELLCEPT) 500 MG tablet Take 2 tablets by mouth 2 (Two) Times a Day.    Antonia Landry MD   O2 (OXYGEN) Inhale 6-8 " "L/min 1 (One) Time. 6 at rest, 8 during exertion    ProviderLatanya MD   omeprazole (priLOSEC) 40 MG capsule Take 1 capsule by mouth Daily. 1/8/25   Fela Blackman APRN   predniSONE (DELTASONE) 5 MG tablet Take 1 tablet by mouth Daily. 4/11/24   Fela Blackman APRN   Sotatercept-csrk (Winrevair) 60 MG kit Inject 60 mg under the skin into the appropriate area as directed Every 21 (Twenty-One) Days.    ProviderLatanya MD   spironolactone (ALDACTONE) 50 MG tablet Take 1 tablet by mouth Daily. 8/18/23   Roosevelt Escalera MD   traZODone (DESYREL) 100 MG tablet Take 1 tablet by mouth Every Night for 90 days. 8/28/24 12/31/25  Fela Blackman APRN   Treprostinil (Tyvaso) 0.6 MG/ML solution inhalation solution Inhale 14 puffs 4 (Four) Times a Day. 8/9/24   Roosevelt Escalera MD   ursodiol (ACTIGALL) 300 MG capsule Take 1 capsule by mouth 2 (Two) Times a Day.    ProviderLatanya MD   venlafaxine (EFFEXOR) 75 MG tablet Take 1 tablet by mouth Daily.    Farida Cano APRN   vitamin D (ERGOCALCIFEROL) 1.25 MG (02988 UT) capsule capsule Take 1 capsule by mouth 1 (One) Time Per Week. 5/10/23   Aaron Stoddard MD   ipratropium-albuterol (DUO-NEB) 0.5-2.5 mg/3 ml nebulizer Take 3 mL by nebulization 4 (Four) Times a Day for 30 days.  Patient not taking: Reported on 2/27/2025 12/3/24 3/20/25  Fela Blackman APRN     I have utilized all available immediate resources to obtain, update, or review the patient's current medications (including all prescriptions, over-the-counter products, herbals, cannabis/cannabidiol products, and vitamin/mineral/dietary (nutritional) supplements).    Objective     Vital Signs: /78 (BP Location: Right arm, Patient Position: Lying)   Pulse 99   Temp 98.2 °F (36.8 °C) (Oral)   Resp 20   Ht 160 cm (63\")   Wt 70.1 kg (154 lb 9.6 oz)   SpO2 94%   BMI 27.39 kg/m²   Physical Exam  Constitutional:       Appearance: She is ill-appearing.   Cardiovascular:      Rate and Rhythm: " Normal rate and regular rhythm.      Pulses: Normal pulses.      Heart sounds: Normal heart sounds. No murmur heard.  Pulmonary:      Effort: Respiratory distress present.      Breath sounds: No wheezing or rales.   Abdominal:      General: Bowel sounds are normal. There is no distension.      Palpations: Abdomen is soft.      Tenderness: There is no abdominal tenderness. There is no guarding.   Musculoskeletal:      Right lower leg: No edema.      Left lower leg: No edema.   Skin:     General: Skin is warm.   Neurological:      General: No focal deficit present.      Mental Status: She is alert and oriented to person, place, and time.              Results Reviewed:  Lab Results (last 24 hours)       Procedure Component Value Units Date/Time    Respiratory Panel PCR w/COVID-19(SARS-CoV-2) [facility]ISHMA/LUIS ARMANDO/[facility] In-House, NP Swab in UTM/VTM, 2 HR TAT - Swab, Nasopharynx [[id number]]  (Normal) Collected: 03/20/25 1635    Specimen: Swab from Nasopharynx Updated: 03/20/25 2100     ADENOVIRUS, PCR Not Detected     Coronavirus 229E Not Detected     Coronavirus HKU1 Not Detected     Coronavirus NL63 Not Detected     Coronavirus OC43 Not Detected     COVID19 Not Detected     Human Metapneumovirus Not Detected     Human Rhinovirus/Enterovirus Not Detected     Influenza A PCR Not Detected     Influenza B PCR Not Detected     Parainfluenza Virus 1 Not Detected     Parainfluenza Virus 2 Not Detected     Parainfluenza Virus 3 Not Detected     Parainfluenza Virus 4 Not Detected     RSV, PCR Not Detected     Bordetella pertussis pcr Not Detected     Bordetella parapertussis PCR Not Detected     Chlamydophila pneumoniae PCR Not Detected     Mycoplasma pneumo by PCR Not Detected    Narrative:      In the setting of a positive respiratory panel with a viral infection PLUS a negative procalcitonin without other underlying concern for bacterial infection, consider observing off antibiotics or discontinuation of antibiotics and continue  supportive care. If the respiratory panel is positive for atypical bacterial infection (Bordetella pertussis, Chlamydophila pneumoniae, or Mycoplasma pneumoniae), consider antibiotic de-escalation to target atypical bacterial infection.    Lactic Acid, Plasma [[id number]]  (Normal) Collected: 03/20/25 1943    Specimen: Blood from Arm, Left Updated: 03/20/25 2012     Lactate 1.1 mmol/L     Urinalysis With Culture If Indicated - Urine, Clean Catch [[id number]]  (Abnormal) Collected: 03/20/25 1920    Specimen: Urine, Clean Catch Updated: 03/20/25 1927     Color, UA Yellow     Appearance, UA Clear     pH, UA 6.5     Specific Gravity, UA 1.014     Glucose, UA Negative     Ketones, UA Trace     Bilirubin, UA Negative     Blood, UA Negative     Protein, UA Negative     Leuk Esterase, UA Negative     Nitrite, UA Negative     Urobilinogen, UA 0.2 E.U./dL    Narrative:      In absence of clinical symptoms, the presence of pyuria, bacteria, and/or nitrites on the urinalysis result does not correlate with infection.  Urine microscopic not indicated.    COVID PRE-OP / PRE-PROCEDURE SCREENING ORDER (NO ISOLATION) - Swab, Nasopharynx [[id number]]  (Normal) Collected: 03/20/25 1635    Specimen: Swab from Nasopharynx Updated: 03/20/25 1720    Narrative:      The following orders were created for panel order COVID PRE-OP / PRE-PROCEDURE SCREENING ORDER (NO ISOLATION) - Swab, Nasopharynx.  Procedure                               Abnormality         Status                     ---------                               -----------         ------                     COVID-19, FLU A/B, RSV P...[[id number]]  Normal              Final result                 Please view results for these tests on the individual orders.    COVID-19, FLU A/B, RSV PCR 1 HR TAT - Swab, Nasopharynx [[id number]]  (Normal) Collected: 03/20/25 1635    Specimen: Swab from Nasopharynx Updated: 03/20/25 1720     COVID19 Not Detected     Influenza A PCR Not Detected      Influenza B PCR Not Detected     RSV, PCR Not Detected    Narrative:      Fact sheet for providers: https://www.fda.gov/media/018556/download    Fact sheet for patients: https://www.fda.gov/media/162164/download    Test performed by PCR.    Ammonia [[id number]]  (Normal) Collected: 03/20/25 1632    Specimen: Blood Updated: 03/20/25 1702     Ammonia 11 umol/L     High Sensitivity Troponin T 1Hr [[id number]]  (Normal) Collected: 03/20/25 1632    Specimen: Blood Updated: 03/20/25 1701     HS Troponin T 9 ng/L      Troponin T Numeric Delta -1 ng/L     Narrative:      High Sensitive Troponin T Reference Range:  <14.0 ng/L- Negative Female for AMI  <22.0 ng/L- Negative Male for AMI  >=14 - Abnormal Female indicating possible myocardial injury.  >=22 - Abnormal Male indicating possible myocardial injury.   Clinicians would have to utilize clinical acumen, EKG, Troponin, and serial changes to determine if it is an Acute Myocardial Infarction or myocardial injury due to an underlying chronic condition.         Lipase [[id number]]  (Abnormal) Collected: 03/20/25 1442    Specimen: Blood from Arm, Right Updated: 03/20/25 1643     Lipase 70 U/L     BNP [[id number]]  (Normal) Collected: 03/20/25 1442    Specimen: Blood from Arm, Right Updated: 03/20/25 1643     proBNP 92.2 pg/mL     Narrative:      This assay is used as an aid in the diagnosis of individuals suspected of having heart failure. It can be used as an aid in the diagnosis of acute decompensated heart failure (ADHF) in patients presenting with signs and symptoms of ADHF to the emergency department (ED). In addition, NT-proBNP of <300 pg/mL indicates ADHF is not likely.    Age Range Result Interpretation  NT-proBNP Concentration (pg/mL:      <50             Positive            >450                   Gray                 300-450                    Negative             <300    50-75           Positive            >900                  Gray                300-900                   Negative            <300      >75             Positive            >1800                  Gray                300-1800                  Negative            <300    Magnesium [[id number]]  (Normal) Collected: 03/20/25 1442    Specimen: Blood from Arm, Right Updated: 03/20/25 1617     Magnesium 1.9 mg/dL     High Sensitivity Troponin T [[id number]]  (Normal) Collected: 03/20/25 1442    Specimen: Blood from Arm, Right Updated: 03/20/25 1617     HS Troponin T 10 ng/L     Narrative:      High Sensitive Troponin T Reference Range:  <14.0 ng/L- Negative Female for AMI  <22.0 ng/L- Negative Male for AMI  >=14 - Abnormal Female indicating possible myocardial injury.  >=22 - Abnormal Male indicating possible myocardial injury.   Clinicians would have to utilize clinical acumen, EKG, Troponin, and serial changes to determine if it is an Acute Myocardial Infarction or myocardial injury due to an underlying chronic condition.         Blood Gas, Arterial With Co-Ox [[id number]]  (Abnormal) Collected: 03/20/25 1618    Specimen: Arterial Blood Updated: 03/20/25 1617     Site Left Radial     Tony's Test Positive     pH, Arterial 7.427 pH units      pCO2, Arterial 44.7 mm Hg      pO2, Arterial 61.0 mm Hg      Comment: 84 Value below reference range        HCO3, Arterial 29.4 mmol/L      Comment: 83 Value above reference range        Base Excess, Arterial 4.3 mmol/L      Comment: 83 Value above reference range        O2 Saturation, Arterial 92.7 %      Comment: 84 Value below reference range        Hemoglobin, Blood Gas 13.3 g/dL      Hematocrit, Blood Gas 40.9 %      Oxyhemoglobin 91.2 %      Comment: 84 Value below reference range        Methemoglobin 0.00 %      Carboxyhemoglobin 1.6 %      Temperature 37.0     Sodium, Arterial 141 mmol/L      Potassium, Arterial 3.5 mmol/L      Comment: 84 Value below reference range        Barometric Pressure for Blood Gas 754 mmHg      Modality Nasal Cannula     Flow Rate 5.5 lpm      Ventilator  Mode NA     Collected by [id number]     Comment: Meter: [id number]     :  Lindsey Garza, RRT        pH, Temp Corrected 7.427 pH Units      pCO2, Temperature Corrected 44.7 mm Hg      pO2, Temperature Corrected 61.0 mm Hg           Imaging Results (Last 24 Hours)       Procedure Component Value Units Date/Time    CT Head Without Contrast [[id number]] Collected: 03/20/25 1653     Updated: 03/20/25 1703    Narrative:      EXAM/TECHNIQUE: CT head without contrast     INDICATION: Confusion, altered mental status     COMPARISON: 5/6/2024     DLP: 589.03 mGy.cm. Automated exposure control was utilized to decrease  patient radiation dose.     FINDINGS:     No evidence of intracranial hemorrhage. Gray-white differentiation is  maintained. No midline shift or mass effect. Lateral ventricles are  nondilated. Basilar cisterns are patent. No acute orbital finding.  Mastoid air cells are clear. Visualized paranasal sinuses are clear. No  acute osseous finding.       Impression:         No acute intracranial findings.        This report was signed and finalized on 3/20/2025 5:00 PM by Dr. Adam Castillo MD.       XR Chest 1 View [[id number]] Collected: 03/20/25 1617     Updated: 03/20/25 1622    Narrative:      EXAMINATION: XR CHEST 1 VW-  3/20/2025 4:17 PM 1 view     HISTORY: Shortness of breath     COMPARISON: 5/6/2024 9/11/2022, 4/20/2020 to 7/24/2023     TECHNIQUE: A single frontal view of the chest was obtained.     FINDINGS:  Shallow inspiration with patchy bibasilar consolidations. The heart is  mildly enlarged and there is pulmonary vascular congestion. Interstitial  opacities are redemonstrated as well. There is some atherosclerotic  vascular disease in the aortic arch. Degenerative changes in the spine.  No definite acute osseous abnormality identified on this exam. There are  granulomatous calcifications. Bronchiectasis was better seen on the  previous CT.       Impression:         1.  Shallow  inspiration with bibasilar opacities, LEFT greater than  RIGHT. I suspect that much of the interstitial opacities seen  bilaterally related to underlying fibrosis and interstitial changes seen  on the CT from 7/24/2023. Superimposed pneumonia is not excluded at the  LEFT lung base.           This report was signed and finalized on 3/20/2025 4:19 PM by Dr. Ernesto Camacho MD.             I have personally reviewed and interpreted the radiology studies and ECG obtained at time of admission.     Assessment / Plan   Assessment:   Active Hospital Problems    Diagnosis     **Respiratory failure with hypoxia        Treatment Plan  The patient will be admitted to my service here at [facility].     Assessment and plan:  #Pneumonia.  #Acute on chronic hypoxemic respiratory failure.  #History of CREST syndrome on mycophenolate.  #History of ILD.  #History of pulmonary arterial hypertension on macitentan and tadalafil.    -Admitting to floor.  -Starting on empiric antibiotics  -Patient received Solu-Medrol 125 mg dose in the ED, I would hold off on further steroids for now.  Awaiting for pulmonary recs.  -Placed a.m. consult for pulmonary.  -Nebulizer treatment as needed.  -DVT prophylaxis with Lovenox subcu.  -Oxygen as needed.    Medical Decision Making  Number and Complexity of problems: 2 moderate and acute, the rest are chronic  Differential Diagnosis: As above    Conditions and Status        Condition is worsening.     [facility] Data  External documents reviewed: Yes  Cardiac tracing (EKG, telemetry) interpretation: Yes  Radiology interpretation: Yes  Labs reviewed: Yes  Any tests that were considered but not ordered: No     Decision rules/scores evaluated (example [id number], Wells, etc): No     Discussed with: Patient and ED provider     Care Planning  Shared decision making: Discussed the plan with the patient and she was agreeable  Code status and discussions: I discussed the CODE STATUS with the patient and  her family at bedside and she is full code    Disposition  Social Determinants of Health that impact treatment or disposition: Not at the moment  Estimated length of stay is 2 to 3 days.     I confirmed that the patient's advanced care plan is present, code status is documented, and a surrogate decision maker is listed in the patient's medical record.         The patient was seen and examined by me on 3/20 at 7:30 PM.    Electronically signed by Radha Blanc MD, 03/21/25, 01:41 CDT.

## 2025-03-21 NOTE — THERAPY EVALUATION
Patient Name: Elaine Juárez  : 1960    MRN: [id number]                              Today's Date: 3/21/2025       Admit Date: 3/20/2025    Visit Dx:     ICD-10-CM ICD-9-CM   1. Acute respiratory failure with hypoxia  J96.01 518.81   2. Altered mental status, unspecified altered mental status type  R41.82 780.97   3. Pneumonia of left lower lobe due to infectious organism  J18.9 486   4. Dysphagia, unspecified type  R13.10 787.20   5. Decreased functional activity tolerance [R68.89]  R68.89 780.99     Patient Active Problem List   Diagnosis    CREST syndrome, partial     Raynaud's phenomenon    Pulmonary fibrosis prob sec underlying autoimmune disease    Gastroesophageal reflux disease without esophagitis    BMI 31.0-31.9,adult    Chronic respiratory failure with hypoxia    History of adenomatous polyp of colon    Environmental allergies    PAH (pulmonary arterial hypertension) with portal hypertension    Hypokalemia    Panic attack    Paranoia (psychosis)    H/O noncompliance with medical treatment, presenting hazards to health    Bipolar affective disorder, currently manic, moderate    Obesity (BMI 30-39.9)    Esophageal dysmotility    Oropharyngeal dysphagia    Right ventricular systolic dysfunction    Respiratory failure with hypoxia    Metabolic encephalopathy    Generalized weakness     Past Medical History:   Diagnosis Date    Allergies     Arthritis     BMI 31.0-31.9,adult 2019    Calcified granuloma of lung 2019    Right lung    CHF (congestive heart failure)     denies    Chronic idiopathic fibrosing alveolitis     Chronic respiratory failure with hypoxia 2020    Chronic respiratory failure with hypoxia, on home oxygen therapy     COVID-19 2022    CREST syndrome     Environmental allergies 2022    Gastroesophageal reflux disease without esophagitis 2019    Interstitial lung disease     Obstructive sleep apnea on CPAP 2019    Oropharyngeal dysphagia  01/26/2023    Primary central sleep apnea     Pulmonary fibrosis     Pulmonary hypertension     Rheumatoid arthritis     Right ventricular systolic dysfunction 05/04/2023    Short-term memory loss      Past Surgical History:   Procedure Laterality Date    BREAST BIOPSY      CARDIAC CATHETERIZATION N/A 07/22/2020    Procedure: Right Heart Cath;  Surgeon: Thong Martin MD;  Location: [facility] CATH INVASIVE LOCATION;  Service: Cardiology;  Laterality: N/A;    CHOLECYSTECTOMY      COLONOSCOPY  05/11/2015    5 POLYPS    COLONOSCOPY N/A 08/04/2021    Procedure: COLONOSCOPY WITH ANESTHESIA;  Surgeon: Michael Landin DO;  Location: [facility] ENDOSCOPY;  Service: Gastroenterology;  Laterality: N/A;  pre-hx polyps  post-colon polyps    ENDOSCOPY N/A 08/04/2021    Procedure: ESOPHAGOGASTRODUODENOSCOPY WITH ANESTHESIA;  Surgeon: Michael Landin DO;  Location: [facility] ENDOSCOPY;  Service: Gastroenterology;  Laterality: N/A;  pre-dysphagia  post-normal  pcp-lanette aguila      General Information       Row Name 03/21/25 1413          Physical Therapy Time and Intention    Document Type evaluation  Admission: respiratory failure with hypoxia  -MS MD armin (r)) MS (c)     Mode of Treatment physical therapy  -MS MD MS Josh (r t)c)       Row Name 03/21/25 1413          General Information    Patient Profile Reviewed yes  -MS MD armin (r)) MS (c)     Prior Level of Function independent:;gait;all household mobility;min assist:;ADL's  -MS MD armin (r)) MS (c)     Existing Precautions/Restrictions fall;oxygen therapy device and L/min  -MS MD armin (r)) MS (c)     Barriers to Rehab medically complex  -MS MD armin (r)) MS (c)       Row Name 03/21/25 1413          Living Environment    Current Living Arrangements home  -MS MD armin (r)) MS (c)     People in Home spouse;other (see comments)  children live close.  -MS MD armin (r)) MS (c)       Row Name 03/21/25 1413          Home Main Entrance    Number of Stairs, Main Entrance two  -MS MD armin (r)) MS Joshc)      Stair Railings, Main Entrance none  -MS (r) MD (t) MS (c)       Row Name 03/21/25 1413          Stairs Within Home, Primary    Number of Stairs, Within Home, Primary none  -MS (r) MD (t) MS (c)       Row Name 03/21/25 1413          Cognition    Orientation Status (Cognition) oriented x 4  -MS (r) MD (t) MS (c)       Row Name 03/21/25 1413          Safety Issues/Impairments Affecting Functional Mobility    Impairments Affecting Function (Mobility) shortness of breath;endurance/activity tolerance;strength  -MS (r) MD (t) MS (c)               User Key  (r) = Recorded By, (t) = Taken By, (c) = Cosigned By      Initials Name Provider Type    Jia Nye, PT, DPT, NCS Physical Therapist    Elaine Romero, PT Student PT Student                   Mobility       Row Name 03/21/25 1413          Bed Mobility    Bed Mobility supine-sit;sit-supine  -MS (r) MD (t) MS (c)     Supine-Sit Chancellor (Bed Mobility) modified independence  -MS (r) MD (t) MS (c)     Sit-Supine Chancellor (Bed Mobility) modified independence  -MS (r) MD (t) MS (c)     Assistive Device (Bed Mobility) head of bed elevated  -MS (r) MD (t) MS (c)       Row Name 03/21/25 1413          Sit-Stand Transfer    Sit-Stand Chancellor (Transfers) standby assist;contact guard  -MS (r) MD (t) MS (c)       Row Name 03/21/25 1413          Gait/Stairs (Locomotion)    Chancellor Level (Gait) contact guard  -MS (r) MD (t) MS (c)     Patient was able to Ambulate yes  -MS (r) MD (t) MS (c)     Distance in Feet (Gait) 50  -MS (r) MD (t) MS (c)     Stairs, Impairments other (see comments)  -MS (r) MD (t) MS (c)     Comment, (Gait/Stairs) Pt amb 50', 50' & 20' with CGA using a reciprocal gait pattern with 8L of O2 with no LOB. Pt demo SOB requiring rest break after 50' at end of choudhary before ambulating back to room.  -MS (r) MD (t) MS (c)               User Key  (r) = Recorded By, (t) = Taken By, (c) = Cosigned By      Initials Name Provider Type    MS Medina  Jia ANAYA, PT, DPT, NCS Physical Therapist    Elaine Romero, PT Student PT Student                   Obj/Interventions       Row Name 03/21/25 1413          Range of Motion Comprehensive    General Range of Motion bilateral lower extremity ROM WFL  -MS (r) MD (t) MS (c)       Row Name 03/21/25 1413          Strength Comprehensive (MMT)    General Manual Muscle Testing (MMT) Assessment other (see comments)  -MS (r) MD (t) MS (c)     Comment, General Manual Muscle Testing (MMT) Assessment pt demo bilateral LE strength 4-/5.  -MS (r) MD (t) MS (c)       Row Name 03/21/25 1413          Balance    Balance Assessment sitting static balance;sitting dynamic balance;standing static balance;standing dynamic balance  -MS (r) MD (t) MS (c)     Static Sitting Balance modified independence  -MS (r) MD (t) MS (c)     Dynamic Sitting Balance modified independence  -MS (r) MD (t) MS (c)     Position, Sitting Balance supported;unsupported;sitting edge of bed  -MS (r) MD (t) MS (c)     Static Standing Balance standby assist;contact guard  -MS (r) MD (t) MS (c)     Dynamic Standing Balance contact guard;standby assist  -MS (r) MD (t) MS (c)     Position/Device Used, Standing Balance unsupported  -MS (r) MD (t) MS (c)       Row Name 03/21/25 1413          Sensory Assessment (Somatosensory)    Sensory Assessment (Somatosensory) other (see comments)  Pt demo LE intact bilaterally however she states that she has tingling/numbness in the LE but was intact during testing  -MS (r) MD (t) MS (c)               User Key  (r) = Recorded By, (t) = Taken By, (c) = Cosigned By      Initials Name Provider Type    Jia Nye, PT, DPT, NCS Physical Therapist    Elaine Romero, PT Student PT Student                   Goals/Plan       Row Name 03/21/25 6442          Gait Training Goal 1 (PT)    Activity/Assistive Device (Gait Training Goal 1, PT) gait (walking locomotion);increase endurance/gait distance;other (see comments)  while  maintaining O2 sat above 90  -MS (r) MD (t) MS (c)     [place] Level (Gait Training Goal 1, PT) modified independence  -MS (r) MD (t) MS (c)     Distance (Gait Training Goal 1, PT) 70  -MS (r) MD (t) MS (c)     Time Frame (Gait Training Goal 1, PT) long term goal (LTG);10 days  -MS (r) MD (t) MS (c)       Row Name 03/21/25 1547          Therapy Assessment/Plan (PT)    Planned Therapy Interventions (PT) patient/family education;strengthening;gait training  -MS (r) MD (t) MS (c)               User Key  (r) = Recorded By, (t) = Taken By, (c) = Cosigned By      Initials Name Provider Type    MS Adam Jia HÉCTOR, PT, DPT, NCS Physical Therapist    Elaine Romero, PT Student PT Student                   Clinical Impression       Row Name 03/21/25 1525          Pain    Pretreatment Pain Rating 0/10 - no pain  -MS (r) MD (t) MS (c)     Additional Documentation Pain Scale: FACES Pre/Post-Treatment (Group)  -MS (r) MD (t) MS (c)       Row Name 03/21/25 1525          Pain Scale: FACES Pre/Post-Treatment    Posttreatment Pain Rating 0-->no hurt  -MS (r) MD (t) MS (c)       Row Name 03/21/25 1521          Plan of Care Review    Plan of Care Reviewed With patient;family  -MS (r) MD (t) MS (c)     Progress improving  -MS (r) MD (t) MS (c)     Outcome Evaluation PT evaluation completed. Pt was plesant, agreeable to therapy, orientedx4, with no c/o of pain. Pt states that prior to admission that she was ambulating around home with oxygen in home with portable at home and performing ADLs with Fabián for showering. Pt performed all t/fs with CGA and bed mobility supine <>sit with head of the bed elevated Mod I throughout session. During muscle testing pt was noted to demo bilateral LE weaknessand demo de-sating requiring breaks between muscle testing. Pt was able to ambulate CGA/SBA with 8L of O2 multiple times during session with the longest distance being 50' before requiring a rest break at the end of hallway due to SOB and  fatigue with O2 sat reading range from 97-89 during rest break before ambalating 50' back to room with CGA/SBA with O2 reading 89 on return. Throughout the evaluation upon any activity with excertion the pt demo diffculty maintaining O2 sat and demo SOB requiring breaks during seated strength testing, long, and short distances of walking. Pt was left in fowlers with 6L of O2, family, call light, and nursing staff. Recommended that during stay pt stay recieve PT to continue to improve LE strength, activity tolerance/endurance. After discussion with the pt it is recommended that pt discharge to SNF to improve cardiovascular endurance, improve functional mobility tolerance, and increase LE strength with the pt recommending to discharge to [place]. (P)   -MD       Row Name 03/21/25 1525          Therapy Assessment/Plan (PT)    Rehab Potential (PT) fair  -MS (r) MD (t) MS (c)     Criteria for Skilled Interventions Met (PT) yes;skilled treatment is necessary  -MS (r) MD (t) MS (c)     Therapy Frequency (PT) 2 times/day  -MS (r) MD (t) MS (c)     Predicted Duration of Therapy Intervention (PT) until discharge  -MS (r) MD (t) MS (c)       Row Name 03/21/25 1523          Vital Signs    Pre SpO2 (%) 94  -MS (r) MD (t) MS (c)     O2 Delivery Pre Treatment hi-flow  -MS (r) MD (t) MS (c)     Intra SpO2 (%) 90  -MS (r) MD (t) MS (c)     O2 Delivery Intra Treatment hi-flow  -MS (r) MD (t) MS (c)     Post SpO2 (%) 89  -MS (r) MD (t) MS (c)     O2 Delivery Post Treatment hi-flow  -MS (r) MD (t) MS (c)       Row Name 03/21/25 1525          Positioning and Restraints    Pre-Treatment Position in bed  -MS (r) MD (t) MS (c)     Post Treatment Position bed  -MS (r) MD (t) MS (c)     In Bed fowlers;with family/caregiver;with nsg;call light within reach  -MS (r) MD (t) MS (c)               User Key  (r) = Recorded By, (t) = Taken By, (c) = Cosigned By      Initials Name Provider Type    Jia Nye R, PT, DPT, NCS Physical  Therapist    Elaine Romero, PT Student PT Student                   Outcome Measures       Row Name 03/21/25 1548 03/21/25 0930       How much help from another person do you currently need...    Turning from your back to your side while in flat bed without using bedrails? 4  -MS (r) MD (t) MS (c) 4  -DF    Moving from lying on back to sitting on the side of a flat bed without bedrails? 4  -MS (r) MD (t) MS (c) 3  -DF    Moving to and from a bed to a chair (including a wheelchair)? 3  -MS (r) MD (t) MS (c) 3  -DF    Standing up from a chair using your arms (e.g., wheelchair, bedside chair)? 3  -MS (r) MD (t) MS (c) 3  -DF    Climbing 3-5 steps with a railing? 3  -MS (r) MD (t) MS (c) 3  -DF    To walk in hospital room? 3  -MS (r) MD (t) MS (c) 3  -DF    AM-PAC 6 Clicks Score (PT) 20  -MS (r) MD (t) 19  -DF    Highest Level of Mobility Goal 6 --> Walk 10 steps or more  -MS (r) MD (t) 6 --> Walk 10 steps or more  -DF      Row Name 03/21/25 1548 03/21/25 0755       Functional Assessment    Outcome Measure Options AM-PAC 6 Clicks Basic Mobility (PT)  -MS (r) MD (t) MS (c) AM-PAC 6 Clicks Daily Activity (OT)  -              User Key  (r) = Recorded By, (t) = Taken By, (c) = Cosigned By      Initials Name Provider Type     Latesha Palma, OTR/L Occupational Therapist    Jia Nye, PT, DPT, NCS Physical Therapist    DF Lenora Stover, RN Registered Nurse    Elaine Romero, PT Student PT Student                                 Physical Therapy Education       Title: PT OT SLP Therapies (Done)       Topic: Physical Therapy (Done)       Point: Body mechanics (Done)       Learning Progress Summary            Patient Acceptance, E, VU by MD at 3/21/2025 9008    Comment: Pt educated on body mechanics/positioning in t/fs. Pt educated on pursed lipped breathing with SOB which pt utilizes.                      Point: Precautions (Done)       Learning Progress Summary            Patient Acceptance, E VU by MD  at 3/21/2025 3368    Comment: Pt educated on body mechanics/positioning in t/fs. Pt educated on pursed lipped breathing with SOB which pt utilizes.                                      User Key       Initials Effective Dates Name Provider Type Discipline    MD 01/29/25 -  Elaine Madison, PT Student PT Student PT                  PT Recommendation and Plan  Planned Therapy Interventions (PT): patient/family education, strengthening, gait training  Progress: improving  Outcome Evaluation: (P) PT evaluation completed. Pt was plesant, agreeable to therapy, orientedx4, with no c/o of pain. Pt states that prior to admission that she was ambulating around home with oxygen in home with portable at home and performing ADLs with Fabián for showering. Pt performed all t/fs with CGA and bed mobility supine <>sit with head of the bed elevated Mod I throughout session. During muscle testing pt was noted to demo bilateral LE weaknessand demo de-sating requiring breaks between muscle testing. Pt was able to ambulate CGA/SBA with 8L of O2 multiple times during session with the longest distance being 50' before requiring a rest break at the end of hallway due to SOB and fatigue with O2 sat reading range from 97-89 during rest break before ambalating 50' back to room with CGA/SBA with O2 reading 89 on return. Throughout the evaluation upon any activity with excertion the pt demo diffculty maintaining O2 sat and demo SOB requiring breaks during seated strength testing, long, and short distances of walking. Pt was left in fowlers with 6L of O2, family, call light, and nursing staff. Recommended that during stay pt stay recieve PT to continue to improve LE strength, activity tolerance/endurance. After discussion with the pt it is recommended that pt discharge to SNF to improve cardiovascular endurance, improve functional mobility tolerance, and increase LE strength with the pt recommending to discharge to [place].     Time Calculation:          PT Charges       Row Name 03/21/25 1550             Time Calculation    Start Time 0213  plus 10 minutes chart review  -MS (r) MD (t) MS (c)      Stop Time 0257  -MS (r) MD (t) MS (c)      Time Calculation (min) 44 min  -MS (r) MD (t)      PT Received On 03/21/25  -MS (r) MD (t) MS (c)      PT Goal Re-Cert Due Date 03/31/25  -MS (r) MD (t) MS (c)         Untimed Charges    PT Eval/Re-eval Minutes 54  -MS (r) MD (t) MS (c)         Total Minutes    Untimed Charges Total Minutes 54  -MS (r) MD (t)       Total Minutes 54  -MS (r) MD (t)                User Key  (r) = Recorded By, (t) = Taken By, (c) = Cosigned By      Initials Name Provider Type    MS Medina Jia R, PT, DPT, NCS Physical Therapist    Elaine Romero, PT Student PT Student                      PT G-Codes  Outcome Measure Options: AM-PAC 6 Clicks Basic Mobility (PT)  AM-PAC 6 Clicks Score (PT): 20  AM-PAC 6 Clicks Score (OT): 23  PT Discharge Summary  Anticipated Discharge Disposition (PT): skilled nursing facility    Elaine Madison, PT Student  3/21/2025

## 2025-03-21 NOTE — PROGRESS NOTES
Patient Name: Elaine Juárez  Date of Admission: 3/20/2025  Today's Date: 03/21/25  Length of Stay: 1  Primary Care Physician: Aaron Stoddard MD    Subjective   Chief Complaint: Shortness of breath and confusion  HPI   Elaine Juárez is a 65-year-old female patient with a past medical history of crest syndrome, interstitial lung disease, pulmonary arterial hypertension (on tadalafil and macitentan), chronic respiratory failure on home oxygen (5 L at rest and 8 L with exertion) who presents to [facility] ED for evaluation of shortness of breath.  She reports having progressive shortness of breath with hypoxia for the last several days that has been associated with mild confusion.  She also reports nausea and vomiting.  She was seen by Dr. Escalera prior to arrival.  ED workup reveals potassium 3.2, BUN 7, lipase 70, RBCs 5.31, chest x-ray revealed shallow inspiration with bibasilar opacities, left greater than right, Dr. Camacho, radiology suspects that much of this interstitial opacities seen bilaterally related to underlying fibrosis and interstitial changes seen on his CT from 7/24/2023.  Superimposed pneumonia is not excluded at the left lung base.    Today: Patient resting in bed.  On 6 L high flow.  Patient only complaint at this time is right foot pain and a headache that she rates a 5/10.  I did examine right foot and noted a moderate size bruise on dorsal part of foot.  She reports dropping a shampoo bottle on it in the shower a couple of days ago.  She is able to bear weight and move foot with mild pain.  I did discuss results of CT head and urinalysis.  He has been getting up to the bedside commode with complaints of shortness of breath.    Documented weights    03/20/25 1533 03/20/25 2100   Weight: 71.2 kg (157 lb) 70.1 kg (154 lb 9.6 oz)        No intake or output data in the 24 hours ending 03/21/25 1023    Results for orders placed during the hospital encounter of 02/05/24    Adult Transthoracic  Echo Limited W/ Cont if Necessary Per Protocol    Interpretation Summary    Left ventricular systolic function is normal. Left ventricular ejection fraction appears to be 51 - 55%.    Normal right ventricular cavity size noted. Moderately reduced right ventricular systolic function noted.    There is mild calcification of the aortic valve. No aortic valve regurgitation is present. No hemodynamically significant aortic valve stenosis is present.    Mild mitral annular calcification is present. Trace mitral valve regurgitation is present.    There is trace pulmonic valve regurgitation present.       Review of Systems   All pertinent negatives and positives are as above. All other systems have been reviewed and are negative unless otherwise stated.     Objective    Temp:  [97.6 °F (36.4 °C)-98.2 °F (36.8 °C)] 97.6 °F (36.4 °C)  Heart Rate:  [] 68  Resp:  [14-33] 18  BP: (111-143)/(64-87) 116/64  Physical Exam  Constitutional:       Appearance: She is ill-appearing.   HENT:      Head: Normocephalic and atraumatic.      Mouth/Throat:      Mouth: Mucous membranes are moist.      Pharynx: Oropharynx is clear.   Eyes:      Extraocular Movements: Extraocular movements intact.      Conjunctiva/sclera: Conjunctivae normal.   Cardiovascular:      Rate and Rhythm: Normal rate and regular rhythm.      Pulses: Normal pulses.      Heart sounds: Normal heart sounds.   Pulmonary:      Effort: Pulmonary effort is normal.      Breath sounds: Decreased breath sounds present.   Abdominal:      General: There is no distension.      Palpations: Abdomen is soft.      Tenderness: There is no abdominal tenderness.   Musculoskeletal:      Cervical back: Normal range of motion and neck supple.      Right lower leg: No edema.      Left lower leg: No edema.   Skin:     General: Skin is warm and dry.      Comments: Purple blotchy areas bilateral cheeks   Neurological:      General: No focal deficit present.      Mental Status: She is alert  and oriented to person, place, and time.   Psychiatric:         Mood and Affect: Mood normal.         Behavior: Behavior normal.         Results Review:  I have reviewed the labs, radiology results, and diagnostic studies.    Laboratory Data:   Results from last 7 days   Lab Units 03/21/25  0308 03/20/25  1442   WBC 10*3/mm3 5.87 8.54   HEMOGLOBIN g/dL 12.9 13.1   HEMATOCRIT % 44.3 43.0   PLATELETS 10*3/mm3 221 267        Results from last 7 days   Lab Units 03/21/25  0308 03/20/25  1618 03/20/25  1442   SODIUM mmol/L 137  --  139   SODIUM, ARTERIAL mmol/L  --  141  --    POTASSIUM mmol/L 4.5  --  3.2*   CHLORIDE mmol/L 103  --  100   CO2 mmol/L 24.0  --  28.0   BUN mg/dL 7*  --  7*   CREATININE mg/dL 0.75  --  0.77   CALCIUM mg/dL 8.5*  --  9.0   GLUCOSE mg/dL 150*  --  94       Culture Data:     Microbiology Results (last 10 days)       Procedure Component Value - Date/Time    COVID PRE-OP / PRE-PROCEDURE SCREENING ORDER (NO ISOLATION) - Swab, Nasopharynx [[id number]]  (Normal) Collected: 03/20/25 1635    Lab Status: Final result Specimen: Swab from Nasopharynx Updated: 03/20/25 1720    Narrative:      The following orders were created for panel order COVID PRE-OP / PRE-PROCEDURE SCREENING ORDER (NO ISOLATION) - Swab, Nasopharynx.  Procedure                               Abnormality         Status                     ---------                               -----------         ------                     COVID-19, FLU A/B, RSV P...[[id number]]  Normal              Final result                 Please view results for these tests on the individual orders.    COVID-19, FLU A/B, RSV PCR 1 HR TAT - Swab, Nasopharynx [[id number]]  (Normal) Collected: 03/20/25 1635    Lab Status: Final result Specimen: Swab from Nasopharynx Updated: 03/20/25 1720     COVID19 Not Detected     Influenza A PCR Not Detected     Influenza B PCR Not Detected     RSV, PCR Not Detected    Narrative:      Fact sheet for providers:  https://www.fda.gov/media/555798/download    Fact sheet for patients: https://www.fda.gov/media/063940/download    Test performed by PCR.    Respiratory Panel PCR w/COVID-19(SARS-CoV-2) KARISHMA/LUIS ARMANDO/DENYS/PAD/COR/DAVID In-House, NP Swab in UTM/VTM, 2 HR TAT - Swab, Nasopharynx [[id number]]  (Normal) Collected: 03/20/25 1635    Lab Status: Final result Specimen: Swab from Nasopharynx Updated: 03/20/25 2100     ADENOVIRUS, PCR Not Detected     Coronavirus 229E Not Detected     Coronavirus HKU1 Not Detected     Coronavirus NL63 Not Detected     Coronavirus OC43 Not Detected     COVID19 Not Detected     Human Metapneumovirus Not Detected     Human Rhinovirus/Enterovirus Not Detected     Influenza A PCR Not Detected     Influenza B PCR Not Detected     Parainfluenza Virus 1 Not Detected     Parainfluenza Virus 2 Not Detected     Parainfluenza Virus 3 Not Detected     Parainfluenza Virus 4 Not Detected     RSV, PCR Not Detected     Bordetella pertussis pcr Not Detected     Bordetella parapertussis PCR Not Detected     Chlamydophila pneumoniae PCR Not Detected     Mycoplasma pneumo by PCR Not Detected    Narrative:      In the setting of a positive respiratory panel with a viral infection PLUS a negative procalcitonin without other underlying concern for bacterial infection, consider observing off antibiotics or discontinuation of antibiotics and continue supportive care. If the respiratory panel is positive for atypical bacterial infection (Bordetella pertussis, Chlamydophila pneumoniae, or Mycoplasma pneumoniae), consider antibiotic de-escalation to target atypical bacterial infection.             Radiology Data:   Imaging Results (Last 7 Days)       Procedure Component Value Units Date/Time    CT Head Without Contrast [[id number]] Collected: 03/20/25 1653     Updated: 03/20/25 1703    Narrative:      EXAM/TECHNIQUE: CT head without contrast     INDICATION: Confusion, altered mental status     COMPARISON: 5/6/2024     DLP: 589.03  mGy.cm. Automated exposure control was utilized to decrease  patient radiation dose.     FINDINGS:     No evidence of intracranial hemorrhage. Gray-white differentiation is  maintained. No midline shift or mass effect. Lateral ventricles are  nondilated. Basilar cisterns are patent. No acute orbital finding.  Mastoid air cells are clear. Visualized paranasal sinuses are clear. No  acute osseous finding.       Impression:         No acute intracranial findings.        This report was signed and finalized on 3/20/2025 5:00 PM by Dr. Adam Castillo MD.       XR Chest 1 View [[id number]] Collected: 03/20/25 1617     Updated: 03/20/25 1622    Narrative:      EXAMINATION: XR CHEST 1 VW-  3/20/2025 4:17 PM 1 view     HISTORY: Shortness of breath     COMPARISON: 5/6/2024 9/11/2022, 4/20/2020 to 7/24/2023     TECHNIQUE: A single frontal view of the chest was obtained.     FINDINGS:  Shallow inspiration with patchy bibasilar consolidations. The heart is  mildly enlarged and there is pulmonary vascular congestion. Interstitial  opacities are redemonstrated as well. There is some atherosclerotic  vascular disease in the aortic arch. Degenerative changes in the spine.  No definite acute osseous abnormality identified on this exam. There are  granulomatous calcifications. Bronchiectasis was better seen on the  previous CT.       Impression:         1.  Shallow inspiration with bibasilar opacities, LEFT greater than  RIGHT. I suspect that much of the interstitial opacities seen  bilaterally related to underlying fibrosis and interstitial changes seen  on the CT from 7/24/2023. Superimposed pneumonia is not excluded at the  LEFT lung base.           This report was signed and finalized on 3/20/2025 4:19 PM by Dr. Ernesto Camacho MD.                I have reviewed the patient's current medications.     azithromycin, 500 mg, Oral, Q24H  budesonide, 0.5 mg, Nebulization, BID - RT  cefTRIAXone, 1,000 mg, Intravenous,  Q24H  enoxaparin sodium, 40 mg, Subcutaneous, Daily  ipratropium-albuterol, 3 mL, Nebulization, 4x Daily - RT  Macitentan-Tadalafil, 1 tablet, Oral, Nightly  metoprolol succinate XL, 12.5 mg, Oral, Nightly  mycophenolate, 1,000 mg, Oral, BID  pantoprazole, 40 mg, Oral, Q AM  sodium chloride, 10 mL, Intravenous, Q12H  traZODone, 100 mg, Oral, Nightly  venlafaxine, 75 mg, Oral, Daily            Assessment/Plan   Assessment  Active Hospital Problems    Diagnosis     **Respiratory failure with hypoxia     Metabolic encephalopathy     Generalized weakness     Hypokalemia        Treatment Plan  1.  Respiratory failure with hypoxia, pulmonary consult this a.m., continuous oxygen to keep O2 saturations above 90%, oral azithromycin initiated this a.m., respiratory panel was negative    2.  Metabolic encephalopathy, has now resolved with resolution of hypoxia    3.  Hypokalemia, potassium replaced last night with repeat of K.4.2 this a.m., repeat labs in the a.m.    4.  Generalized weakness, PT/ consulted    Medical Decision Making    4 problems, acute, high complexity, unchanged    Number and Complexity of problems: 4  Differential Diagnosis: None    Conditions and Status        Condition is unchanged.     MDM Data  External documents reviewed: None  Cardiac tracing (EKG, telemetry) interpretation: Reviewed  Radiology interpretation: Reviewed  Labs reviewed: yes  Any tests that were considered but not ordered: None     Decision rules/scores evaluated (example [id number], Wells, etc): None     Discussed with: Patient and Dr. Arthur   Care Planning  Shared decision making: Patient and Dr. Arthur  Code status and discussions: Full  Surrogate Decision Maker daughter, Gladis Dill  Social Determinants of Health that impact treatment or disposition: None  I expect the patient to be discharged to home in 2-3 days.     Electronically signed by JOSE Silveira, 03/21/25, 10:23 CDT.

## 2025-03-21 NOTE — CONSULTS
HPI: Elaine Juárez is a 65 y.o. female with the below pertinent PMH for whom cardiology is consulted regarding pulmonary hypertension.    The patient follows with me in heart failure clinic for pulmonary hypertension management and was most recently seen by me 2/27/25.  At that time, the patient was returning for her second dose of sotatercept and reported having started to see some improvement in energy, breathing, and activity tolerance since starting sotatercept.  She did mention some GI issues and had reportedly had a GI bug that several family members also experiencing a little after Lenore but had not fully recovered.  She went to the ED yesterday after coming in for her third sotatercept injection due to a multitude of symptoms as well as some confusion.  She ultimately was admitted and cardiology was consulted today.    At the time of my assessment, she states that for the last few months she has been having waxing and waning issues with diarrhea and vomiting.  Over the weekend she had more intense vomiting and diarrhea as well as some chest discomfort.  Shortness of breath remained stable.  She has continued to have issues with coughing and reports using up to 30 menthol cough drops daily due to frequent coughing.  Due to a combination of coughing and associated vomiting, the patient mentions that in the last couple of weeks she has only been using her Tyvaso 3 times daily instead of QID and on a couple occasions has only used 7 breaths instead of 14 breaths.    Since presentation, the patient's workup has been relatively unrevealing thus far.  She was noted to be more hypoxic in the ED and had increased oxygen requirement that has improved back to her baseline.  She has received IV Pepcid, Zofran, and antibiotics and reports that she is feeling some better than she had been on presentation.  She was evaluated by pulmonology who reviewed her imaging and felt that everything overall appears to be  "stable and consistent with baseline.      Pertinent PMH  Scleroderma/CREST syndrome - followed by Dr. Sanders  Chronic Hypoxic Respiratory Failure  ILD/Bronchiectasis  WHO Group I (scleroderma) and Group III (ILD) Pulmonary Arterial Hypertension  Liver disease    Medications: Reviewed    Review of Systems: All pertinent negatives and positives as noted above.  Otherwise, 10 systems reviewed and found to be negative.    O:  /55 (BP Location: Right arm, Patient Position: Lying)   Pulse 79   Temp 97.3 °F (36.3 °C) (Oral)   Resp 16   Ht 160 cm (63\")   Wt 70.1 kg (154 lb 9.6 oz)   SpO2 98%   BMI 27.39 kg/m²   Temp:  [97.3 °F (36.3 °C)-98.2 °F (36.8 °C)] 97.3 °F (36.3 °C)  Heart Rate:  [] 79  Resp:  [10-33] 16  BP: (109-143)/(55-87) 109/55    Gen: female lying in bed in NAD  HEENT: NC/AT, sclera anicteric  CV: RRR, systolic murmur at LLSB  Pulm: Bilateral pulmonary crackles, NWOB  Abd: Soft, ND/N  Ext: WWP, no edema    Diagnostic Data:    CMP   CMP          2/27/2025    14:45 3/20/2025    14:42 3/20/2025    16:18 3/21/2025    03:08   CMP   Glucose 128  94   150    BUN 13  7   7    Creatinine 0.84  0.77   0.75    EGFR 76.8  84.7   87.4    Sodium 141  139  141  137    Potassium 3.2  3.2   4.5    Chloride 100  100   103    Calcium 9.1  9.0   8.5    BUN/Creatinine Ratio 15.5  9.1   9.3    Anion Gap 13.0  11.0   10.0      CBC   CBC          2/27/2025    14:45 3/20/2025    14:42 3/21/2025    03:08   CBC   WBC 8.49  8.54  5.87    RBC 5.14  5.31  5.36    Hemoglobin 12.8  13.1  12.9    Hematocrit 40.8  43.0  44.3    MCV 79.4  81.0  82.6    MCH 24.9  24.7  24.1    MCHC 31.4  30.5  29.1    RDW 14.5  14.9  14.5    Platelets 244  267  221      Pro-BNP       Lab 03/20/25  1442   PROBNP 92.2     Troponin   HS Troponin T   Date/Time Value Ref Range Status   03/20/2025 1632 9 <14 ng/L Final   03/20/2025 1442 10 <14 ng/L Final     Lactate   Lactate   Date/Time Value Ref Range Status   03/20/2025 1943 1.1 0.5 - 2.0 mmol/L " Final   09/12/2022 0949 2.2 (C) 0.5 - 2.0 mmol/L Final   09/11/2022 2213 1.2 0.5 - 2.0 mmol/L Final         ASSESSMENT/PLAN:    1.  WHO group 1 pulmonary hypertension  2.  Acute on chronic hypoxic respiratory failure-now back to baseline  3.  Pulmonary fibrosis  4.  CREST syndrome  5.  Nausea and vomiting-improved  6.  Metabolic encephalopathy-improved    Ms. Juárez currently appears well compensated from a pulmonary hypertension standpoint.  The majority of her symptoms appear to be more related to GI issues with nausea and vomiting.  She did have some hypoxia on arrival in the ED, but she is known to have significant exertional hypoxia in the context of her pulmonary hypertension and pulmonary fibrosis as well as hypoxia exacerbated by coughing and vomiting transiently.  I do suspect that some of her GI issues may have led to a component of dehydration and that this in conjunction with some hypoxia may have contributed to her confusion.  In the grand scheme of things, I do suspect that she may see some improvement with supportive care.  That being said, it may be beneficial to try to optimize some of her chronic management that may contribute to GI issues.  Tyvaso has been very beneficial for her pulmonary hypertension but does also exacerbate coughing and can contribute to some dyspepsia, so I am going to reduce the dose of this.  We did discuss the possibility of transitioning to Uptravi although I would favor Tyvaso given her known pulmonary fibrosis; it is unclear if her GI symptoms would be better on Uptravi but I do think that she would have less treatment induced coughing.  This is a consideration that we can further discuss in the outpatient setting.  I do also think it may be reasonable to transition from CellCept to Myfortic given that Myfortic typically causes less diarrhea and GI side effects.  Additionally, she has had a lot of issues with dyspepsia and reflux related to her crest syndrome in the  past, so more aggressive antacid medication may also be beneficial.  Lastly, she has been using an inordinate amount of mentholated cough drops, which also may contribute to some GI issues.    - Continue macitentan 10 mg daily  - Continue tadalafil 40 mg daily  - Resume Tyvaso but reduce dose to 10 breaths QID  - Consider transitioning from CellCept to Myfortic; I will defer this to the patient's attending provider or to outpatient rheumatology follow-up  - Continue Protonix 40 mg daily  - Start Pepcid 20 mg twice daily  - Recommend reducing cough drop use  - Outpatient cardiology follow-up in CHF clinic as scheduled.      Thank you for involving us in the care of this patient.  Cardiology will sign off at this time.

## 2025-03-22 ENCOUNTER — READMISSION MANAGEMENT (OUTPATIENT)
Dept: CALL CENTER | Facility: HOSPITAL | Age: 65
End: 2025-03-22
Payer: MEDICARE

## 2025-03-22 VITALS
HEART RATE: 95 BPM | RESPIRATION RATE: 18 BRPM | OXYGEN SATURATION: 95 % | HEIGHT: 63 IN | DIASTOLIC BLOOD PRESSURE: 81 MMHG | BODY MASS INDEX: 27.39 KG/M2 | SYSTOLIC BLOOD PRESSURE: 116 MMHG | TEMPERATURE: 97.9 F | WEIGHT: 154.6 LBS

## 2025-03-22 LAB
ANION GAP SERPL CALCULATED.3IONS-SCNC: 10 MMOL/L (ref 5–15)
BASOPHILS # BLD AUTO: 0.05 10*3/MM3 (ref 0–0.2)
BASOPHILS NFR BLD AUTO: 0.6 % (ref 0–1.5)
BUN SERPL-MCNC: 11 MG/DL (ref 8–23)
BUN/CREAT SERPL: 14.1 (ref 7–25)
CALCIUM SPEC-SCNC: 8.3 MG/DL (ref 8.6–10.5)
CHLORIDE SERPL-SCNC: 104 MMOL/L (ref 98–107)
CO2 SERPL-SCNC: 26 MMOL/L (ref 22–29)
CREAT SERPL-MCNC: 0.78 MG/DL (ref 0.57–1)
DEPRECATED RDW RBC AUTO: 43.4 FL (ref 37–54)
EGFRCR SERPLBLD CKD-EPI 2021: 83.4 ML/MIN/1.73
EOSINOPHIL # BLD AUTO: 0.15 10*3/MM3 (ref 0–0.4)
EOSINOPHIL NFR BLD AUTO: 1.9 % (ref 0.3–6.2)
ERYTHROCYTE [DISTWIDTH] IN BLOOD BY AUTOMATED COUNT: 14.6 % (ref 12.3–15.4)
GLUCOSE SERPL-MCNC: 94 MG/DL (ref 65–99)
HCT VFR BLD AUTO: 38.4 % (ref 34–46.6)
HGB BLD-MCNC: 11.6 G/DL (ref 12–15.9)
IMM GRANULOCYTES # BLD AUTO: 0.17 10*3/MM3 (ref 0–0.05)
IMM GRANULOCYTES NFR BLD AUTO: 2.2 % (ref 0–0.5)
LYMPHOCYTES # BLD AUTO: 2.12 10*3/MM3 (ref 0.7–3.1)
LYMPHOCYTES NFR BLD AUTO: 27.2 % (ref 19.6–45.3)
MCH RBC QN AUTO: 24.8 PG (ref 26.6–33)
MCHC RBC AUTO-ENTMCNC: 30.2 G/DL (ref 31.5–35.7)
MCV RBC AUTO: 82.2 FL (ref 79–97)
MONOCYTES # BLD AUTO: 0.44 10*3/MM3 (ref 0.1–0.9)
MONOCYTES NFR BLD AUTO: 5.6 % (ref 5–12)
NEUTROPHILS NFR BLD AUTO: 4.87 10*3/MM3 (ref 1.7–7)
NEUTROPHILS NFR BLD AUTO: 62.5 % (ref 42.7–76)
NRBC BLD AUTO-RTO: 0 /100 WBC (ref 0–0.2)
PLATELET # BLD AUTO: 208 10*3/MM3 (ref 140–450)
PMV BLD AUTO: 10.6 FL (ref 6–12)
POTASSIUM SERPL-SCNC: 3.7 MMOL/L (ref 3.5–5.2)
RBC # BLD AUTO: 4.67 10*6/MM3 (ref 3.77–5.28)
SODIUM SERPL-SCNC: 140 MMOL/L (ref 136–145)
WBC NRBC COR # BLD AUTO: 7.8 10*3/MM3 (ref 3.4–10.8)

## 2025-03-22 PROCEDURE — 94799 UNLISTED PULMONARY SVC/PX: CPT

## 2025-03-22 PROCEDURE — 94664 DEMO&/EVAL PT USE INHALER: CPT

## 2025-03-22 PROCEDURE — 63710000001 MYCOPHENOLATE MOFETIL PER 250 MG

## 2025-03-22 PROCEDURE — 99232 SBSQ HOSP IP/OBS MODERATE 35: CPT | Performed by: INTERNAL MEDICINE

## 2025-03-22 PROCEDURE — 25010000002 ENOXAPARIN PER 10 MG

## 2025-03-22 PROCEDURE — 80048 BASIC METABOLIC PNL TOTAL CA: CPT | Performed by: NURSE PRACTITIONER

## 2025-03-22 PROCEDURE — 85025 COMPLETE CBC W/AUTO DIFF WBC: CPT | Performed by: NURSE PRACTITIONER

## 2025-03-22 PROCEDURE — 92526 ORAL FUNCTION THERAPY: CPT | Performed by: SPEECH-LANGUAGE PATHOLOGIST

## 2025-03-22 PROCEDURE — 25010000002 CEFTRIAXONE PER 250 MG: Performed by: STUDENT IN AN ORGANIZED HEALTH CARE EDUCATION/TRAINING PROGRAM

## 2025-03-22 PROCEDURE — 63710000001 PREDNISONE PER 5 MG: Performed by: INTERNAL MEDICINE

## 2025-03-22 PROCEDURE — 97116 GAIT TRAINING THERAPY: CPT

## 2025-03-22 RX ORDER — AZITHROMYCIN 500 MG/1
500 TABLET, FILM COATED ORAL
Qty: 1 TABLET | Refills: 0 | Status: SHIPPED | OUTPATIENT
Start: 2025-03-22 | End: 2025-03-23

## 2025-03-22 RX ORDER — FAMOTIDINE 20 MG/1
20 TABLET, FILM COATED ORAL
Qty: 60 TABLET | Refills: 0 | Status: SHIPPED | OUTPATIENT
Start: 2025-03-22

## 2025-03-22 RX ORDER — CEFDINIR 300 MG/1
300 CAPSULE ORAL 2 TIMES DAILY
Qty: 4 CAPSULE | Refills: 0 | Status: SHIPPED | OUTPATIENT
Start: 2025-03-23 | End: 2025-04-07

## 2025-03-22 RX ORDER — ACETAMINOPHEN 325 MG/1
650 TABLET ORAL EVERY 6 HOURS PRN
Status: DISCONTINUED | OUTPATIENT
Start: 2025-03-22 | End: 2025-03-22 | Stop reason: HOSPADM

## 2025-03-22 RX ADMIN — ENOXAPARIN SODIUM 40 MG: 100 INJECTION SUBCUTANEOUS at 08:26

## 2025-03-22 RX ADMIN — VENLAFAXINE 75 MG: 37.5 TABLET ORAL at 08:26

## 2025-03-22 RX ADMIN — FAMOTIDINE 20 MG: 20 TABLET, FILM COATED ORAL at 08:26

## 2025-03-22 RX ADMIN — PANTOPRAZOLE SODIUM 40 MG: 40 TABLET, DELAYED RELEASE ORAL at 06:14

## 2025-03-22 RX ADMIN — IPRATROPIUM BROMIDE AND ALBUTEROL SULFATE 3 ML: .5; 3 SOLUTION RESPIRATORY (INHALATION) at 06:53

## 2025-03-22 RX ADMIN — MYCOPHENOLATE MOFETIL 1000 MG: 500 TABLET ORAL at 08:26

## 2025-03-22 RX ADMIN — SODIUM CHLORIDE 1000 MG: 900 INJECTION INTRAVENOUS at 14:48

## 2025-03-22 RX ADMIN — BUDESONIDE 0.5 MG: 0.5 INHALANT RESPIRATORY (INHALATION) at 06:58

## 2025-03-22 RX ADMIN — Medication 10 ML: at 08:27

## 2025-03-22 RX ADMIN — IPRATROPIUM BROMIDE AND ALBUTEROL SULFATE 3 ML: .5; 3 SOLUTION RESPIRATORY (INHALATION) at 13:12

## 2025-03-22 RX ADMIN — PREDNISONE 5 MG: 5 TABLET ORAL at 08:26

## 2025-03-22 NOTE — PLAN OF CARE
Goal Outcome Evaluation:      Patient A/O x4 but forgetful with short term memory loss (baseline); family at bedside at shift start but did not stay overnight. Patient rec'v IV abx per MAR. Patient able to get some good sleep between roundings/care. No unadressed needs in the room this shift. Will update oncoming dayshift nurse at bedside shift report in the a.m. as appropriate. IPASS updated.    Tele: SR 80's.

## 2025-03-22 NOTE — DISCHARGE SUMMARY
[facility] Medicine Services  DISCHARGE SUMMARY       Date of Admission: 3/20/2025  Date of Discharge:  3/22/2025  Primary Care Physician: Aaron Stoddard MD    Presenting Problem/History of Present Illness:  Elaine Juárez is a 65-year-old female patient with a past medical history of crest syndrome, interstitial lung disease, pulmonary arterial hypertension (on tadalafil and macitentan), chronic respiratory failure on home oxygen (5 L at rest and 8 L with exertion) who presents to [facility] ED for evaluation of shortness of breath.     Final Discharge Diagnoses:  Active Hospital Problems    Diagnosis     **Respiratory failure with hypoxia     Metabolic encephalopathy     Generalized weakness     Hypokalemia        Consults: Pulmonology, cardiology    Procedures Performed: None    Pertinent Test Results:   Results for orders placed during the hospital encounter of 02/05/24    Adult Transthoracic Echo Limited W/ Cont if Necessary Per Protocol    Interpretation Summary    Left ventricular systolic function is normal. Left ventricular ejection fraction appears to be 51 - 55%.    Normal right ventricular cavity size noted. Moderately reduced right ventricular systolic function noted.    There is mild calcification of the aortic valve. No aortic valve regurgitation is present. No hemodynamically significant aortic valve stenosis is present.    Mild mitral annular calcification is present. Trace mitral valve regurgitation is present.    There is trace pulmonic valve regurgitation present.      Imaging Results (All)       Procedure Component Value Units Date/Time    CT Head Without Contrast [[id number]] Collected: 03/20/25 1653     Updated: 03/20/25 1703    Narrative:      EXAM/TECHNIQUE: CT head without contrast     INDICATION: Confusion, altered mental status     COMPARISON: 5/6/2024     DLP: 589.03 mGy.cm. Automated exposure control was utilized to decrease  patient radiation  dose.     FINDINGS:     No evidence of intracranial hemorrhage. Gray-white differentiation is  maintained. No midline shift or mass effect. Lateral ventricles are  nondilated. Basilar cisterns are patent. No acute orbital finding.  Mastoid air cells are clear. Visualized paranasal sinuses are clear. No  acute osseous finding.       Impression:         No acute intracranial findings.        This report was signed and finalized on 3/20/2025 5:00 PM by Dr. Adam Castillo MD.       XR Chest 1 View [[id number]] Collected: 03/20/25 1617     Updated: 03/20/25 1622    Narrative:      EXAMINATION: XR CHEST 1 VW-  3/20/2025 4:17 PM 1 view     HISTORY: Shortness of breath     COMPARISON: 5/6/2024 9/11/2022, 4/20/2020 to 7/24/2023     TECHNIQUE: A single frontal view of the chest was obtained.     FINDINGS:  Shallow inspiration with patchy bibasilar consolidations. The heart is  mildly enlarged and there is pulmonary vascular congestion. Interstitial  opacities are redemonstrated as well. There is some atherosclerotic  vascular disease in the aortic arch. Degenerative changes in the spine.  No definite acute osseous abnormality identified on this exam. There are  granulomatous calcifications. Bronchiectasis was better seen on the  previous CT.       Impression:         1.  Shallow inspiration with bibasilar opacities, LEFT greater than  RIGHT. I suspect that much of the interstitial opacities seen  bilaterally related to underlying fibrosis and interstitial changes seen  on the CT from 7/24/2023. Superimposed pneumonia is not excluded at the  LEFT lung base.           This report was signed and finalized on 3/20/2025 4:19 PM by Dr. Ernesto Camacho MD.             LAB RESULTS:      Lab 03/22/25  0520 03/21/25  0308 03/20/25  1943 03/20/25  1442   WBC 7.80 5.87  --  8.54   HEMOGLOBIN 11.6* 12.9  --  13.1   HEMATOCRIT 38.4 44.3  --  43.0   PLATELETS 208 221  --  267   NEUTROS ABS 4.87  --   --   --    IMMATURE GRANS (ABS) 0.17*   --   --   --    LYMPHS ABS 2.12  --   --   --    MONOS ABS 0.44  --   --   --    EOS ABS 0.15  --   --   --    MCV 82.2 82.6  --  81.0   PROCALCITONIN  --  0.06  --   --    LACTATE  --   --  1.1  --          Lab 03/22/25  0520 03/21/25  0308 03/20/25  1618 03/20/25  1442   SODIUM 140 137  --  139   SODIUM, ARTERIAL  --   --  141  --    POTASSIUM 3.7 4.5  --  3.2*   CHLORIDE 104 103  --  100   CO2 26.0 24.0  --  28.0   ANION GAP 10.0 10.0  --  11.0   BUN 11 7*  --  7*   CREATININE 0.78 0.75  --  0.77   EGFR 83.4 87.4  --  84.7   GLUCOSE 94 150*  --  94   CALCIUM 8.3* 8.5*  --  9.0   MAGNESIUM  --   --   --  1.9   HEMOGLOBIN A1C  --  5.50  --   --          Lab 03/20/25  1442   LIPASE 70*         Lab 03/20/25  1632 03/20/25  1442   PROBNP  --  92.2   HSTROP T 9 10                 Lab 03/20/25  1618   PH, ARTERIAL 7.427   PCO2, ARTERIAL 44.7   PO2 ART 61.0*   O2 SATURATION ART 92.7*   HCO3 ART 29.4*   BASE EXCESS ART 4.3*   CARBOXYHEMOGLOBIN 1.6     Brief Urine Lab Results  (Last result in the past 365 days)        Color   Clarity   Blood   Leuk Est   Nitrite   Protein   CREAT   Urine HCG        03/20/25 1920 Yellow   Clear   Negative   Negative   Negative   Negative                 Microbiology Results (last 10 days)       Procedure Component Value - Date/Time    S. Pneumo Ag Urine or CSF - Urine, Urine, Clean Catch [[id number]]  (Normal) Collected: 03/21/25 1502    Lab Status: Final result Specimen: Urine, Clean Catch Updated: 03/21/25 1547     Strep Pneumo Ag Negative    Narrative:      Streptococcus pneumoniae (pneumococcal pneumonia) vaccine may cause false-positive results, especially in patients who have received the vaccine within 5 days of having the test performed.    Legionella Antigen, Urine - Urine, Urine, Clean Catch [[id number]]  (Normal) Collected: 03/21/25 1502    Lab Status: Final result Specimen: Urine, Clean Catch Updated: 03/21/25 1547     LEGIONELLA ANTIGEN, URINE Negative    Respiratory Culture -  Sputum, Cough [[id number]] Collected: 03/21/25 1133    Lab Status: Final result Specimen: Sputum from Cough Updated: 03/21/25 1459     Respiratory Culture Rejected     Gram Stain Rare (1+) WBCs per low power field      Few (2+) Epithelial cells per low power field      Few (2+) Mixed gram positive jm    Narrative:      Specimen rejected due to oropharyngeal contamination. Please reorder and recollect specimen if clinically necessary.    COVID PRE-OP / PRE-PROCEDURE SCREENING ORDER (NO ISOLATION) - Swab, Nasopharynx [[id number]]  (Normal) Collected: 03/20/25 1635    Lab Status: Final result Specimen: Swab from Nasopharynx Updated: 03/20/25 1720    Narrative:      The following orders were created for panel order COVID PRE-OP / PRE-PROCEDURE SCREENING ORDER (NO ISOLATION) - Swab, Nasopharynx.  Procedure                               Abnormality         Status                     ---------                               -----------         ------                     COVID-19, FLU A/B, RSV P...[[id number]]  Normal              Final result                 Please view results for these tests on the individual orders.    COVID-19, FLU A/B, RSV PCR 1 HR TAT - Swab, Nasopharynx [[id number]]  (Normal) Collected: 03/20/25 1635    Lab Status: Final result Specimen: Swab from Nasopharynx Updated: 03/20/25 1720     COVID19 Not Detected     Influenza A PCR Not Detected     Influenza B PCR Not Detected     RSV, PCR Not Detected    Narrative:      Fact sheet for providers: https://www.fda.gov/media/633834/download    Fact sheet for patients: https://www.fda.gov/media/344076/download    Test performed by PCR.    Respiratory Panel PCR w/COVID-19(SARS-CoV-2) KARISHMA/LUIS ARMANDO/DENYS/PAD/COR/DAVID In-House, NP Swab in UTM/VTM, 2 HR TAT - Swab, Nasopharynx [[id number]]  (Normal) Collected: 03/20/25 1635    Lab Status: Final result Specimen: Swab from Nasopharynx Updated: 03/20/25 2100     ADENOVIRUS, PCR Not Detected     Coronavirus 229E Not Detected      Coronavirus HKU1 Not Detected     Coronavirus NL63 Not Detected     Coronavirus OC43 Not Detected     COVID19 Not Detected     Human Metapneumovirus Not Detected     Human Rhinovirus/Enterovirus Not Detected     Influenza A PCR Not Detected     Influenza B PCR Not Detected     Parainfluenza Virus 1 Not Detected     Parainfluenza Virus 2 Not Detected     Parainfluenza Virus 3 Not Detected     Parainfluenza Virus 4 Not Detected     RSV, PCR Not Detected     Bordetella pertussis pcr Not Detected     Bordetella parapertussis PCR Not Detected     Chlamydophila pneumoniae PCR Not Detected     Mycoplasma pneumo by PCR Not Detected    Narrative:      In the setting of a positive respiratory panel with a viral infection PLUS a negative procalcitonin without other underlying concern for bacterial infection, consider observing off antibiotics or discontinuation of antibiotics and continue supportive care. If the respiratory panel is positive for atypical bacterial infection (Bordetella pertussis, Chlamydophila pneumoniae, or Mycoplasma pneumoniae), consider antibiotic de-escalation to target atypical bacterial infection.            Hospital Course:     Elaine Juárez is a 65-year-old female patient with a past medical history of crest syndrome, interstitial lung disease, pulmonary arterial hypertension (on tadalafil and macitentan), chronic respiratory failure on home oxygen (5 L at rest and 8 L with exertion) who presents to [facility] ED for evaluation of shortness of breath.     She was evaluated by pulmonary. Treated empirically with ceftriaxone and azithromycin. She received multiple nebulizer treatments. Discharged with one remaining dose of azithromycin and 2 more days of cefdinir. She will continue her home daily prednisone 5mg. She will follow up pulmonologist NP Pablo Blackman.  She had prior home health, and requesting home health again.    For her pulmonary hypertension she was evaluated by cardiologist Dr. Escalera  "who also sees her outpatient.  Her pulmonary hypertension was thought to be at baseline and well compensated.  Continuing her current outpatient regimen.  Her Tyvaso dose was reduced to 10 breaths 4 times daily (deviously 14, but she was not compliant).  She will follow-up with heart failure clinic.      Physical Exam on Discharge:  /70 (BP Location: Right arm, Patient Position: Lying)   Pulse 87   Temp 98.2 °F (36.8 °C) (Oral)   Resp 16   Ht 160 cm (63\")   Wt 70.1 kg (154 lb 9.6 oz)   SpO2 100%   BMI 27.39 kg/m²   Physical Exam  GEN: Awake, alert, interactive, ill-appearing, in NAD on 6L  HEENT: Atraumatic, EOMI, Anicteric  Lungs: CTAB, good air entry   Heart: RRR, +S1/s2, no rub  ABD: soft, nt/nd, +BS, no guarding/rebound  Extremities: atraumatic, no cyanosis, no edema  Skin: no rashes or lesions  Neuro: AAOx3, no focal deficits  Psych: normal mood & affect    Condition on Discharge: At baseline     Discharge Disposition:  Home or Self Care    Discharge Medications:     Discharge Medications        New Medications        Instructions Start Date   azithromycin 500 MG tablet  Commonly known as: ZITHROMAX   500 mg, Oral, Every 24 Hours Scheduled      cefdinir 300 MG capsule  Commonly known as: OMNICEF   300 mg, Oral, 2 Times Daily   Start Date: March 23, 2025     famotidine 20 MG tablet  Commonly known as: PEPCID   20 mg, Oral, 2 Times Daily Before Meals             Continue These Medications        Instructions Start Date   alendronate 70 MG tablet  Commonly known as: FOSAMAX   70 mg, Oral, Weekly      busPIRone 10 MG tablet  Commonly known as: BUSPAR   Take 1 tablet by mouth 3 (Three) Times a Day As Needed (anxiety).      Macitentan-Tadalafil 10-40 MG tablet   1 tablet, Oral, Daily      metoprolol succinate XL 25 MG 24 hr tablet  Commonly known as: TOPROL-XL   12.5 mg, Oral, Daily      mycophenolate 500 MG tablet  Commonly known as: CELLCEPT   1,000 mg, 2 Times Daily      O2  Commonly known as: " OXYGEN   6-8 L/min, Once      omeprazole 40 MG capsule  Commonly known as: priLOSEC   40 mg, Oral, Daily      predniSONE 5 MG tablet  Commonly known as: DELTASONE   5 mg, Oral, Daily      spironolactone 50 MG tablet  Commonly known as: ALDACTONE   50 mg, Oral, Daily      traZODone 100 MG tablet  Commonly known as: DESYREL   100 mg, Oral, Nightly      Tyvaso 0.6 MG/ML solution inhalation solution  Generic drug: Treprostinil   14 puffs, Inhalation, 4 Times Daily - RT      venlafaxine 75 MG tablet  Commonly known as: EFFEXOR   75 mg, Daily      vitamin D 1.25 MG (66429 UT) capsule capsule  Commonly known as: ERGOCALCIFEROL   50,000 Units, Weekly      Winrevair 60 MG kit  Generic drug: Sotatercept-csrk   60 mg, Every 21 Days             Discharge Diet:   Dietary Orders (From admission, onward)       Start     Ordered    03/20/25 2217  Diet: Regular/House; Fluid Consistency: Thin (IDDSI 0)  Diet Effective Now        References:    Diet Order Definitions   Question Answer Comment   Diets: Regular/House    Fluid Consistency: Thin (IDDSI 0)        03/20/25 2219                    Activity at Discharge: regular, as tolerated     Follow-up Appointments:   Future Appointments   Date Time Provider Department Center   4/7/2025  1:00 PM Fela Blackman APRN [facility]   4/10/2025  3:00 PM DIEURESIS ROOM  [facility] HEART FAIL  [facility] None   4/17/2025  2:15 PM  [facility] HEART FAILURE CLINIC - MD  [facility] None   5/12/2025  2:00 PM P[facility] ECHO ROOM 2  [facility]ARDI [facility]   5/29/2025  2:45 PM  [facility] HEART FAILURE CLINIC - MD  [facility] None       Test Results Pending at Discharge: none    Electronically signed by Kaden Arthur MD, 03/22/25, 15:02 CDT.    Time: > 30 minutes.

## 2025-03-22 NOTE — THERAPY TREATMENT NOTE
Acute Care - Physical Therapy Treatment Note  [facility]     Patient Name: Elaine Juárez  : 1960  MRN: [id number]  Today's Date: 3/22/2025      Visit Dx:     ICD-10-CM ICD-9-CM   1. Acute respiratory failure with hypoxia  J96.01 518.81   2. Altered mental status, unspecified altered mental status type  R41.82 780.97   3. Pneumonia of left lower lobe due to infectious organism  J18.9 486   4. Dysphagia, unspecified type  R13.10 787.20   5. Decreased functional activity tolerance [R68.89]  R68.89 780.99   6. Generalized weakness [R53.1]  R53.1 780.79     Patient Active Problem List   Diagnosis    CREST syndrome, partial     Raynaud's phenomenon    Pulmonary fibrosis prob sec underlying autoimmune disease    Gastroesophageal reflux disease without esophagitis    BMI 31.0-31.9,adult    Chronic respiratory failure with hypoxia    History of adenomatous polyp of colon    Environmental allergies    PAH (pulmonary arterial hypertension) with portal hypertension    Hypokalemia    Panic attack    Paranoia (psychosis)    H/O noncompliance with medical treatment, presenting hazards to [facility]    Bipolar affective disorder, currently manic, moderate    Obesity (BMI 30-39.9)    Esophageal dysmotility    Oropharyngeal dysphagia    Right ventricular systolic dysfunction    Respiratory failure with hypoxia    Metabolic encephalopathy    Generalized weakness     Past Medical History:   Diagnosis Date    Allergies     Arthritis     BMI 31.0-31.9,adult 2019    Calcified granuloma of lung 2019    Right lung    CHF (congestive heart failure)     denies    Chronic idiopathic fibrosing alveolitis     Chronic respiratory failure with hypoxia 2020    Chronic respiratory failure with hypoxia, on home oxygen therapy     COVID-19 2022    CREST syndrome     Environmental allergies 2022    Gastroesophageal reflux disease without esophagitis 2019    Interstitial lung disease     Obstructive sleep apnea  on CPAP 06/04/2019    Oropharyngeal dysphagia 01/26/2023    Primary central sleep apnea     Pulmonary fibrosis     Pulmonary hypertension     Rheumatoid arthritis     Right ventricular systolic dysfunction 05/04/2023    Short-term memory loss      Past Surgical History:   Procedure Laterality Date    BREAST BIOPSY      CARDIAC CATHETERIZATION N/A 07/22/2020    Procedure: Right Heart Cath;  Surgeon: Thong Martin MD;  Location: [facility] CATH INVASIVE LOCATION;  Service: Cardiology;  Laterality: N/A;    CHOLECYSTECTOMY      COLONOSCOPY  05/11/2015    5 POLYPS    COLONOSCOPY N/A 08/04/2021    Procedure: COLONOSCOPY WITH ANESTHESIA;  Surgeon: Michael Landin DO;  Location: [facility] ENDOSCOPY;  Service: Gastroenterology;  Laterality: N/A;  pre-hx polyps  post-colon polyps    ENDOSCOPY N/A 08/04/2021    Procedure: ESOPHAGOGASTRODUODENOSCOPY WITH ANESTHESIA;  Surgeon: Michael Landin DO;  Location: [facility] ENDOSCOPY;  Service: Gastroenterology;  Laterality: N/A;  pre-dysphagia  post-normal  pcp-lanette aguila     PT Assessment (Last 12 Hours)       PT Evaluation and Treatment       Row Name 03/22/25 1150          Physical Therapy Time and Intention    Subjective Information no complaints  -WK     Document Type therapy note (daily note)  -WK     Mode of Treatment physical therapy  -WK       Row Name 03/22/25 1150          General Information    Existing Precautions/Restrictions fall;oxygen therapy device and L/min  5L hi flow rest,  -WK       Row Name 03/22/25 1150          Pain    Pretreatment Pain Rating 0/10 - no pain  -WK     Posttreatment Pain Rating 0/10 - no pain  -WK       Row Name 03/22/25 1150          Bed Mobility    Supine-Sit [place] (Bed Mobility) modified independence  -WK     Sit-Supine [place] (Bed Mobility) modified independence  -WK     Comment, (Bed Mobility) had shower prior to amb  -WK       Row Name 03/22/25 1150          Sit-Stand Transfer    Sit-Stand [place] (Transfers) standby  assist  -WK       Row Name 03/22/25 1150          Stand-Sit Transfer    Stand-Sit [place] (Transfers) standby assist  -WK       Row Name 03/22/25 1150          Gait/Stairs (Locomotion)    [place] Level (Gait) contact guard  -WK     Distance in Feet (Gait) 60  standing rest 30' SOA.  -WK     Comment, (Gait/Stairs) refused sitting rest. increase SOA as she entered room. Educated on safety.  -WK       Row Name 03/22/25 1150          Plan of Care Review    Plan of Care Reviewed With patient  -WK     Outcome Evaluation Pt stated she had just finished shower not long ago. Educated on endurance and o2 prior to amb. Pt amb in choudhary CGA and refused sitting rest. Pt amb 60' and required standing rest. O2 89% 8L. Amb back to room and had increase in SOA. O2 desat to 80% on 8L. Educated on breathing technique. Pt able to recover back to 90% within 3 minutes. Pt returned to 5l hi flow and o2 94%.  -WK       Row Name 03/22/25 1150          Positioning and Restraints    Pre-Treatment Position in bed  -WK     Post Treatment Position bed  -WK     In Bed side lying right;call light within reach;encouraged to call for assist;with family/caregiver  -WK               User Key  (r) = Recorded By, (t) = Taken By, (c) = Cosigned By      Initials Name Provider Type    WK Stephanie Griffith PTA Physical Therapist Assistant                    Physical Therapy Education       Title: PT OT SLP Therapies (Done)       Topic: Physical Therapy (Done)       Point: Body mechanics (Done)       Learning Progress Summary            Patient Acceptance, E, VU by MD at 3/21/2025 5202    Comment: Pt educated on body mechanics/positioning in t/fs. Pt educated on pursed lipped breathing with SOB which pt utilizes.                      Point: Precautions (Done)       Learning Progress Summary            Patient Acceptance, E, VU by MD at 3/21/2025 0913    Comment: Pt educated on body mechanics/positioning in t/fs. Pt educated on pursed lipped breathing  with SOB which pt utilizes.                                      User Key       Initials Effective Dates Name Provider Type Discipline    MD 01/29/25 -  Elaine Madison, PT Student PT Student PT                  PT Recommendation and Plan     Plan of Care Reviewed With: patient  Outcome Evaluation: Pt stated she had just finished shower not long ago. Educated on endurance and o2 prior to amb. Pt amb in choudhary CGA and refused sitting rest. Pt amb 60' and required standing rest. O2 89% 8L. Amb back to room and had increase in SOA. O2 desat to 80% on 8L. Educated on breathing technique. Pt able to recover back to 90% within 3 minutes. Pt returned to 5l hi flow and o2 94%.       Time Calculation:    PT Charges       Row Name 03/22/25 1311             Time Calculation    Start Time 1150  -WK      Stop Time 1205  -WK      Time Calculation (min) 15 min  -WK      PT Received On 03/22/25  -WK         Time Calculation- PT    Total Timed Code Minutes- PT 15 minute(s)  -WK         Timed Charges    01190 - Gait Training Minutes  15  -WK         Total Minutes    Timed Charges Total Minutes 15  -WK       Total Minutes 15  -WK                User Key  (r) = Recorded By, (t) = Taken By, (c) = Cosigned By      Initials Name Provider Type    WK Stephanie Griffith PTA Physical Therapist Assistant                  Therapy Charges for Today       Code Description Service Date Service Provider Modifiers Qty    [id number] HC GAIT TRAINING EA 15 MIN 3/22/2025 Stephanie Griffith PTA GP 1            PT G-Codes  Outcome Measure Options: AM-PAC 6 Clicks Basic Mobility (PT)  AM-PAC 6 Clicks Score (PT): 20  AM-PAC 6 Clicks Score (OT): 23    Stephanie Griffith PTA  3/22/2025

## 2025-03-22 NOTE — PROGRESS NOTES
"     Inpatient Progress Note        Results Review:   Results from last 7 days   Lab Units 25  0520 25  0308 25  1618 25  1442   SODIUM mmol/L 140 137  --  139   SODIUM, ARTERIAL mmol/L  --   --  141  --    POTASSIUM mmol/L 3.7 4.5  --  3.2*   CHLORIDE mmol/L 104 103  --  100   CO2 mmol/L 26.0 24.0  --  28.0   BUN mg/dL 11 7*  --  7*   CALCIUM mg/dL 8.3* 8.5*  --  9.0     Results from last 7 days   Lab Units 25  0520 25  0308 25  1442   WBC 10*3/mm3 7.80 5.87 8.54   HEMOGLOBIN g/dL 11.6* 12.9 13.1   HEMATOCRIT % 38.4 44.3 43.0   PLATELETS 10*3/mm3 208 221 267       Visit Vitals  /58 (BP Location: Right arm, Patient Position: Lying)   Pulse 105   Temp 97.7 °F (36.5 °C) (Oral)   Resp 16   Ht 160 cm (63\")   Wt 70.1 kg (154 lb 9.6 oz)   SpO2 96%   BMI 27.39 kg/m²            Medications:   azithromycin, 500 mg, Oral, Q24H  budesonide, 0.5 mg, Nebulization, BID - RT  cefTRIAXone, 1,000 mg, Intravenous, Q24H  enoxaparin sodium, 40 mg, Subcutaneous, Daily  famotidine, 20 mg, Oral, BID AC  ipratropium-albuterol, 3 mL, Nebulization, Q6H While Awake - RT  Macitentan-Tadalafil, 1 tablet, Oral, Nightly  metoprolol succinate XL, 12.5 mg, Oral, Nightly  mycophenolate, 1,000 mg, Oral, BID  pantoprazole, 40 mg, Oral, Q AM  predniSONE, 5 mg, Oral, Daily With Breakfast  sodium chloride, 10 mL, Intravenous, Q12H  traZODone, 100 mg, Oral, Nightly  Treprostinil, 10 puff, Inhalation, 4x Daily - RT  venlafaxine, 75 mg, Oral, Daily                                                NAME: Elaine Juárez  MRN: [id number]  AGE: 65 y.o.            : 1960  ADMISSION DATE: 3/20/2025  PRIMARY CARE PROVIDER: Aaron Stoddard MD  ATTENDING PHYSICIAN: Kaden Arthur MD                       Reason for Consult:  Concern for pneumonia    Interval History:    No acute events overnight, patient resting bed breathing comfortably on her home oxygen of 5 L.  She was seen by Dr. Escalera yesterday, " home pulmonary hypertension medications resumed.  Today she notes sore throat from frequent coughing.    Review of Systems:  A full 12-point review of systems was performed and was negative except as documented in the HPI.                       Physical Exam:  General: No acute distress, cooperative  Head: Atraumatic, normocephalic, normal inspection  Eyes: EOMI, normal inspection  ENT: Mucous membranes moist, no thrush  Teeth/gums: Normal inspection  Heart: RRR, no murmur  Lungs: Diminished, clear to auscultation  MSK: No edema or clubbing  GI/abdominal: Soft, nontender  Neurologic: Alert, oriented.  Cranial nerves II through XII grossly intact.           Imaging Review:   No new imaging for review.                       Problem List:  Acute on chronic hypoxic respiratory failure  CREST syndrome  Pulmonary fibrosis  Pulmonary hypertension  Concern for pneumonia           Recommendations:   -Continue supplemental oxygen as needed and wean as tolerated, goal O2 sat of 88-92%  -This morning she is doing well on her home oxygen of 5 L with rest  -Continue Rocephin and azithromycin to complete empiric course.  On discharge can be transition to oral with Omnicef  -Continue home prednisone 5 mg daily  -Continue scheduled DuoNebs, on discharge can resume home regimen  -Dr. Escalera's note reviewed.  Recommends reevaluation of CellCept but deferred to rheumatology  -Frequency respirometer, PT/OT/ST  -Follow-up with Pablo Blackman after discharge, will arrange    This morning the patient's respiratory status appears stable.  She is on her home oxygen.  She has chronic exertional hypoxemia requiring additional supplemental O2.  She does appear to be at her respiratory baseline.  As mentioned yesterday doubt acute infection so feel the patient does not warrant bronchoscopy at this time.  Agree with empiric coverage.  We will arrange pulmonary follow-up.  Thank you for allowing us to participate in this patient's care.  Pulmonology  will sign off at this time, please feel free to contact us if we can be of further assistance.             Alicia Keane DO  Pulmonary/Critical Care  3/22/2025  10:36 CDT

## 2025-03-22 NOTE — THERAPY TREATMENT NOTE
Acute Care - Speech Language Pathology   Swallow Treatment Note  [place]     Patient Name: Elaine Juárez  : 1960  MRN: [id number]  Today's Date: 3/22/2025               Admit Date: 3/20/2025  Swallow follow up completed. Patient alert and cooperative, eating breakfast when I entered the room. Patient remains concerned regarding worsening dysphagia since last VFSS in . She completed regular solids and thin liquids with SLP present. No large concerns that would indicate aspiration at this time. OK to continue current diet of regular/thin. Possible VFSS the first of the week if she remains admitted. Given her concern, if she discharges she may benefit from outpatient swallow study if unable to be completed while admitted.     SLP will continue to follow.     Jia Izaguirre MS CCC-SLP 3/22/2025 08:52 CDT    Visit Dx:     ICD-10-CM ICD-9-CM   1. Acute respiratory failure with hypoxia  J96.01 518.81   2. Altered mental status, unspecified altered mental status type  R41.82 780.97   3. Pneumonia of left lower lobe due to infectious organism  J18.9 486   4. Dysphagia, unspecified type  R13.10 787.20   5. Decreased functional activity tolerance [R68.89]  R68.89 780.99     Patient Active Problem List   Diagnosis    CREST syndrome, partial     Raynaud's phenomenon    Pulmonary fibrosis prob sec underlying autoimmune disease    Gastroesophageal reflux disease without esophagitis    BMI 31.0-31.9,adult    Chronic respiratory failure with hypoxia    History of adenomatous polyp of colon    Environmental allergies    PAH (pulmonary arterial hypertension) with portal hypertension    Hypokalemia    Panic attack    Paranoia (psychosis)    H/O noncompliance with medical treatment, presenting hazards to health    Bipolar affective disorder, currently manic, moderate    Obesity (BMI 30-39.9)    Esophageal dysmotility    Oropharyngeal dysphagia    Right ventricular systolic dysfunction    Respiratory failure with  hypoxia    Metabolic encephalopathy    Generalized weakness     Past Medical History:   Diagnosis Date    Allergies     Arthritis     BMI 31.0-31.9,adult 06/04/2019    Calcified granuloma of lung 06/04/2019    Right lung    CHF (congestive heart failure)     denies    Chronic idiopathic fibrosing alveolitis     Chronic respiratory failure with hypoxia 08/05/2020    Chronic respiratory failure with hypoxia, on home oxygen therapy     COVID-19 02/01/2022    CREST syndrome     Environmental allergies 02/01/2022    Gastroesophageal reflux disease without esophagitis 06/04/2019    Interstitial lung disease     Obstructive sleep apnea on CPAP 06/04/2019    Oropharyngeal dysphagia 01/26/2023    Primary central sleep apnea     Pulmonary fibrosis     Pulmonary hypertension     Rheumatoid arthritis     Right ventricular systolic dysfunction 05/04/2023    Short-term memory loss      Past Surgical History:   Procedure Laterality Date    BREAST BIOPSY      CARDIAC CATHETERIZATION N/A 07/22/2020    Procedure: Right Heart Cath;  Surgeon: Thong Martin MD;  Location: [facility] CATH INVASIVE LOCATION;  Service: Cardiology;  Laterality: N/A;    CHOLECYSTECTOMY      COLONOSCOPY  05/11/2015    5 POLYPS    COLONOSCOPY N/A 08/04/2021    Procedure: COLONOSCOPY WITH ANESTHESIA;  Surgeon: Michael Landin DO;  Location: [facility] ENDOSCOPY;  Service: Gastroenterology;  Laterality: N/A;  pre-hx polyps  post-colon polyps    ENDOSCOPY N/A 08/04/2021    Procedure: ESOPHAGOGASTRODUODENOSCOPY WITH ANESTHESIA;  Surgeon: Michael Landin DO;  Location: [facility] ENDOSCOPY;  Service: Gastroenterology;  Laterality: N/A;  pre-dysphagia  post-normal  pcp-lanette aguila       SLP Recommendation and Plan                    Recommended Diagnostics: VFSS (MBS) (03/22/25 0814)                          Daily Summary of Progress (SLP): progress toward functional goals as expected (03/22/25 0814)               Treatment Assessment (SLP): continued (03/22/25  0814)  Treatment Assessment Comments (SLP): See note (03/22/25 0814)  Plan for Continued Treatment (SLP): continue treatment per plan of care (03/22/25 0814)         Progress: improving      SWALLOW EVALUATION (Last 72 Hours)       SLP Adult Swallow Evaluation       Row Name 03/22/25 0814 03/21/25 1471                Rehab Evaluation    Document Type therapy note (daily note)  -MG evaluation  -CS       Subjective Information no complaints  -MG complains of;weakness;fatigue  -CS       Patient Observations cooperative;alert;agree to therapy  -MG alert;cooperative;agree to therapy  -CS       Patient/Family/Caregiver Comments/Observations -- Daughter and grandaughter  -CS       Patient Effort good  -MG good  -CS       Symptoms Noted During/After Treatment none  -MG --          General Information    Patient Profile Reviewed -- yes  -CS       Pertinent History Of Current Problem -- crest syndrome, interstitial lung disease, pulmonary arterial hypertension (on tadalafil and macitentan), chronic respiratory failure  -CS       Current Method of Nutrition -- regular textures;thin liquids  -CS       Precautions/Limitations, Vision -- WFL  -CS       Precautions/Limitations, Hearing -- WFL  -CS       Prior Level of Function-Communication -- WFL  -CS       Prior Level of Function-Swallowing -- no diet consistency restrictions  -CS       Plans/Goals Discussed with -- patient and family  -CS       Barriers to Rehab -- none identified  -CS       Patient's Goals for Discharge -- return to all previous roles/activities  -CS          Pain    Pretreatment Pain Rating -- 0/10 - no pain  -CS       Posttreatment Pain Rating -- 0/10 - no pain  -CS       Additional Documentation Pain Scale: FACES Pre/Post-Treatment (Group)  -MG --          Pain Scale: FACES Pre/Post-Treatment    Pain: FACES Scale, Pretreatment 0-->no hurt  -MG --       Posttreatment Pain Rating 0-->no hurt  -MG --          Oral Motor Structure and Function    Dentition  Assessment -- natural, present and adequate  -CS       Secretion Management -- WNL/WFL  -CS       Mucosal Quality -- moist, healthy  -CS       Volitional Swallow -- weak  -CS       Volitional Cough -- WFL  -CS          Oral Musculature and Cranial Nerve Assessment    Oral Motor General Assessment -- WFL  -CS          General Eating/Swallowing Observations    Respiratory Support Currently in Use -- nasal cannula  -CS       Eating/Swallowing Skills -- self-fed;fed by SLP  -CS       Positioning During Eating -- upright in bed  -CS       Utensils Used -- spoon;cup;straw  -CS       Consistencies Trialed -- regular textures;honey-thick liquids;nectar/syrup-thick liquids;thin liquids;pureed  -CS          Clinical Swallow Eval    Oral Prep Phase -- WFL  -CS       Oral Transit -- WFL  -CS       Oral Residue -- WFL  -CS       Pharyngeal Phase -- suspected pharyngeal impairment  -CS       Esophageal Phase -- suspected esophageal impairment  -CS       Clinical Swallow Evaluation Summary -- See note  -CS          Pharyngeal Phase Concerns    Pharyngeal Phase Concerns -- multiple swallows;cough  -CS       Multiple Swallows -- all consistencies  -CS       Cough -- regular consistencies  -CS          Esophageal Phase Concerns    Esophageal Phase Concerns -- globus  -CS          SLP Evaluation Clinical Impression    SLP Swallowing Diagnosis -- mild;suspected pharyngeal dysphagia;R/O pharyngeal dysphagia  -CS       Functional Impact -- risk of aspiration/pneumonia  -CS       Rehab Potential/Prognosis, Swallowing -- good, to achieve stated therapy goals  -CS       Swallow Criteria for Skilled Therapeutic Interventions Met -- demonstrates skilled criteria  -CS          SLP Treatment Clinical Impressions    Treatment Assessment (SLP) continued  -MG --       Treatment Assessment Comments (SLP) See note  -MG --       Daily Summary of Progress (SLP) progress toward functional goals as expected  -MG --       Barriers to Overall Progress  (SLP) Baseline deficits  -MG --       Plan for Continued Treatment (SLP) continue treatment per plan of care  -MG --       Care Plan Review care plan/treatment goals reviewed  -MG --       Care Plan Review, Other Participant(s) caregiver  DELLA Watson  -MG --          Recommendations    Therapy Frequency (Swallow) -- at least;2 days per week  -CS       Predicted Duration Therapy Intervention (Days) -- until discharge  -CS       SLP Diet Recommendation -- regular textures;thin liquids  -CS       Recommended Diagnostics VFSS (MBS)  -MG VFSS (MBS)  -CS       Recommended Precautions and Strategies -- upright posture during/after eating;small bites of food and sips of liquid  -CS       Oral Care Recommendations -- Oral Care BID/PRN  -CS       SLP Rec. for Method of Medication Administration -- meds whole;with thin liquids;with puree;as tolerated  -CS       Monitor for Signs of Aspiration -- yes;notify SLP if any concerns  -CS       Anticipated Discharge Disposition (SLP) -- unknown  -CS          Swallow Goals (SLP)    Swallow LTGs Swallow Long Term Goal (free text)  -MG Swallow Long Term Goal (free text)  -CS       Swallow STGs diet tolerance goal selection (SLP)  -MG diet tolerance goal selection (SLP)  -CS       Diet Tolerance Goal Selection (SLP) Patient will tolerate trials of  -MG Patient will tolerate trials of  -CS          (LTG) Swallow    (LTG) Swallow Pt will tolerate LRD w/o any overt s/s of aspiration.  -MG Pt will tolerate LRD w/o any overt s/s of aspiration.  -CS       [place] (Swallow Long Term Goal) with minimal cues (75-90% accuracy)  -MG with minimal cues (75-90% accuracy)  -CS       Time Frame (Swallow Long Term Goal) by discharge  -MG by discharge  -CS       Barriers (Swallow Long Term Goal) n/a  -MG n/a  -CS       Progress/Outcomes (Swallow Long Term Goal) continuing progress toward goal  -MG new goal  -CS          (STG) Patient will tolerate trials of    Consistencies Trialed (Tolerate trials)  regular textures;thin liquids  -MG regular textures;thin liquids  -CS       Desired Outcome (Tolerate trials) without signs of distress;without signs/symptoms of aspiration  -MG without signs of distress;without signs/symptoms of aspiration  -CS       [place] (Tolerate trials) with minimal cues (75-90% accuracy)  -MG with minimal cues (75-90% accuracy)  -CS       Time Frame (Tolerate trials) by discharge  -MG by discharge  -CS       Progress/Outcomes (Tolerate trials) continuing progress toward goal  -MG new goal  -CS                 User Key  (r) = Recorded By, (t) = Taken By, (c) = Cosigned By      Initials Name Effective Dates    CS Cabrera Diane, [facility]-SLP 05/07/24 -     MG Jia Izaguirre, MS [facility]-SLP 07/11/23 -                     EDUCATION  The patient has been educated in the following areas:   Dysphagia (Swallowing Impairment) Oral Care/Hydration.        SLP GOALS       Row Name 03/22/25 0814 03/21/25 1342          (LTG) Swallow    (LTG) Swallow Pt will tolerate LRD w/o any overt s/s of aspiration.  -MG Pt will tolerate LRD w/o any overt s/s of aspiration.  -CS     [place] (Swallow Long Term Goal) with minimal cues (75-90% accuracy)  -MG with minimal cues (75-90% accuracy)  -CS     Time Frame (Swallow Long Term Goal) by discharge  -MG by discharge  -CS     Barriers (Swallow Long Term Goal) n/a  -MG n/a  -CS     Progress/Outcomes (Swallow Long Term Goal) continuing progress toward goal  -MG new goal  -CS        (STG) Patient will tolerate trials of    Consistencies Trialed (Tolerate trials) regular textures;thin liquids  -MG regular textures;thin liquids  -CS     Desired Outcome (Tolerate trials) without signs of distress;without signs/symptoms of aspiration  -MG without signs of distress;without signs/symptoms of aspiration  -CS     [place] (Tolerate trials) with minimal cues (75-90% accuracy)  -MG with minimal cues (75-90% accuracy)  -CS     Time Frame (Tolerate trials) by discharge  -MG by  discharge  -CS     Progress/Outcomes (Tolerate trials) continuing progress toward goal  -MG new goal  -CS               User Key  (r) = Recorded By, (t) = Taken By, (c) = Cosigned By      Initials Name Provider Type    Cabrera Benites, CCC-SLP Speech and Language Pathologist    Jia Mix MS CCC-SLP Speech and Language Pathologist                         Time Calculation:    Time Calculation- SLP       Row Name 03/22/25 0849             Time Calculation- SLP    SLP Start Time 0814  -MG      SLP Stop Time 0852  -MG      SLP Time Calculation (min) 38 min  -MG      SLP Received On 03/22/25  -MG         Untimed Charges    48003-JD Treatment Swallow Minutes 38  -MG         Total Minutes    Untimed Charges Total Minutes 38  -MG       Total Minutes 38  -MG                User Key  (r) = Recorded By, (t) = Taken By, (c) = Cosigned By      Initials Name Provider Type    Jia Mix MS CCC-SLP Speech and Language Pathologist                    Therapy Charges for Today       Code Description Service Date Service Provider Modifiers Qty    [id number]  ST TREATMENT SWALLOW 3 3/22/2025 Jia Izaguirre MS CCC-SLP GN 1                 Jia Izaguirre MS CCC-SLP  3/22/2025

## 2025-03-22 NOTE — PLAN OF CARE
Goal Outcome Evaluation:  Plan of Care Reviewed With: patient, caregiver (DELLA Watson)        Progress: improving                     Treatment Assessment (SLP): continued (03/22/25 0814)  Treatment Assessment Comments (SLP): See note (03/22/25 0814)  Plan for Continued Treatment (SLP): continue treatment per plan of care (03/22/25 0814)    Swallow follow up completed. Patient alert and cooperative, eating breakfast when I entered the room. Patient remains concerned regarding worsening dysphagia since last VFSS in 2023. She completed regular solids and thin liquids with SLP present. No large concerns that would indicate aspiration at this time. OK to continue current diet of regular/thin. Possible VFSS the first of the week if she remains admitted. Given her concern, if she discharges she may benefit from outpatient swallow study if unable to be completed while admitted.     SLP will continue to follow.     Jia Izaguirre MS CCC-SLP 3/22/2025 08:49 CDT

## 2025-03-22 NOTE — PLAN OF CARE
Goal Outcome Evaluation:  Plan of Care Reviewed With: patient           Outcome Evaluation: Pt stated she had just finished shower not long ago. Educated on endurance and o2 prior to amb. Pt amb in choudhary CGA and refused sitting rest. Pt amb 60' and required standing rest. O2 89% 8L. Amb back to room and had increase in SOA. O2 desat to 80% on 8L. Educated on breathing technique. Pt able to recover back to 90% within 3 minutes. Pt returned to 5l hi flow and o2 94%.

## 2025-03-22 NOTE — NURSING NOTE
Patient states she is having some pain at this time, but declined pain medication right now. Patient states she will call out if she needs something for it.    appears normal and intact

## 2025-03-22 NOTE — CASE MANAGEMENT/SOCIAL WORK
Case Management Discharge Note      Final Note: Pt has been accepted by [facility] and will start [facility] pt on Tuesday. FREDI spoke with Philomena Valero [phone] from [facility] to confirm acceptance.         Selected Continued Care - Admitted Since 3/20/2025       Destination    No services have been selected for the patient.                Durable Medical Equipment    No services have been selected for the patient.                Dialysis/Infusion    No services have been selected for the patient.                Home Medical Care       Service Provider Services Address Phone Fax Patient Preferred    [facility] [address] [place] [address] [address] [address] [phone] --              Therapy    No services have been selected for the patient.                Community Resources    No services have been selected for the patient.                Community & DME    No services have been selected for the patient.                         Final Discharge Disposition Code: 06 - home with home health care

## 2025-03-23 LAB
QT INTERVAL: 398 MS
QTC INTERVAL: 453 MS

## 2025-03-23 NOTE — THERAPY DISCHARGE NOTE
Acute Care - Physical Therapy Discharge Summary  [facility]       Patient Name: Elaine Juárez  : 1960  MRN: [id number]    Today's Date: 3/23/2025                 Admit Date: 3/20/2025      PT Recommendation and Plan    Visit Dx:    ICD-10-CM ICD-9-CM   1. Acute respiratory failure with hypoxia  J96.01 518.81   2. Altered mental status, unspecified altered mental status type  R41.82 780.97   3. Pneumonia of left lower lobe due to infectious organism  J18.9 486   4. Dysphagia, unspecified type  R13.10 787.20   5. Decreased functional activity tolerance [R68.89]  R68.89 780.99   6. Generalized weakness [R53.1]  R53.1 780.79   7. Pulmonary fibrosis prob sec underlying autoimmune disease  J84.10 515   8. PAH (pulmonary arterial hypertension) with portal hypertension  I27.21 416.8    K76.6                 PT Rehab Goals       Row Name 25 0600             Gait Training Goal 1 (PT)    Activity/Assistive Device (Gait Training Goal 1, PT) gait (walking locomotion);increase endurance/gait distance;other (see comments)  while maintaining O2 sat above 90  -AB      [place] Level (Gait Training Goal 1, PT) modified independence  -AB      Distance (Gait Training Goal 1, PT) 70  -AB      Time Frame (Gait Training Goal 1, PT) long term goal (LTG);10 days  -AB      Progress/Outcome (Gait Training Goal 1, PT) goal not met  -AB                User Key  (r) = Recorded By, (t) = Taken By, (c) = Cosigned By      Initials Name Provider Type Discipline    Radha Zuniga PTA Physical Therapist Assistant PT                        PT Discharge Summary  Anticipated Discharge Disposition (PT): skilled nursing facility  Reason for Discharge: Discharge from facility  Outcomes Achieved: Refer to plan of care for updates on goals achieved  Discharge Destination: Home with assist      Radha Gil PTA   3/23/2025

## 2025-03-23 NOTE — OUTREACH NOTE
Prep Survey      Flowsheet Row Responses   [facility] facility patient discharged from? [place]   Is LACE score < 7 ? No   Eligibility Readm Mgmt   Discharge diagnosis Respiratory failure with hypoxia   Does the patient have one of the following disease processes/diagnoses(primary or secondary)? Other   Does the patient have Home health ordered? Yes   What is the Home health agency?  [facility]   Is there a DME ordered? No   Prep survey completed? Yes            Vinita SANCHEZ - Registered Nurse

## 2025-03-25 NOTE — THERAPY DISCHARGE NOTE
Acute Care - Speech Language Pathology Discharge Summary  [facility]       Patient Name: Elaine Juárez  : 1960  MRN: [id number]    Today's Date: 3/25/2025                   Admit Date: 3/20/2025      SLP Recommendation and Plan  Regular solids and thin liquids    Visit Dx:    ICD-10-CM ICD-9-CM   1. Acute respiratory failure with hypoxia  J96.01 518.81   2. Altered mental status, unspecified altered mental status type  R41.82 780.97   3. Pneumonia of left lower lobe due to infectious organism  J18.9 486   4. Dysphagia, unspecified type  R13.10 787.20   5. Decreased functional activity tolerance [R68.89]  R68.89 780.99   6. Generalized weakness [R53.1]  R53.1 780.79   7. Pulmonary fibrosis prob sec underlying autoimmune disease  J84.10 515   8. PAH (pulmonary arterial hypertension) with portal hypertension  I27.21 416.8    K76.6                 SLP GOALS       Row Name 25 0900             (LTG) Swallow    (LTG) Swallow Pt will tolerate LRD w/o any overt s/s of aspiration.  -MB      [place] (Swallow Long Term Goal) with minimal cues (75-90% accuracy)  -MB      Time Frame (Swallow Long Term Goal) by discharge  -MB      Barriers (Swallow Long Term Goal) n/a  -MB      Progress/Outcomes (Swallow Long Term Goal) goal partially met  -MB         (STG) Patient will tolerate trials of    Consistencies Trialed (Tolerate trials) regular textures;thin liquids  -MB      Desired Outcome (Tolerate trials) without signs of distress;without signs/symptoms of aspiration  -MB      [place] (Tolerate trials) with minimal cues (75-90% accuracy)  -MB      Time Frame (Tolerate trials) by discharge  -MB      Progress/Outcomes (Tolerate trials) goal partially met  -MB                User Key  (r) = Recorded By, (t) = Taken By, (c) = Cosigned By      Initials Name Provider Type    Lilli Sharma CCC-SLP Speech and Language Pathologist                    [email or web address]      SLP Discharge Summary  Anticipated  Discharge Disposition (SLP): unknown  Reason for Discharge: discharge from this facility  Progress Toward Achieving Short/long Term Goals: goals partially met within established timelines  Discharge Destination: home      Lilli Lazo CCC-SLP  3/25/2025

## 2025-03-26 ENCOUNTER — READMISSION MANAGEMENT (OUTPATIENT)
Dept: CALL CENTER | Facility: HOSPITAL | Age: 65
End: 2025-03-26
Payer: MEDICARE

## 2025-03-26 RX ORDER — PREDNISONE 5 MG/1
5 TABLET ORAL DAILY
Qty: 90 TABLET | Refills: 3 | Status: SHIPPED | OUTPATIENT
Start: 2025-03-26 | End: 2025-06-24

## 2025-03-26 NOTE — TELEPHONE ENCOUNTER
Rx Refill Note  Requested Prescriptions     Pending Prescriptions Disp Refills    predniSONE (DELTASONE) 5 MG tablet       Sig: Take 1 tablet by mouth.      Last office visit with prescribing clinician: 12/3/2024   Last telemedicine visit with prescribing clinician: Visit date not found   Next office visit with prescribing clinician: 4/7/2025                         Would you like a call back once the refill request has been completed: [] Yes [] No    If the office needs to give you a call back, can they leave a voicemail: [] Yes [] No    Philomena Dhaliwal MA  03/26/25, 10:56 CDT

## 2025-03-26 NOTE — OUTREACH NOTE
Medical Week 1 Survey      Flowsheet Row Responses   [facility] patient discharged from? [place]   Does the patient have one of the following disease processes/diagnoses(primary or secondary)? Other   Week 1 attempt successful? No   Unsuccessful attempts Attempt 1  [All numbers attempted.]            Clementine T - Registered Nurse

## 2025-04-07 ENCOUNTER — TELEPHONE (OUTPATIENT)
Dept: PULMONOLOGY | Facility: CLINIC | Age: 65
End: 2025-04-07

## 2025-04-07 ENCOUNTER — OFFICE VISIT (OUTPATIENT)
Dept: PULMONOLOGY | Facility: CLINIC | Age: 65
End: 2025-04-07
Payer: MEDICARE

## 2025-04-07 VITALS
OXYGEN SATURATION: 94 % | WEIGHT: 153 LBS | SYSTOLIC BLOOD PRESSURE: 118 MMHG | HEART RATE: 88 BPM | HEIGHT: 63 IN | DIASTOLIC BLOOD PRESSURE: 60 MMHG | BODY MASS INDEX: 27.11 KG/M2

## 2025-04-07 DIAGNOSIS — K76.6 PAH (PULMONARY ARTERIAL HYPERTENSION) WITH PORTAL HYPERTENSION: Chronic | ICD-10-CM

## 2025-04-07 DIAGNOSIS — K21.9 GASTROESOPHAGEAL REFLUX DISEASE WITHOUT ESOPHAGITIS: Chronic | ICD-10-CM

## 2025-04-07 DIAGNOSIS — J84.10 PULMONARY FIBROSIS: Primary | Chronic | ICD-10-CM

## 2025-04-07 DIAGNOSIS — I27.21 PAH (PULMONARY ARTERIAL HYPERTENSION) WITH PORTAL HYPERTENSION: Chronic | ICD-10-CM

## 2025-04-07 DIAGNOSIS — M34.1 CREST SYNDROME: Chronic | ICD-10-CM

## 2025-04-07 DIAGNOSIS — J96.11 CHRONIC RESPIRATORY FAILURE WITH HYPOXIA: Chronic | ICD-10-CM

## 2025-04-07 DIAGNOSIS — J41.0 SIMPLE CHRONIC BRONCHITIS: ICD-10-CM

## 2025-04-07 DIAGNOSIS — K22.4 ESOPHAGEAL DYSMOTILITY: Chronic | ICD-10-CM

## 2025-04-07 PROBLEM — J96.91 RESPIRATORY FAILURE WITH HYPOXIA: Chronic | Status: ACTIVE | Noted: 2025-03-20

## 2025-04-07 PROCEDURE — 1160F RVW MEDS BY RX/DR IN RCRD: CPT | Performed by: NURSE PRACTITIONER

## 2025-04-07 PROCEDURE — G2211 COMPLEX E/M VISIT ADD ON: HCPCS | Performed by: NURSE PRACTITIONER

## 2025-04-07 PROCEDURE — 99214 OFFICE O/P EST MOD 30 MIN: CPT | Performed by: NURSE PRACTITIONER

## 2025-04-07 PROCEDURE — 1159F MED LIST DOCD IN RCRD: CPT | Performed by: NURSE PRACTITIONER

## 2025-04-07 RX ORDER — IPRATROPIUM BROMIDE AND ALBUTEROL SULFATE 2.5; .5 MG/3ML; MG/3ML
3 SOLUTION RESPIRATORY (INHALATION) 4 TIMES DAILY PRN
Qty: 120 ML | Refills: 5 | Status: SHIPPED | OUTPATIENT
Start: 2025-04-07 | End: 2025-05-07

## 2025-04-07 NOTE — TELEPHONE ENCOUNTER
April per Dr. Stoddard's office says she is being seen by [facility] once a week and if we wanted her to have more then we could call them and request that.

## 2025-04-07 NOTE — PROGRESS NOTES
Chief Complaint  Primary pulmonary hypertension    Subjective    History of Present Illness {CC  Problem List  Visit Diagnosis   Encounters  Notes  Medications  Labs  Result Review Imaging  Media: 23}    Elaine Juárez presents to [facility] for:    History of Present Illness  Management of her pulmonary fibrosis/bronchiectasis secondary to underlying autoimmune disease, specifically crest syndrome.  She is also under treatment for pulmonary hypertension secondary to her autoimmune disease/interstitial lung disease.       She is obese.  She is a never smoker.  She has daily shortness of breath with exertion and a dry bothersome cough.  She has benefited from DuoNebs in the past.  She has been unable to get insurance to pay for those.  She would like to have those resent.         Unable to do PFTs times multiple attempts due to coughing.  Last 1 in 2019.  FEV1 54, FVC 59, diffusion 48/95 when corrected     She is followed by Dr. Landry with rheumatology for her crest syndrome. She is on CellCept 1000 mg twice a day and prednisone 5 mg.  She is very intolerant to higher doses of this as it results in agitation, insomnia and aggravation of underlying bipolar issues.  She has had some diarrhea lately.  She indicates her cardiologist requested she ask her rheumatologist if they can put her on something different to see if that may help with her diarrhea.  She has follow-up with them on May 1.        Most recent high-resolution CT scan July 2023 showing stable ILD/bronchiectasis.      Right heart cath in 2020 showing a PA pressure of 38.       She had an abnormal esophagram in the past showing reflux and dysmotility.  She remains on Prilosec 40 daily with no symptoms.    She has been referred to [place], [facility], [facility] and [facility] for consideration of lung transplantation.  She has been declined due to history of liver  disease and esophageal dysmotility.     Last 6-minute walk September 2024 showing a drop from 234 m down to 143 m.  She wears 6 L at rest and requires 8 L when she exerts.  She also uses 6 L with sleep.  She has had a negative sleep study.       Last echo February 2024, stable.  They have ordered another echo.  It is due in a few weeks.     She is now being followed by Dr. Escalera at the heart failure clinic. She is also on 12.5 mg of beta-blocker and Aldactone 50 mg.  She is no longer on Lasix.  Due to worsening 6-minute walk in functional class they opted to add Sotatercept.  She had her first dose in February 10.  They discussed referring her to tertiary care for IV therapy if her condition did not improve with this addition.  She reports she is tolerating this medication well.    She was hospitalized recently for reported pneumonia.  She was having a lot of nausea, vomiting and diarrhea preceding this hospitalization.  She was also having some intermittent fever.  She is not certain she did not have an aspiration event.  They did treat her with IV antibiotics and sent her home with oral antibiotics.  During that hospital stay they reduced her Tyvaso dosing to 10 breaths 4 times daily.  She is uncertain why.  They have not increased her back to full dose yet.  She is due to see her cardiologist on the 10th.    She is feeling much better overall.  She was evaluated by PT and was supposed to be starting home health.  She is uncertain why this has not started yet.           Current Outpatient Medications:     alendronate (FOSAMAX) 70 MG tablet, Take 1 tablet by mouth 1 (One) Time Per Week., Disp: , Rfl:     busPIRone (BUSPAR) 10 MG tablet, Take 1 tablet by mouth 3 (Three) Times a Day As Needed (anxiety)., Disp: , Rfl:     famotidine (PEPCID) 20 MG tablet, Take 1 tablet by mouth 2 (Two) Times a Day Before Meals., Disp: 60 tablet, Rfl: 0    Macitentan-Tadalafil 10-40 MG tablet, Take 1 tablet by mouth Daily., Disp: 30  "tablet, Rfl: 11    metoprolol succinate XL (TOPROL-XL) 25 MG 24 hr tablet, Take 0.5 tablets by mouth Daily., Disp: 45 tablet, Rfl: 3    mycophenolate (CELLCEPT) 500 MG tablet, Take 2 tablets by mouth 2 (Two) Times a Day., Disp: , Rfl:     O2 (OXYGEN), Inhale 6-8 L/min 1 (One) Time. 6 at rest, 8 during exertion, Disp: , Rfl:     omeprazole (priLOSEC) 40 MG capsule, Take 1 capsule by mouth Daily., Disp: 90 capsule, Rfl: 3    predniSONE (DELTASONE) 5 MG tablet, Take 1 tablet by mouth Daily for 90 days., Disp: 90 tablet, Rfl: 3    Sotatercept-csrk (Winrevair) 60 MG kit, Inject 60 mg under the skin into the appropriate area as directed Every 21 (Twenty-One) Days., Disp: , Rfl:     spironolactone (ALDACTONE) 50 MG tablet, Take 1 tablet by mouth Daily., Disp: 90 tablet, Rfl: 3    traZODone (DESYREL) 100 MG tablet, Take 1 tablet by mouth Every Night for 90 days., Disp: 90 tablet, Rfl: 3    Treprostinil (Tyvaso) 0.6 MG/ML solution inhalation solution, Inhale 14 puffs 4 (Four) Times a Day., Disp: 15 mL, Rfl: 11    venlafaxine (EFFEXOR) 75 MG tablet, Take 1 tablet by mouth Daily., Disp: , Rfl:     vitamin D (ERGOCALCIFEROL) 1.25 MG (72038 UT) capsule capsule, Take 1 capsule by mouth 1 (One) Time Per Week., Disp: , Rfl:     ipratropium-albuterol (DUO-NEB) 0.5-2.5 mg/3 ml nebulizer, Take 3 mL by nebulization 4 (Four) Times a Day As Needed for Wheezing for up to 30 days., Disp: 120 mL, Rfl: 5    Social History     Socioeconomic History    Marital status:    Tobacco Use    Smoking status: Never     Passive exposure: Past    Smokeless tobacco: Never   Vaping Use    Vaping status: Never Used   Substance and Sexual Activity    Alcohol use: No    Drug use: No    Sexual activity: Not Currently     Partners: Male       Objective   Vital Signs:   /60   Pulse 88   Ht 160 cm (63\")   Wt 69.4 kg (153 lb)   SpO2 94% Comment: 6Lpd  BMI 27.10 kg/m²     Physical Exam  Constitutional:       Interventions: Nasal cannula in place. "   Cardiovascular:      Rate and Rhythm: Normal rate and regular rhythm.      Heart sounds: No murmur heard.  Pulmonary:      Effort: Pulmonary effort is normal.      Breath sounds: Normal breath sounds.      Comments: Fine basilar rales, chronic  Musculoskeletal:      Right lower leg: No edema.      Left lower leg: No edema.      Comments: Wheelchair, chronic   Neurological:      Mental Status: She is alert and oriented to person, place, and time.        Result Review :      My interpretation of the PFT : none    Results for orders placed in visit on 06/05/19    Pulmonary Function Test      My interpretation of imaging:  none    My interpretation of labs: none      Assessment and Plan {CC Problem List  Visit Diagnosis  ROS  Review (Popup)  [facility]  Quality  BestPractice  Medications  SmartSets  SnapShot Encounters  Media : 23}    Diagnoses and all orders for this visit:    1. Pulmonary fibrosis prob sec underlying autoimmune disease (Primary)  Comments:  Will update CT of the chest, high-res.  Continue CellCept and prednisone for now.  Awaiting rheumatology appointment  Orders:  -     CT Chest Hi Resolution Diagnostic; Future    2. PAH (pulmonary arterial hypertension) with portal hypertension  Comments:  Continue Opsynvi, Tyvaso and Sortatercept.  Awaiting updated echo. ?  Increasing Tyvaso dosing.    3. CREST syndrome, partial   Comments:  Continue CellCept and prednisone.  Defer decision about changing medication to rheumatologist.  Will update CT of the chest  Orders:  -     CT Chest Hi Resolution Diagnostic; Future    4. Chronic respiratory failure with hypoxia  Comments:  Continue 6 L at rest and with sleep and 8 L with exertion.  She is compliant with this and benefiting from    5. Gastroesophageal reflux disease without esophagitis  Comments:  Likely contributing to cough and may have contributed to recent hospitalization. Continue Prilosec and Pepcid    6. Esophageal  dysmotility  Comments:  Likely contributing to cough and may have contributed to recent hospitalization.  Continue Prilosec and Pepcid    7. Simple chronic bronchitis  Comments:  Refills for DuoNeb sent to pharmacy  Orders:  -     ipratropium-albuterol (DUO-NEB) 0.5-2.5 mg/3 ml nebulizer; Take 3 mL by nebulization 4 (Four) Times a Day As Needed for Wheezing for up to 30 days.  Dispense: 120 mL; Refill: 5          Body mass index is 27.1 kg/m².    Fela Blakcman, ANASTASIA  4/7/2025  11:46 CDT    Follow Up   Return in about 3 months (around 7/7/2025).    Patient was given instructions and counseling regarding her condition or for health maintenance advice. Please see specific information pulled into the AVS if appropriate.

## 2025-04-07 NOTE — TELEPHONE ENCOUNTER
Just let her know that Dr Stoddard has only ordered her to have the therapy once a week. If she does not think it is helping let them know.

## 2025-04-07 NOTE — TELEPHONE ENCOUNTER
----- Message from Fela Blackman sent at 4/7/2025  1:28 PM CDT -----  Regarding: Home health  Saw her for f/u today. Just DC'd from the hospital recently. She said someone came to her home and did an assessment and told her she'd be getting home health and Physical therapy but no one ever called to set it up. Can you reach out to her PCP and check on this please? Fela

## 2025-04-10 ENCOUNTER — HOSPITAL ENCOUNTER (OUTPATIENT)
Dept: CARDIOLOGY | Facility: HOSPITAL | Age: 65
Discharge: HOME | End: 2025-04-10
Admitting: HOSPITALIST
Payer: MEDICARE

## 2025-04-10 VITALS
OXYGEN SATURATION: 95 % | DIASTOLIC BLOOD PRESSURE: 60 MMHG | HEART RATE: 102 BPM | SYSTOLIC BLOOD PRESSURE: 92 MMHG | BODY MASS INDEX: 27.85 KG/M2 | WEIGHT: 157.2 LBS

## 2025-04-10 DIAGNOSIS — I50.812 CHRONIC RIGHT HEART FAILURE: ICD-10-CM

## 2025-04-10 DIAGNOSIS — R06.02 SOB (SHORTNESS OF BREATH): Primary | ICD-10-CM

## 2025-04-10 LAB
ANION GAP SERPL CALCULATED.3IONS-SCNC: 11 MMOL/L (ref 5–15)
BUN SERPL-MCNC: 8 MG/DL (ref 8–23)
BUN/CREAT SERPL: 9.9 (ref 7–25)
CALCIUM SPEC-SCNC: 8.2 MG/DL (ref 8.6–10.5)
CHLORIDE SERPL-SCNC: 99 MMOL/L (ref 98–107)
CO2 SERPL-SCNC: 29 MMOL/L (ref 22–29)
CREAT SERPL-MCNC: 0.81 MG/DL (ref 0.57–1)
DEPRECATED RDW RBC AUTO: 43.6 FL (ref 37–54)
EGFRCR SERPLBLD CKD-EPI 2021: 79.7 ML/MIN/1.73
ERYTHROCYTE [DISTWIDTH] IN BLOOD BY AUTOMATED COUNT: 15.1 % (ref 12.3–15.4)
GLUCOSE SERPL-MCNC: 124 MG/DL (ref 65–99)
HCT VFR BLD AUTO: 42.5 % (ref 34–46.6)
HGB BLD-MCNC: 13 G/DL (ref 12–15.9)
MCH RBC QN AUTO: 24.6 PG (ref 26.6–33)
MCHC RBC AUTO-ENTMCNC: 30.6 G/DL (ref 31.5–35.7)
MCV RBC AUTO: 80.5 FL (ref 79–97)
PLATELET # BLD AUTO: 222 10*3/MM3 (ref 140–450)
PMV BLD AUTO: 10.5 FL (ref 6–12)
POTASSIUM SERPL-SCNC: 3.4 MMOL/L (ref 3.5–5.2)
RBC # BLD AUTO: 5.28 10*6/MM3 (ref 3.77–5.28)
SODIUM SERPL-SCNC: 139 MMOL/L (ref 136–145)
WBC NRBC COR # BLD AUTO: 7.51 10*3/MM3 (ref 3.4–10.8)

## 2025-04-10 PROCEDURE — 80048 BASIC METABOLIC PNL TOTAL CA: CPT | Performed by: HOSPITALIST

## 2025-04-10 PROCEDURE — 85027 COMPLETE CBC AUTOMATED: CPT | Performed by: HOSPITALIST

## 2025-04-10 PROCEDURE — G0463 HOSPITAL OUTPT CLINIC VISIT: HCPCS | Performed by: HOSPITALIST

## 2025-04-11 ENCOUNTER — READMISSION MANAGEMENT (OUTPATIENT)
Dept: CALL CENTER | Facility: HOSPITAL | Age: 65
End: 2025-04-11
Payer: MEDICARE

## 2025-04-11 NOTE — OUTREACH NOTE
Medical Week 3 Survey      Flowsheet Row Responses   [facility] patient discharged from? [place]   Does the patient have one of the following disease processes/diagnoses(primary or secondary)? Other   Week 3 attempt successful? Yes   Call start time 1622   Call end time 1624   Is patient permission given to speak with other caregiver? Yes   Person spoke with today (if not patient) and relationship Gladis-daughter.   Meds reviewed with patient/caregiver? Yes   Is the patient having any side effects they believe may be caused by any medication additions or changes? No   Does the patient have all medications ordered at discharge? Yes   Is the patient taking all medications as directed (includes completed medication regime)? Yes   Does the patient have a primary care provider?  Yes   Does the patient have an appointment with their PCP within 7 days of discharge? Yes   Has the patient kept scheduled appointments due by today? Yes   Has home health visited the patient within 72 hours of discharge? Yes   Psychosocial issues? No   Did the patient receive a copy of their discharge instructions? Yes   Nursing interventions Reviewed instructions with patient   What is the patient's perception of their health status since discharge? Improving   Is the patient/caregiver able to teach back signs and symptoms related to disease process for when to call PCP? Yes   Is the patient/caregiver able to teach back signs and symptoms related to disease process for when to call 911? Yes   Is the patient/caregiver able to teach back the hierarchy of who to call/visit for symptoms/problems? PCP, Specialist, Home health nurse, Urgent Care, ED, 911 Yes   If the patient is a current smoker, are they able to teach back resources for cessation? Not a smoker   Week 3 Call Completed? Yes   Graduated Yes   Is the patient interested in additional calls from an ambulatory ? No   Would this patient benefit from a Referral to [facility]  Work? No   Wrap up additional comments Brief call with daughter, Gladis. States patient is improving, and has everything needed. States HH continues to visit, and patient keeping f/u appts. Denies any needs/concerns today.   Call end time [id number]            Kalpana DAWKINS - Registered Nurse

## 2025-04-17 ENCOUNTER — HOSPITAL ENCOUNTER (OUTPATIENT)
Dept: CARDIOLOGY | Facility: HOSPITAL | Age: 65
Discharge: HOME | End: 2025-04-17
Admitting: HOSPITALIST
Payer: MEDICARE

## 2025-04-17 VITALS
OXYGEN SATURATION: 92 % | HEART RATE: 84 BPM | WEIGHT: 156 LBS | DIASTOLIC BLOOD PRESSURE: 66 MMHG | BODY MASS INDEX: 27.63 KG/M2 | SYSTOLIC BLOOD PRESSURE: 100 MMHG

## 2025-04-17 DIAGNOSIS — K76.6 PAH (PULMONARY ARTERIAL HYPERTENSION) WITH PORTAL HYPERTENSION: Primary | ICD-10-CM

## 2025-04-17 DIAGNOSIS — R06.02 SOB (SHORTNESS OF BREATH): ICD-10-CM

## 2025-04-17 DIAGNOSIS — J84.10 PULMONARY FIBROSIS: ICD-10-CM

## 2025-04-17 DIAGNOSIS — I50.812 CHRONIC RIGHT HEART FAILURE: ICD-10-CM

## 2025-04-17 DIAGNOSIS — I27.21 PAH (PULMONARY ARTERIAL HYPERTENSION) WITH PORTAL HYPERTENSION: Primary | ICD-10-CM

## 2025-04-17 LAB
ALBUMIN SERPL-MCNC: 3.7 G/DL (ref 3.5–5.2)
ALBUMIN/GLOB SERPL: 1.2 G/DL
ALP SERPL-CCNC: 126 U/L (ref 39–117)
ALT SERPL W P-5'-P-CCNC: 11 U/L (ref 1–33)
ANION GAP SERPL CALCULATED.3IONS-SCNC: 10 MMOL/L (ref 5–15)
AST SERPL-CCNC: 20 U/L (ref 1–32)
BILIRUB SERPL-MCNC: 0.2 MG/DL (ref 0–1.2)
BUN SERPL-MCNC: 10 MG/DL (ref 8–23)
BUN/CREAT SERPL: 12 (ref 7–25)
CALCIUM SPEC-SCNC: 9.2 MG/DL (ref 8.6–10.5)
CHLORIDE SERPL-SCNC: 99 MMOL/L (ref 98–107)
CO2 SERPL-SCNC: 29 MMOL/L (ref 22–29)
CREAT SERPL-MCNC: 0.83 MG/DL (ref 0.57–1)
EGFRCR SERPLBLD CKD-EPI 2021: 77.4 ML/MIN/1.73
GLOBULIN UR ELPH-MCNC: 3.1 GM/DL
GLUCOSE SERPL-MCNC: 113 MG/DL (ref 65–99)
NT-PROBNP SERPL-MCNC: 107.1 PG/ML (ref 0–900)
POTASSIUM SERPL-SCNC: 4.2 MMOL/L (ref 3.5–5.2)
PROT SERPL-MCNC: 6.8 G/DL (ref 6–8.5)
SODIUM SERPL-SCNC: 138 MMOL/L (ref 136–145)

## 2025-04-17 PROCEDURE — G0463 HOSPITAL OUTPT CLINIC VISIT: HCPCS | Performed by: HOSPITALIST

## 2025-04-17 PROCEDURE — 83880 ASSAY OF NATRIURETIC PEPTIDE: CPT | Performed by: HOSPITALIST

## 2025-04-17 PROCEDURE — 80053 COMPREHEN METABOLIC PANEL: CPT | Performed by: HOSPITALIST

## 2025-04-17 NOTE — PROGRESS NOTES
Reason For Visit:  Pulmonary hypertension    Subjective        Elaine Juárez is a 65 y.o. female with the below pertinent PMH who presents for follow-up of pulmonary hypertension.    Elaine Juárez was most recently seen by me in the hospital 3/21/2025 at which time she was dealing with an acute illness and GI symptoms.  So was reduced due to inconsistent dosing and difficulty getting in all the breast with her GI issues.  She was changed to 10 breaths QID with plan to eventually titrate back up to 12 breaths QID.    The patient ports that she is feeling much better and that her GI issues have resolved.  She continues to have some dyspnea on exertion although states that she overall does feel better since she was started on Winrevair.  She denies chest pain, palpitations, lightheadedness, and peripheral edema.    ROS: Pertinent findings are included above.    Prior PAH History:   - Echo 11/13/19 at [facility] reportedly grossly normal cardiac chamber dimensions without evidence of pulmonary hypertension   - RHC 7/22/20: RA 6, RV 63/6, PA 64/19 (mean 38), PCWP 10, David CO/CI 3.8/2.1, PVR 7.4 TIAN, negative adenosine vasodilator challenge  - Started opsimut mid 2020 after RHC   - 4/2022 proBNP 108-122  - 6/2022 6MWD after COVID 422 feet (129 meters)  - Started Tyvaso DPI 8/2022 (titrated to max dose)  - 9/2022 admission              - 9/12/22 Echo: RV severely dilated w/ severe dysfunction, systolic and diastolic septal flattening, RA severely dilated, small pericardial effusion, RVSP 56-63 (my estimate)              - proBNP 7417              - Started Sildenafil 20mg TID 9/2022              - Started lasix 10mg daily w/ KCl on hospital discharge 9/2022  - 2022: Referred to [facility], [facility], [facility], and [facility] for consideration of lung transplant but was considered not a candidate due to history of liver disease and esophageal motility (patient states she has not been seen in PAH clinic at  any of these hospitals)  - 1/2023: 6MWD 633 feet (193 meters)  - 3/2023: Transitioned to Medicare and was unable to get medications filled so ran out.   4/2023: Feeling poorly but was able to restart sildenafil and transition to Tyvaso inhaler at end of month.  - 5/12/2023: For CHF clinic visit. 6MWD 169 meters. proBNP 1798.  Improving dyspnea and exertional capacity since restarting Tyvaso (still titrating up).  Started spironolactone 25 mg daily.  - 5/24: Tyvaso up to goal dose of 12 breaths QID and being tolerated well.  Dyspnea improving.  Had obtained a thuan to hopefully restart Opsumit soon (did start at end of May).  - 7/5: Patient had restarted Opsumit and reported continual improvement in her breathing.  proBNP improved to 212.  - 7/25/2023 echo: LVEF 61-65%, mild LVH with concentric remodeling, RV mildly dilated with mildly to moderately reduced systolic function, mild systolic septal flattening of the IV septum consistent with RV pressure overload, normal RA size, insufficient TR velocity to estimate RVSP.  - 9/6/2022 6-minute walk distance: 234 m  - 11/20/23: Started Toprol XL  12.5 mg daily for 3 weeks then 25 mg daily.  - 1/11/2024: 6-minute walk distance 234 m  - Echo 2/6/2024: My review, RV mildly dilated with mildly or moderately reduced systolic function mild systolic septal flattening overall similar to prior echo from 7/2023.  - 3/2024: Transition from sildenafil to tadalafil  - 5/16/2024: 6-minute walk distance 234 m  - 8/9/2024: Increase Tyvaso to 14 breaths QID.  6-minute walk distance 143 m.  - 2/10/2025: First dose sotatercept  - 3/21/2025: Seen in the hospital; she was only taking 10 Tyvaso TID and not consistently getting in all of her breaths due to issues with nausea.  Tyvaso was adjusted to 10 breaths QID.     Pertinent PMH  Scleroderma/CREST syndrome - followed by Dr. Sanders  Chronic Hypoxic Respiratory Failure  ILD/Bronchiectasis  WHO Group I (scleroderma) and Group III (ILD)  Pulmonary Arterial Hypertension  Liver disease    Pertinent past medical, surgical, family, and social history were reviewed.      Current Outpatient Medications:     alendronate (FOSAMAX) 70 MG tablet, Take 1 tablet by mouth 1 (One) Time Per Week., Disp: , Rfl:     busPIRone (BUSPAR) 10 MG tablet, Take 1 tablet by mouth 3 (Three) Times a Day As Needed (anxiety)., Disp: , Rfl:     famotidine (PEPCID) 20 MG tablet, Take 1 tablet by mouth 2 (Two) Times a Day Before Meals., Disp: 60 tablet, Rfl: 0    ipratropium-albuterol (DUO-NEB) 0.5-2.5 mg/3 ml nebulizer, Take 3 mL by nebulization 4 (Four) Times a Day As Needed for Wheezing for up to 30 days., Disp: 120 mL, Rfl: 5    Macitentan-Tadalafil 10-40 MG tablet, Take 1 tablet by mouth Daily., Disp: 30 tablet, Rfl: 11    metoprolol succinate XL (TOPROL-XL) 25 MG 24 hr tablet, Take 0.5 tablets by mouth Daily., Disp: 45 tablet, Rfl: 3    mycophenolate (CELLCEPT) 500 MG tablet, Take 2 tablets by mouth 2 (Two) Times a Day., Disp: , Rfl:     O2 (OXYGEN), Inhale 6-8 L/min 1 (One) Time. 6 at rest, 8 during exertion, Disp: , Rfl:     omeprazole (priLOSEC) 40 MG capsule, Take 1 capsule by mouth Daily., Disp: 90 capsule, Rfl: 3    potassium chloride 10 MEQ CR tablet, Take 1 tablet by mouth Daily., Disp: 30 tablet, Rfl: 11    predniSONE (DELTASONE) 5 MG tablet, Take 1 tablet by mouth Daily for 90 days., Disp: 90 tablet, Rfl: 3    Sotatercept-csrk (Winrevair) 60 MG kit, Inject 60 mg under the skin into the appropriate area as directed Every 21 (Twenty-One) Days., Disp: , Rfl:     spironolactone (ALDACTONE) 50 MG tablet, Take 1 tablet by mouth Daily., Disp: 90 tablet, Rfl: 3    traZODone (DESYREL) 100 MG tablet, Take 1 tablet by mouth Every Night for 90 days., Disp: 90 tablet, Rfl: 3    Treprostinil (Tyvaso) 0.6 MG/ML solution inhalation solution, Inhale 14 puffs 4 (Four) Times a Day. (Patient taking differently: Inhale 10 puffs 4 (Four) Times a Day.), Disp: 15 mL, Rfl: 11    venlafaxine  "(EFFEXOR) 75 MG tablet, Take 1 tablet by mouth Daily., Disp: , Rfl:     vitamin D (ERGOCALCIFEROL) 1.25 MG (93026 UT) capsule capsule, Take 1 capsule by mouth 1 (One) Time Per Week., Disp: , Rfl:      Objective   Vital Signs:  /66 (BP Location: Left arm, Patient Position: Sitting)   Pulse 84   Wt 70.8 kg (156 lb)   SpO2 92% Comment: 6L  BMI 27.63 kg/m²   Estimated body mass index is 27.63 kg/m² as calculated from the following:    Height as of 4/7/25: 160 cm (63\").    Weight as of this encounter: 70.8 kg (156 lb).      Constitutional:       Appearance: Healthy appearance. Not in distress.   Neck:      Vascular: JVD normal.   Pulmonary:      Comments: Bilateral pulmonary crackles with normal work of breathing  Cardiovascular:      Normal rate. Regular rhythm.      Murmurs: There is a grade 1/6 systolic murmur at the LLSB.      No gallop.  No click. No rub.   Edema:     Peripheral edema absent.   Abdominal:      General: There is no distension.      Palpations: Abdomen is soft.      Tenderness: There is no abdominal tenderness.   Skin:     General: Skin is warm and dry.   Neurological:      Mental Status: Alert and oriented to person, place and time.        Result Review :  The following data was reviewed by: Roosevelt Escalera MD on 04/17/2025:  CMP   CMP          3/22/2025    05:20 4/10/2025    15:09 4/17/2025    14:25   CMP   Glucose 94  124  113    BUN 11  8  10    Creatinine 0.78  0.81  0.83    EGFR 83.4  79.7  77.4    Sodium 140  139  138    Potassium 3.7  3.4  4.2    Chloride 104  99  99    Calcium 8.3  8.2  9.2    Total Protein   6.8    Albumin   3.7    Globulin   3.1    Total Bilirubin   0.2    Alkaline Phosphatase   126    AST (SGOT)   20    ALT (SGPT)   11    Albumin/Globulin Ratio   1.2    BUN/Creatinine Ratio 14.1  9.9  12.0    Anion Gap 10.0  11.0  10.0      CBC   CBC          3/21/2025    03:08 3/22/2025    05:20 4/10/2025    15:09   CBC   WBC 5.87  7.80  7.51    RBC 5.36  4.67  5.28    Hemoglobin " 12.9  11.6  13.0    Hematocrit 44.3  38.4  42.5    MCV 82.6  82.2  80.5    MCH 24.1  24.8  24.6    MCHC 29.1  30.2  30.6    RDW 14.5  14.6  15.1    Platelets 221  208  222      Pro-BNP       Lab 04/17/25  1425   PROBNP 107.1        Assessment and Plan   Diagnoses and all orders for this visit:    1. PAH (pulmonary arterial hypertension) with portal hypertension (Primary)  2. SOB (shortness of breath)  3. Chronic right heart failure  4. Pulmonary fibrosis  -Overall appears to be doing better with Winrevair and is now tolerating her current Tyvaso dose again.  Prior GI issues have resolved.  - Increase Tyvaso to 11 breaths QID for 1 week and then subsequently back up to 12 breaths QID  - Continue Winrevair, Opsumit, and tadalafil at current doses  - Echo scheduled next month  - Continue pulmonology follow-up  - Continue spironolactone 50 mg daily, Toprol-XL 12.5 mg daily    Follow Up   5/29/2025 as scheduled  Patient was given instructions and counseling regarding her condition or for health maintenance advice. Please see specific information pulled into the AVS if appropriate.       EMR Dragon/Transcription disclaimer: Much of this encounter note is an electronic transcription/translation of spoken language to printed text. The electronic translation of spoken language may permit erroneous, or at times, nonsensical words or phrases to be inadvertently transcribed; although I have reviewed the note for such errors, some may still exist.

## 2025-04-17 NOTE — PATIENT INSTRUCTIONS
Increase Tyvaso to 11 breaths four times daily for 1 week and if feeling ok with this then increase to 12 breaths four times daily

## 2025-04-22 ENCOUNTER — HOSPITAL ENCOUNTER (OUTPATIENT)
Dept: CT IMAGING | Facility: HOSPITAL | Age: 65
Discharge: HOME | End: 2025-04-22
Admitting: NURSE PRACTITIONER
Payer: MEDICARE

## 2025-04-22 DIAGNOSIS — J84.10 PULMONARY FIBROSIS: Chronic | ICD-10-CM

## 2025-04-22 DIAGNOSIS — M34.1 CREST SYNDROME: Chronic | ICD-10-CM

## 2025-04-22 PROCEDURE — 71250 CT THORAX DX C-: CPT

## 2025-04-23 ENCOUNTER — RESULTS FOLLOW-UP (OUTPATIENT)
Dept: PULMONOLOGY | Facility: CLINIC | Age: 65
End: 2025-04-23
Payer: MEDICARE

## 2025-04-26 DIAGNOSIS — R91.8 LUNG NODULES: Primary | ICD-10-CM

## 2025-04-29 ENCOUNTER — HOSPITAL ENCOUNTER (OUTPATIENT)
Dept: CARDIOLOGY | Facility: HOSPITAL | Age: 65
Discharge: HOME | End: 2025-04-29
Admitting: HOSPITALIST
Payer: MEDICARE

## 2025-04-29 VITALS
BODY MASS INDEX: 27.28 KG/M2 | SYSTOLIC BLOOD PRESSURE: 104 MMHG | DIASTOLIC BLOOD PRESSURE: 59 MMHG | OXYGEN SATURATION: 85 % | HEART RATE: 94 BPM | WEIGHT: 154 LBS

## 2025-04-29 DIAGNOSIS — R06.02 SOB (SHORTNESS OF BREATH): Primary | ICD-10-CM

## 2025-04-29 DIAGNOSIS — I50.812 CHRONIC RIGHT HEART FAILURE: ICD-10-CM

## 2025-04-29 LAB
ANION GAP SERPL CALCULATED.3IONS-SCNC: 10 MMOL/L (ref 5–15)
BUN SERPL-MCNC: 8 MG/DL (ref 8–23)
BUN/CREAT SERPL: 10.5 (ref 7–25)
CALCIUM SPEC-SCNC: 8.4 MG/DL (ref 8.6–10.5)
CHLORIDE SERPL-SCNC: 100 MMOL/L (ref 98–107)
CO2 SERPL-SCNC: 26 MMOL/L (ref 22–29)
CREAT SERPL-MCNC: 0.76 MG/DL (ref 0.57–1)
DEPRECATED RDW RBC AUTO: 43.2 FL (ref 37–54)
EGFRCR SERPLBLD CKD-EPI 2021: 86 ML/MIN/1.73
ERYTHROCYTE [DISTWIDTH] IN BLOOD BY AUTOMATED COUNT: 15.3 % (ref 12.3–15.4)
GLUCOSE SERPL-MCNC: 98 MG/DL (ref 65–99)
HCT VFR BLD AUTO: 43.9 % (ref 34–46.6)
HGB BLD-MCNC: 13.5 G/DL (ref 12–15.9)
MCH RBC QN AUTO: 24.6 PG (ref 26.6–33)
MCHC RBC AUTO-ENTMCNC: 30.8 G/DL (ref 31.5–35.7)
MCV RBC AUTO: 80 FL (ref 79–97)
PLATELET # BLD AUTO: 287 10*3/MM3 (ref 140–450)
PMV BLD AUTO: 10.1 FL (ref 6–12)
POTASSIUM SERPL-SCNC: 3.7 MMOL/L (ref 3.5–5.2)
RBC # BLD AUTO: 5.49 10*6/MM3 (ref 3.77–5.28)
SODIUM SERPL-SCNC: 136 MMOL/L (ref 136–145)
WBC NRBC COR # BLD AUTO: 9.89 10*3/MM3 (ref 3.4–10.8)

## 2025-04-29 PROCEDURE — 80048 BASIC METABOLIC PNL TOTAL CA: CPT | Performed by: HOSPITALIST

## 2025-04-29 PROCEDURE — G0463 HOSPITAL OUTPT CLINIC VISIT: HCPCS | Performed by: HOSPITALIST

## 2025-04-29 PROCEDURE — 85027 COMPLETE CBC AUTOMATED: CPT | Performed by: HOSPITALIST

## 2025-04-29 NOTE — PROGRESS NOTES
Winrevair Injection Labs    Dosage Modifications Due to Hemoglobin Increase or Platelet Count Decrease (Winrevair PI section 2.3)  Check Hgb and platelet count before each dose for the first 5 doses, or longer if values are unstable. Thereafter, monitor Hgb and platelet count periodically  Delay treatment for at least 3 weeks if any of the following occur:   Hgb increases >2.0 g/dL from the previous dose and is above ULN.  Hgb increases >4.0 g/dL from baseline.  Hgb increases >2.0 g/dL above ULN.  Platelet count decreases to <50,000/mm^3   Recheck Hgb and platelet count before reinitiating treatment. For treatment delays lasting >9 weeks, restart treatment at 0.3 mg/kg, and escalate to 0.7 mg/kg after verifying acceptable Hgb and platelet count.    *Patient's daughter who is an APRN administers her injection once every 3 weeks after she has labs drawn in our office       Hemoglobin Platelets   Prior to 1st dose on 2/10/25 12.2 246   Prior to 2nd dose on 3/3/25 12.8 244   Prior to 3rd dose on 3/24/25 13.1 267   Prior to 4th dose on 4/14/25 13.0 222   Prior to 5th dose on 5/5/25 13.5 287       Per labs on 4/29/25, patient is ok to receive her Winrevair injection on Monday 5/5/25    After receiving 5th dose, CBC will be drawn approximately every 90 days to reassess.

## 2025-05-07 RX ORDER — SPIRONOLACTONE 50 MG/1
50 TABLET, FILM COATED ORAL DAILY
Qty: 90 TABLET | Refills: 3 | Status: SHIPPED | OUTPATIENT
Start: 2025-05-07

## 2025-05-08 ENCOUNTER — APPOINTMENT (OUTPATIENT)
Dept: GENERAL RADIOLOGY | Facility: HOSPITAL | Age: 65
End: 2025-05-08
Payer: MEDICARE

## 2025-05-08 ENCOUNTER — HOSPITAL ENCOUNTER (EMERGENCY)
Facility: HOSPITAL | Age: 65
Discharge: HOME | End: 2025-05-08
Attending: FAMILY MEDICINE
Payer: MEDICARE

## 2025-05-08 VITALS
TEMPERATURE: 98.6 F | OXYGEN SATURATION: 99 % | HEART RATE: 78 BPM | SYSTOLIC BLOOD PRESSURE: 126 MMHG | RESPIRATION RATE: 20 BRPM | HEIGHT: 63 IN | DIASTOLIC BLOOD PRESSURE: 68 MMHG | BODY MASS INDEX: 27.3 KG/M2 | WEIGHT: 154.1 LBS

## 2025-05-08 DIAGNOSIS — J44.1 COPD WITH ACUTE EXACERBATION: Primary | ICD-10-CM

## 2025-05-08 DIAGNOSIS — J84.10 PULMONARY FIBROSIS: ICD-10-CM

## 2025-05-08 LAB
ALBUMIN SERPL-MCNC: 3.7 G/DL (ref 3.5–5.2)
ALBUMIN/GLOB SERPL: 1.1 G/DL
ALP SERPL-CCNC: 143 U/L (ref 39–117)
ALT SERPL W P-5'-P-CCNC: 11 U/L (ref 1–33)
ANION GAP SERPL CALCULATED.3IONS-SCNC: 12 MMOL/L (ref 5–15)
ANISOCYTOSIS BLD QL: ABNORMAL
APTT PPP: 24.6 SECONDS (ref 24.5–36)
ARTERIAL PATENCY WRIST A: POSITIVE
AST SERPL-CCNC: 18 U/L (ref 1–32)
ATMOSPHERIC PRESS: 751 MMHG
BASE EXCESS BLDA CALC-SCNC: 1.1 MMOL/L (ref 0–2)
BASOPHILS # BLD MANUAL: 0 10*3/MM3 (ref 0–0.2)
BASOPHILS NFR BLD MANUAL: 0 % (ref 0–1.5)
BDY SITE: ABNORMAL
BILIRUB SERPL-MCNC: 0.3 MG/DL (ref 0–1.2)
BODY TEMPERATURE: 37
BUN SERPL-MCNC: 10 MG/DL (ref 8–23)
BUN/CREAT SERPL: 12 (ref 7–25)
CALCIUM SPEC-SCNC: 8.9 MG/DL (ref 8.6–10.5)
CHLORIDE SERPL-SCNC: 99 MMOL/L (ref 98–107)
CO2 SERPL-SCNC: 23 MMOL/L (ref 22–29)
COHGB MFR BLD: 1.4 % (ref 0–5)
CREAT SERPL-MCNC: 0.83 MG/DL (ref 0.57–1)
D-LACTATE SERPL-SCNC: 2.2 MMOL/L (ref 0.5–2)
DEPRECATED RDW RBC AUTO: 43.6 FL (ref 37–54)
EGFRCR SERPLBLD CKD-EPI 2021: 77.4 ML/MIN/1.73
EOSINOPHIL # BLD MANUAL: 0.35 10*3/MM3 (ref 0–0.4)
EOSINOPHIL NFR BLD MANUAL: 3.2 % (ref 0.3–6.2)
ERYTHROCYTE [DISTWIDTH] IN BLOOD BY AUTOMATED COUNT: 15.4 % (ref 12.3–15.4)
FLUAV RNA RESP QL NAA+PROBE: NOT DETECTED
FLUBV RNA RESP QL NAA+PROBE: NOT DETECTED
GAS FLOW AIRWAY: 5 LPM
GEN 5 1HR TROPONIN T REFLEX: 10 NG/L
GIANT PLATELETS: ABNORMAL
GLOBULIN UR ELPH-MCNC: 3.3 GM/DL
GLUCOSE SERPL-MCNC: 181 MG/DL (ref 65–99)
HCO3 BLDA-SCNC: 26.4 MMOL/L (ref 20–26)
HCT VFR BLD AUTO: 45.7 % (ref 34–46.6)
HCT VFR BLD CALC: 40.8 % (ref 38–51)
HGB BLD-MCNC: 13.8 G/DL (ref 12–15.9)
HGB BLDA-MCNC: 13.3 G/DL (ref 12–16)
HOLD SPECIMEN: NORMAL
HOLD SPECIMEN: NORMAL
INR PPP: 1.02 (ref 0.91–1.09)
LIPASE SERPL-CCNC: 102 U/L (ref 13–60)
LYMPHOCYTES # BLD MANUAL: 1.41 10*3/MM3 (ref 0.7–3.1)
LYMPHOCYTES NFR BLD MANUAL: 5.4 % (ref 5–12)
Lab: ABNORMAL
MAGNESIUM SERPL-MCNC: 2 MG/DL (ref 1.6–2.4)
MCH RBC QN AUTO: 24 PG (ref 26.6–33)
MCHC RBC AUTO-ENTMCNC: 30.2 G/DL (ref 31.5–35.7)
MCV RBC AUTO: 79.6 FL (ref 79–97)
METAMYELOCYTES NFR BLD MANUAL: 1.1 % (ref 0–0)
METHGB BLD QL: 0.1 % (ref 0–3)
MICROCYTES BLD QL: ABNORMAL
MODALITY: ABNORMAL
MONOCYTES # BLD: 0.59 10*3/MM3 (ref 0.1–0.9)
MYELOCYTES NFR BLD MANUAL: 1.1 % (ref 0–0)
NEUTROPHILS # BLD AUTO: 8.29 10*3/MM3 (ref 1.7–7)
NEUTROPHILS NFR BLD MANUAL: 73.1 % (ref 42.7–76)
NEUTS BAND NFR BLD MANUAL: 3.2 % (ref 0–5)
NT-PROBNP SERPL-MCNC: 250.6 PG/ML (ref 0–900)
OXYHGB MFR BLDV: 97.4 % (ref 94–99)
PCO2 BLDA: 43.6 MM HG (ref 35–45)
PCO2 TEMP ADJ BLD: 43.6 MM HG (ref 35–45)
PH BLDA: 7.39 PH UNITS (ref 7.35–7.45)
PH, TEMP CORRECTED: 7.39 PH UNITS (ref 7.35–7.45)
PLATELET # BLD AUTO: 246 10*3/MM3 (ref 140–450)
PMV BLD AUTO: 10.4 FL (ref 6–12)
PO2 BLDA: 104 MM HG (ref 83–108)
PO2 TEMP ADJ BLD: 104 MM HG (ref 83–108)
POIKILOCYTOSIS BLD QL SMEAR: ABNORMAL
POLYCHROMASIA BLD QL SMEAR: ABNORMAL
POTASSIUM BLDA-SCNC: 3.9 MMOL/L (ref 3.5–5.2)
POTASSIUM SERPL-SCNC: 4.1 MMOL/L (ref 3.5–5.2)
PROT SERPL-MCNC: 7 G/DL (ref 6–8.5)
PROTHROMBIN TIME: 13.9 SECONDS (ref 11.8–14.8)
RBC # BLD AUTO: 5.74 10*6/MM3 (ref 3.77–5.28)
RSV RNA RESP QL NAA+PROBE: NOT DETECTED
SAO2 % BLDCOA: 98.9 % (ref 94–99)
SARS-COV-2 RNA RESP QL NAA+PROBE: NOT DETECTED
SODIUM BLDA-SCNC: 137 MMOL/L (ref 136–145)
SODIUM SERPL-SCNC: 134 MMOL/L (ref 136–145)
TROPONIN T NUMERIC DELTA: -1 NG/L
TROPONIN T SERPL HS-MCNC: 11 NG/L
VARIANT LYMPHS NFR BLD MANUAL: 10.8 % (ref 19.6–45.3)
VARIANT LYMPHS NFR BLD MANUAL: 2.2 % (ref 0–5)
VENTILATOR MODE: ABNORMAL
WBC MORPH BLD: NORMAL
WBC NRBC COR # BLD AUTO: 10.86 10*3/MM3 (ref 3.4–10.8)
WHOLE BLOOD HOLD COAG: NORMAL
WHOLE BLOOD HOLD SPECIMEN: NORMAL

## 2025-05-08 PROCEDURE — 83050 HGB METHEMOGLOBIN QUAN: CPT

## 2025-05-08 PROCEDURE — 85025 COMPLETE CBC W/AUTO DIFF WBC: CPT | Performed by: FAMILY MEDICINE

## 2025-05-08 PROCEDURE — 83735 ASSAY OF MAGNESIUM: CPT | Performed by: FAMILY MEDICINE

## 2025-05-08 PROCEDURE — 25010000002 MAGNESIUM SULFATE 2 GM/50ML SOLUTION: Performed by: FAMILY MEDICINE

## 2025-05-08 PROCEDURE — 94799 UNLISTED PULMONARY SVC/PX: CPT

## 2025-05-08 PROCEDURE — 96375 TX/PRO/DX INJ NEW DRUG ADDON: CPT

## 2025-05-08 PROCEDURE — 87040 BLOOD CULTURE FOR BACTERIA: CPT | Performed by: FAMILY MEDICINE

## 2025-05-08 PROCEDURE — 80053 COMPREHEN METABOLIC PANEL: CPT | Performed by: FAMILY MEDICINE

## 2025-05-08 PROCEDURE — 94640 AIRWAY INHALATION TREATMENT: CPT

## 2025-05-08 PROCEDURE — 36415 COLL VENOUS BLD VENIPUNCTURE: CPT

## 2025-05-08 PROCEDURE — 83605 ASSAY OF LACTIC ACID: CPT | Performed by: FAMILY MEDICINE

## 2025-05-08 PROCEDURE — 25010000002 METHYLPREDNISOLONE PER 125 MG: Performed by: FAMILY MEDICINE

## 2025-05-08 PROCEDURE — 71045 X-RAY EXAM CHEST 1 VIEW: CPT

## 2025-05-08 PROCEDURE — 94761 N-INVAS EAR/PLS OXIMETRY MLT: CPT

## 2025-05-08 PROCEDURE — 36600 WITHDRAWAL OF ARTERIAL BLOOD: CPT

## 2025-05-08 PROCEDURE — 83880 ASSAY OF NATRIURETIC PEPTIDE: CPT | Performed by: FAMILY MEDICINE

## 2025-05-08 PROCEDURE — 82805 BLOOD GASES W/O2 SATURATION: CPT

## 2025-05-08 PROCEDURE — 93005 ELECTROCARDIOGRAM TRACING: CPT

## 2025-05-08 PROCEDURE — 85730 THROMBOPLASTIN TIME PARTIAL: CPT | Performed by: FAMILY MEDICINE

## 2025-05-08 PROCEDURE — 93005 ELECTROCARDIOGRAM TRACING: CPT | Performed by: FAMILY MEDICINE

## 2025-05-08 PROCEDURE — 99284 EMERGENCY DEPT VISIT MOD MDM: CPT | Performed by: FAMILY MEDICINE

## 2025-05-08 PROCEDURE — 84484 ASSAY OF TROPONIN QUANT: CPT | Performed by: FAMILY MEDICINE

## 2025-05-08 PROCEDURE — 85007 BL SMEAR W/DIFF WBC COUNT: CPT | Performed by: FAMILY MEDICINE

## 2025-05-08 PROCEDURE — 87637 SARSCOV2&INF A&B&RSV AMP PRB: CPT | Performed by: FAMILY MEDICINE

## 2025-05-08 PROCEDURE — 85610 PROTHROMBIN TIME: CPT | Performed by: FAMILY MEDICINE

## 2025-05-08 PROCEDURE — 83690 ASSAY OF LIPASE: CPT | Performed by: FAMILY MEDICINE

## 2025-05-08 PROCEDURE — 96365 THER/PROPH/DIAG IV INF INIT: CPT

## 2025-05-08 PROCEDURE — 82375 ASSAY CARBOXYHB QUANT: CPT

## 2025-05-08 PROCEDURE — 93010 ELECTROCARDIOGRAM REPORT: CPT | Performed by: HOSPITALIST

## 2025-05-08 RX ORDER — DOXYCYCLINE 100 MG/1
100 CAPSULE ORAL 2 TIMES DAILY
Qty: 14 CAPSULE | Refills: 0 | Status: SHIPPED | OUTPATIENT
Start: 2025-05-08 | End: 2025-05-15

## 2025-05-08 RX ORDER — MAGNESIUM SULFATE HEPTAHYDRATE 40 MG/ML
2 INJECTION, SOLUTION INTRAVENOUS ONCE
Status: COMPLETED | OUTPATIENT
Start: 2025-05-08 | End: 2025-05-08

## 2025-05-08 RX ORDER — METHYLPREDNISOLONE SODIUM SUCCINATE 125 MG/2ML
125 INJECTION, POWDER, LYOPHILIZED, FOR SOLUTION INTRAMUSCULAR; INTRAVENOUS ONCE
Status: COMPLETED | OUTPATIENT
Start: 2025-05-08 | End: 2025-05-08

## 2025-05-08 RX ORDER — SODIUM CHLORIDE 0.9 % (FLUSH) 0.9 %
10 SYRINGE (ML) INJECTION AS NEEDED
Status: DISCONTINUED | OUTPATIENT
Start: 2025-05-08 | End: 2025-05-08 | Stop reason: HOSPADM

## 2025-05-08 RX ORDER — IPRATROPIUM BROMIDE AND ALBUTEROL SULFATE 2.5; .5 MG/3ML; MG/3ML
3 SOLUTION RESPIRATORY (INHALATION) ONCE
Status: COMPLETED | OUTPATIENT
Start: 2025-05-08 | End: 2025-05-08

## 2025-05-08 RX ADMIN — METHYLPREDNISOLONE SODIUM SUCCINATE 125 MG: 125 INJECTION, POWDER, FOR SOLUTION INTRAMUSCULAR; INTRAVENOUS at 17:01

## 2025-05-08 RX ADMIN — MAGNESIUM SULFATE HEPTAHYDRATE 2 G: 40 INJECTION, SOLUTION INTRAVENOUS at 16:55

## 2025-05-08 RX ADMIN — IPRATROPIUM BROMIDE AND ALBUTEROL SULFATE 3 ML: .5; 3 SOLUTION RESPIRATORY (INHALATION) at 16:19

## 2025-05-08 NOTE — ED NOTES
This nurse updated provider on situation. I placed a bedside commode against the bed for this patient. Patient stood and pivoted to the commode her o2 dropped to 83% on her normal 5L NC. she got back into the bed and was breathing approx 36breaths per minute and dropped to 78%. I increased her to 7L NC and this nurse monitored patients O2. Patient was able to recover to normal O2. she has recovered and is at 5L NC at 100%.

## 2025-05-08 NOTE — ED PROVIDER NOTES
HPI:    Patient 65-year-old white female with known history of chronic COPD was admitted to the hospital recently discharged about a month ago now coming back with worsening shortness of breath over the past week to the fact that the last 2448 hrs. that she has had oxygen dropped as low as 64% despite being on her 8 L of oxygen.  She has seen pulmonary last appointment ANASTASIA Blackman and has seen cardiologist Dr. Escalera in the past.  Patient has a longstanding history of crest syndrome as well as interstitial lung disease and pulmonary fibrosis.  She was recently admitted by me here on 3/20/2025.      REVIEW OF SYSTEMS  CONSTITUTIONAL:  No complaints of fever, chills,or weakness  EYES:  No complaints of discharge   ENT: No complaints of sore throat or ear pain  CARDIOVASCULAR:  No complaints of chest pain, palpitations, or swelling  RESPIRATORY: Positive for worsening shortness of breath and hypoxia   GI:  No complaints of abdominal pain, nausea, vomiting, or diarrhea  MUSCULOSKELETAL:  No complaints of back pain  SKIN:  No complaints of rash  NEUROLOGIC:  No complaints of headache, focal weakness, or sensory changes  ENDOCRINE:  No complaints of polyuria or polydipsia  LYMPHATIC:  No complaints of swollen glands  GENITOURINARY: No complaints of urinary frequency or hematuria        PAST MEDICAL HISTORY  Past Medical History:   Diagnosis Date    Allergies     Arthritis     BMI 31.0-31.9,adult 06/04/2019    Calcified granuloma of lung 06/04/2019    Right lung    CHF (congestive heart failure)     denies    Chronic idiopathic fibrosing alveolitis     Chronic respiratory failure with hypoxia 08/05/2020    Chronic respiratory failure with hypoxia, on home oxygen therapy     COVID-19 02/01/2022    CREST syndrome     Environmental allergies 02/01/2022    Gastroesophageal reflux disease without esophagitis 06/04/2019    Interstitial lung disease     Obstructive sleep apnea on CPAP 06/04/2019    Oropharyngeal dysphagia  01/26/2023    Primary central sleep apnea     Pulmonary fibrosis     Pulmonary hypertension     Rheumatoid arthritis     Right ventricular systolic dysfunction 05/04/2023    Short-term memory loss        FAMILY HISTORY  Family History   Problem Relation Age of Onset    Cirrhosis Sister     Liver cancer Brother     No Known Problems Mother     No Known Problems Father     Colon cancer Neg Hx     Colon polyps Neg Hx        SOCIAL HISTORY  Social History     Socioeconomic History    Marital status:    Tobacco Use    Smoking status: Never     Passive exposure: Past    Smokeless tobacco: Never   Vaping Use    Vaping status: Never Used   Substance and Sexual Activity    Alcohol use: No    Drug use: No    Sexual activity: Not Currently     Partners: Male       IMMUNIZATION HISTORY  Deferred to primary care physician.    SURGICAL HISTORY  Past Surgical History:   Procedure Laterality Date    BREAST BIOPSY      CARDIAC CATHETERIZATION N/A 07/22/2020    Procedure: Right Heart Cath;  Surgeon: Thong Martin MD;  Location: [facility] CATH INVASIVE LOCATION;  Service: Cardiology;  Laterality: N/A;    CHOLECYSTECTOMY      COLONOSCOPY  05/11/2015    5 POLYPS    COLONOSCOPY N/A 08/04/2021    Procedure: COLONOSCOPY WITH ANESTHESIA;  Surgeon: Michael Landin DO;  Location: [facility] ENDOSCOPY;  Service: Gastroenterology;  Laterality: N/A;  pre-hx polyps  post-colon polyps    ENDOSCOPY N/A 08/04/2021    Procedure: ESOPHAGOGASTRODUODENOSCOPY WITH ANESTHESIA;  Surgeon: Michael Landin DO;  Location: [facility] ENDOSCOPY;  Service: Gastroenterology;  Laterality: N/A;  pre-dysphagia  post-normal  pcp-lanette aguila       CURRENT MEDICATIONS    Current Facility-Administered Medications:     sodium chloride 0.9 % flush 10 mL, 10 mL, Intravenous, PRN, Luis Lundberg Jr., MD    Current Outpatient Medications:     alendronate (FOSAMAX) 70 MG tablet, Take 1 tablet by mouth 1 (One) Time Per Week., Disp: , Rfl:     busPIRone (BUSPAR) 10  MG tablet, Take 1 tablet by mouth 3 (Three) Times a Day As Needed (anxiety)., Disp: , Rfl:     doxycycline (MONODOX) 100 MG capsule, Take 1 capsule by mouth 2 (Two) Times a Day for 7 days., Disp: 14 capsule, Rfl: 0    famotidine (PEPCID) 20 MG tablet, Take 1 tablet by mouth 2 (Two) Times a Day Before Meals., Disp: 60 tablet, Rfl: 0    ipratropium-albuterol (DUO-NEB) 0.5-2.5 mg/3 ml nebulizer, Take 3 mL by nebulization 4 (Four) Times a Day As Needed for Wheezing for up to 30 days., Disp: 120 mL, Rfl: 5    Macitentan-Tadalafil 10-40 MG tablet, Take 1 tablet by mouth Daily., Disp: 30 tablet, Rfl: 11    metoprolol succinate XL (TOPROL-XL) 25 MG 24 hr tablet, Take 0.5 tablets by mouth Daily., Disp: 45 tablet, Rfl: 3    mycophenolate (CELLCEPT) 500 MG tablet, Take 2 tablets by mouth 2 (Two) Times a Day., Disp: , Rfl:     O2 (OXYGEN), Inhale 6-8 L/min 1 (One) Time. 6 at rest, 8 during exertion, Disp: , Rfl:     omeprazole (priLOSEC) 40 MG capsule, Take 1 capsule by mouth Daily., Disp: 90 capsule, Rfl: 3    potassium chloride 10 MEQ CR tablet, Take 1 tablet by mouth Daily., Disp: 30 tablet, Rfl: 11    predniSONE (DELTASONE) 5 MG tablet, Take 1 tablet by mouth Daily for 90 days., Disp: 90 tablet, Rfl: 3    Sotatercept-csrk (Winrevair) 60 MG kit, Inject 60 mg under the skin into the appropriate area as directed Every 21 (Twenty-One) Days., Disp: , Rfl:     spironolactone (ALDACTONE) 50 MG tablet, Take 1 tablet by mouth Daily., Disp: 90 tablet, Rfl: 3    traZODone (DESYREL) 100 MG tablet, Take 1 tablet by mouth Every Night for 90 days., Disp: 90 tablet, Rfl: 3    Treprostinil (Tyvaso) 0.6 MG/ML solution inhalation solution, Inhale 14 puffs 4 (Four) Times a Day. (Patient taking differently: Inhale 10 puffs 4 (Four) Times a Day.), Disp: 15 mL, Rfl: 11    venlafaxine (EFFEXOR) 75 MG tablet, Take 1 tablet by mouth Daily., Disp: , Rfl:     vitamin D (ERGOCALCIFEROL) 1.25 MG (07483 UT) capsule capsule, Take 1 capsule by mouth 1  "(One) Time Per Week., Disp: , Rfl:     ALLERGIES  Allergies   Allergen Reactions    Codeine Nausea And Vomiting    Latex Rash    Prednisone Mental Status Change     High doses makes her \"nuts\"         Respiratory Exam    VITAL SIGNS:   /80   Pulse 102   Temp 98.6 °F (37 °C)   Resp 22   Ht 160 cm (63\")   Wt 69.9 kg (154 lb 1.6 oz)   SpO2 93%   BMI 27.30 kg/m²     Constitutional: Patient is alert and in mild respiratory distress.  Patient with generalized body discomfort.    ENT: There is a normal pharynx with no acute erythema or exudate and oral mucosa is moist.  Nose is clear with no drainage.  Tympanic membranes intact and non-erythemic    Cardiovascular: S1-S2 regular rate and rhythm.  No murmur, rubs or gallops.    Respiratory: Decreased breath sounds in both lung fields without coarse rhonchi.    Abdomen: Soft, nontender.  Bowel sounds are normal in all 4 quadrants.  There is no rebound or guarding noted.  There is no abdominal distention or hepatosplenomegaly..    Genitourinary: Patient is voiding appropriately.    Integument: No acute lesions noted.  Color appears to be normal.    Marianela Coma Scale: Total score 15    Neurological: Patient is alert and oriented x4 and no acute findings noted.  Speech is fluent and cognition is normal.  No evidence of acute CVA.  Cranial nerves II through XII intact.  Patient with normal motor function as well as reflexes and sensation.    Psychiatric: Normal affect and mood      RADIOLOGY/PROCEDURES    XR Chest 1 View   Final Result       1. Persistent nodular opacities right upper lung which were also noted   on a CT chest from 4/22/2025. A PET is recommended for further   evaluation.   2. Low lung volumes and bilateral interstitial prominence likely chronic   and related to fibrosis and bronchiectasis.       This report was signed and finalized on 5/8/2025 3:15 PM by Aroldo Henson.                FUTURE APPOINTMENTS     Future Appointments   Date Time " Provider Department Center   5/12/2025  2:00 PM [facility] ECHO ROOM 2 [facility] CARDI PAD   5/22/2025  2:30 PM [facility] HEART FAILURE CLINIC - MD [facility] HFC None   7/1/2025 11:00 AM Fela Blackman APRN [facility] PAD PAD          EKG (reviewed and interpreted by me):  EKG #1 shows normal sinus rhythm with a ventricular rate of 98 with no acute ST segment elevation or depression noted      EKG #2 shows normal sinus rhythm with a ventricular rate of 94 with a nonspecific ST-T wave abnormality with no acute ST segment elevation or depression no      COURSE & MEDICAL DECISION MAKING     Patient's partial differential diagnosis can include:    COPD exacerbation, pulmonary embolism, pneumothorax, pleural effusion, pulmonary edema, pulmonary fibrosis, pneumonia, pneumonitis, bronchitis, bronchiolitis,congestive heart failure, asthma exacerbation, empyema,  atelectasis,viral pneumonia, and others      CBC, CMP, magnesium, lactate, chest x-ray, ABG, and others.    Will give a DuoNeb, IV magnesium sulfate 2 g IV and Solu-Medrol 125 mg IV.      Patient's white count is essentially normal at 10.86.  Patient's COVID influenza and RSV are all negative.  ABG is normal with the exception of the bicarb which is 26.4 which is actually mildly high.  But the pH is normal pCO2 and pO2 are all normal.  BNP normal.  Lipase is mildly elevated at 102.  Troponin is normal CMP is essentially normal with exception of elevated glucose at 181 borderline low sodium of 134 and alkaline phosphatase which is mildly elevated 143.  Awaiting for second troponin.  Pulmonary nodules in the right apex as previously mentioned in the past CT scan.  CT scan reviewed by me as above performed less than a month ago showed that the patient had infection or inflammation with possible malignancy.        Did also discuss this patient's laboratory radiological test with hospitalist Dr. Denson agrees that the patient is stable to be discharged home and admission to the  hospital at this time is not necessary.  Patient also self admits that she had not been doing all of her nebulizing treatments that she should.  Explained to her the importance of doing all of these nebulizing treatments as prescribed.      Also explained to the patient that she needs to follow-up with her pulmonary provider and give them a call tomorrow.    Patient's level of risk: Moderate        CRITICAL CARE    CRITICAL CARE: No    CRITICAL CARE TIME: None      Also Old charts were reviewed per [employer] EMR.  Pertinent details are summarized above.  All laboratory, radiologic, and EKG studies that were performed in the Emergency Department were a necessary part of the evaluation needed to exclude unstable or emergent medical conditions:     Patient was hemodynamically and neurologically stable in the ED.   Pertinent studies were reviewed as above.     Recent Results (from the past 24 hours)   ECG 12 Lead Dyspnea    Collection Time: 05/08/25  3:02 PM   Result Value Ref Range    QT Interval 356 ms    QTC Interval 454 ms   Comprehensive Metabolic Panel    Collection Time: 05/08/25  3:17 PM    Specimen: Blood   Result Value Ref Range    Glucose 181 (H) 65 - 99 mg/dL    BUN 10 8 - 23 mg/dL    Creatinine 0.83 0.57 - 1.00 mg/dL    Sodium 134 (L) 136 - 145 mmol/L    Potassium 4.1 3.5 - 5.2 mmol/L    Chloride 99 98 - 107 mmol/L    CO2 23.0 22.0 - 29.0 mmol/L    Calcium 8.9 8.6 - 10.5 mg/dL    Total Protein 7.0 6.0 - 8.5 g/dL    Albumin 3.7 3.5 - 5.2 g/dL    ALT (SGPT) 11 1 - 33 U/L    AST (SGOT) 18 1 - 32 U/L    Alkaline Phosphatase 143 (H) 39 - 117 U/L    Total Bilirubin 0.3 0.0 - 1.2 mg/dL    Globulin 3.3 gm/dL    A/G Ratio 1.1 g/dL    BUN/Creatinine Ratio 12.0 7.0 - 25.0    Anion Gap 12.0 5.0 - 15.0 mmol/L    eGFR 77.4 >60.0 mL/min/1.73   BNP    Collection Time: 05/08/25  3:17 PM    Specimen: Blood   Result Value Ref Range    proBNP 250.6 0.0 - 900.0 pg/mL   High Sensitivity Troponin T    Collection Time: 05/08/25  3:17  PM    Specimen: Blood   Result Value Ref Range    HS Troponin T 11 <14 ng/L   Green Top (Gel)    Collection Time: 05/08/25  3:17 PM   Result Value Ref Range    Extra Tube Hold for add-ons.    Lavender Top    Collection Time: 05/08/25  3:17 PM   Result Value Ref Range    Extra Tube hold for add-on    Red Top    Collection Time: 05/08/25  3:17 PM   Result Value Ref Range    Extra Tube Hold for add-ons.    Light Blue Top    Collection Time: 05/08/25  3:17 PM   Result Value Ref Range    Extra Tube Hold for add-ons.    CBC Auto Differential    Collection Time: 05/08/25  3:17 PM    Specimen: Blood   Result Value Ref Range    WBC 10.86 (H) 3.40 - 10.80 10*3/mm3    RBC 5.74 (H) 3.77 - 5.28 10*6/mm3    Hemoglobin 13.8 12.0 - 15.9 g/dL    Hematocrit 45.7 34.0 - 46.6 %    MCV 79.6 79.0 - 97.0 fL    MCH 24.0 (L) 26.6 - 33.0 pg    MCHC 30.2 (L) 31.5 - 35.7 g/dL    RDW 15.4 12.3 - 15.4 %    RDW-SD 43.6 37.0 - 54.0 fl    MPV 10.4 6.0 - 12.0 fL    Platelets 246 140 - 450 10*3/mm3   Protime-INR    Collection Time: 05/08/25  3:17 PM    Specimen: Blood   Result Value Ref Range    Protime 13.9 11.8 - 14.8 Seconds    INR 1.02 0.91 - 1.09   aPTT    Collection Time: 05/08/25  3:17 PM    Specimen: Blood   Result Value Ref Range    PTT 24.6 24.5 - 36.0 seconds   Lipase    Collection Time: 05/08/25  3:17 PM    Specimen: Blood   Result Value Ref Range    Lipase 102 (H) 13 - 60 U/L   Magnesium    Collection Time: 05/08/25  3:17 PM    Specimen: Blood   Result Value Ref Range    Magnesium 2.0 1.6 - 2.4 mg/dL   Manual Differential    Collection Time: 05/08/25  3:17 PM    Specimen: Blood   Result Value Ref Range    Neutrophil % 73.1 42.7 - 76.0 %    Lymphocyte % 10.8 (L) 19.6 - 45.3 %    Monocyte % 5.4 5.0 - 12.0 %    Eosinophil % 3.2 0.3 - 6.2 %    Basophil % 0.0 0.0 - 1.5 %    Bands %  3.2 0.0 - 5.0 %    Metamyelocyte % 1.1 (H) 0.0 - 0.0 %    Myelocyte % 1.1 (H) 0.0 - 0.0 %    Atypical Lymphocyte % 2.2 0.0 - 5.0 %    Neutrophils Absolute 8.29  (H) 1.70 - 7.00 10*3/mm3    Lymphocytes Absolute 1.41 0.70 - 3.10 10*3/mm3    Monocytes Absolute 0.59 0.10 - 0.90 10*3/mm3    Eosinophils Absolute 0.35 0.00 - 0.40 10*3/mm3    Basophils Absolute 0.00 0.00 - 0.20 10*3/mm3    Anisocytosis Slight/1+ None Seen    Microcytes Slight/1+ None Seen    Poikilocytes Slight/1+ None Seen    Polychromasia Slight/1+ None Seen    WBC Morphology Normal Normal    Giant Platelets Slight/1+ None Seen   COVID-19, FLU A/B, RSV PCR 1 HR TAT - Swab, Nasopharynx    Collection Time: 05/08/25  3:27 PM    Specimen: Nasopharynx; Swab   Result Value Ref Range    COVID19 Not Detected Not Detected - Ref. Range    Influenza A PCR Not Detected Not Detected    Influenza B PCR Not Detected Not Detected    RSV, PCR Not Detected Not Detected   Lactic Acid, Plasma    Collection Time: 05/08/25  3:33 PM    Specimen: Blood   Result Value Ref Range    Lactate 2.2 (C) 0.5 - 2.0 mmol/L   Blood Gas, Arterial With Co-Ox    Collection Time: 05/08/25  4:44 PM    Specimen: Arterial Blood   Result Value Ref Range    Site Left Radial     Tony's Test Positive     pH, Arterial 7.391 7.350 - 7.450 pH units    pCO2, Arterial 43.6 35.0 - 45.0 mm Hg    pO2, Arterial 104.0 83.0 - 108.0 mm Hg    HCO3, Arterial 26.4 (H) 20.0 - 26.0 mmol/L    Base Excess, Arterial 1.1 0.0 - 2.0 mmol/L    O2 Saturation, Arterial 98.9 94.0 - 99.0 %    Hemoglobin, Blood Gas 13.3 12 - 16 g/dL    Hematocrit, Blood Gas 40.8 38.0 - 51.0 %    Oxyhemoglobin 97.4 94 - 99 %    Methemoglobin 0.10 0.00 - 3.00 %    Carboxyhemoglobin 1.4 0 - 5 %    Temperature 37.0     Sodium, Arterial 137 136 - 145 mmol/L    Potassium, Arterial 3.9 3.5 - 5.2 mmol/L    Barometric Pressure for Blood Gas 751 mmHg    Modality Nasal Cannula     Flow Rate 5.0 lpm    Ventilator Mode NA     Collected by [id number]     pH, Temp Corrected 7.391 7.350 - 7.450 pH Units    pCO2, Temperature Corrected 43.6 35 - 45 mm Hg    pO2, Temperature Corrected 104 83 - 108 mm Hg   High Sensitivity  Troponin T 1Hr    Collection Time: 05/08/25  4:49 PM    Specimen: Arm, Right; Blood   Result Value Ref Range    HS Troponin T 10 <14 ng/L    Troponin T Numeric Delta -1 Abnormal if >/=3 ng/L   ECG 12 Lead Dyspnea    Collection Time: 05/08/25  4:52 PM   Result Value Ref Range    QT Interval 360 ms    QTC Interval 450 ms       The patient received:  Medications   sodium chloride 0.9 % flush 10 mL (has no administration in time range)   ipratropium-albuterol (DUO-NEB) nebulizer solution 3 mL (3 mL Nebulization Given 5/8/25 1619)   methylPREDNISolone sodium succinate (SOLU-Medrol) injection 125 mg (125 mg Intravenous Given 5/8/25 1701)   magnesium sulfate 2g/50 mL (PREMIX) infusion (2 g Intravenous New Bag 5/8/25 1655)       ED Course as of 05/08/25 1744   Thu May 08, 2025   1734 Influenza B PCR: Not Detected [TB]      ED Course User Index  [TB] Luis Lundberg Jr., MD       ED Disposition       ED Disposition   Discharge    Condition   Stable    Comment   --                   Dragon disclaimer:  Part of this note may be an electronic transcription/translation of spoken language to printed text using the Dragon Dictation System.     I have reviewed the patient’s prescription history via a prescription monitoring program.  This information is consistent with my knowledge of the patient’s controlled substance use history.    FINAL IMPRESSION   Diagnosis Plan   1. COPD with acute exacerbation        2. Pulmonary fibrosis              MD Toño Wadsworth Jr, Thomas Mark Jr., MD  05/08/25 [id number]

## 2025-05-08 NOTE — DISCHARGE INSTRUCTIONS
Call your pulmonary provider tomorrow to follow-up.  You have opted not to increase your steroids for short period of time due to your shortness of breath and COPD.  Please also do your nebulizing treatments as prescribed.  You self-admitted that there are a few that she have not been doing appropriately please make sure this is done so your pulmonary provider can be assured of the treatment for you.

## 2025-05-10 LAB
QT INTERVAL: 356 MS
QT INTERVAL: 360 MS
QTC INTERVAL: 450 MS
QTC INTERVAL: 454 MS

## 2025-05-13 ENCOUNTER — TELEPHONE (OUTPATIENT)
Dept: CARDIOLOGY | Facility: HOSPITAL | Age: 65
End: 2025-05-13

## 2025-05-13 LAB
BACTERIA SPEC AEROBE CULT: NORMAL
BACTERIA SPEC AEROBE CULT: NORMAL

## 2025-05-13 NOTE — TELEPHONE ENCOUNTER
Patient called to report she went to the ER last week due to worsening SOB and her oxygen stat dropping into the 60s with minimal activity on 8L of oxygen. She stated they told her admission was not necessary but she has not improved since the visit. She would like advise from Dr. Escalera.

## 2025-05-14 ENCOUNTER — TELEPHONE (OUTPATIENT)
Dept: PULMONOLOGY | Facility: CLINIC | Age: 65
End: 2025-05-14
Payer: MEDICARE

## 2025-05-14 NOTE — TELEPHONE ENCOUNTER
Per Fela OCONNOR, she has been speaking with Dr. Escalera and patient's daughter. Fela OCONNOR wants the patient to be walked and evaluate her need for more oxygen.  Offered 2:30 appointment and she is going to check with her daughter to see if she can bring her and then call back.  She is also asking if she could have an order for a bedside commode and a hospital bed?

## 2025-05-14 NOTE — TELEPHONE ENCOUNTER
She will need to get order for hospital bed and commode from PCP as they require fairly extensive documentation for Medicare to pay for this sort of DME equipment at home.

## 2025-05-15 ENCOUNTER — CLINICAL SUPPORT (OUTPATIENT)
Dept: PULMONOLOGY | Facility: CLINIC | Age: 65
End: 2025-05-15
Payer: MEDICARE

## 2025-05-15 DIAGNOSIS — J84.10 PULMONARY FIBROSIS: Primary | ICD-10-CM

## 2025-05-15 NOTE — PROCEDURES
Walking Oximetry    Performed by: Philomena Rivera, RRT  Authorized by: Fela Blackman APRN    Supplemental Oxygen: continuous  Rest on O2 @ Liters:  10L  SAT %:  98  Exercise on O2 @ Liters:  10L  SAT %:  88    Patient was walked for about 8 feet.  That is the normal distance for her to go to her bathroom at home.  Her concentrator goes to 10L.  We did discuss pausing, either by standing still or placing a chair,  skilled nursing as her sats did not drop until after skilled nursing. We discussed pre oxygenating before getting up.  She is going to move her concentrator closer to her chair.  She is aware she needs to stay at 8L at rest.

## 2025-05-15 NOTE — TELEPHONE ENCOUNTER
Message relayed to patient and she voiced understanding. She will come today at 2:30 for a nurse visit for us to check her oxygen.

## 2025-05-22 ENCOUNTER — APPOINTMENT (OUTPATIENT)
Dept: GENERAL RADIOLOGY | Facility: HOSPITAL | Age: 65
End: 2025-05-22
Payer: MEDICARE

## 2025-05-22 ENCOUNTER — HOSPITAL ENCOUNTER (OUTPATIENT)
Dept: CT IMAGING | Facility: HOSPITAL | Age: 65
Discharge: HOME | End: 2025-05-22
Payer: MEDICARE

## 2025-05-22 ENCOUNTER — HOSPITAL ENCOUNTER (OUTPATIENT)
Dept: CARDIOLOGY | Facility: HOSPITAL | Age: 65
Discharge: HOME | End: 2025-05-22
Payer: MEDICARE

## 2025-05-22 ENCOUNTER — HOSPITAL ENCOUNTER (INPATIENT)
Facility: HOSPITAL | Age: 65
LOS: 6 days | Discharge: HOME | End: 2025-05-28
Attending: FAMILY MEDICINE | Admitting: INTERNAL MEDICINE
Payer: MEDICARE

## 2025-05-22 ENCOUNTER — APPOINTMENT (OUTPATIENT)
Dept: CT IMAGING | Facility: HOSPITAL | Age: 65
End: 2025-05-22
Payer: MEDICARE

## 2025-05-22 VITALS
BODY MASS INDEX: 26.43 KG/M2 | DIASTOLIC BLOOD PRESSURE: 74 MMHG | SYSTOLIC BLOOD PRESSURE: 116 MMHG | OXYGEN SATURATION: 95 % | HEART RATE: 128 BPM | WEIGHT: 149.2 LBS

## 2025-05-22 DIAGNOSIS — I50.812 CHRONIC RIGHT HEART FAILURE: ICD-10-CM

## 2025-05-22 DIAGNOSIS — R59.0 HILAR LYMPHADENOPATHY: ICD-10-CM

## 2025-05-22 DIAGNOSIS — Z74.09 DECREASED FUNCTIONAL MOBILITY AND ENDURANCE: ICD-10-CM

## 2025-05-22 DIAGNOSIS — J96.21 ACUTE ON CHRONIC RESPIRATORY FAILURE WITH HYPOXIA: ICD-10-CM

## 2025-05-22 DIAGNOSIS — I27.21 PAH (PULMONARY ARTERY HYPERTENSION): Primary | ICD-10-CM

## 2025-05-22 DIAGNOSIS — J47.1 BRONCHIECTASIS WITH ACUTE EXACERBATION: ICD-10-CM

## 2025-05-22 DIAGNOSIS — J18.9 PNEUMONIA OF LEFT LOWER LOBE DUE TO INFECTIOUS ORGANISM: ICD-10-CM

## 2025-05-22 DIAGNOSIS — R06.02 SHORTNESS OF BREATH: ICD-10-CM

## 2025-05-22 DIAGNOSIS — J96.91 RESPIRATORY FAILURE WITH HYPOXIA, UNSPECIFIED CHRONICITY: Primary | ICD-10-CM

## 2025-05-22 DIAGNOSIS — R00.0 SINUS TACHYCARDIA: ICD-10-CM

## 2025-05-22 DIAGNOSIS — I27.21 PULMONARY ARTERY HYPERTENSION: ICD-10-CM

## 2025-05-22 DIAGNOSIS — R53.1 GENERALIZED WEAKNESS: ICD-10-CM

## 2025-05-22 PROBLEM — R06.03 RESPIRATORY DISTRESS: Status: ACTIVE | Noted: 2025-05-22

## 2025-05-22 LAB
ALBUMIN SERPL-MCNC: 3.4 G/DL (ref 3.5–5.2)
ALBUMIN SERPL-MCNC: 3.6 G/DL (ref 3.5–5.2)
ALBUMIN/GLOB SERPL: 1.1 G/DL
ALBUMIN/GLOB SERPL: 1.1 G/DL
ALP SERPL-CCNC: 121 U/L (ref 39–117)
ALP SERPL-CCNC: 123 U/L (ref 39–117)
ALT SERPL W P-5'-P-CCNC: 7 U/L (ref 1–33)
ALT SERPL W P-5'-P-CCNC: 9 U/L (ref 1–33)
ANION GAP SERPL CALCULATED.3IONS-SCNC: 13 MMOL/L (ref 5–15)
ANION GAP SERPL CALCULATED.3IONS-SCNC: 14 MMOL/L (ref 5–15)
ARTERIAL PATENCY WRIST A: ABNORMAL
AST SERPL-CCNC: 18 U/L (ref 1–32)
AST SERPL-CCNC: 18 U/L (ref 1–32)
ATMOSPHERIC PRESS: 753 MMHG
BASE EXCESS BLDA CALC-SCNC: -1.2 MMOL/L (ref 0–2)
BASOPHILS # BLD AUTO: 0.14 10*3/MM3 (ref 0–0.2)
BASOPHILS NFR BLD AUTO: 1.2 % (ref 0–1.5)
BDY SITE: ABNORMAL
BILIRUB SERPL-MCNC: 0.3 MG/DL (ref 0–1.2)
BILIRUB SERPL-MCNC: 0.3 MG/DL (ref 0–1.2)
BODY TEMPERATURE: 37
BUN SERPL-MCNC: 7 MG/DL (ref 8–23)
BUN SERPL-MCNC: 8 MG/DL (ref 8–23)
BUN/CREAT SERPL: 8.5 (ref 7–25)
BUN/CREAT SERPL: 9.9 (ref 7–25)
CALCIUM SPEC-SCNC: 8.7 MG/DL (ref 8.6–10.5)
CALCIUM SPEC-SCNC: 9 MG/DL (ref 8.6–10.5)
CHLORIDE SERPL-SCNC: 100 MMOL/L (ref 98–107)
CHLORIDE SERPL-SCNC: 99 MMOL/L (ref 98–107)
CO2 SERPL-SCNC: 23 MMOL/L (ref 22–29)
CO2 SERPL-SCNC: 23 MMOL/L (ref 22–29)
COHGB MFR BLD: 1.2 % (ref 0–5)
CREAT SERPL-MCNC: 0.81 MG/DL (ref 0.57–1)
CREAT SERPL-MCNC: 0.82 MG/DL (ref 0.57–1)
D-LACTATE SERPL-SCNC: 1.6 MMOL/L (ref 0.5–2)
DEPRECATED RDW RBC AUTO: 42.4 FL (ref 37–54)
DEPRECATED RDW RBC AUTO: 43 FL (ref 37–54)
EGFRCR SERPLBLD CKD-EPI 2021: 78.5 ML/MIN/1.73
EGFRCR SERPLBLD CKD-EPI 2021: 79.7 ML/MIN/1.73
EOSINOPHIL # BLD AUTO: 0.34 10*3/MM3 (ref 0–0.4)
EOSINOPHIL NFR BLD AUTO: 2.9 % (ref 0.3–6.2)
ERYTHROCYTE [DISTWIDTH] IN BLOOD BY AUTOMATED COUNT: 15.7 % (ref 12.3–15.4)
ERYTHROCYTE [DISTWIDTH] IN BLOOD BY AUTOMATED COUNT: 15.8 % (ref 12.3–15.4)
GAS FLOW AIRWAY: 10 LPM
GEN 5 1HR TROPONIN T REFLEX: 14 NG/L
GLOBULIN UR ELPH-MCNC: 3.1 GM/DL
GLOBULIN UR ELPH-MCNC: 3.2 GM/DL
GLUCOSE SERPL-MCNC: 125 MG/DL (ref 65–99)
GLUCOSE SERPL-MCNC: 129 MG/DL (ref 65–99)
HCO3 BLDA-SCNC: 23.9 MMOL/L (ref 20–26)
HCT VFR BLD AUTO: 45 % (ref 34–46.6)
HCT VFR BLD AUTO: 45.7 % (ref 34–46.6)
HCT VFR BLD CALC: 40.8 % (ref 38–51)
HGB BLD-MCNC: 13.8 G/DL (ref 12–15.9)
HGB BLD-MCNC: 13.8 G/DL (ref 12–15.9)
HGB BLDA-MCNC: 13.3 G/DL (ref 12–16)
HOLD SPECIMEN: NORMAL
HOLD SPECIMEN: NORMAL
IMM GRANULOCYTES # BLD AUTO: 0.47 10*3/MM3 (ref 0–0.05)
IMM GRANULOCYTES NFR BLD AUTO: 3.9 % (ref 0–0.5)
LYMPHOCYTES # BLD AUTO: 1.88 10*3/MM3 (ref 0.7–3.1)
LYMPHOCYTES NFR BLD AUTO: 15.8 % (ref 19.6–45.3)
Lab: ABNORMAL
MCH RBC QN AUTO: 23.5 PG (ref 26.6–33)
MCH RBC QN AUTO: 23.6 PG (ref 26.6–33)
MCHC RBC AUTO-ENTMCNC: 30.2 G/DL (ref 31.5–35.7)
MCHC RBC AUTO-ENTMCNC: 30.7 G/DL (ref 31.5–35.7)
MCV RBC AUTO: 77.1 FL (ref 79–97)
MCV RBC AUTO: 78 FL (ref 79–97)
METHGB BLD QL: 0.1 % (ref 0–3)
MODALITY: ABNORMAL
MONOCYTES # BLD AUTO: 0.91 10*3/MM3 (ref 0.1–0.9)
MONOCYTES NFR BLD AUTO: 7.6 % (ref 5–12)
NEUTROPHILS NFR BLD AUTO: 68.6 % (ref 42.7–76)
NEUTROPHILS NFR BLD AUTO: 8.17 10*3/MM3 (ref 1.7–7)
NRBC BLD AUTO-RTO: 0.2 /100 WBC (ref 0–0.2)
NT-PROBNP SERPL-MCNC: 357.1 PG/ML (ref 0–900)
NT-PROBNP SERPL-MCNC: 357.4 PG/ML (ref 0–900)
OXYHGB MFR BLDV: 99.2 % (ref 94–99)
PCO2 BLDA: 41 MM HG (ref 35–45)
PCO2 TEMP ADJ BLD: 41 MM HG (ref 35–45)
PH BLDA: 7.38 PH UNITS (ref 7.35–7.45)
PH, TEMP CORRECTED: 7.38 PH UNITS (ref 7.35–7.45)
PLATELET # BLD AUTO: 248 10*3/MM3 (ref 140–450)
PLATELET # BLD AUTO: 252 10*3/MM3 (ref 140–450)
PMV BLD AUTO: 10.1 FL (ref 6–12)
PMV BLD AUTO: 11.1 FL (ref 6–12)
PO2 BLDA: 188 MM HG (ref 83–108)
PO2 TEMP ADJ BLD: 188 MM HG (ref 83–108)
POTASSIUM BLDA-SCNC: 3.8 MMOL/L (ref 3.5–5.2)
POTASSIUM SERPL-SCNC: 3.9 MMOL/L (ref 3.5–5.2)
POTASSIUM SERPL-SCNC: 4.1 MMOL/L (ref 3.5–5.2)
PROCALCITONIN SERPL-MCNC: 0.08 NG/ML (ref 0–0.25)
PROT SERPL-MCNC: 6.5 G/DL (ref 6–8.5)
PROT SERPL-MCNC: 6.8 G/DL (ref 6–8.5)
QT INTERVAL: 320 MS
QT INTERVAL: 350 MS
QTC INTERVAL: 458 MS
QTC INTERVAL: 469 MS
RBC # BLD AUTO: 5.84 10*6/MM3 (ref 3.77–5.28)
RBC # BLD AUTO: 5.86 10*6/MM3 (ref 3.77–5.28)
SAO2 % BLDCOA: >100.1 % (ref 94–99)
SODIUM BLDA-SCNC: 135 MMOL/L (ref 136–145)
SODIUM SERPL-SCNC: 135 MMOL/L (ref 136–145)
SODIUM SERPL-SCNC: 137 MMOL/L (ref 136–145)
TROPONIN T % DELTA: -18
TROPONIN T NUMERIC DELTA: -3 NG/L
TROPONIN T SERPL HS-MCNC: 17 NG/L
VENTILATOR MODE: ABNORMAL
WBC NRBC COR # BLD AUTO: 11.43 10*3/MM3 (ref 3.4–10.8)
WBC NRBC COR # BLD AUTO: 11.91 10*3/MM3 (ref 3.4–10.8)
WHOLE BLOOD HOLD COAG: NORMAL
WHOLE BLOOD HOLD SPECIMEN: NORMAL

## 2025-05-22 PROCEDURE — 84484 ASSAY OF TROPONIN QUANT: CPT | Performed by: FAMILY MEDICINE

## 2025-05-22 PROCEDURE — 25510000001 IOPAMIDOL PER 1 ML: Performed by: FAMILY MEDICINE

## 2025-05-22 PROCEDURE — 94799 UNLISTED PULMONARY SVC/PX: CPT

## 2025-05-22 PROCEDURE — 63710000001 MYCOPHENOLATE MOFETIL PER 250 MG: Performed by: INTERNAL MEDICINE

## 2025-05-22 PROCEDURE — G0463 HOSPITAL OUTPT CLINIC VISIT: HCPCS | Performed by: HOSPITALIST

## 2025-05-22 PROCEDURE — 36415 COLL VENOUS BLD VENIPUNCTURE: CPT

## 2025-05-22 PROCEDURE — 25010000002 VANCOMYCIN PER 500 MG: Performed by: INTERNAL MEDICINE

## 2025-05-22 PROCEDURE — 93005 ELECTROCARDIOGRAM TRACING: CPT

## 2025-05-22 PROCEDURE — 25010000002 MAGNESIUM SULFATE 2 GM/50ML SOLUTION: Performed by: FAMILY MEDICINE

## 2025-05-22 PROCEDURE — 93005 ELECTROCARDIOGRAM TRACING: CPT | Performed by: HOSPITALIST

## 2025-05-22 PROCEDURE — 83605 ASSAY OF LACTIC ACID: CPT | Performed by: FAMILY MEDICINE

## 2025-05-22 PROCEDURE — 36600 WITHDRAWAL OF ARTERIAL BLOOD: CPT

## 2025-05-22 PROCEDURE — 94640 AIRWAY INHALATION TREATMENT: CPT

## 2025-05-22 PROCEDURE — 25010000002 METHYLPREDNISOLONE PER 125 MG: Performed by: INTERNAL MEDICINE

## 2025-05-22 PROCEDURE — 25010000002 CEFEPIME PER 500 MG: Performed by: INTERNAL MEDICINE

## 2025-05-22 PROCEDURE — 87040 BLOOD CULTURE FOR BACTERIA: CPT | Performed by: FAMILY MEDICINE

## 2025-05-22 PROCEDURE — 85025 COMPLETE CBC W/AUTO DIFF WBC: CPT

## 2025-05-22 PROCEDURE — 82805 BLOOD GASES W/O2 SATURATION: CPT

## 2025-05-22 PROCEDURE — 80053 COMPREHEN METABOLIC PANEL: CPT | Performed by: FAMILY MEDICINE

## 2025-05-22 PROCEDURE — 25010000002 CEFTRIAXONE PER 250 MG: Performed by: FAMILY MEDICINE

## 2025-05-22 PROCEDURE — 99222 1ST HOSP IP/OBS MODERATE 55: CPT | Performed by: INTERNAL MEDICINE

## 2025-05-22 PROCEDURE — 82375 ASSAY CARBOXYHB QUANT: CPT

## 2025-05-22 PROCEDURE — 93005 ELECTROCARDIOGRAM TRACING: CPT | Performed by: FAMILY MEDICINE

## 2025-05-22 PROCEDURE — 83880 ASSAY OF NATRIURETIC PEPTIDE: CPT

## 2025-05-22 PROCEDURE — 71045 X-RAY EXAM CHEST 1 VIEW: CPT

## 2025-05-22 PROCEDURE — 99285 EMERGENCY DEPT VISIT HI MDM: CPT | Performed by: FAMILY MEDICINE

## 2025-05-22 PROCEDURE — 71275 CT ANGIOGRAPHY CHEST: CPT

## 2025-05-22 PROCEDURE — 93010 ELECTROCARDIOGRAM REPORT: CPT | Performed by: HOSPITALIST

## 2025-05-22 PROCEDURE — 94761 N-INVAS EAR/PLS OXIMETRY MLT: CPT

## 2025-05-22 PROCEDURE — 83050 HGB METHEMOGLOBIN QUAN: CPT

## 2025-05-22 PROCEDURE — 83880 ASSAY OF NATRIURETIC PEPTIDE: CPT | Performed by: FAMILY MEDICINE

## 2025-05-22 PROCEDURE — 84145 PROCALCITONIN (PCT): CPT | Performed by: FAMILY MEDICINE

## 2025-05-22 PROCEDURE — 80053 COMPREHEN METABOLIC PANEL: CPT | Performed by: HOSPITALIST

## 2025-05-22 RX ORDER — PANTOPRAZOLE SODIUM 40 MG/1
40 TABLET, DELAYED RELEASE ORAL
Status: DISCONTINUED | OUTPATIENT
Start: 2025-05-23 | End: 2025-05-28 | Stop reason: HOSPADM

## 2025-05-22 RX ORDER — SPIRONOLACTONE 50 MG/1
50 TABLET, FILM COATED ORAL DAILY
Status: DISCONTINUED | OUTPATIENT
Start: 2025-05-22 | End: 2025-05-28 | Stop reason: HOSPADM

## 2025-05-22 RX ORDER — VANCOMYCIN/0.9 % SOD CHLORIDE 750 MG/250
750 PLASTIC BAG, INJECTION (ML) INTRAVENOUS EVERY 12 HOURS
Status: DISCONTINUED | OUTPATIENT
Start: 2025-05-22 | End: 2025-05-27

## 2025-05-22 RX ORDER — TREPROSTINIL 1.74 MG/2.9ML
12 INHALANT ORAL
Status: ON HOLD
Start: 2025-05-22

## 2025-05-22 RX ORDER — BISACODYL 5 MG/1
5 TABLET, DELAYED RELEASE ORAL DAILY PRN
Status: DISCONTINUED | OUTPATIENT
Start: 2025-05-22 | End: 2025-05-28 | Stop reason: HOSPADM

## 2025-05-22 RX ORDER — METOPROLOL SUCCINATE 25 MG/1
12.5 TABLET, EXTENDED RELEASE ORAL DAILY
Status: DISCONTINUED | OUTPATIENT
Start: 2025-05-22 | End: 2025-05-28 | Stop reason: HOSPADM

## 2025-05-22 RX ORDER — SODIUM CHLORIDE 0.9 % (FLUSH) 0.9 %
10 SYRINGE (ML) INJECTION EVERY 12 HOURS SCHEDULED
Status: DISCONTINUED | OUTPATIENT
Start: 2025-05-22 | End: 2025-05-28 | Stop reason: HOSPADM

## 2025-05-22 RX ORDER — BUDESONIDE 0.5 MG/2ML
0.5 INHALANT ORAL
Status: DISCONTINUED | OUTPATIENT
Start: 2025-05-22 | End: 2025-05-28 | Stop reason: HOSPADM

## 2025-05-22 RX ORDER — IPRATROPIUM BROMIDE AND ALBUTEROL SULFATE 2.5; .5 MG/3ML; MG/3ML
3 SOLUTION RESPIRATORY (INHALATION) ONCE
Status: COMPLETED | OUTPATIENT
Start: 2025-05-22 | End: 2025-05-22

## 2025-05-22 RX ORDER — VENLAFAXINE 37.5 MG/1
75 TABLET ORAL DAILY
Status: DISCONTINUED | OUTPATIENT
Start: 2025-05-22 | End: 2025-05-28 | Stop reason: HOSPADM

## 2025-05-22 RX ORDER — ENOXAPARIN SODIUM 100 MG/ML
40 INJECTION SUBCUTANEOUS EVERY 24 HOURS
Status: DISCONTINUED | OUTPATIENT
Start: 2025-05-22 | End: 2025-05-28 | Stop reason: HOSPADM

## 2025-05-22 RX ORDER — HYDROCODONE BITARTRATE AND ACETAMINOPHEN 5; 325 MG/1; MG/1
1 TABLET ORAL EVERY 6 HOURS PRN
Status: DISPENSED | OUTPATIENT
Start: 2025-05-22 | End: 2025-05-27

## 2025-05-22 RX ORDER — MORPHINE SULFATE 2 MG/ML
1 INJECTION, SOLUTION INTRAMUSCULAR; INTRAVENOUS EVERY 4 HOURS PRN
Status: DISCONTINUED | OUTPATIENT
Start: 2025-05-22 | End: 2025-05-23 | Stop reason: ALTCHOICE

## 2025-05-22 RX ORDER — MYCOPHENOLATE MOFETIL 500 MG/1
1000 TABLET ORAL EVERY 12 HOURS SCHEDULED
Status: DISCONTINUED | OUTPATIENT
Start: 2025-05-22 | End: 2025-05-28 | Stop reason: HOSPADM

## 2025-05-22 RX ORDER — IOPAMIDOL 755 MG/ML
100 INJECTION, SOLUTION INTRAVASCULAR
Status: COMPLETED | OUTPATIENT
Start: 2025-05-22 | End: 2025-05-22

## 2025-05-22 RX ORDER — GUAIFENESIN 600 MG/1
1200 TABLET, EXTENDED RELEASE ORAL EVERY 12 HOURS SCHEDULED
Status: DISCONTINUED | OUTPATIENT
Start: 2025-05-22 | End: 2025-05-28 | Stop reason: HOSPADM

## 2025-05-22 RX ORDER — CETIRIZINE HYDROCHLORIDE 10 MG/1
10 TABLET ORAL DAILY
Status: DISCONTINUED | OUTPATIENT
Start: 2025-05-23 | End: 2025-05-28 | Stop reason: HOSPADM

## 2025-05-22 RX ORDER — METHYLPREDNISOLONE SODIUM SUCCINATE 125 MG/2ML
80 INJECTION, POWDER, LYOPHILIZED, FOR SOLUTION INTRAMUSCULAR; INTRAVENOUS EVERY 12 HOURS
Status: DISCONTINUED | OUTPATIENT
Start: 2025-05-22 | End: 2025-05-25

## 2025-05-22 RX ORDER — TRAZODONE HYDROCHLORIDE 100 MG/1
100 TABLET ORAL NIGHTLY
Status: DISCONTINUED | OUTPATIENT
Start: 2025-05-22 | End: 2025-05-28 | Stop reason: HOSPADM

## 2025-05-22 RX ORDER — SODIUM CHLORIDE 0.9 % (FLUSH) 0.9 %
10 SYRINGE (ML) INJECTION AS NEEDED
Status: DISCONTINUED | OUTPATIENT
Start: 2025-05-22 | End: 2025-05-28 | Stop reason: HOSPADM

## 2025-05-22 RX ORDER — MAGNESIUM SULFATE HEPTAHYDRATE 40 MG/ML
2 INJECTION, SOLUTION INTRAVENOUS ONCE
Status: COMPLETED | OUTPATIENT
Start: 2025-05-22 | End: 2025-05-22

## 2025-05-22 RX ORDER — POLYETHYLENE GLYCOL 3350 17 G/17G
17 POWDER, FOR SOLUTION ORAL DAILY PRN
Status: DISCONTINUED | OUTPATIENT
Start: 2025-05-22 | End: 2025-05-28 | Stop reason: HOSPADM

## 2025-05-22 RX ORDER — BUSPIRONE HYDROCHLORIDE 10 MG/1
10 TABLET ORAL 3 TIMES DAILY PRN
Status: DISCONTINUED | OUTPATIENT
Start: 2025-05-22 | End: 2025-05-28 | Stop reason: HOSPADM

## 2025-05-22 RX ORDER — POTASSIUM CHLORIDE 750 MG/1
10 TABLET, EXTENDED RELEASE ORAL DAILY
Status: DISCONTINUED | OUTPATIENT
Start: 2025-05-22 | End: 2025-05-28 | Stop reason: HOSPADM

## 2025-05-22 RX ORDER — MYCOPHENOLATE MOFETIL 500 MG/1
1000 TABLET ORAL 2 TIMES DAILY
Status: DISCONTINUED | OUTPATIENT
Start: 2025-05-22 | End: 2025-05-22

## 2025-05-22 RX ORDER — BISACODYL 10 MG
10 SUPPOSITORY, RECTAL RECTAL DAILY PRN
Status: DISCONTINUED | OUTPATIENT
Start: 2025-05-22 | End: 2025-05-28 | Stop reason: HOSPADM

## 2025-05-22 RX ORDER — NALOXONE HCL 0.4 MG/ML
0.4 VIAL (ML) INJECTION
Status: DISCONTINUED | OUTPATIENT
Start: 2025-05-22 | End: 2025-05-23 | Stop reason: ALTCHOICE

## 2025-05-22 RX ORDER — FAMOTIDINE 20 MG/1
20 TABLET, FILM COATED ORAL
Status: DISCONTINUED | OUTPATIENT
Start: 2025-05-22 | End: 2025-05-28 | Stop reason: HOSPADM

## 2025-05-22 RX ORDER — SODIUM CHLORIDE 9 MG/ML
40 INJECTION, SOLUTION INTRAVENOUS AS NEEDED
Status: DISCONTINUED | OUTPATIENT
Start: 2025-05-22 | End: 2025-05-28 | Stop reason: HOSPADM

## 2025-05-22 RX ORDER — AMOXICILLIN 250 MG
2 CAPSULE ORAL 2 TIMES DAILY PRN
Status: DISCONTINUED | OUTPATIENT
Start: 2025-05-22 | End: 2025-05-28 | Stop reason: HOSPADM

## 2025-05-22 RX ORDER — FLUTICASONE PROPIONATE 50 MCG
2 SPRAY, SUSPENSION (ML) NASAL DAILY
Status: DISCONTINUED | OUTPATIENT
Start: 2025-05-23 | End: 2025-05-28 | Stop reason: HOSPADM

## 2025-05-22 RX ADMIN — MYCOPHENOLATE MOFETIL 1000 MG: 500 TABLET, FILM COATED ORAL at 20:19

## 2025-05-22 RX ADMIN — CEFTRIAXONE SODIUM 1000 MG: 1 INJECTION, POWDER, FOR SOLUTION INTRAMUSCULAR; INTRAVENOUS at 17:21

## 2025-05-22 RX ADMIN — IPRATROPIUM BROMIDE AND ALBUTEROL SULFATE 3 ML: .5; 3 SOLUTION RESPIRATORY (INHALATION) at 16:13

## 2025-05-22 RX ADMIN — TRAZODONE HYDROCHLORIDE 100 MG: 100 TABLET ORAL at 20:21

## 2025-05-22 RX ADMIN — BUDESONIDE 0.5 MG: 0.5 INHALANT RESPIRATORY (INHALATION) at 22:38

## 2025-05-22 RX ADMIN — Medication 10 ML: at 20:49

## 2025-05-22 RX ADMIN — METOPROLOL SUCCINATE 12.5 MG: 25 TABLET, EXTENDED RELEASE ORAL at 20:57

## 2025-05-22 RX ADMIN — MAGNESIUM SULFATE HEPTAHYDRATE 2 G: 2 INJECTION, SOLUTION INTRAVENOUS at 16:15

## 2025-05-22 RX ADMIN — SPIRONOLACTONE 50 MG: 50 TABLET ORAL at 20:57

## 2025-05-22 RX ADMIN — CEFEPIME 2000 MG: 2 INJECTION, POWDER, FOR SOLUTION INTRAVENOUS at 20:22

## 2025-05-22 RX ADMIN — METHYLPREDNISOLONE SODIUM SUCCINATE 80 MG: 125 INJECTION, POWDER, FOR SOLUTION INTRAMUSCULAR; INTRAVENOUS at 22:43

## 2025-05-22 RX ADMIN — IPRATROPIUM BROMIDE 0.5 MG: 0.5 SOLUTION RESPIRATORY (INHALATION) at 22:38

## 2025-05-22 RX ADMIN — GUAIFENESIN 1200 MG: 600 TABLET, EXTENDED RELEASE ORAL at 22:44

## 2025-05-22 RX ADMIN — FAMOTIDINE 20 MG: 20 TABLET, FILM COATED ORAL at 20:21

## 2025-05-22 RX ADMIN — Medication 750 MG: at 22:43

## 2025-05-22 RX ADMIN — IOPAMIDOL 100 ML: 755 INJECTION, SOLUTION INTRAVENOUS at 16:43

## 2025-05-22 NOTE — PROGRESS NOTES
Reason For Visit:  Pulmonary hypertension, shortness of breath    Subjective        Elaine Juárez is a 65 y.o. female with the below pertinent PMH who presents for follow-up of the above issues.    Elaine Juárez was most recently seen by me in clinic 4/17/2025 at which time she continued to have some dyspnea on exertion although stated that she did overall feel like she was doing better since having started Winrevair.  Plan was to titrate Tyvaso back up to 12 breaths QID.  An echo was pending, but she unfortunately missed that appointment.  She was seen in the ED 5/8 for worsening shortness of breath and increased oxygen requirement.  Workup did not reveal any new/acute findings.  She was given Solu-Medrol for possible COPD exacerbation and discharged home.  She subsequently had another walk test done in pulmonology that showed a significant increase in oxygen requirement with minimal exertion.    Today, the patient states that she generally has been feeling poorly for the last month and a half with progressive worsening of shortness of breath and increased oxygen requirement.  She states that she now cannot even make it to the bathroom without having to stop because of severe shortness of breath.  She states that it took her 4 hours to get ready today to come in for her appointment.  Her shortness of breath is causing significant impairment in her quality of life.  She does feel like she has had some increased coughing during this time.  She denies chest pain, lightheadedness, and leg swelling.  Her prior issues with diarrhea and abdominal symptoms have resolved.  She reports good adherence to her pulmonary hypertension medications.  She does mention that for about a day or so after she got steroids during her last ED visit she felt a little better.  She has had issues with high-dose steroids in the past but thinks that this may have been more related to not continuing to take her Effexor rather than a side  effect from the steroids.    ROS: Pertinent findings are included above.    Prior PAH History:   - Echo 11/13/19 at [facility] reportedly grossly normal cardiac chamber dimensions without evidence of pulmonary hypertension   - [facility] 7/22/20: RA 6, RV 63/6, PA 64/19 (mean 38), PCWP 10, David CO/CI 3.8/2.1, PVR 7.4 TIAN, negative adenosine vasodilator challenge  - Started opsimut mid 2020 after [facility]   - 4/2022 proBNP 108-122  - 6/2022 6MWD after COVID 422 feet (129 meters)  - Started Tyvaso DPI 8/2022 (titrated to max dose)  - 9/2022 admission              - 9/12/22 Echo: RV severely dilated w/ severe dysfunction, systolic and diastolic septal flattening, RA severely dilated, small pericardial effusion, RVSP 56-63 (my estimate)              - proBNP 7417              - Started Sildenafil 20mg TID 9/2022              - Started lasix 10mg daily w/ KCl on hospital discharge 9/2022  - 2022: Referred to [facility], [facility], and [facility] for consideration of lung transplant but was considered not a candidate due to history of liver disease and esophageal motility (patient states she has not been seen in PAH clinic at any of these hospitals)  - 1/2023: 6MWD 633 feet (193 meters)  - 3/2023: Transitioned to Medicare and was unable to get medications filled so ran out.   4/2023: Feeling poorly but was able to restart sildenafil and transition to Tyvaso inhaler at end of month.  - 5/12/2023: For CHF clinic visit. 6MWD 169 meters. proBNP 1798.  Improving dyspnea and exertional capacity since restarting Tyvaso (still titrating up).  Started spironolactone 25 mg daily.  - 5/24: Tyvaso up to goal dose of 12 breaths QID and being tolerated well.  Dyspnea improving.  Had obtained a thuan to hopefully restart Opsumit soon (did start at end of May).  - 7/5: Patient had restarted Opsumit and reported continual improvement in her breathing.  proBNP improved to 212.  - 7/25/2023 echo: LVEF 61-65%, mild LVH with  concentric remodeling, RV mildly dilated with mildly to moderately reduced systolic function, mild systolic septal flattening of the IV septum consistent with RV pressure overload, normal RA size, insufficient TR velocity to estimate RVSP.  - 9/6/2022 6-minute walk distance: 234 m  - 11/20/23: Started Toprol XL  12.5 mg daily for 3 weeks then 25 mg daily.  - 1/11/2024: 6-minute walk distance 234 m  - Echo 2/6/2024: My review, RV mildly dilated with mildly or moderately reduced systolic function mild systolic septal flattening overall similar to prior echo from 7/2023.  - 3/2024: Transition from sildenafil to tadalafil  - 5/16/2024: 6-minute walk distance 234 m  - 8/9/2024: Increase Tyvaso to 14 breaths QID.  6-minute walk distance 143 m.  - 2/10/2025: First dose sotatercept  - 3/21/2025: Seen in the hospital; she was only taking 10 Tyvaso TID and not consistently getting in all of her breaths due to issues with nausea.  Tyvaso was adjusted to 10 breaths QID.     Pertinent PMH  Scleroderma/CREST syndrome - followed by Dr. Sanders  Chronic Hypoxic Respiratory Failure  ILD/Bronchiectasis  WHO Group I (scleroderma) and Group III (ILD) Pulmonary Arterial Hypertension  Liver disease    Pertinent past medical, surgical, family, and social history were reviewed.      Current Outpatient Medications:     alendronate (FOSAMAX) 70 MG tablet, Take 1 tablet by mouth 1 (One) Time Per Week., Disp: , Rfl:     busPIRone (BUSPAR) 10 MG tablet, Take 1 tablet by mouth 3 (Three) Times a Day As Needed (anxiety)., Disp: , Rfl:     famotidine (PEPCID) 20 MG tablet, Take 1 tablet by mouth 2 (Two) Times a Day Before Meals., Disp: 60 tablet, Rfl: 0    ipratropium-albuterol (DUO-NEB) 0.5-2.5 mg/3 ml nebulizer, Take 3 mL by nebulization 4 (Four) Times a Day As Needed for Wheezing for up to 30 days., Disp: 120 mL, Rfl: 5    Macitentan-Tadalafil 10-40 MG tablet, Take 1 tablet by mouth Daily., Disp: 30 tablet, Rfl: 11    metoprolol succinate XL  "(TOPROL-XL) 25 MG 24 hr tablet, Take 0.5 tablets by mouth Daily., Disp: 45 tablet, Rfl: 3    mycophenolate (CELLCEPT) 500 MG tablet, Take 2 tablets by mouth 2 (Two) Times a Day., Disp: , Rfl:     O2 (OXYGEN), Inhale 6-8 L/min 1 (One) Time. 6 at rest, 8 during exertion, Disp: , Rfl:     omeprazole (priLOSEC) 40 MG capsule, Take 1 capsule by mouth Daily., Disp: 90 capsule, Rfl: 3    potassium chloride 10 MEQ CR tablet, Take 1 tablet by mouth Daily., Disp: 30 tablet, Rfl: 11    predniSONE (DELTASONE) 5 MG tablet, Take 1 tablet by mouth Daily for 90 days., Disp: 90 tablet, Rfl: 3    Sotatercept-csrk (Winrevair) 60 MG kit, Inject 60 mg under the skin into the appropriate area as directed Every 21 (Twenty-One) Days., Disp: , Rfl:     spironolactone (ALDACTONE) 50 MG tablet, Take 1 tablet by mouth Daily., Disp: 90 tablet, Rfl: 3    traZODone (DESYREL) 100 MG tablet, Take 1 tablet by mouth Every Night for 90 days., Disp: 90 tablet, Rfl: 3    Treprostinil (Tyvaso) 0.6 MG/ML solution inhalation solution, Inhale 14 puffs 4 (Four) Times a Day. (Patient taking differently: Inhale 10 puffs 4 (Four) Times a Day.), Disp: 15 mL, Rfl: 11    venlafaxine (EFFEXOR) 75 MG tablet, Take 1 tablet by mouth Daily., Disp: , Rfl:     vitamin D (ERGOCALCIFEROL) 1.25 MG (80058 UT) capsule capsule, Take 1 capsule by mouth 1 (One) Time Per Week., Disp: , Rfl:      Objective   Vital Signs:  /74 (BP Location: Left arm, Patient Position: Sitting)   Pulse (!) 128   Wt 67.7 kg (149 lb 3.2 oz)   SpO2 95%   BMI 26.43 kg/m²   Estimated body mass index is 26.43 kg/m² as calculated from the following:    Height as of 5/8/25: 160 cm (63\").    Weight as of this encounter: 67.7 kg (149 lb 3.2 oz).      Constitutional:       Appearance: Not in distress.   Neck:      Vascular: JVD normal.   Pulmonary:      Comments: Faint bilateral pulmonary crackles, decreased air movement, mild tachypnea  Cardiovascular:      Normal rate. Regular rhythm.      Murmurs: " There is a grade 1/6 systolic murmur at the LLSB.      No gallop.  No click. No rub.   Edema:     Peripheral edema absent.   Abdominal:      General: There is no distension.      Palpations: Abdomen is soft.      Tenderness: There is no abdominal tenderness.   Skin:     General: Skin is warm and dry.   Neurological:      Mental Status: Alert and oriented to person, place and time.        Result Review :  The following data was reviewed by: Roosevelt Escalera MD on 05/22/2025:  CMP   CMP          4/29/2025    14:04 5/8/2025    15:17 5/8/2025    16:44 5/22/2025    14:07 5/22/2025    15:36   CMP   Glucose 98  181   129  125    BUN 8  10   7  8    Creatinine 0.76  0.83   0.82  0.81    EGFR 86.0  77.4   78.5  79.7    Sodium 136  134  137  137  135    Potassium 3.7  4.1   3.9  4.1    Chloride 100  99   100  99    Calcium 8.4  8.9   8.7  9.0    Total Protein  7.0   6.5  6.8    Albumin  3.7   3.4  3.6    Globulin  3.3   3.1  3.2    Total Bilirubin  0.3   0.3  0.3    Alkaline Phosphatase  143   121  123    AST (SGOT)  18   18  18    ALT (SGPT)  11   7  9    Albumin/Globulin Ratio  1.1   1.1  1.1    BUN/Creatinine Ratio 10.5  12.0   8.5  9.9    Anion Gap 10.0  12.0   14.0  13.0      Pro-BNP       Lab 05/22/25  1536   PROBNP 357.4          Assessment and Plan   Diagnoses and all orders for this visit:    1. PAH (pulmonary artery hypertension) (Primary)  -     Comprehensive Metabolic Panel; Future  -     CBC (No Diff); Future  -     Comprehensive Metabolic Panel; Standing  -     Comprehensive Metabolic Panel  -     CBC (No Diff); Standing  -     CBC (No Diff)    2. Chronic right heart failure    3. Shortness of breath  -     BNP  -     CT Angiogram Chest; Future    4. Acute on chronic respiratory failure with hypoxia    5. Sinus tachycardia    Other orders  -     ECG 12 Lead Rhythm Change; Standing  -     ECG 12 Lead Rhythm Change  -     Treprostinil (Tyvaso) 0.6 MG/ML solution inhalation solution; Inhale 12 puffs 4 (Four) Times a  Day.      Currently, the patient appears to be doing quite poorly.  She has a notably increased oxygen requirement recently associated with significant shortness of breath and mild cough.  She does have a mild leukocytosis, so possible that there may be some underlying infectious component.  She did have a pulmonary mass noted on her last CT, so reasonable to see if this may be worsened or if there could be an obstructive component with a postobstructive pneumonia.  I think it is reasonable to obtain a CTA chest to assess this and rule out PE.  Potentially could warrant a course of steroids to see if it provides some benefit.  Otherwise, possible that this may be some progression of her underlying chronic pulmonary disease, which would portend a poor prognosis.  If that is the case, could consider sending her to a tertiary care facility for pulmonary/pulmonary hypertension additional assessment.  No clear evidence that this is related to worsened pulmonary hypertension, but if concern for that could consider parenteral pulmonary vasodilator therapy at a tertiary care center.  I had a long discussion with her and ultimately am sending her to the ED for evaluation and potential admission.  If admitted, I do think that it may be appropriate to consult palliative care while she is in the hospital.  I did instruct her to take all of her pulmonary hypertension medications with her to the ED as they will need to be given to her as patient supplied medications (Tyvaso 12 breaths QID, Opsynvi 10/40 mg daily).  She is receiving sotatercept, but this is given every 3 weeks in clinic.  I called and spoke with Dr. Callahan in the ED about this patient.    Follow Up   Return in about 6 weeks (around 7/3/2025).  Patient was given instructions and counseling regarding her condition or for health maintenance advice. Please see specific information pulled into the AVS if appropriate.     I spent 60 minutes caring for Elaine on this date  of service. This time includes time spent by me in the following activities:preparing for the visit, reviewing tests, performing a medically appropriate examination and/or evaluation , counseling and educating the patient/family/caregiver, ordering medications, tests, or procedures, referring and communicating with other health care professionals , documenting information in the medical record, and care coordination  Time spent on the separately reported service of ECG interpretation/documentation is not included in the time used to support the E/M service also reported today.    EMR Dragon/Transcription disclaimer: Much of this encounter note is an electronic transcription/translation of spoken language to printed text. The electronic translation of spoken language may permit erroneous, or at times, nonsensical words or phrases to be inadvertently transcribed; although I have reviewed the note for such errors, some may still exist.

## 2025-05-22 NOTE — H&P
[facility] Services  HISTORY AND PHYSICAL    Date of Admission: 5/22/2025  Primary Care Physician: Aaron Stoddard MD    Subjective   Primary Historian: Patient and daughter at bedside    Chief Complaint: Shortness of breath    History of Present Illness  Ms. Juárez is a 65-year-old female from home with past medical history of arthritis, congestive heart failure of unknown type, chronic idiopathic fibrosing alveolitis, chronic respiratory failure with hypoxia, crest syndrome, GERD, interstitial lung disease, pulmonary hypertension, rheumatoid arthritis, obstructive sleep apnea and oropharyngeal dysphagia, who presented to the emergency room with complaint of shortness of breath, history was provided mostly by patient with contribution from daughter at bedside.  Patient states she is she was discharged from hospital she has not really recovered to her baseline oxygen need at home, but in the last couple of days she has become more short of breath prompting visit to her cardiologist today.  After evaluation her cardiologist did not think her shortness of breath was secondary to worsening pulmonary hypertension and therefore recommended to go to the emergency room.  In the emergency room, she was worked up with CTA chest which ruled out pulmonary embolism but showed chronic consolidations within the 2 lungs, and also a new nodule at the upper segment of the right lower lobe which was suspicious for malignancy with recommendation for further evaluation with a PET scan.  She follows outpatient with Dr. Griffith of pulmonology.      Review of Systems   Otherwise complete ROS reviewed and negative except as mentioned in the HPI.    Past Medical History:   Past Medical History:   Diagnosis Date    Allergies     Arthritis     BMI 31.0-31.9,adult 06/04/2019    Calcified granuloma of lung 06/04/2019    Right lung    CHF (congestive heart failure)     denies    Chronic idiopathic  "fibrosing alveolitis     Chronic respiratory failure with hypoxia 08/05/2020    Chronic respiratory failure with hypoxia, on home oxygen therapy     COVID-19 02/01/2022    CREST syndrome     Environmental allergies 02/01/2022    Gastroesophageal reflux disease without esophagitis 06/04/2019    Interstitial lung disease     Obstructive sleep apnea on CPAP 06/04/2019    Oropharyngeal dysphagia 01/26/2023    Primary central sleep apnea     Pulmonary fibrosis     Pulmonary hypertension     Rheumatoid arthritis     Right ventricular systolic dysfunction 05/04/2023    Short-term memory loss      Past Surgical History:  Past Surgical History:   Procedure Laterality Date    BREAST BIOPSY      CARDIAC CATHETERIZATION N/A 07/22/2020    Procedure: Right Heart Cath;  Surgeon: Thong Martin MD;  Location: [facility] CATH INVASIVE LOCATION;  Service: Cardiology;  Laterality: N/A;    CHOLECYSTECTOMY      COLONOSCOPY  05/11/2015    5 POLYPS    COLONOSCOPY N/A 08/04/2021    Procedure: COLONOSCOPY WITH ANESTHESIA;  Surgeon: Michael Landin DO;  Location: [facility] ENDOSCOPY;  Service: Gastroenterology;  Laterality: N/A;  pre-hx polyps  post-colon polyps    ENDOSCOPY N/A 08/04/2021    Procedure: ESOPHAGOGASTRODUODENOSCOPY WITH ANESTHESIA;  Surgeon: Michael Landin DO;  Location: [facility] ENDOSCOPY;  Service: Gastroenterology;  Laterality: N/A;  pre-dysphagia  post-normal  pcp-lanette aguila     Social History:  reports that she has never smoked. She has been exposed to tobacco smoke. She has never used smokeless tobacco. She reports that she does not drink alcohol and does not use drugs.    Family History: family history includes Cirrhosis in her sister; Liver cancer in her brother; No Known Problems in her father and mother.       Allergies:  Allergies   Allergen Reactions    Codeine Nausea And Vomiting    Latex Rash    Prednisone Mental Status Change     High doses makes her \"nuts\"       Medications:  Prior to Admission medications  "   Medication Sig Start Date End Date Taking? Authorizing Provider   alendronate (FOSAMAX) 70 MG tablet Take 1 tablet by mouth 1 (One) Time Per Week. 4/25/24   Latanya Spring MD   busPIRone (BUSPAR) 10 MG tablet Take 1 tablet by mouth 3 (Three) Times a Day As Needed (anxiety). 4/29/24   Latanya Spring MD   famotidine (PEPCID) 20 MG tablet Take 1 tablet by mouth 2 (Two) Times a Day Before Meals. 3/22/25   Kaden Arthur MD   ipratropium-albuterol (DUO-NEB) 0.5-2.5 mg/3 ml nebulizer Take 3 mL by nebulization 4 (Four) Times a Day As Needed for Wheezing for up to 30 days. 4/7/25 5/7/25  Fela Blackman APRN   Macitentan-Tadalafil 10-40 MG tablet Take 1 tablet by mouth Daily. 10/30/24   Roosevelt Escalera MD   metoprolol succinate XL (TOPROL-XL) 25 MG 24 hr tablet Take 0.5 tablets by mouth Daily. 2/7/25   Roosevelt Escalera MD   mycophenolate (CELLCEPT) 500 MG tablet Take 2 tablets by mouth 2 (Two) Times a Day.    Antonia Landry MD   O2 (OXYGEN) Inhale 6-8 L/min 1 (One) Time. 6 at rest, 8 during exertion    Latanya Spring MD   omeprazole (priLOSEC) 40 MG capsule Take 1 capsule by mouth Daily. 1/8/25   Fela Blackman APRN   potassium chloride 10 MEQ CR tablet Take 1 tablet by mouth Daily. 4/10/25   Roosevelt Escalera MD   predniSONE (DELTASONE) 5 MG tablet Take 1 tablet by mouth Daily for 90 days. 3/26/25 6/24/25  Fela Blackman APRN   Sotatercept-csrk (Winrevair) 60 MG kit Inject 60 mg under the skin into the appropriate area as directed Every 21 (Twenty-One) Days.    Latanya Spring MD   spironolactone (ALDACTONE) 50 MG tablet Take 1 tablet by mouth Daily. 5/7/25   Roosevelt Escalera MD   traZODone (DESYREL) 100 MG tablet Take 1 tablet by mouth Every Night for 90 days. 8/28/24 12/31/25  Fela Blackman APRN   Treprostinil (Tyvaso) 0.6 MG/ML solution inhalation solution Inhale 12 puffs 4 (Four) Times a Day. 5/22/25   Roosevelt Escalera MD   venlafaxine (EFFEXOR) 75 MG tablet Take 1 tablet by  "mouth Daily.    Farida Cano APRN   vitamin D (ERGOCALCIFEROL) 1.25 MG (16441 UT) capsule capsule Take 1 capsule by mouth 1 (One) Time Per Week. 5/10/23   Aaron Stoddard MD   Treprostinil (Tyvaso) 0.6 MG/ML solution inhalation solution Inhale 14 puffs 4 (Four) Times a Day.  Patient taking differently: Inhale 10 puffs 4 (Four) Times a Day. 8/9/24 5/22/25  Roosevelt Escalera MD     I have utilized all available immediate resources to obtain, update, or review the patient's current medications (including all prescriptions, over-the-counter products, herbals, cannabis/cannabidiol products, and vitamin/mineral/dietary (nutritional) supplements).    Objective     Vital Signs: /78 (BP Location: Left arm, Patient Position: Lying)   Pulse 106   Temp 98 °F (36.7 °C) (Oral)   Resp 20   Ht 160 cm (62.99\")   Wt 68.7 kg (151 lb 8 oz)   SpO2 97%   BMI 26.84 kg/m²   Physical Exam   GEN: Awake, alert, interactive, in mild respiratory distress  HEENT: Atraumatic, PERRLA, EOMI, Anicteric, Trachea midline  Lungs: Reduced breath sounds bilaterally with bilateral crackles  Heart: RRR, +S1/s2, no rub  ABD: soft, nt/nd, +BS, no guarding/rebound  Extremities: atraumatic, no cyanosis, no edema  Skin: no rashes or lesions  Neuro: AAOx3, no focal deficits  Psych: normal mood & affect       Results Reviewed:  Lab Results (last 24 hours)       Procedure Component Value Units Date/Time    High Sensitivity Troponin T 1Hr [[id number]]  (Abnormal) Collected: 05/22/25 1715    Specimen: Blood Updated: 05/22/25 1751     HS Troponin T 14 ng/L      Troponin T Numeric Delta -3 ng/L      Troponin T % Delta -18    Narrative:      High Sensitive Troponin T Reference Range:  <14.0 ng/L- Negative Female for AMI  <22.0 ng/L- Negative Male for AMI  >=14 - Abnormal Female indicating possible myocardial injury.  >=22 - Abnormal Male indicating possible myocardial injury.   Clinicians would have to utilize clinical acumen, EKG, Troponin, and serial " "changes to determine if it is an Acute Myocardial Infarction or myocardial injury due to an underlying chronic condition.         Lactic Acid, Plasma [[id number]]  (Normal) Collected: 05/22/25 1715    Specimen: Blood Updated: 05/22/25 1750     Lactate 1.6 mmol/L     Blood Culture - Blood, Arm, Left [[id number]] Collected: 05/22/25 1715    Specimen: Blood from Arm, Left Updated: 05/22/25 1734    Blood Culture - Blood, Arm, Left [[id number]] Collected: 05/22/25 1719    Specimen: Blood from Arm, Left Updated: 05/22/25 1734    Procalcitonin [[id number]]  (Normal) Collected: 05/22/25 1536    Specimen: Blood from Arm, Left Updated: 05/22/25 1713     Procalcitonin 0.08 ng/mL     Narrative:      As a Marker for Sepsis (Non-Neonates):    1. <0.5 ng/mL represents a low risk of severe sepsis and/or septic shock.  2. >2 ng/mL represents a high risk of severe sepsis and/or septic shock.    As a Marker for Lower Respiratory Tract Infections that require antibiotic therapy:    PCT on Admission    Antibiotic Therapy       6-12 Hrs later    >0.5                Strongly Recommended  >0.25 - <0.5        Recommended   0.1 - 0.25          Discouraged              Remeasure/reassess PCT  <0.1                Strongly Discouraged     Remeasure/reassess PCT    As 28 day mortality risk marker: \"Change in Procalcitonin Result\" (>80% or <=80%) if Day 0 (or Day 1) and Day 4 values are available. Refer to http://www.bras-pct-calculator.com    Change in PCT <=80%  A decrease of PCT levels below or equal to 80% defines a positive change in PCT test result representing a higher risk for 28-day all-cause mortality of patients diagnosed with severe sepsis for septic shock.    Change in PCT >80%  A decrease of PCT levels of more than 80% defines a negative change in PCT result representing a lower risk for 28-day all-cause mortality of patients diagnosed with severe sepsis or septic shock.       Blood Gas, Arterial With Co-Ox [[id number]]  (Abnormal) " Collected: 05/22/25 1711    Specimen: Arterial Blood Updated: 05/22/25 1709     Site Left Brachial     Tony's Test N/A     pH, Arterial 7.375 pH units      pCO2, Arterial 41.0 mm Hg      pO2, Arterial 188.0 mm Hg      Comment: 83 Value above reference range        HCO3, Arterial 23.9 mmol/L      Base Excess, Arterial -1.2 mmol/L      Comment: 84 Value below reference range        O2 Saturation, Arterial >100.1 %      Comment: 93 Value above reportable range > 100.1        Hemoglobin, Blood Gas 13.3 g/dL      Hematocrit, Blood Gas 40.8 %      Oxyhemoglobin 99.2 %      Comment: 83 Value above reference range        Methemoglobin 0.10 %      Carboxyhemoglobin 1.2 %      Temperature 37.0     Sodium, Arterial 135 mmol/L      Comment: 84 Value below reference range        Potassium, Arterial 3.8 mmol/L      Barometric Pressure for Blood Gas 753 mmHg      Modality HFNC     Flow Rate 10.0 lpm      Ventilator Mode NA     Collected by [id number]     Comment: Meter: [id number]     :  Lindsey Garza, EDDA        pH, Temp Corrected 7.375 pH Units      pCO2, Temperature Corrected 41.0 mm Hg      pO2, Temperature Corrected 188 mm Hg     Comprehensive Metabolic Panel [[id number]]  (Abnormal) Collected: 05/22/25 1536    Specimen: Blood from Arm, Left Updated: 05/22/25 1607     Glucose 125 mg/dL      BUN 8 mg/dL      Creatinine 0.81 mg/dL      Sodium 135 mmol/L      Potassium 4.1 mmol/L      Chloride 99 mmol/L      CO2 23.0 mmol/L      Calcium 9.0 mg/dL      Total Protein 6.8 g/dL      Albumin 3.6 g/dL      ALT (SGPT) 9 U/L      AST (SGOT) 18 U/L      Alkaline Phosphatase 123 U/L      Total Bilirubin 0.3 mg/dL      Globulin 3.2 gm/dL      A/G Ratio 1.1 g/dL      BUN/Creatinine Ratio 9.9     Anion Gap 13.0 mmol/L      eGFR 79.7 mL/min/1.73     Narrative:      GFR Categories in Chronic Kidney Disease (CKD)              GFR Category          GFR (mL/min/1.73)    Interpretation  G1                    90 or greater         Normal or high (1)  G2                    60-89                Mild decrease (1)  G3a                   45-59                Mild to moderate decrease  G3b                   30-44                Moderate to severe decrease  G4                    15-29                Severe decrease  G5                    14 or less           Kidney failure    (1)In the absence of evidence of kidney disease, neither GFR category G1 or G2 fulfill the criteria for CKD.    eGFR calculation 2021 CKD-EPI creatinine equation, which does not include race as a factor    BNP [[id number]]  (Normal) Collected: 05/22/25 1536    Specimen: Blood from Arm, Left Updated: 05/22/25 1604     proBNP 357.4 pg/mL     Narrative:      This assay is used as an aid in the diagnosis of individuals suspected of having heart failure. It can be used as an aid in the diagnosis of acute decompensated heart failure (ADHF) in patients presenting with signs and symptoms of ADHF to the emergency department (ED). In addition, NT-proBNP of <300 pg/mL indicates ADHF is not likely.    Age Range Result Interpretation  NT-proBNP Concentration (pg/mL:      <50             Positive            >450                   Gray                 300-450                    Negative             <300    50-75           Positive            >900                  Gray                300-900                  Negative            <300      >75             Positive            >1800                  Gray                300-1800                  Negative            <300    High Sensitivity Troponin T [[id number]]  (Abnormal) Collected: 05/22/25 1407    Specimen: Blood from Arm, Right Updated: 05/22/25 1545     HS Troponin T 17 ng/L     Narrative:      High Sensitive Troponin T Reference Range:  <14.0 ng/L- Negative Female for AMI  <22.0 ng/L- Negative Male for AMI  >=14 - Abnormal Female indicating possible myocardial injury.  >=22 - Abnormal Male indicating possible myocardial injury.   Clinicians  would have to utilize clinical acumen, EKG, Troponin, and serial changes to determine if it is an Acute Myocardial Infarction or myocardial injury due to an underlying chronic condition.         [place] Draw [[id number]] Collected: 05/22/25 1536    Specimen: Blood from Arm, Left Updated: 05/22/25 1545    Narrative:      The following orders were created for panel order [place] Draw.  Procedure                               Abnormality         Status                     ---------                               -----------         ------                     Green Top (Gel)[[id number]]                                  Final result               Lavender Top[[id number]]                                     Final result               Red Top[[id number]]                                          Final result               Light Blue Top[[id number]]                                   Final result                 Please view results for these tests on the individual orders.    Green Top (Gel) [[id number]] Collected: 05/22/25 1536    Specimen: Blood from Arm, Left Updated: 05/22/25 1545     Extra Tube Hold for add-ons.     Comment: Auto resulted.       Lavender Top [[id number]] Collected: 05/22/25 1536    Specimen: Blood from Arm, Left Updated: 05/22/25 1545     Extra Tube hold for add-on     Comment: Auto resulted       Red Top [[id number]] Collected: 05/22/25 1536    Specimen: Blood from Arm, Left Updated: 05/22/25 1545     Extra Tube Hold for add-ons.     Comment: Auto resulted.       Light Blue Top [[id number]] Collected: 05/22/25 1536    Specimen: Blood from Arm, Left Updated: 05/22/25 1545     Extra Tube Hold for add-ons.     Comment: Auto resulted       CBC & Differential [[id number]]  (Abnormal) Collected: 05/22/25 1407    Specimen: Blood from Arm, Right Updated: 05/22/25 1536    Narrative:      The following orders were created for panel order CBC & Differential.  Procedure                               Abnormality         Status                      ---------                               -----------         ------                     CBC Auto Differential[[id number]]        Abnormal            Final result                 Please view results for these tests on the individual orders.    CBC Auto Differential [[id number]]  (Abnormal) Collected: 05/22/25 1407    Specimen: Blood from Arm, Right Updated: 05/22/25 1536     WBC 11.91 10*3/mm3      RBC 5.86 10*6/mm3      Hemoglobin 13.8 g/dL      Hematocrit 45.7 %      MCV 78.0 fL      MCH 23.5 pg      MCHC 30.2 g/dL      RDW 15.7 %      RDW-SD 43.0 fl      MPV 11.1 fL      Platelets 248 10*3/mm3      Neutrophil % 68.6 %      Lymphocyte % 15.8 %      Monocyte % 7.6 %      Eosinophil % 2.9 %      Basophil % 1.2 %      Immature Grans % 3.9 %      Neutrophils, Absolute 8.17 10*3/mm3      Lymphocytes, Absolute 1.88 10*3/mm3      Monocytes, Absolute 0.91 10*3/mm3      Eosinophils, Absolute 0.34 10*3/mm3      Basophils, Absolute 0.14 10*3/mm3      Immature Grans, Absolute 0.47 10*3/mm3      nRBC 0.2 /100 WBC           Imaging Results (Last 24 Hours)       Procedure Component Value Units Date/Time    CT Angiogram Chest [[id number]] Collected: 05/22/25 1650     Updated: 05/22/25 1709    Narrative:      Exam: CT angiogram of the of the chest with intravenous contrast.  5/22/2025     CLINICAL HISTORY: Shortness of breath.     DOSE:  215.59 mGy.cm . All CT scans are performed using dose  optimization techniques as appropriate to the performed exam and  including at least one of the following: Automated exposure control,  adjustment of the mA and/or kV according to size, and the use of the  iterative reconstruction technique.     TECHNIQUE: Contiguous axial images were obtained through the thorax  following intravenous contrast administration with reformatted images  obtained in the sagittal and coronal projections from the original data  set. Three-dimensional reconstructions are also obtained.     COMPARISON:  4/22/2025     FINDINGS:     Pulmonary arteries:  There is normal enhancement of the pulmonary  arteries with no evidence of pulmonary thromboembolic disease. There is  enlargement of the pulmonary artery suggesting pulmonary arterial  hypertension.     Aorta:  The thoracic aorta and proximal great vessels are normal in  appearance. There is no evidence of aortic dissection or aneurysm.     LUNGS: There is evidence of remote granulomatous disease. There is  patchy consolidation within both lower lobes stable from the previous  exam. There are changes of cylindrical and varicoid bronchiectasis  within both the lower lobes and left lingula and right middle lobe.  There is a new focus of nodularity within the superior segment of the  right lower lobe abutting the major fissure image 59 series 7 measuring  11 mm in long axis. A focus of nodularity in the paramediastinal aspect  of the right upper lobe anteriorly image 59 series 7 previously measured  1.3 x 1.6 cm in size now measuring 1.9 x 2.0 cm. Pleural-based  consolidation and nodularity within the right upper lobe anteriorly also  shows mild progression including a more lateral nodular component  measuring 1.7 x 1.9 cm on the current exam previously measured at 1.3 x  1.3 cm. This area of nodular consolidation is contiguous with the pleura  anteriorly. No definite chest wall invasion or associate bony  destruction.     Pleural spaces: No effusion present.     HEART: There is mild cardiomegaly. Mild coronary artery calcifications  are present. No pericardial effusion present     Bones: No acute osseous abnormalities are demonstrated.     CHEST WALL: No chest wall abnormalities are demonstrated.     Lymph nodes: Abnormal right paratracheal nodes are present including a  2.8 cm short axis node in the upper right paratracheal cora group  previously measured at 2.3 cm in short axis. A precarinal node measures  2.8 cm in short axis previously measured at 2.7 cm. A right  hilar node  measures 1.9 cm in short axis previous measured at 1.7 cm. A subcarinal  node previously measured at 1.1 cm in short axis now measures 1.4 cm.     Upper abdomen: A moderate size hiatal hernia is present. Previous  cholecystectomy. Bilateral nonobstructing nephrolithiasis present. The  adrenals are unremarkable.       Impression:      1. No evidence of pulmonary thromboembolic disease. There is enlargement  of the pulmonary artery suggesting pulmonary arterial hypertension.  2. Atheromatous calcification of the thoracic aorta and great vessels.  No aneurysm or dissection. Mild cardiac enlargement.  3. Multiple areas of right upper lobe pulmonary nodularity some of which  have shown progression from a previous exam of 1 month earlier. There is  also a new nodule in the superior segment of the right lower lobe  abutting the major fissure medially. Bulky mediastinal and right hilar  lymphadenopathy also shows some progression. Although this could  potentially represent an infectious etiology I feel a neoplastic process  should be excluded. PET CT will likely be helpful for further  characterization.  4. Extensive bronchiectasis within both lungs. This includes cylindrical  and varicoid bronchiectasis. There are patchy chronic appearing areas of  consolidation within both lungs, particularly within the right middle  lobe, left lingula and lower lobes stable from the previous exam. No  effusion present.  5. Bilateral nonobstructing nephrolithiasis. Moderate size hiatal hernia  present.     This report was signed and finalized on 5/22/2025 5:06 PM by Dr. Romaine Castillo MD.       XR Chest 1 View [[id number]] Collected: 05/22/25 1550     Updated: 05/22/25 1557    Narrative:      EXAMINATION: XR CHEST 1 VW-  5/22/2025 3:50 PM 1 view     HISTORY: [facility] Triage Protocol     COMPARISON: 5/8/2025 and 4/22/2025     TECHNIQUE: A single frontal view of the chest was obtained.     FINDINGS:  Underlying pulmonary fibrosis  with bibasilar opacities and  bronchiectasis, consistent with pulmonary fibrosis. In the RIGHT upper  lung, near the periphery, there is a rounded area of density measuring  up to 3 x 2.6 cm. When compared to the exam from 5/8/2025, this is more  conspicuous. Differential considerations include malignancy and  infectious/inflammatory process such as pneumonia. There is increased  opacity in the lateral periphery of the LEFT midlung which also may be  related to pneumonia.       Impression:         1.  Increased opacity in the RIGHT mid to upper lung has a rounded  appearance and is more pronounced today than on the study from 5/8/2025.  Neoplastic and infectious process is to be considered.     2.  Patchy opacity in the LEFT mid and lower lung increased since the  comparison study could be related to pneumonia as well.     3.  Interstitial fibrosis again noted.           This report was signed and finalized on 5/22/2025 3:54 PM by Dr. Ernesto Camacho MD.             I have personally reviewed and interpreted the radiology studies and ECG obtained at time of admission.     Assessment / Plan   Assessment:   Active Hospital Problems    Diagnosis     **Respiratory distress        Treatment Plan  The patient will be admitted to my service here at [facility].     -Acute on chronic hypoxic respiratory failure  Patient is on about 6-8 L of oxygen at home, currently needing about 10 L of oxygen.  Will continue oxygen support but and wean down to baseline as tolerated    -Probable pulmonary fibrosis exacerbation  Patient is on low-dose prednisone at home, we will start her on high-dose Solu-Medrol and consult her pulmonologist to help with management    - Suspected pneumonia [on immunosuppressants]  Patient is at high risk for infection, on multiple immunosuppressants.  I will started empirically on vancomycin and cefepime.  Pulmonologist will consult to help with management.    -Suspected right lower lobe  nodule/malignancy  Radiologist is recommending PET scan, but this may not be possible while in hospital.  Patient's pulmonologist will be on consult to help with management..  Also    Other chronic medical conditions-  Systolic congestive heart failure  Chronic idiopathic fibrosing alveolitis  Pulmonary hypertension  Chronic respiratory failure with hypoxia  Crest syndrome  GERD  Pulmonary fibrosis  Rheumatoid arthritis  Obstructive sleep apnea  Oropharyngeal dysphagia    Medical Decision Making  Number and Complexity of problems: Multiple  Differential Diagnosis: Pneumonia, new malignancy of the lung, pulmonary fibrosis exacerbation    Conditions and Status        Condition is unchanged.     MDM Data  External documents reviewed: No  Cardiac tracing (EKG, telemetry) interpretation: Yes  Radiology interpretation: Yes  Labs reviewed: Yes  Any tests that were considered but not ordered: No     Decision rules/scores evaluated (example [id number], Wells, etc): No     Discussed with: Patient and daughter at bedside     Care Planning  Shared decision making: Yes, with patient and daughter  Code status and discussions: Yes, full code    Disposition  Social Determinants of Health that impact treatment or disposition: No  Estimated length of stay is 2 to 3 days.     I confirmed that the patient's advanced care plan is present, code status is documented, and a surrogate decision maker is listed in the patient's medical record.     The patient's surrogate decision maker is 2 daughters.     The patient was seen and examined by me on 5/22/2025 at 5:40 PM.    Electronically signed by Christiano Green MD, 05/22/25, 18:49 CDT.

## 2025-05-22 NOTE — ED PROVIDER NOTES
HPI:    Patient is a 65-year-old white female with known history of recurrent respiratory hypoxia requiring multiple admissions to the hospitalist who now comes from CHF clinic with worsening shortness of breath.  The patient was sent down here to get a CTA of the chest for further evaluation for possible PE or mass.  Patient will then need to be admitted to the hospital.  She saw cardiologist Dr. Escalera who sent the patient down here to the emergency room.  Patient normally wears 6 to 8 L of oxygen due to the autoimmune disease.      REVIEW OF SYSTEMS    CONSTITUTIONAL:  No complaints of fever, chills,or weakness  EYES:  No complaints of discharge   ENT: No complaints of sore throat or ear pain  CARDIOVASCULAR:  No complaints of chest pain, palpitations, or swelling  RESPIRATORY: Positive for respiratory distress and shortness of breath and cough. Primarily nonproductive   GI:  No complaints of abdominal pain, nausea, vomiting, or diarrhea  MUSCULOSKELETAL:  No complaints of back pain  SKIN:  No complaints of rash  NEUROLOGIC:  No complaints of headache, focal weakness, or sensory changes  ENDOCRINE:  No complaints of polyuria or polydipsia  LYMPHATIC:  No complaints of swollen glands  GENITOURINARY: No complaints of urinary frequency or hematuria        PAST MEDICAL HISTORY  Past Medical History:   Diagnosis Date    Allergies     Arthritis     BMI 31.0-31.9,adult 06/04/2019    Calcified granuloma of lung 06/04/2019    Right lung    CHF (congestive heart failure)     denies    Chronic idiopathic fibrosing alveolitis     Chronic respiratory failure with hypoxia 08/05/2020    Chronic respiratory failure with hypoxia, on home oxygen therapy     COVID-19 02/01/2022    CREST syndrome     Environmental allergies 02/01/2022    Gastroesophageal reflux disease without esophagitis 06/04/2019    Interstitial lung disease     Obstructive sleep apnea on CPAP 06/04/2019    Oropharyngeal dysphagia 01/26/2023    Primary central  sleep apnea     Pulmonary fibrosis     Pulmonary hypertension     Rheumatoid arthritis     Right ventricular systolic dysfunction 05/04/2023    Short-term memory loss        FAMILY HISTORY  Family History   Problem Relation Age of Onset    Cirrhosis Sister     Liver cancer Brother     No Known Problems Mother     No Known Problems Father     Colon cancer Neg Hx     Colon polyps Neg Hx        SOCIAL HISTORY  Social History     Socioeconomic History    Marital status:    Tobacco Use    Smoking status: Never     Passive exposure: Past    Smokeless tobacco: Never   Vaping Use    Vaping status: Never Used   Substance and Sexual Activity    Alcohol use: No    Drug use: No    Sexual activity: Not Currently     Partners: Male       IMMUNIZATION HISTORY  Deferred to primary care physician.    SURGICAL HISTORY  Past Surgical History:   Procedure Laterality Date    BREAST BIOPSY      CARDIAC CATHETERIZATION N/A 07/22/2020    Procedure: Right Heart Cath;  Surgeon: Thong Martin MD;  Location: [facility] CATH INVASIVE LOCATION;  Service: Cardiology;  Laterality: N/A;    CHOLECYSTECTOMY      COLONOSCOPY  05/11/2015    5 POLYPS    COLONOSCOPY N/A 08/04/2021    Procedure: COLONOSCOPY WITH ANESTHESIA;  Surgeon: Michael Landin DO;  Location: [facility] ENDOSCOPY;  Service: Gastroenterology;  Laterality: N/A;  pre-hx polyps  post-colon polyps    ENDOSCOPY N/A 08/04/2021    Procedure: ESOPHAGOGASTRODUODENOSCOPY WITH ANESTHESIA;  Surgeon: Michael Landin DO;  Location: [facility] ENDOSCOPY;  Service: Gastroenterology;  Laterality: N/A;  pre-dysphagia  post-normal  pcp-lanette aguila       CURRENT MEDICATIONS    Current Facility-Administered Medications:     cefTRIAXone (ROCEPHIN) 1,000 mg in sodium chloride 0.9 % 100 mL MBP, 1,000 mg, Intravenous, Once, Luis Lundberg Jr., MD, Last Rate: 200 mL/hr at 05/22/25 1721, 1,000 mg at 05/22/25 1721    sodium chloride 0.9 % flush 10 mL, 10 mL, Intravenous, PRN, Luis Lundberg  MD Ethel    Current Outpatient Medications:     alendronate (FOSAMAX) 70 MG tablet, Take 1 tablet by mouth 1 (One) Time Per Week., Disp: , Rfl:     busPIRone (BUSPAR) 10 MG tablet, Take 1 tablet by mouth 3 (Three) Times a Day As Needed (anxiety)., Disp: , Rfl:     famotidine (PEPCID) 20 MG tablet, Take 1 tablet by mouth 2 (Two) Times a Day Before Meals., Disp: 60 tablet, Rfl: 0    ipratropium-albuterol (DUO-NEB) 0.5-2.5 mg/3 ml nebulizer, Take 3 mL by nebulization 4 (Four) Times a Day As Needed for Wheezing for up to 30 days., Disp: 120 mL, Rfl: 5    Macitentan-Tadalafil 10-40 MG tablet, Take 1 tablet by mouth Daily., Disp: 30 tablet, Rfl: 11    metoprolol succinate XL (TOPROL-XL) 25 MG 24 hr tablet, Take 0.5 tablets by mouth Daily., Disp: 45 tablet, Rfl: 3    mycophenolate (CELLCEPT) 500 MG tablet, Take 2 tablets by mouth 2 (Two) Times a Day., Disp: , Rfl:     O2 (OXYGEN), Inhale 6-8 L/min 1 (One) Time. 6 at rest, 8 during exertion, Disp: , Rfl:     omeprazole (priLOSEC) 40 MG capsule, Take 1 capsule by mouth Daily., Disp: 90 capsule, Rfl: 3    potassium chloride 10 MEQ CR tablet, Take 1 tablet by mouth Daily., Disp: 30 tablet, Rfl: 11    predniSONE (DELTASONE) 5 MG tablet, Take 1 tablet by mouth Daily for 90 days., Disp: 90 tablet, Rfl: 3    Sotatercept-csrk (Winrevair) 60 MG kit, Inject 60 mg under the skin into the appropriate area as directed Every 21 (Twenty-One) Days., Disp: , Rfl:     spironolactone (ALDACTONE) 50 MG tablet, Take 1 tablet by mouth Daily., Disp: 90 tablet, Rfl: 3    traZODone (DESYREL) 100 MG tablet, Take 1 tablet by mouth Every Night for 90 days., Disp: 90 tablet, Rfl: 3    Treprostinil (Tyvaso) 0.6 MG/ML solution inhalation solution, Inhale 12 puffs 4 (Four) Times a Day., Disp: , Rfl:     venlafaxine (EFFEXOR) 75 MG tablet, Take 1 tablet by mouth Daily., Disp: , Rfl:     vitamin D (ERGOCALCIFEROL) 1.25 MG (04681 UT) capsule capsule, Take 1 capsule by mouth 1 (One) Time Per Week., Disp: ,  "Rfl:     ALLERGIES  Allergies   Allergen Reactions    Codeine Nausea And Vomiting    Latex Rash    Prednisone Mental Status Change     High doses makes her \"nuts\"         Respiratory Exam    VITAL SIGNS:   /75   Pulse 115   Temp 97.9 °F (36.6 °C) (Oral)   Resp 22   Ht 160 cm (63\")   Wt 67.7 kg (149 lb 4 oz)   SpO2 100%   BMI 26.44 kg/m²     Constitutional: Patient is alert and in moderate respiratory distress.  Patient with generalized body discomfort.    ENT: There is a normal pharynx with no acute erythema or exudate and oral mucosa is moist.  Nose is clear with no drainage.  Tympanic membranes intact and non-erythemic    Cardiovascular: S1-S2 tachycardic with a murmur     respiratory: Decreased breath sounds and rales in both lung fields with rhonchi in the left lower lobe.    Abdomen: Soft, nontender.  Bowel sounds are normal in all 4 quadrants.  There is no rebound or guarding noted.  There is no abdominal distention or hepatosplenomegaly..    Genitourinary: Patient is voiding appropriately.    Integument: No acute lesions noted.  Color appears to be normal.    [place] Coma Scale: Total score 15    Neurological: Patient is alert and oriented x4 and no acute findings noted.  Speech is fluent and cognition is normal.  No evidence of acute CVA.  Cranial nerves II through XII intact.  Patient with normal motor function as well as reflexes and sensation.    Psychiatric: Normal affect and mood      RADIOLOGY/PROCEDURES    CT Angiogram Chest   Final Result   1. No evidence of pulmonary thromboembolic disease. There is enlargement   of the pulmonary artery suggesting pulmonary arterial hypertension.   2. Atheromatous calcification of the thoracic aorta and great vessels.   No aneurysm or dissection. Mild cardiac enlargement.   3. Multiple areas of right upper lobe pulmonary nodularity some of which   have shown progression from a previous exam of 1 month earlier. There is   also a new nodule in the " superior segment of the right lower lobe   abutting the major fissure medially. Bulky mediastinal and right hilar   lymphadenopathy also shows some progression. Although this could   potentially represent an infectious etiology I feel a neoplastic process   should be excluded. PET CT will likely be helpful for further   characterization.   4. Extensive bronchiectasis within both lungs. This includes cylindrical   and varicoid bronchiectasis. There are patchy chronic appearing areas of   consolidation within both lungs, particularly within the right middle   lobe, left lingula and lower lobes stable from the previous exam. No   effusion present.   5. Bilateral nonobstructing nephrolithiasis. Moderate size hiatal hernia   present.       This report was signed and finalized on 5/22/2025 5:06 PM by Dr. Romaine Castillo MD.          XR Chest 1 View   Final Result       1.  Increased opacity in the RIGHT mid to upper lung has a rounded   appearance and is more pronounced today than on the study from 5/8/2025.   Neoplastic and infectious process is to be considered.       2.  Patchy opacity in the LEFT mid and lower lung increased since the   comparison study could be related to pneumonia as well.       3.  Interstitial fibrosis again noted.               This report was signed and finalized on 5/22/2025 3:54 PM by Dr. Ernesto Camacho MD.                FUTURE APPOINTMENTS     Future Appointments   Date Time Provider Department Center   6/23/2025  3:00 PM P[facility] ECHO ROOM 1  [facility]   7/1/2025 11:00 AM Fela Blackman, [facility]  [facility]          EKG (reviewed and interpreted by me):  Normal sinus tachycardia rhythm. No acute ischemia. Heart rate 108.       COURSE & MEDICAL DECISION MAKING     Patient's partial differential diagnosis can include:    Respiratory hypoxia with hypercapnia, respiratory hypoxia, pending respiratory failure, pneumonia, pneumonitis, bronchitis, bronchiolitis, COPD exacerbation,  congestive heart failure, asthma exacerbation, pulmonary embolism, pneumothorax, pleural effusion, pulmonary edema, empyema,  atelectasis,viral pneumonia, and others    CBC, CMP, magnesium, troponin, BNP, CTA of the chest will be ordered.    X-ray is concerning for worsening pneumonia and a right mid and upper lung as well as the left lower lobe.  There is also concern of possible malignancy in the right upper lobe of the lung with new nodular pattern.  A PET CT was recommended    Discussed the case with hospitalist Dr. Koo who states the patient can be admitted to Dr. Green    Patient's level of risk: High        CRITICAL CARE    CRITICAL CARE: no   CRITICAL CARE TIME: none      Also Old charts were reviewed per [facility] EMR.  Pertinent details are summarized above.  All laboratory, radiologic, and EKG studies that were performed in the Emergency Department were a necessary part of the evaluation needed to exclude unstable or emergent medical conditions:     Patient was hemodynamically and neurologically stable in the ED.   Pertinent studies were reviewed as above.     Recent Results (from the past 24 hours)   BNP    Collection Time: 05/22/25  2:07 PM    Specimen: Arm, Right; Blood   Result Value Ref Range    proBNP 357.1 0.0 - 900.0 pg/mL   Comprehensive Metabolic Panel    Collection Time: 05/22/25  2:07 PM    Specimen: Arm, Right; Blood   Result Value Ref Range    Glucose 129 (H) 65 - 99 mg/dL    BUN 7 (L) 8 - 23 mg/dL    Creatinine 0.82 0.57 - 1.00 mg/dL    Sodium 137 136 - 145 mmol/L    Potassium 3.9 3.5 - 5.2 mmol/L    Chloride 100 98 - 107 mmol/L    CO2 23.0 22.0 - 29.0 mmol/L    Calcium 8.7 8.6 - 10.5 mg/dL    Total Protein 6.5 6.0 - 8.5 g/dL    Albumin 3.4 (L) 3.5 - 5.2 g/dL    ALT (SGPT) 7 1 - 33 U/L    AST (SGOT) 18 1 - 32 U/L    Alkaline Phosphatase 121 (H) 39 - 117 U/L    Total Bilirubin 0.3 0.0 - 1.2 mg/dL    Globulin 3.1 gm/dL    A/G Ratio 1.1 g/dL    BUN/Creatinine Ratio 8.5 7.0 - 25.0    Anion Gap  14.0 5.0 - 15.0 mmol/L    eGFR 78.5 >60.0 mL/min/1.73   CBC (No Diff)    Collection Time: 05/22/25  2:07 PM    Specimen: Arm, Right; Blood   Result Value Ref Range    WBC 11.43 (H) 3.40 - 10.80 10*3/mm3    RBC 5.84 (H) 3.77 - 5.28 10*6/mm3    Hemoglobin 13.8 12.0 - 15.9 g/dL    Hematocrit 45.0 34.0 - 46.6 %    MCV 77.1 (L) 79.0 - 97.0 fL    MCH 23.6 (L) 26.6 - 33.0 pg    MCHC 30.7 (L) 31.5 - 35.7 g/dL    RDW 15.8 (H) 12.3 - 15.4 %    RDW-SD 42.4 37.0 - 54.0 fl    MPV 10.1 6.0 - 12.0 fL    Platelets 252 140 - 450 10*3/mm3   High Sensitivity Troponin T    Collection Time: 05/22/25  2:07 PM    Specimen: Arm, Right; Blood   Result Value Ref Range    HS Troponin T 17 (H) <14 ng/L   CBC Auto Differential    Collection Time: 05/22/25  2:07 PM    Specimen: Arm, Right; Blood   Result Value Ref Range    WBC 11.91 (H) 3.40 - 10.80 10*3/mm3    RBC 5.86 (H) 3.77 - 5.28 10*6/mm3    Hemoglobin 13.8 12.0 - 15.9 g/dL    Hematocrit 45.7 34.0 - 46.6 %    MCV 78.0 (L) 79.0 - 97.0 fL    MCH 23.5 (L) 26.6 - 33.0 pg    MCHC 30.2 (L) 31.5 - 35.7 g/dL    RDW 15.7 (H) 12.3 - 15.4 %    RDW-SD 43.0 37.0 - 54.0 fl    MPV 11.1 6.0 - 12.0 fL    Platelets 248 140 - 450 10*3/mm3    Neutrophil % 68.6 42.7 - 76.0 %    Lymphocyte % 15.8 (L) 19.6 - 45.3 %    Monocyte % 7.6 5.0 - 12.0 %    Eosinophil % 2.9 0.3 - 6.2 %    Basophil % 1.2 0.0 - 1.5 %    Immature Grans % 3.9 (H) 0.0 - 0.5 %    Neutrophils, Absolute 8.17 (H) 1.70 - 7.00 10*3/mm3    Lymphocytes, Absolute 1.88 0.70 - 3.10 10*3/mm3    Monocytes, Absolute 0.91 (H) 0.10 - 0.90 10*3/mm3    Eosinophils, Absolute 0.34 0.00 - 0.40 10*3/mm3    Basophils, Absolute 0.14 0.00 - 0.20 10*3/mm3    Immature Grans, Absolute 0.47 (H) 0.00 - 0.05 10*3/mm3    nRBC 0.2 0.0 - 0.2 /100 WBC   ECG 12 Lead Rhythm Change    Collection Time: 05/22/25  2:18 PM   Result Value Ref Range    QT Interval 320 ms    QTC Interval 458 ms   ECG 12 Lead Dyspnea    Collection Time: 05/22/25  3:32 PM   Result Value Ref Range    QT  Interval 350 ms    QTC Interval 469 ms   Comprehensive Metabolic Panel    Collection Time: 05/22/25  3:36 PM    Specimen: Arm, Left; Blood   Result Value Ref Range    Glucose 125 (H) 65 - 99 mg/dL    BUN 8 8 - 23 mg/dL    Creatinine 0.81 0.57 - 1.00 mg/dL    Sodium 135 (L) 136 - 145 mmol/L    Potassium 4.1 3.5 - 5.2 mmol/L    Chloride 99 98 - 107 mmol/L    CO2 23.0 22.0 - 29.0 mmol/L    Calcium 9.0 8.6 - 10.5 mg/dL    Total Protein 6.8 6.0 - 8.5 g/dL    Albumin 3.6 3.5 - 5.2 g/dL    ALT (SGPT) 9 1 - 33 U/L    AST (SGOT) 18 1 - 32 U/L    Alkaline Phosphatase 123 (H) 39 - 117 U/L    Total Bilirubin 0.3 0.0 - 1.2 mg/dL    Globulin 3.2 gm/dL    A/G Ratio 1.1 g/dL    BUN/Creatinine Ratio 9.9 7.0 - 25.0    Anion Gap 13.0 5.0 - 15.0 mmol/L    eGFR 79.7 >60.0 mL/min/1.73   BNP    Collection Time: 05/22/25  3:36 PM    Specimen: Arm, Left; Blood   Result Value Ref Range    proBNP 357.4 0.0 - 900.0 pg/mL   Green Top (Gel)    Collection Time: 05/22/25  3:36 PM   Result Value Ref Range    Extra Tube Hold for add-ons.    Lavender Top    Collection Time: 05/22/25  3:36 PM   Result Value Ref Range    Extra Tube hold for add-on    Red Top    Collection Time: 05/22/25  3:36 PM   Result Value Ref Range    Extra Tube Hold for add-ons.    Light Blue Top    Collection Time: 05/22/25  3:36 PM   Result Value Ref Range    Extra Tube Hold for add-ons.    Procalcitonin    Collection Time: 05/22/25  3:36 PM    Specimen: Arm, Left; Blood   Result Value Ref Range    Procalcitonin 0.08 0.00 - 0.25 ng/mL   Blood Gas, Arterial With Co-Ox    Collection Time: 05/22/25  5:11 PM    Specimen: Arterial Blood   Result Value Ref Range    Site Left Brachial     Tony's Test N/A     pH, Arterial 7.375 7.350 - 7.450 pH units    pCO2, Arterial 41.0 35.0 - 45.0 mm Hg    pO2, Arterial 188.0 (H) 83.0 - 108.0 mm Hg    HCO3, Arterial 23.9 20.0 - 26.0 mmol/L    Base Excess, Arterial -1.2 (L) 0.0 - 2.0 mmol/L    O2 Saturation, Arterial >100.1 (H) 94.0 - 99.0 %     Hemoglobin, Blood Gas 13.3 12 - 16 g/dL    Hematocrit, Blood Gas 40.8 38.0 - 51.0 %    Oxyhemoglobin 99.2 (H) 94 - 99 %    Methemoglobin 0.10 0.00 - 3.00 %    Carboxyhemoglobin 1.2 0 - 5 %    Temperature 37.0     Sodium, Arterial 135 (L) 136 - 145 mmol/L    Potassium, Arterial 3.8 3.5 - 5.2 mmol/L    Barometric Pressure for Blood Gas 753 mmHg    Modality HFNC     Flow Rate 10.0 lpm    Ventilator Mode NA     Collected by [id number]     pH, Temp Corrected 7.375 7.350 - 7.450 pH Units    pCO2, Temperature Corrected 41.0 35 - 45 mm Hg    pO2, Temperature Corrected 188 (H) 83 - 108 mm Hg       The patient received:  Medications   sodium chloride 0.9 % flush 10 mL (has no administration in time range)   cefTRIAXone (ROCEPHIN) 1,000 mg in sodium chloride 0.9 % 100 mL MBP (1,000 mg Intravenous New Bag 5/22/25 1721)   magnesium sulfate 2g/50 mL (PREMIX) infusion (0 g Intravenous Stopped 5/22/25 1724)   ipratropium-albuterol (DUO-NEB) nebulizer solution 3 mL (3 mL Nebulization Given 5/22/25 1613)   iopamidol (ISOVUE-370) 76 % injection 100 mL (100 mL Intravenous Given 5/22/25 1643)            ED Disposition       ED Disposition   Decision to Admit    Condition   --    Comment   Level of Care: Telemetry [5]   Diagnosis: Respiratory distress [806138]   Admitting Physician: MANJIT KING [[id number]]   Attending Physician: MANJIT KING [[id number]]                     Dragon disclaimer:  Part of this note may be an electronic transcription/translation of spoken language to printed text using the Dragon Dictation System.     I have reviewed the patient’s prescription history via a prescription monitoring program.  This information is consistent with my knowledge of the patient’s controlled substance use history.    FINAL IMPRESSION   Diagnosis Plan   1. Respiratory failure with hypoxia, unspecified chronicity        2. Pneumonia of left lower lobe due to infectious organism        3. Pulmonary artery hypertension        4.  Bronchiectasis with acute exacerbation        5. Hilar lymphadenopathy      Right            MD Toño Wadsworth Jr, Thomas Mark Jr., MD  05/22/25 [id number]

## 2025-05-22 NOTE — PROGRESS NOTES
"Pharmacy Dosing Service  Pharmacokinetics  Vancomycin Initial Evaluation  Assessment/Action/Plan:  Loading dose?:   Current Order: Vancomycin 750 mg IVPB every 12 hours  Current end date:5/28/25  Levels: 5/24/25 @ 0800  Additional antimicrobial agent(s): cefepime  MRSA Nasal PCR ordered: Yes (Vancomycin indication is pneumonia, bone/joint, SSTI or IAI)    Vancomycin dosage initiated based on population pharmacokinetic parameters. Pharmacy will continue to follow daily and adjust dose accordingly.     Subjective:  Elaine Juárez is a 65 y.o. female with a Vancomycin \"Pharmacy to Dose\" consult for the treatment of pneumonia , day 1 of 7 of treatment.    AUC Model Data:  Loading dose:   Regimen: 750 mg IV every 12 hours.  Start time: 19:43 on 05/22/2025  Exposure target: AUC24 (range) 400-600 mg/L.hr   AUC24,ss: 486 mg/L.hr  Probability of AUC24 > 400: 81 %  Ctrough,ss: 14.3 mg/L  Probability of Ctrough,ss > 20: 12 %  Probability of nephrotoxicity (Lodise SHAYNE 2009): 9 %    Objective:  Ht: 160 cm (62.99\"); Wt: 68.7 kg (151 lb 8 oz)  Estimated Creatinine Clearance: 62.7 mL/min (by C-G formula based on SCr of 0.81 mg/dL).   Creatinine   Date Value Ref Range Status   05/22/2025 0.81 0.57 - 1.00 mg/dL Final   05/22/2025 0.82 0.57 - 1.00 mg/dL Final   05/08/2025 0.83 0.57 - 1.00 mg/dL Final   08/30/2021 0.8 0.5 - 0.9 mg/dL Final   08/05/2021 0.80 0.60 - 1.30 mg/dL Final     Comment:     Serial Number: [id number]:  [id number]   09/22/2020 0.7 0.5 - 0.9 mg/dL Final   05/28/2020 0.7 0.5 - 0.9 mg/dL Final      Lab Results   Component Value Date    WBC 11.43 (H) 05/22/2025    WBC 11.91 (H) 05/22/2025    WBC 10.86 (H) 05/08/2025      Baseline culture results:  Microbiology Results (last 10 days)       ** No results found for the last 240 hours. **            Raffi Kingston, [facility]  05/22/25 18:47 CDT   "

## 2025-05-23 LAB
ANION GAP SERPL CALCULATED.3IONS-SCNC: 11 MMOL/L (ref 5–15)
BUN SERPL-MCNC: 7 MG/DL (ref 8–23)
BUN/CREAT SERPL: 9.7 (ref 7–25)
CALCIUM SPEC-SCNC: 7.9 MG/DL (ref 8.6–10.5)
CHLORIDE SERPL-SCNC: 101 MMOL/L (ref 98–107)
CO2 SERPL-SCNC: 23 MMOL/L (ref 22–29)
CREAT SERPL-MCNC: 0.72 MG/DL (ref 0.57–1)
DEPRECATED RDW RBC AUTO: 44.3 FL (ref 37–54)
EGFRCR SERPLBLD CKD-EPI 2021: 91.8 ML/MIN/1.73
ERYTHROCYTE [DISTWIDTH] IN BLOOD BY AUTOMATED COUNT: 15.6 % (ref 12.3–15.4)
GLUCOSE BLDC GLUCOMTR-MCNC: 140 MG/DL (ref 70–130)
GLUCOSE SERPL-MCNC: 203 MG/DL (ref 65–99)
HCT VFR BLD AUTO: 43.3 % (ref 34–46.6)
HGB BLD-MCNC: 12.8 G/DL (ref 12–15.9)
L PNEUMO1 AG UR QL IA: NEGATIVE
MAGNESIUM SERPL-MCNC: 2.4 MG/DL (ref 1.6–2.4)
MCH RBC QN AUTO: 23.6 PG (ref 26.6–33)
MCHC RBC AUTO-ENTMCNC: 29.6 G/DL (ref 31.5–35.7)
MCV RBC AUTO: 79.9 FL (ref 79–97)
MRSA DNA SPEC QL NAA+PROBE: NORMAL
PLATELET # BLD AUTO: 193 10*3/MM3 (ref 140–450)
PMV BLD AUTO: 11.2 FL (ref 6–12)
POTASSIUM SERPL-SCNC: 4.4 MMOL/L (ref 3.5–5.2)
RBC # BLD AUTO: 5.42 10*6/MM3 (ref 3.77–5.28)
S PNEUM AG SPEC QL LA: NEGATIVE
SODIUM SERPL-SCNC: 135 MMOL/L (ref 136–145)
WBC NRBC COR # BLD AUTO: 6.79 10*3/MM3 (ref 3.4–10.8)

## 2025-05-23 PROCEDURE — 25010000002 METHYLPREDNISOLONE PER 125 MG: Performed by: INTERNAL MEDICINE

## 2025-05-23 PROCEDURE — 94799 UNLISTED PULMONARY SVC/PX: CPT

## 2025-05-23 PROCEDURE — 85027 COMPLETE CBC AUTOMATED: CPT | Performed by: INTERNAL MEDICINE

## 2025-05-23 PROCEDURE — 25010000002 ENOXAPARIN PER 10 MG: Performed by: INTERNAL MEDICINE

## 2025-05-23 PROCEDURE — 97162 PT EVAL MOD COMPLEX 30 MIN: CPT | Performed by: PHYSICAL THERAPIST

## 2025-05-23 PROCEDURE — 82948 REAGENT STRIP/BLOOD GLUCOSE: CPT

## 2025-05-23 PROCEDURE — 25010000002 CEFEPIME PER 500 MG: Performed by: INTERNAL MEDICINE

## 2025-05-23 PROCEDURE — 87641 MR-STAPH DNA AMP PROBE: CPT | Performed by: INTERNAL MEDICINE

## 2025-05-23 PROCEDURE — 87449 NOS EACH ORGANISM AG IA: CPT | Performed by: INTERNAL MEDICINE

## 2025-05-23 PROCEDURE — 80048 BASIC METABOLIC PNL TOTAL CA: CPT | Performed by: INTERNAL MEDICINE

## 2025-05-23 PROCEDURE — 63710000001 MYCOPHENOLATE MOFETIL PER 250 MG: Performed by: INTERNAL MEDICINE

## 2025-05-23 PROCEDURE — 83735 ASSAY OF MAGNESIUM: CPT | Performed by: INTERNAL MEDICINE

## 2025-05-23 PROCEDURE — 94669 MECHANICAL CHEST WALL OSCILL: CPT

## 2025-05-23 PROCEDURE — 94760 N-INVAS EAR/PLS OXIMETRY 1: CPT

## 2025-05-23 PROCEDURE — 99233 SBSQ HOSP IP/OBS HIGH 50: CPT | Performed by: INTERNAL MEDICINE

## 2025-05-23 PROCEDURE — 25010000002 VANCOMYCIN PER 500 MG: Performed by: INTERNAL MEDICINE

## 2025-05-23 PROCEDURE — 87899 AGENT NOS ASSAY W/OPTIC: CPT | Performed by: INTERNAL MEDICINE

## 2025-05-23 RX ORDER — LORAZEPAM 0.5 MG/1
0.5 TABLET ORAL ONCE
Status: DISCONTINUED | OUTPATIENT
Start: 2025-05-23 | End: 2025-05-28 | Stop reason: HOSPADM

## 2025-05-23 RX ADMIN — CEFEPIME 2000 MG: 2 INJECTION, POWDER, FOR SOLUTION INTRAVENOUS at 05:09

## 2025-05-23 RX ADMIN — POTASSIUM CHLORIDE 10 MEQ: 750 TABLET, EXTENDED RELEASE ORAL at 08:05

## 2025-05-23 RX ADMIN — BUDESONIDE 0.5 MG: 0.5 INHALANT RESPIRATORY (INHALATION) at 06:35

## 2025-05-23 RX ADMIN — IPRATROPIUM BROMIDE 0.5 MG: 0.5 SOLUTION RESPIRATORY (INHALATION) at 18:42

## 2025-05-23 RX ADMIN — BUSPIRONE HYDROCHLORIDE 10 MG: 10 TABLET ORAL at 19:08

## 2025-05-23 RX ADMIN — GUAIFENESIN 1200 MG: 600 TABLET, EXTENDED RELEASE ORAL at 21:13

## 2025-05-23 RX ADMIN — VENLAFAXINE 75 MG: 37.5 TABLET ORAL at 08:05

## 2025-05-23 RX ADMIN — MYCOPHENOLATE MOFETIL 1000 MG: 500 TABLET, FILM COATED ORAL at 08:05

## 2025-05-23 RX ADMIN — SPIRONOLACTONE 50 MG: 50 TABLET ORAL at 08:05

## 2025-05-23 RX ADMIN — FAMOTIDINE 20 MG: 20 TABLET, FILM COATED ORAL at 17:12

## 2025-05-23 RX ADMIN — MYCOPHENOLATE MOFETIL 1000 MG: 500 TABLET, FILM COATED ORAL at 21:12

## 2025-05-23 RX ADMIN — ENOXAPARIN SODIUM 40 MG: 100 INJECTION SUBCUTANEOUS at 05:10

## 2025-05-23 RX ADMIN — GUAIFENESIN 1200 MG: 600 TABLET, EXTENDED RELEASE ORAL at 08:05

## 2025-05-23 RX ADMIN — METHYLPREDNISOLONE SODIUM SUCCINATE 80 MG: 125 INJECTION, POWDER, FOR SOLUTION INTRAMUSCULAR; INTRAVENOUS at 08:05

## 2025-05-23 RX ADMIN — BUDESONIDE 0.5 MG: 0.5 INHALANT RESPIRATORY (INHALATION) at 18:42

## 2025-05-23 RX ADMIN — IPRATROPIUM BROMIDE 0.5 MG: 0.5 SOLUTION RESPIRATORY (INHALATION) at 14:12

## 2025-05-23 RX ADMIN — CEFEPIME 2000 MG: 2 INJECTION, POWDER, FOR SOLUTION INTRAVENOUS at 21:12

## 2025-05-23 RX ADMIN — ENOXAPARIN SODIUM 40 MG: 100 INJECTION SUBCUTANEOUS at 21:12

## 2025-05-23 RX ADMIN — METHYLPREDNISOLONE SODIUM SUCCINATE 80 MG: 125 INJECTION, POWDER, FOR SOLUTION INTRAMUSCULAR; INTRAVENOUS at 21:12

## 2025-05-23 RX ADMIN — FAMOTIDINE 20 MG: 20 TABLET, FILM COATED ORAL at 08:05

## 2025-05-23 RX ADMIN — IPRATROPIUM BROMIDE 0.5 MG: 0.5 SOLUTION RESPIRATORY (INHALATION) at 06:35

## 2025-05-23 RX ADMIN — Medication 10 ML: at 21:14

## 2025-05-23 RX ADMIN — CETIRIZINE HYDROCHLORIDE 10 MG: 10 TABLET, FILM COATED ORAL at 08:05

## 2025-05-23 RX ADMIN — Medication 750 MG: at 08:06

## 2025-05-23 RX ADMIN — IPRATROPIUM BROMIDE 0.5 MG: 0.5 SOLUTION RESPIRATORY (INHALATION) at 10:22

## 2025-05-23 RX ADMIN — PANTOPRAZOLE SODIUM 40 MG: 40 TABLET, DELAYED RELEASE ORAL at 05:10

## 2025-05-23 RX ADMIN — Medication 10 ML: at 08:07

## 2025-05-23 RX ADMIN — TRAZODONE HYDROCHLORIDE 100 MG: 100 TABLET ORAL at 21:12

## 2025-05-23 RX ADMIN — CEFEPIME 2000 MG: 2 INJECTION, POWDER, FOR SOLUTION INTRAVENOUS at 13:18

## 2025-05-23 NOTE — CASE MANAGEMENT/SOCIAL WORK
Discharge Planning Assessment   Javed     Patient Name: Elaine Juárez  MRN: [id number]  Today's Date: 5/23/2025    Admit Date: 5/22/2025        Discharge Needs Assessment       Row Name 05/23/25 0742       Living Environment    People in Home spouse    Current Living Arrangements home    Potentially Unsafe Housing Conditions none    In the past 12 months has the electric, gas, oil, or water company threatened to shut off services in your home? No    Primary Care Provided by self    Provides Primary Care For no one    Family Caregiver if Needed spouse    Quality of Family Relationships helpful;involved;supportive    Able to Return to Prior Arrangements yes       Resource/Environmental Concerns    Resource/Environmental Concerns none    Transportation Concerns none       Transportation Needs    In the past 12 months, has lack of transportation kept you from medical appointments or from getting medications? no    In the past 12 months, has lack of transportation kept you from meetings, work, or from getting things needed for daily living? No       Food Insecurity    Within the past 12 months, you worried that your food would run out before you got the money to buy more. Never true    Within the past 12 months, the food you bought just didn't last and you didn't have money to get more. Never true       Transition Planning    Patient/Family Anticipates Transition to home    Patient/Family Anticipated Services at Transition none    Transportation Anticipated car, drives self;family or friend will provide       Discharge Needs Assessment    Readmission Within the Last 30 Days no previous admission in last 30 days    Equipment Currently Used at Home walker, rolling    Concerns to be Addressed no discharge needs identified    Do you want help finding or keeping work or a job? I do not need or want help    Do you want help with school or training? For example, starting or completing job training or getting a high  school diploma, GED or equivalent No    Anticipated Changes Related to Illness none    Equipment Needed After Discharge none    Provided Post Acute Provider List? N/A    Provided Post Acute Provider Quality & Resource List? N/A    Discharge Coordination/Progress Lives at home with her . Drives but  can provide transportation if need be. Has PCP and Rx coverage. No D/C needs identified at this time.                   Discharge Plan    No documentation.                 Selected Continued Care - Prior Encounters Includes continued care and service providers with selected services from prior encounters from 2/21/2025 to 5/23/2025      Discharged on 3/22/2025 Admission date: 3/20/2025 - Discharge disposition: Home-Health Care [facility] [place]      Home Medical Care       Service Provider Services Address Phone Fax Patient Preferred    [facility] [address] [place] [address] [address], [facility] [address] [phone] --                             Demographic Summary    No documentation.                  Functional Status    No documentation.                  Psychosocial    No documentation.                  Abuse/Neglect    No documentation.                  Legal    No documentation.                  Substance Abuse    No documentation.                  Patient Forms    No documentation.                     Mac Bazan RN

## 2025-05-23 NOTE — PLAN OF CARE
Goal Outcome Evaluation:  Plan of Care Reviewed With: patient        Progress: no change  Outcome Evaluation: PT eval completed. Pt alert and oriented x4 with c/o weakness, fatigue and SOA. Pt on 10L HFNC upon arrival and reports wearing 10L at home recently as well over the last couple of months. Pt reports independence at home prior to arrival with use of rollator for household distances and w/c for community. Today, pt performs all mobility with SBA-CGA and reqd extended rest breaks between movements and demos coughing spells throughout. Pt ambulates a few steps forward and backward at EOB with RW and CGA and O2 sats drop to low 80s per monitor. Pt turned up to 15L HFNC to recover which took approx 30 seconds. Pt will benefit from skilled PT to improve fxl mob and endurance. Rec d/c subacute rehab vs home with assist and HH pending progress.    Anticipated Discharge Disposition (PT): sub acute care setting

## 2025-05-23 NOTE — PLAN OF CARE
Goal Outcome Evaluation:   VSS. 10L 02 NC. Up to the bedside toilet. No new complaints. Will continue to monitor.

## 2025-05-23 NOTE — THERAPY EVALUATION
Patient Name: Elaine Juárez  : 1960    MRN: [id number]                              Today's Date: 2025       Admit Date: 2025    Visit Dx:     ICD-10-CM ICD-9-CM   1. Respiratory failure with hypoxia, unspecified chronicity  J96.91 518.81   2. Pneumonia of left lower lobe due to infectious organism  J18.9 486   3. Pulmonary artery hypertension  I27.21 416.8   4. Bronchiectasis with acute exacerbation  J47.1 494.1   5. Hilar lymphadenopathy  R59.0 785.6   6. Decreased functional mobility and endurance [Z74.09]  Z74.09 780.99     Patient Active Problem List   Diagnosis    CREST syndrome, partial     Raynaud's phenomenon    Pulmonary fibrosis prob sec underlying autoimmune disease    Gastroesophageal reflux disease without esophagitis    BMI 31.0-31.9,adult    History of adenomatous polyp of colon    Environmental allergies    PAH (pulmonary arterial hypertension) with portal hypertension    Hypokalemia    Panic attack    Paranoia (psychosis)    H/O noncompliance with medical treatment, presenting hazards to health    Bipolar affective disorder, currently manic, moderate    Obesity (BMI 30-39.9)    Esophageal dysmotility    Oropharyngeal dysphagia    Right ventricular systolic dysfunction    Respiratory failure with hypoxia    Metabolic encephalopathy    Generalized weakness    Respiratory distress     Past Medical History:   Diagnosis Date    Allergies     Arthritis     BMI 31.0-31.9,adult 2019    Calcified granuloma of lung 2019    Right lung    CHF (congestive heart failure)     denies    Chronic idiopathic fibrosing alveolitis     Chronic respiratory failure with hypoxia 2020    Chronic respiratory failure with hypoxia, on home oxygen therapy     COVID-19 2022    CREST syndrome     Environmental allergies 2022    Gastroesophageal reflux disease without esophagitis 2019    Interstitial lung disease     Obstructive sleep apnea on CPAP 2019     Oropharyngeal dysphagia 01/26/2023    Primary central sleep apnea     Pulmonary fibrosis     Pulmonary hypertension     Rheumatoid arthritis     Right ventricular systolic dysfunction 05/04/2023    Short-term memory loss      Past Surgical History:   Procedure Laterality Date    BREAST BIOPSY      CARDIAC CATHETERIZATION N/A 07/22/2020    Procedure: Right Heart Cath;  Surgeon: Thong Martin MD;  Location: [facility] CATH INVASIVE LOCATION;  Service: Cardiology;  Laterality: N/A;    CHOLECYSTECTOMY      COLONOSCOPY  05/11/2015    5 POLYPS    COLONOSCOPY N/A 08/04/2021    Procedure: COLONOSCOPY WITH ANESTHESIA;  Surgeon: Michael Landin DO;  Location: [facility] ENDOSCOPY;  Service: Gastroenterology;  Laterality: N/A;  pre-hx polyps  post-colon polyps    ENDOSCOPY N/A 08/04/2021    Procedure: ESOPHAGOGASTRODUODENOSCOPY WITH ANESTHESIA;  Surgeon: Michael Landin DO;  Location: [facility] ENDOSCOPY;  Service: Gastroenterology;  Laterality: N/A;  pre-dysphagia  post-normal  pcp-lanette aguila      General Information       Row Name 05/23/25 0819          Physical Therapy Time and Intention    Document Type evaluation  -SB     Mode of Treatment physical therapy  -SB       Row Name 05/23/25 0819          General Information    Patient Profile Reviewed yes  -SB     Prior Level of Function independent:;bed mobility;all household mobility;transfer;ADL's;cooking;cleaning;driving  rollator, manual w/c, motorized w/c, needs BSC, step over tub, shower chair  -SB     Existing Precautions/Restrictions fall;oxygen therapy device and L/min  10L HFNC currently, was wearing 6L at home at rest and 8L for activity until the last couple of months  -SB     Barriers to Rehab medically complex;previous functional deficit  -SB       Row Name 05/23/25 0819          Living Environment    Current Living Arrangements home  -SB     People in Home spouse  -SB       Row Name 05/23/25 0819          Home Main Entrance    Number of Stairs, Main Entrance  two  -SB     Stair Railings, Main Entrance none  -SB       Row Name 05/23/25 0819          Stairs Within Home, Primary    Number of Stairs, Within Home, Primary none  -SB       Row Name 05/23/25 0819          Cognition    Orientation Status (Cognition) oriented x 4  -SB       Row Name 05/23/25 0819          Safety Issues/Impairments Affecting Functional Mobility    Impairments Affecting Function (Mobility) endurance/activity tolerance;shortness of breath;strength  -SB               User Key  (r) = Recorded By, (t) = Taken By, (c) = Cosigned By      Initials Name Provider Type    SB Concepción Hearn PT DPT Physical Therapist                   Mobility       Row Name 05/23/25 0819          Bed Mobility    Bed Mobility supine-sit;sit-supine  -SB     Supine-Sit [place] (Bed Mobility) modified independence  -SB     Sit-Supine [place] (Bed Mobility) modified independence  -SB     Assistive Device (Bed Mobility) bed rails;head of bed elevated  -SB       Row Name 05/23/25 0819          Sit-Stand Transfer    Sit-Stand [place] (Transfers) standby assist  -SB     Assistive Device (Sit-Stand Transfers) walker, front-wheeled  -SB       Row Name 05/23/25 0819          Gait/Stairs (Locomotion)    [place] Level (Gait) standby assist  -SB     Assistive Device (Gait) walker, front-wheeled  -SB     Patient was able to Ambulate yes  -SB     Distance in Feet (Gait) 5  -SB     Deviations/Abnormal Patterns (Gait) gait speed decreased  -SB     Bilateral Gait Deviations forward flexed posture  -SB               User Key  (r) = Recorded By, (t) = Taken By, (c) = Cosigned By      Initials Name Provider Type    Concepción Lou PT DPT Physical Therapist                   Obj/Interventions       Row Name 05/23/25 0819          Range of Motion Comprehensive    General Range of Motion bilateral lower extremity ROM WFL  -SB       Row Name 05/23/25 0819          Strength Comprehensive (MMT)    General Manual Muscle Testing  (MMT) Assessment lower extremity strength deficits identified  -SB     Comment, General Manual Muscle Testing (MMT) Assessment BLE 4-/5  -SB       Row Name 05/23/25 0819          Balance    Balance Assessment sitting static balance;sitting dynamic balance;standing static balance;standing dynamic balance  -SB     Static Sitting Balance standby assist  -SB     Dynamic Sitting Balance standby assist  -SB     Position, Sitting Balance sitting edge of bed;unsupported  -SB     Static Standing Balance standby assist  -SB     Dynamic Standing Balance standby assist  -SB     Position/Device Used, Standing Balance supported;walker, front-wheeled  -SB       Row Name 05/23/25 0819          Sensory Assessment (Somatosensory)    Sensory Assessment (Somatosensory) LE sensation intact  -SB               User Key  (r) = Recorded By, (t) = Taken By, (c) = Cosigned By      Initials Name Provider Type    SB Concepción Hearn, PT DPT Physical Therapist                   Goals/Plan       Row Name 05/23/25 0819          Bed Mobility Goal 1 (PT)    Activity/Assistive Device (Bed Mobility Goal 1, PT) sit to supine;supine to sit;rolling to left;rolling to right  -SB     [place] Level/Cues Needed (Bed Mobility Goal 1, PT) independent  -SB     Time Frame (Bed Mobility Goal 1, PT) long term goal (LTG)  -SB     Progress/Outcomes (Bed Mobility Goal 1, PT) new goal  -SB       Row Name 05/23/25 0819          Transfer Goal 1 (PT)    Activity/Assistive Device (Transfer Goal 1, PT) sit-to-stand/stand-to-sit;bed-to-chair/chair-to-bed  -SB     [place] Level/Cues Needed (Transfer Goal 1, PT) modified independence  -SB     Time Frame (Transfer Goal 1, PT) long term goal (LTG)  -SB     Progress/Outcome (Transfer Goal 1, PT) new goal  -SB       Row Name 05/23/25 0819          Gait Training Goal 1 (PT)    Activity/Assistive Device (Gait Training Goal 1, PT) gait (walking locomotion);increase endurance/gait distance;increase energy conservation;improve  balance and speed;walker, rolling  -SB     [place] Level (Gait Training Goal 1, PT) contact guard required  -SB     Distance (Gait Training Goal 1, PT) 40 feet with SpO2 >/= 90%  -SB     Time Frame (Gait Training Goal 1, PT) long term goal (LTG)  -SB     Progress/Outcome (Gait Training Goal 1, PT) new goal  -SB       Row Name 05/23/25 0819          Therapy Assessment/Plan (PT)    Planned Therapy Interventions (PT) balance training;strengthening;bed mobility training;gait training;patient/family education;transfer training  -SB               User Key  (r) = Recorded By, (t) = Taken By, (c) = Cosigned By      Initials Name Provider Type    SB Concepción Hearn, PT DPT Physical Therapist                   Clinical Impression       Row Name 05/23/25 0819          Pain    Pretreatment Pain Rating 0/10 - no pain  -SB     Posttreatment Pain Rating 0/10 - no pain  -SB     Pre/Posttreatment Pain Comment c/o weakness, fatigue and SOA  -SB       Row Name 05/23/25 0819          Plan of Care Review    Plan of Care Reviewed With patient  -SB     Progress no change  -SB     Outcome Evaluation PT eval completed. Pt alert and oriented x4 with c/o weakness, fatigue and SOA. Pt on 10L HFNC upon arrival and reports wearing 10L at home recently as well over the last couple of months. Pt reports independence at home prior to arrival with use of rollator for household distances and w/c for community. Today, pt performs all mobility with SBA-CGA and reqd extended rest breaks between movements and demos coughing spells throughout. Pt ambulates a few steps forward and backward at EOB with RW and CGA and O2 sats drop to low 80s per monitor. Pt turned up to 15L HFNC to recover which took approx 30 seconds. Pt will benefit from skilled PT to improve fxl mob and endurance. Rec d/c subacute rehab vs home with assist and HH pending progress.  -SB       Row Name 05/23/25 0819          Therapy Assessment/Plan (PT)    Patient/Family Therapy Goals  Statement (PT) improve function  -SB     Rehab Potential (PT) good  -SB     Criteria for Skilled Interventions Met (PT) yes;meets criteria;skilled treatment is necessary  -SB     Therapy Frequency (PT) 2 times/day  -SB     Predicted Duration of Therapy Intervention (PT) until d/c or goals met  -SB       Row Name 05/23/25 0819          Vital Signs    Pretreatment Heart Rate (beats/min) 74  -SB     Pre SpO2 (%) 95  -SB     O2 Delivery Pre Treatment hi-flow  10L  -SB     Intra SpO2 (%) 80  -SB     O2 Delivery Intra Treatment hi-flow  15L  -SB     Post SpO2 (%) 93  -SB     O2 Delivery Post Treatment hi-flow  10L  -SB       Row Name 05/23/25 0819          Positioning and Restraints    Pre-Treatment Position in bed  -SB     Post Treatment Position bed  -SB     In Bed side lying right;call light within reach;encouraged to call for assist  -SB               User Key  (r) = Recorded By, (t) = Taken By, (c) = Cosigned By      Initials Name Provider Type    Concepción Lou, AARON DPT Physical Therapist                   Outcome Measures       Row Name 05/23/25 0819          How much help from another person do you currently need...    Turning from your back to your side while in flat bed without using bedrails? 4  -SB     Moving from lying on back to sitting on the side of a flat bed without bedrails? 4  -SB     Moving to and from a bed to a chair (including a wheelchair)? 4  -SB     Standing up from a chair using your arms (e.g., wheelchair, bedside chair)? 4  -SB     Climbing 3-5 steps with a railing? 3  -SB     To walk in hospital room? 3  -SB     AM-PAC 6 Clicks Score (PT) 22  -SB     Highest Level of Mobility Goal Walk 25 Feet or More-7  -SB       Row Name 05/23/25 0819          Functional Assessment    Outcome Measure Options AM-PAC 6 Clicks Basic Mobility (PT)  -SB               User Key  (r) = Recorded By, (t) = Taken By, (c) = Cosigned By      Initials Name Provider Type    Concepción Lou, AARON DPT Physical Therapist                                  Physical Therapy Education       Title: PT OT SLP Therapies (In Progress)       Topic: Physical Therapy (In Progress)       Point: Mobility training (Done)       Learning Progress Summary            Patient Acceptance, E, VU by SB at 5/23/2025 0826    Comment: pt edu on POC, benefits of act and d/c plans                      Point: Home exercise program (Not Started)       Learner Progress:  Not documented in this visit.              Point: Body mechanics (Not Started)       Learner Progress:  Not documented in this visit.              Point: Precautions (Done)       Learning Progress Summary            Patient Acceptance, E, VU by SB at 5/23/2025 0826    Comment: pt edu on POC, benefits of act and d/c plans                                      User Key       Initials Effective Dates Name Provider Type Discipline    SB 07/11/23 -  Concepción Hearn, PT DPT Physical Therapist PT                  PT Recommendation and Plan  Planned Therapy Interventions (PT): balance training, strengthening, bed mobility training, gait training, patient/family education, transfer training  Progress: no change  Outcome Evaluation: PT eval completed. Pt alert and oriented x4 with c/o weakness, fatigue and SOA. Pt on 10L HFNC upon arrival and reports wearing 10L at home recently as well over the last couple of months. Pt reports independence at home prior to arrival with use of rollator for household distances and w/c for community. Today, pt performs all mobility with SBA-CGA and reqd extended rest breaks between movements and demos coughing spells throughout. Pt ambulates a few steps forward and backward at EOB with RW and CGA and O2 sats drop to low 80s per monitor. Pt turned up to 15L HFNC to recover which took approx 30 seconds. Pt will benefit from skilled PT to improve fxl mob and endurance. Rec d/c subacute rehab vs home with assist and HH pending progress.     Time Calculation:         PT Charges        Row Name 05/23/25 1050             Time Calculation    Start Time 0810  -SB      Stop Time 0910  -SB      Time Calculation (min) 60 min  -SB      PT Received On 05/23/25  -SB      PT Goal Re-Cert Due Date 06/02/25  -SB         Untimed Charges    PT Eval/Re-eval Minutes 60  -SB         Total Minutes    Untimed Charges Total Minutes 60  -SB       Total Minutes 60  -SB                User Key  (r) = Recorded By, (t) = Taken By, (c) = Cosigned By      Initials Name Provider Type    SB Concepción Hearn, PT DPT Physical Therapist                  Therapy Charges for Today       Code Description Service Date Service Provider Modifiers Qty    28842891987 HC PT EVAL MOD COMPLEXITY 4 5/23/2025 Concepción Hearn PT DPT GP 1            PT G-Codes  Outcome Measure Options: AM-PAC 6 Clicks Basic Mobility (PT)  AM-PAC 6 Clicks Score (PT): 22  PT Discharge Summary  Anticipated Discharge Disposition (PT): sub acute care setting    Concepción Hearn PT DPT  5/23/2025

## 2025-05-23 NOTE — PLAN OF CARE
Problem: Adult Inpatient Plan of Care  Goal: Plan of Care Review  Outcome: Progressing  Flowsheets (Taken 5/23/2025 0644)  Progress: no change  Outcome Evaluation: Pt slept through night and had no c/o pain. Tyvason and Macitentan/Tadalafil was sent down to pharmacy for patient use. Patient explained she understood the proper usage of the Tyvason. Receiving IV abx. Call light within reach and safety maintained.  Plan of Care Reviewed With: patient  Goal: Patient-Specific Goal (Individualized)  Outcome: Progressing  Goal: Absence of Hospital-Acquired Illness or Injury  Outcome: Progressing  Intervention: Identify and Manage Fall Risk  Recent Flowsheet Documentation  Taken 5/23/2025 0000 by Zoraida Colin RN  Safety Promotion/Fall Prevention: safety round/check completed  Taken 5/22/2025 2200 by Zoraida Colin RN  Safety Promotion/Fall Prevention: safety round/check completed  Taken 5/22/2025 2100 by Zoraida Colin RN  Safety Promotion/Fall Prevention: safety round/check completed  Taken 5/22/2025 2000 by Zoraida Colin RN  Safety Promotion/Fall Prevention: safety round/check completed  Taken 5/22/2025 1900 by Zoraida Colin RN  Safety Promotion/Fall Prevention: safety round/check completed  Intervention: Prevent Skin Injury  Recent Flowsheet Documentation  Taken 5/23/2025 0400 by Zoraida Colin RN  Body Position: position changed independently  Taken 5/23/2025 0200 by Zoraida Colin RN  Body Position: position changed independently  Taken 5/23/2025 0000 by Zoraida Colin RN  Body Position: position changed independently  Taken 5/22/2025 2200 by Zoraida Colin RN  Body Position: position changed independently  Taken 5/22/2025 2100 by Zoraida Colin RN  Body Position: position changed independently  Taken 5/22/2025 2000 by Zoraida Colin RN  Body Position: position changed independently  Taken 5/22/2025 1900 by Zoraida Colin RN  Body Position: position changed independently  Intervention: Prevent and Manage VTE (Venous Thromboembolism)  Risk  Recent Flowsheet Documentation  Taken 5/22/2025 2200 by Zoraida Colin RN  VTE Prevention/Management: (see mar) other (see comments)  Goal: Optimal Comfort and Wellbeing  Outcome: Progressing  Intervention: Provide Person-Centered Care  Recent Flowsheet Documentation  Taken 5/22/2025 2200 by Zoraida Colin RN  Trust Relationship/Rapport:   care explained   choices provided   questions answered   thoughts/feelings acknowledged  Goal: Readiness for Transition of Care  Outcome: Progressing  Goal: Plan of Care Review  Outcome: Progressing  Flowsheets (Taken 5/23/2025 0644)  Progress: no change  Outcome Evaluation: Pt slept through night and had no c/o pain. Tyvason and Macitentan/Tadalafil was sent down to pharmacy for patient use. Patient explained she understood the proper usage of the Tyvason. Receiving IV abx. Call light within reach and safety maintained.  Plan of Care Reviewed With: patient  Goal: Absence of Hospital-Acquired Illness or Injury  Outcome: Progressing  Intervention: Identify and Manage Fall Risk  Recent Flowsheet Documentation  Taken 5/23/2025 0000 by Zoraida Colin RN  Safety Promotion/Fall Prevention: safety round/check completed  Taken 5/22/2025 2200 by Zoraida Colin RN  Safety Promotion/Fall Prevention: safety round/check completed  Taken 5/22/2025 2100 by Zoraida Colin RN  Safety Promotion/Fall Prevention: safety round/check completed  Taken 5/22/2025 2000 by Zoraida Colin RN  Safety Promotion/Fall Prevention: safety round/check completed  Taken 5/22/2025 1900 by Zoraida Colin RN  Safety Promotion/Fall Prevention: safety round/check completed  Intervention: Prevent Skin Injury  Recent Flowsheet Documentation  Taken 5/23/2025 0400 by Zoraida Colin RN  Body Position: position changed independently  Taken 5/23/2025 0200 by Zoraida Colin RN  Body Position: position changed independently  Taken 5/23/2025 0000 by Zoraida Colin RN  Body Position: position changed independently  Taken 5/22/2025 2200 by Dixie  DELLA Conway  Body Position: position changed independently  Taken 5/22/2025 2100 by Zoraida Colin RN  Body Position: position changed independently  Taken 5/22/2025 2000 by Zoraida Colin RN  Body Position: position changed independently  Taken 5/22/2025 1900 by Zoraida Colin RN  Body Position: position changed independently  Intervention: Prevent and Manage VTE (Venous Thromboembolism) Risk  Recent Flowsheet Documentation  Taken 5/22/2025 2200 by Zoraida Colin RN  VTE Prevention/Management: (see mar) other (see comments)   Goal Outcome Evaluation:  Plan of Care Reviewed With: patient        Progress: no change  Outcome Evaluation: Pt slept through night and had no c/o pain. Tyvason and Macitentan/Tadalafil was sent down to pharmacy for patient use. Patient explained she understood the proper usage of the Tyvason. Receiving IV abx. Call light within reach and safety maintained.

## 2025-05-23 NOTE — PROGRESS NOTES
[facility] PULMONARY AND CRITICAL CARE PROGRESS NOTE - [facility]    Patient: Elaine Juárez  1960   MR# [id number]   Acct# [id number]  05/23/25   18:31 CDT  Referring Provider: Christiano Green MD    Chief Complaint: Shortness of breath, acute on chronic hypoxic respiratory failure, chronic idiopathic fibrosing alveolitis, crest syndrome congestive diastolic heart failure, coronary, oxygen, history of COVID-19 infection in the past, obstructive sleep apnea on CPAP not using it now, lung nodule    Interval history: Patient was seen in the follow-up visit in pulmonary rounds in medical floor today.  She was seen as inpatient pulmonary consult last night.  She is currently on 10 L of oxygen and is feeling better.  She started on cefepime and also on vancomycin for pneumonia.  She has severe interstitial lung disease with pulmonary hypertension and is on several medications.  She is getting high-dose of Solu-Medrol as well and also started on Pulmicort and DuoNeb.  She is not on any BiPAP or CPAP.  She told me she is feeling little better today but still gets short of breath at times.  No other acute events overnight.    Meds:  budesonide, 0.5 mg, Nebulization, BID - RT  cefepime, 2,000 mg, Intravenous, Q8H  cetirizine, 10 mg, Oral, Daily  enoxaparin sodium, 40 mg, Subcutaneous, Q24H  famotidine, 20 mg, Oral, BID AC  fluticasone, 2 spray, Each Nare, Daily  guaiFENesin, 1,200 mg, Oral, Q12H  ipratropium, 0.5 mg, Nebulization, 4x Daily - RT  Macitentan-Tadalafil, 10-40 mg, Oral, Daily  methylPREDNISolone sodium succinate, 80 mg, Intravenous, Q12H  metoprolol succinate XL, 12.5 mg, Oral, Daily  mycophenolate, 1,000 mg, Oral, Q12H  pantoprazole, 40 mg, Oral, Q AM  potassium chloride, 10 mEq, Oral, Daily  sodium chloride, 10 mL, Intravenous, Q12H  spironolactone, 50 mg, Oral, Daily  traZODone, 100 mg, Oral, Nightly  Treprostinil, 4 puff, Inhalation, 4x Daily  vancomycin, 750 mg, Intravenous,  Q12H  venlafaxine, 75 mg, Oral, Daily      Pharmacy to dose vancomycin,       Physical Exam:  SpO2 Percentage    05/23/25 1118 05/23/25 1412 05/23/25 1653   SpO2: 100% 97% 99%     Body mass index is 26.84 kg/m².   Temp:  [97.7 °F (36.5 °C)-97.8 °F (36.6 °C)] 97.7 °F (36.5 °C)  Heart Rate:  [] 74  Resp:  [18-22] 18  BP: (101-131)/(56-79) 101/69  Intake/Output Summary (Last 24 hours) at 5/23/2025 1831  Last data filed at 5/23/2025 1418  Gross per 24 hour   Intake 440 ml   Output 1000 ml   Net -560 ml     Physical Exam    Vitals and nursing note reviewed.   Constitutional:       General: She is not in acute distress.     Appearance: She is ill-appearing.      Comments: Middle-aged  female looks chronically ill on high flow oxygen resting in bed   HENT:      Head: Normocephalic and atraumatic.      Right Ear: External ear normal.      Left Ear: External ear normal.      Nose: Nose normal. No congestion or rhinorrhea.      Mouth/Throat:      Mouth: Mucous membranes are moist.      Pharynx: Oropharynx is clear. No oropharyngeal exudate or posterior oropharyngeal erythema.   Eyes:      General: No scleral icterus.        Right eye: No discharge.         Left eye: No discharge.      Extraocular Movements: Extraocular movements intact.      Conjunctiva/sclera: Conjunctivae normal.      Pupils: Pupils are equal, round, and reactive to light.   Neck:      Vascular: No carotid bruit.   Cardiovascular:      Rate and Rhythm: Regular rhythm. Tachycardia present.      Pulses: Normal pulses.      Heart sounds: Normal heart sounds. No murmur heard.     No friction rub. No gallop.   Pulmonary:      Effort: Pulmonary effort is normal. No respiratory distress.      Breath sounds: No stridor. Wheezing and rales present.      Comments: Decreased breath sound bilaterally  Chest:      Chest wall: No tenderness.   Abdominal:      General: Abdomen is flat. Bowel sounds are normal. There is no distension.      Palpations: There  is no mass.      Tenderness: There is no abdominal tenderness.      Hernia: No hernia is present.   Genitourinary:     Comments: Not examined  Musculoskeletal:         General: No swelling, tenderness, deformity or signs of injury. Normal range of motion.      Cervical back: Normal range of motion and neck supple. No rigidity or tenderness.      Right lower leg: No edema.      Left lower leg: No edema.   Lymphadenopathy:      Cervical: No cervical adenopathy.   Skin:     General: Skin is warm and dry.      Capillary Refill: Capillary refill takes less than 2 seconds.      Coloration: Skin is not jaundiced or pale.      Findings: No bruising, erythema, lesion or rash.   Neurological:      General: No focal deficit present.      Mental Status: She is alert and oriented to person, place, and time. Mental status is at baseline.      Cranial Nerves: No cranial nerve deficit.      Sensory: No sensory deficit.      Motor: Weakness present.      Deep Tendon Reflexes: Reflexes normal.      Comments: Decreased breath sound bilaterally   Psychiatric:         Mood and Affect: Mood normal.         Behavior: Behavior normal.        Laboratory Data:  Results from last 7 days   Lab Units 05/23/25  0444 05/22/25  1407   WBC 10*3/mm3 6.79 11.91*  11.43*   HEMOGLOBIN g/dL 12.8 13.8  13.8   PLATELETS 10*3/mm3 193 248  252     Results from last 7 days   Lab Units 05/23/25  0444 05/22/25  1711 05/22/25  1536 05/22/25  1407   SODIUM mmol/L 135*  --  135* 137   SODIUM, ARTERIAL mmol/L  --  135*  --   --    POTASSIUM mmol/L 4.4  --  4.1 3.9   BUN mg/dL 7*  --  8 7*   CREATININE mg/dL 0.72  --  0.81 0.82     Results from last 7 days   Lab Units 05/22/25  1711   PH, ARTERIAL pH units 7.375   PCO2, ARTERIAL mm Hg 41.0   PO2 ART mm Hg 188.0*     Microbiology Results (last 10 days)       Procedure Component Value - Date/Time    Legionella Antigen, Urine - Urine, Urine, Clean Catch [[id number]]  (Normal) Collected: 05/23/25 1057    Lab Status:  Final result Specimen: Urine, Clean Catch Updated: 05/23/25 1129     LEGIONELLA ANTIGEN, URINE Negative    S. Pneumo Ag Urine or CSF - Urine, Urine, Clean Catch [[id number]]  (Normal) Collected: 05/23/25 1057    Lab Status: Final result Specimen: Urine, Clean Catch Updated: 05/23/25 1130     Strep Pneumo Ag Negative    MRSA Screen, PCR (Inpatient) - Swab, Nares [[id number]]  (Normal) Collected: 05/23/25 0526    Lab Status: Final result Specimen: Swab from Nares Updated: 05/23/25 0828     MRSA PCR No MRSA Detected    Narrative:      The negative predictive value of this diagnostic test is high and should only be used to consider de-escalating anti-MRSA therapy. A positive result may indicate colonization with MRSA and must be correlated clinically.    Blood Culture - Blood, Arm, Left [[id number]]  (Normal) Collected: 05/22/25 1719    Lab Status: Preliminary result Specimen: Blood from Arm, Left Updated: 05/23/25 1745     Blood Culture No growth at 24 hours    Blood Culture - Blood, Arm, Left [[id number]]  (Normal) Collected: 05/22/25 1715    Lab Status: Preliminary result Specimen: Blood from Arm, Left Updated: 05/23/25 1745     Blood Culture No growth at 24 hours          Recent films:  CT Angiogram Chest  Result Date: 5/22/2025  1. No evidence of pulmonary thromboembolic disease. There is enlargement of the pulmonary artery suggesting pulmonary arterial hypertension. 2. Atheromatous calcification of the thoracic aorta and great vessels. No aneurysm or dissection. Mild cardiac enlargement. 3. Multiple areas of right upper lobe pulmonary nodularity some of which have shown progression from a previous exam of 1 month earlier. There is also a new nodule in the superior segment of the right lower lobe abutting the major fissure medially. Bulky mediastinal and right hilar lymphadenopathy also shows some progression. Although this could potentially represent an infectious etiology I feel a neoplastic process should be  excluded. PET CT will likely be helpful for further characterization. 4. Extensive bronchiectasis within both lungs. This includes cylindrical and varicoid bronchiectasis. There are patchy chronic appearing areas of consolidation within both lungs, particularly within the right middle lobe, left lingula and lower lobes stable from the previous exam. No effusion present. 5. Bilateral nonobstructing nephrolithiasis. Moderate size hiatal hernia present.  This report was signed and finalized on 5/22/2025 5:06 PM by Dr. Romaine Castillo MD.      XR Chest 1 View  Result Date: 5/22/2025   1.  Increased opacity in the RIGHT mid to upper lung has a rounded appearance and is more pronounced today than on the study from 5/8/2025. Neoplastic and infectious process is to be considered.  2.  Patchy opacity in the LEFT mid and lower lung increased since the comparison study could be related to pneumonia as well.  3.  Interstitial fibrosis again noted.    This report was signed and finalized on 5/22/2025 3:54 PM by Dr. Ernesto Camacho MD.      Personal review of imaging : Study showed severe interstitial lung disease and right-sided pneumonia.    Pulmonary Assessment:  Acute on chronic hypoxic respiratory failure  Dependence on supplemental oxygen  Bilateral pneumonia infectious organisms  Chronic interstitial fibrosing alveolitis  Interstitial lung disease  Bronchiectasis with possible acute exacerbation  Lung nodule may need outpatient workup  CREST syndrome  Rheumatoid arthritis  Severe pulmonary hypertension on multiple medication  Non-smoker  Obstructive sleep apnea not on CPAP  Congestive diastolic heart failure with preserved ejection fraction  Severe deconditioning    Recommend:   Continue supplemental oxygen and titrate to keep saturation more than 92%.  Currently on 10 L high flow salter  Titrate to keep oxygen saturation more than 92% normally she is around 6 to 8 L at home  Continue Pulmicort and DuoNeb and high-dose  Solu-Medrol.  She is already getting Tyvaso and other medicines for pulmonary hypertension which will be continued.  Patient is getting antibiotic coverage with cefepime and vancomycin which should be continued  Monitor culture results and adjust antibiotics accordingly.  Continue chest vest treatment aggressive pulmonary toilet.  DVT and stress ulcer prophylaxis.  Pain and anxiety control  Repeat labs and imaging studies from time to time.  Nutritional support  Plan for follow-up in the pulmonary clinic after discharge  CODE STATUS: Full.  Overall prognosis: Guarded  We will follow.  Total time spent in seeing this patient 35-minute    Electronically signed by     Judith Woodson MD,  Pulmonologist/Intensivist   05/23/25, 18:31 CDT

## 2025-05-23 NOTE — CONSULTS
[facility] [place] PULMONARY CONSULT - [facility]    25, 22:03 CDT  Patient Care Team:  Aaron Stoddard MD as PCP - Michael Tsang DO as Consulting Physician (Gastroenterology)  Fela Blackman APRN as Nurse Practitioner (Pulmonary Disease)  [facility]  ([facility]  Antonia Landry MD as Consulting Physician (Rheumatology)  Thong Martin MD as Cardiologist (Cardiology)  Gladis Mccord APRN as Referring Physician (Family Medicine)  Name: Elaine Juárez  : 1960  MRN: [id number]  Contact Serial Number [id number]    Chief complaint: Shortness of breath, acute on chronic hypoxic respiratory failure, chronic idiopathic fibrosing alveolitis, crest syndrome congestive diastolic heart failure, coronary, oxygen, history of COVID-19 infection in the past, obstructive sleep apnea on CPAP not using it now, lung nodule    HPI:  We have been consulted by Christiano Green MD to see this 65 y.o. female.  She is known to the pulmonary clinic and was seen and followed by ANASTASIA Briggs.  Patient is also seen and followed by Raffi Escalera cardiologist for her pulmonary hypertension.    Patient is a is a 65-year-old female from home with past medical history of arthritis, congestive heart failure of unknown type, chronic idiopathic fibrosing alveolitis, chronic respiratory failure with hypoxia, crest syndrome, GERD, interstitial lung disease, pulmonary hypertension, rheumatoid arthritis, obstructive sleep apnea not using CPAP now and oropharyngeal dysphagia.  She presented to the emergency room in [facility] today with complaint of shortness of breath, which was worsening for the last few days.    Patient states she is she was recently hospitalized and was discharged from hospital when she has not really recovered to her baseline oxygen need at home, but in the last couple of days she has become more short of breath prompting visit to her cardiologist today. After evaluation her  cardiologist Dr. Escalera did not think her shortness of breath was secondary to worsening pulmonary hypertension and therefore recommended to go to the emergency room.    In the emergency room, she was worked up with CTA chest which ruled out pulmonary embolism but showed chronic consolidations within the 2 lungs, and also a new nodule at the upper segment of the right lower lobe which was suspicious for malignancy with recommendation for further evaluation with a PET scan.  She follows outpatient with ANASTASIA Rowland and used to see Dr. Griffith before.  At the time of her visit patient was tired and exhausted and was getting ready for her to start her Tyvaso treatment.  She also uses Winrevair and Macitentan-tadalafil for pulmonary hypertension.  She is also taking CellCept.  The patient has bilateral pulmonary infiltrate and it cannot be differentiated from an active infection superimposed on her chronic interstitial changes in the lung and she is already empirically started on cefepime and vancomycin.    She has started on Pulmicort when it was changed to Atrovent due to tachycardia.  She looks tired and exhausted but in no acute distress.  Continue with for further evaluation she declines.  She was reporting 10 L of high flow oxygen at and she was normally on 6 to 8 L at home.  She is up-to-date on her vaccinations.      Past Medical History:   has a past medical history of Allergies, Arthritis, BMI 31.0-31.9,adult (06/04/2019), Calcified granuloma of lung (06/04/2019), CHF (congestive heart failure), Chronic idiopathic fibrosing alveolitis, Chronic respiratory failure with hypoxia (08/05/2020), Chronic respiratory failure with hypoxia, on home oxygen therapy, COVID-19 (02/01/2022), CREST syndrome, Environmental allergies (02/01/2022), Gastroesophageal reflux disease without esophagitis (06/04/2019), Interstitial lung disease, Obstructive sleep apnea on CPAP (06/04/2019), Oropharyngeal dysphagia (01/26/2023),  "Primary central sleep apnea, Pulmonary fibrosis, Pulmonary hypertension, Rheumatoid arthritis, Right ventricular systolic dysfunction (05/04/2023), and Short-term memory loss.   has a past surgical history that includes Cholecystectomy; Colonoscopy (05/11/2015); Breast biopsy; Cardiac catheterization (N/A, 07/22/2020); Colonoscopy (N/A, 08/04/2021); and Esophagogastroduodenoscopy (N/A, 08/04/2021).  Allergies   Allergen Reactions    Codeine Nausea And Vomiting    Latex Rash    Prednisone Mental Status Change     High doses makes her \"nuts\"     Medications:  budesonide, 0.5 mg, Nebulization, BID - RT  [START ON 5/23/2025] cefepime, 2,000 mg, Intravenous, Q8H  [START ON 5/23/2025] cetirizine, 10 mg, Oral, Daily  enoxaparin sodium, 40 mg, Subcutaneous, Q24H  famotidine, 20 mg, Oral, BID AC  [START ON 5/23/2025] fluticasone, 2 spray, Each Nare, Daily  ipratropium, 0.5 mg, Nebulization, 4x Daily - RT  Macitentan-Tadalafil, 10-40 mg, Oral, Daily  methylPREDNISolone sodium succinate, 80 mg, Intravenous, Q12H  metoprolol succinate XL, 12.5 mg, Oral, Daily  mycophenolate, 1,000 mg, Oral, Q12H  [START ON 5/23/2025] pantoprazole, 40 mg, Oral, Q AM  potassium chloride, 10 mEq, Oral, Daily  sodium chloride, 10 mL, Intravenous, Q12H  spironolactone, 50 mg, Oral, Daily  traZODone, 100 mg, Oral, Nightly  Treprostinil, 4 puff, Inhalation, 4x Daily  vancomycin, 750 mg, Intravenous, Q12H  venlafaxine, 75 mg, Oral, Daily      Pharmacy to dose vancomycin,       Family History:  Family History   Problem Relation Age of Onset    Cirrhosis Sister     Liver cancer Brother     No Known Problems Mother     No Known Problems Father     Colon cancer Neg Hx     Colon polyps Neg Hx      Social History:   reports that she has never smoked. She has been exposed to tobacco smoke. She has never used smokeless tobacco. She reports that she does not drink alcohol and does not use drugs.  Review of Systems:  Review of Systems   Constitutional:  Positive " for fatigue. Negative for fever.   HENT:  Positive for congestion, postnasal drip and sinus pressure.    Eyes: Negative.    Respiratory:  Positive for cough, chest tightness and shortness of breath.    Cardiovascular: Negative.    Gastrointestinal: Negative.    Endocrine: Negative.    Genitourinary: Negative.    Musculoskeletal:  Positive for arthralgias and back pain.   Skin: Negative.    Allergic/Immunologic: Positive for environmental allergies.   Neurological:  Positive for weakness and light-headedness.   Hematological: Negative.    Psychiatric/Behavioral: Negative.        Physical Exam:  Temp:  [97.9 °F (36.6 °C)-98 °F (36.7 °C)] 98 °F (36.7 °C)  Heart Rate:  [106-128] 111  Resp:  [20-24] 20  BP: (116-131)/(74-79) 131/79  Intake/Output Summary (Last 24 hours) at 5/22/2025 2203  Last data filed at 5/22/2025 1825  Gross per 24 hour   Intake --   Output 400 ml   Net -400 ml         05/22/25  1532 05/22/25  1825   Weight: 67.7 kg (149 lb 4 oz) 68.7 kg (151 lb 8 oz)     SpO2 Percentage    05/22/25 1613 05/22/25 1621 05/22/25 1825   SpO2: 99% 100% 97%     Body mass index is 26.84 kg/m².   Physical Exam  Vitals and nursing note reviewed.   Constitutional:       General: She is not in acute distress.     Appearance: She is ill-appearing.      Comments: Middle-aged  female looks chronically ill on high flow oxygen resting in bed   HENT:      Head: Normocephalic and atraumatic.      Right Ear: External ear normal.      Left Ear: External ear normal.      Nose: Nose normal. No congestion or rhinorrhea.      Mouth/Throat:      Mouth: Mucous membranes are moist.      Pharynx: Oropharynx is clear. No oropharyngeal exudate or posterior oropharyngeal erythema.   Eyes:      General: No scleral icterus.        Right eye: No discharge.         Left eye: No discharge.      Extraocular Movements: Extraocular movements intact.      Conjunctiva/sclera: Conjunctivae normal.      Pupils: Pupils are equal, round, and reactive  to light.   Neck:      Vascular: No carotid bruit.   Cardiovascular:      Rate and Rhythm: Regular rhythm. Tachycardia present.      Pulses: Normal pulses.      Heart sounds: Normal heart sounds. No murmur heard.     No friction rub. No gallop.   Pulmonary:      Effort: Pulmonary effort is normal. No respiratory distress.      Breath sounds: No stridor. Wheezing and rales present.      Comments: Decreased breath sound bilaterally  Chest:      Chest wall: No tenderness.   Abdominal:      General: Abdomen is flat. Bowel sounds are normal. There is no distension.      Palpations: There is no mass.      Tenderness: There is no abdominal tenderness.      Hernia: No hernia is present.   Genitourinary:     Comments: Not examined  Musculoskeletal:         General: No swelling, tenderness, deformity or signs of injury. Normal range of motion.      Cervical back: Normal range of motion and neck supple. No rigidity or tenderness.      Right lower leg: No edema.      Left lower leg: No edema.   Lymphadenopathy:      Cervical: No cervical adenopathy.   Skin:     General: Skin is warm and dry.      Capillary Refill: Capillary refill takes less than 2 seconds.      Coloration: Skin is not jaundiced or pale.      Findings: No bruising, erythema, lesion or rash.   Neurological:      General: No focal deficit present.      Mental Status: She is alert and oriented to person, place, and time. Mental status is at baseline.      Cranial Nerves: No cranial nerve deficit.      Sensory: No sensory deficit.      Motor: Weakness present.      Deep Tendon Reflexes: Reflexes normal.      Comments: Decreased breath sound bilaterally   Psychiatric:         Mood and Affect: Mood normal.         Behavior: Behavior normal.       Result Review  Results from last 7 days   Lab Units 05/22/25  1407   WBC 10*3/mm3 11.91*  11.43*   HEMOGLOBIN g/dL 13.8  13.8   PLATELETS 10*3/mm3 248  252     Results from last 7 days   Lab Units 05/22/25  2394  05/22/25  1536 05/22/25  1407   SODIUM mmol/L  --  135* 137   SODIUM, ARTERIAL mmol/L 135*  --   --    POTASSIUM mmol/L  --  4.1 3.9   CO2 mmol/L  --  23.0 23.0   BUN mg/dL  --  8 7*   CREATININE mg/dL  --  0.81 0.82   GLUCOSE mg/dL  --  125* 129*     Results from last 7 days   Lab Units 05/22/25  1711   PH, ARTERIAL pH units 7.375   PCO2, ARTERIAL mm Hg 41.0   PO2 ART mm Hg 188.0*     Microbiology Results (last 10 days)       ** No results found for the last 240 hours. **          Recent radiology:   Imaging Results (Last 72 Hours)       Procedure Component Value Units Date/Time    CT Angiogram Chest [[id number]] Collected: 05/22/25 1650     Updated: 05/22/25 1709    Narrative:      Exam: CT angiogram of the of the chest with intravenous contrast.  5/22/2025     CLINICAL HISTORY: Shortness of breath.     DOSE:  215.59 mGy.cm . All CT scans are performed using dose  optimization techniques as appropriate to the performed exam and  including at least one of the following: Automated exposure control,  adjustment of the mA and/or kV according to size, and the use of the  iterative reconstruction technique.     TECHNIQUE: Contiguous axial images were obtained through the thorax  following intravenous contrast administration with reformatted images  obtained in the sagittal and coronal projections from the original data  set. Three-dimensional reconstructions are also obtained.     COMPARISON: 4/22/2025     FINDINGS:     Pulmonary arteries:  There is normal enhancement of the pulmonary  arteries with no evidence of pulmonary thromboembolic disease. There is  enlargement of the pulmonary artery suggesting pulmonary arterial  hypertension.     Aorta:  The thoracic aorta and proximal great vessels are normal in  appearance. There is no evidence of aortic dissection or aneurysm.     LUNGS: There is evidence of remote granulomatous disease. There is  patchy consolidation within both lower lobes stable from the previous  exam.  There are changes of cylindrical and varicoid bronchiectasis  within both the lower lobes and left lingula and right middle lobe.  There is a new focus of nodularity within the superior segment of the  right lower lobe abutting the major fissure image 59 series 7 measuring  11 mm in long axis. A focus of nodularity in the paramediastinal aspect  of the right upper lobe anteriorly image 59 series 7 previously measured  1.3 x 1.6 cm in size now measuring 1.9 x 2.0 cm. Pleural-based  consolidation and nodularity within the right upper lobe anteriorly also  shows mild progression including a more lateral nodular component  measuring 1.7 x 1.9 cm on the current exam previously measured at 1.3 x  1.3 cm. This area of nodular consolidation is contiguous with the pleura  anteriorly. No definite chest wall invasion or associate bony  destruction.     Pleural spaces: No effusion present.     HEART: There is mild cardiomegaly. Mild coronary artery calcifications  are present. No pericardial effusion present     Bones: No acute osseous abnormalities are demonstrated.     CHEST WALL: No chest wall abnormalities are demonstrated.     Lymph nodes: Abnormal right paratracheal nodes are present including a  2.8 cm short axis node in the upper right paratracheal cora group  previously measured at 2.3 cm in short axis. A precarinal node measures  2.8 cm in short axis previously measured at 2.7 cm. A right hilar node  measures 1.9 cm in short axis previous measured at 1.7 cm. A subcarinal  node previously measured at 1.1 cm in short axis now measures 1.4 cm.     Upper abdomen: A moderate size hiatal hernia is present. Previous  cholecystectomy. Bilateral nonobstructing nephrolithiasis present. The  adrenals are unremarkable.       Impression:      1. No evidence of pulmonary thromboembolic disease. There is enlargement  of the pulmonary artery suggesting pulmonary arterial hypertension.  2. Atheromatous calcification of the thoracic  aorta and great vessels.  No aneurysm or dissection. Mild cardiac enlargement.  3. Multiple areas of right upper lobe pulmonary nodularity some of which  have shown progression from a previous exam of 1 month earlier. There is  also a new nodule in the superior segment of the right lower lobe  abutting the major fissure medially. Bulky mediastinal and right hilar  lymphadenopathy also shows some progression. Although this could  potentially represent an infectious etiology I feel a neoplastic process  should be excluded. PET CT will likely be helpful for further  characterization.  4. Extensive bronchiectasis within both lungs. This includes cylindrical  and varicoid bronchiectasis. There are patchy chronic appearing areas of  consolidation within both lungs, particularly within the right middle  lobe, left lingula and lower lobes stable from the previous exam. No  effusion present.  5. Bilateral nonobstructing nephrolithiasis. Moderate size hiatal hernia  present.     This report was signed and finalized on 5/22/2025 5:06 PM by Dr. Romaine Castillo MD.       XR Chest 1 View [[id number]] Collected: 05/22/25 1550     Updated: 05/22/25 1557    Narrative:      EXAMINATION: XR CHEST 1 VW-  5/22/2025 3:50 PM 1 view     HISTORY: [facility] Triage Protocol     COMPARISON: 5/8/2025 and 4/22/2025     TECHNIQUE: A single frontal view of the chest was obtained.     FINDINGS:  Underlying pulmonary fibrosis with bibasilar opacities and  bronchiectasis, consistent with pulmonary fibrosis. In the RIGHT upper  lung, near the periphery, there is a rounded area of density measuring  up to 3 x 2.6 cm. When compared to the exam from 5/8/2025, this is more  conspicuous. Differential considerations include malignancy and  infectious/inflammatory process such as pneumonia. There is increased  opacity in the lateral periphery of the LEFT midlung which also may be  related to pneumonia.       Impression:         1.  Increased opacity in the RIGHT  mid to upper lung has a rounded  appearance and is more pronounced today than on the study from 5/8/2025.  Neoplastic and infectious process is to be considered.     2.  Patchy opacity in the LEFT mid and lower lung increased since the  comparison study could be related to pneumonia as well.     3.  Interstitial fibrosis again noted.           This report was signed and finalized on 5/22/2025 3:54 PM by Dr. Ernesto Camacho MD.             Personal review of imaging : CT scan shows bilateral patchy infiltrate which could be chronic suggesting pneumonia bronchiectatic changes lung nodule and interstitial fibrosis  Other test results (not lab or imaging): Results for orders placed during the hospital encounter of 02/05/24    Adult Transthoracic Echo Limited W/ Cont if Necessary Per Protocol    Interpretation Summary    Left ventricular systolic function is normal. Left ventricular ejection fraction appears to be 51 - 55%.    Normal right ventricular cavity size noted. Moderately reduced right ventricular systolic function noted.    There is mild calcification of the aortic valve. No aortic valve regurgitation is present. No hemodynamically significant aortic valve stenosis is present.    Mild mitral annular calcification is present. Trace mitral valve regurgitation is present.    There is trace pulmonic valve regurgitation present.  Reviewed.   Independent review of ekg: Done  Problem List as identified by Epic (may contain historical, inactive problems)    Respiratory distress    Pulmonary Assessment:    Acute on chronic hypoxic respiratory failure  Dependence on supplemental oxygen  Bilateral pneumonia infectious organisms  Chronic interstitial fibrosing alveolitis  Interstitial lung disease  Bronchiectasis with possible acute exacerbation  Lung nodule may need outpatient workup  CREST syndrome  Rheumatoid arthritis  Severe pulmonary hypertension on multiple medication  Non-smoker  Obstructive sleep apnea not on  CPAP  Congestive diastolic heart failure with preserved ejection fraction  Severe deconditioning    Recommend/plan:   Patient is chronically ill with multiple ongoing lung issues  She presented with worsening respiratory failure and hypoxia  Continue supplemental oxygen currently on 10 L may need Vapotherm or BiPAP.  We will repeat blood gas tomorrow morning.  Titrate oxygen to keep saturation more than 92%  She has bilateral pneumonia.  It is difficult to interpret pneumonia from the chest CT because she has severe chronic interstitial changes.    Respiratory viral panel respiratory culture and pneumonia workup ordered  I agree with cefepime and vancomycin and de-escalate antibiotic later  Continue Pulmicort nebulizer started on Atrovent albuterol avoided due to tachycardia  He is on Solu-Medrol very high dose 80 mg twice a day which could be tapered later.   He is on maintenance steroid at home  Continue Tyvaso and other medicines for pulmonary hypertension  DVT and stress ulcer prophylaxis.  Anticoagulation with Eliquis.  Pulmonary toilet  Incentive spirometry, flutter valve.  MetaNeb started.  She is to be started  Patient started on Flonase and Claritin for nasal allergy.  Continue current care plan, nutritional support.  PT OT as tolerated.  Repeat labs and imaging studies from time to time.  Code status: Full.  Prognosis: Guarded.  We appreciate the consult and we will follow  Total time spent in seeing this patient as pulmonary consult was 45 minutes    Thank you for this consult.  We will follow along.    Electronically signed by     Judith Woodson MD,  Pulmonologist/Intensivist   05/22/25, 10:03 PM CDT.

## 2025-05-23 NOTE — PROGRESS NOTES
[facility]  INPATIENT PROGRESS NOTE    Patient Name: Elaine Juárez  Date of Admission: 5/22/2025  Today's Date: 05/23/25  Length of Stay: 1  Primary Care Physician: Aaron Stoddard MD    Subjective   Chief Complaint: Shortness of breath    Patient has no new complaint this morning, states she feels somewhat improved.  Discussed management plan and pulmonology recommendations with patient.      Review of Systems   All pertinent negatives and positives are as above. All other systems have been reviewed and are negative unless otherwise stated.     Objective    Temp:  [97.7 °F (36.5 °C)-98 °F (36.7 °C)] 97.7 °F (36.5 °C)  Heart Rate:  [] 101  Resp:  [18-24] 18  BP: (101-131)/(56-79) 127/65  Physical Exam  GEN: Awake, alert, interactive, in mild respiratory distress  HEENT: Atraumatic, PERRLA, EOMI, Anicteric, Trachea midline  Lungs: Reduced breath sounds bilaterally with bilateral crackles  Heart: RRR, +S1/s2, no rub  ABD: soft, nt/nd, +BS, no guarding/rebound  Extremities: atraumatic, no cyanosis, no edema  Skin: no rashes or lesions  Neuro: AAOx3, no focal deficits  Psych: normal mood & affect      Results Review:  I have reviewed the labs, radiology results, and diagnostic studies.    Laboratory Data:   Results from last 7 days   Lab Units 05/23/25  0444 05/22/25  1407   WBC 10*3/mm3 6.79 11.91*  11.43*   HEMOGLOBIN g/dL 12.8 13.8  13.8   HEMATOCRIT % 43.3 45.7  45.0   PLATELETS 10*3/mm3 193 248  252        Results from last 7 days   Lab Units 05/23/25  0444 05/22/25  1711 05/22/25  1536 05/22/25  1407   SODIUM mmol/L 135*  --  135* 137   SODIUM, ARTERIAL mmol/L  --  135*  --   --    POTASSIUM mmol/L 4.4  --  4.1 3.9   CHLORIDE mmol/L 101  --  99 100   CO2 mmol/L 23.0  --  23.0 23.0   BUN mg/dL 7*  --  8 7*   CREATININE mg/dL 0.72  --  0.81 0.82   CALCIUM mg/dL 7.9*  --  9.0 8.7   BILIRUBIN mg/dL  --   --  0.3 0.3   ALK PHOS U/L  --   --  123*  "121*   ALT (SGPT) U/L  --   --  9 7   AST (SGOT) U/L  --   --  18 18   GLUCOSE mg/dL 203*  --  125* 129*       Culture Data:   No results found for: \"BLOODCX\", \"URINECX\", \"WOUNDCX\", \"MRSACX\", \"RESPCX\", \"STOOLCX\"    Radiology Data:   Imaging Results (Last 24 Hours)       Procedure Component Value Units Date/Time    CT Angiogram Chest [[id number]] Collected: 05/22/25 1650     Updated: 05/22/25 1709    Narrative:      Exam: CT angiogram of the of the chest with intravenous contrast.  5/22/2025     CLINICAL HISTORY: Shortness of breath.     DOSE:  215.59 mGy.cm . All CT scans are performed using dose  optimization techniques as appropriate to the performed exam and  including at least one of the following: Automated exposure control,  adjustment of the mA and/or kV according to size, and the use of the  iterative reconstruction technique.     TECHNIQUE: Contiguous axial images were obtained through the thorax  following intravenous contrast administration with reformatted images  obtained in the sagittal and coronal projections from the original data  set. Three-dimensional reconstructions are also obtained.     COMPARISON: 4/22/2025     FINDINGS:     Pulmonary arteries:  There is normal enhancement of the pulmonary  arteries with no evidence of pulmonary thromboembolic disease. There is  enlargement of the pulmonary artery suggesting pulmonary arterial  hypertension.     Aorta:  The thoracic aorta and proximal great vessels are normal in  appearance. There is no evidence of aortic dissection or aneurysm.     LUNGS: There is evidence of remote granulomatous disease. There is  patchy consolidation within both lower lobes stable from the previous  exam. There are changes of cylindrical and varicoid bronchiectasis  within both the lower lobes and left lingula and right middle lobe.  There is a new focus of nodularity within the superior segment of the  right lower lobe abutting the major fissure image 59 series 7 " measuring  11 mm in long axis. A focus of nodularity in the paramediastinal aspect  of the right upper lobe anteriorly image 59 series 7 previously measured  1.3 x 1.6 cm in size now measuring 1.9 x 2.0 cm. Pleural-based  consolidation and nodularity within the right upper lobe anteriorly also  shows mild progression including a more lateral nodular component  measuring 1.7 x 1.9 cm on the current exam previously measured at 1.3 x  1.3 cm. This area of nodular consolidation is contiguous with the pleura  anteriorly. No definite chest wall invasion or associate bony  destruction.     Pleural spaces: No effusion present.     HEART: There is mild cardiomegaly. Mild coronary artery calcifications  are present. No pericardial effusion present     Bones: No acute osseous abnormalities are demonstrated.     CHEST WALL: No chest wall abnormalities are demonstrated.     Lymph nodes: Abnormal right paratracheal nodes are present including a  2.8 cm short axis node in the upper right paratracheal cora group  previously measured at 2.3 cm in short axis. A precarinal node measures  2.8 cm in short axis previously measured at 2.7 cm. A right hilar node  measures 1.9 cm in short axis previous measured at 1.7 cm. A subcarinal  node previously measured at 1.1 cm in short axis now measures 1.4 cm.     Upper abdomen: A moderate size hiatal hernia is present. Previous  cholecystectomy. Bilateral nonobstructing nephrolithiasis present. The  adrenals are unremarkable.       Impression:      1. No evidence of pulmonary thromboembolic disease. There is enlargement  of the pulmonary artery suggesting pulmonary arterial hypertension.  2. Atheromatous calcification of the thoracic aorta and great vessels.  No aneurysm or dissection. Mild cardiac enlargement.  3. Multiple areas of right upper lobe pulmonary nodularity some of which  have shown progression from a previous exam of 1 month earlier. There is  also a new nodule in the superior  segment of the right lower lobe  abutting the major fissure medially. Bulky mediastinal and right hilar  lymphadenopathy also shows some progression. Although this could  potentially represent an infectious etiology I feel a neoplastic process  should be excluded. PET CT will likely be helpful for further  characterization.  4. Extensive bronchiectasis within both lungs. This includes cylindrical  and varicoid bronchiectasis. There are patchy chronic appearing areas of  consolidation within both lungs, particularly within the right middle  lobe, left lingula and lower lobes stable from the previous exam. No  effusion present.  5. Bilateral nonobstructing nephrolithiasis. Moderate size hiatal hernia  present.     This report was signed and finalized on 5/22/2025 5:06 PM by Dr. Romaine Castillo MD.       XR Chest 1 View [[id number]] Collected: 05/22/25 1550     Updated: 05/22/25 1557    Narrative:      EXAMINATION: XR CHEST 1 VW-  5/22/2025 3:50 PM 1 view     HISTORY: SOA Triage Protocol     COMPARISON: 5/8/2025 and 4/22/2025     TECHNIQUE: A single frontal view of the chest was obtained.     FINDINGS:  Underlying pulmonary fibrosis with bibasilar opacities and  bronchiectasis, consistent with pulmonary fibrosis. In the RIGHT upper  lung, near the periphery, there is a rounded area of density measuring  up to 3 x 2.6 cm. When compared to the exam from 5/8/2025, this is more  conspicuous. Differential considerations include malignancy and  infectious/inflammatory process such as pneumonia. There is increased  opacity in the lateral periphery of the LEFT midlung which also may be  related to pneumonia.       Impression:         1.  Increased opacity in the RIGHT mid to upper lung has a rounded  appearance and is more pronounced today than on the study from 5/8/2025.  Neoplastic and infectious process is to be considered.     2.  Patchy opacity in the LEFT mid and lower lung increased since the  comparison study could be  related to pneumonia as well.     3.  Interstitial fibrosis again noted.           This report was signed and finalized on 5/22/2025 3:54 PM by Dr. Ernesto Camacho MD.               I have reviewed the patient's current medications.     Assessment/Plan   Assessment  Active Hospital Problems    Diagnosis     **Respiratory distress        Treatment Plan    -Acute on chronic hypoxic respiratory failure  Patient is on about 6-8 L of oxygen at home, currently needing about 10 L of oxygen.  Will continue oxygen support but and wean down to baseline as tolerated  Pulmonology is helping with management.     -Probable pulmonary fibrosis exacerbation  Patient is on low-dose prednisone at home, we will continue her on high-dose Solu-Medrol and her pulmonologist is on consult to help with management     - Suspected pneumonia [on immunosuppressants]  Patient is at high risk for infection, on multiple immunosuppressants.  I will continue empirically on vancomycin and cefepime.  Appreciate pulmonology's recommendations.     -Suspected right lower lobe nodule/malignancy  Radiologist is recommending PET scan, but this may not be possible while in hospital.  Patient's pulmonologist will be on consult to help with management.     Other chronic medical conditions-  Systolic congestive heart failure  Chronic idiopathic fibrosing alveolitis  Pulmonary hypertension  Chronic respiratory failure with hypoxia  Crest syndrome  GERD  Pulmonary fibrosis  Rheumatoid arthritis  Obstructive sleep apnea  Oropharyngeal dysphagia     DVT prophylaxis-Lovenox    Disposition-continue current management    Electronically signed by Christiano Green MD, 05/23/25, 14:26 CDT.

## 2025-05-24 LAB — VANCOMYCIN TROUGH SERPL-MCNC: 11.1 MCG/ML (ref 5–20)

## 2025-05-24 PROCEDURE — 25010000002 METHYLPREDNISOLONE PER 125 MG: Performed by: INTERNAL MEDICINE

## 2025-05-24 PROCEDURE — 80202 ASSAY OF VANCOMYCIN: CPT | Performed by: INTERNAL MEDICINE

## 2025-05-24 PROCEDURE — 25010000002 ENOXAPARIN PER 10 MG: Performed by: INTERNAL MEDICINE

## 2025-05-24 PROCEDURE — 94664 DEMO&/EVAL PT USE INHALER: CPT

## 2025-05-24 PROCEDURE — 94799 UNLISTED PULMONARY SVC/PX: CPT

## 2025-05-24 PROCEDURE — 97116 GAIT TRAINING THERAPY: CPT

## 2025-05-24 PROCEDURE — 94760 N-INVAS EAR/PLS OXIMETRY 1: CPT

## 2025-05-24 PROCEDURE — 63710000001 MYCOPHENOLATE MOFETIL PER 250 MG: Performed by: INTERNAL MEDICINE

## 2025-05-24 PROCEDURE — 25010000002 CEFEPIME PER 500 MG: Performed by: INTERNAL MEDICINE

## 2025-05-24 PROCEDURE — 99233 SBSQ HOSP IP/OBS HIGH 50: CPT | Performed by: INTERNAL MEDICINE

## 2025-05-24 PROCEDURE — 97110 THERAPEUTIC EXERCISES: CPT

## 2025-05-24 PROCEDURE — 25010000002 VANCOMYCIN PER 500 MG: Performed by: INTERNAL MEDICINE

## 2025-05-24 PROCEDURE — 94669 MECHANICAL CHEST WALL OSCILL: CPT

## 2025-05-24 RX ADMIN — PANTOPRAZOLE SODIUM 40 MG: 40 TABLET, DELAYED RELEASE ORAL at 06:54

## 2025-05-24 RX ADMIN — ENOXAPARIN SODIUM 40 MG: 100 INJECTION SUBCUTANEOUS at 20:19

## 2025-05-24 RX ADMIN — BUDESONIDE 0.5 MG: 0.5 INHALANT RESPIRATORY (INHALATION) at 06:45

## 2025-05-24 RX ADMIN — VENLAFAXINE 75 MG: 37.5 TABLET ORAL at 09:25

## 2025-05-24 RX ADMIN — FAMOTIDINE 20 MG: 20 TABLET, FILM COATED ORAL at 09:25

## 2025-05-24 RX ADMIN — Medication 10 ML: at 09:31

## 2025-05-24 RX ADMIN — BUDESONIDE 0.5 MG: 0.5 INHALANT RESPIRATORY (INHALATION) at 19:33

## 2025-05-24 RX ADMIN — Medication 750 MG: at 09:35

## 2025-05-24 RX ADMIN — FLUTICASONE PROPIONATE 2 SPRAY: 50 SPRAY, METERED NASAL at 09:28

## 2025-05-24 RX ADMIN — METHYLPREDNISOLONE SODIUM SUCCINATE 80 MG: 125 INJECTION, POWDER, FOR SOLUTION INTRAMUSCULAR; INTRAVENOUS at 09:26

## 2025-05-24 RX ADMIN — SPIRONOLACTONE 50 MG: 50 TABLET ORAL at 09:25

## 2025-05-24 RX ADMIN — MYCOPHENOLATE MOFETIL 1000 MG: 500 TABLET, FILM COATED ORAL at 09:25

## 2025-05-24 RX ADMIN — CETIRIZINE HYDROCHLORIDE 10 MG: 10 TABLET, FILM COATED ORAL at 09:25

## 2025-05-24 RX ADMIN — CEFEPIME 2000 MG: 2 INJECTION, POWDER, FOR SOLUTION INTRAVENOUS at 12:14

## 2025-05-24 RX ADMIN — METOPROLOL SUCCINATE 12.5 MG: 25 TABLET, EXTENDED RELEASE ORAL at 09:25

## 2025-05-24 RX ADMIN — Medication 10 ML: at 20:19

## 2025-05-24 RX ADMIN — HYDROCODONE BITARTRATE AND ACETAMINOPHEN 1 TABLET: 5; 325 TABLET ORAL at 15:53

## 2025-05-24 RX ADMIN — CEFEPIME 2000 MG: 2 INJECTION, POWDER, FOR SOLUTION INTRAVENOUS at 20:19

## 2025-05-24 RX ADMIN — METHYLPREDNISOLONE SODIUM SUCCINATE 80 MG: 125 INJECTION, POWDER, FOR SOLUTION INTRAMUSCULAR; INTRAVENOUS at 20:19

## 2025-05-24 RX ADMIN — GUAIFENESIN 1200 MG: 600 TABLET, EXTENDED RELEASE ORAL at 09:25

## 2025-05-24 RX ADMIN — IPRATROPIUM BROMIDE 0.5 MG: 0.5 SOLUTION RESPIRATORY (INHALATION) at 15:24

## 2025-05-24 RX ADMIN — POTASSIUM CHLORIDE 10 MEQ: 750 TABLET, EXTENDED RELEASE ORAL at 09:25

## 2025-05-24 RX ADMIN — IPRATROPIUM BROMIDE 0.5 MG: 0.5 SOLUTION RESPIRATORY (INHALATION) at 10:07

## 2025-05-24 RX ADMIN — GUAIFENESIN 1200 MG: 600 TABLET, EXTENDED RELEASE ORAL at 20:19

## 2025-05-24 RX ADMIN — Medication 750 MG: at 01:45

## 2025-05-24 RX ADMIN — CEFEPIME 2000 MG: 2 INJECTION, POWDER, FOR SOLUTION INTRAVENOUS at 04:47

## 2025-05-24 RX ADMIN — IPRATROPIUM BROMIDE 0.5 MG: 0.5 SOLUTION RESPIRATORY (INHALATION) at 19:33

## 2025-05-24 RX ADMIN — FAMOTIDINE 20 MG: 20 TABLET, FILM COATED ORAL at 18:23

## 2025-05-24 RX ADMIN — MYCOPHENOLATE MOFETIL 1000 MG: 500 TABLET, FILM COATED ORAL at 20:19

## 2025-05-24 RX ADMIN — IPRATROPIUM BROMIDE 0.5 MG: 0.5 SOLUTION RESPIRATORY (INHALATION) at 06:45

## 2025-05-24 RX ADMIN — TRAZODONE HYDROCHLORIDE 100 MG: 100 TABLET ORAL at 20:20

## 2025-05-24 NOTE — PLAN OF CARE
Goal Outcome Evaluation:  Plan of Care Reviewed With: patient        Progress: improving  Outcome Evaluation: PT tx completed. Pt with no complaints and states she is feeling good this morning. Pt. was Modified Independent with bed mobility. She actively performed B LE exercises while sitting EOB. Resting O2 sat was 93% while on 10L. Increased O2 to 12L for activity. She was able to ambulate 50' in room with CGA. Took 2 standing rests. O2 sat was 88-90% while on the 12L. Pt. slightly SOA with the activity, but quickly recovered. Will continue to work on mobility and strengthening as pt is able.

## 2025-05-24 NOTE — PROGRESS NOTES
[facility] [place]UL[place]ARY [place]GRESS [place] - [facility]    Patient: Elaine Juárez  1960   MR# [id number]   Acct# [id number]  05/24/25   12:27 CDT  Referring Provider: Christiano Green MD    Chief Complaint: Shortness of breath    Interval history: Afebrile.  Saturation 100 on 15 L.  Patient reports they turned it up so she can get up to go to the bathroom.  She does desat with exertion.  Reduced back to 10 L.  Saturation remained 94-98 on 10 L.  No labs or imaging for review.  Still on high-dose steroids.  She seems to be tolerating the dose okay.  Will plan to taper dosing of steroids starting tomorrow.  Encouraged her to continue to mobilize with therapy as much as feasible.  She is producing mucus.  They started chest physiotherapy which she likes.  She would like to see if she can get a vest at home as well as a potty chair and hospital bed at home.  She would also like to see about going to see a pulmonary hypertension specialist at [place] after discharge.    Meds:  budesonide, 0.5 mg, Nebulization, BID - RT  cefepime, 2,000 mg, Intravenous, Q8H  cetirizine, 10 mg, Oral, Daily  enoxaparin sodium, 40 mg, Subcutaneous, Q24H  famotidine, 20 mg, Oral, BID AC  fluticasone, 2 spray, Each Nare, Daily  guaiFENesin, 1,200 mg, Oral, Q12H  ipratropium, 0.5 mg, Nebulization, 4x Daily - RT  LORazepam, 0.5 mg, Oral, Once  Macitentan-Tadalafil, 10-40 mg, Oral, Daily  methylPREDNISolone sodium succinate, 80 mg, Intravenous, Q12H  metoprolol succinate XL, 12.5 mg, Oral, Daily  mycophenolate, 1,000 mg, Oral, Q12H  pantoprazole, 40 mg, Oral, Q AM  potassium chloride, 10 mEq, Oral, Daily  sodium chloride, 10 mL, Intravenous, Q12H  spironolactone, 50 mg, Oral, Daily  traZODone, 100 mg, Oral, Nightly  Treprostinil, 4 puff, Inhalation, 4x Daily  vancomycin, 750 mg, Intravenous, Q12H  venlafaxine, 75 mg, Oral, Daily      Pharmacy to dose vancomycin,         Physical Exam:  SpO2 Percentage    05/24/25 1007 05/24/25  1014 05/24/25 1100   SpO2: 97% 100% 99%     Body mass index is 26.84 kg/m².   Temp:  [98 °F (36.7 °C)-98.6 °F (37 °C)] 98.6 °F (37 °C)  Heart Rate:  [] 68  Resp:  [16-18] 18  BP: (100-112)/(55-77) 100/77  Intake/Output Summary (Last 24 hours) at 5/24/2025 1227  Last data filed at 5/24/2025 0900  Gross per 24 hour   Intake 760 ml   Output --   Net 760 ml     Physical Exam  Constitutional:       Appearance: She is ill-appearing. She is not toxic-appearing.      Interventions: Nasal cannula in place.   HENT:      Head: Normocephalic and atraumatic.      Nose: Nose normal.   Eyes:      Conjunctiva/sclera: Conjunctivae normal.      Pupils: Pupils are equal, round, and reactive to light.   Cardiovascular:      Rate and Rhythm: Normal rate and regular rhythm.      Heart sounds: No murmur heard.  Pulmonary:      Effort: No tachypnea, accessory muscle usage, prolonged expiration or respiratory distress.      Breath sounds: Decreased air movement present. Rhonchi and rales present.   Abdominal:      General: Abdomen is flat. Bowel sounds are normal. There is no distension.      Palpations: There is no mass.   Musculoskeletal:         General: No swelling or tenderness. Normal range of motion.      Cervical back: Normal range of motion.   Skin:     General: Skin is warm and dry.      Coloration: Skin is not jaundiced or pale.   Neurological:      General: No focal deficit present.      Mental Status: She is oriented to person, place, and time. Mental status is at baseline.      Motor: Weakness present.   Psychiatric:         Mood and Affect: Mood normal.         Behavior: Behavior normal.         Thought Content: Thought content normal.         Time spent by me: 12 minutes  Electronically signed by Judith Woodson MD, 5/24/2025, 12:27 CDT      Physician Substantive Portion: Medical Decision Making to follow:        Result Review  Results from last 7 days   Lab Units 05/23/25  0444 05/22/25  1407   WBC 10*3/mm3 6.79  11.91*  11.43*   HEMOGLOBIN g/dL 12.8 13.8  13.8   PLATELETS 10*3/mm3 193 248  252     Results from last 7 days   Lab Units 05/23/25  0444 05/22/25  1715 05/22/25  1711 05/22/25  1536 05/22/25  1407   SODIUM mmol/L 135*  --   --  135* 137   SODIUM, ARTERIAL mmol/L  --   --  135*  --   --    POTASSIUM mmol/L 4.4  --   --  4.1 3.9   CO2 mmol/L 23.0  --   --  23.0 23.0   BUN mg/dL 7*  --   --  8 7*   CREATININE mg/dL 0.72  --   --  0.81 0.82   MAGNESIUM mg/dL 2.4  --   --   --   --    GLUCOSE mg/dL 203*  --   --  125* 129*   LACTATE mmol/L  --  1.6  --   --   --      Results from last 7 days   Lab Units 05/22/25  1711   PH, ARTERIAL pH units 7.375   PCO2, ARTERIAL mm Hg 41.0   PO2 ART mm Hg 188.0*     Lab Results   Component Value Date    PROBNP 357.4 05/22/2025     Microbiology Results (last 10 days)       Procedure Component Value - Date/Time    Legionella Antigen, Urine - Urine, Urine, Clean Catch [[id number]]  (Normal) Collected: 05/23/25 1057    Lab Status: Final result Specimen: Urine, Clean Catch Updated: 05/23/25 1129     LEGIONELLA ANTIGEN, URINE Negative    S. Pneumo Ag Urine or CSF - Urine, Urine, Clean Catch [[id number]]  (Normal) Collected: 05/23/25 1057    Lab Status: Final result Specimen: Urine, Clean Catch Updated: 05/23/25 1130     Strep Pneumo Ag Negative    MRSA Screen, PCR (Inpatient) - Swab, Nares [[id number]]  (Normal) Collected: 05/23/25 0526    Lab Status: Final result Specimen: Swab from Nares Updated: 05/23/25 0828     MRSA PCR No MRSA Detected    Narrative:      The negative predictive value of this diagnostic test is high and should only be used to consider de-escalating anti-MRSA therapy. A positive result may indicate colonization with MRSA and must be correlated clinically.    Blood Culture - Blood, Arm, Left [[id number]]  (Normal) Collected: 05/22/25 1718    Lab Status: Preliminary result Specimen: Blood from Arm, Left Updated: 05/23/25 4823     Blood Culture No growth at 24 hours     Blood Culture - Blood, Arm, Left [[id number]]  (Normal) Collected: 05/22/25 1715    Lab Status: Preliminary result Specimen: Blood from Arm, Left Updated: 05/23/25 1745     Blood Culture No growth at 24 hours             Recent radiology:   Imaging Results (Last 72 Hours)       Procedure Component Value Units Date/Time    CT Angiogram Chest [[id number]] Collected: 05/22/25 1650     Updated: 05/22/25 1709    Narrative:      Exam: CT angiogram of the of the chest with intravenous contrast.  5/22/2025     CLINICAL HISTORY: Shortness of breath.     DOSE:  215.59 mGy.cm . All CT scans are performed using dose  optimization techniques as appropriate to the performed exam and  including at least one of the following: Automated exposure control,  adjustment of the mA and/or kV according to size, and the use of the  iterative reconstruction technique.     TECHNIQUE: Contiguous axial images were obtained through the thorax  following intravenous contrast administration with reformatted images  obtained in the sagittal and coronal projections from the original data  set. Three-dimensional reconstructions are also obtained.     COMPARISON: 4/22/2025     FINDINGS:     Pulmonary arteries:  There is normal enhancement of the pulmonary  arteries with no evidence of pulmonary thromboembolic disease. There is  enlargement of the pulmonary artery suggesting pulmonary arterial  hypertension.     Aorta:  The thoracic aorta and proximal great vessels are normal in  appearance. There is no evidence of aortic dissection or aneurysm.     LUNGS: There is evidence of remote granulomatous disease. There is  patchy consolidation within both lower lobes stable from the previous  exam. There are changes of cylindrical and varicoid bronchiectasis  within both the lower lobes and left lingula and right middle lobe.  There is a new focus of nodularity within the superior segment of the  right lower lobe abutting the major fissure image 59 series 7  measuring  11 mm in long axis. A focus of nodularity in the paramediastinal aspect  of the right upper lobe anteriorly image 59 series 7 previously measured  1.3 x 1.6 cm in size now measuring 1.9 x 2.0 cm. Pleural-based  consolidation and nodularity within the right upper lobe anteriorly also  shows mild progression including a more lateral nodular component  measuring 1.7 x 1.9 cm on the current exam previously measured at 1.3 x  1.3 cm. This area of nodular consolidation is contiguous with the pleura  anteriorly. No definite chest wall invasion or associate bony  destruction.     Pleural spaces: No effusion present.     HEART: There is mild cardiomegaly. Mild coronary artery calcifications  are present. No pericardial effusion present     Bones: No acute osseous abnormalities are demonstrated.     CHEST WALL: No chest wall abnormalities are demonstrated.     Lymph nodes: Abnormal right paratracheal nodes are present including a  2.8 cm short axis node in the upper right paratracheal cora group  previously measured at 2.3 cm in short axis. A precarinal node measures  2.8 cm in short axis previously measured at 2.7 cm. A right hilar node  measures 1.9 cm in short axis previous measured at 1.7 cm. A subcarinal  node previously measured at 1.1 cm in short axis now measures 1.4 cm.     Upper abdomen: A moderate size hiatal hernia is present. Previous  cholecystectomy. Bilateral nonobstructing nephrolithiasis present. The  adrenals are unremarkable.       Impression:      1. No evidence of pulmonary thromboembolic disease. There is enlargement  of the pulmonary artery suggesting pulmonary arterial hypertension.  2. Atheromatous calcification of the thoracic aorta and great vessels.  No aneurysm or dissection. Mild cardiac enlargement.  3. Multiple areas of right upper lobe pulmonary nodularity some of which  have shown progression from a previous exam of 1 month earlier. There is  also a new nodule in the superior  segment of the right lower lobe  abutting the major fissure medially. Bulky mediastinal and right hilar  lymphadenopathy also shows some progression. Although this could  potentially represent an infectious etiology I feel a neoplastic process  should be excluded. PET CT will likely be helpful for further  characterization.  4. Extensive bronchiectasis within both lungs. This includes cylindrical  and varicoid bronchiectasis. There are patchy chronic appearing areas of  consolidation within both lungs, particularly within the right middle  lobe, left lingula and lower lobes stable from the previous exam. No  effusion present.  5. Bilateral nonobstructing nephrolithiasis. Moderate size hiatal hernia  present.     This report was signed and finalized on 5/22/2025 5:06 PM by Dr. Romaine Castillo MD.       XR Chest 1 View [[id number]] Collected: 05/22/25 1550     Updated: 05/22/25 1557    Narrative:      EXAMINATION: XR CHEST 1 VW-  5/22/2025 3:50 PM 1 view     HISTORY: [facility]A Triage Protocol     COMPARISON: 5/8/2025 and 4/22/2025     TECHNIQUE: A single frontal view of the chest was obtained.     FINDINGS:  Underlying pulmonary fibrosis with bibasilar opacities and  bronchiectasis, consistent with pulmonary fibrosis. In the RIGHT upper  lung, near the periphery, there is a rounded area of density measuring  up to 3 x 2.6 cm. When compared to the exam from 5/8/2025, this is more  conspicuous. Differential considerations include malignancy and  infectious/inflammatory process such as pneumonia. There is increased  opacity in the lateral periphery of the LEFT midlung which also may be  related to pneumonia.       Impression:         1.  Increased opacity in the RIGHT mid to upper lung has a rounded  appearance and is more pronounced today than on the study from 5/8/2025.  Neoplastic and infectious process is to be considered.     2.  Patchy opacity in the LEFT mid and lower lung increased since the  comparison study could be  related to pneumonia as well.     3.  Interstitial fibrosis again noted.           This report was signed and finalized on 5/22/2025 3:54 PM by Dr. Ernesto Camacho MD.                 Other test results: Results for orders placed during the hospital encounter of 02/05/24    Adult Transthoracic Echo Limited W/ Cont if Necessary Per Protocol    Interpretation Summary    Left ventricular systolic function is normal. Left ventricular ejection fraction appears to be 51 - 55%.    Normal right ventricular cavity size noted. Moderately reduced right ventricular systolic function noted.    There is mild calcification of the aortic valve. No aortic valve regurgitation is present. No hemodynamically significant aortic valve stenosis is present.    Mild mitral annular calcification is present. Trace mitral valve regurgitation is present.    There is trace pulmonic valve regurgitation present.  Reviewed.  No pulmonary hypertension noted.  Independent review of ekg: Done.  Problem List as identified by Epic (may contain historical, inactive problems)    Respiratory distress    Pulmonary Assessment:  Acute on chronic hypoxic respiratory failure  Dependence on supplemental oxygen  Bilateral pneumonia infectious organisms  Chronic interstitial fibrosing alveolitis  Interstitial lung disease  Bronchiectasis with possible acute exacerbation  Lung nodule may need outpatient workup  CREST syndrome  Rheumatoid arthritis  Severe pulmonary hypertension on multiple medication  Non-smoker  Obstructive sleep apnea not on CPAP  Congestive diastolic heart failure with preserved ejection fraction  Severe deconditioning    Recommend/plan:   Patient to follow-up with me in pulmonary artery medical floor today.  Daughter was at the bedside.    She was feeling little better and currently requiring 3 L oxygen although she needed 15 L this morning.    She gets short of exertion and has increased oxygen requirement.    She reported she is having  copious expectoration of breathing chest vest and really liked it   Continue supplemental oxygen and titrate to keep saturation more than 92%.  Currently on 10 L  Titrate to keep oxygen saturation more than 92% normally she is around 6 to 8 L at home  Continue Pulmicort and DuoNeb and high-dose Solu-Medrol.  For her severe pulmonary hypertension continue Tyvaso and other medicines as before.    Patient is getting antibiotic coverage with cefepime and vancomycin which should be continued  Monitor culture results and adjust antibiotics accordingly.    Continue chest vest treatment aggressive pulmonary toilet.    DVT and stress ulcer prophylaxis.  Pain and anxiety control  Repeat labs and imaging studies from time to time.  Nutritional support  Plan for follow-up in the pulmonary clinic after discharge  CODE STATUS: Full.  Overall prognosis: Guarded  We will follow.     Time spent by me: 35 minutes    This visit was performed by both a physician and an [facility] RN.  I personally evaluated and examined the patient.  I performed all aspects of the medical decision making as documented.    Electronically signed by     Judith Woodson MD,   Pulmonologist/Intensivist  5/24/2025, 12:27 CDT

## 2025-05-24 NOTE — PLAN OF CARE
Goal Outcome Evaluation:  Plan of Care Reviewed With: patient        Progress: improving  Outcome Evaluation: Pt is AOx4, on 10L high-flow NC. VSS. S/ST  on tele. C/o pain and was given prn [place] as ordered. Plan of care ongoing. Safety maintianed.

## 2025-05-24 NOTE — PROGRESS NOTES
[facility] Medicine Services  INPATIENT PROGRESS NOTE    Patient Name: Elaine Juárez  Date of Admission: 5/22/2025  Today's Date: 05/24/25  Length of Stay: 2  Primary Care Physician: Aaron Stoddard MD    Subjective   Chief Complaint: Shortness of breath    Patient has no new complaint this morning, states she feels somewhat improved.  Patient's daughter was at bedside, answered her questions to the best of my ability.      Review of Systems   All pertinent negatives and positives are as above. All other systems have been reviewed and are negative unless otherwise stated.     Objective    Temp:  [98 °F (36.7 °C)-98.6 °F (37 °C)] 98.3 °F (36.8 °C)  Heart Rate:  [] 71  Resp:  [16-18] 18  BP: (100-141)/(55-77) 141/68  Physical Exam  GEN: Awake, alert, interactive, in mild respiratory distress  HEENT: Atraumatic, PERRLA, EOMI, Anicteric, Trachea midline  Lungs: Reduced breath sounds bilaterally with bilateral crackles  Heart: RRR, +S1/s2, no rub  ABD: soft, nt/nd, +BS, no guarding/rebound  Extremities: atraumatic, no cyanosis, no edema  Skin: no rashes or lesions  Neuro: AAOx3, no focal deficits  Psych: normal mood & affect      Results Review:  I have reviewed the labs, radiology results, and diagnostic studies.    Laboratory Data:   Results from last 7 days   Lab Units 05/23/25  0444 05/22/25  1407   WBC 10*3/mm3 6.79 11.91*  11.43*   HEMOGLOBIN g/dL 12.8 13.8  13.8   HEMATOCRIT % 43.3 45.7  45.0   PLATELETS 10*3/mm3 193 248  252        Results from last 7 days   Lab Units 05/23/25  0444 05/22/25  1711 05/22/25  1536 05/22/25  1407   SODIUM mmol/L 135*  --  135* 137   SODIUM, ARTERIAL mmol/L  --  135*  --   --    POTASSIUM mmol/L 4.4  --  4.1 3.9   CHLORIDE mmol/L 101  --  99 100   CO2 mmol/L 23.0  --  23.0 23.0   BUN mg/dL 7*  --  8 7*   CREATININE mg/dL 0.72  --  0.81 0.82   CALCIUM mg/dL 7.9*  --  9.0 8.7   BILIRUBIN mg/dL  --   --  0.3 0.3   ALK PHOS U/L  --    "--  123* 121*   ALT (SGPT) U/L  --   --  9 7   AST (SGOT) U/L  --   --  18 18   GLUCOSE mg/dL 203*  --  125* 129*       Culture Data:   No results found for: \"BLOODCX\", \"URINECX\", \"WOUNDCX\", \"MRSACX\", \"RESPCX\", \"STOOLCX\"    Radiology Data:   Imaging Results (Last 24 Hours)       ** No results found for the last 24 hours. **            I have reviewed the patient's current medications.     Assessment/Plan   Assessment  Active Hospital Problems    Diagnosis     **Respiratory distress        Treatment Plan    -Acute on chronic hypoxic respiratory failure  Patient is on about 6-8 L of oxygen at home, currently needing about 10 L of oxygen.  Will continue oxygen support but and wean down to baseline as tolerated  Pulmonology is helping with management.     -Probable pulmonary fibrosis exacerbation  Patient is on low-dose prednisone at home, we will continue her on high-dose Solu-Medrol and her pulmonologist is on consult to help with management     - Suspected pneumonia [on immunosuppressants]  Patient is at high risk for infection, on multiple immunosuppressants.  I will continue empirically on vancomycin and cefepime.  Appreciate pulmonology's recommendations.     -Suspected right lower lobe nodule/malignancy  Radiologist is recommending PET scan, but this may not be possible while in hospital.  Patient's pulmonologist will be on consult to help with management] recommend outpatient follow-up]     Other chronic medical conditions-  Systolic congestive heart failure  Chronic idiopathic fibrosing alveolitis  Pulmonary hypertension  Chronic respiratory failure with hypoxia  Crest syndrome  GERD  Pulmonary fibrosis  Rheumatoid arthritis  Obstructive sleep apnea  Oropharyngeal dysphagia     DVT prophylaxis-Lovenox    Disposition-continue current management    Electronically signed by Christiano Green MD, 05/24/25, 17:17 CDT.      "

## 2025-05-24 NOTE — PLAN OF CARE
Goal Outcome Evaluation:           Progress: improving  Outcome Evaluation: VSS.  Pt continues to have periods of anxiety and shortness of air at times.  Up to BSC.  Remains on 10L.  Telemetry SR/ST  PAC.  Safety maintained.

## 2025-05-24 NOTE — THERAPY TREATMENT NOTE
Acute Care - Physical Therapy Treatment Note  [facility]     Patient Name: Elaine Juárez  : 1960  MRN: [id number]  Today's Date: 2025      Visit Dx:     ICD-10-CM ICD-9-CM   1. Respiratory failure with hypoxia, unspecified chronicity  J96.91 518.81   2. Pneumonia of left lower lobe due to infectious organism  J18.9 486   3. Pulmonary artery hypertension  I27.21 416.8   4. Bronchiectasis with acute exacerbation  J47.1 494.1   5. Hilar lymphadenopathy  R59.0 785.6   6. Decreased functional mobility and endurance [Z74.09]  Z74.09 780.99     Patient Active Problem List   Diagnosis    CREST syndrome, partial     Raynaud's phenomenon    Pulmonary fibrosis prob sec underlying autoimmune disease    Gastroesophageal reflux disease without esophagitis    BMI 31.0-31.9,adult    History of adenomatous polyp of colon    Environmental allergies    PAH (pulmonary arterial hypertension) with portal hypertension    Hypokalemia    Panic attack    Paranoia (psychosis)    H/O noncompliance with medical treatment, presenting hazards to health    Bipolar affective disorder, currently manic, moderate    Obesity (BMI 30-39.9)    Esophageal dysmotility    Oropharyngeal dysphagia    Right ventricular systolic dysfunction    Respiratory failure with hypoxia    Metabolic encephalopathy    Generalized weakness    Respiratory distress     Past Medical History:   Diagnosis Date    Allergies     Arthritis     BMI 31.0-31.9,adult 2019    Calcified granuloma of lung 2019    Right lung    CHF (congestive heart failure)     denies    Chronic idiopathic fibrosing alveolitis     Chronic respiratory failure with hypoxia 2020    Chronic respiratory failure with hypoxia, on home oxygen therapy     COVID-19 2022    CREST syndrome     Environmental allergies 2022    Gastroesophageal reflux disease without esophagitis 2019    Interstitial lung disease     Obstructive sleep apnea on CPAP 2019     Oropharyngeal dysphagia 01/26/2023    Primary central sleep apnea     Pulmonary fibrosis     Pulmonary hypertension     Rheumatoid arthritis     Right ventricular systolic dysfunction 05/04/2023    Short-term memory loss      Past Surgical History:   Procedure Laterality Date    BREAST BIOPSY      CARDIAC CATHETERIZATION N/A 07/22/2020    Procedure: Right Heart Cath;  Surgeon: Thong Martin MD;  Location: [facility] CATH INVASIVE LOCATION;  Service: Cardiology;  Laterality: N/A;    CHOLECYSTECTOMY      COLONOSCOPY  05/11/2015    5 POLYPS    COLONOSCOPY N/A 08/04/2021    Procedure: COLONOSCOPY WITH ANESTHESIA;  Surgeon: Michael Landin DO;  Location: [facility] ENDOSCOPY;  Service: Gastroenterology;  Laterality: N/A;  pre-hx polyps  post-colon polyps    ENDOSCOPY N/A 08/04/2021    Procedure: ESOPHAGOGASTRODUODENOSCOPY WITH ANESTHESIA;  Surgeon: Michael Landin DO;  Location: [facility] ENDOSCOPY;  Service: Gastroenterology;  Laterality: N/A;  pre-dysphagia  post-normal  pcp-lanette aguila     PT Assessment (Last 12 Hours)       PT Evaluation and Treatment       Row Name 05/24/25 1150          Physical Therapy Time and Intention    Subjective Information no complaints  -     Document Type therapy note (daily note)  -     Mode of Treatment physical therapy  -       Row Name 05/24/25 1150          General Information    Existing Precautions/Restrictions fall;oxygen therapy device and L/min  -       Row Name 05/24/25 1150          Pain    Pretreatment Pain Rating 0/10 - no pain  -     Posttreatment Pain Rating 0/10 - no pain  -       Row Name 05/24/25 1150          Bed Mobility    Supine-Sit [place] (Bed Mobility) modified independence  -     Sit-Supine [place] (Bed Mobility) modified independence  -     Assistive Device (Bed Mobility) head of bed elevated  -       Row Name 05/24/25 1150          Sit-Stand Transfer    Sit-Stand [place] (Transfers) standby assist  -       Row Name 05/24/25  1150          Stand-Sit Transfer    Stand-Sit [place] (Transfers) standby assist  -       Row Name 05/24/25 1150          Toilet Transfer    Type (Toilet Transfer) stand pivot/stand step  -     [place] Level (Toilet Transfer) standby assist  -     Assistive Device (Toilet Transfer) commode, bedside without drop arms  -     Comment, (Toilet Transfer) pt completed hygiene herself.  -       Row Name 05/24/25 1150          Gait/Stairs (Locomotion)    [place] Level (Gait) contact guard;standby assist  -     Assistive Device (Gait) walker, front-wheeled  -     Distance in Feet (Gait) 50  2 standing rests. ambulated in room  -     Deviations/Abnormal Patterns (Gait) santa decreased;stride length decreased  -       Row Name 05/24/25 1150          Hip (Therapeutic Exercise)    Hip (Therapeutic Exercise) AROM (active range of motion)  -     Hip AROM (Therapeutic Exercise) bilateral;flexion;sitting;15 repititions  -       Row Name 05/24/25 1150          Knee (Therapeutic Exercise)    Knee (Therapeutic Exercise) AROM (active range of motion)  -     Knee AROM (Therapeutic Exercise) bilateral;LAQ (long arc quad);sitting;20 repititions  -       Row Name 05/24/25 1150          Ankle (Therapeutic Exercise)    Ankle (Therapeutic Exercise) AROM (active range of motion)  -     Ankle AROM (Therapeutic Exercise) bilateral;dorsiflexion;sitting;15 repititions  -       Row Name 05/24/25 1150          Plan of Care Review    Plan of Care Reviewed With patient  -     Progress improving  -     Outcome Evaluation PT tx completed. Pt with no complaints and states she is feeling good this morning. Pt. was Modified Independent with bed mobility. She actively performed B LE exercises while sitting EOB. Resting O2 sat was 93% while on 10L. Increased O2 to 12L for activity. She was able to ambulate 50' in room with CGA. Took 2 standing rests. O2 sat was 88-90% while on the 12L. Pt. slightly SOA with  the activity, but quickly recovered. Will continue to work on mobility and strengthening as pt is able.  -MF       Row Name 05/24/25 1150          Vital Signs    Pre SpO2 (%) 93  -MF     O2 Delivery Pre Treatment hi-flow  10l  -MF     Intra SpO2 (%) 88  -MF     O2 Delivery Intra Treatment hi-flow  12l  -MF     Post SpO2 (%) 90  -MF     O2 Delivery Post Treatment hi-flow  10l  -MF     Pre Patient Position Sitting  -MF     Intra Patient Position Standing  -MF     Post Patient Position Sitting  -MF       Row Name 05/24/25 1150          Positioning and Restraints    Pre-Treatment Position in bed  -MF     Post Treatment Position bed  -MF     In Bed fowlers;call light within reach;encouraged to call for assist;with family/caregiver;side rails up x2  -MF               User Key  (r) = Recorded By, (t) = Taken By, (c) = Cosigned By      Initials Name Provider Type    aDfne Sterling, PTA Physical Therapist Assistant                    Physical Therapy Education       Title: PT OT SLP Therapies (In Progress)       Topic: Physical Therapy (In Progress)       Point: Mobility training (Done)       Learning Progress Summary            Patient Acceptance, E, VU by SB at 5/23/2025 0826    Comment: pt edu on POC, benefits of act and d/c plans                      Point: Home exercise program (Not Started)       Learner Progress:  Not documented in this visit.              Point: Body mechanics (Not Started)       Learner Progress:  Not documented in this visit.              Point: Precautions (Done)       Learning Progress Summary            Patient Acceptance, E, VU by SB at 5/23/2025 0826    Comment: pt edu on POC, benefits of act and d/c plans                                      User Key       Initials Effective Dates Name Provider Type Discipline     07/11/23 -  Concepción Hearn, PT DPT Physical Therapist PT                  PT Recommendation and Plan     Plan of Care Reviewed With: patient  Progress: improving  Outcome  Evaluation: PT tx completed. Pt with no complaints and states she is feeling good this morning. Pt. was Modified Independent with bed mobility. She actively performed B LE exercises while sitting EOB. Resting O2 sat was 93% while on 10L. Increased O2 to 12L for activity. She was able to ambulate 50' in room with CGA. Took 2 standing rests. O2 sat was 88-90% while on the 12L. Pt. slightly SOA with the activity, but quickly recovered. Will continue to work on mobility and strengthening as pt is able.   Outcome Measures       Row Name 05/24/25 1150             How much help from another person do you currently need...    Turning from your back to your side while in flat bed without using bedrails? 4  -MF      Moving from lying on back to sitting on the side of a flat bed without bedrails? 4  -MF      Moving to and from a bed to a chair (including a wheelchair)? 3  -MF      Standing up from a chair using your arms (e.g., wheelchair, bedside chair)? 3  -MF      Climbing 3-5 steps with a railing? 3  -MF      To walk in hospital room? 3  -MF      AM-PAC 6 Clicks Score (PT) 20  -MF         Functional Assessment    Outcome Measure Options AM-PAC 6 Clicks Basic Mobility (PT)  -MF                User Key  (r) = Recorded By, (t) = Taken By, (c) = Cosigned By      Initials Name Provider Type    Dafne Sterling PTA Physical Therapist Assistant                     Time Calculation:    PT Charges       Row Name 05/24/25 1627             Time Calculation    Start Time 1150  -MF      Stop Time 1215  -MF      Time Calculation (min) 25 min  -MF      PT Received On 05/24/25  -MF         Time Calculation- PT    Total Timed Code Minutes- PT 25 minute(s)  -MF         Timed Charges    69823 - PT Therapeutic Exercise Minutes 10  -MF      78028 - Gait Training Minutes  15  -MF         Total Minutes    Timed Charges Total Minutes 25  -MF       Total Minutes 25  -MF                User Key  (r) = Recorded By, (t) = Taken By, (c) =  Cosigned By      Initials Name Provider Type     Dafne Licona PTA Physical Therapist Assistant                  Therapy Charges for Today       Code Description Service Date Service Provider Modifiers Qty    46871228017 HC PT THER PROC EA 15 MIN 5/24/2025 Dafne Licona PTA GP 1    72627082672 HC GAIT TRAINING EA 15 MIN 5/24/2025 Dafne Licona PTA GP 1            PT G-Codes  Outcome Measure Options: AM-PAC 6 Clicks Basic Mobility (PT)  AM-PAC 6 Clicks Score (PT): 20    Dafne Licona PTA  5/24/2025

## 2025-05-25 LAB
CREAT SERPL-MCNC: 0.84 MG/DL (ref 0.57–1)
EGFRCR SERPLBLD CKD-EPI 2021: 76.3 ML/MIN/1.73

## 2025-05-25 PROCEDURE — 94664 DEMO&/EVAL PT USE INHALER: CPT

## 2025-05-25 PROCEDURE — 94799 UNLISTED PULMONARY SVC/PX: CPT

## 2025-05-25 PROCEDURE — 99233 SBSQ HOSP IP/OBS HIGH 50: CPT | Performed by: INTERNAL MEDICINE

## 2025-05-25 PROCEDURE — 25010000002 CEFEPIME PER 500 MG: Performed by: INTERNAL MEDICINE

## 2025-05-25 PROCEDURE — 25010000002 VANCOMYCIN PER 500 MG: Performed by: INTERNAL MEDICINE

## 2025-05-25 PROCEDURE — 25010000002 METHYLPREDNISOLONE PER 125 MG: Performed by: NURSE PRACTITIONER

## 2025-05-25 PROCEDURE — 63710000001 MYCOPHENOLATE MOFETIL PER 250 MG: Performed by: INTERNAL MEDICINE

## 2025-05-25 PROCEDURE — 94669 MECHANICAL CHEST WALL OSCILL: CPT

## 2025-05-25 PROCEDURE — 25010000002 ENOXAPARIN PER 10 MG: Performed by: INTERNAL MEDICINE

## 2025-05-25 PROCEDURE — 82565 ASSAY OF CREATININE: CPT | Performed by: INTERNAL MEDICINE

## 2025-05-25 RX ORDER — METHYLPREDNISOLONE SODIUM SUCCINATE 125 MG/2ML
60 INJECTION, POWDER, LYOPHILIZED, FOR SOLUTION INTRAMUSCULAR; INTRAVENOUS EVERY 12 HOURS
Status: DISCONTINUED | OUTPATIENT
Start: 2025-05-25 | End: 2025-05-26

## 2025-05-25 RX ADMIN — METHYLPREDNISOLONE SODIUM SUCCINATE 60 MG: 125 INJECTION, POWDER, FOR SOLUTION INTRAMUSCULAR; INTRAVENOUS at 09:11

## 2025-05-25 RX ADMIN — CETIRIZINE HYDROCHLORIDE 10 MG: 10 TABLET, FILM COATED ORAL at 09:11

## 2025-05-25 RX ADMIN — IPRATROPIUM BROMIDE 0.5 MG: 0.5 SOLUTION RESPIRATORY (INHALATION) at 10:10

## 2025-05-25 RX ADMIN — MYCOPHENOLATE MOFETIL 1000 MG: 500 TABLET, FILM COATED ORAL at 09:11

## 2025-05-25 RX ADMIN — Medication 10 ML: at 20:52

## 2025-05-25 RX ADMIN — FAMOTIDINE 20 MG: 20 TABLET, FILM COATED ORAL at 17:16

## 2025-05-25 RX ADMIN — Medication 10 ML: at 09:14

## 2025-05-25 RX ADMIN — IPRATROPIUM BROMIDE 0.5 MG: 0.5 SOLUTION RESPIRATORY (INHALATION) at 14:14

## 2025-05-25 RX ADMIN — MYCOPHENOLATE MOFETIL 1000 MG: 500 TABLET, FILM COATED ORAL at 20:51

## 2025-05-25 RX ADMIN — CEFEPIME 2000 MG: 2 INJECTION, POWDER, FOR SOLUTION INTRAVENOUS at 20:45

## 2025-05-25 RX ADMIN — GUAIFENESIN 1200 MG: 600 TABLET, EXTENDED RELEASE ORAL at 20:51

## 2025-05-25 RX ADMIN — BUDESONIDE 0.5 MG: 0.5 INHALANT RESPIRATORY (INHALATION) at 20:18

## 2025-05-25 RX ADMIN — Medication 750 MG: at 00:54

## 2025-05-25 RX ADMIN — POTASSIUM CHLORIDE 10 MEQ: 750 TABLET, EXTENDED RELEASE ORAL at 09:11

## 2025-05-25 RX ADMIN — ENOXAPARIN SODIUM 40 MG: 100 INJECTION SUBCUTANEOUS at 20:50

## 2025-05-25 RX ADMIN — BUDESONIDE 0.5 MG: 0.5 INHALANT RESPIRATORY (INHALATION) at 06:29

## 2025-05-25 RX ADMIN — TRAZODONE HYDROCHLORIDE 100 MG: 100 TABLET ORAL at 20:51

## 2025-05-25 RX ADMIN — IPRATROPIUM BROMIDE 0.5 MG: 0.5 SOLUTION RESPIRATORY (INHALATION) at 06:29

## 2025-05-25 RX ADMIN — PANTOPRAZOLE SODIUM 40 MG: 40 TABLET, DELAYED RELEASE ORAL at 09:10

## 2025-05-25 RX ADMIN — CEFEPIME 2000 MG: 2 INJECTION, POWDER, FOR SOLUTION INTRAVENOUS at 05:10

## 2025-05-25 RX ADMIN — VENLAFAXINE 75 MG: 37.5 TABLET ORAL at 09:10

## 2025-05-25 RX ADMIN — METHYLPREDNISOLONE SODIUM SUCCINATE 60 MG: 125 INJECTION, POWDER, FOR SOLUTION INTRAMUSCULAR; INTRAVENOUS at 20:51

## 2025-05-25 RX ADMIN — METOPROLOL SUCCINATE 12.5 MG: 25 TABLET, EXTENDED RELEASE ORAL at 09:11

## 2025-05-25 RX ADMIN — FAMOTIDINE 20 MG: 20 TABLET, FILM COATED ORAL at 09:10

## 2025-05-25 RX ADMIN — GUAIFENESIN 1200 MG: 600 TABLET, EXTENDED RELEASE ORAL at 09:10

## 2025-05-25 RX ADMIN — CEFEPIME 2000 MG: 2 INJECTION, POWDER, FOR SOLUTION INTRAVENOUS at 12:20

## 2025-05-25 RX ADMIN — HYDROCODONE BITARTRATE AND ACETAMINOPHEN 1 TABLET: 5; 325 TABLET ORAL at 21:05

## 2025-05-25 RX ADMIN — FLUTICASONE PROPIONATE 2 SPRAY: 50 SPRAY, METERED NASAL at 09:14

## 2025-05-25 RX ADMIN — SPIRONOLACTONE 50 MG: 50 TABLET ORAL at 09:10

## 2025-05-25 RX ADMIN — IPRATROPIUM BROMIDE 0.5 MG: 0.5 SOLUTION RESPIRATORY (INHALATION) at 20:18

## 2025-05-25 RX ADMIN — Medication 750 MG: at 09:23

## 2025-05-25 RX ADMIN — Medication 750 MG: at 21:00

## 2025-05-25 NOTE — PLAN OF CARE
Goal Outcome Evaluation:  Plan of Care Reviewed With: patient        Progress: no change  Outcome Evaluation: Pt is Aox4, on 8.5 high-flow NC. VSS. SB/S 47-82 on tele. Daughter at bedside. Plan of care ongoing. Safety maintained.

## 2025-05-25 NOTE — PROGRESS NOTES
[facility] [place]ESS [place] - [facility]    Patient: Elaine Juárez  1960   MR# [id number]   Acct# [id number]  05/25/25   06:53 CDT  Referring Provider: Christiano Green MD    Chief Complaint: Shortness of breath    Interval history: Afebrile.  Sat 99 on 9 L.  Required 12 L with PT yesterday but recovered quickly.  No labs or imaging for review.  She still has coughing spells but reports overall she is feeling better.      Will taper steroids today.  Continue IV antibiotics for pneumonia.  Will need referral to [facility] clinic upon discharge.  Will also need home hospital bed, potty chair and chest physiotherapy for bronchiectasis    Meds:  budesonide, 0.5 mg, Nebulization, BID - RT  cefepime, 2,000 mg, Intravenous, Q8H  cetirizine, 10 mg, Oral, Daily  enoxaparin sodium, 40 mg, Subcutaneous, Q24H  famotidine, 20 mg, Oral, BID AC  fluticasone, 2 spray, Each Nare, Daily  guaiFENesin, 1,200 mg, Oral, Q12H  ipratropium, 0.5 mg, Nebulization, 4x Daily - RT  LORazepam, 0.5 mg, Oral, Once  Macitentan-Tadalafil, 10-40 mg, Oral, Daily  methylPREDNISolone sodium succinate, 80 mg, Intravenous, Q12H  metoprolol succinate XL, 12.5 mg, Oral, Daily  mycophenolate, 1,000 mg, Oral, Q12H  pantoprazole, 40 mg, Oral, Q AM  potassium chloride, 10 mEq, Oral, Daily  sodium chloride, 10 mL, Intravenous, Q12H  spironolactone, 50 mg, Oral, Daily  traZODone, 100 mg, Oral, Nightly  Treprostinil, 4 puff, Inhalation, 4x Daily  vancomycin, 750 mg, Intravenous, Q12H  venlafaxine, 75 mg, Oral, Daily      Pharmacy to dose vancomycin,         Physical Exam:  SpO2 Percentage    05/25/25 0341 05/25/25 0629 05/25/25 0642   SpO2: 100% 100% 100%     Body mass index is 26.84 kg/m².   Temp:  [98 °F (36.7 °C)-98.6 °F (37 °C)] 98 °F (36.7 °C)  Heart Rate:  [] 61  Resp:  [16-20] 18  BP: (100-141)/(53-77) 117/60  Intake/Output Summary (Last 24 hours) at 5/25/2025 0623  Last data filed at 5/25/2025 0341  Gross per 24 hour    Intake 840 ml   Output 900 ml   Net -60 ml     Physical Exam  Constitutional:       Appearance: She is ill-appearing. She is not toxic-appearing.      Interventions: Nasal cannula in place.   HENT:      Head: Normocephalic and atraumatic.      Nose: Nose normal.  Nasal cannula  Eyes:      Conjunctiva/sclera: Conjunctivae normal.      Pupils: Pupils are equal, round, and reactive to light.   Cardiovascular:      Rate and Rhythm: Normal rate and regular rhythm.      Heart sounds: No murmur heard.  Pulmonary:      Effort: No tachypnea, accessory muscle usage, prolonged expiration or respiratory distress.      Breath sounds: Decreased air movement present.    Abdominal:      General: Abdomen is flat. Bowel sounds are normal. There is no distension.      Palpations: There is no mass.   Musculoskeletal:         General: No swelling or tenderness. Normal range of motion.      Cervical back: Normal range of motion.   Skin:     General: Skin is warm and dry.      Coloration: Skin is not jaundiced or pale.   Neurological:      General: No focal deficit present.      Mental Status: She is oriented to person, place, and time. Mental status is at baseline.      Motor: Weakness present.   Psychiatric:         Mood and Affect: Mood normal.         Behavior: Behavior normal.         Thought Content: Thought content normal.       Time spent by me: 12 minutes  Electronically signed by ANASTASIA Briggs, 5/25/2025, 06:53 CDT      Physician Substantive Portion: Medical Decision Making to follow:        Result Review  Results from last 7 days   Lab Units 05/23/25  0444 05/22/25  1407   WBC 10*3/mm3 6.79 11.91*  11.43*   HEMOGLOBIN g/dL 12.8 13.8  13.8   PLATELETS 10*3/mm3 193 248  252     Results from last 7 days   Lab Units 05/23/25  0444 05/22/25  1715 05/22/25  1711 05/22/25  1536 05/22/25  1407   SODIUM mmol/L 135*  --   --  135* 137   SODIUM, ARTERIAL mmol/L  --   --  135*  --   --    POTASSIUM mmol/L 4.4  --   --  4.1  3.9   CO2 mmol/L 23.0  --   --  23.0 23.0   BUN mg/dL 7*  --   --  8 7*   CREATININE mg/dL 0.72  --   --  0.81 0.82   MAGNESIUM mg/dL 2.4  --   --   --   --    GLUCOSE mg/dL 203*  --   --  125* 129*   LACTATE mmol/L  --  1.6  --   --   --      Results from last 7 days   Lab Units 05/22/25  1711   PH, ARTERIAL pH units 7.375   PCO2, ARTERIAL mm Hg 41.0   PO2 ART mm Hg 188.0*     Lab Results   Component Value Date    PROBNP 357.4 05/22/2025     Microbiology Results (last 10 days)       Procedure Component Value - Date/Time    Legionella Antigen, Urine - Urine, Urine, Clean Catch [[id number]]  (Normal) Collected: 05/23/25 1057    Lab Status: Final result Specimen: Urine, Clean Catch Updated: 05/23/25 1129     LEGIONELLA ANTIGEN, URINE Negative    S. Pneumo Ag Urine or CSF - Urine, Urine, Clean Catch [[id number]]  (Normal) Collected: 05/23/25 1057    Lab Status: Final result Specimen: Urine, Clean Catch Updated: 05/23/25 1130     Strep Pneumo Ag Negative    MRSA Screen, PCR (Inpatient) - Swab, Nares [[id number]]  (Normal) Collected: 05/23/25 0526    Lab Status: Final result Specimen: Swab from Nares Updated: 05/23/25 0828     MRSA PCR No MRSA Detected    Narrative:      The negative predictive value of this diagnostic test is high and should only be used to consider de-escalating anti-MRSA therapy. A positive result may indicate colonization with MRSA and must be correlated clinically.    Blood Culture - Blood, Arm, Left [[id number]]  (Normal) Collected: 05/22/25 1719    Lab Status: Preliminary result Specimen: Blood from Arm, Left Updated: 05/24/25 1745     Blood Culture No growth at 2 days    Blood Culture - Blood, Arm, Left [[id number]]  (Normal) Collected: 05/22/25 1715    Lab Status: Preliminary result Specimen: Blood from Arm, Left Updated: 05/24/25 1745     Blood Culture No growth at 2 days             Recent radiology:   Imaging Results (Last 72 Hours)       Procedure Component Value Units Date/Time    CT Angiogram  Chest [[id number]] Collected: 05/22/25 1650     Updated: 05/22/25 1709    Narrative:      Exam: CT angiogram of the of the chest with intravenous contrast.  5/22/2025     CLINICAL HISTORY: Shortness of breath.     DOSE:  215.59 mGy.cm . All CT scans are performed using dose  optimization techniques as appropriate to the performed exam and  including at least one of the following: Automated exposure control,  adjustment of the mA and/or kV according to size, and the use of the  iterative reconstruction technique.     TECHNIQUE: Contiguous axial images were obtained through the thorax  following intravenous contrast administration with reformatted images  obtained in the sagittal and coronal projections from the original data  set. Three-dimensional reconstructions are also obtained.     COMPARISON: 4/22/2025     FINDINGS:     Pulmonary arteries:  There is normal enhancement of the pulmonary  arteries with no evidence of pulmonary thromboembolic disease. There is  enlargement of the pulmonary artery suggesting pulmonary arterial  hypertension.     Aorta:  The thoracic aorta and proximal great vessels are normal in  appearance. There is no evidence of aortic dissection or aneurysm.     LUNGS: There is evidence of remote granulomatous disease. There is  patchy consolidation within both lower lobes stable from the previous  exam. There are changes of cylindrical and varicoid bronchiectasis  within both the lower lobes and left lingula and right middle lobe.  There is a new focus of nodularity within the superior segment of the  right lower lobe abutting the major fissure image 59 series 7 measuring  11 mm in long axis. A focus of nodularity in the paramediastinal aspect  of the right upper lobe anteriorly image 59 series 7 previously measured  1.3 x 1.6 cm in size now measuring 1.9 x 2.0 cm. Pleural-based  consolidation and nodularity within the right upper lobe anteriorly also  shows mild progression including a more  lateral nodular component  measuring 1.7 x 1.9 cm on the current exam previously measured at 1.3 x  1.3 cm. This area of nodular consolidation is contiguous with the pleura  anteriorly. No definite chest wall invasion or associate bony  destruction.     Pleural spaces: No effusion present.     HEART: There is mild cardiomegaly. Mild coronary artery calcifications  are present. No pericardial effusion present     Bones: No acute osseous abnormalities are demonstrated.     CHEST WALL: No chest wall abnormalities are demonstrated.     Lymph nodes: Abnormal right paratracheal nodes are present including a  2.8 cm short axis node in the upper right paratracheal cora group  previously measured at 2.3 cm in short axis. A precarinal node measures  2.8 cm in short axis previously measured at 2.7 cm. A right hilar node  measures 1.9 cm in short axis previous measured at 1.7 cm. A subcarinal  node previously measured at 1.1 cm in short axis now measures 1.4 cm.     Upper abdomen: A moderate size hiatal hernia is present. Previous  cholecystectomy. Bilateral nonobstructing nephrolithiasis present. The  adrenals are unremarkable.       Impression:      1. No evidence of pulmonary thromboembolic disease. There is enlargement  of the pulmonary artery suggesting pulmonary arterial hypertension.  2. Atheromatous calcification of the thoracic aorta and great vessels.  No aneurysm or dissection. Mild cardiac enlargement.  3. Multiple areas of right upper lobe pulmonary nodularity some of which  have shown progression from a previous exam of 1 month earlier. There is  also a new nodule in the superior segment of the right lower lobe  abutting the major fissure medially. Bulky mediastinal and right hilar  lymphadenopathy also shows some progression. Although this could  potentially represent an infectious etiology I feel a neoplastic process  should be excluded. PET CT will likely be helpful for further  characterization.  4. Extensive  bronchiectasis within both lungs. This includes cylindrical  and varicoid bronchiectasis. There are patchy chronic appearing areas of  consolidation within both lungs, particularly within the right middle  lobe, left lingula and lower lobes stable from the previous exam. No  effusion present.  5. Bilateral nonobstructing nephrolithiasis. Moderate size hiatal hernia  present.     This report was signed and finalized on 5/22/2025 5:06 PM by Dr. Romaine Castillo MD.       XR Chest 1 View [[id number]] Collected: 05/22/25 1550     Updated: 05/22/25 1557    Narrative:      EXAMINATION: XR CHEST 1 VW-  5/22/2025 3:50 PM 1 view     HISTORY: [facility] Triage Protocol     COMPARISON: 5/8/2025 and 4/22/2025     TECHNIQUE: A single frontal view of the chest was obtained.     FINDINGS:  Underlying pulmonary fibrosis with bibasilar opacities and  bronchiectasis, consistent with pulmonary fibrosis. In the RIGHT upper  lung, near the periphery, there is a rounded area of density measuring  up to 3 x 2.6 cm. When compared to the exam from 5/8/2025, this is more  conspicuous. Differential considerations include malignancy and  infectious/inflammatory process such as pneumonia. There is increased  opacity in the lateral periphery of the LEFT midlung which also may be  related to pneumonia.       Impression:         1.  Increased opacity in the RIGHT mid to upper lung has a rounded  appearance and is more pronounced today than on the study from 5/8/2025.  Neoplastic and infectious process is to be considered.     2.  Patchy opacity in the LEFT mid and lower lung increased since the  comparison study could be related to pneumonia as well.     3.  Interstitial fibrosis again noted.           This report was signed and finalized on 5/22/2025 3:54 PM by Dr. Ernesto Camacho MD.               Personal review of imaging : Last imaging study reviewed and agree with interpretation  Other test results: Results for orders placed during the hospital  encounter of 02/05/24    Adult Transthoracic Echo Limited W/ Cont if Necessary Per Protocol    Interpretation Summary    Left ventricular systolic function is normal. Left ventricular ejection fraction appears to be 51 - 55%.    Normal right ventricular cavity size noted. Moderately reduced right ventricular systolic function noted.    There is mild calcification of the aortic valve. No aortic valve regurgitation is present. No hemodynamically significant aortic valve stenosis is present.    Mild mitral annular calcification is present. Trace mitral valve regurgitation is present.    There is trace pulmonic valve regurgitation present.  Reviewed.  No pulmonary hypertension noted  Independent review of ekg: Reviewed  Problem List as identified by Epic (may contain historical, inactive problems)    Respiratory distress    Pulmonary Assessment:  Acute on chronic hypoxic respiratory failure  Dependence on supplemental oxygen  Bilateral pneumonia infectious organisms  Chronic interstitial fibrosing alveolitis  Interstitial lung disease  Bronchiectasis with possible acute exacerbation  Lung nodule may need outpatient workup  CREST syndrome  Rheumatoid arthritis  Severe pulmonary hypertension on multiple medication  Non-smoker  Obstructive sleep apnea not on CPAP  Congestive diastolic heart failure with preserved ejection fraction  Severe deconditioning    Recommend/plan:   Patient to follow-up with me in pulmonary artery medical floor today.  Daughter was at the bedside.    Better but still requiring 10 to 12 L high flow oxygen and may need oxygen concentrator at home  She did not use any BiPAP.  Still getting high-dose steroid for interstitial lung disease with acute exacerbation  Continue chest vest therapy to help her good expectoration.  She really likes a chest vest   Titrate FiO2 to keep oxygen saturation more than 92% normally she is around 6 to 8 L at home  Continue Pulmicort and DuoNeb and high-dose Solu-Medrol.     Monitor blood glucose closely and taper steroids when possible  For her severe pulmonary hypertension continue Tyvaso and other medicines as before.    Patient follows up with Dr. Escalera for pulmonary hypertension  Patient is getting antibiotic coverage with cefepime and vancomycin which should be continued  His current antibiotic treatment slowly de-escalate the antibiotic treatment slowly and monitor cultures    Continue chest vest treatment incentive spirometry and flutter valve with aggressive pulmonary toilet.    DVT and stress ulcer prophylaxis.  Pain and anxiety control.  Nutritional support  Repeat labs and imaging studies from time to time.  Plan for follow-up in the pulmonary clinic after discharge  CODE STATUS: Full.  Overall prognosis: Guarded  We will follow.     Time spent by me: 35 minutes    This visit was performed by both a physician and an [facility] RN.  I personally evaluated and examined the patient.  I performed all aspects of the medical decision making as documented.    Electronically signed by     Judith Woodson MD,  Pulmonologist/Intensivist   5/25/2025, 13:40 CDT

## 2025-05-25 NOTE — PROGRESS NOTES
[facility] Medicine Services  INPATIENT PROGRESS NOTE    Patient Name: Elaine Juárez  Date of Admission: 5/22/2025  Today's Date: 05/25/25  Length of Stay: 3  Primary Care Physician: Aaron Stoddard MD    Subjective   Chief Complaint: Shortness of breath    Patient complained that she did not really feel good this morning.      Review of Systems   All pertinent negatives and positives are as above. All other systems have been reviewed and are negative unless otherwise stated.     Objective    Temp:  [97.8 °F (36.6 °C)-98 °F (36.7 °C)] 97.8 °F (36.6 °C)  Heart Rate:  [54-80] 70  Resp:  [18-20] 20  BP: (108-129)/(53-71) 113/53  Physical Exam  GEN: Awake, alert, interactive, in mild respiratory distress  HEENT: Atraumatic, PERRLA, EOMI, Anicteric, Trachea midline  Lungs: Reduced breath sounds bilaterally with bilateral crackles  Heart: RRR, +S1/s2, no rub  ABD: soft, nt/nd, +BS, no guarding/rebound  Extremities: atraumatic, no cyanosis, no edema  Skin: no rashes or lesions  Neuro: AAOx3, no focal deficits  Psych: normal mood & affect      Results Review:  I have reviewed the labs, radiology results, and diagnostic studies.    Laboratory Data:   Results from last 7 days   Lab Units 05/23/25  0444 05/22/25  1407   WBC 10*3/mm3 6.79 11.91*  11.43*   HEMOGLOBIN g/dL 12.8 13.8  13.8   HEMATOCRIT % 43.3 45.7  45.0   PLATELETS 10*3/mm3 193 248  252        Results from last 7 days   Lab Units 05/23/25  0444 05/22/25  1711 05/22/25  1536 05/22/25  1407   SODIUM mmol/L 135*  --  135* 137   SODIUM, ARTERIAL mmol/L  --  135*  --   --    POTASSIUM mmol/L 4.4  --  4.1 3.9   CHLORIDE mmol/L 101  --  99 100   CO2 mmol/L 23.0  --  23.0 23.0   BUN mg/dL 7*  --  8 7*   CREATININE mg/dL 0.72  --  0.81 0.82   CALCIUM mg/dL 7.9*  --  9.0 8.7   BILIRUBIN mg/dL  --   --  0.3 0.3   ALK PHOS U/L  --   --  123* 121*   ALT (SGPT) U/L  --   --  9 7   AST (SGOT) U/L  --   --  18 18   GLUCOSE mg/dL 203*  " --  125* 129*       Culture Data:   No results found for: \"BLOODCX\", \"URINECX\", \"WOUNDCX\", \"MRSACX\", \"RESPCX\", \"STOOLCX\"    Radiology Data:   Imaging Results (Last 24 Hours)       ** No results found for the last 24 hours. **            I have reviewed the patient's current medications.     Assessment/Plan   Assessment  Active Hospital Problems    Diagnosis     **Respiratory distress        Treatment Plan    -Acute on chronic hypoxic respiratory failure  Patient is on about 6-8 L of oxygen at home, currently needing about 8 L of oxygen.  Will continue oxygen support but and wean down to baseline as tolerated  Pulmonology is helping with management.     -Probable pulmonary fibrosis exacerbation  Patient is on low-dose prednisone at home, we will continue her on high-dose Solu-Medrol and her pulmonologist is on consult to help with management     - Suspected pneumonia [on immunosuppressants]  Patient is at high risk for infection, on multiple immunosuppressants.  I will continue empirically on vancomycin and cefepime.  Appreciate pulmonology's recommendations.     -Suspected right lower lobe nodule/malignancy  Radiologist is recommending PET scan, but this may not be possible while in hospital.  Patient's pulmonologist will be on consult to help with management] recommend outpatient follow-up]     Other chronic medical conditions-  Systolic congestive heart failure  Chronic idiopathic fibrosing alveolitis  Pulmonary hypertension  Chronic respiratory failure with hypoxia  Crest syndrome  GERD  Pulmonary fibrosis  Rheumatoid arthritis  Obstructive sleep apnea  Oropharyngeal dysphagia     DVT prophylaxis-Lovenox    Disposition-continue current management    Electronically signed by Christiano Green MD, 05/25/25, 15:33 CDT.      "

## 2025-05-25 NOTE — PLAN OF CARE
Goal Outcome Evaluation:           Progress: improving  Outcome Evaluation: VSS.  Rested well throughout shift.  Remains on high flow O2.  Telemetry SB/SR 51-72.  Safety maintained.

## 2025-05-26 ENCOUNTER — APPOINTMENT (OUTPATIENT)
Dept: GENERAL RADIOLOGY | Facility: HOSPITAL | Age: 65
End: 2025-05-26
Payer: MEDICARE

## 2025-05-26 ENCOUNTER — APPOINTMENT (OUTPATIENT)
Dept: CARDIOLOGY | Facility: HOSPITAL | Age: 65
End: 2025-05-26
Payer: MEDICARE

## 2025-05-26 LAB
ANION GAP SERPL CALCULATED.3IONS-SCNC: 10 MMOL/L (ref 5–15)
ANISOCYTOSIS BLD QL: ABNORMAL
AORTIC DIMENSIONLESS INDEX: 0.83 (DI)
AV MEAN PRESS GRAD SYS DOP V1V2: 3 MMHG
AV VMAX SYS DOP: 128 CM/SEC
BASOPHILS # BLD MANUAL: 0 10*3/MM3 (ref 0–0.2)
BASOPHILS NFR BLD MANUAL: 0 % (ref 0–1.5)
BH CV ECHO MEAS - AO MAX PG: 6.6 MMHG
BH CV ECHO MEAS - AO ROOT DIAM: 2.9 CM
BH CV ECHO MEAS - AO V2 VTI: 27.7 CM
BH CV ECHO MEAS - AVA(I,D): 2.6 CM2
BH CV ECHO MEAS - EDV(CUBED): 53.8 ML
BH CV ECHO MEAS - EDV(MOD-SP4): 55.1 ML
BH CV ECHO MEAS - EF(MOD-SP4): 62.8 %
BH CV ECHO MEAS - ESV(CUBED): 10.2 ML
BH CV ECHO MEAS - ESV(MOD-SP4): 20.5 ML
BH CV ECHO MEAS - FS: 42.5 %
BH CV ECHO MEAS - IVS/LVPW: 1.05 CM
BH CV ECHO MEAS - IVSD: 1.39 CM
BH CV ECHO MEAS - LA DIMENSION: 3.9 CM
BH CV ECHO MEAS - LAT PEAK E' VEL: 9 CM/SEC
BH CV ECHO MEAS - LV DIASTOLIC VOL/BSA (35-75): 32.1 CM2
BH CV ECHO MEAS - LV MASS(C)D: 183.4 GRAMS
BH CV ECHO MEAS - LV MAX PG: 4.2 MMHG
BH CV ECHO MEAS - LV MEAN PG: 2 MMHG
BH CV ECHO MEAS - LV SYSTOLIC VOL/BSA (12-30): 11.9 CM2
BH CV ECHO MEAS - LV V1 MAX: 102 CM/SEC
BH CV ECHO MEAS - LV V1 VTI: 23.1 CM
BH CV ECHO MEAS - LVIDD: 3.8 CM
BH CV ECHO MEAS - LVIDS: 2.17 CM
BH CV ECHO MEAS - LVOT AREA: 3.1 CM2
BH CV ECHO MEAS - LVOT DIAM: 2 CM
BH CV ECHO MEAS - LVPWD: 1.33 CM
BH CV ECHO MEAS - MED PEAK E' VEL: 5 CM/SEC
BH CV ECHO MEAS - MV A MAX VEL: 92.8 CM/SEC
BH CV ECHO MEAS - MV DEC TIME: 0.23 SEC
BH CV ECHO MEAS - MV E MAX VEL: 79.7 CM/SEC
BH CV ECHO MEAS - MV E/A: 0.86
BH CV ECHO MEAS - SV(LVOT): 72.6 ML
BH CV ECHO MEAS - SV(MOD-SP4): 34.6 ML
BH CV ECHO MEAS - SVI(LVOT): 42.3 ML/M2
BH CV ECHO MEAS - SVI(MOD-SP4): 20.2 ML/M2
BH CV ECHO MEASUREMENTS AVERAGE E/E' RATIO: 11.39
BH CV XLRA - RV BASE: 3.6 CM
BH CV XLRA - RV MID: 4.1 CM
BUN SERPL-MCNC: 20 MG/DL (ref 8–23)
BUN/CREAT SERPL: 28.6 (ref 7–25)
CALCIUM SPEC-SCNC: 8.5 MG/DL (ref 8.6–10.5)
CHLORIDE SERPL-SCNC: 103 MMOL/L (ref 98–107)
CLUMPED PLATELETS: PRESENT
CO2 SERPL-SCNC: 25 MMOL/L (ref 22–29)
CREAT SERPL-MCNC: 0.7 MG/DL (ref 0.57–1)
DEPRECATED RDW RBC AUTO: 44.5 FL (ref 37–54)
EGFRCR SERPLBLD CKD-EPI 2021: 94.9 ML/MIN/1.73
EOSINOPHIL # BLD MANUAL: 0.23 10*3/MM3 (ref 0–0.4)
EOSINOPHIL NFR BLD MANUAL: 2.2 % (ref 0.3–6.2)
ERYTHROCYTE [DISTWIDTH] IN BLOOD BY AUTOMATED COUNT: 15.6 % (ref 12.3–15.4)
GLUCOSE SERPL-MCNC: 106 MG/DL (ref 65–99)
HCT VFR BLD AUTO: 42.3 % (ref 34–46.6)
HGB BLD-MCNC: 12.6 G/DL (ref 12–15.9)
LEFT ATRIUM VOLUME INDEX: 24.8 ML/M2
LEFT ATRIUM VOLUME: 42.6 ML
LYMPHOCYTES # BLD MANUAL: 1.14 10*3/MM3 (ref 0.7–3.1)
LYMPHOCYTES NFR BLD MANUAL: 5.4 % (ref 5–12)
MAGNESIUM SERPL-MCNC: 2.4 MG/DL (ref 1.6–2.4)
MCH RBC QN AUTO: 23.8 PG (ref 26.6–33)
MCHC RBC AUTO-ENTMCNC: 29.8 G/DL (ref 31.5–35.7)
MCV RBC AUTO: 80 FL (ref 79–97)
METAMYELOCYTES NFR BLD MANUAL: 3.2 % (ref 0–0)
MONOCYTES # BLD: 0.57 10*3/MM3 (ref 0.1–0.9)
MYELOCYTES NFR BLD MANUAL: 4.3 % (ref 0–0)
NEUTROPHILS # BLD AUTO: 7.81 10*3/MM3 (ref 1.7–7)
NEUTROPHILS NFR BLD MANUAL: 69.9 % (ref 42.7–76)
NEUTS BAND NFR BLD MANUAL: 4.3 % (ref 0–5)
PLATELET # BLD AUTO: 219 10*3/MM3 (ref 140–450)
PMV BLD AUTO: 10.7 FL (ref 6–12)
POTASSIUM SERPL-SCNC: 4.8 MMOL/L (ref 3.5–5.2)
RBC # BLD AUTO: 5.29 10*6/MM3 (ref 3.77–5.28)
SODIUM SERPL-SCNC: 138 MMOL/L (ref 136–145)
VANCOMYCIN TROUGH SERPL-MCNC: 13.6 MCG/ML (ref 5–20)
VARIANT LYMPHS NFR BLD MANUAL: 1.1 % (ref 0–5)
VARIANT LYMPHS NFR BLD MANUAL: 9.7 % (ref 19.6–45.3)
WBC MORPH BLD: NORMAL
WBC NRBC COR # BLD AUTO: 10.52 10*3/MM3 (ref 3.4–10.8)

## 2025-05-26 PROCEDURE — 94799 UNLISTED PULMONARY SVC/PX: CPT

## 2025-05-26 PROCEDURE — 85025 COMPLETE CBC W/AUTO DIFF WBC: CPT | Performed by: INTERNAL MEDICINE

## 2025-05-26 PROCEDURE — 80048 BASIC METABOLIC PNL TOTAL CA: CPT | Performed by: INTERNAL MEDICINE

## 2025-05-26 PROCEDURE — 83735 ASSAY OF MAGNESIUM: CPT | Performed by: INTERNAL MEDICINE

## 2025-05-26 PROCEDURE — 93306 TTE W/DOPPLER COMPLETE: CPT | Performed by: HOSPITALIST

## 2025-05-26 PROCEDURE — 99232 SBSQ HOSP IP/OBS MODERATE 35: CPT | Performed by: INTERNAL MEDICINE

## 2025-05-26 PROCEDURE — 25010000002 METHYLPREDNISOLONE PER 40 MG: Performed by: NURSE PRACTITIONER

## 2025-05-26 PROCEDURE — 94664 DEMO&/EVAL PT USE INHALER: CPT

## 2025-05-26 PROCEDURE — 25010000002 VANCOMYCIN PER 500 MG: Performed by: INTERNAL MEDICINE

## 2025-05-26 PROCEDURE — 85007 BL SMEAR W/DIFF WBC COUNT: CPT | Performed by: INTERNAL MEDICINE

## 2025-05-26 PROCEDURE — 63710000001 MYCOPHENOLATE MOFETIL PER 250 MG: Performed by: INTERNAL MEDICINE

## 2025-05-26 PROCEDURE — 94669 MECHANICAL CHEST WALL OSCILL: CPT

## 2025-05-26 PROCEDURE — 80202 ASSAY OF VANCOMYCIN: CPT | Performed by: INTERNAL MEDICINE

## 2025-05-26 PROCEDURE — 93306 TTE W/DOPPLER COMPLETE: CPT

## 2025-05-26 PROCEDURE — 25010000002 METHYLPREDNISOLONE PER 125 MG: Performed by: NURSE PRACTITIONER

## 2025-05-26 PROCEDURE — 25010000002 CEFEPIME PER 500 MG: Performed by: INTERNAL MEDICINE

## 2025-05-26 PROCEDURE — 97116 GAIT TRAINING THERAPY: CPT

## 2025-05-26 PROCEDURE — 71046 X-RAY EXAM CHEST 2 VIEWS: CPT

## 2025-05-26 PROCEDURE — 25010000002 ENOXAPARIN PER 10 MG: Performed by: INTERNAL MEDICINE

## 2025-05-26 PROCEDURE — 97110 THERAPEUTIC EXERCISES: CPT

## 2025-05-26 RX ORDER — METHYLPREDNISOLONE SODIUM SUCCINATE 40 MG/ML
40 INJECTION, POWDER, LYOPHILIZED, FOR SOLUTION INTRAMUSCULAR; INTRAVENOUS EVERY 12 HOURS
Status: DISCONTINUED | OUTPATIENT
Start: 2025-05-26 | End: 2025-05-28

## 2025-05-26 RX ADMIN — FAMOTIDINE 20 MG: 20 TABLET, FILM COATED ORAL at 17:01

## 2025-05-26 RX ADMIN — GUAIFENESIN 1200 MG: 600 TABLET, EXTENDED RELEASE ORAL at 08:33

## 2025-05-26 RX ADMIN — Medication 750 MG: at 22:38

## 2025-05-26 RX ADMIN — METHYLPREDNISOLONE SODIUM SUCCINATE 40 MG: 40 INJECTION, POWDER, FOR SOLUTION INTRAMUSCULAR; INTRAVENOUS at 17:01

## 2025-05-26 RX ADMIN — MYCOPHENOLATE MOFETIL 1000 MG: 500 TABLET, FILM COATED ORAL at 08:33

## 2025-05-26 RX ADMIN — POTASSIUM CHLORIDE 10 MEQ: 750 TABLET, EXTENDED RELEASE ORAL at 08:34

## 2025-05-26 RX ADMIN — CEFEPIME 2000 MG: 2 INJECTION, POWDER, FOR SOLUTION INTRAVENOUS at 19:50

## 2025-05-26 RX ADMIN — SPIRONOLACTONE 50 MG: 50 TABLET ORAL at 08:34

## 2025-05-26 RX ADMIN — MYCOPHENOLATE MOFETIL 1000 MG: 500 TABLET, FILM COATED ORAL at 21:11

## 2025-05-26 RX ADMIN — Medication 10 ML: at 08:35

## 2025-05-26 RX ADMIN — ENOXAPARIN SODIUM 40 MG: 100 INJECTION SUBCUTANEOUS at 21:11

## 2025-05-26 RX ADMIN — BUDESONIDE 0.5 MG: 0.5 INHALANT RESPIRATORY (INHALATION) at 06:25

## 2025-05-26 RX ADMIN — FLUTICASONE PROPIONATE 2 SPRAY: 50 SPRAY, METERED NASAL at 08:32

## 2025-05-26 RX ADMIN — CEFEPIME 2000 MG: 2 INJECTION, POWDER, FOR SOLUTION INTRAVENOUS at 13:08

## 2025-05-26 RX ADMIN — GUAIFENESIN 1200 MG: 600 TABLET, EXTENDED RELEASE ORAL at 21:11

## 2025-05-26 RX ADMIN — IPRATROPIUM BROMIDE 0.5 MG: 0.5 SOLUTION RESPIRATORY (INHALATION) at 14:29

## 2025-05-26 RX ADMIN — CEFEPIME 2000 MG: 2 INJECTION, POWDER, FOR SOLUTION INTRAVENOUS at 04:09

## 2025-05-26 RX ADMIN — TRAZODONE HYDROCHLORIDE 100 MG: 100 TABLET ORAL at 21:11

## 2025-05-26 RX ADMIN — CETIRIZINE HYDROCHLORIDE 10 MG: 10 TABLET, FILM COATED ORAL at 08:32

## 2025-05-26 RX ADMIN — METOPROLOL SUCCINATE 12.5 MG: 25 TABLET, EXTENDED RELEASE ORAL at 08:33

## 2025-05-26 RX ADMIN — IPRATROPIUM BROMIDE 0.5 MG: 0.5 SOLUTION RESPIRATORY (INHALATION) at 06:25

## 2025-05-26 RX ADMIN — IPRATROPIUM BROMIDE 0.5 MG: 0.5 SOLUTION RESPIRATORY (INHALATION) at 10:46

## 2025-05-26 RX ADMIN — BUDESONIDE 0.5 MG: 0.5 INHALANT RESPIRATORY (INHALATION) at 19:13

## 2025-05-26 RX ADMIN — BUSPIRONE HYDROCHLORIDE 10 MG: 10 TABLET ORAL at 21:11

## 2025-05-26 RX ADMIN — VENLAFAXINE 75 MG: 37.5 TABLET ORAL at 08:34

## 2025-05-26 RX ADMIN — FAMOTIDINE 20 MG: 20 TABLET, FILM COATED ORAL at 06:42

## 2025-05-26 RX ADMIN — PANTOPRAZOLE SODIUM 40 MG: 40 TABLET, DELAYED RELEASE ORAL at 06:42

## 2025-05-26 RX ADMIN — METHYLPREDNISOLONE SODIUM SUCCINATE 60 MG: 125 INJECTION, POWDER, FOR SOLUTION INTRAMUSCULAR; INTRAVENOUS at 06:45

## 2025-05-26 RX ADMIN — IPRATROPIUM BROMIDE 0.5 MG: 0.5 SOLUTION RESPIRATORY (INHALATION) at 19:13

## 2025-05-26 RX ADMIN — Medication 750 MG: at 09:11

## 2025-05-26 NOTE — PROGRESS NOTES
[facility] PULMONARY PROGRESS NOTE - [facility]    Patient: Elaine Juárez  1960   MR# [id number]   Acct# [id number]  05/26/25   07:02 CDT  Referring Provider: Christiano Green MD    Chief Complaint: Shortness of breath     Interval history: Afebrile.  Sat 100 on 6 L at rest.  She reports she is requiring 9 with mobilization which is an improvement from yesterday.  Labs stable.  Will taper steroids additionally.  Will order follow-up x-ray.  She is also due for echo in regards to her pulmonary hypertension.  Will order that as well.     Continue IV antibiotics for pneumonia.  Will need referral to [facility] clinic upon discharge.  Will also need home hospital bed, potty chair and chest physiotherapy for bronchiectasis    Meds:  budesonide, 0.5 mg, Nebulization, BID - RT  cefepime, 2,000 mg, Intravenous, Q8H  cetirizine, 10 mg, Oral, Daily  enoxaparin sodium, 40 mg, Subcutaneous, Q24H  famotidine, 20 mg, Oral, BID AC  fluticasone, 2 spray, Each Nare, Daily  guaiFENesin, 1,200 mg, Oral, Q12H  ipratropium, 0.5 mg, Nebulization, 4x Daily - RT  LORazepam, 0.5 mg, Oral, Once  Macitentan-Tadalafil, 10-40 mg, Oral, Daily  methylPREDNISolone sodium succinate, 60 mg, Intravenous, Q12H  metoprolol succinate XL, 12.5 mg, Oral, Daily  mycophenolate, 1,000 mg, Oral, Q12H  pantoprazole, 40 mg, Oral, Q AM  potassium chloride, 10 mEq, Oral, Daily  sodium chloride, 10 mL, Intravenous, Q12H  spironolactone, 50 mg, Oral, Daily  traZODone, 100 mg, Oral, Nightly  Treprostinil, 4 puff, Inhalation, 4x Daily  vancomycin, 750 mg, Intravenous, Q12H  venlafaxine, 75 mg, Oral, Daily      Pharmacy to dose vancomycin,         Physical Exam:  SpO2 Percentage    05/26/25 0500 05/26/25 0625 05/26/25 0635   SpO2: 98% 100% 97%     Body mass index is 26.84 kg/m².   Temp:  [97.8 °F (36.6 °C)-98.1 °F (36.7 °C)] 98 °F (36.7 °C)  Heart Rate:  [53-80] 56  Resp:  [18-20] 18  BP: (109-118)/(49-69) 118/69  Intake/Output Summary (Last  24 hours) at 5/26/2025 0702  Last data filed at 5/26/2025 0645  Gross per 24 hour   Intake 680 ml   Output 1450 ml   Net -770 ml     Physical Exam  Constitutional:       Appearance: She is ill-appearing. She is not toxic-appearing.      Interventions: Nasal cannula in place.   HENT:      Head: Normocephalic and atraumatic.      Nose: Nose normal.  Nasal cannula  Eyes:      Conjunctiva/sclera: Conjunctivae normal.      Pupils: Pupils are equal, round, and reactive to light.   Cardiovascular:      Rate and Rhythm: Normal rate and regular rhythm.      Heart sounds: No murmur heard.  Pulmonary:      Effort: No tachypnea, accessory muscle usage, prolonged expiration or respiratory distress.      Breath sounds: Decreased air movement present.    Abdominal:      General: Abdomen is flat. Bowel sounds are normal. There is no distension.      Palpations: There is no mass.   Musculoskeletal:         General: No swelling or tenderness. Normal range of motion.      Cervical back: Normal range of motion.   Skin:     General: Skin is warm and dry.      Coloration: Skin is not jaundiced or pale.   Neurological:      General: No focal deficit present.      Mental Status: She is oriented to person, place, and time. Mental status is at baseline.      Motor: Weakness present.   Psychiatric:         Mood and Affect: Mood normal.         Behavior: Behavior normal.         Thought Content: Thought content normal.     Time spent by me: 12 minutes  Electronically signed by ANASTASIA Briggs, 5/26/2025, 07:02 CDT      Physician Substantive Portion: Medical Decision Making to follow:      Result Review    Laboratory Data:  Results from last 7 days   Lab Units 05/26/25  0513 05/23/25  0444 05/22/25  1407   WBC 10*3/mm3 10.52 6.79 11.91*  11.43*   HEMOGLOBIN g/dL 12.6 12.8 13.8  13.8   PLATELETS 10*3/mm3 219 193 248  252     Results from last 7 days   Lab Units 05/26/25  0514 05/25/25  1726 05/23/25  0444 05/22/25  1715 05/22/25  1711  05/22/25  1536 05/22/25  1407   SODIUM mmol/L 138  --  135*  --   --  135* 137   SODIUM, ARTERIAL mmol/L  --   --   --   --  135*  --   --    POTASSIUM mmol/L 4.8  --  4.4  --   --  4.1 3.9   CHLORIDE mmol/L 103  --  101  --   --  99 100   CO2 mmol/L 25.0  --  23.0  --   --  23.0 23.0   BUN mg/dL 20  --  7*  --   --  8 7*   CREATININE mg/dL 0.70 0.84 0.72  --   --  0.81 0.82   CALCIUM mg/dL 8.5*  --  7.9*  --   --  9.0 8.7   ALK PHOS U/L  --   --   --   --   --  123* 121*   ALT (SGPT) U/L  --   --   --   --   --  9 7   AST (SGOT) U/L  --   --   --   --   --  18 18   LACTATE mmol/L  --   --   --  1.6  --   --   --          Lab 05/26/25  0514 05/23/25  0444 05/22/25  1536   GLUCOSE 106* 203* 125*     Results from last 7 days   Lab Units 05/22/25  1711   PH, ARTERIAL pH units 7.375   PCO2, ARTERIAL mm Hg 41.0   PO2 ART mm Hg 188.0*         Lab 05/22/25  1715 05/22/25  1536 05/22/25  1407   PROBNP  --  357.4 357.1   HSTROP T 14*  --  17*     Microbiology Results (last 10 days)       Procedure Component Value - Date/Time    Legionella Antigen, Urine - Urine, Urine, Clean Catch [[id number]]  (Normal) Collected: 05/23/25 1057    Lab Status: Final result Specimen: Urine, Clean Catch Updated: 05/23/25 1129     LEGIONELLA ANTIGEN, URINE Negative    S. Pneumo Ag Urine or CSF - Urine, Urine, Clean Catch [[id number]]  (Normal) Collected: 05/23/25 1057    Lab Status: Final result Specimen: Urine, Clean Catch Updated: 05/23/25 1130     Strep Pneumo Ag Negative    MRSA Screen, PCR (Inpatient) - Swab, Nares [[id number]]  (Normal) Collected: 05/23/25 0526    Lab Status: Final result Specimen: Swab from Nares Updated: 05/23/25 0828     MRSA PCR No MRSA Detected    Narrative:      The negative predictive value of this diagnostic test is high and should only be used to consider de-escalating anti-MRSA therapy. A positive result may indicate colonization with MRSA and must be correlated clinically.    Blood Culture - Blood, Arm, Left  [[id number]]  (Normal) Collected: 05/22/25 1719    Lab Status: Preliminary result Specimen: Blood from Arm, Left Updated: 05/25/25 1746     Blood Culture No growth at 3 days    Blood Culture - Blood, Arm, Left [[id number]]  (Normal) Collected: 05/22/25 1715    Lab Status: Preliminary result Specimen: Blood from Arm, Left Updated: 05/25/25 1746     Blood Culture No growth at 3 days           Recent films:  XR Chest 2 View  Result Date: 5/26/2025  EXAM: XR CHEST 2 VW-  HISTORY: f/u RUL density; J96.91-Respiratory failure, unspecified with hypoxia; J18.9-Pneumonia, unspecified organism; I27.21-Secondary pulmonary arterial hypertension; J47.1-Bronchiectasis with (acute) exacerbation; R59.0-Localized enlarged lymph nodes; Z74.09-Other reduced mobility   COMPARISON: 5/22/2025.  TECHNIQUE: Frontal and lateral radiographs of the chest was obtained.  FINDINGS:  Support Devices: None.  Cardiac and Mediastinal Silhouettes: Normal.  Lungs/Pleura: Previously demonstrated rounded opacity in the right upper lung zone persists but is slightly less discrete as compared to the prior chest radiograph. Stable chronic interstitial changes. No sizable pleural effusion. No visible pneumothorax.  Osseous structures: No acute osseous finding.  Other: None.      Impression:  Rounded right upper lung zone opacity persists but is slightly less discrete. This may support a resolving infectious process, however continued follow-up is recommended.   This report was signed and finalized on 5/26/2025 8:14 AM by Cyril Gonzalez.             Pulmonary Assessment:  Pneumonia  Crest  Acute on chronic resp failure with hypoxia, due to above, a threat to life and bodily function   Pulmonary hypertension    Recommend/plan:   Xray ordered by us today, resulted above.  Mild improvement suggested on film  Continue oxygen for sat 90  Taper steroids as there is high risk of morbidity from additional diagnostic testing or treatment, including acute adverse steroid  effect and opportunistic infection in setting of chronic immune suppression for ctd.  Pulmonary hypertension eval as outpatient.    Time spent by me:  19 minutes  This visit was performed by both a physician and an Advanced Practice RN.  I performed all aspects of the medical decision making as documented.  Electronically signed by Faisal Griffith MD, 5/26/2025, 14:48 CDT

## 2025-05-26 NOTE — PLAN OF CARE
Goal Outcome Evaluation:              Outcome Evaluation: AO4. completed CXR and echo today. cont iv abx. new IV 22RAC. possible d/c in the next 24-48 hrs. all needs currently met.

## 2025-05-26 NOTE — PROGRESS NOTES
[facility] Medicine Services  INPATIENT PROGRESS NOTE    Patient Name: Elaine Juárez  Date of Admission: 5/22/2025  Today's Date: 05/26/25  Length of Stay: 4  Primary Care Physician: Aaron Stoddard MD    Subjective   Chief Complaint: Shortness of breath    Patient has no new complaint, says she is feeling a lot better this morning.  We discussed discharge planning in the next 24 to 48 hours if she continues to improve.      Review of Systems   All pertinent negatives and positives are as above. All other systems have been reviewed and are negative unless otherwise stated.     Objective    Temp:  [97.6 °F (36.4 °C)-98.3 °F (36.8 °C)] 98.3 °F (36.8 °C)  Heart Rate:  [53-91] 91  Resp:  [18] 18  BP: (114-120)/(58-69) 120/69  Physical Exam  GEN: Awake, alert, interactive, in mild respiratory distress  HEENT: Atraumatic, PERRLA, EOMI, Anicteric, Trachea midline  Lungs: Reduced breath sounds bilaterally with bilateral crackles  Heart: RRR, +S1/s2, no rub  ABD: soft, nt/nd, +BS, no guarding/rebound  Extremities: atraumatic, no cyanosis, no edema  Skin: no rashes or lesions  Neuro: AAOx3, no focal deficits  Psych: normal mood & affect      Results Review:  I have reviewed the labs, radiology results, and diagnostic studies.    Laboratory Data:   Results from last 7 days   Lab Units 05/26/25  0513 05/23/25  0444 05/22/25  1407   WBC 10*3/mm3 10.52 6.79 11.91*  11.43*   HEMOGLOBIN g/dL 12.6 12.8 13.8  13.8   HEMATOCRIT % 42.3 43.3 45.7  45.0   PLATELETS 10*3/mm3 219 193 248  252        Results from last 7 days   Lab Units 05/26/25  0514 05/25/25  1726 05/23/25  0444 05/22/25  1711 05/22/25  1536 05/22/25  1407   SODIUM mmol/L 138  --  135*  --  135* 137   SODIUM, ARTERIAL mmol/L  --   --   --  135*  --   --    POTASSIUM mmol/L 4.8  --  4.4  --  4.1 3.9   CHLORIDE mmol/L 103  --  101  --  99 100   CO2 mmol/L 25.0  --  23.0  --  23.0 23.0   BUN mg/dL 20  --  7*  --  8 7*  "  CREATININE mg/dL 0.70 0.84 0.72  --  0.81 0.82   CALCIUM mg/dL 8.5*  --  7.9*  --  9.0 8.7   BILIRUBIN mg/dL  --   --   --   --  0.3 0.3   ALK PHOS U/L  --   --   --   --  123* 121*   ALT (SGPT) U/L  --   --   --   --  9 7   AST (SGOT) U/L  --   --   --   --  18 18   GLUCOSE mg/dL 106*  --  203*  --  125* 129*       Culture Data:   No results found for: \"BLOODCX\", \"URINECX\", \"WOUNDCX\", \"MRSACX\", \"RESPCX\", \"STOOLCX\"    Radiology Data:   Imaging Results (Last 24 Hours)       Procedure Component Value Units Date/Time    XR Chest 2 View [[id number]] Collected: 05/26/25 0811     Updated: 05/26/25 0817    Narrative:      EXAM: XR CHEST 2 VW-      HISTORY: f/u RUL density; J96.91-Respiratory failure, unspecified with  hypoxia; J18.9-Pneumonia, unspecified organism; I27.21-Secondary  pulmonary arterial hypertension; J47.1-Bronchiectasis with (acute)  exacerbation; R59.0-Localized enlarged lymph nodes; Z74.09-Other reduced  mobility       COMPARISON: 5/22/2025.     TECHNIQUE: Frontal and lateral radiographs of the chest was obtained.     FINDINGS:     Support Devices: None.     Cardiac and Mediastinal Silhouettes: Normal.     Lungs/Pleura: Previously demonstrated rounded opacity in the right upper  lung zone persists but is slightly less discrete as compared to the  prior chest radiograph. Stable chronic interstitial changes. No sizable  pleural effusion. No visible pneumothorax.     Osseous structures: No acute osseous finding.     Other: None.       Impression:         Rounded right upper lung zone opacity persists but is slightly less  discrete. This may support a resolving infectious process, however  continued follow-up is recommended.        This report was signed and finalized on 5/26/2025 8:14 AM by Cyril Gonzalez.               I have reviewed the patient's current medications.     Assessment/Plan   Assessment  Active Hospital Problems    Diagnosis     **Respiratory distress        Treatment Plan    -Acute on " chronic hypoxic respiratory failure  Patient is on about 6-8 L of oxygen at home, currently needing about 8 L of oxygen.  Will continue oxygen support but and wean down to baseline as tolerated [currently needing about 6 L of oxygen which is baseline]  Pulmonology is helping with management.     -Probable pulmonary fibrosis exacerbation  Patient is on low-dose prednisone at home, we will continue her on Solu-Medrol and her pulmonologist is on consult to help with management [steroid is being weaned down]     - Suspected pneumonia [on immunosuppressants]  Patient is at high risk for infection, on multiple immunosuppressants.  Continue empirically on vancomycin and cefepime.  Appreciate pulmonology's recommendations.     -Suspected right lower lobe nodule/malignancy  Radiologist is recommending PET scan, but this may not be possible while in hospital.  Patient's pulmonologist will be on consult to help with management] recommend outpatient follow-up]     Other chronic medical conditions-  Systolic congestive heart failure  Chronic idiopathic fibrosing alveolitis  Pulmonary hypertension  Chronic respiratory failure with hypoxia  Crest syndrome  GERD  Pulmonary fibrosis  Rheumatoid arthritis  Obstructive sleep apnea  Oropharyngeal dysphagia     DVT prophylaxis-Lovenox    Disposition-continue current management/discharge planning in the next 24 to 48 hours.    Electronically signed by Christiano Green MD, 05/26/25, 15:45 CDT.

## 2025-05-26 NOTE — CASE MANAGEMENT/SOCIAL WORK
Continued Stay Note  LILIBETH Burt     Patient Name: Elaine Juárez  MRN: [id number]  Today's Date: 5/26/2025    Admit Date: 5/22/2025    Plan: Hopeful home   Discharge Plan       Row Name 05/26/25 0942       Plan    Plan Hopeful home    Patient/Family in Agreement with Plan yes    Plan Comments Pt lives at home with spouse and hopeful same. Pt on high flow O2 here so not ready for d/c.  Will be set up with home O2 closer to d/c if ordered/qualifies at that time. Pt working with therapy, slow progress. Will follow.                   Discharge Codes    No documentation.                       NIDIA Alvarado

## 2025-05-26 NOTE — THERAPY TREATMENT NOTE
Acute Care - Physical Therapy Treatment Note  [facility]     Patient Name: Elaine Juárez  : 1960  MRN: [id number]  Today's Date: 2025      Visit Dx:     ICD-10-CM ICD-9-CM   1. Respiratory failure with hypoxia, unspecified chronicity  J96.91 518.81   2. Pneumonia of left lower lobe due to infectious organism  J18.9 486   3. Pulmonary artery hypertension  I27.21 416.8   4. Bronchiectasis with acute exacerbation  J47.1 494.1   5. Hilar lymphadenopathy  R59.0 785.6   6. Decreased functional mobility and endurance [Z74.09]  Z74.09 780.99   7. Generalized weakness [R53.1]  R53.1 780.79     Patient Active Problem List   Diagnosis    CREST syndrome, partial     Raynaud's phenomenon    Pulmonary fibrosis prob sec underlying autoimmune disease    Gastroesophageal reflux disease without esophagitis    BMI 31.0-31.9,adult    History of adenomatous polyp of colon    Environmental allergies    PAH (pulmonary arterial hypertension) with portal hypertension    Hypokalemia    Panic attack    Paranoia (psychosis)    H/O noncompliance with medical treatment, presenting hazards to [facility]    Bipolar affective disorder, currently manic, moderate    Obesity (BMI 30-39.9)    Esophageal dysmotility    Oropharyngeal dysphagia    Right ventricular systolic dysfunction    Respiratory failure with hypoxia    Metabolic encephalopathy    Generalized weakness    Respiratory distress     Past Medical History:   Diagnosis Date    Allergies     Arthritis     BMI 31.0-31.9,adult 2019    Calcified granuloma of lung 2019    Right lung    CHF (congestive heart failure)     denies    Chronic idiopathic fibrosing alveolitis     Chronic respiratory failure with hypoxia 2020    Chronic respiratory failure with hypoxia, on home oxygen therapy     COVID-19 2022    CREST syndrome     Environmental allergies 2022    Gastroesophageal reflux disease without esophagitis 2019    Interstitial lung disease      Obstructive sleep apnea on CPAP 06/04/2019    Oropharyngeal dysphagia 01/26/2023    Primary central sleep apnea     Pulmonary fibrosis     Pulmonary hypertension     Rheumatoid arthritis     Right ventricular systolic dysfunction 05/04/2023    Short-term memory loss      Past Surgical History:   Procedure Laterality Date    BREAST BIOPSY      CARDIAC CATHETERIZATION N/A 07/22/2020    Procedure: Right Heart Cath;  Surgeon: Thong Martin MD;  Location: [facility] CATH INVASIVE LOCATION;  Service: Cardiology;  Laterality: N/A;    CHOLECYSTECTOMY      COLONOSCOPY  05/11/2015    5 POLYPS    COLONOSCOPY N/A 08/04/2021    Procedure: COLONOSCOPY WITH ANESTHESIA;  Surgeon: Michael Landin DO;  Location: [facility] ENDOSCOPY;  Service: Gastroenterology;  Laterality: N/A;  pre-hx polyps  post-colon polyps    ENDOSCOPY N/A 08/04/2021    Procedure: ESOPHAGOGASTRODUODENOSCOPY WITH ANESTHESIA;  Surgeon: Michael Landin DO;  Location: [facility] ENDOSCOPY;  Service: Gastroenterology;  Laterality: N/A;  pre-dysphagia  post-normal  pcp-lanette aguila     PT Assessment (Last 12 Hours)       PT Evaluation and Treatment       Row Name 05/26/25 1406          Physical Therapy Time and Intention    Subjective Information complains of;dyspnea  -     Document Type therapy note (daily note)  -     Mode of Treatment physical therapy  -       Row Name 05/26/25 1406          General Information    Existing Precautions/Restrictions fall;oxygen therapy device and L/min  -       Row Name 05/26/25 1406          Pain    Pretreatment Pain Rating 0/10 - no pain  -     Posttreatment Pain Rating 0/10 - no pain  -       Row Name 05/26/25 1406          Bed Mobility    Supine-Sit [place] (Bed Mobility) independent  -       Row Name 05/26/25 1406          Sit-Stand Transfer    Sit-Stand [place] (Transfers) supervision  -       Row Name 05/26/25 1406          Stand-Sit Transfer    Stand-Sit [place] (Transfers) supervision  -        Row Name 05/26/25 1406          Toilet Transfer    Type (Toilet Transfer) stand pivot/stand step  -     [place] Level (Toilet Transfer) supervision  -     Assistive Device (Toilet Transfer) commode, bedside without drop arms  -     Comment, (Toilet Transfer) pt completed hygiene herself  -       Row Name 05/26/25 1406          Gait/Stairs (Locomotion)    [place] Level (Gait) contact guard  -     Assistive Device (Gait) --  pt declined to use RWX. She requested to push IV pole.  -     Distance in Feet (Gait) 60  x 2 with one sitting rest  -     Deviations/Abnormal Patterns (Gait) santa decreased;stride length decreased  -     Bilateral Gait Deviations heel strike decreased  -     Comment, (Gait/Stairs) one sitting rest due to O2 sats dropping and allowing pt time to recover.  -       Row Name 05/26/25 1406          Hip (Therapeutic Exercise)    Hip AROM (Therapeutic Exercise) bilateral;flexion;sitting;15 repititions  -       Row Name 05/26/25 1406          Knee (Therapeutic Exercise)    Knee AROM (Therapeutic Exercise) bilateral;sitting;15 repititions;LAQ (long arc quad)  -       Row Name 05/26/25 1406          Ankle (Therapeutic Exercise)    Ankle (Therapeutic Exercise) AROM (active range of motion)  -     Ankle AROM (Therapeutic Exercise) bilateral;plantarflexion;sitting;15 repititions  -       Row Name 05/26/25 1406          Plan of Care Review    Plan of Care Reviewed With patient  -     Progress improving  -     Outcome Evaluation PT tx completed. Pt. with no complaints other than feeling constipated and SOA with activity. Pt. is Independent with bed mobility. She was Supervision for transfers and using BSC. At rest, O2 was 90% while on 6L. With activity it was increased to 8L for ambulation. After walking 60' it dropped to 70%. Increased O2 to 10L and within 4 minutes, O2 sat increased to 94%. Walked back 60' and o2 was 86% upon returning to room. RT present and left  them to adjust O2 as needed following tx. Pt. was able to ambulate with CGA. Used Iv pole for balance-declined to use RWX. WIll continue to work on strengthening and endurance.  -       Row Name 05/26/25 1406          Vital Signs    Pre SpO2 (%) 90  -MF     O2 Delivery Pre Treatment supplemental O2  6l  -MF     Intra SpO2 (%) 70   during ambulation  -MF     O2 Delivery Intra Treatment hi-flow   8L-increased to 10L for return trip to room. took about 4 minutes for recovery while sitting.  -     Post SpO2 (%) 86  -     O2 Delivery Post Treatment hi-flow  10l-pt slowly increasing. RT arrived and left them to adjust O2 as needed following treatment.  -MF     Pre Patient Position Sitting  -     Intra Patient Position Standing  -MF     Post Patient Position Sitting  -       Row Name 05/26/25 1406          Positioning and Restraints    Pre-Treatment Position in bed  -MF     Post Treatment Position bed  -MF     In Bed sitting EOB;call light within reach;with family/caregiver;with other staff;side rails up x2;notified ns  -               User Key  (r) = Recorded By, (t) = Taken By, (c) = Cosigned By      Initials Name Provider Type    Dafne Sterling, PTA Physical Therapist Assistant                    Physical Therapy Education       Title: PT OT SLP Therapies (In Progress)       Topic: Physical Therapy (In Progress)       Point: Mobility training (In Progress)       Learning Progress Summary            Patient Acceptance, E, NR by RB at 5/25/2025 1835    Acceptance, E, NR by RB at 5/24/2025 1813    Acceptance, E, VU by SB at 5/23/2025 0826    Comment: pt edu on POC, benefits of act and d/c plans                      Point: Home exercise program (In Progress)       Learning Progress Summary            Patient Acceptance, E, NR by RB at 5/25/2025 1835    Acceptance, E, NR by RB at 5/24/2025 1813                      Point: Body mechanics (In Progress)       Learning Progress Summary            Patient  Acceptance, E, NR by RB at 5/25/2025 1835    Acceptance, E, NR by RB at 5/24/2025 1813                      Point: Precautions (In Progress)       Learning Progress Summary            Patient Acceptance, E, NR by RB at 5/25/2025 1835    Acceptance, E, NR by RB at 5/24/2025 1813    Acceptance, E, VU by SB at 5/23/2025 0826    Comment: pt edu on POC, benefits of act and d/c plans                                      User Key       Initials Effective Dates Name Provider Type Discipline    SB 07/11/23 -  Concepción Hearn, PT DPT Physical Therapist PT    RB 02/10/25 -  Jarrod Dobbins, DELLA Registered Nurse Nurse                  PT Recommendation and Plan     Plan of Care Reviewed With: patient  Progress: improving  Outcome Evaluation: PT tx completed. Pt. with no complaints other than feeling constipated and SOA with activity. Pt. is Independent with bed mobility. She was Supervision for transfers and using BSC. At rest, O2 was 90% while on 6L. With activity it was increased to 8L for ambulation. After walking 60' it dropped to 70%. Increased O2 to 10L and within 4 minutes, O2 sat increased to 94%. Walked back 60' and o2 was 86% upon returning to room. RT present and left them to adjust O2 as needed following tx. Pt. was able to ambulate with CGA. Used Iv pole for balance-declined to use RWX. WIll continue to work on strengthening and endurance.   Outcome Measures       Row Name 05/26/25 1406 05/24/25 1150          How much help from another person do you currently need...    Turning from your back to your side while in flat bed without using bedrails? 4  -MF 4  -MF     Moving from lying on back to sitting on the side of a flat bed without bedrails? 4  -MF 4  -MF     Moving to and from a bed to a chair (including a wheelchair)? 4  -MF 3  -MF     Standing up from a chair using your arms (e.g., wheelchair, bedside chair)? 4  -MF 3  -MF     Climbing 3-5 steps with a railing? 3  -MF 3  -MF     To walk in hospital room? 3  -MF 3   -MF     AM-PAC 6 Clicks Score (PT) 22  -MF 20  -MF        Functional Assessment    Outcome Measure Options AM-PAC 6 Clicks Basic Mobility (PT)  -MF AM-PAC 6 Clicks Basic Mobility (PT)  -MF               User Key  (r) = Recorded By, (t) = Taken By, (c) = Cosigned By      Initials Name Provider Type    Dafne Sterling PTA Physical Therapist Assistant                     Time Calculation:    PT Charges       Row Name 05/26/25 1443             Time Calculation    Start Time 1406  -MF      Stop Time 1435  -MF      Time Calculation (min) 29 min  -MF      PT Received On 05/26/25  -MF         Time Calculation- PT    Total Timed Code Minutes- PT 29 minute(s)  -MF         Timed Charges    86830 - PT Therapeutic Exercise Minutes 12  -MF      89013 - Gait Training Minutes  17  -MF         Total Minutes    Timed Charges Total Minutes 29  -MF       Total Minutes 29  -MF                User Key  (r) = Recorded By, (t) = Taken By, (c) = Cosigned By      Initials Name Provider Type     Dafne Licona PTA Physical Therapist Assistant                  Therapy Charges for Today       Code Description Service Date Service Provider Modifiers Qty    93722939869 HC PT THER PROC EA 15 MIN 5/26/2025 Dafne Licona PTA GP 1    34028584379 HC GAIT TRAINING EA 15 MIN 5/26/2025 Dafne Licona PTA GP 1            PT G-Codes  Outcome Measure Options: AM-PAC 6 Clicks Basic Mobility (PT)  AM-PAC 6 Clicks Score (PT): 22    Dafne Licona PTA  5/26/2025

## 2025-05-26 NOTE — PLAN OF CARE
Problem: Adult Inpatient Plan of Care  Goal: Plan of Care Review  Outcome: Progressing  Flowsheets  Taken 5/26/2025 0519 by Mavis Armenta RN  Progress: improving  Outcome Evaluation: Pt. A&Ox4, very pleasant this shift. Began on 8.5L HF O2 via NC but requested to be decreased to 8L around 5am. SB 44-57 overnight. Pain managed with PRN norco at HS. Patient was awakened early AM drenched with sweat. Assisted patient with partial bath, new gown, and all linens changed. VSS. Patient believes episode was from pain pill. No s/s acute distress noted. Continues IV ATB therapy. Will continue to monitor for any changes and update as needed.  Taken 5/25/2025 1836 by Jarrod Dobbins RN  Plan of Care Reviewed With: patient  Goal: Patient-Specific Goal (Individualized)  Outcome: Progressing  Goal: Absence of Hospital-Acquired Illness or Injury  Outcome: Progressing  Intervention: Identify and Manage Fall Risk  Recent Flowsheet Documentation  Taken 5/26/2025 0400 by Mavis Armenta RN  Safety Promotion/Fall Prevention: safety round/check completed  Taken 5/26/2025 0200 by Mavis Armenta RN  Safety Promotion/Fall Prevention: safety round/check completed  Taken 5/26/2025 0000 by Mavis Armenta RN  Safety Promotion/Fall Prevention: safety round/check completed  Taken 5/25/2025 2304 by Mavis Armenta RN  Safety Promotion/Fall Prevention: safety round/check completed  Taken 5/25/2025 2200 by Mavis Armenta RN  Safety Promotion/Fall Prevention: safety round/check completed  Taken 5/25/2025 2105 by Mavis Armenta RN  Safety Promotion/Fall Prevention: safety round/check completed  Taken 5/25/2025 2018 by Mavis Armenta RN  Safety Promotion/Fall Prevention: safety round/check completed  Taken 5/25/2025 1900 by Mavis Armenta RN  Safety Promotion/Fall Prevention: safety round/check completed  Intervention: Prevent Skin Injury  Recent Flowsheet Documentation  Taken 5/26/2025 0400 by Mavis Armenta  RN  Body Position:   position changed independently   weight shifting   heels elevated   dangle, side of bed  Skin Protection: transparent dressing maintained  Taken 5/26/2025 0200 by Mavis Armenta RN  Body Position:   position changed independently   weight shifting   heels elevated   supine  Skin Protection: transparent dressing maintained  Taken 5/26/2025 0000 by Mavis Armenta RN  Body Position:   position changed independently   weight shifting   heels elevated   left   turned  Skin Protection: transparent dressing maintained  Taken 5/25/2025 2304 by Mavis Armenta RN  Body Position:   position changed independently   weight shifting   heels elevated   supine  Skin Protection: transparent dressing maintained  Taken 5/25/2025 2200 by Mavis Armenta RN  Body Position:   position changed independently   weight shifting   heels elevated   left   turned  Skin Protection: transparent dressing maintained  Taken 5/25/2025 2105 by Mavis Armenta RN  Body Position:   position changed independently   weight shifting   heels elevated   supine  Skin Protection: transparent dressing maintained  Taken 5/25/2025 2018 by Mavis Armenta RN  Body Position:   position changed independently   weight shifting   heels elevated   supine  Skin Protection: transparent dressing maintained  Taken 5/25/2025 1900 by Mavis Armenta RN  Body Position:   position changed independently   weight shifting   heels elevated   supine  Skin Protection: transparent dressing maintained  Intervention: Prevent Infection  Recent Flowsheet Documentation  Taken 5/25/2025 2105 by Mavis Armenta RN  Infection Prevention:   hand hygiene promoted   rest/sleep promoted   single patient room provided  Goal: Optimal Comfort and Wellbeing  Outcome: Progressing  Intervention: Monitor Pain and Promote Comfort  Recent Flowsheet Documentation  Taken 5/25/2025 2105 by Mavis Armenta RN  Pain Management Interventions:   pain  management plan reviewed with patient/caregiver   pain medication given   pillow support provided   position adjusted   quiet environment facilitated   relaxation techniques promoted  Taken 5/25/2025 2018 by Mavis Armenta RN  Pain Management Interventions: (patient requested pain medication with HS meds) other (see comments)  Intervention: Provide Person-Centered Care  Recent Flowsheet Documentation  Taken 5/25/2025 2105 by Mavis Armenta RN  Trust Relationship/Rapport:   care explained   choices provided   emotional support provided   empathic listening provided   questions answered   reassurance provided   thoughts/feelings acknowledged  Goal: Readiness for Transition of Care  Outcome: Progressing  Goal: Plan of Care Review  Outcome: Progressing  Flowsheets (Taken 5/26/2025 0519)  Progress: improving  Outcome Evaluation: Pt. A&Ox4, very pleasant this shift. Began on 8.5L HF O2 via NC but requested to be decreased to 8L around 5am. SB 44-57 overnight. Pain managed with PRN norco at HS. Patient was awakened early AM drenched with sweat. Assisted patient with partial bath, new gown, and all linens changed. VSS. Patient believes episode was from pain pill. No s/s acute distress noted. Continues IV ATB therapy. Will continue to monitor for any changes and update as needed.  Goal: Absence of Hospital-Acquired Illness or Injury  Outcome: Progressing  Intervention: Identify and Manage Fall Risk  Recent Flowsheet Documentation  Taken 5/26/2025 0400 by Mavis Armenta RN  Safety Promotion/Fall Prevention: safety round/check completed  Taken 5/26/2025 0200 by Mavis Armenta RN  Safety Promotion/Fall Prevention: safety round/check completed  Taken 5/26/2025 0000 by Mavis Armenta RN  Safety Promotion/Fall Prevention: safety round/check completed  Taken 5/25/2025 2304 by Mavis Armenta RN  Safety Promotion/Fall Prevention: safety round/check completed  Taken 5/25/2025 2200 by Mavis Armenta,  RN  Safety Promotion/Fall Prevention: safety round/check completed  Taken 5/25/2025 2105 by Mavis Armenta RN  Safety Promotion/Fall Prevention: safety round/check completed  Taken 5/25/2025 2018 by Mavis Armenta RN  Safety Promotion/Fall Prevention: safety round/check completed  Taken 5/25/2025 1900 by Mavis Armenta RN  Safety Promotion/Fall Prevention: safety round/check completed  Intervention: Prevent Skin Injury  Recent Flowsheet Documentation  Taken 5/26/2025 0400 by Mavis Armenta RN  Body Position:   position changed independently   weight shifting   heels elevated   dangle, side of bed  Skin Protection: transparent dressing maintained  Taken 5/26/2025 0200 by Mavis Armenta RN  Body Position:   position changed independently   weight shifting   heels elevated   supine  Skin Protection: transparent dressing maintained  Taken 5/26/2025 0000 by Mavis Armenta RN  Body Position:   position changed independently   weight shifting   heels elevated   left   turned  Skin Protection: transparent dressing maintained  Taken 5/25/2025 2304 by Mavis Armenta RN  Body Position:   position changed independently   weight shifting   heels elevated   supine  Skin Protection: transparent dressing maintained  Taken 5/25/2025 2200 by Mavis Armenta RN  Body Position:   position changed independently   weight shifting   heels elevated   left   turned  Skin Protection: transparent dressing maintained  Taken 5/25/2025 2105 by Mavis Armenta RN  Body Position:   position changed independently   weight shifting   heels elevated   supine  Skin Protection: transparent dressing maintained  Taken 5/25/2025 2018 by Mavis Armenta RN  Body Position:   position changed independently   weight shifting   heels elevated   supine  Skin Protection: transparent dressing maintained  Taken 5/25/2025 1900 by Mavis Armenta RN  Body Position:   position changed independently   weight shifting   heels  elevated   supine  Skin Protection: transparent dressing maintained  Intervention: Prevent Infection  Recent Flowsheet Documentation  Taken 5/25/2025 2105 by Mavis Armenta RN  Infection Prevention:   hand hygiene promoted   rest/sleep promoted   single patient room provided     Problem: Fall Injury Risk  Goal: Absence of Fall and Fall-Related Injury  Outcome: Progressing  Intervention: Identify and Manage Contributors  Recent Flowsheet Documentation  Taken 5/26/2025 0400 by Mavis Armenta RN  Self-Care Promotion:   independence encouraged   BADL personal objects within reach  Taken 5/25/2025 2105 by Mavis Armenta RN  Medication Review/Management:   medications reviewed   high-risk medications identified  Intervention: Promote Injury-Free Environment  Recent Flowsheet Documentation  Taken 5/26/2025 0400 by Mavis Armenta RN  Safety Promotion/Fall Prevention: safety round/check completed  Taken 5/26/2025 0200 by Mavis Armenta RN  Safety Promotion/Fall Prevention: safety round/check completed  Taken 5/26/2025 0000 by Mavis Armenta RN  Safety Promotion/Fall Prevention: safety round/check completed  Taken 5/25/2025 2304 by Mavis Armenta RN  Safety Promotion/Fall Prevention: safety round/check completed  Taken 5/25/2025 2200 by Mavis Armenta RN  Safety Promotion/Fall Prevention: safety round/check completed  Taken 5/25/2025 2105 by Mavis Armenta RN  Safety Promotion/Fall Prevention: safety round/check completed  Taken 5/25/2025 2018 by Mavis Armenta RN  Safety Promotion/Fall Prevention: safety round/check completed  Taken 5/25/2025 1900 by Mavis Armenta RN  Safety Promotion/Fall Prevention: safety round/check completed   Goal Outcome Evaluation:           Progress: improving  Outcome Evaluation: Pt. A&Ox4, very pleasant this shift. Began on 8.5L HF O2 via NC but requested to be decreased to 8L around 5am. SB 44-57 overnight. Pain managed with PRN norco at HS. Patient  was awakened early AM drenched with sweat. Assisted patient with partial bath, new gown, and all linens changed. VSS. Patient believes episode was from pain pill. No s/s acute distress noted. Continues IV ATB therapy. Will continue to monitor for any changes and update as needed.

## 2025-05-26 NOTE — PLAN OF CARE
Goal Outcome Evaluation:  Plan of Care Reviewed With: patient        Progress: improving  Outcome Evaluation: PT tx completed. Pt. with no complaints other than feeling constipated and SOA with activity. Pt. is Independent with bed mobility. She was Supervision for transfers and using BSC. At rest, O2 was 90% while on 6L. With activity it was increased to 8L for ambulation. After walking 60' it dropped to 70%. Increased O2 to 10L and within 4 minutes, O2 sat increased to 94%. Walked back 60' and o2 was 86% upon returning to room. RT present and left them to adjust O2 as needed following tx. Pt. was able to ambulate with CGA. Used Iv pole for balance-declined to use RWX. WIll continue to work on strengthening and endurance.

## 2025-05-26 NOTE — PROGRESS NOTES
"Pharmacy Dosing Service  Pharmacokinetics  Vancomycin Follow-up Evaluation    Assessment/Action/Plan:  Current Order: Vancomycin 750 mg IVPB every 12 hours  Current end date:5/28/25  Levels:   5/24/25 at 07:47 trough of 11.1 (~6 hours post 750 mg dose)  Additional antimicrobial agent(s): Cefepime    Renal function stable. Previous trough resulted therapeutic but did not reflect a true trough as it was drawn ~5.5 hours too early. Will order a repeat trough for tonight at 17:00. Continue current dosing regimen at this time pending the trough result. Pharmacy will continue to follow daily and adjust dose accordingly.     AUC Model Data:  Regimen: 750 mg IV every 12 hours.  Start time: 22:11 on 05/26/2025  Exposure target: AUC24 (range) 400-600 mg/L.hr   AUC24,ss: 406 mg/L.hr  Probability of AUC24 > 400: 52 %  Ctrough,ss: 12.9 mg/L  Probability of Ctrough,ss > 20: 10 %  Probability of nephrotoxicity (Lodise SHAYNE 2009): 8 %    Subjective:   Elaine Juárez is a 65 y.o. female currently on Vancomycin 750 mg IV every 12 hours for the treatment of Pneumonia, day 4 of 7 of treatment.    Objective:  Ht: 160 cm (63\"); Wt: 68.5 kg (151 lb)  Estimated Creatinine Clearance: 72.4 mL/min (by C-G formula based on SCr of 0.7 mg/dL).   Creatinine   Date Value Ref Range Status   05/26/2025 0.70 0.57 - 1.00 mg/dL Final   05/25/2025 0.84 0.57 - 1.00 mg/dL Final   05/23/2025 0.72 0.57 - 1.00 mg/dL Final   08/30/2021 0.8 0.5 - 0.9 mg/dL Final   08/05/2021 0.80 0.60 - 1.30 mg/dL Final     Comment:     Serial Number: [id number]:  [id number]   09/22/2020 0.7 0.5 - 0.9 mg/dL Final   05/28/2020 0.7 0.5 - 0.9 mg/dL Final      Lab Results   Component Value Date    WBC 10.52 05/26/2025    WBC 6.79 05/23/2025    WBC 11.43 (H) 05/22/2025    WBC 11.91 (H) 05/22/2025         Lab Results   Component Value Date    [facility] 11.10 05/24/2025       Culture Results:  Microbiology Results (last 10 days)       Procedure Component Value - Date/Time    " Legionella Antigen, Urine - Urine, Urine, Clean Catch [[id number]]  (Normal) Collected: 05/23/25 1057    Lab Status: Final result Specimen: Urine, Clean Catch Updated: 05/23/25 1129     LEGIONELLA ANTIGEN, URINE Negative    S. Pneumo Ag Urine or CSF - Urine, Urine, Clean Catch [[id number]]  (Normal) Collected: 05/23/25 1057    Lab Status: Final result Specimen: Urine, Clean Catch Updated: 05/23/25 1130     Strep Pneumo Ag Negative    MRSA Screen, PCR (Inpatient) - Swab, Nares [[id number]]  (Normal) Collected: 05/23/25 0526    Lab Status: Final result Specimen: Swab from Nares Updated: 05/23/25 0828     MRSA PCR No MRSA Detected    Narrative:      The negative predictive value of this diagnostic test is high and should only be used to consider de-escalating anti-MRSA therapy. A positive result may indicate colonization with MRSA and must be correlated clinically.    Blood Culture - Blood, Arm, Left [[id number]]  (Normal) Collected: 05/22/25 1719    Lab Status: Preliminary result Specimen: Blood from Arm, Left Updated: 05/25/25 1746     Blood Culture No growth at 3 days    Blood Culture - Blood, Arm, Left [[id number]]  (Normal) Collected: 05/22/25 1715    Lab Status: Preliminary result Specimen: Blood from Arm, Left Updated: 05/25/25 1746     Blood Culture No growth at 3 days          Antimicrobials:  Anti-Infectives (From admission, onward)      Ordered     Dose/Rate Route Frequency Start Stop    05/22/25 1834  cefepime 2000 mg IVPB in 100 mL NS (MBP)        Ordering Provider: Christiano Green MD    2,000 mg  over 4 Hours Intravenous Every 8 Hours 05/23/25 0415 05/30/25 0344    05/22/25 1846  vancomycin (VANCOCIN) 750 mg in 0.9% NaCl 250 mL        Ordering Provider: Christiano Green MD    750 mg  333.3 mL/hr over 45 Minutes Intravenous Every 12 Hours 05/22/25 2100 05/29/25 2059    05/22/25 1834  cefepime 2000 mg IVPB in 100 mL NS (MBP)        Ordering Provider: Christiano Green MD    2,000 mg  over 30 Minutes  Intravenous Once 05/22/25 1930 05/22/25 2052 05/22/25 1834  Pharmacy to dose vancomycin        Ordering Provider: Christiano Green MD     Not Applicable Continuous PRN 05/22/25 1834 05/29/25 1833    05/22/25 1647  cefTRIAXone (ROCEPHIN) 1,000 mg in sodium chloride 0.9 % 100 mL MBP        Ordering Provider: Luis Lundberg Jr., MD    1,000 mg  200 mL/hr over 30 Minutes Intravenous Once 05/22/25 1703 05/22/25 1751            Dangelo Garcia, [employer]   05/26/25 12:04 CDT

## 2025-05-27 ENCOUNTER — TELEPHONE (OUTPATIENT)
Dept: PULMONOLOGY | Facility: CLINIC | Age: 65
End: 2025-05-27
Payer: MEDICARE

## 2025-05-27 LAB
BACTERIA SPEC AEROBE CULT: NORMAL
BACTERIA SPEC AEROBE CULT: NORMAL

## 2025-05-27 PROCEDURE — 25010000002 ENOXAPARIN PER 10 MG: Performed by: INTERNAL MEDICINE

## 2025-05-27 PROCEDURE — 25810000003 SODIUM CHLORIDE 0.9 % SOLUTION 250 ML FLEX CONT: Performed by: INTERNAL MEDICINE

## 2025-05-27 PROCEDURE — 25010000002 CEFEPIME PER 500 MG: Performed by: INTERNAL MEDICINE

## 2025-05-27 PROCEDURE — 25010000002 METHYLPREDNISOLONE PER 40 MG: Performed by: NURSE PRACTITIONER

## 2025-05-27 PROCEDURE — 63710000001 MYCOPHENOLATE MOFETIL PER 250 MG: Performed by: INTERNAL MEDICINE

## 2025-05-27 PROCEDURE — 94669 MECHANICAL CHEST WALL OSCILL: CPT

## 2025-05-27 PROCEDURE — 94799 UNLISTED PULMONARY SVC/PX: CPT

## 2025-05-27 PROCEDURE — 99233 SBSQ HOSP IP/OBS HIGH 50: CPT | Performed by: INTERNAL MEDICINE

## 2025-05-27 PROCEDURE — 25010000002 VANCOMYCIN 1 G RECONSTITUTED SOLUTION 1 EACH VIAL: Performed by: INTERNAL MEDICINE

## 2025-05-27 RX ORDER — HYDROCODONE BITARTRATE AND ACETAMINOPHEN 5; 325 MG/1; MG/1
1 TABLET ORAL ONCE AS NEEDED
Refills: 0 | Status: COMPLETED | OUTPATIENT
Start: 2025-05-27 | End: 2025-05-27

## 2025-05-27 RX ADMIN — SPIRONOLACTONE 50 MG: 50 TABLET ORAL at 09:01

## 2025-05-27 RX ADMIN — MYCOPHENOLATE MOFETIL 1000 MG: 500 TABLET, FILM COATED ORAL at 21:43

## 2025-05-27 RX ADMIN — GUAIFENESIN 1200 MG: 600 TABLET, EXTENDED RELEASE ORAL at 09:02

## 2025-05-27 RX ADMIN — VENLAFAXINE 75 MG: 37.5 TABLET ORAL at 09:01

## 2025-05-27 RX ADMIN — Medication 10 ML: at 21:45

## 2025-05-27 RX ADMIN — VANCOMYCIN HYDROCHLORIDE 1000 MG: 1 INJECTION, POWDER, LYOPHILIZED, FOR SOLUTION INTRAVENOUS at 09:03

## 2025-05-27 RX ADMIN — BUDESONIDE 0.5 MG: 0.5 INHALANT RESPIRATORY (INHALATION) at 18:33

## 2025-05-27 RX ADMIN — IPRATROPIUM BROMIDE 0.5 MG: 0.5 SOLUTION RESPIRATORY (INHALATION) at 14:10

## 2025-05-27 RX ADMIN — CETIRIZINE HYDROCHLORIDE 10 MG: 10 TABLET, FILM COATED ORAL at 09:02

## 2025-05-27 RX ADMIN — TRAZODONE HYDROCHLORIDE 100 MG: 100 TABLET ORAL at 21:44

## 2025-05-27 RX ADMIN — POTASSIUM CHLORIDE 10 MEQ: 750 TABLET, EXTENDED RELEASE ORAL at 09:02

## 2025-05-27 RX ADMIN — IPRATROPIUM BROMIDE 0.5 MG: 0.5 SOLUTION RESPIRATORY (INHALATION) at 18:33

## 2025-05-27 RX ADMIN — HYDROCODONE BITARTRATE AND ACETAMINOPHEN 1 TABLET: 5; 325 TABLET ORAL at 21:44

## 2025-05-27 RX ADMIN — CEFEPIME 2000 MG: 2 INJECTION, POWDER, FOR SOLUTION INTRAVENOUS at 05:17

## 2025-05-27 RX ADMIN — METOPROLOL SUCCINATE 12.5 MG: 25 TABLET, EXTENDED RELEASE ORAL at 09:01

## 2025-05-27 RX ADMIN — MYCOPHENOLATE MOFETIL 1000 MG: 500 TABLET, FILM COATED ORAL at 09:00

## 2025-05-27 RX ADMIN — METHYLPREDNISOLONE SODIUM SUCCINATE 40 MG: 40 INJECTION, POWDER, FOR SOLUTION INTRAMUSCULAR; INTRAVENOUS at 05:17

## 2025-05-27 RX ADMIN — FAMOTIDINE 20 MG: 20 TABLET, FILM COATED ORAL at 09:02

## 2025-05-27 RX ADMIN — CEFEPIME 2000 MG: 2 INJECTION, POWDER, FOR SOLUTION INTRAVENOUS at 12:38

## 2025-05-27 RX ADMIN — PANTOPRAZOLE SODIUM 40 MG: 40 TABLET, DELAYED RELEASE ORAL at 05:17

## 2025-05-27 RX ADMIN — Medication 10 ML: at 09:06

## 2025-05-27 RX ADMIN — FLUTICASONE PROPIONATE 2 SPRAY: 50 SPRAY, METERED NASAL at 09:06

## 2025-05-27 RX ADMIN — BUDESONIDE 0.5 MG: 0.5 INHALANT RESPIRATORY (INHALATION) at 09:13

## 2025-05-27 RX ADMIN — METHYLPREDNISOLONE SODIUM SUCCINATE 40 MG: 40 INJECTION, POWDER, FOR SOLUTION INTRAMUSCULAR; INTRAVENOUS at 17:12

## 2025-05-27 RX ADMIN — GUAIFENESIN 1200 MG: 600 TABLET, EXTENDED RELEASE ORAL at 21:44

## 2025-05-27 RX ADMIN — FAMOTIDINE 20 MG: 20 TABLET, FILM COATED ORAL at 17:12

## 2025-05-27 RX ADMIN — CEFEPIME 2000 MG: 2 INJECTION, POWDER, FOR SOLUTION INTRAVENOUS at 20:54

## 2025-05-27 RX ADMIN — IPRATROPIUM BROMIDE 0.5 MG: 0.5 SOLUTION RESPIRATORY (INHALATION) at 09:13

## 2025-05-27 RX ADMIN — BUSPIRONE HYDROCHLORIDE 10 MG: 10 TABLET ORAL at 17:44

## 2025-05-27 RX ADMIN — VANCOMYCIN HYDROCHLORIDE 1000 MG: 1 INJECTION, POWDER, LYOPHILIZED, FOR SOLUTION INTRAVENOUS at 21:43

## 2025-05-27 RX ADMIN — ENOXAPARIN SODIUM 40 MG: 100 INJECTION SUBCUTANEOUS at 21:43

## 2025-05-27 NOTE — PLAN OF CARE
G     Problem: Noninvasive Ventilation Acute  Goal: Effective Unassisted Ventilation and Oxygenation  Outcome: Progressing   Goal Outcome Evaluation:

## 2025-05-27 NOTE — PROGRESS NOTES
[facility] Medicine Services  INPATIENT PROGRESS NOTE    Patient Name: Elaine Juárez  Date of Admission: 5/22/2025  Today's Date: 05/27/25  Length of Stay: 5  Primary Care Physician: Aaron Stoddard MD    Subjective   Chief Complaint: Shortness of breath    Patient has no new complaint, we discussed discharge planning for tomorrow if she continues to improve and she is agreeable.      Review of Systems   All pertinent negatives and positives are as above. All other systems have been reviewed and are negative unless otherwise stated.     Objective    Temp:  [97.6 °F (36.4 °C)-98.1 °F (36.7 °C)] 97.8 °F (36.6 °C)  Heart Rate:  [] 90  Resp:  [16-22] 18  BP: (100-131)/(56-69) 100/56  Physical Exam  GEN: Awake, alert, interactive, in mild respiratory distress  HEENT: Atraumatic, PERRLA, EOMI, Anicteric, Trachea midline  Lungs: Reduced breath sounds bilaterally with bilateral crackles  Heart: RRR, +S1/s2, no rub  ABD: soft, nt/nd, +BS, no guarding/rebound  Extremities: atraumatic, no cyanosis, no edema  Skin: no rashes or lesions  Neuro: AAOx3, no focal deficits  Psych: normal mood & affect      Results Review:  I have reviewed the labs, radiology results, and diagnostic studies.    Laboratory Data:   Results from last 7 days   Lab Units 05/26/25  0513 05/23/25  0444 05/22/25  1407   WBC 10*3/mm3 10.52 6.79 11.91*  11.43*   HEMOGLOBIN g/dL 12.6 12.8 13.8  13.8   HEMATOCRIT % 42.3 43.3 45.7  45.0   PLATELETS 10*3/mm3 219 193 248  252        Results from last 7 days   Lab Units 05/26/25  0514 05/25/25  1726 05/23/25  0444 05/22/25  1711 05/22/25  1536 05/22/25  1407   SODIUM mmol/L 138  --  135*  --  135* 137   SODIUM, ARTERIAL mmol/L  --   --   --  135*  --   --    POTASSIUM mmol/L 4.8  --  4.4  --  4.1 3.9   CHLORIDE mmol/L 103  --  101  --  99 100   CO2 mmol/L 25.0  --  23.0  --  23.0 23.0   BUN mg/dL 20  --  7*  --  8 7*   CREATININE mg/dL 0.70 0.84 0.72  --  0.81  "0.82   CALCIUM mg/dL 8.5*  --  7.9*  --  9.0 8.7   BILIRUBIN mg/dL  --   --   --   --  0.3 0.3   ALK PHOS U/L  --   --   --   --  123* 121*   ALT (SGPT) U/L  --   --   --   --  9 7   AST (SGOT) U/L  --   --   --   --  18 18   GLUCOSE mg/dL 106*  --  203*  --  125* 129*       Culture Data:   No results found for: \"BLOODCX\", \"URINECX\", \"WOUNDCX\", \"MRSACX\", \"RESPCX\", \"STOOLCX\"    Radiology Data:   Imaging Results (Last 24 Hours)       ** No results found for the last 24 hours. **            I have reviewed the patient's current medications.     Assessment/Plan   Assessment  Active Hospital Problems    Diagnosis     **Respiratory distress        Treatment Plan    -Acute on chronic hypoxic respiratory failure  Patient is on about 6-8 L of oxygen at home, currently needing about 8 L of oxygen.  Will continue oxygen support but and wean down to baseline as tolerated [currently needing about 6 L of oxygen which is baseline]  Pulmonology is helping with management.     -Probable pulmonary fibrosis exacerbation  Patient is on low-dose prednisone at home, we will continue her on Solu-Medrol and her pulmonologist is on consult to help with management [steroid is being weaned down]     - Suspected pneumonia [on immunosuppressants]  Patient is at high risk for infection, on multiple immunosuppressants.  Continue empirically on vancomycin and cefepime.  Appreciate pulmonology's recommendations.     -Suspected right lower lobe nodule/malignancy  Radiologist is recommending PET scan, but this may not be possible while in hospital.  Patient's pulmonologist will be on consult to help with management] recommend outpatient follow-up]     Other chronic medical conditions-  Systolic congestive heart failure  Chronic idiopathic fibrosing alveolitis  Pulmonary hypertension  Chronic respiratory failure with hypoxia  Crest syndrome  GERD  Pulmonary fibrosis  Rheumatoid arthritis  Obstructive sleep apnea  Oropharyngeal dysphagia     DVT " prophylaxis-Lovenox    Disposition-continue current management/discharge planning for tomorrow    Electronically signed by Christiano Green MD, 05/27/25, 14:43 CDT.

## 2025-05-27 NOTE — PROGRESS NOTES
Enter Query Response Below      Query Response: Bacterial pneumonia unspecified.             If applicable, please update the problem list.

## 2025-05-27 NOTE — PROGRESS NOTES
[facility] [place]ULMONARY PROGRESS NOTE - [facility]    Patient: Elaine Juárez  1960   MR# [id number]   Acct# [id number]  05/27/25   08:45 CDT  Referring Provider: Christiano Green MD    Chief Complaint: Shortness of breath     Interval history: Afebrile.  Sat 100 on 6 L at rest.  She reports she is requiring 9 with mobilization which is an improvement from yesterday.  Labs stable.  Will taper steroids additionally.  Will order follow-up x-ray.  She is also due for echo in regards to her pulmonary hypertension.  Will order that as well.     Continue IV antibiotics for pneumonia.  Will need referral to [facility] clinic upon discharge.  Will also need home hospital bed, potty chair and chest physiotherapy for bronchiectasis    Meds:  budesonide, 0.5 mg, Nebulization, BID - RT  cefepime, 2,000 mg, Intravenous, Q8H  cetirizine, 10 mg, Oral, Daily  enoxaparin sodium, 40 mg, Subcutaneous, Q24H  famotidine, 20 mg, Oral, BID AC  fluticasone, 2 spray, Each Nare, Daily  guaiFENesin, 1,200 mg, Oral, Q12H  ipratropium, 0.5 mg, Nebulization, 4x Daily - RT  LORazepam, 0.5 mg, Oral, Once  Macitentan-Tadalafil, 10-40 mg, Oral, Daily  methylPREDNISolone sodium succinate, 40 mg, Intravenous, Q12H  metoprolol succinate XL, 12.5 mg, Oral, Daily  mycophenolate, 1,000 mg, Oral, Q12H  pantoprazole, 40 mg, Oral, Q AM  potassium chloride, 10 mEq, Oral, Daily  sodium chloride, 10 mL, Intravenous, Q12H  spironolactone, 50 mg, Oral, Daily  traZODone, 100 mg, Oral, Nightly  Treprostinil, 4 puff, Inhalation, 4x Daily  vancomycin, 1,000 mg, Intravenous, Q12H  venlafaxine, 75 mg, Oral, Daily      Pharmacy to dose vancomycin,         Physical Exam:  SpO2 Percentage    05/27/25 0337 05/27/25 0601 05/27/25 0821   SpO2: 95% 96% 94%     Body mass index is 26.75 kg/m².   Temp:  [97.6 °F (36.4 °C)-98.3 °F (36.8 °C)] 97.6 °F (36.4 °C)  Heart Rate:  [63-91] 84  Resp:  [16-22] 16  BP: (102-131)/(58-69) 131/69  Intake/Output Summary  (Last 24 hours) at 5/27/2025 0845  Last data filed at 5/26/2025 1700  Gross per 24 hour   Intake 840 ml   Output 900 ml   Net -60 ml     Physical Exam  Constitutional:       General: She is awake.      Appearance: She is ill-appearing. She is not toxic-appearing.      Interventions: Nasal cannula in place.   Pulmonary:      Breath sounds: Rhonchi present. No wheezing.   Abdominal:      General: There is no distension.   Neurological:      Mental Status: She is alert.         Result Review    Laboratory Data:  Results from last 7 days   Lab Units 05/26/25  0513 05/23/25  0444 05/22/25  1407   WBC 10*3/mm3 10.52 6.79 11.91*  11.43*   HEMOGLOBIN g/dL 12.6 12.8 13.8  13.8   PLATELETS 10*3/mm3 219 193 248  252     Results from last 7 days   Lab Units 05/26/25  0514 05/25/25  1726 05/23/25  0444 05/22/25  1715 05/22/25  1711 05/22/25  1536 05/22/25  1407   SODIUM mmol/L 138  --  135*  --   --  135* 137   SODIUM, ARTERIAL mmol/L  --   --   --   --  135*  --   --    POTASSIUM mmol/L 4.8  --  4.4  --   --  4.1 3.9   CHLORIDE mmol/L 103  --  101  --   --  99 100   CO2 mmol/L 25.0  --  23.0  --   --  23.0 23.0   BUN mg/dL 20  --  7*  --   --  8 7*   CREATININE mg/dL 0.70 0.84 0.72  --   --  0.81 0.82   CALCIUM mg/dL 8.5*  --  7.9*  --   --  9.0 8.7   ALK PHOS U/L  --   --   --   --   --  123* 121*   ALT (SGPT) U/L  --   --   --   --   --  9 7   AST (SGOT) U/L  --   --   --   --   --  18 18   LACTATE mmol/L  --   --   --  1.6  --   --   --          Lab 05/26/25  0514 05/23/25  0444 05/22/25  1536   GLUCOSE 106* 203* 125*     Results from last 7 days   Lab Units 05/22/25  1711   PH, ARTERIAL pH units 7.375   PCO2, ARTERIAL mm Hg 41.0   PO2 ART mm Hg 188.0*         Lab 05/22/25  1715 05/22/25  1536 05/22/25  1407   PROBNP  --  357.4 357.1   HSTROP T 14*  --  17*     Microbiology Results (last 10 days)       Procedure Component Value - Date/Time    Legionella Antigen, Urine - Urine, Urine, Clean Catch [[id number]]  (Normal)  Collected: 05/23/25 1057    Lab Status: Final result Specimen: Urine, Clean Catch Updated: 05/23/25 1129     LEGIONELLA ANTIGEN, URINE Negative    S. Pneumo Ag Urine or CSF - Urine, Urine, Clean Catch [[id number]]  (Normal) Collected: 05/23/25 1057    Lab Status: Final result Specimen: Urine, Clean Catch Updated: 05/23/25 1130     Strep Pneumo Ag Negative    MRSA Screen, PCR (Inpatient) - Swab, Nares [[id number]]  (Normal) Collected: 05/23/25 0526    Lab Status: Final result Specimen: Swab from Nares Updated: 05/23/25 0828     MRSA PCR No MRSA Detected    Narrative:      The negative predictive value of this diagnostic test is high and should only be used to consider de-escalating anti-MRSA therapy. A positive result may indicate colonization with MRSA and must be correlated clinically.    Blood Culture - Blood, Arm, Left [[id number]]  (Normal) Collected: 05/22/25 1719    Lab Status: Preliminary result Specimen: Blood from Arm, Left Updated: 05/26/25 1746     Blood Culture No growth at 4 days    Blood Culture - Blood, Arm, Left [[id number]]  (Normal) Collected: 05/22/25 1715    Lab Status: Preliminary result Specimen: Blood from Arm, Left Updated: 05/26/25 1746     Blood Culture No growth at 4 days           Recent films:  XR Chest 2 View  Result Date: 5/26/2025  EXAM: XR CHEST 2 VW-  HISTORY: f/u RUL density; J96.91-Respiratory failure, unspecified with hypoxia; J18.9-Pneumonia, unspecified organism; I27.21-Secondary pulmonary arterial hypertension; J47.1-Bronchiectasis with (acute) exacerbation; R59.0-Localized enlarged lymph nodes; Z74.09-Other reduced mobility   COMPARISON: 5/22/2025.  TECHNIQUE: Frontal and lateral radiographs of the chest was obtained.  FINDINGS:  Support Devices: None.  Cardiac and Mediastinal Silhouettes: Normal.  Lungs/Pleura: Previously demonstrated rounded opacity in the right upper lung zone persists but is slightly less discrete as compared to the prior chest radiograph. Stable chronic  interstitial changes. No sizable pleural effusion. No visible pneumothorax.  Osseous structures: No acute osseous finding.  Other: None.      Impression:  Rounded right upper lung zone opacity persists but is slightly less discrete. This may support a resolving infectious process, however continued follow-up is recommended.   This report was signed and finalized on 5/26/2025 8:14 AM by Cyril Gonzalez.             Pulmonary Assessment:  Pneumonia immune compromised host  Bronchiectasis with mucous plugging and responsiveness to vest therapy.  Notably, this patient is being managed for inadequate airway clearance secondary to bronchiectasis.  My patient has been experiencing a daily productive cough for greater than 6 months.  Airway clearance has been ineffective secondary to thick tenacious secretions, weak cough and inability to expectorate secretions.  CT scan of chest confirms bronchiectasis. Airway clearance therapies that have been tried and failed or considered and ruled out include bronchodilators, metaneb. The reason they failed these therapies were persistent symptoms despite these efforts and 2 admissions to the hospital this year. Recommend airway clearance for home via home chest physiotherapy, high frequency chest wall percussion therapy per [employer] protocol.   Crest compensated  Acute on chronic resp failure with hypoxia, due to above, a threat to life and bodily function, compensated at rest with 8 lpm oxygen  Pulmonary hypertension    Recommend/plan:   Continue oxygen for sat 90  Continue tapering steroids as tolerated.  There is high risk of morbidity from additional diagnostic testing or treatment, given immune compromised state and possibility of breakthrough infection on treatment.  Further exacerbation of bronchiectasis could be attenuated or blocked with vest airway clearance, which during this hospital stay clearly has been superior to other means.  Pulmonary hypertension eval as  outpatient.    Electronically signed by Faisal Griffith MD, 5/27/2025, 08:45 CDT

## 2025-05-27 NOTE — PROGRESS NOTES
"Pharmacy Dosing Service  Pharmacokinetics  Vancomycin Follow-up Evaluation    Assessment/Action/Plan:  Current Order: Vancomycin 750 mg IVPB every 12 hours adjusting to Vanomycin 1000 iv q 12hrs.   Current end date:5/29/25  Levels: Tr = 13.6 (10.6 hrs post dose)  Additional antimicrobial agent(s): Cefepime    Dose adjusted after trough level evaluated.  New dose Vancomycin 1000 mg q 12hrs. Pharmacy will continue to follow daily and adjust dose accordingly.     Subjective:  Elaine Juárez is a 65 y.o. female currently on Vancomycin 1000 mg IV every 12 hours for the treatment of Pneumonia, day 5 of 7 of treatment.    AUC Model Data:  Regimen: 1000 mg IV every 12 hours.  Exposure target: AUC24 (range) 400-600 mg/L.hr   AUC24,ss: 514 mg/L.hr  Probability of AUC24 > 400: 98 %  Ctrough,ss: 15.8 mg/L  Probability of Ctrough,ss > 20: 7 %  Probability of nephrotoxicity (Lodise SHAYNE 2009): 11 %    Objective:  Ht: 160 cm (63\"); Wt: 68.5 kg (151 lb)  Estimated Creatinine Clearance: 72.4 mL/min (by C-G formula based on SCr of 0.7 mg/dL).   Creatinine   Date Value Ref Range Status   05/26/2025 0.70 0.57 - 1.00 mg/dL Final   05/25/2025 0.84 0.57 - 1.00 mg/dL Final   05/23/2025 0.72 0.57 - 1.00 mg/dL Final   08/30/2021 0.8 0.5 - 0.9 mg/dL Final   08/05/2021 0.80 0.60 - 1.30 mg/dL Final     Comment:     Serial Number: [id number]:  [id number]   09/22/2020 0.7 0.5 - 0.9 mg/dL Final   05/28/2020 0.7 0.5 - 0.9 mg/dL Final      Lab Results   Component Value Date    WBC 10.52 05/26/2025    WBC 6.79 05/23/2025    WBC 11.43 (H) 05/22/2025    WBC 11.91 (H) 05/22/2025         Lab Results   Component Value Date    VANCOTROUGH 13.60 05/26/2025       Culture Results:  Microbiology Results (last 10 days)       Procedure Component Value - Date/Time    Legionella Antigen, Urine - Urine, Urine, Clean Catch [[id number]]  (Normal) Collected: 05/23/25 1057    Lab Status: Final result Specimen: Urine, Clean Catch Updated: 05/23/25 1129     LEGIONELLA " ANTIGEN, URINE Negative    S. Pneumo Ag Urine or CSF - Urine, Urine, Clean Catch [[id number]]  (Normal) Collected: 05/23/25 1057    Lab Status: Final result Specimen: Urine, Clean Catch Updated: 05/23/25 1130     Strep Pneumo Ag Negative    MRSA Screen, PCR (Inpatient) - Swab, Nares [[id number]]  (Normal) Collected: 05/23/25 0526    Lab Status: Final result Specimen: Swab from Nares Updated: 05/23/25 0828     MRSA PCR No MRSA Detected    Narrative:      The negative predictive value of this diagnostic test is high and should only be used to consider de-escalating anti-MRSA therapy. A positive result may indicate colonization with MRSA and must be correlated clinically.    Blood Culture - Blood, Arm, Left [[id number]]  (Normal) Collected: 05/22/25 1719    Lab Status: Preliminary result Specimen: Blood from Arm, Left Updated: 05/26/25 1746     Blood Culture No growth at 4 days    Blood Culture - Blood, Arm, Left [[id number]]  (Normal) Collected: 05/22/25 1715    Lab Status: Preliminary result Specimen: Blood from Arm, Left Updated: 05/26/25 1746     Blood Culture No growth at 4 days            SAMPSON Brambila RPH   05/27/25 08:07 CDT

## 2025-05-27 NOTE — PLAN OF CARE
Goal Outcome Evaluation:              Outcome Evaluation: AO4. cont iv abx. bm today. d/c tomorrow. all needs currently met.

## 2025-05-28 ENCOUNTER — READMISSION MANAGEMENT (OUTPATIENT)
Dept: CALL CENTER | Facility: HOSPITAL | Age: 65
End: 2025-05-28
Payer: MEDICARE

## 2025-05-28 VITALS
DIASTOLIC BLOOD PRESSURE: 73 MMHG | BODY MASS INDEX: 26.75 KG/M2 | HEIGHT: 63 IN | HEART RATE: 78 BPM | RESPIRATION RATE: 18 BRPM | WEIGHT: 151 LBS | TEMPERATURE: 97.6 F | OXYGEN SATURATION: 96 % | SYSTOLIC BLOOD PRESSURE: 120 MMHG

## 2025-05-28 LAB — VANCOMYCIN TROUGH SERPL-MCNC: 17.6 MCG/ML (ref 5–20)

## 2025-05-28 PROCEDURE — 25010000002 METHYLPREDNISOLONE PER 40 MG: Performed by: NURSE PRACTITIONER

## 2025-05-28 PROCEDURE — 94799 UNLISTED PULMONARY SVC/PX: CPT

## 2025-05-28 PROCEDURE — 63710000001 MYCOPHENOLATE MOFETIL PER 250 MG: Performed by: INTERNAL MEDICINE

## 2025-05-28 PROCEDURE — 99232 SBSQ HOSP IP/OBS MODERATE 35: CPT | Performed by: INTERNAL MEDICINE

## 2025-05-28 PROCEDURE — 25010000002 CEFEPIME PER 500 MG: Performed by: INTERNAL MEDICINE

## 2025-05-28 PROCEDURE — 80202 ASSAY OF VANCOMYCIN: CPT | Performed by: INTERNAL MEDICINE

## 2025-05-28 RX ORDER — CEFDINIR 300 MG/1
300 CAPSULE ORAL EVERY 12 HOURS SCHEDULED
Status: DISCONTINUED | OUTPATIENT
Start: 2025-05-28 | End: 2025-05-28 | Stop reason: HOSPADM

## 2025-05-28 RX ORDER — CEFDINIR 300 MG/1
300 CAPSULE ORAL EVERY 12 HOURS SCHEDULED
Qty: 5 CAPSULE | Refills: 0 | Status: SHIPPED | OUTPATIENT
Start: 2025-05-28 | End: 2025-05-31

## 2025-05-28 RX ORDER — METHYLPREDNISOLONE SODIUM SUCCINATE 40 MG/ML
20 INJECTION, POWDER, LYOPHILIZED, FOR SOLUTION INTRAMUSCULAR; INTRAVENOUS EVERY 12 HOURS
Status: DISCONTINUED | OUTPATIENT
Start: 2025-05-28 | End: 2025-05-28 | Stop reason: HOSPADM

## 2025-05-28 RX ADMIN — GUAIFENESIN 1200 MG: 600 TABLET, EXTENDED RELEASE ORAL at 08:47

## 2025-05-28 RX ADMIN — METHYLPREDNISOLONE SODIUM SUCCINATE 40 MG: 40 INJECTION, POWDER, FOR SOLUTION INTRAMUSCULAR; INTRAVENOUS at 05:00

## 2025-05-28 RX ADMIN — IPRATROPIUM BROMIDE 0.5 MG: 0.5 SOLUTION RESPIRATORY (INHALATION) at 06:05

## 2025-05-28 RX ADMIN — FAMOTIDINE 20 MG: 20 TABLET, FILM COATED ORAL at 08:48

## 2025-05-28 RX ADMIN — POTASSIUM CHLORIDE 10 MEQ: 750 TABLET, EXTENDED RELEASE ORAL at 08:48

## 2025-05-28 RX ADMIN — FLUTICASONE PROPIONATE 2 SPRAY: 50 SPRAY, METERED NASAL at 08:49

## 2025-05-28 RX ADMIN — BUDESONIDE 0.5 MG: 0.5 INHALANT RESPIRATORY (INHALATION) at 06:05

## 2025-05-28 RX ADMIN — METOPROLOL SUCCINATE 12.5 MG: 25 TABLET, EXTENDED RELEASE ORAL at 08:46

## 2025-05-28 RX ADMIN — CEFDINIR 300 MG: 300 CAPSULE ORAL at 10:37

## 2025-05-28 RX ADMIN — CETIRIZINE HYDROCHLORIDE 10 MG: 10 TABLET, FILM COATED ORAL at 08:49

## 2025-05-28 RX ADMIN — PANTOPRAZOLE SODIUM 40 MG: 40 TABLET, DELAYED RELEASE ORAL at 05:00

## 2025-05-28 RX ADMIN — SPIRONOLACTONE 50 MG: 50 TABLET ORAL at 08:49

## 2025-05-28 RX ADMIN — Medication 10 ML: at 08:49

## 2025-05-28 RX ADMIN — IPRATROPIUM BROMIDE 0.5 MG: 0.5 SOLUTION RESPIRATORY (INHALATION) at 09:30

## 2025-05-28 RX ADMIN — MYCOPHENOLATE MOFETIL 1000 MG: 500 TABLET, FILM COATED ORAL at 08:47

## 2025-05-28 RX ADMIN — VENLAFAXINE 75 MG: 37.5 TABLET ORAL at 08:48

## 2025-05-28 RX ADMIN — CEFEPIME 2000 MG: 2 INJECTION, POWDER, FOR SOLUTION INTRAVENOUS at 05:00

## 2025-05-28 NOTE — TELEPHONE ENCOUNTER
Doris from [employer] called stating that there needed to be an addendum to the note adding why the patient could not use the flutter device for Medicare to cover the vest.

## 2025-05-28 NOTE — CASE MANAGEMENT/SOCIAL WORK
Continued Stay Note  LILIBETH Burt     Patient Name: Elaine Juárez  MRN: [id number]  Today's Date: 5/28/2025    Admit Date: 5/22/2025    Plan: Home   Discharge Plan       Row Name 05/28/25 1018       Plan    Plan Home    Patient/Family in Agreement with Plan yes    Plan Comments Pt lives at home with spouse and hopeful same. Pt on high flow O2 here so not ready for d/c. Pt working with therapy, slow progress. Will follow.                   Discharge Codes    No documentation.                       BRYN GillW

## 2025-05-28 NOTE — DISCHARGE SUMMARY
[facility] Medicine Services  DISCHARGE SUMMARY       Date of Admission: 5/22/2025  Date of Discharge:  5/28/2025  Primary Care Physician: Aaron Stoddard MD    Presenting Problem/History of Present Illness:  Shortness of breath    Final Discharge Diagnoses:  Active Hospital Problems    Diagnosis     **Respiratory distress        Consults: Pulmonology    Procedures Performed: None    Pertinent Test Results:   Results for orders placed during the hospital encounter of 05/22/25    Adult Transthoracic Echo Complete W/ Cont if Necessary Per Protocol    Interpretation Summary    Left ventricular systolic function is normal. Left ventricular ejection fraction appears to be 61 - 65%.    Left ventricular wall thickness is consistent with mild concentric hypertrophy.    Left ventricular diastolic function is consistent with (grade I) impaired relaxation.    The right ventricular cavity is mildly dilated with mildly reduced systolic function; size and function are both improved compared to echo 2/5/2024.    Left atrium borderline dilated.    Right atrium normal size.    Insufficient TR jet for adequate RVSP assessment.    No significant valvular dysfunction.      Imaging Results (All)       Procedure Component Value Units Date/Time    XR Chest 2 View [[id number]] Collected: 05/26/25 0811     Updated: 05/26/25 0817    Narrative:      EXAM: XR CHEST 2 VW-      HISTORY: f/u RUL density; J96.91-Respiratory failure, unspecified with  hypoxia; J18.9-Pneumonia, unspecified organism; I27.21-Secondary  pulmonary arterial hypertension; J47.1-Bronchiectasis with (acute)  exacerbation; R59.0-Localized enlarged lymph nodes; Z74.09-Other reduced  mobility       COMPARISON: 5/22/2025.     TECHNIQUE: Frontal and lateral radiographs of the chest was obtained.     FINDINGS:     Support Devices: None.     Cardiac and Mediastinal Silhouettes: Normal.     Lungs/Pleura: Previously demonstrated rounded  opacity in the right upper  lung zone persists but is slightly less discrete as compared to the  prior chest radiograph. Stable chronic interstitial changes. No sizable  pleural effusion. No visible pneumothorax.     Osseous structures: No acute osseous finding.     Other: None.       Impression:         Rounded right upper lung zone opacity persists but is slightly less  discrete. This may support a resolving infectious process, however  continued follow-up is recommended.        This report was signed and finalized on 5/26/2025 8:14 AM by Cyril Gonzalez.       CT Angiogram Chest [[id number]] Collected: 05/22/25 1650     Updated: 05/22/25 1709    Narrative:      Exam: CT angiogram of the of the chest with intravenous contrast.  5/22/2025     CLINICAL HISTORY: Shortness of breath.     DOSE:  215.59 mGy.cm . All CT scans are performed using dose  optimization techniques as appropriate to the performed exam and  including at least one of the following: Automated exposure control,  adjustment of the mA and/or kV according to size, and the use of the  iterative reconstruction technique.     TECHNIQUE: Contiguous axial images were obtained through the thorax  following intravenous contrast administration with reformatted images  obtained in the sagittal and coronal projections from the original data  set. Three-dimensional reconstructions are also obtained.     COMPARISON: 4/22/2025     FINDINGS:     Pulmonary arteries:  There is normal enhancement of the pulmonary  arteries with no evidence of pulmonary thromboembolic disease. There is  enlargement of the pulmonary artery suggesting pulmonary arterial  hypertension.     Aorta:  The thoracic aorta and proximal great vessels are normal in  appearance. There is no evidence of aortic dissection or aneurysm.     LUNGS: There is evidence of remote granulomatous disease. There is  patchy consolidation within both lower lobes stable from the previous  exam. There are changes of  cylindrical and varicoid bronchiectasis  within both the lower lobes and left lingula and right middle lobe.  There is a new focus of nodularity within the superior segment of the  right lower lobe abutting the major fissure image 59 series 7 measuring  11 mm in long axis. A focus of nodularity in the paramediastinal aspect  of the right upper lobe anteriorly image 59 series 7 previously measured  1.3 x 1.6 cm in size now measuring 1.9 x 2.0 cm. Pleural-based  consolidation and nodularity within the right upper lobe anteriorly also  shows mild progression including a more lateral nodular component  measuring 1.7 x 1.9 cm on the current exam previously measured at 1.3 x  1.3 cm. This area of nodular consolidation is contiguous with the pleura  anteriorly. No definite chest wall invasion or associate bony  destruction.     Pleural spaces: No effusion present.     HEART: There is mild cardiomegaly. Mild coronary artery calcifications  are present. No pericardial effusion present     Bones: No acute osseous abnormalities are demonstrated.     CHEST WALL: No chest wall abnormalities are demonstrated.     Lymph nodes: Abnormal right paratracheal nodes are present including a  2.8 cm short axis node in the upper right paratracheal cora group  previously measured at 2.3 cm in short axis. A precarinal node measures  2.8 cm in short axis previously measured at 2.7 cm. A right hilar node  measures 1.9 cm in short axis previous measured at 1.7 cm. A subcarinal  node previously measured at 1.1 cm in short axis now measures 1.4 cm.     Upper abdomen: A moderate size hiatal hernia is present. Previous  cholecystectomy. Bilateral nonobstructing nephrolithiasis present. The  adrenals are unremarkable.       Impression:      1. No evidence of pulmonary thromboembolic disease. There is enlargement  of the pulmonary artery suggesting pulmonary arterial hypertension.  2. Atheromatous calcification of the thoracic aorta and great  vessels.  No aneurysm or dissection. Mild cardiac enlargement.  3. Multiple areas of right upper lobe pulmonary nodularity some of which  have shown progression from a previous exam of 1 month earlier. There is  also a new nodule in the superior segment of the right lower lobe  abutting the major fissure medially. Bulky mediastinal and right hilar  lymphadenopathy also shows some progression. Although this could  potentially represent an infectious etiology I feel a neoplastic process  should be excluded. PET CT will likely be helpful for further  characterization.  4. Extensive bronchiectasis within both lungs. This includes cylindrical  and varicoid bronchiectasis. There are patchy chronic appearing areas of  consolidation within both lungs, particularly within the right middle  lobe, left lingula and lower lobes stable from the previous exam. No  effusion present.  5. Bilateral nonobstructing nephrolithiasis. Moderate size hiatal hernia  present.     This report was signed and finalized on 5/22/2025 5:06 PM by Dr. Romaine Castillo MD.       XR Chest 1 View [[id number]] Collected: 05/22/25 1550     Updated: 05/22/25 1557    Narrative:      EXAMINATION: XR CHEST 1 VW-  5/22/2025 3:50 PM 1 view     HISTORY: [facility] Triage Protocol     COMPARISON: 5/8/2025 and 4/22/2025     TECHNIQUE: A single frontal view of the chest was obtained.     FINDINGS:  Underlying pulmonary fibrosis with bibasilar opacities and  bronchiectasis, consistent with pulmonary fibrosis. In the RIGHT upper  lung, near the periphery, there is a rounded area of density measuring  up to 3 x 2.6 cm. When compared to the exam from 5/8/2025, this is more  conspicuous. Differential considerations include malignancy and  infectious/inflammatory process such as pneumonia. There is increased  opacity in the lateral periphery of the LEFT midlung which also may be  related to pneumonia.       Impression:         1.  Increased opacity in the RIGHT mid to upper  lung has a rounded  appearance and is more pronounced today than on the study from 5/8/2025.  Neoplastic and infectious process is to be considered.     2.  Patchy opacity in the LEFT mid and lower lung increased since the  comparison study could be related to pneumonia as well.     3.  Interstitial fibrosis again noted.           This report was signed and finalized on 5/22/2025 3:54 PM by Dr. Ernesto Camacho MD.             LAB RESULTS:      Lab 05/26/25  0513 05/23/25 0444 05/22/25 1715 05/22/25  1536 05/22/25  1407   WBC 10.52 6.79  --   --  11.91*  11.43*   HEMOGLOBIN 12.6 12.8  --   --  13.8  13.8   HEMATOCRIT 42.3 43.3  --   --  45.7  45.0   PLATELETS 219 193  --   --  248  252   NEUTROS ABS 7.81*  --   --   --  8.17*   IMMATURE GRANS (ABS)  --   --   --   --  0.47*   LYMPHS ABS  --   --   --   --  1.88   MONOS ABS  --   --   --   --  0.91*   EOS ABS 0.23  --   --   --  0.34   MCV 80.0 79.9  --   --  78.0*  77.1*   PROCALCITONIN  --   --   --  0.08  --    LACTATE  --   --  1.6  --   --          Lab 05/26/25  0514 05/25/25 1726 05/23/25 0444 05/22/25 1711 05/22/25  1536 05/22/25  1407   SODIUM 138  --  135*  --  135* 137   SODIUM, ARTERIAL  --   --   --  135*  --   --    POTASSIUM 4.8  --  4.4  --  4.1 3.9   CHLORIDE 103  --  101  --  99 100   CO2 25.0  --  23.0  --  23.0 23.0   ANION GAP 10.0  --  11.0  --  13.0 14.0   BUN 20  --  7*  --  8 7*   CREATININE 0.70 0.84 0.72  --  0.81 0.82   EGFR 94.9 76.3 91.8  --  79.7 78.5   GLUCOSE 106*  --  203*  --  125* 129*   CALCIUM 8.5*  --  7.9*  --  9.0 8.7   MAGNESIUM 2.4  --  2.4  --   --   --          Lab 05/22/25  1536 05/22/25  1407   TOTAL PROTEIN 6.8 6.5   ALBUMIN 3.6 3.4*   GLOBULIN 3.2 3.1   ALT (SGPT) 9 7   AST (SGOT) 18 18   BILIRUBIN 0.3 0.3   ALK PHOS 123* 121*         Lab 05/22/25  1715 05/22/25  1536 05/22/25  1407   PROBNP  --  357.4 357.1   HSTROP T 14*  --  17*                 Lab 05/22/25  1711   PH, ARTERIAL 7.375   PCO2, ARTERIAL 41.0    PO2 .0*   O2 SATURATION ART >100.1*   HCO3 ART 23.9   BASE EXCESS ART -1.2*   CARBOXYHEMOGLOBIN 1.2     Brief Urine Lab Results  (Last result in the past 365 days)        Color   Clarity   Blood   Leuk Est   Nitrite   Protein   CREAT   Urine HCG        03/20/25 1920 Yellow   Clear   Negative   Negative   Negative   Negative                 Microbiology Results (last 10 days)       Procedure Component Value - Date/Time    Legionella Antigen, Urine - Urine, Urine, Clean Catch [[id number]]  (Normal) Collected: 05/23/25 1057    Lab Status: Final result Specimen: Urine, Clean Catch Updated: 05/23/25 1129     LEGIONELLA ANTIGEN, URINE Negative    S. Pneumo Ag Urine or CSF - Urine, Urine, Clean Catch [[id number]]  (Normal) Collected: 05/23/25 1057    Lab Status: Final result Specimen: Urine, Clean Catch Updated: 05/23/25 1130     Strep Pneumo Ag Negative    MRSA Screen, PCR (Inpatient) - Swab, Nares [[id number]]  (Normal) Collected: 05/23/25 0526    Lab Status: Final result Specimen: Swab from Nares Updated: 05/23/25 0828     MRSA PCR No MRSA Detected    Narrative:      The negative predictive value of this diagnostic test is high and should only be used to consider de-escalating anti-MRSA therapy. A positive result may indicate colonization with MRSA and must be correlated clinically.    Blood Culture - Blood, Arm, Left [[id number]]  (Normal) Collected: 05/22/25 1719    Lab Status: Final result Specimen: Blood from Arm, Left Updated: 05/27/25 1745     Blood Culture No growth at 5 days    Blood Culture - Blood, Arm, Left [[id number]]  (Normal) Collected: 05/22/25 1715    Lab Status: Final result Specimen: Blood from Arm, Left Updated: 05/27/25 1745     Blood Culture No growth at 5 days            Hospital Course:     -Acute on chronic hypoxic respiratory failure  Patient is on about 6-8 L of oxygen at home, currently needing about 6 L of oxygen.  Meaning that patient has improved to her baseline oxygen requirement, and  "therefore will be discharged to continue home oxygen.     -Probable pulmonary fibrosis exacerbation  She was treated in hospital with high-dose steroids, will be discharged to continue her home dose of steroids.  Patient's pulmonologist was consulted and helped with management in the hospital, and therefore she will follow-up as scheduled postdischarge.    - Suspected pneumonia [on immunosuppressants]  Patient is at high risk for infection, on multiple immunosuppressants.  She was treated empirically with vancomycin and cefepime for 7 days in hospital, and will be discharged home on Omnicef as recommended by her pulmonologist.     -Suspected right lower lobe nodule/malignancy  Radiologist is recommending PET scan, but this may not be possible while in hospital.  Patient's pulmonologist was on consult and helped with management] recommend outpatient follow-up]     Other chronic medical conditions-  Systolic congestive heart failure  Chronic idiopathic fibrosing alveolitis  Pulmonary hypertension.  Chronic respiratory failure with hypoxia  Crest syndrome  GERD  Pulmonary fibrosis  Rheumatoid arthritis  Obstructive sleep apnea  Oropharyngeal dysphagia      Physical Exam on Discharge:  /73 (BP Location: Right arm, Patient Position: Lying)   Pulse 78   Temp 97.6 °F (36.4 °C) (Oral)   Resp 18   Ht 160 cm (63\")   Wt 68.5 kg (151 lb)   SpO2 96%   BMI 26.75 kg/m²   Physical Exam  GEN: Awake, alert, interactive, in mild respiratory distress  HEENT: Atraumatic, PERRLA, EOMI, Anicteric, Trachea midline  Lungs: Reduced breath sounds bilaterally with bilateral crackles  Heart: RRR, +S1/s2, no rub  ABD: soft, nt/nd, +BS, no guarding/rebound  Extremities: atraumatic, no cyanosis, no edema  Skin: no rashes or lesions  Neuro: AAOx3, no focal deficits  Psych: normal mood & affect    Condition on Discharge: Improved/fair    Discharge Disposition:  Home or Self Care    Discharge Medications:     Discharge Medications    "     New Medications        Instructions Start Date   cefdinir 300 MG capsule  Commonly known as: OMNICEF   300 mg, Oral, Every 12 Hours Scheduled             Changes to Medications        Instructions Start Date   famotidine 20 MG tablet  Commonly known as: PEPCID  What changed:   when to take this  reasons to take this   20 mg, Oral, 2 Times Daily Before Meals             Continue These Medications        Instructions Start Date   alendronate 70 MG tablet  Commonly known as: FOSAMAX   70 mg, Weekly      busPIRone 10 MG tablet  Commonly known as: BUSPAR   Take 1 tablet by mouth 3 (Three) Times a Day As Needed (anxiety).      ipratropium-albuterol 0.5-2.5 mg/3 ml nebulizer  Commonly known as: DUO-NEB   3 mL, Nebulization, 4 Times Daily PRN      Macitentan-Tadalafil 10-40 MG tablet   1 tablet, Nightly      metoprolol succinate XL 25 MG 24 hr tablet  Commonly known as: TOPROL-XL   12.5 mg, Oral, Daily      mycophenolate 500 MG tablet  Commonly known as: CELLCEPT   1,000 mg, 2 Times Daily      omeprazole 40 MG capsule  Commonly known as: priLOSEC   40 mg, Oral, Daily      potassium chloride 10 MEQ CR tablet   10 mEq, Oral, Daily      predniSONE 5 MG tablet  Commonly known as: DELTASONE   5 mg, Oral, Daily      spironolactone 50 MG tablet  Commonly known as: ALDACTONE   50 mg, Oral, Daily      traZODone 100 MG tablet  Commonly known as: DESYREL   100 mg, Oral, Nightly      Tyvaso 0.6 MG/ML solution inhalation solution  Generic drug: Treprostinil   12 puffs, Inhalation, 4 Times Daily - RT      venlafaxine 75 MG tablet  Commonly known as: EFFEXOR   75 mg, Daily      vitamin D 1.25 MG (93186 UT) capsule capsule  Commonly known as: ERGOCALCIFEROL   50,000 Units, Weekly      Winrevair 60 MG kit  Generic drug: Sotatercept-csrk   60 mg, Every 21 Days             Discharge Diet: Regular diet    Activity at Discharge: As tolerated    Follow-up Appointments:   Future Appointments   Date Time Provider Department Center   6/23/2025   3:00 PM [facility] ECHO ROOM 1  [facility]   7/1/2025 11:00 AM Fela Blackman APRN [facility]       Test Results Pending at Discharge: None    Electronically signed by Christiano Green MD, 05/28/25, 15:31 CDT.    Time: 45 minutes.

## 2025-05-28 NOTE — PROGRESS NOTES
[facility] [place] PULMONARY PROGRESS NOTE - [facility]    Patient: Elaine Juárez  1960   MR# [id number]   Acct# [id number]  05/28/25   08:54 CDT  Referring Provider: Christiano Green MD    Chief Complaint: Shortness of breath     Interval history: She feels like she is doing much better and she thinks she would like to go home.  We had started paperwork yesterday for getting her a home chest wall percussive device since she has had all of these ongoing problems despite using bronchodilators mucus clearance devices including MetaNeb and flutter valve.  She feels the vest is helping her tremendously.  She denies fevers.    Meds:  budesonide, 0.5 mg, Nebulization, BID - RT  cefepime, 2,000 mg, Intravenous, Q8H  cetirizine, 10 mg, Oral, Daily  enoxaparin sodium, 40 mg, Subcutaneous, Q24H  famotidine, 20 mg, Oral, BID AC  fluticasone, 2 spray, Each Nare, Daily  guaiFENesin, 1,200 mg, Oral, Q12H  ipratropium, 0.5 mg, Nebulization, 4x Daily - RT  LORazepam, 0.5 mg, Oral, Once  Macitentan-Tadalafil, 10-40 mg, Oral, Daily  methylPREDNISolone sodium succinate, 20 mg, Intravenous, Q12H  metoprolol succinate XL, 12.5 mg, Oral, Daily  mycophenolate, 1,000 mg, Oral, Q12H  pantoprazole, 40 mg, Oral, Q AM  potassium chloride, 10 mEq, Oral, Daily  sodium chloride, 10 mL, Intravenous, Q12H  spironolactone, 50 mg, Oral, Daily  traZODone, 100 mg, Oral, Nightly  Treprostinil, 4 puff, Inhalation, 4x Daily  venlafaxine, 75 mg, Oral, Daily      Pharmacy to dose vancomycin,         Physical Exam:  SpO2 Percentage    05/28/25 0317 05/28/25 0439 05/28/25 0605   SpO2: 98% 100% 97%     Body mass index is 26.75 kg/m².   Temp:  [97.7 °F (36.5 °C)-98.2 °F (36.8 °C)] 97.7 °F (36.5 °C)  Heart Rate:  [] 75  Resp:  [14-18] 16  BP: ()/(44-56) 115/54  Intake/Output Summary (Last 24 hours) at 5/28/2025 0854  Last data filed at 5/28/2025 0439  Gross per 24 hour   Intake 720 ml   Output 2000 ml   Net -1280 ml     Physical  Exam  Pulmonary:      Effort: Pulmonary effort is normal. No tachypnea.      Breath sounds: Decreased breath sounds present. No wheezing.         Result Review    Laboratory Data:  Results from last 7 days   Lab Units 05/26/25  0513 05/23/25  0444 05/22/25  1407   WBC 10*3/mm3 10.52 6.79 11.91*  11.43*   HEMOGLOBIN g/dL 12.6 12.8 13.8  13.8   PLATELETS 10*3/mm3 219 193 248  252     Results from last 7 days   Lab Units 05/26/25  0514 05/25/25  1726 05/23/25  0444 05/22/25  1715 05/22/25  1711 05/22/25  1536 05/22/25  1407   SODIUM mmol/L 138  --  135*  --   --  135* 137   SODIUM, ARTERIAL mmol/L  --   --   --   --  135*  --   --    POTASSIUM mmol/L 4.8  --  4.4  --   --  4.1 3.9   CHLORIDE mmol/L 103  --  101  --   --  99 100   CO2 mmol/L 25.0  --  23.0  --   --  23.0 23.0   BUN mg/dL 20  --  7*  --   --  8 7*   CREATININE mg/dL 0.70 0.84 0.72  --   --  0.81 0.82   CALCIUM mg/dL 8.5*  --  7.9*  --   --  9.0 8.7   ALK PHOS U/L  --   --   --   --   --  123* 121*   ALT (SGPT) U/L  --   --   --   --   --  9 7   AST (SGOT) U/L  --   --   --   --   --  18 18   LACTATE mmol/L  --   --   --  1.6  --   --   --          Lab 05/26/25  0514 05/23/25  0444 05/22/25  1536   GLUCOSE 106* 203* 125*     Results from last 7 days   Lab Units 05/22/25  1711   PH, ARTERIAL pH units 7.375   PCO2, ARTERIAL mm Hg 41.0   PO2 ART mm Hg 188.0*         Lab 05/22/25  1715 05/22/25  1536 05/22/25  1407   PROBNP  --  357.4 357.1   HSTROP T 14*  --  17*     Microbiology Results (last 10 days)       Procedure Component Value - Date/Time    Legionella Antigen, Urine - Urine, Urine, Clean Catch [[id number]]  (Normal) Collected: 05/23/25 1057    Lab Status: Final result Specimen: Urine, Clean Catch Updated: 05/23/25 1129     LEGIONELLA ANTIGEN, URINE Negative    S. Pneumo Ag Urine or CSF - Urine, Urine, Clean Catch [[id number]]  (Normal) Collected: 05/23/25 1057    Lab Status: Final result Specimen: Urine, Clean Catch Updated: 05/23/25 1130     Strep  Pneumo Ag Negative    MRSA Screen, PCR (Inpatient) - Swab, Nares [[id number]]  (Normal) Collected: 05/23/25 0526    Lab Status: Final result Specimen: Swab from Nares Updated: 05/23/25 0828     MRSA PCR No MRSA Detected    Narrative:      The negative predictive value of this diagnostic test is high and should only be used to consider de-escalating anti-MRSA therapy. A positive result may indicate colonization with MRSA and must be correlated clinically.    Blood Culture - Blood, Arm, Left [[id number]]  (Normal) Collected: 05/22/25 1719    Lab Status: Final result Specimen: Blood from Arm, Left Updated: 05/27/25 1745     Blood Culture No growth at 5 days    Blood Culture - Blood, Arm, Left [[id number]]  (Normal) Collected: 05/22/25 1715    Lab Status: Final result Specimen: Blood from Arm, Left Updated: 05/27/25 1745     Blood Culture No growth at 5 days           Recent films:  No radiology results from the last 24 hrs           Pulmonary Assessment:  Pneumonia in immunocompromised host, improving  Bronchiectasis, improved  Crest  Chronic respiratory failure with hypoxia    Recommend/plan:   I have reduced the Solu-Medrol to 20 mg/day  Okay with me to transition to oral steroids and taper over the next 5 days or so  We will try transition to oral antibiotics  Possibly home later today or in a.m. if unclear whether she is ready later today for home  Follow-up in office as scheduled on 7/1  We are working on home vest , and she will be contacted by the supplier for this    Electronically signed by Faisal Griffith MD, 5/28/2025, 08:54 CDT

## 2025-05-29 NOTE — OUTREACH NOTE
Prep Survey      Flowsheet Row Responses   [facility] facility patient discharged from? [facility]   Is LACE score < 7 ? No   Eligibility Readm Mgmt   Discharge diagnosis Respiratory distress   Does the patient have one of the following disease processes/diagnoses(primary or secondary)? Other   Does the patient have Home health ordered? No   Is there a DME ordered? No   Prep survey completed? Yes            Vinita SANCHEZ - Registered Nurse

## 2025-05-29 NOTE — THERAPY DISCHARGE NOTE
Acute Care - Physical Therapy Discharge Summary  [facility]       Patient Name: Elaine Juárez  : 1960  MRN: [id number]    Today's Date: 2025                 Admit Date: 2025      PT Recommendation and Plan    Visit Dx:    ICD-10-CM ICD-9-CM   1. Respiratory failure with hypoxia, unspecified chronicity  J96.91 518.81   2. Pneumonia of left lower lobe due to infectious organism  J18.9 486   3. Pulmonary artery hypertension  I27.21 416.8   4. Bronchiectasis with acute exacerbation  J47.1 494.1   5. Hilar lymphadenopathy  R59.0 785.6   6. Decreased functional mobility and endurance [Z74.09]  Z74.09 780.99   7. Generalized weakness [R53.1]  R53.1 780.79                PT Rehab Goals       Row Name 25 1525             Bed Mobility Goal 1 (PT)    Activity/Assistive Device (Bed Mobility Goal 1, PT) sit to supine;supine to sit;rolling to left;rolling to right  -KJ      [place]al Level/Cues Needed (Bed Mobility Goal 1, PT) independent  -KJ      Time Frame (Bed Mobility Goal 1, PT) long term goal (LTG)  -KJ      Progress/Outcomes (Bed Mobility Goal 1, PT) goal not met  -KJ         Transfer Goal 1 (PT)    Activity/Assistive Device (Transfer Goal 1, PT) sit-to-stand/stand-to-sit;bed-to-chair/chair-to-bed  -KJ      Nogal Level/Cues Needed (Transfer Goal 1, PT) modified independence  -KJ      Time Frame (Transfer Goal 1, PT) long term goal (LTG)  -KJ      Progress/Outcome (Transfer Goal 1, PT) goal not met  -KJ         Gait Training Goal 1 (PT)    Activity/Assistive Device (Gait Training Goal 1, PT) gait (walking locomotion);increase endurance/gait distance;increase energy conservation;improve balance and speed;walker, rolling  -KJ      Nogal Level (Gait Training Goal 1, PT) contact guard required  -KJ      Distance (Gait Training Goal 1, PT) 40 feet with SpO2 >/= 90%  -KJ      Time Frame (Gait Training Goal 1, PT) long term goal (LTG)  -KJ      Progress/Outcome (Gait Training Goal 1,  PT) goal met  -DASHAWN                User Key  (r) = Recorded By, (t) = Taken By, (c) = Cosigned By      Initials Name Provider Type Discipline    Gladis Steele, PTA Physical Therapist Assistant PT                        PT Discharge Summary  Anticipated Discharge Disposition (PT): home  Reason for Discharge: Discharge from facility  Outcomes Achieved: Refer to plan of care for updates on goals achieved  Discharge Destination: Home      Gladis Frankel PTA   5/29/2025

## 2025-06-02 DIAGNOSIS — M34.1 CREST SYNDROME: ICD-10-CM

## 2025-06-02 DIAGNOSIS — I27.21 PAH (PULMONARY ARTERIAL HYPERTENSION) WITH PORTAL HYPERTENSION: Primary | ICD-10-CM

## 2025-06-02 DIAGNOSIS — K76.6 PAH (PULMONARY ARTERIAL HYPERTENSION) WITH PORTAL HYPERTENSION: Primary | ICD-10-CM

## 2025-06-03 ENCOUNTER — READMISSION MANAGEMENT (OUTPATIENT)
Dept: CALL CENTER | Facility: HOSPITAL | Age: 65
End: 2025-06-03
Payer: MEDICARE

## 2025-06-03 NOTE — OUTREACH NOTE
Medical Week 1 Survey      Flowsheet Row Responses   [facility] patient discharged from? [place]   Does the patient have one of the following disease processes/diagnoses(primary or secondary)? Other   Week 1 attempt successful? Yes   Call start time 1614   Call end time 1616   Discharge diagnosis Respiratory distress   Meds reviewed with patient/caregiver? Yes   Is the patient having any side effects they believe may be caused by any medication additions or changes? No   Does the patient have all medications ordered at discharge? Yes   Is the patient taking all medications as directed (includes completed medication regime)? Yes   Does the patient have a primary care provider?  Yes   Comments regarding PCP PCP appt tomorrow 6/4   Psychosocial issues? No   Did the patient receive a copy of their discharge instructions? Yes   Nursing interventions Reviewed instructions with patient   What is the patient's perception of their health status since discharge? Same   Is the patient/caregiver able to teach back signs and symptoms related to disease process for when to call PCP? Yes   Is the patient/caregiver able to teach back signs and symptoms related to disease process for when to call 911? Yes   Is the patient/caregiver able to teach back the hierarchy of who to call/visit for symptoms/problems? PCP, Specialist, Home health nurse, Urgent Care, ED, 911 Yes   Week 1 call completed? Yes   Graduated Yes   Did the patient feel the follow up calls were helpful during their recovery period? Yes   Was the number of calls appropriate? Yes   Would this patient benefit from a Referral to Amb Social Work? No   Is the patient interested in additional calls from an ambulatory ? No   Wrap up additional comments Patient reports doing okay. No questions or concerns at this time.   Call end time 1616            REYES FALCON - Registered Nurse

## 2025-06-10 RX ORDER — SOTATERCEPT-CSRK 60 MG
0.9 KIT SUBCUTANEOUS
Qty: 1 KIT | Refills: 11 | Status: ON HOLD | OUTPATIENT
Start: 2025-06-10

## 2025-06-11 ENCOUNTER — APPOINTMENT (OUTPATIENT)
Dept: GENERAL RADIOLOGY | Facility: HOSPITAL | Age: 65
End: 2025-06-11
Payer: MEDICARE

## 2025-06-11 ENCOUNTER — APPOINTMENT (OUTPATIENT)
Dept: CT IMAGING | Facility: HOSPITAL | Age: 65
End: 2025-06-11
Payer: MEDICARE

## 2025-06-11 ENCOUNTER — TELEPHONE (OUTPATIENT)
Dept: PULMONOLOGY | Facility: CLINIC | Age: 65
End: 2025-06-11
Payer: MEDICARE

## 2025-06-11 ENCOUNTER — HOSPITAL ENCOUNTER (INPATIENT)
Facility: HOSPITAL | Age: 65
LOS: 5 days | Discharge: NURSING, REHAB OR LONG-TERM CARE | End: 2025-06-17
Attending: FAMILY MEDICINE | Admitting: HOSPITALIST
Payer: MEDICARE

## 2025-06-11 DIAGNOSIS — R45.89 ANXIETY ABOUT HEALTH: ICD-10-CM

## 2025-06-11 DIAGNOSIS — M54.9 INTRACTABLE BACK PAIN: ICD-10-CM

## 2025-06-11 DIAGNOSIS — E87.1 HYPONATREMIA: ICD-10-CM

## 2025-06-11 DIAGNOSIS — J18.9 MULTIFOCAL PNEUMONIA: ICD-10-CM

## 2025-06-11 DIAGNOSIS — M54.41 ACUTE RIGHT-SIDED LOW BACK PAIN WITH RIGHT-SIDED SCIATICA: ICD-10-CM

## 2025-06-11 DIAGNOSIS — J96.21 ACUTE ON CHRONIC HYPOXIC RESPIRATORY FAILURE: Primary | ICD-10-CM

## 2025-06-11 DIAGNOSIS — Z74.09 IMPAIRED MOBILITY: ICD-10-CM

## 2025-06-11 LAB
ALBUMIN SERPL-MCNC: 3.2 G/DL (ref 3.5–5.2)
ALBUMIN/GLOB SERPL: 1 G/DL
ALP SERPL-CCNC: 179 U/L (ref 39–117)
ALT SERPL W P-5'-P-CCNC: 11 U/L (ref 1–33)
ANION GAP SERPL CALCULATED.3IONS-SCNC: 15 MMOL/L (ref 5–15)
APTT PPP: 28.8 SECONDS (ref 24.5–36)
ARTERIAL PATENCY WRIST A: ABNORMAL
ARTERIAL PATENCY WRIST A: ABNORMAL
AST SERPL-CCNC: 21 U/L (ref 1–32)
ATMOSPHERIC PRESS: 755 MMHG
ATMOSPHERIC PRESS: 756 MMHG
B PARAPERT DNA SPEC QL NAA+PROBE: NOT DETECTED
B PERT DNA SPEC QL NAA+PROBE: NOT DETECTED
BASE EXCESS BLDA CALC-SCNC: -0.1 MMOL/L (ref 0–2)
BASE EXCESS BLDA CALC-SCNC: -2.1 MMOL/L (ref 0–2)
BASOPHILS # BLD MANUAL: 0 10*3/MM3 (ref 0–0.2)
BASOPHILS NFR BLD MANUAL: 0 % (ref 0–1.5)
BDY SITE: ABNORMAL
BDY SITE: ABNORMAL
BILIRUB SERPL-MCNC: 0.4 MG/DL (ref 0–1.2)
BILIRUB UR QL STRIP: NEGATIVE
BODY TEMPERATURE: 37
BODY TEMPERATURE: 37
BUN SERPL-MCNC: 9.8 MG/DL (ref 8–23)
BUN/CREAT SERPL: 13.4 (ref 7–25)
C PNEUM DNA NPH QL NAA+NON-PROBE: NOT DETECTED
CALCIUM SPEC-SCNC: 8.8 MG/DL (ref 8.6–10.5)
CHLORIDE SERPL-SCNC: 97 MMOL/L (ref 98–107)
CLARITY UR: CLEAR
CO2 SERPL-SCNC: 21 MMOL/L (ref 22–29)
COHGB MFR BLD: 1.4 % (ref 0–5)
COLOR UR: YELLOW
CREAT SERPL-MCNC: 0.73 MG/DL (ref 0.57–1)
D-LACTATE SERPL-SCNC: 1.9 MMOL/L (ref 0.5–2)
DEPRECATED RDW RBC AUTO: 45.1 FL (ref 37–54)
EGFRCR SERPLBLD CKD-EPI 2021: 90.3 ML/MIN/1.73
EOSINOPHIL # BLD MANUAL: 0.23 10*3/MM3 (ref 0–0.4)
EOSINOPHIL NFR BLD MANUAL: 1.6 % (ref 0.3–6.2)
ERYTHROCYTE [DISTWIDTH] IN BLOOD BY AUTOMATED COUNT: 17.8 % (ref 12.3–15.4)
FLUAV SUBTYP SPEC NAA+PROBE: NOT DETECTED
FLUBV RNA ISLT QL NAA+PROBE: NOT DETECTED
GAS FLOW AIRWAY: 35 LPM
GAS FLOW AIRWAY: 6 LPM
GEN 5 1HR TROPONIN T REFLEX: 17 NG/L
GLOBULIN UR ELPH-MCNC: 3.2 GM/DL
GLUCOSE SERPL-MCNC: 131 MG/DL (ref 65–99)
GLUCOSE UR STRIP-MCNC: NEGATIVE MG/DL
HADV DNA SPEC NAA+PROBE: NOT DETECTED
HCO3 BLDA-SCNC: 22.3 MMOL/L (ref 20–26)
HCO3 BLDA-SCNC: 23.6 MMOL/L (ref 20–26)
HCOV 229E RNA SPEC QL NAA+PROBE: NOT DETECTED
HCOV HKU1 RNA SPEC QL NAA+PROBE: NOT DETECTED
HCOV NL63 RNA SPEC QL NAA+PROBE: NOT DETECTED
HCOV OC43 RNA SPEC QL NAA+PROBE: NOT DETECTED
HCT VFR BLD AUTO: 46.1 % (ref 34–46.6)
HCT VFR BLD CALC: 44.4 % (ref 38–51)
HGB BLD-MCNC: 14.3 G/DL (ref 12–15.9)
HGB BLDA-MCNC: 14.5 G/DL (ref 12–16)
HGB UR QL STRIP.AUTO: NEGATIVE
HMPV RNA NPH QL NAA+NON-PROBE: NOT DETECTED
HOLD SPECIMEN: NORMAL
HPIV1 RNA ISLT QL NAA+PROBE: NOT DETECTED
HPIV2 RNA SPEC QL NAA+PROBE: NOT DETECTED
HPIV3 RNA NPH QL NAA+PROBE: NOT DETECTED
HPIV4 P GENE NPH QL NAA+PROBE: NOT DETECTED
INHALED O2 CONCENTRATION: 50 %
INR PPP: 1.04 (ref 0.91–1.09)
KETONES UR QL STRIP: ABNORMAL
LEUKOCYTE ESTERASE UR QL STRIP.AUTO: NEGATIVE
LYMPHOCYTES # BLD MANUAL: 1.96 10*3/MM3 (ref 0.7–3.1)
LYMPHOCYTES NFR BLD MANUAL: 4.1 % (ref 5–12)
Lab: ABNORMAL
Lab: ABNORMAL
M PNEUMO IGG SER IA-ACNC: NOT DETECTED
MAGNESIUM SERPL-MCNC: 1.8 MG/DL (ref 1.6–2.4)
MCH RBC QN AUTO: 23.7 PG (ref 26.6–33)
MCHC RBC AUTO-ENTMCNC: 31 G/DL (ref 31.5–35.7)
MCV RBC AUTO: 76.3 FL (ref 79–97)
METHGB BLD QL: 0.2 % (ref 0–3)
MODALITY: ABNORMAL
MODALITY: ABNORMAL
MONOCYTES # BLD: 0.58 10*3/MM3 (ref 0.1–0.9)
NEUTROPHILS # BLD AUTO: 11.41 10*3/MM3 (ref 1.7–7)
NEUTROPHILS NFR BLD MANUAL: 78 % (ref 42.7–76)
NEUTS BAND NFR BLD MANUAL: 2.4 % (ref 0–5)
NITRITE UR QL STRIP: NEGATIVE
NRBC SPEC MANUAL: 0.8 /100 WBC (ref 0–0.2)
OXYHGB MFR BLDV: 90.7 % (ref 94–99)
PCO2 BLDA: 35 MM HG (ref 35–45)
PCO2 BLDA: 36.2 MM HG (ref 35–45)
PCO2 TEMP ADJ BLD: 35 MM HG (ref 35–45)
PCO2 TEMP ADJ BLD: 36.2 MM HG (ref 35–45)
PH BLDA: 7.4 PH UNITS (ref 7.35–7.45)
PH BLDA: 7.44 PH UNITS (ref 7.35–7.45)
PH UR STRIP.AUTO: 5.5 [PH] (ref 5–8)
PH, TEMP CORRECTED: 7.4 PH UNITS (ref 7.35–7.45)
PH, TEMP CORRECTED: 7.44 PH UNITS (ref 7.35–7.45)
PLAT MORPH BLD: NORMAL
PLATELET # BLD AUTO: 242 10*3/MM3 (ref 140–450)
PMV BLD AUTO: 10.5 FL (ref 6–12)
PO2 BLDA: 62.6 MM HG (ref 83–108)
PO2 BLDA: 68.3 MM HG (ref 83–108)
PO2 TEMP ADJ BLD: 62.6 MM HG (ref 83–108)
PO2 TEMP ADJ BLD: 68.3 MM HG (ref 83–108)
POLYCHROMASIA BLD QL SMEAR: ABNORMAL
POTASSIUM BLDA-SCNC: 4.7 MMOL/L (ref 3.5–5.2)
POTASSIUM SERPL-SCNC: 4.3 MMOL/L (ref 3.5–5.2)
PROT SERPL-MCNC: 6.4 G/DL (ref 6–8.5)
PROT UR QL STRIP: NEGATIVE
PROTHROMBIN TIME: 14.1 SECONDS (ref 11.8–14.8)
RBC # BLD AUTO: 6.04 10*6/MM3 (ref 3.77–5.28)
RHINOVIRUS RNA SPEC NAA+PROBE: NOT DETECTED
RSV RNA NPH QL NAA+NON-PROBE: NOT DETECTED
SAO2 % BLDCOA: 92.2 % (ref 94–99)
SAO2 % BLDCOA: 94.8 % (ref 94–99)
SARS-COV-2 RNA RESP QL NAA+PROBE: NOT DETECTED
SODIUM BLDA-SCNC: 136 MMOL/L (ref 136–145)
SODIUM SERPL-SCNC: 133 MMOL/L (ref 136–145)
SP GR UR STRIP: 1.01 (ref 1–1.03)
TROPONIN T % DELTA: -11
TROPONIN T NUMERIC DELTA: -2 NG/L
TROPONIN T SERPL HS-MCNC: 19 NG/L
TSH SERPL DL<=0.05 MIU/L-ACNC: 3.3 UIU/ML (ref 0.27–4.2)
UROBILINOGEN UR QL STRIP: ABNORMAL
VARIANT LYMPHS NFR BLD MANUAL: 5.7 % (ref 0–5)
VARIANT LYMPHS NFR BLD MANUAL: 8.1 % (ref 19.6–45.3)
VENTILATOR MODE: ABNORMAL
VENTILATOR MODE: ABNORMAL
WBC MORPH BLD: NORMAL
WBC NRBC COR # BLD AUTO: 14.17 10*3/MM3 (ref 3.4–10.8)
WHOLE BLOOD HOLD COAG: NORMAL
WHOLE BLOOD HOLD SPECIMEN: NORMAL

## 2025-06-11 PROCEDURE — 36600 WITHDRAWAL OF ARTERIAL BLOOD: CPT

## 2025-06-11 PROCEDURE — 93005 ELECTROCARDIOGRAM TRACING: CPT | Performed by: FAMILY MEDICINE

## 2025-06-11 PROCEDURE — 71045 X-RAY EXAM CHEST 1 VIEW: CPT

## 2025-06-11 PROCEDURE — 85025 COMPLETE CBC W/AUTO DIFF WBC: CPT | Performed by: FAMILY MEDICINE

## 2025-06-11 PROCEDURE — 94799 UNLISTED PULMONARY SVC/PX: CPT

## 2025-06-11 PROCEDURE — 82805 BLOOD GASES W/O2 SATURATION: CPT

## 2025-06-11 PROCEDURE — 25010000002 METHYLPREDNISOLONE PER 125 MG: Performed by: FAMILY MEDICINE

## 2025-06-11 PROCEDURE — 84484 ASSAY OF TROPONIN QUANT: CPT | Performed by: FAMILY MEDICINE

## 2025-06-11 PROCEDURE — 83050 HGB METHEMOGLOBIN QUAN: CPT

## 2025-06-11 PROCEDURE — 74176 CT ABD & PELVIS W/O CONTRAST: CPT

## 2025-06-11 PROCEDURE — 80053 COMPREHEN METABOLIC PANEL: CPT | Performed by: FAMILY MEDICINE

## 2025-06-11 PROCEDURE — 25010000002 METHYLPREDNISOLONE PER 125 MG: Performed by: HOSPITALIST

## 2025-06-11 PROCEDURE — 81003 URINALYSIS AUTO W/O SCOPE: CPT | Performed by: FAMILY MEDICINE

## 2025-06-11 PROCEDURE — 25810000003 SODIUM CHLORIDE 0.9 % SOLUTION: Performed by: FAMILY MEDICINE

## 2025-06-11 PROCEDURE — 94640 AIRWAY INHALATION TREATMENT: CPT

## 2025-06-11 PROCEDURE — 25010000002 FENTANYL CITRATE (PF) 50 MCG/ML SOLUTION: Performed by: FAMILY MEDICINE

## 2025-06-11 PROCEDURE — 85007 BL SMEAR W/DIFF WBC COUNT: CPT | Performed by: FAMILY MEDICINE

## 2025-06-11 PROCEDURE — G0378 HOSPITAL OBSERVATION PER HR: HCPCS

## 2025-06-11 PROCEDURE — 85730 THROMBOPLASTIN TIME PARTIAL: CPT | Performed by: FAMILY MEDICINE

## 2025-06-11 PROCEDURE — 82375 ASSAY CARBOXYHB QUANT: CPT

## 2025-06-11 PROCEDURE — 87040 BLOOD CULTURE FOR BACTERIA: CPT | Performed by: FAMILY MEDICINE

## 2025-06-11 PROCEDURE — 0202U NFCT DS 22 TRGT SARS-COV-2: CPT | Performed by: FAMILY MEDICINE

## 2025-06-11 PROCEDURE — 84443 ASSAY THYROID STIM HORMONE: CPT | Performed by: FAMILY MEDICINE

## 2025-06-11 PROCEDURE — 99285 EMERGENCY DEPT VISIT HI MDM: CPT | Performed by: FAMILY MEDICINE

## 2025-06-11 PROCEDURE — 25010000002 MAGNESIUM SULFATE 2 GM/50ML SOLUTION: Performed by: FAMILY MEDICINE

## 2025-06-11 PROCEDURE — 82803 BLOOD GASES ANY COMBINATION: CPT

## 2025-06-11 PROCEDURE — 25010000002 PIPERACILLIN SOD-TAZOBACTAM PER 1 G: Performed by: FAMILY MEDICINE

## 2025-06-11 PROCEDURE — 83735 ASSAY OF MAGNESIUM: CPT | Performed by: FAMILY MEDICINE

## 2025-06-11 PROCEDURE — 85610 PROTHROMBIN TIME: CPT | Performed by: FAMILY MEDICINE

## 2025-06-11 PROCEDURE — 25010000002 ONDANSETRON PER 1 MG: Performed by: FAMILY MEDICINE

## 2025-06-11 PROCEDURE — 93010 ELECTROCARDIOGRAM REPORT: CPT | Performed by: HOSPITALIST

## 2025-06-11 PROCEDURE — 36415 COLL VENOUS BLD VENIPUNCTURE: CPT

## 2025-06-11 PROCEDURE — 25010000002 FUROSEMIDE PER 20 MG: Performed by: HOSPITALIST

## 2025-06-11 PROCEDURE — 83605 ASSAY OF LACTIC ACID: CPT | Performed by: FAMILY MEDICINE

## 2025-06-11 RX ORDER — ONDANSETRON 2 MG/ML
4 INJECTION INTRAMUSCULAR; INTRAVENOUS EVERY 6 HOURS PRN
Status: DISCONTINUED | OUTPATIENT
Start: 2025-06-11 | End: 2025-06-17 | Stop reason: HOSPADM

## 2025-06-11 RX ORDER — SODIUM CHLORIDE 0.9 % (FLUSH) 0.9 %
10 SYRINGE (ML) INJECTION AS NEEDED
Status: DISCONTINUED | OUTPATIENT
Start: 2025-06-11 | End: 2025-06-17 | Stop reason: HOSPADM

## 2025-06-11 RX ORDER — ONDANSETRON 2 MG/ML
4 INJECTION INTRAMUSCULAR; INTRAVENOUS ONCE
Status: COMPLETED | OUTPATIENT
Start: 2025-06-11 | End: 2025-06-11

## 2025-06-11 RX ORDER — FUROSEMIDE 10 MG/ML
20 INJECTION INTRAMUSCULAR; INTRAVENOUS EVERY 12 HOURS
Status: DISCONTINUED | OUTPATIENT
Start: 2025-06-11 | End: 2025-06-16

## 2025-06-11 RX ORDER — ALPRAZOLAM 0.5 MG
0.5 TABLET ORAL EVERY 8 HOURS PRN
Status: DISCONTINUED | OUTPATIENT
Start: 2025-06-11 | End: 2025-06-17 | Stop reason: HOSPADM

## 2025-06-11 RX ORDER — ACETAMINOPHEN 160 MG/5ML
650 SOLUTION ORAL EVERY 4 HOURS PRN
Status: DISCONTINUED | OUTPATIENT
Start: 2025-06-11 | End: 2025-06-17 | Stop reason: HOSPADM

## 2025-06-11 RX ORDER — POLYETHYLENE GLYCOL 3350 17 G/17G
17 POWDER, FOR SOLUTION ORAL DAILY PRN
Status: DISCONTINUED | OUTPATIENT
Start: 2025-06-11 | End: 2025-06-13

## 2025-06-11 RX ORDER — HYDROCODONE BITARTRATE AND ACETAMINOPHEN 5; 325 MG/1; MG/1
1 TABLET ORAL EVERY 6 HOURS PRN
Status: DISCONTINUED | OUTPATIENT
Start: 2025-06-11 | End: 2025-06-14

## 2025-06-11 RX ORDER — ACETAMINOPHEN 650 MG/1
650 SUPPOSITORY RECTAL EVERY 4 HOURS PRN
Status: DISCONTINUED | OUTPATIENT
Start: 2025-06-11 | End: 2025-06-17 | Stop reason: HOSPADM

## 2025-06-11 RX ORDER — IPRATROPIUM BROMIDE AND ALBUTEROL SULFATE 2.5; .5 MG/3ML; MG/3ML
3 SOLUTION RESPIRATORY (INHALATION)
Status: DISCONTINUED | OUTPATIENT
Start: 2025-06-11 | End: 2025-06-12

## 2025-06-11 RX ORDER — MAGNESIUM SULFATE HEPTAHYDRATE 40 MG/ML
2 INJECTION, SOLUTION INTRAVENOUS ONCE
Status: COMPLETED | OUTPATIENT
Start: 2025-06-11 | End: 2025-06-11

## 2025-06-11 RX ORDER — LACTULOSE 10 G/15ML
20 SOLUTION ORAL 3 TIMES DAILY
Status: DISCONTINUED | OUTPATIENT
Start: 2025-06-11 | End: 2025-06-13

## 2025-06-11 RX ORDER — BISACODYL 5 MG/1
5 TABLET, DELAYED RELEASE ORAL DAILY PRN
Status: DISCONTINUED | OUTPATIENT
Start: 2025-06-11 | End: 2025-06-13

## 2025-06-11 RX ORDER — BISACODYL 10 MG
10 SUPPOSITORY, RECTAL RECTAL DAILY PRN
Status: DISCONTINUED | OUTPATIENT
Start: 2025-06-11 | End: 2025-06-13

## 2025-06-11 RX ORDER — ENOXAPARIN SODIUM 100 MG/ML
40 INJECTION SUBCUTANEOUS DAILY
Status: DISCONTINUED | OUTPATIENT
Start: 2025-06-11 | End: 2025-06-17 | Stop reason: HOSPADM

## 2025-06-11 RX ORDER — FENTANYL CITRATE 50 UG/ML
50 INJECTION, SOLUTION INTRAMUSCULAR; INTRAVENOUS ONCE
Refills: 0 | Status: COMPLETED | OUTPATIENT
Start: 2025-06-11 | End: 2025-06-11

## 2025-06-11 RX ORDER — NITROGLYCERIN 0.4 MG/1
0.4 TABLET SUBLINGUAL
Status: DISCONTINUED | OUTPATIENT
Start: 2025-06-11 | End: 2025-06-17 | Stop reason: HOSPADM

## 2025-06-11 RX ORDER — METHYLPREDNISOLONE SODIUM SUCCINATE 125 MG/2ML
80 INJECTION, POWDER, LYOPHILIZED, FOR SOLUTION INTRAMUSCULAR; INTRAVENOUS EVERY 8 HOURS
Status: DISCONTINUED | OUTPATIENT
Start: 2025-06-11 | End: 2025-06-13

## 2025-06-11 RX ORDER — SODIUM CHLORIDE 9 MG/ML
40 INJECTION, SOLUTION INTRAVENOUS AS NEEDED
Status: DISCONTINUED | OUTPATIENT
Start: 2025-06-11 | End: 2025-06-17 | Stop reason: HOSPADM

## 2025-06-11 RX ORDER — AMOXICILLIN 250 MG
2 CAPSULE ORAL 2 TIMES DAILY PRN
Status: DISCONTINUED | OUTPATIENT
Start: 2025-06-11 | End: 2025-06-13

## 2025-06-11 RX ORDER — SODIUM CHLORIDE 0.9 % (FLUSH) 0.9 %
10 SYRINGE (ML) INJECTION EVERY 12 HOURS SCHEDULED
Status: DISCONTINUED | OUTPATIENT
Start: 2025-06-11 | End: 2025-06-17 | Stop reason: HOSPADM

## 2025-06-11 RX ORDER — ACETAMINOPHEN 325 MG/1
650 TABLET ORAL EVERY 4 HOURS PRN
Status: DISCONTINUED | OUTPATIENT
Start: 2025-06-11 | End: 2025-06-17 | Stop reason: HOSPADM

## 2025-06-11 RX ORDER — METHYLPREDNISOLONE SODIUM SUCCINATE 125 MG/2ML
125 INJECTION, POWDER, LYOPHILIZED, FOR SOLUTION INTRAMUSCULAR; INTRAVENOUS ONCE
Status: COMPLETED | OUTPATIENT
Start: 2025-06-11 | End: 2025-06-11

## 2025-06-11 RX ADMIN — PIPERACILLIN AND TAZOBACTAM 3.38 G: 3; .375 INJECTION, POWDER, FOR SOLUTION INTRAVENOUS at 18:04

## 2025-06-11 RX ADMIN — SODIUM CHLORIDE 1000 ML: 9 INJECTION, SOLUTION INTRAVENOUS at 12:22

## 2025-06-11 RX ADMIN — ONDANSETRON 4 MG: 2 INJECTION INTRAMUSCULAR; INTRAVENOUS at 12:22

## 2025-06-11 RX ADMIN — FENTANYL CITRATE 50 MCG: 50 INJECTION, SOLUTION INTRAMUSCULAR; INTRAVENOUS at 15:43

## 2025-06-11 RX ADMIN — FUROSEMIDE 20 MG: 10 INJECTION, SOLUTION INTRAMUSCULAR; INTRAVENOUS at 20:48

## 2025-06-11 RX ADMIN — METHYLPREDNISOLONE SODIUM SUCCINATE 125 MG: 125 INJECTION, POWDER, FOR SOLUTION INTRAMUSCULAR; INTRAVENOUS at 12:22

## 2025-06-11 RX ADMIN — IPRATROPIUM BROMIDE AND ALBUTEROL SULFATE 3 ML: .5; 3 SOLUTION RESPIRATORY (INHALATION) at 19:41

## 2025-06-11 RX ADMIN — MAGNESIUM SULFATE HEPTAHYDRATE 2 G: 40 INJECTION, SOLUTION INTRAVENOUS at 12:22

## 2025-06-11 RX ADMIN — LACTULOSE 20 G: 20 SOLUTION ORAL at 20:48

## 2025-06-11 RX ADMIN — Medication 10 ML: at 23:59

## 2025-06-11 RX ADMIN — FENTANYL CITRATE 50 MCG: 50 INJECTION, SOLUTION INTRAMUSCULAR; INTRAVENOUS at 12:22

## 2025-06-11 RX ADMIN — METHYLPREDNISOLONE SODIUM SUCCINATE 80 MG: 125 INJECTION, POWDER, FOR SOLUTION INTRAMUSCULAR; INTRAVENOUS at 20:47

## 2025-06-11 NOTE — ED PROVIDER NOTES
HPI:    Patient is a 65-year-old white female presents to the emergency room with worsening shortness of breath and back pain.  Patient states she has right flank pain and has been getting worse over the last 3 to 4 days.  She states that her urine has also been dark.  She states that she does have a history of ureteral stones.  She states this is feels just like it.  She is also been more short of breath today and called EMS for her right flank and back pain but noted that her oxygen was in the 70s while upon arrival and tachycardic.  She refused a DuoNeb by EMS en route.  Currently on 6 L as she normally always is in satting in the 85% upon arrival.  Patient denies any fever.  She states that her heart is racing because she is in pain and short of breath.  Patient denies any fall or trauma.      REVIEW OF SYSTEMS  CONSTITUTIONAL:  No complaints of fever, chills,or weakness  EYES:  No complaints of discharge   ENT: No complaints of sore throat or ear pain  CARDIOVASCULAR: Positive for palpitation  RESPIRATORY: Positive for shortness of breath   GI:  No complaints of abdominal pain, nausea, vomiting, or diarrhea  MUSCULOSKELETAL:  No complaints of back pain  SKIN:  No complaints of rash  NEUROLOGIC:  No complaints of headache, focal weakness, or sensory changes  ENDOCRINE:  No complaints of polyuria or polydipsia  LYMPHATIC:  No complaints of swollen glands  GENITOURINARY: Positive for right flank pain and dark urine and urinary frequency urgency        PAST MEDICAL HISTORY  Past Medical History:   Diagnosis Date    Allergies     Arthritis     BMI 31.0-31.9,adult 06/04/2019    Calcified granuloma of lung 06/04/2019    Right lung    CHF (congestive heart failure)     denies    Chronic idiopathic fibrosing alveolitis     Chronic respiratory failure with hypoxia 08/05/2020    Chronic respiratory failure with hypoxia, on home oxygen therapy     COVID-19 02/01/2022    CREST syndrome     Environmental allergies 02/01/2022     Gastroesophageal reflux disease without esophagitis 06/04/2019    Interstitial lung disease     Obstructive sleep apnea on CPAP 06/04/2019    Oropharyngeal dysphagia 01/26/2023    Primary central sleep apnea     Pulmonary fibrosis     Pulmonary hypertension     Rheumatoid arthritis     Right ventricular systolic dysfunction 05/04/2023    Short-term memory loss        FAMILY HISTORY  Family History   Problem Relation Age of Onset    Cirrhosis Sister     Liver cancer Brother     No Known Problems Mother     No Known Problems Father     Colon cancer Neg Hx     Colon polyps Neg Hx        SOCIAL HISTORY  Social History     Socioeconomic History    Marital status:    Tobacco Use    Smoking status: Never     Passive exposure: Past    Smokeless tobacco: Never   Vaping Use    Vaping status: Never Used   Substance and Sexual Activity    Alcohol use: No    Drug use: No    Sexual activity: Not Currently     Partners: Male       IMMUNIZATION HISTORY  Deferred to primary care physician.    SURGICAL HISTORY  Past Surgical History:   Procedure Laterality Date    BREAST BIOPSY      CARDIAC CATHETERIZATION N/A 07/22/2020    Procedure: Right Heart Cath;  Surgeon: Thong Martin MD;  Location: [facility] CATH INVASIVE LOCATION;  Service: Cardiology;  Laterality: N/A;    CHOLECYSTECTOMY      COLONOSCOPY  05/11/2015    5 POLYPS    COLONOSCOPY N/A 08/04/2021    Procedure: COLONOSCOPY WITH ANESTHESIA;  Surgeon: Michael Landin DO;  Location: [facility] ENDOSCOPY;  Service: Gastroenterology;  Laterality: N/A;  pre-hx polyps  post-colon polyps    ENDOSCOPY N/A 08/04/2021    Procedure: ESOPHAGOGASTRODUODENOSCOPY WITH ANESTHESIA;  Surgeon: Michael Landin DO;  Location: [facility] ENDOSCOPY;  Service: Gastroenterology;  Laterality: N/A;  pre-dysphagia  post-normal  pcp-lanette agiula       CURRENT MEDICATIONS    Current Facility-Administered Medications:     piperacillin-tazobactam (ZOSYN) 3.375 g IVPB in 100 mL NS MBP (CD), 3.375 g,  Intravenous, Once, Luis Lundberg Jr., MD    Current Outpatient Medications:     alendronate (FOSAMAX) 70 MG tablet, Take 1 tablet by mouth 1 (One) Time Per Week., Disp: , Rfl:     busPIRone (BUSPAR) 10 MG tablet, Take 1 tablet by mouth 3 (Three) Times a Day As Needed (anxiety)., Disp: , Rfl:     famotidine (PEPCID) 20 MG tablet, Take 1 tablet by mouth 2 (Two) Times a Day Before Meals., Disp: 60 tablet, Rfl: 0    ipratropium-albuterol (DUO-NEB) 0.5-2.5 mg/3 ml nebulizer, Take 3 mL by nebulization 4 (Four) Times a Day As Needed for Wheezing for up to 30 days., Disp: 120 mL, Rfl: 5    Macitentan-Tadalafil 10-40 MG tablet, Take 1 tablet by mouth Every Night., Disp: , Rfl:     metoprolol succinate XL (TOPROL-XL) 25 MG 24 hr tablet, Take 0.5 tablets by mouth Daily., Disp: 45 tablet, Rfl: 3    mycophenolate (CELLCEPT) 500 MG tablet, Take 2 tablets by mouth 2 (Two) Times a Day., Disp: , Rfl:     omeprazole (priLOSEC) 40 MG capsule, Take 1 capsule by mouth Daily., Disp: 90 capsule, Rfl: 3    potassium chloride 10 MEQ CR tablet, Take 1 tablet by mouth Daily., Disp: 30 tablet, Rfl: 11    predniSONE (DELTASONE) 5 MG tablet, Take 1 tablet by mouth Daily for 90 days., Disp: 90 tablet, Rfl: 3    Sotatercept-csrk (Winrevair) 60 MG kit, Inject 0.9 mL under the skin into the appropriate area as directed Every 21 (Twenty-One) Days., Disp: 1 kit, Rfl: 11    spironolactone (ALDACTONE) 50 MG tablet, Take 1 tablet by mouth Daily., Disp: 90 tablet, Rfl: 3    traZODone (DESYREL) 100 MG tablet, Take 1 tablet by mouth Every Night for 90 days., Disp: 90 tablet, Rfl: 3    Treprostinil (Tyvaso) 0.6 MG/ML solution inhalation solution, Inhale 12 puffs 4 (Four) Times a Day., Disp: , Rfl:     venlafaxine (EFFEXOR) 75 MG tablet, Take 1 tablet by mouth Daily., Disp: , Rfl:     vitamin D (ERGOCALCIFEROL) 1.25 MG (21759 UT) capsule capsule, Take 1 capsule by mouth 1 (One) Time Per Week., Disp: , Rfl:     ALLERGIES  Allergies   Allergen Reactions  "   Codeine Nausea And Vomiting    Latex Rash    Prednisone Mental Status Change     High doses makes her \"nuts\"         Respiratory Exam    VITAL SIGNS:   BP 96/61   Pulse 111   Temp 98.5 °F (36.9 °C) (Oral)   Resp (!) 30   Ht 160 cm (63\")   Wt 70.8 kg (156 lb 1.6 oz)   SpO2 97%   BMI 27.65 kg/m²     Constitutional: Patient is alert and in mild respiratory distress.  Patient with moderate right flank and back discomfort.    ENT: There is a normal pharynx with no acute erythema or exudate and oral mucosa is moist.  Nose is clear with no drainage.  Tympanic membranes intact and non-erythemic    Cardiovascular: S1-S2 tachycardic with no murmur.    Respiratory: Decreased breath sounds in both lung fields with soft crackles in the bases.    Abdomen: Soft, nontender.  Bowel sounds are normal in all 4 quadrants.  There is no rebound or guarding noted.  There is no abdominal distention or hepatosplenomegaly..    Genitourinary: Patient is voiding appropriately.    Integument: No acute lesions noted.  Color appears to be normal.    Marianela Coma Scale: Total score 15    Neurological: Patient is alert and oriented x4 and no acute findings noted.  Speech is fluent and cognition is normal.  No evidence of acute CVA.  Cranial nerves II through XII intact.  Patient with normal motor function as well as reflexes and sensation.    Psychiatric: Normal affect and mood      RADIOLOGY/PROCEDURES    CT Abdomen Pelvis Stone Protocol   Final Result       1. Bilateral nonobstructing intrarenal stones measuring 4 mm or less. No   ureteral stones or hydronephrosis. No bladder stones. Left renal cyst.   2. Pathologic mediastinal right hilar lymphadenopathy is redemonstrated   as seen on CTA chest of 5/22/2025. Advanced basilar chronic interstitial   lung disease with bronchiectasis. Slightly increasing small pericardial   effusion with cardiomegaly.   3. Small hiatal hernia. Fluid of the mildly distended lower esophagus   may relate to " reflux.   4. Moderate fecal stasis. Normal appendix. No evidence for bowel   obstruction. No free air or abscess.       This report was signed and finalized on 6/11/2025 1:45 PM by Dr. Keke Bangura MD.          XR Chest 1 View   Final Result   1. Increasing bilateral perihilar opacities favoring pulmonary edema   over multifocal pneumonia superimposed on advanced chronic interstitial   lung disease with basilar predilection. Similar nodular right upper lobe   opacities concerning for potential malignancy. Right hilar and   mediastinal lymphadenopathy better seen on recent CTA chest of   5/22/2025.           This report was signed and finalized on 6/11/2025 12:39 PM by Dr. Keke Bangura MD.                FUTURE APPOINTMENTS     Future Appointments   Date Time Provider Department Center   6/23/2025  3:00 PM [facility] ECHO ROOM 1  [facility]   7/1/2025 11:00 AM Fela Blackman, APRN [facility]          EKG (reviewed and interpreted by me):  Undetermined rhythm with a ventricular rate of 130.  No acute ST segment elevation depression noted      COURSE & MEDICAL DECISION MAKING     Patient's partial differential diagnosis can include:    pulmonary embolism, pneumothorax, Pneumonia, COPD exacerbation, pulmonary edema, arrhythmia, pneumonitis, pleural effusion,  empyema, bronchitis, bronchiolitis, congestive heart failure, asthma exacerbation,  atelectasis,viral pneumonia, and others      CBC, CMP, magnesium, PT, PTT, lactic acid, blood culture x 2, troponin, BNP, chest x-ray, EKG, ABG, and CT abdomen pelvis renal  stone protocol.  Sepsis bolus of fluids.      Patient placed on high flow oxygen due to her hypoxia.    Repeat ABG is not improved still with low on her oxygen.  Mild pulmonary edema is noted on the chest x-ray along with pulmonary fibrosis and possible multifocal pneumonia.      Due to the patient's intractable pain she has been given fentanyl 50 mcg x 2 with minimal improvement of her pain.  Will  admit her for pain control but also due to her respiratory hypoxia..     Patient's level of risk: Moderately high        CRITICAL CARE    CRITICAL CARE: No    CRITICAL CARE TIME: None      Also Old charts were reviewed per [facility] EMR.  Pertinent details are summarized above.  All laboratory, radiologic, and EKG studies that were performed in the Emergency Department were a necessary part of the evaluation needed to exclude unstable or emergent medical conditions:     Patient was hemodynamically and neurologically stable in the ED.   Pertinent studies were reviewed as above.     Recent Results (from the past 24 hours)   ECG 12 Lead Dyspnea    Collection Time: 06/11/25 11:40 AM   Result Value Ref Range    QT Interval 312 ms    QTC Interval 459 ms   Green Top (Gel)    Collection Time: 06/11/25 11:43 AM   Result Value Ref Range    Extra Tube Hold for add-ons.    Lavender Top    Collection Time: 06/11/25 11:43 AM   Result Value Ref Range    Extra Tube hold for add-on    Red Top    Collection Time: 06/11/25 11:43 AM   Result Value Ref Range    Extra Tube Hold for add-ons.    Gray Top    Collection Time: 06/11/25 11:43 AM   Result Value Ref Range    Extra Tube Hold for add-ons.    Light Blue Top    Collection Time: 06/11/25 11:43 AM   Result Value Ref Range    Extra Tube Hold for add-ons.    Comprehensive Metabolic Panel    Collection Time: 06/11/25 11:43 AM    Specimen: Blood   Result Value Ref Range    Glucose 131 (H) 65 - 99 mg/dL    BUN 9.8 8.0 - 23.0 mg/dL    Creatinine 0.73 0.57 - 1.00 mg/dL    Sodium 133 (L) 136 - 145 mmol/L    Potassium 4.3 3.5 - 5.2 mmol/L    Chloride 97 (L) 98 - 107 mmol/L    CO2 21.0 (L) 22.0 - 29.0 mmol/L    Calcium 8.8 8.6 - 10.5 mg/dL    Total Protein 6.4 6.0 - 8.5 g/dL    Albumin 3.2 (L) 3.5 - 5.2 g/dL    ALT (SGPT) 11 1 - 33 U/L    AST (SGOT) 21 1 - 32 U/L    Alkaline Phosphatase 179 (H) 39 - 117 U/L    Total Bilirubin 0.4 0.0 - 1.2 mg/dL    Globulin 3.2 gm/dL    A/G Ratio 1.0 g/dL     BUN/Creatinine Ratio 13.4 7.0 - 25.0    Anion Gap 15.0 5.0 - 15.0 mmol/L    eGFR 90.3 >60.0 mL/min/1.73   Protime-INR    Collection Time: 06/11/25 11:43 AM    Specimen: Blood   Result Value Ref Range    Protime 14.1 11.8 - 14.8 Seconds    INR 1.04 0.91 - 1.09   aPTT    Collection Time: 06/11/25 11:43 AM    Specimen: Blood   Result Value Ref Range    PTT 28.8 24.5 - 36.0 seconds   Lactic Acid, Plasma    Collection Time: 06/11/25 11:43 AM    Specimen: Blood   Result Value Ref Range    Lactate 1.9 0.5 - 2.0 mmol/L   Magnesium    Collection Time: 06/11/25 11:43 AM    Specimen: Blood   Result Value Ref Range    Magnesium 1.8 1.6 - 2.4 mg/dL   TSH Rfx On Abnormal To Free T4    Collection Time: 06/11/25 11:43 AM    Specimen: Blood   Result Value Ref Range    TSH 3.300 0.270 - 4.200 uIU/mL   CBC Auto Differential    Collection Time: 06/11/25 11:43 AM    Specimen: Blood   Result Value Ref Range    WBC 14.17 (H) 3.40 - 10.80 10*3/mm3    RBC 6.04 (H) 3.77 - 5.28 10*6/mm3    Hemoglobin 14.3 12.0 - 15.9 g/dL    Hematocrit 46.1 34.0 - 46.6 %    MCV 76.3 (L) 79.0 - 97.0 fL    MCH 23.7 (L) 26.6 - 33.0 pg    MCHC 31.0 (L) 31.5 - 35.7 g/dL    RDW 17.8 (H) 12.3 - 15.4 %    RDW-SD 45.1 37.0 - 54.0 fl    MPV 10.5 6.0 - 12.0 fL    Platelets 242 140 - 450 10*3/mm3   Manual Differential    Collection Time: 06/11/25 11:43 AM    Specimen: Blood   Result Value Ref Range    Neutrophil % 78.0 (H) 42.7 - 76.0 %    Lymphocyte % 8.1 (L) 19.6 - 45.3 %    Monocyte % 4.1 (L) 5.0 - 12.0 %    Eosinophil % 1.6 0.3 - 6.2 %    Basophil % 0.0 0.0 - 1.5 %    Bands %  2.4 0.0 - 5.0 %    Atypical Lymphocyte % 5.7 (H) 0.0 - 5.0 %    Neutrophils Absolute 11.41 (H) 1.70 - 7.00 10*3/mm3    Lymphocytes Absolute 1.96 0.70 - 3.10 10*3/mm3    Monocytes Absolute 0.58 0.10 - 0.90 10*3/mm3    Eosinophils Absolute 0.23 0.00 - 0.40 10*3/mm3    Basophils Absolute 0.00 0.00 - 0.20 10*3/mm3    nRBC 0.8 (H) 0.0 - 0.2 /100 WBC    Polychromasia Slight/1+ None Seen    WBC  Morphology Normal Normal    Platelet Morphology Normal Normal   High Sensitivity Troponin T    Collection Time: 06/11/25 11:43 AM    Specimen: Blood   Result Value Ref Range    HS Troponin T 19 (H) <14 ng/L   Blood Gas, Arterial -    Collection Time: 06/11/25 11:50 AM    Specimen: Arterial Blood   Result Value Ref Range    Site Right Brachial     Tony's Test N/A     pH, Arterial 7.438 7.350 - 7.450 pH units    pCO2, Arterial 35.0 35.0 - 45.0 mm Hg    pO2, Arterial 68.3 (L) 83.0 - 108.0 mm Hg    HCO3, Arterial 23.6 20.0 - 26.0 mmol/L    Base Excess, Arterial -0.1 (L) 0.0 - 2.0 mmol/L    O2 Saturation, Arterial 94.8 94.0 - 99.0 %    Temperature 37.0     Barometric Pressure for Blood Gas 756 mmHg    Modality Nasal Cannula     Flow Rate 6.0 lpm    Ventilator Mode NA     Collected by [id number]     pCO2, Temperature Corrected 35.0 35 - 45 mm Hg    pH, Temp Corrected 7.438 7.350 - 7.450 pH Units    pO2, Temperature Corrected 68.3 (L) 83 - 108 mm Hg   Respiratory Panel PCR w/COVID-19(SARS-CoV-2) KARISHMA/LUIS ARMANDO/DENYS/[facility]/DAVID In-House, NP Swab in UTM/VTM, 2 HR TAT - Swab, Nasopharynx    Collection Time: 06/11/25 12:15 PM    Specimen: Nasopharynx; Swab   Result Value Ref Range    ADENOVIRUS, PCR Not Detected Not Detected    Coronavirus 229E Not Detected Not Detected    Coronavirus HKU1 Not Detected Not Detected    Coronavirus NL63 Not Detected Not Detected    Coronavirus OC43 Not Detected Not Detected    COVID19 Not Detected Not Detected - Ref. Range    Human Metapneumovirus Not Detected Not Detected    Human Rhinovirus/Enterovirus Not Detected Not Detected    Influenza A PCR Not Detected Not Detected    Influenza B PCR Not Detected Not Detected    Parainfluenza Virus 1 Not Detected Not Detected    Parainfluenza Virus 2 Not Detected Not Detected    Parainfluenza Virus 3 Not Detected Not Detected    Parainfluenza Virus 4 Not Detected Not Detected    RSV, PCR Not Detected Not Detected    Bordetella pertussis pcr Not Detected Not  Detected    Bordetella parapertussis PCR Not Detected Not Detected    Chlamydophila pneumoniae PCR Not Detected Not Detected    Mycoplasma pneumo by PCR Not Detected Not Detected   High Sensitivity Troponin T 1Hr    Collection Time: 06/11/25 12:59 PM    Specimen: Blood   Result Value Ref Range    HS Troponin T 17 (H) <14 ng/L    Troponin T Numeric Delta -2 ng/L    Troponin T % Delta -11 Abnormal if >/= 20%   Urinalysis With Culture If Indicated - Urine, Clean Catch    Collection Time: 06/11/25  1:04 PM    Specimen: Urine, Clean Catch   Result Value Ref Range    Color, UA Yellow Yellow, Straw    Appearance, UA Clear Clear    pH, UA 5.5 5.0 - 8.0    Specific Gravity, UA 1.013 1.005 - 1.030    Glucose, UA Negative Negative    Ketones, UA Trace (A) Negative    Bilirubin, UA Negative Negative    Blood, UA Negative Negative    Protein, UA Negative Negative    Leuk Esterase, UA Negative Negative    Nitrite, UA Negative Negative    Urobilinogen, UA 1.0 E.U./dL 0.2 - 1.0 E.U./dL   Blood Gas, Arterial With Co-Ox    Collection Time: 06/11/25  4:14 PM    Specimen: Arterial Blood   Result Value Ref Range    Site Right Brachial     Tony's Test N/A     pH, Arterial 7.398 7.350 - 7.450 pH units    pCO2, Arterial 36.2 35.0 - 45.0 mm Hg    pO2, Arterial 62.6 (L) 83.0 - 108.0 mm Hg    HCO3, Arterial 22.3 20.0 - 26.0 mmol/L    Base Excess, Arterial -2.1 (L) 0.0 - 2.0 mmol/L    O2 Saturation, Arterial 92.2 (L) 94.0 - 99.0 %    Hemoglobin, Blood Gas 14.5 12 - 16 g/dL    Hematocrit, Blood Gas 44.4 38.0 - 51.0 %    Oxyhemoglobin 90.7 (L) 94 - 99 %    Methemoglobin 0.20 0.00 - 3.00 %    Carboxyhemoglobin 1.4 0 - 5 %    Temperature 37.0     Sodium, Arterial 136 136 - 145 mmol/L    Potassium, Arterial 4.7 3.5 - 5.2 mmol/L    Barometric Pressure for Blood Gas 755 mmHg    Modality Heated HFNC     FIO2 50 %    Flow Rate 35.0 lpm    Ventilator Mode NA     Collected by [id number]     pH, Temp Corrected 7.398 7.350 - 7.450 pH Units    pCO2,  Temperature Corrected 36.2 35 - 45 mm Hg    pO2, Temperature Corrected 62.6 (L) 83 - 108 mm Hg       The patient received:  Medications   piperacillin-tazobactam (ZOSYN) 3.375 g IVPB in 100 mL NS MBP (CD) (has no administration in time range)   fentaNYL citrate (PF) (SUBLIMAZE) injection 50 mcg (50 mcg Intravenous Given 6/11/25 1222)   ondansetron (ZOFRAN) injection 4 mg (4 mg Intravenous Given 6/11/25 1222)   sodium chloride 0.9 % bolus 1,000 mL (0 mL Intravenous Stopped 6/11/25 1347)   methylPREDNISolone sodium succinate (SOLU-Medrol) injection 125 mg (125 mg Intravenous Given 6/11/25 1222)   magnesium sulfate 2g/50 mL (PREMIX) infusion (0 g Intravenous Stopped 6/11/25 1258)   fentaNYL citrate (PF) (SUBLIMAZE) injection 50 mcg (50 mcg Intravenous Given 6/11/25 1543)            ED Disposition       ED Disposition   Decision to Admit    Condition   --    Comment   Level of Care: Med/Surg [1]   Diagnosis: Acute on chronic respiratory failure with hypoxia [2189049]   Admitting Physician: GERALD MORALEZ [[id number]]   Attending Physician: GERALD MORALEZ [[id number]]                     Dragon disclaimer:  Part of this note may be an electronic transcription/translation of spoken language to printed text using the Dragon Dictation System.     I have reviewed the patient’s prescription history via a prescription monitoring program.  This information is consistent with my knowledge of the patient’s controlled substance use history.    FINAL IMPRESSION   Diagnosis Plan   1. Acute on chronic hypoxic respiratory failure        2. Intractable back pain        3. Hyponatremia        4. Multifocal pneumonia              MD Toño Wadsworth Jr, Thomas Mark Jr., MD  06/11/25 1295

## 2025-06-11 NOTE — H&P
[facility] Medicine Services  HISTORY AND PHYSICAL    Date of Admission: 6/11/2025  Primary Care Physician: Aaron Stoddard MD    Subjective   Primary Historian: Patient    Chief Complaint: Shortness of breath back pain abdominal pain    Shortness of Breath  Back Pain    Patient is a 65-year-old white female with past medical history of oxygen dependent pulmonary fibrosis bronchiectasis show COPD on 6 to 7 L history of depression anxiety history of bilateral kidney stones history of sleep apnea presents to the emergency room with worsening shortness of breath and back pain.  Patient states she has right flank pain and has been getting worse over the last 3 to 4 days.  She states that her urine has also been dark.   She is also been more short of breath today and called EMS for her right flank and back pain but noted that her oxygen was in the 70s while upon arrival and tachycardic.  She refused a DuoNeb by EMS en route.    In ER chest x-ray was showing multifocal pneumonia possible pulmonary edema , CT abdomen pelvis was done for abdominal pain was showing constipation bilateral kidney stones the patient was placed on Vapotherm blood cultures were obtained started on steroid breathing treatment and Sandi NIETO discussed the case with intensivist and with the nurse practitioner intensivist Nba  who felt that the patient could be admitted to the floor and does not need to be in the unit  Will admit continue steroid breathing treatment antibiotics we will start her on IV Lasix order echo of the heart we will consult with pulmonary I's and O's and daily weights Lovenox for DVT prophylaxis identified reconcile restart home medications once reconciled    Review of Systems   Respiratory:  Positive for shortness of breath.    Musculoskeletal:  Positive for back pain.      Otherwise complete ROS reviewed and negative except as mentioned in the HPI.    Past Medical History:   Past Medical  History:   Diagnosis Date    Allergies     Arthritis     BMI 31.0-31.9,adult 06/04/2019    Calcified granuloma of lung 06/04/2019    Right lung    CHF (congestive heart failure)     denies    Chronic idiopathic fibrosing alveolitis     Chronic respiratory failure with hypoxia 08/05/2020    Chronic respiratory failure with hypoxia, on home oxygen therapy     COVID-19 02/01/2022    CREST syndrome     Environmental allergies 02/01/2022    Gastroesophageal reflux disease without esophagitis 06/04/2019    Interstitial lung disease     Obstructive sleep apnea on CPAP 06/04/2019    Oropharyngeal dysphagia 01/26/2023    Primary central sleep apnea     Pulmonary fibrosis     Pulmonary hypertension     Rheumatoid arthritis     Right ventricular systolic dysfunction 05/04/2023    Short-term memory loss      Past Surgical History:  Past Surgical History:   Procedure Laterality Date    BREAST BIOPSY      CARDIAC CATHETERIZATION N/A 07/22/2020    Procedure: Right Heart Cath;  Surgeon: Thong Martin MD;  Location: [facility] CATH INVASIVE LOCATION;  Service: Cardiology;  Laterality: N/A;    CHOLECYSTECTOMY      COLONOSCOPY  05/11/2015    5 POLYPS    COLONOSCOPY N/A 08/04/2021    Procedure: COLONOSCOPY WITH ANESTHESIA;  Surgeon: Michael Landin DO;  Location: [facility] ENDOSCOPY;  Service: Gastroenterology;  Laterality: N/A;  pre-hx polyps  post-colon polyps    ENDOSCOPY N/A 08/04/2021    Procedure: ESOPHAGOGASTRODUODENOSCOPY WITH ANESTHESIA;  Surgeon: Michael Landin DO;  Location: [facility] ENDOSCOPY;  Service: Gastroenterology;  Laterality: N/A;  pre-dysphagia  post-normal  pcp-lanette aguila     Social History:  reports that she has never smoked. She has been exposed to tobacco smoke. She has never used smokeless tobacco. She reports that she does not drink alcohol and does not use drugs.    Family History: family history includes Cirrhosis in her sister; Liver cancer in her brother; No Known Problems in her father and mother.    "    Allergies:  Allergies   Allergen Reactions    Codeine Nausea And Vomiting    Latex Rash    Prednisone Mental Status Change     High doses makes her \"nuts\"       Medications:  Prior to Admission medications    Medication Sig Start Date End Date Taking? Authorizing Provider   alendronate (FOSAMAX) 70 MG tablet Take 1 tablet by mouth 1 (One) Time Per Week. 4/25/24   Latanya Spring MD   busPIRone (BUSPAR) 10 MG tablet Take 1 tablet by mouth 3 (Three) Times a Day As Needed (anxiety). 4/29/24   Latanya Spring MD   famotidine (PEPCID) 20 MG tablet Take 1 tablet by mouth 2 (Two) Times a Day Before Meals. 3/22/25   Kaden Arthur MD   ipratropium-albuterol (DUO-NEB) 0.5-2.5 mg/3 ml nebulizer Take 3 mL by nebulization 4 (Four) Times a Day As Needed for Wheezing for up to 30 days. 4/7/25 5/23/25  Fela Blackman APRN   Macitentan-Tadalafil 10-40 MG tablet Take 1 tablet by mouth Every Night.    ProviderLatanya MD   metoprolol succinate XL (TOPROL-XL) 25 MG 24 hr tablet Take 0.5 tablets by mouth Daily. 2/7/25   Roosevelt Escalera MD   mycophenolate (CELLCEPT) 500 MG tablet Take 2 tablets by mouth 2 (Two) Times a Day.    Antonia Landry MD   omeprazole (priLOSEC) 40 MG capsule Take 1 capsule by mouth Daily. 1/8/25   Fela Blackman APRN   potassium chloride 10 MEQ CR tablet Take 1 tablet by mouth Daily. 4/10/25   Roosevelt Escalera MD   predniSONE (DELTASONE) 5 MG tablet Take 1 tablet by mouth Daily for 90 days. 3/26/25 6/24/25  Fela Blackman APRN   Sotatercept-csrk (Winrevair) 60 MG kit Inject 0.9 mL under the skin into the appropriate area as directed Every 21 (Twenty-One) Days. 6/10/25   Toby Nath APRN   spironolactone (ALDACTONE) 50 MG tablet Take 1 tablet by mouth Daily. 5/7/25   Roosevelt Escalera MD   traZODone (DESYREL) 100 MG tablet Take 1 tablet by mouth Every Night for 90 days. 8/28/24 12/31/25  Fela Blackman APRN   Treprostinil (Tyvaso) 0.6 MG/ML solution inhalation solution " "Inhale 12 puffs 4 (Four) Times a Day. 5/22/25   Roosevelt Escalera MD   venlafaxine (EFFEXOR) 75 MG tablet Take 1 tablet by mouth Daily.    Farida Cano APRN   vitamin D (ERGOCALCIFEROL) 1.25 MG (12515 UT) capsule capsule Take 1 capsule by mouth 1 (One) Time Per Week. 5/10/23   Aaron Stoddard MD     I have utilized all available immediate resources to obtain, update, or review the patient's current medications (including all prescriptions, over-the-counter products, herbals, cannabis/cannabidiol products, and vitamin/mineral/dietary (nutritional) supplements).    Objective     Vital Signs: /68   Pulse 116   Temp 98.5 °F (36.9 °C) (Oral)   Resp (!) 38   Ht 160 cm (63\")   Wt 70.8 kg (156 lb 1.6 oz)   SpO2 93%   BMI 27.65 kg/m²   Physical Exam  Vitals reviewed.   Constitutional:       Appearance: She is ill-appearing.   HENT:      Head: Normocephalic.      Nose: Nose normal.   Eyes:      Pupils: Pupils are equal, round, and reactive to light.   Cardiovascular:      Rate and Rhythm: Normal rate.   Pulmonary:      Effort: Respiratory distress present.      Breath sounds: Wheezing present.   Abdominal:      General: Abdomen is flat.   Musculoskeletal:         General: Normal range of motion.      Cervical back: Normal range of motion.   Skin:     General: Skin is warm.      Capillary Refill: Capillary refill takes less than 2 seconds.   Neurological:      Mental Status: She is alert.              Results Reviewed:  Lab Results (last 24 hours)       Procedure Component Value Units Date/Time    Blood Gas, Arterial With Co-Ox [[id number]]  (Abnormal) Collected: 06/11/25 1614    Specimen: Arterial Blood Updated: 06/11/25 1616     Site Right Brachial     Tony's Test N/A     pH, Arterial 7.398 pH units      pCO2, Arterial 36.2 mm Hg      pO2, Arterial 62.6 mm Hg      Comment: 84 Value below reference range        HCO3, Arterial 22.3 mmol/L      Base Excess, Arterial -2.1 mmol/L      Comment: 84 Value below " reference range        O2 Saturation, Arterial 92.2 %      Comment: 84 Value below reference range        Hemoglobin, Blood Gas 14.5 g/dL      Hematocrit, Blood Gas 44.4 %      Oxyhemoglobin 90.7 %      Comment: 84 Value below reference range        Methemoglobin 0.20 %      Carboxyhemoglobin 1.4 %      Temperature 37.0     Sodium, Arterial 136 mmol/L      Comment: 84 Value below reference range        Potassium, Arterial 4.7 mmol/L      Barometric Pressure for Blood Gas 755 mmHg      Modality Heated HFNC     FIO2 50 %      Flow Rate 35.0 lpm      Ventilator Mode NA     Collected by [id number]     Comment: Meter: [id number]     :  Sheree Mariscal, FAVIAN        pH, Temp Corrected 7.398 pH Units      pCO2, Temperature Corrected 36.2 mm Hg      pO2, Temperature Corrected 62.6 mm Hg     High Sensitivity Troponin T 1Hr [[id number]]  (Abnormal) Collected: 06/11/25 1259    Specimen: Blood Updated: 06/11/25 1331     HS Troponin T 17 ng/L      Troponin T Numeric Delta -2 ng/L      Troponin T % Delta -11    Narrative:      High Sensitive Troponin T Reference Range:  <14.0 ng/L- Negative Female for AMI  <22.0 ng/L- Negative Male for AMI  >=14 - Abnormal Female indicating possible myocardial injury.  >=22 - Abnormal Male indicating possible myocardial injury.   Clinicians would have to utilize clinical acumen, EKG, Troponin, and serial changes to determine if it is an Acute Myocardial Infarction or myocardial injury due to an underlying chronic condition.         Blood Culture - Blood, Arm, Right [[id number]] Collected: 06/11/25 1259    Specimen: Blood from Arm, Right Updated: 06/11/25 1331    Urinalysis With Culture If Indicated - Urine, Clean Catch [[id number]]  (Abnormal) Collected: 06/11/25 1304    Specimen: Urine, Clean Catch Updated: 06/11/25 1315     Color, UA Yellow     Appearance, UA Clear     pH, UA 5.5     Specific Gravity, UA 1.013     Glucose, UA Negative     Ketones, UA Trace     Bilirubin, UA Negative      Blood, UA Negative     Protein, UA Negative     Leuk Esterase, UA Negative     Nitrite, UA Negative     Urobilinogen, UA 1.0 E.U./dL    Narrative:      In absence of clinical symptoms, the presence of pyuria, bacteria, and/or nitrites on the urinalysis result does not correlate with infection.  Urine microscopic not indicated.    Respiratory Panel PCR w/COVID-19(SARS-CoV-2) KARISHMA/LUIS ARMANDO/DENYS/PAD/COR/DAVID In-House, NP Swab in UTM/VTM, 2 HR TAT - Swab, Nasopharynx [[id number]]  (Normal) Collected: 06/11/25 1215    Specimen: Swab from Nasopharynx Updated: 06/11/25 1311     ADENOVIRUS, PCR Not Detected     Coronavirus 229E Not Detected     Coronavirus HKU1 Not Detected     Coronavirus NL63 Not Detected     Coronavirus OC43 Not Detected     COVID19 Not Detected     Human Metapneumovirus Not Detected     Human Rhinovirus/Enterovirus Not Detected     Influenza A PCR Not Detected     Influenza B PCR Not Detected     Parainfluenza Virus 1 Not Detected     Parainfluenza Virus 2 Not Detected     Parainfluenza Virus 3 Not Detected     Parainfluenza Virus 4 Not Detected     RSV, PCR Not Detected     Bordetella pertussis pcr Not Detected     Bordetella parapertussis PCR Not Detected     Chlamydophila pneumoniae PCR Not Detected     Mycoplasma pneumo by PCR Not Detected    Narrative:      In the setting of a positive respiratory panel with a viral infection PLUS a negative procalcitonin without other underlying concern for bacterial infection, consider observing off antibiotics or discontinuation of antibiotics and continue supportive care. If the respiratory panel is positive for atypical bacterial infection (Bordetella pertussis, Chlamydophila pneumoniae, or Mycoplasma pneumoniae), consider antibiotic de-escalation to target atypical bacterial infection.    High Sensitivity Troponin T [[id number]]  (Abnormal) Collected: 06/11/25 1143    Specimen: Blood Updated: 06/11/25 1241     HS Troponin T 19 ng/L     Narrative:      High Sensitive  Troponin T Reference Range:  <14.0 ng/L- Negative Female for AMI  <22.0 ng/L- Negative Male for AMI  >=14 - Abnormal Female indicating possible myocardial injury.  >=22 - Abnormal Male indicating possible myocardial injury.   Clinicians would have to utilize clinical acumen, EKG, Troponin, and serial changes to determine if it is an Acute Myocardial Infarction or myocardial injury due to an underlying chronic condition.         Blood Culture - Blood, Arm, Right [[id number]] Collected: 06/11/25 1223    Specimen: Blood from Arm, Right Updated: 06/11/25 1236    CBC & Differential [[id number]]  (Abnormal) Collected: 06/11/25 1143    Specimen: Blood Updated: 06/11/25 1233    Narrative:      The following orders were created for panel order CBC & Differential.  Procedure                               Abnormality         Status                     ---------                               -----------         ------                     CBC Auto Differential[[id number]]        Abnormal            Final result                 Please view results for these tests on the individual orders.    CBC Auto Differential [[id number]]  (Abnormal) Collected: 06/11/25 1143    Specimen: Blood Updated: 06/11/25 1233     WBC 14.17 10*3/mm3      RBC 6.04 10*6/mm3      Hemoglobin 14.3 g/dL      Hematocrit 46.1 %      MCV 76.3 fL      MCH 23.7 pg      MCHC 31.0 g/dL      RDW 17.8 %      RDW-SD 45.1 fl      MPV 10.5 fL      Platelets 242 10*3/mm3     Narrative:      The previously reported component NRBC is no longer being reported. Previous result was 0.1 /100 WBC (Reference Range: 0.0-0.2 /100 WBC) on 6/11/2025 at 1215 CDT.    Manual Differential [[id number]]  (Abnormal) Collected: 06/11/25 1143    Specimen: Blood Updated: 06/11/25 1233     Neutrophil % 78.0 %      Lymphocyte % 8.1 %      Monocyte % 4.1 %      Eosinophil % 1.6 %      Basophil % 0.0 %      Bands %  2.4 %      Atypical Lymphocyte % 5.7 %      Neutrophils Absolute 11.41 10*3/mm3       Lymphocytes Absolute 1.96 10*3/mm3      Monocytes Absolute 0.58 10*3/mm3      Eosinophils Absolute 0.23 10*3/mm3      Basophils Absolute 0.00 10*3/mm3      nRBC 0.8 /100 WBC      Polychromasia Slight/1+     WBC Morphology Normal     Platelet Morphology Normal    TSH Rfx On Abnormal To Free T4 [[id number]]  (Normal) Collected: 06/11/25 1143    Specimen: Blood Updated: 06/11/25 1230     TSH 3.300 uIU/mL     Comprehensive Metabolic Panel [[id number]]  (Abnormal) Collected: 06/11/25 1143    Specimen: Blood Updated: 06/11/25 1224     Glucose 131 mg/dL      BUN 9.8 mg/dL      Creatinine 0.73 mg/dL      Sodium 133 mmol/L      Potassium 4.3 mmol/L      Chloride 97 mmol/L      CO2 21.0 mmol/L      Calcium 8.8 mg/dL      Total Protein 6.4 g/dL      Albumin 3.2 g/dL      ALT (SGPT) 11 U/L      AST (SGOT) 21 U/L      Alkaline Phosphatase 179 U/L      Total Bilirubin 0.4 mg/dL      Globulin 3.2 gm/dL      A/G Ratio 1.0 g/dL      BUN/Creatinine Ratio 13.4     Anion Gap 15.0 mmol/L      eGFR 90.3 mL/min/1.73     Narrative:      GFR Categories in Chronic Kidney Disease (CKD)              GFR Category          GFR (mL/min/1.73)    Interpretation  G1                    90 or greater        Normal or high (1)  G2                    60-89                Mild decrease (1)  G3a                   45-59                Mild to moderate decrease  G3b                   30-44                Moderate to severe decrease  G4                    15-29                Severe decrease  G5                    14 or less           Kidney failure    (1)In the absence of evidence of kidney disease, neither GFR category G1 or G2 fulfill the criteria for CKD.    eGFR calculation 2021 CKD-EPI creatinine equation, which does not include race as a factor    Magnesium [[id number]]  (Normal) Collected: 06/11/25 1143    Specimen: Blood Updated: 06/11/25 1224     Magnesium 1.8 mg/dL     Lactic Acid, Plasma [[id number]]  (Normal) Collected: 06/11/25 1143    Specimen:  Blood Updated: 06/11/25 1222     Lactate 1.9 mmol/L     Protime-INR [[id number]]  (Normal) Collected: 06/11/25 1143    Specimen: Blood Updated: 06/11/25 1213     Protime 14.1 Seconds      INR 1.04    aPTT [[id number]]  (Normal) Collected: 06/11/25 1143    Specimen: Blood Updated: 06/11/25 1213     PTT 28.8 seconds     Narrative:      PTT = The equivalent PTT values for the therapeutic range of heparin levels at 0.3 to 0.7 U/ml are 77 - 99 seconds.    Norris Draw [[id number]] Collected: 06/11/25 1143    Specimen: Blood Updated: 06/11/25 1201    Narrative:      The following orders were created for panel order Norris Draw.  Procedure                               Abnormality         Status                     ---------                               -----------         ------                     Green Top (Gel)[[id number]]                                  Final result               Lavender Top[[id number]]                                     Final result               Red Top[[id number]]                                          Final result               Martinez Top[[id number]]                                         Final result               Light Blue Top[[id number]]                                   Final result                 Please view results for these tests on the individual orders.    Green Top (Gel) [[id number]] Collected: 06/11/25 1143    Specimen: Blood Updated: 06/11/25 1201     Extra Tube Hold for add-ons.     Comment: Auto resulted.       Lavender Top [[id number]] Collected: 06/11/25 1143    Specimen: Blood Updated: 06/11/25 1201     Extra Tube hold for add-on     Comment: Auto resulted       Red Top [[id number]] Collected: 06/11/25 1143    Specimen: Blood Updated: 06/11/25 1201     Extra Tube Hold for add-ons.     Comment: Auto resulted.       Gray Top [[id number]] Collected: 06/11/25 1143    Specimen: Blood Updated: 06/11/25 1201     Extra Tube Hold for add-ons.     Comment: Auto resulted.       Light Blue Top  [[id number]] Collected: 06/11/25 1143    Specimen: Blood Updated: 06/11/25 1201     Extra Tube Hold for add-ons.     Comment: Auto resulted       Blood Gas, Arterial - [[id number]]  (Abnormal) Collected: 06/11/25 1150    Specimen: Arterial Blood Updated: 06/11/25 1157     Site Right Brachial     Tony's Test N/A     pH, Arterial 7.438 pH units      pCO2, Arterial 35.0 mm Hg      Comment: 84 Value below reference range        pO2, Arterial 68.3 mm Hg      Comment: 84 Value below reference range        HCO3, Arterial 23.6 mmol/L      Base Excess, Arterial -0.1 mmol/L      Comment: 84 Value below reference range        O2 Saturation, Arterial 94.8 %      Temperature 37.0     Barometric Pressure for Blood Gas 756 mmHg      Modality Nasal Cannula     Flow Rate 6.0 lpm      Ventilator Mode NA     Collected by [id number]     Comment: Meter: [id number]     :  Sheree Mariscal, FAVIAN        pCO2, Temperature Corrected 35.0 mm Hg      pH, Temp Corrected 7.438 pH Units      pO2, Temperature Corrected 68.3 mm Hg           Imaging Results (Last 24 Hours)       Procedure Component Value Units Date/Time    CT Abdomen Pelvis Stone Protocol [[id number]] Collected: 06/11/25 1336     Updated: 06/11/25 1348    Narrative:      CT ABDOMEN PELVIS STONE PROTOCOL-      HISTORY: Right flank pain history of kidney stone      COMPARISON: CTA chest 5/22/2025     TOTAL DOSE LENGTH PRODUCT: 343.82 mGy.cm. Automated exposure control was  also utilized to decrease patient radiation dose.     TECHNIQUE: Axial images of the abdomen and pelvis are performed without  IV or oral contrast     FINDINGS: Right hide there and mediastinal lymphadenopathy is  redemonstrated. Cardiomegaly with slightly increasing small pericardial  effusion. Chronic basilar interstitial lung disease with diffuse  bronchiectasis. Right lower lobe granuloma.     The nonenhanced liver spleen, pancreas, and adrenal glands are  unremarkable. There are punctate nonobstructing  bilateral intrarenal  stones measuring 4 mm or less. No ureteral stones or hydronephrosis. 1.4  cm fluid attenuated left renal cyst. No abnormal perinephric fluid  collection. Bladder is unremarkable. Uterus is unremarkable. No adnexal  mass.     No free intraperitoneal air or loculated abscess. Moderate fecal stasis.  Normal appendix. No small bowel dilatation. Small hiatal hernia. Fluid  contained within the mildly distended lower esophagus which may relate  to reflux or poor primary peristalsis.     Mild vascular calcification. No pathologic-appearing regional  lymphadenopathy.     Minimal anterolisthesis of L4 over L5 likely due to facet  osteoarthropathy. No focal aggressive regional bony lesions.       Impression:         1. Bilateral nonobstructing intrarenal stones measuring 4 mm or less. No  ureteral stones or hydronephrosis. No bladder stones. Left renal cyst.  2. Pathologic mediastinal right hilar lymphadenopathy is redemonstrated  as seen on CTA chest of 5/22/2025. Advanced basilar chronic interstitial  lung disease with bronchiectasis. Slightly increasing small pericardial  effusion with cardiomegaly.  3. Small hiatal hernia. Fluid of the mildly distended lower esophagus  may relate to reflux.  4. Moderate fecal stasis. Normal appendix. No evidence for bowel  obstruction. No free air or abscess.     This report was signed and finalized on 6/11/2025 1:45 PM by Dr. Keke Bangura MD.       XR Chest 1 View [[id number]] Collected: 06/11/25 1235     Updated: 06/11/25 1242    Narrative:      XR CHEST 1 VW-     HISTORY: Palpitation     COMPARISON: 5/26/2025     FINDINGS: Frontal view the chest obtained.     Increasing perihilar opacities superimposed on chronic interstitial lung  disease. Stable nodular right upper lobe opacities. Mediastinal and  right hilar adenopathy best seen on CTA chest of 5/22/2025.  Cardiomegaly. Questionable trace right pleural effusion. No  pneumothorax. Degenerative changes  regional skeleton.       Impression:      1. Increasing bilateral perihilar opacities favoring pulmonary edema  over multifocal pneumonia superimposed on advanced chronic interstitial  lung disease with basilar predilection. Similar nodular right upper lobe  opacities concerning for potential malignancy. Right hilar and  mediastinal lymphadenopathy better seen on recent CTA chest of  5/22/2025.        This report was signed and finalized on 6/11/2025 12:39 PM by Dr. Keke Bangura MD.             I have personally reviewed and interpreted the radiology studies and ECG obtained at time of admission.     Assessment / Plan   Assessment:   Active Hospital Problems    Diagnosis     Acute on chronic respiratory failure with hypoxia        Treatment Plan  The patient will be admitted to my service here at [facility].   In ER chest x-ray was showing multifocal pneumonia possible pulmonary edema , CT abdomen pelvis was done for abdominal pain was showing constipation bilateral kidney stones the patient was placed on Vapotherm blood cultures were obtained started on steroid breathing treatment and Zosyn I discussed the case with intensivist and with the nurse practitioner intensivist Nba  who felt that the patient could be admitted to the floor and does not need to be in the unit  Will admit continue steroid breathing treatment antibiotics we will start her on IV Lasix order echo of the heart we will consult with pulmonary I's and O's and daily weights Lovenox for DVT prophylaxis identified reconcile restart home medications once reconciled  Medical Decision Making  Number and Complexity of problems: 3 acute  Differential Diagnosis: As above    Conditions and Status        Condition is at treatment goal.     [facility] Data  External documents reviewed: yes  Cardiac tracing (EKG, telemetry) interpretation: yes  Radiology interpretation: yes  Labs reviewed: yes  Any tests that were considered but not ordered: no      Decision rules/scores evaluated (example [id number], Wells, etc): no     Discussed with: ED      Care Planning  Shared decision making: Patient apprised of current labs, vitals, imaging and treatment plan.  They are agreeable with proceeding with plans as discussed.   Code status and discussions: full     Disposition  Social Determinants of Health that impact treatment or disposition: no   Estimated length of stay is TBD.     I confirmed that the patient's advanced care plan is present, code status is documented, and a surrogate decision maker is listed in the patient's medical record.         The patient was seen and examined by me on 1840 at [facility] .    Electronically signed by Cristy Lopez MD, 06/11/25, 18:34 CDT.

## 2025-06-12 ENCOUNTER — APPOINTMENT (OUTPATIENT)
Dept: CARDIOLOGY | Facility: HOSPITAL | Age: 65
End: 2025-06-12
Payer: MEDICARE

## 2025-06-12 LAB
ANION GAP SERPL CALCULATED.3IONS-SCNC: 13 MMOL/L (ref 5–15)
ANISOCYTOSIS BLD QL: ABNORMAL
AORTIC DIMENSIONLESS INDEX: 0.98 (DI)
AV MEAN PRESS GRAD SYS DOP V1V2: 4 MMHG
AV VMAX SYS DOP: 117 CM/SEC
BACTERIA SPEC RESP CULT: NORMAL
BASOPHILS # BLD MANUAL: 0 10*3/MM3 (ref 0–0.2)
BASOPHILS NFR BLD MANUAL: 0 % (ref 0–1.5)
BH CV ECHO MEAS - AO MAX PG: 5.5 MMHG
BH CV ECHO MEAS - AO V2 VTI: 18.5 CM
BH CV ECHO MEAS - AVA(I,D): 4.1 CM2
BH CV ECHO MEAS - EDV(CUBED): 27.8 ML
BH CV ECHO MEAS - EDV(MOD-SP2): 14.1 ML
BH CV ECHO MEAS - EDV(MOD-SP4): 30.2 ML
BH CV ECHO MEAS - EF(MOD-SP2): 65.8 %
BH CV ECHO MEAS - EF(MOD-SP4): 58.9 %
BH CV ECHO MEAS - ESV(CUBED): 5.1 ML
BH CV ECHO MEAS - ESV(MOD-SP2): 4.8 ML
BH CV ECHO MEAS - ESV(MOD-SP4): 12.4 ML
BH CV ECHO MEAS - FS: 43.2 %
BH CV ECHO MEAS - IVS/LVPW: 0.97 CM
BH CV ECHO MEAS - IVSD: 1.51 CM
BH CV ECHO MEAS - LA DIMENSION: 3.7 CM
BH CV ECHO MEAS - LAT PEAK E' VEL: 7.9 CM/SEC
BH CV ECHO MEAS - LV DIASTOLIC VOL/BSA (35-75): 17.4 CM2
BH CV ECHO MEAS - LV MASS(C)D: 165.4 GRAMS
BH CV ECHO MEAS - LV MAX PG: 6.7 MMHG
BH CV ECHO MEAS - LV MEAN PG: 4 MMHG
BH CV ECHO MEAS - LV SYSTOLIC VOL/BSA (12-30): 7.1 CM2
BH CV ECHO MEAS - LV V1 MAX: 129 CM/SEC
BH CV ECHO MEAS - LV V1 VTI: 18.2 CM
BH CV ECHO MEAS - LVIDD: 3 CM
BH CV ECHO MEAS - LVIDS: 1.72 CM
BH CV ECHO MEAS - LVOT AREA: 4.2 CM2
BH CV ECHO MEAS - LVOT DIAM: 2.3 CM
BH CV ECHO MEAS - LVPWD: 1.55 CM
BH CV ECHO MEAS - MED PEAK E' VEL: 11.1 CM/SEC
BH CV ECHO MEAS - MV A MAX VEL: 121 CM/SEC
BH CV ECHO MEAS - MV DEC SLOPE: 820 CM/SEC2
BH CV ECHO MEAS - MV E MAX VEL: 67.1 CM/SEC
BH CV ECHO MEAS - MV E/A: 0.55
BH CV ECHO MEAS - MV P1/2T: 37.1 MSEC
BH CV ECHO MEAS - MVA(P1/2T): 5.9 CM2
BH CV ECHO MEAS - PA V2 MAX: 103 CM/SEC
BH CV ECHO MEAS - RAP SYSTOLE: 3 MMHG
BH CV ECHO MEAS - RV MAX PG: 1.83 MMHG
BH CV ECHO MEAS - RV V1 MAX: 67.6 CM/SEC
BH CV ECHO MEAS - RVDD: 2.6 CM
BH CV ECHO MEAS - SV(LVOT): 75.6 ML
BH CV ECHO MEAS - SV(MOD-SP2): 9.3 ML
BH CV ECHO MEAS - SV(MOD-SP4): 17.8 ML
BH CV ECHO MEAS - SVI(LVOT): 43.5 ML/M2
BH CV ECHO MEAS - SVI(MOD-SP2): 5.3 ML/M2
BH CV ECHO MEAS - SVI(MOD-SP4): 10.2 ML/M2
BH CV ECHO MEAS - TAPSE (>1.6): 1.23 CM
BH CV ECHO MEASUREMENTS AVERAGE E/E' RATIO: 7.06
BH CV XLRA - RV BASE: 3.3 CM
BUN SERPL-MCNC: 18.6 MG/DL (ref 8–23)
BUN/CREAT SERPL: 20 (ref 7–25)
BURR CELLS BLD QL SMEAR: ABNORMAL
CALCIUM SPEC-SCNC: 8.1 MG/DL (ref 8.6–10.5)
CHLORIDE SERPL-SCNC: 101 MMOL/L (ref 98–107)
CO2 SERPL-SCNC: 22 MMOL/L (ref 22–29)
CREAT SERPL-MCNC: 0.93 MG/DL (ref 0.57–1)
DEPRECATED RDW RBC AUTO: 45.8 FL (ref 37–54)
EGFRCR SERPLBLD CKD-EPI 2021: 67.5 ML/MIN/1.73
EOSINOPHIL # BLD MANUAL: 0 10*3/MM3 (ref 0–0.4)
EOSINOPHIL NFR BLD MANUAL: 0 % (ref 0.3–6.2)
ERYTHROCYTE [DISTWIDTH] IN BLOOD BY AUTOMATED COUNT: 17 % (ref 12.3–15.4)
GLUCOSE SERPL-MCNC: 221 MG/DL (ref 65–99)
GRAM STN SPEC: NORMAL
GRAM STN SPEC: NORMAL
HCT VFR BLD AUTO: 44 % (ref 34–46.6)
HGB BLD-MCNC: 13.4 G/DL (ref 12–15.9)
L PNEUMO1 AG UR QL IA: NEGATIVE
LEFT ATRIUM VOLUME INDEX: 14.5 ML/M2
LV EF BIPLANE MOD: 62 %
LYMPHOCYTES # BLD MANUAL: 1.25 10*3/MM3 (ref 0.7–3.1)
LYMPHOCYTES NFR BLD MANUAL: 0 % (ref 5–12)
MCH RBC QN AUTO: 23.6 PG (ref 26.6–33)
MCHC RBC AUTO-ENTMCNC: 30.5 G/DL (ref 31.5–35.7)
MCV RBC AUTO: 77.5 FL (ref 79–97)
METAMYELOCYTES NFR BLD MANUAL: 1 % (ref 0–0)
MICROCYTES BLD QL: ABNORMAL
MONOCYTES # BLD: 0 10*3/MM3 (ref 0.1–0.9)
MYELOCYTES NFR BLD MANUAL: 2 % (ref 0–0)
NEUTROPHILS # BLD AUTO: 7.4 10*3/MM3 (ref 1.7–7)
NEUTROPHILS NFR BLD MANUAL: 75 % (ref 42.7–76)
NEUTS BAND NFR BLD MANUAL: 8 % (ref 0–5)
PLAT MORPH BLD: NORMAL
PLATELET # BLD AUTO: 194 10*3/MM3 (ref 140–450)
PMV BLD AUTO: 10.6 FL (ref 6–12)
POIKILOCYTOSIS BLD QL SMEAR: ABNORMAL
POLYCHROMASIA BLD QL SMEAR: ABNORMAL
POTASSIUM SERPL-SCNC: 4 MMOL/L (ref 3.5–5.2)
PROCALCITONIN SERPL-MCNC: 0.14 NG/ML (ref 0–0.25)
RBC # BLD AUTO: 5.68 10*6/MM3 (ref 3.77–5.28)
S PNEUM AG SPEC QL LA: NEGATIVE
SINUS: 3.3 CM
SODIUM SERPL-SCNC: 136 MMOL/L (ref 136–145)
STJ: 3.1 CM
VARIANT LYMPHS NFR BLD MANUAL: 1 % (ref 0–5)
VARIANT LYMPHS NFR BLD MANUAL: 13 % (ref 19.6–45.3)
WBC MORPH BLD: NORMAL
WBC NRBC COR # BLD AUTO: 8.92 10*3/MM3 (ref 3.4–10.8)

## 2025-06-12 PROCEDURE — 36415 COLL VENOUS BLD VENIPUNCTURE: CPT | Performed by: HOSPITALIST

## 2025-06-12 PROCEDURE — 87449 NOS EACH ORGANISM AG IA: CPT | Performed by: INTERNAL MEDICINE

## 2025-06-12 PROCEDURE — 94799 UNLISTED PULMONARY SVC/PX: CPT

## 2025-06-12 PROCEDURE — 94761 N-INVAS EAR/PLS OXIMETRY MLT: CPT

## 2025-06-12 PROCEDURE — 94669 MECHANICAL CHEST WALL OSCILL: CPT

## 2025-06-12 PROCEDURE — 25010000002 PIPERACILLIN SOD-TAZOBACTAM PER 1 G: Performed by: HOSPITALIST

## 2025-06-12 PROCEDURE — 85025 COMPLETE CBC W/AUTO DIFF WBC: CPT | Performed by: HOSPITALIST

## 2025-06-12 PROCEDURE — 87899 AGENT NOS ASSAY W/OPTIC: CPT | Performed by: INTERNAL MEDICINE

## 2025-06-12 PROCEDURE — 87205 SMEAR GRAM STAIN: CPT | Performed by: INTERNAL MEDICINE

## 2025-06-12 PROCEDURE — 84145 PROCALCITONIN (PCT): CPT | Performed by: INTERNAL MEDICINE

## 2025-06-12 PROCEDURE — 97163 PT EVAL HIGH COMPLEX 45 MIN: CPT

## 2025-06-12 PROCEDURE — 25010000002 ONDANSETRON PER 1 MG: Performed by: HOSPITALIST

## 2025-06-12 PROCEDURE — 80048 BASIC METABOLIC PNL TOTAL CA: CPT | Performed by: HOSPITALIST

## 2025-06-12 PROCEDURE — 99222 1ST HOSP IP/OBS MODERATE 55: CPT | Performed by: INTERNAL MEDICINE

## 2025-06-12 PROCEDURE — 25010000002 FUROSEMIDE PER 20 MG: Performed by: HOSPITALIST

## 2025-06-12 PROCEDURE — 25010000002 ENOXAPARIN PER 10 MG: Performed by: HOSPITALIST

## 2025-06-12 PROCEDURE — 93306 TTE W/DOPPLER COMPLETE: CPT | Performed by: INTERNAL MEDICINE

## 2025-06-12 PROCEDURE — 85007 BL SMEAR W/DIFF WBC COUNT: CPT | Performed by: HOSPITALIST

## 2025-06-12 PROCEDURE — 93306 TTE W/DOPPLER COMPLETE: CPT

## 2025-06-12 PROCEDURE — 97167 OT EVAL HIGH COMPLEX 60 MIN: CPT

## 2025-06-12 PROCEDURE — 25010000002 METHYLPREDNISOLONE PER 125 MG: Performed by: HOSPITALIST

## 2025-06-12 RX ORDER — HYDROCODONE BITARTRATE AND ACETAMINOPHEN 5; 325 MG/1; MG/1
1 TABLET ORAL EVERY 8 HOURS
COMMUNITY
End: 2025-06-17 | Stop reason: HOSPADM

## 2025-06-12 RX ORDER — SODIUM CHLORIDE FOR INHALATION 3 %
4 VIAL, NEBULIZER (ML) INHALATION
Status: DISCONTINUED | OUTPATIENT
Start: 2025-06-12 | End: 2025-06-17 | Stop reason: HOSPADM

## 2025-06-12 RX ORDER — VENLAFAXINE HYDROCHLORIDE 75 MG/1
75 CAPSULE, EXTENDED RELEASE ORAL
Status: DISCONTINUED | OUTPATIENT
Start: 2025-06-13 | End: 2025-06-17 | Stop reason: HOSPADM

## 2025-06-12 RX ORDER — IPRATROPIUM BROMIDE AND ALBUTEROL SULFATE 2.5; .5 MG/3ML; MG/3ML
3 SOLUTION RESPIRATORY (INHALATION)
Status: DISCONTINUED | OUTPATIENT
Start: 2025-06-12 | End: 2025-06-13

## 2025-06-12 RX ORDER — TRAZODONE HYDROCHLORIDE 100 MG/1
100 TABLET ORAL NIGHTLY
Status: DISCONTINUED | OUTPATIENT
Start: 2025-06-12 | End: 2025-06-17 | Stop reason: HOSPADM

## 2025-06-12 RX ADMIN — PIPERACILLIN AND TAZOBACTAM 3.38 G: 3; .375 INJECTION, POWDER, FOR SOLUTION INTRAVENOUS at 07:41

## 2025-06-12 RX ADMIN — FUROSEMIDE 20 MG: 10 INJECTION, SOLUTION INTRAMUSCULAR; INTRAVENOUS at 07:41

## 2025-06-12 RX ADMIN — FUROSEMIDE 20 MG: 10 INJECTION, SOLUTION INTRAMUSCULAR; INTRAVENOUS at 20:00

## 2025-06-12 RX ADMIN — PIPERACILLIN AND TAZOBACTAM 3.38 G: 3; .375 INJECTION, POWDER, FOR SOLUTION INTRAVENOUS at 00:00

## 2025-06-12 RX ADMIN — METHYLPREDNISOLONE SODIUM SUCCINATE 80 MG: 125 INJECTION, POWDER, FOR SOLUTION INTRAMUSCULAR; INTRAVENOUS at 12:19

## 2025-06-12 RX ADMIN — HYDROCODONE BITARTRATE AND ACETAMINOPHEN 1 TABLET: 5; 325 TABLET ORAL at 00:05

## 2025-06-12 RX ADMIN — LACTULOSE 20 G: 20 SOLUTION ORAL at 17:16

## 2025-06-12 RX ADMIN — Medication 10 ML: at 07:42

## 2025-06-12 RX ADMIN — IPRATROPIUM BROMIDE AND ALBUTEROL SULFATE 3 ML: .5; 3 SOLUTION RESPIRATORY (INHALATION) at 06:55

## 2025-06-12 RX ADMIN — ONDANSETRON 4 MG: 2 INJECTION INTRAMUSCULAR; INTRAVENOUS at 20:56

## 2025-06-12 RX ADMIN — TRAZODONE HYDROCHLORIDE 100 MG: 100 TABLET ORAL at 20:03

## 2025-06-12 RX ADMIN — Medication 4 ML: at 10:48

## 2025-06-12 RX ADMIN — Medication 4 ML: at 18:37

## 2025-06-12 RX ADMIN — LACTULOSE 20 G: 20 SOLUTION ORAL at 20:04

## 2025-06-12 RX ADMIN — METHYLPREDNISOLONE SODIUM SUCCINATE 80 MG: 125 INJECTION, POWDER, FOR SOLUTION INTRAMUSCULAR; INTRAVENOUS at 04:35

## 2025-06-12 RX ADMIN — ALPRAZOLAM 0.5 MG: 0.5 TABLET ORAL at 20:56

## 2025-06-12 RX ADMIN — IPRATROPIUM BROMIDE AND ALBUTEROL SULFATE 3 ML: .5; 3 SOLUTION RESPIRATORY (INHALATION) at 18:37

## 2025-06-12 RX ADMIN — PIPERACILLIN AND TAZOBACTAM 3.38 G: 3; .375 INJECTION, POWDER, FOR SOLUTION INTRAVENOUS at 17:16

## 2025-06-12 RX ADMIN — METHYLPREDNISOLONE SODIUM SUCCINATE 80 MG: 125 INJECTION, POWDER, FOR SOLUTION INTRAMUSCULAR; INTRAVENOUS at 20:03

## 2025-06-12 RX ADMIN — HYDROCODONE BITARTRATE AND ACETAMINOPHEN 1 TABLET: 5; 325 TABLET ORAL at 14:17

## 2025-06-12 RX ADMIN — IPRATROPIUM BROMIDE AND ALBUTEROL SULFATE 3 ML: .5; 3 SOLUTION RESPIRATORY (INHALATION) at 10:47

## 2025-06-12 RX ADMIN — Medication 10 ML: at 20:05

## 2025-06-12 RX ADMIN — ENOXAPARIN SODIUM 40 MG: 100 INJECTION SUBCUTANEOUS at 20:00

## 2025-06-12 RX ADMIN — Medication 10 ML: at 09:28

## 2025-06-12 NOTE — PLAN OF CARE
Goal Outcome Evaluation:                 Pt a/ox4, VSS on 8L NC humidified. Has had some SOB, at this time I turned O2 up to 15 and then back down to 8. She got nebulizer treatments with respiratory today. Had some moments today where she had a lot of anxiety. Got Effexor started for her that she will start getting in the morning. Voiding per preet. Call light in reach, safety maintained.

## 2025-06-12 NOTE — PLAN OF CARE
Goal Outcome Evaluation:  Plan of Care Reviewed With: patient        Progress: no change  Outcome Evaluation: Pt alone at time of visit, sitting up in chair. Unable to complete NFPE at this time, will attempt at follow up. Pt states she has lost 20 lbs in the last month, says her BW was 151 lbs at time of last D/C. Pt states last BM a week ago after D/C and went 3x. Pt expereincing intermittent N/V. Pt unsure of chewing/swallowing difficulty, states she's afraid to eat d/t respiratory failure. Pt did not want a diet texture change. Appetite has changed from last admission to present, pt states medication is making her hungry, but reiterated that she is afraid to eat. Supplements: Vit D, Calcium tablet. Dietetic intern observed 25% or less consumed of lunch today, pt was waiting to eat peaches with medication. Pt also requested a popsicle, Dietetic intern provided one. Follow per protocol.

## 2025-06-12 NOTE — THERAPY EVALUATION
Patient Name: Elaine Juárez  : 1960    MRN: [id number]                              Today's Date: 2025       Admit Date: 2025    Visit Dx:     ICD-10-CM ICD-9-CM   1. Acute on chronic hypoxic respiratory failure  J96.21 518.84     799.02   2. Intractable back pain  M54.9 724.5   3. Hyponatremia  E87.1 276.1   4. Multifocal pneumonia  J18.9 486   5. Impaired mobility [Z74.09]  Z74.09 799.89     Patient Active Problem List   Diagnosis    CREST syndrome, partial     Raynaud's phenomenon    Pulmonary fibrosis prob sec underlying autoimmune disease    Gastroesophageal reflux disease without esophagitis    BMI 31.0-31.9,adult    History of adenomatous polyp of colon    Environmental allergies    PAH (pulmonary arterial hypertension) with portal hypertension    Hypokalemia    Panic attack    Paranoia (psychosis)    H/O noncompliance with medical treatment, presenting hazards to health    Bipolar affective disorder, currently manic, moderate    Obesity (BMI 30-39.9)    Esophageal dysmotility    Oropharyngeal dysphagia    Right ventricular systolic dysfunction    Respiratory failure with hypoxia    Metabolic encephalopathy    Generalized weakness    Respiratory distress    Acute on chronic respiratory failure with hypoxia     Past Medical History:   Diagnosis Date    Allergies     Arthritis     BMI 31.0-31.9,adult 2019    Calcified granuloma of lung 2019    Right lung    CHF (congestive heart failure)     denies    Chronic idiopathic fibrosing alveolitis     Chronic respiratory failure with hypoxia 2020    Chronic respiratory failure with hypoxia, on home oxygen therapy     COVID-19 2022    CREST syndrome     Environmental allergies 2022    Gastroesophageal reflux disease without esophagitis 2019    Interstitial lung disease     Obstructive sleep apnea on CPAP 2019    Oropharyngeal dysphagia 2023    Primary central sleep apnea     Pulmonary fibrosis      Pulmonary hypertension     Rheumatoid arthritis     Right ventricular systolic dysfunction 05/04/2023    Short-term memory loss      Past Surgical History:   Procedure Laterality Date    BREAST BIOPSY      CARDIAC CATHETERIZATION N/A 07/22/2020    Procedure: Right Heart Cath;  Surgeon: Thong Martin MD;  Location: [facility] CATH INVASIVE LOCATION;  Service: Cardiology;  Laterality: N/A;    CHOLECYSTECTOMY      COLONOSCOPY  05/11/2015    5 POLYPS    COLONOSCOPY N/A 08/04/2021    Procedure: COLONOSCOPY WITH ANESTHESIA;  Surgeon: Michael Landin DO;  Location:  [facility];  Service: Gastroenterology;  Laterality: N/A;  pre-hx polyps  post-colon polyps    ENDOSCOPY N/A 08/04/2021    Procedure: ESOPHAGOGASTRODUODENOSCOPY WITH ANESTHESIA;  Surgeon: Michael Landin DO;  Location: [facility] ENDOSCOPY;  Service: Gastroenterology;  Laterality: N/A;  pre-dysphagia  post-normal  pcp-lanette aguila      General Information       Row Name 06/12/25 1350          OT Time and Intention    Subjective Information complains of;pain;fatigue  -     Document Type evaluation  -     Mode of Treatment occupational therapy  -     Patient Effort adequate  -       Row Name 06/12/25 1355          General Information    Patient Profile Reviewed yes  -     Prior Level of Function independent:;all household mobility;transfer;bed mobility;ADL's  -     Existing Precautions/Restrictions fall;oxygen therapy device and L/min;other (see comments)  9L baseline, 10-15L today  -     Barriers to Rehab medically complex;physical barrier;ineffective coping  -       Row Name 06/12/25 1350          Living Environment    Current Living Arrangements home  -     People in Home spouse  -       Row Name 06/12/25 1350          Home Main Entrance    Number of Stairs, Main Entrance two  -KP     Stair Railings, Main Entrance railings on both sides of stairs  -       Row Name 06/12/25 1350          Stairs Within Home, Primary    Number of  Stairs, Within Home, Primary none  -       Row Name 06/12/25 1350          Cognition    Orientation Status (Cognition) oriented x 4  -       Row Name 06/12/25 [facility]0          Safety Issues/Impairments Affecting Functional Mobility    Safety Issues Affecting Function (Mobility) at risk behavior observed;insight into deficits/self-awareness;judgment;safety precaution awareness;safety precautions follow-through/compliance  -     Impairments Affecting Function (Mobility) endurance/activity tolerance;shortness of breath;pain  -               User Key  (r) = Recorded By, (t) = Taken By, (c) = Cosigned By      Initials Name Provider Type     Dena York OTR/L Occupational Therapist                     Mobility/ADL's       Row Name 06/12/25 [facility]0          Bed Mobility    Bed Mobility rolling right;supine-sit  -     Rolling Right [place] (Bed Mobility) modified independence  -     Supine-Sit [place] (Bed Mobility) modified independence  -     Assistive Device (Bed Mobility) head of bed elevated;bed rails  -[facility] Name 06/12/25 1350          Transfers    Transfers bed-chair transfer;toilet transfer;stand-sit transfer;sit-stand transfer  -       Row Name 06/12/25 [facility]0          Bed-Chair Transfer    Bed-Chair [place] (Transfers) minimum assist (75% patient effort);1 person assist;2 person assist  -     Assistive Device (Bed-Chair Transfers) other (see comments)  HHA  -       Row Name 06/12/25 1350          Sit-Stand Transfer    Sit-Stand [place] (Transfers) minimum assist (75% patient effort);1 person assist;2 person assist  -[facility] Name 06/12/25 [facility]0          Stand-Sit Transfer    Stand-Sit [place] (Transfers) minimum assist (75% patient effort);verbal cues  -       Row Name 06/12/25 1350          Toilet Transfer    Type (Toilet Transfer) stand pivot/stand step  -     [place] Level (Toilet Transfer) minimum assist (75% patient effort)  -     Comment,  (Toilet Transfer) HHA  -       Row Name 06/12/25 1350          Activities of Daily Living    BADL Assessment/Intervention toileting;lower body dressing  -       Row Name 06/12/25 1350          Toileting Assessment/Training    [place] Level (Toileting) toileting skills;adjust/manage clothing;moderate assist (50% patient effort);perform perineal hygiene;contact guard assist  -     Assistive Devices (Toileting) commode, bedside without drop arms  -       Row Name 06/12/25 1350          Lower Body Dressing Assessment/Training    [place] Level (Lower Body Dressing) don;socks;dependent (less than 25% patient effort)  -               User Key  (r) = Recorded By, (t) = Taken By, (c) = Cosigned By      Initials Name Provider Type     Dena York OTR/L Occupational Therapist                   Obj/Interventions       Row Name 06/12/25 1350          Sensory Assessment (Somatosensory)    Sensory Assessment (Somatosensory) UE sensation intact  -KP       Row Name 06/12/25 1350          Vision Assessment/Intervention    Visual Impairment/Limitations WNL  -[facility] Name 06/12/25 1350          Range of Motion Comprehensive    General Range of Motion bilateral upper extremity ROM WFL  -[facility] Name 06/12/25 1350          Strength Comprehensive (MMT)    Comment, General Manual Muscle Testing (MMT) Assessment no formal BUE strength assessment completed  -[facility] Name 06/12/25 1350          Motor Skills    Motor Skills functional endurance  -     Functional Endurance poor, requires 10-15L O2 today   -       Row Name 06/12/25 1350          Balance    Balance Assessment sitting static balance;sitting dynamic balance;standing static balance;standing dynamic balance  -     Static Sitting Balance independent  -     Dynamic Sitting Balance independent  -     Position, Sitting Balance unsupported;sitting edge of bed  -     Static Standing Balance contact guard  -     Dynamic Standing Balance  minimal assist  -KP     Position/Device Used, Standing Balance supported;other (see comments)  HHA  -KP               User Key  (r) = Recorded By, (t) = Taken By, (c) = Cosigned By      Initials Name Provider Type    Dena Lopez, OTR/L Occupational Therapist                   Goals/Plan       Row Name 06/12/25 1680          Transfer Goal 1 (OT)    Activity/Assistive Device (Transfer Goal 1, OT) bed-to-chair/chair-to-bed;commode, bedside without drop arms  -KP     [place] Level/Cues Needed (Transfer Goal 1, OT) standby assist  -KP     Time Frame (Transfer Goal 1, OT) long term goal (LTG);10 days  -KP     Progress/Outcome (Transfer Goal 1, OT) new goal  -KP       Row Name 06/12/25 2170          Dressing Goal 1 (OT)    Activity/Device (Dressing Goal 1, OT) dressing skills, all  -KP     [place]/Cues Needed (Dressing Goal 1, OT) minimum assist (75% or more patient effort)  -KP     Time Frame (Dressing Goal 1, OT) long term goal (LTG);10 days  -KP     Progress/Outcome (Dressing Goal 1, OT) new goal  -KP       Row Name 06/12/25 1350          Toileting Goal 1 (OT)    Activity/Device (Toileting Goal 1, OT) toileting skills, all  -KP     [place] Level/Cues Needed (Toileting Goal 1, OT) standby assist  -KP     Time Frame (Toileting Goal 1, OT) long term goal (LTG);10 days  -KP     Progress/Outcome (Toileting Goal 1, OT) new goal  -KP       Row Name 06/12/25 0280          Problem Specific Goal 1 (OT)    Problem Specific Goal 1 (OT) Pt will independently implement one pain management technique to decrease pain and improve functional adl performance.  -KP     Time Frame (Problem Specific Goal 1, OT) long term goal (LTG);by discharge  -KP     Progress/Outcome (Problem Specific Goal 1, OT) new goal  -       Row Name 06/12/25 4990          Therapy Assessment/Plan (OT)    Planned Therapy Interventions (OT) transfer/mobility retraining;strengthening exercise;patient/caregiver  education/training;occupation/activity based interventions;functional balance retraining;activity tolerance training;BADL retraining  -               User Key  (r) = Recorded By, (t) = Taken By, (c) = Cosigned By      Initials Name Provider Type    Dena Lopez, OTR/L Occupational Therapist                   Clinical Impression       Row Name 06/12/25 1350          Pain Assessment    Pretreatment Pain Rating 5/10  -KP     Posttreatment Pain Rating 5/10  -     Pain Location back  -     Pain Side/Orientation generalized  -     Pain Management Interventions nursing notified;exercise or physical activity utilized;positioning techniques utilized  -     Response to Pain Interventions no change per patient report  -       Row Name 06/12/25 1350          Plan of Care Review    Plan of Care Reviewed With patient  -KP     Outcome Evaluation OT eval completed. Pt is A&O x4, fowlers upon arrival. Pt reports back pain 5/10 and completes bed mobility w/ ind to sit EOB. Upon arrival, pt is on O2 via nasal cannula 9L and spO2 92-93%. Pt very aggitated, upset due to needing to speak w/ respiratory therapy. Nsg calls RT, who arrives and speaks w/ pt. Following this, pt t/f to BSC w/ min A to complete toileting.  Throughout eval, pt mood is unpredictable and unstable. When pt would become upset, she would require increased O2 up to 15L w/ spO2 ranging 74-84%. Pt educated on PLB and w/ time, able to improve spO2% back to low 902. Pt completed toileting w/ CGA toileting hygiene and mod A for clothing management. Pt then pivoted to recliner where she was left on 10L O2 and call light. Nsg notified of findings. OT to cont to see for defciits, recommend home w/ 24/7 care vs SNF depending on progress.  -       Row Name 06/12/25 1350          Therapy Assessment/Plan (OT)    Rehab Potential (OT) limited  -     Criteria for Skilled Therapeutic Interventions Met (OT) yes;meets criteria  -     Therapy Frequency (OT) 5  times/wk  -     Predicted Duration of Therapy Intervention (OT) until d/c  -KP       Row Name 06/12/25 1350          Therapy Plan Review/Discharge Plan (OT)    Anticipated Discharge Disposition (OT) home with 24/7 care;home with home health;skilled nursing facility  -       Row Name 06/12/25 1350          Positioning and Restraints    Pre-Treatment Position in bed  -KP     Post Treatment Position chair  -KP     In Chair notified nsg;reclined;call light within reach;encouraged to call for assist  -KP               User Key  (r) = Recorded By, (t) = Taken By, (c) = Cosigned By      Initials Name Provider Type    Dena Lopez, OTR/L Occupational Therapist                   Outcome Measures       Row Name 06/12/25 1350          How much help from another is currently needed...    Putting on and taking off regular lower body clothing? 2  -KP     Bathing (including washing, rinsing, and drying) 2  -KP     Toileting (which includes using toilet bed pan or urinal) 3  -KP     Putting on and taking off regular upper body clothing 3  -KP     Taking care of personal grooming (such as brushing teeth) 3  -KP     Eating meals 4  -KP     AM-PAC 6 Clicks Score (OT) 17  -KP       Row Name 06/12/25 1357 06/12/25 0800       How much help from another person do you currently need...    Turning from your back to your side while in flat bed without using bedrails? 4  -AJ 3  -TS    Moving from lying on back to sitting on the side of a flat bed without bedrails? 4  -AJ 3  -TS    Moving to and from a bed to a chair (including a wheelchair)? 3  -AJ 2  -TS    Standing up from a chair using your arms (e.g., wheelchair, bedside chair)? 3  -AJ 2  -TS    Climbing 3-5 steps with a railing? 1  -AJ 2  -TS    To walk in hospital room? 2  -AJ 2  -TS    AM-PAC 6 Clicks Score (PT) 17  -AJ 14  -TS    Highest Level of Mobility Goal Stand (1 or More Minutes)-5  -AJ Move to Chair/Commode-4  -TS      Row Name 06/12/25 1357 06/12/25 1350        Functional Assessment    Outcome Measure Options AM-PAC 6 Clicks Basic Mobility (PT)  - AM-PAC 6 Clicks Daily Activity (OT)  -              User Key  (r) = Recorded By, (t) = Taken By, (c) = Cosigned By      Initials Name Provider Type    Cyril Ware, PT DPT Physical Therapist    Dena Lopez OTR/L Occupational Therapist    Valentina Acuna LPN Licensed Nurse                    Occupational Therapy Education       Title: PT OT SLP Therapies (Done)       Topic: Occupational Therapy (Done)       Point: ADL training (Done)       Learning Progress Summary            Patient Acceptance, E,D, VU,DU,NR by  at 6/12/2025 1612                      Point: Precautions (Done)       Learning Progress Summary            Patient Acceptance, E,D, VU,DU,NR by  at 6/12/2025 1612                      Point: Body mechanics (Done)       Learning Progress Summary            Patient Acceptance, E,D, VU,DU,NR by  at 6/12/2025 1612                                      User Key       Initials Effective Dates Name Provider Type Discipline     10/08/24 -  Dena York OTR/L Occupational Therapist OT                  OT Recommendation and Plan  Planned Therapy Interventions (OT): transfer/mobility retraining, strengthening exercise, patient/caregiver education/training, occupation/activity based interventions, functional balance retraining, activity tolerance training, BADL retraining  Therapy Frequency (OT): 5 times/wk  Plan of Care Review  Plan of Care Reviewed With: patient  Outcome Evaluation: OT eval completed. Pt is A&O x4, fowlers upon arrival. Pt reports back pain 5/10 and completes bed mobility w/ ind to sit EOB. Upon arrival, pt is on O2 via nasal cannula 9L and spO2 92-93%. Pt very aggitated, upset due to needing to speak w/ respiratory therapy. Nsg calls RT, who arrives and speaks w/ pt. Following this, pt t/f to BSC w/ min A to complete toileting.  Throughout eval, pt mood is unpredictable and unstable.  When pt would become upset, she would require increased O2 up to 15L w/ spO2 ranging 74-84%. Pt educated on PLB and w/ time, able to improve spO2% back to low 902. Pt completed toileting w/ CGA toileting hygiene and mod A for clothing management. Pt then pivoted to recliner where she was left on 10L O2 and call light. Nsg notified of findings. OT to cont to see for defciits, recommend home w/ 24/7 care vs SNF depending on progress.     Time Calculation:         Time Calculation- OT       Row Name 06/12/25 1350             Time Calculation- OT    OT Start Time 1350  -KP      OT Stop Time 1450  -KP      OT Time Calculation (min) 60 min  -KP      OT Received On 06/12/25  -KP      OT Goal Re-Cert Due Date 06/22/25  -KP         Untimed Charges    OT Eval/Re-eval Minutes 60  -KP         Total Minutes    Untimed Charges Total Minutes 60  -KP       Total Minutes 60  -KP                User Key  (r) = Recorded By, (t) = Taken By, (c) = Cosigned By      Initials Name Provider Type     Dena York OTR/L Occupational Therapist                  Therapy Charges for Today       Code Description Service Date Service Provider Modifiers Qty    81356326366 HC OT EVAL HIGH COMPLEXITY 4 6/12/2025 Dena York OTR/L GO 1                 THERON Maria/SAMPSON  6/12/2025

## 2025-06-12 NOTE — PLAN OF CARE
Goal Outcome Evaluation:  Plan of Care Reviewed With: patient           Outcome Evaluation: Pt AOx4, lasix and solumedrol IV, IV ABX, refused lovenox for VTE, strict bedrest, purewick, safety maintained and will cont to monitor.

## 2025-06-12 NOTE — PLAN OF CARE
Goal Outcome Evaluation:  Plan of Care Reviewed With: patient           Outcome Evaluation: PT eval complete. pt in fowlers position, AOx4 with complaints of back pain and SOA. Prior to mobility, SpO2 reading of 92-93% while on 9L HFNC at rest. Pt reports she is needing Respiratory Therapist ue to breathing and was called along with nsg staff present. Once cleared for activity, pt was agreeable to session to decrease back pain and to void. Upon sitting EOB, pt become agitated, SOA and desaturation with any mobility. was able to t/f once pt was calmed down. During this instance, SpO2 reading ranged from 78-84% and was increased to 12L HFNC with RT present, HR reading ranged from 120-145, and recovered to 90% with verbal cues for pursed lip breathing. Once recovered t/f to bs, completed for toileting pt then performed another stand pivot to recliner. Pt again desaturated to upper 70s and recovered within 5 minutes to 89-90% and left on 10 L with nsg staff notified. Pt will benefit from skilled PT services to improve activity tolerance, decrease fall risk, continued education for pursed lip breathing and return to PLOF. Recommend home with 24/7 assist vs SNF pending progress towards goals.    Anticipated Discharge Disposition (PT): home with 24/7 care, home with home health, skilled nursing facility

## 2025-06-12 NOTE — PROGRESS NOTES
[facility] Medicine Services  INPATIENT PROGRESS NOTE    Patient Name: Elaine Juárez  Date of Admission: 6/11/2025  Today's Date: 06/12/25  Length of Stay: 0  Primary Care Physician: Aaron Stoddard MD    Subjective   Chief Complaint: Shortness of breath back pain abdominal pain     Shortness of Breath  Back Pain     Patient is a 65-year-old white female with past medical history of oxygen dependent pulmonary fibrosis bronchiectasis show COPD on 6 to 7 L history of depression anxiety history of bilateral kidney stones history of sleep apnea presents to the emergency room with worsening shortness of breath and back pain.  Patient states she has right flank pain and has been getting worse over the last 3 to 4 days.  She states that her urine has also been dark.   She is also been more short of breath today and called EMS for her right flank and back pain but noted that her oxygen was in the 70s while upon arrival and tachycardic.  She refused a DuoNeb by EMS en route.    In ER chest x-ray was showing multifocal pneumonia possible pulmonary edema , CT abdomen pelvis was done for abdominal pain was showing constipation bilateral kidney stones the patient was placed on Vapotherm blood cultures were obtained started on steroid breathing treatment and Sandi NIETO discussed the case with intensivist and with the nurse practitioner intensivist Nba  who felt that the patient could be admitted to the floor and does not need to be in the unit  Will admit continue steroid breathing treatment antibiotics we will start her on IV Lasix order echo of the heart we will consult with pulmonary I's and O's and daily weights Lovenox for DVT prophylaxis identified reconcile restart home medications once reconciled  6/12  As before presented  with above remains on antibiotics steroid breathing treatment started Lasix echo of the heart is pending pulmonary has been consulted continue to titrate down  "her oxygen blood cultures remain unremarkable, vest therapy, last echo of the heart was showing EF to be 60 to 65% repeat echo is pending    Review of Systems   All other systems reviewed and are negative.     All pertinent negatives and positives are as above. All other systems have been reviewed and are negative unless otherwise stated.     Objective    Temp:  [97.8 °F (36.6 °C)-98.5 °F (36.9 °C)] 98.5 °F (36.9 °C)  Heart Rate:  [] 101  Resp:  [20-38] 21  BP: ()/(52-86) 115/60  Physical Exam  Constitutional:       Appearance: She is ill-appearing.   HENT:      Head: Normocephalic.      Nose: Nose normal.   Eyes:      Pupils: Pupils are equal, round, and reactive to light.   Cardiovascular:      Rate and Rhythm: Normal rate.   Pulmonary:      Breath sounds: Wheezing present.   Abdominal:      General: Abdomen is flat.   Musculoskeletal:         General: Normal range of motion.      Cervical back: Normal range of motion.   Skin:     General: Skin is warm.      Capillary Refill: Capillary refill takes less than 2 seconds.   Neurological:      Mental Status: She is alert.             Results Review:  I have reviewed the labs, radiology results, and diagnostic studies.    Laboratory Data:   Results from last 7 days   Lab Units 06/12/25  0527 06/11/25  1143   WBC 10*3/mm3 8.92 14.17*   HEMOGLOBIN g/dL 13.4 14.3   HEMATOCRIT % 44.0 46.1   PLATELETS 10*3/mm3 194 242        Results from last 7 days   Lab Units 06/12/25  0527 06/11/25  1614 06/11/25  1143   SODIUM mmol/L 136  --  133*   SODIUM, ARTERIAL mmol/L  --  136  --    POTASSIUM mmol/L 4.0  --  4.3   CHLORIDE mmol/L 101  --  97*   CO2 mmol/L 22.0  --  21.0*   BUN mg/dL 18.6  --  9.8   CREATININE mg/dL 0.93  --  0.73   CALCIUM mg/dL 8.1*  --  8.8   BILIRUBIN mg/dL  --   --  0.4   ALK PHOS U/L  --   --  179*   ALT (SGPT) U/L  --   --  11   AST (SGOT) U/L  --   --  21   GLUCOSE mg/dL 221*  --  131*       Culture Data:   No results found for: \"BLOODCX\", " "\"URINECX\", \"WOUNDCX\", \"MRSACX\", \"RESPCX\", \"STOOLCX\"    Radiology Data:   Imaging Results (Last 24 Hours)       Procedure Component Value Units Date/Time    CT Abdomen Pelvis Stone Protocol [[id number]] Collected: 06/11/25 1336     Updated: 06/11/25 1348    Narrative:      CT ABDOMEN PELVIS STONE PROTOCOL-      HISTORY: Right flank pain history of kidney stone      COMPARISON: CTA chest 5/22/2025     TOTAL DOSE LENGTH PRODUCT: 343.82 mGy.cm. Automated exposure control was  also utilized to decrease patient radiation dose.     TECHNIQUE: Axial images of the abdomen and pelvis are performed without  IV or oral contrast     FINDINGS: Right hide there and mediastinal lymphadenopathy is  redemonstrated. Cardiomegaly with slightly increasing small pericardial  effusion. Chronic basilar interstitial lung disease with diffuse  bronchiectasis. Right lower lobe granuloma.     The nonenhanced liver spleen, pancreas, and adrenal glands are  unremarkable. There are punctate nonobstructing bilateral intrarenal  stones measuring 4 mm or less. No ureteral stones or hydronephrosis. 1.4  cm fluid attenuated left renal cyst. No abnormal perinephric fluid  collection. Bladder is unremarkable. Uterus is unremarkable. No adnexal  mass.     No free intraperitoneal air or loculated abscess. Moderate fecal stasis.  Normal appendix. No small bowel dilatation. Small hiatal hernia. Fluid  contained within the mildly distended lower esophagus which may relate  to reflux or poor primary peristalsis.     Mild vascular calcification. No pathologic-appearing regional  lymphadenopathy.     Minimal anterolisthesis of L4 over L5 likely due to facet  osteoarthropathy. No focal aggressive regional bony lesions.       Impression:         1. Bilateral nonobstructing intrarenal stones measuring 4 mm or less. No  ureteral stones or hydronephrosis. No bladder stones. Left renal cyst.  2. Pathologic mediastinal right hilar lymphadenopathy is redemonstrated  as " seen on CTA chest of 5/22/2025. Advanced basilar chronic interstitial  lung disease with bronchiectasis. Slightly increasing small pericardial  effusion with cardiomegaly.  3. Small hiatal hernia. Fluid of the mildly distended lower esophagus  may relate to reflux.  4. Moderate fecal stasis. Normal appendix. No evidence for bowel  obstruction. No free air or abscess.     This report was signed and finalized on 6/11/2025 1:45 PM by Dr. Keke Bangura MD.       XR Chest 1 View [[id number]] Collected: 06/11/25 1235     Updated: 06/11/25 1242    Narrative:      XR CHEST 1 VW-     HISTORY: Palpitation     COMPARISON: 5/26/2025     FINDINGS: Frontal view the chest obtained.     Increasing perihilar opacities superimposed on chronic interstitial lung  disease. Stable nodular right upper lobe opacities. Mediastinal and  right hilar adenopathy best seen on CTA chest of 5/22/2025.  Cardiomegaly. Questionable trace right pleural effusion. No  pneumothorax. Degenerative changes regional skeleton.       Impression:      1. Increasing bilateral perihilar opacities favoring pulmonary edema  over multifocal pneumonia superimposed on advanced chronic interstitial  lung disease with basilar predilection. Similar nodular right upper lobe  opacities concerning for potential malignancy. Right hilar and  mediastinal lymphadenopathy better seen on recent CTA chest of  5/22/2025.        This report was signed and finalized on 6/11/2025 12:39 PM by Dr. Keke Bangura MD.               I have reviewed the patient's current medications.     Assessment/Plan   Assessment  Active Hospital Problems    Diagnosis     Acute on chronic respiratory failure with hypoxia        Treatment Plan   In ER chest x-ray was showing multifocal pneumonia possible pulmonary edema , CT abdomen pelvis was done for abdominal pain was showing constipation bilateral kidney stones the patient was placed on Vapotherm blood cultures were obtained started on steroid  breathing treatment and Zosyn I discussed the case with intensivist and with the nurse practitioner intensivist Nba  who felt that the patient could be admitted to the floor and does not need to be in the unit  Will admit continue steroid breathing treatment antibiotics we will start her on IV Lasix order echo of the heart we will consult with pulmonary I's and O's and daily weights Lovenox for DVT prophylaxis identified reconcile restart home medications once reconciled  Medical Decision Making  Number and Complexity of problems: 3 acute  Differential Diagnosis: As above     Conditions and Status        Condition is at treatment goal.     MDM Data  External documents reviewed: yes  Cardiac tracing (EKG, telemetry) interpretation: yes  Radiology interpretation: yes  Labs reviewed: yes  Any tests that were considered but not ordered: no     Decision rules/scores evaluated (example [id number], Wells, etc): no     Discussed with: ED      Care Planning  Shared decision making: Patient apprised of current labs, vitals, imaging and treatment plan.  They are agreeable with proceeding with plans as discussed.   Code status and discussions: full      Disposition  Social Determinants of Health that impact treatment or disposition: no   Estimated length of stay is TBD.      I confirmed that the patient's advanced care plan is present, code status is documented, and a surrogate decision maker is listed in the patient's medical record.        Electronically signed by Cristy Lopez MD, 06/12/25, 09:53 CDT.

## 2025-06-12 NOTE — PLAN OF CARE
Goal Outcome Evaluation:  Plan of Care Reviewed With: patient           Outcome Evaluation: OT eval completed. Pt is A&O x4, fowlers upon arrival. Pt reports back pain 5/10 and completes bed mobility w/ ind to sit EOB. Upon arrival, pt is on O2 via nasal cannula 9L and spO2 92-93%. Pt very aggitated, upset due to needing to speak w/ respiratory therapy. Nsg calls RT, who arrives and speaks w/ pt. Following this, pt t/f to BSC w/ min A to complete toileting.  Throughout eval, pt mood is unpredictable and unstable. When pt would become upset, she would require increased O2 up to 15L w/ spO2 ranging 74-84%. Pt educated on PLB and w/ time, able to improve spO2% back to low 902. Pt completed toileting w/ CGA toileting hygiene and mod A for clothing management. Pt then pivoted to recliner where she was left on 10L O2 and call light. Nsg notified of findings. OT to cont to see for defciits, recommend home w/ 24/7 care vs SNF depending on progress.    Anticipated Discharge Disposition (OT): home with 24/7 care, home with home health, skilled nursing facility

## 2025-06-12 NOTE — CONSULTS
Inpatient Consult Note            NAME: Elaine Juárez  MRN: [id number]  AGE: 65 y.o.            : 1960  ADMISSION DATE: 2025  PRIMARY CARE PROVIDER: Aaron Stoddard MD  ATTENDING PHYSICIAN: Cristy Lopez MD               Reason for Consult:  We have been consulted by Cristy Lopez MD to see Elaine Juárez for acute on chronic respiratory failure.    HPI:    Mrs. Juárez is a 65-year-old female admitted to [facility] with worsening respiratory status and back pain. Past medical history includes CREST syndrome with resultant pulmonary fibrosis, pulmonary hypertension.    The patient was recently mated to [facility] for acute on chronic respiratory failure.  She was discharged home on 2025.  Patient states that she initially was doing well at home and her respiratory status was improving.  When she ran out of the higher dose steroids and went back to 5 mg of prednisone and completed her antibiotic she noted worsening of back pain.  This led to increased cough and shortness of breath.  She was concerned for a kidney stone.  Due to the symptoms she called EMS.  On EMS arrival patient's O2 sat was in the 70s.  On arrival to the ED on 6 L she was satting 85%.  She was initially placed on heated high flow and eventually changed back to high flow cannula.  Workup in the ED included CT of the abdomen and pelvis which showed bilateral nonobstructing intrarenal stones, mediastinal right hilar lymphadenopathy with bronchiectasis.  Chest x-ray showed bilateral perihilar opacities with other chronic changes.  ABG on 6 L 7.4, 35, 68, 24.  RVP negative.    On exam the patient is resting in bed in no acute distress.  States that her back pain continues to be a 9-10 out of 10.  Oxygen has been weaned to 10 L.  While talking and sliding up in the bed for breakfast the patient did desat to 87% before recovering to the low 90s.  She feels her respiratory status is improved with the antibiotics and  high-dose steroids.  The patient did obtain a home vest physiotherapy for her bronchiectasis.    Review of Systems:  A full 12-point review of systems was performed and was negative except as documented in the HPI.               Social History:  Social History     Socioeconomic History    Marital status:    Tobacco Use    Smoking status: Never     Passive exposure: Past    Smokeless tobacco: Never   Vaping Use    Vaping status: Never Used   Substance and Sexual Activity    Alcohol use: No    Drug use: No    Sexual activity: Not Currently     Partners: Male                 Physical Exam:  General: Well appearing, in no respiratory distress with mild increased work of breathing during conversation  Head: Normocephalic, atraumatic  Eyes: Conjunctiva clear, sclera non-icteric  Teeth/Gums: Normal inspection  Neck: Supple without stridor  Heart: Regular rate and rhythm, no murmur  Lungs: Diminished with crackles throughout  Abdomen: Soft, nontender  MSK/extremities: No cyanosis or edema  Neurologic: AOx3, grossly no focal deficits      Imaging Review:  I personally reviewed the chest x-ray and CT abdomen done on admission:  Chest x-ray showed increased perihilar opacities and chronic changes.  CT abdomen pelvis with similar right hilar lymphadenopathy and chronic changes in the lung bases.      PFTs Reivew:  June 2019: Ratio 87, FEV1 59, DLCO 48             Problem List:  Acute on chronic hypoxic respiratory failure  CREST syndrome  Pulmonary fibrosis  Pulmonary hypertension  Concern for pneumonia  Bronchiectasis      Pulmonary Assessment:  Patient is a 65-year-old female who presents due to acute on chronic respiratory failure and back pain.  Imaging shows increased opacities.  She is currently on treatment for healthcare associated pneumonia with Zosyn.      Recommendations:   - Continue supplemental oxygen as needed and wean as tolerated, goal O2 sat of 88-92%  - O2 requirements improved from heated high flow  down to 10 L today  - Will continue Solu-Medrol at current dose, will start to wean as she improves  - Continue pulmonary hypertension medications, these have been ordered  - Lower concern for pneumonia as the patient is afebrile with normal white count.  Suspect imaging findings are chronic.  Consider monitoring off of antibiotics from a pulmonary standpoint  - Restart vest therapy which the patient does at home, add hypertonic saline  - Has an appointment in [place] pulmonary hypertension clinic  - Pulmonary clinic follow-up on discharge  - Frequent incentive spirometer, PT/OT        Thank you for allowing us to participate in this patient's care. We will continue to follow.              Past Medical History:   Past Medical History:   Diagnosis Date    Allergies     Arthritis     BMI 31.0-31.9,adult 06/04/2019    Calcified granuloma of lung 06/04/2019    Right lung    CHF (congestive heart failure)     denies    Chronic idiopathic fibrosing alveolitis     Chronic respiratory failure with hypoxia 08/05/2020    Chronic respiratory failure with hypoxia, on home oxygen therapy     COVID-19 02/01/2022    CREST syndrome     Environmental allergies 02/01/2022    Gastroesophageal reflux disease without esophagitis 06/04/2019    Interstitial lung disease     Obstructive sleep apnea on CPAP 06/04/2019    Oropharyngeal dysphagia 01/26/2023    Primary central sleep apnea     Pulmonary fibrosis     Pulmonary hypertension     Rheumatoid arthritis     Right ventricular systolic dysfunction 05/04/2023    Short-term memory loss          Past Surgical History:   Past Surgical History:   Procedure Laterality Date    BREAST BIOPSY      CARDIAC CATHETERIZATION N/A 07/22/2020    Procedure: Right Heart Cath;  Surgeon: Thong Martin MD;  Location:  [facility] CATH [facility]ASIVE [facility];  Service: Cardiology;  Laterality: N/A;    CHOLECYSTECTOMY      COLONOSCOPY  05/11/2015    5 POLYPS    COLONOSCOPY N/A 08/04/2021    Procedure: COLONOSCOPY  WITH ANESTHESIA;  Surgeon: Michael Landin DO;  Location:  [facility] ENDOSCOPY;  Service: Gastroenterology;  Laterality: N/A;  pre-hx polyps  post-colon polyps    ENDOSCOPY N/A 08/04/2021    Procedure: ESOPHAGOGASTRODUODENOSCOPY WITH ANESTHESIA;  Surgeon: Michael Landin DO;  Location:  [facility] ENDOSCOPY;  Service: Gastroenterology;  Laterality: N/A;  pre-dysphagia  post-normal  pcp-lanette aguila         Family History:   Family History   Problem Relation Age of Onset    Cirrhosis Sister     Liver cancer Brother     No Known Problems Mother     No Known Problems Father     Colon cancer Neg Hx     Colon polyps Neg Hx          Medications:   Prior to Admission medications    Medication Sig Start Date End Date Taking? Authorizing Provider   alendronate (FOSAMAX) 70 MG tablet Take 1 tablet by mouth 1 (One) Time Per Week. 4/25/24   Latanya Spring MD   busPIRone (BUSPAR) 10 MG tablet Take 1 tablet by mouth 3 (Three) Times a Day As Needed (anxiety). 4/29/24   Latanya Spring MD   famotidine (PEPCID) 20 MG tablet Take 1 tablet by mouth 2 (Two) Times a Day Before Meals. 3/22/25   Kaden Arthur MD   ipratropium-albuterol (DUO-NEB) 0.5-2.5 mg/3 ml nebulizer Take 3 mL by nebulization 4 (Four) Times a Day As Needed for Wheezing for up to 30 days. 4/7/25 5/23/25  Fela Blackman APRN   Macitentan-Tadalafil 10-40 MG tablet Take 1 tablet by mouth Every Night.    ProviderLatanya MD   metoprolol succinate XL (TOPROL-XL) 25 MG 24 hr tablet Take 0.5 tablets by mouth Daily. 2/7/25   Roosevelt Escalera MD   mycophenolate (CELLCEPT) 500 MG tablet Take 2 tablets by mouth 2 (Two) Times a Day.    Antonia Landry MD   omeprazole (priLOSEC) 40 MG capsule Take 1 capsule by mouth Daily. 1/8/25   Fela Blackman APRN   potassium chloride 10 MEQ CR tablet Take 1 tablet by mouth Daily. 4/10/25   Roosevelt Escalera MD   predniSONE (DELTASONE) 5 MG tablet Take 1 tablet by mouth Daily for 90 days. 3/26/25 6/24/25  Fela Blackman  "ANASTASIA FALCON   Sotatercept-csrk (Winrevair) 60 MG kit Inject 0.9 mL under the skin into the appropriate area as directed Every 21 (Twenty-One) Days. 6/10/25   Toby Nath APRN   spironolactone (ALDACTONE) 50 MG tablet Take 1 tablet by mouth Daily. 5/7/25   Roosevelt Escalera MD   traZODone (DESYREL) 100 MG tablet Take 1 tablet by mouth Every Night for 90 days. 8/28/24 12/31/25  Fela Blackman APRN   Treprostinil (Tyvaso) 0.6 MG/ML solution inhalation solution Inhale 12 puffs 4 (Four) Times a Day. 5/22/25   Roosevelt Escalera MD   venlafaxine (EFFEXOR) 75 MG tablet Take 1 tablet by mouth Daily.    Farida Cano APRN   vitamin D (ERGOCALCIFEROL) 1.25 MG (98825 UT) capsule capsule Take 1 capsule by mouth 1 (One) Time Per Week. 5/10/23   Aaron Stoddard MD         Allergies:   Codeine, Latex, and Prednisone      Results Review:   Results from last 7 days   Lab Units 06/12/25  0527 06/11/25  1614 06/11/25  1143   SODIUM mmol/L 136  --  133*   SODIUM, ARTERIAL mmol/L  --  136  --    POTASSIUM mmol/L 4.0  --  4.3   CHLORIDE mmol/L 101  --  97*   CO2 mmol/L 22.0  --  21.0*   BUN mg/dL 18.6  --  9.8   CALCIUM mg/dL 8.1*  --  8.8     Results from last 7 days   Lab Units 06/12/25  0527 06/11/25  1143   WBC 10*3/mm3 8.92 14.17*   HEMOGLOBIN g/dL 13.4 14.3   HEMATOCRIT % 44.0 46.1   PLATELETS 10*3/mm3 194 242       Visit Vitals  /60 (BP Location: Right arm, Patient Position: Lying)   Pulse 101   Temp 98.5 °F (36.9 °C) (Oral)   Resp 21   Ht 160 cm (63\")   Wt 70.8 kg (156 lb 1.6 oz)   SpO2 97%   BMI 27.65 kg/m²                Brentley Frossard DO  Pulmonary/Critical Care  6/12/2025  08:31 CDT  "

## 2025-06-12 NOTE — CONSULTS
"Inpatient Nutrition Consult    Patient Name:  Elaine Juárez  YOB: 1960  MRN: [id number]  Admit Date:  6/11/2025    Assessment Date:  6/12/2025       Reason for Assessment       Row Name 06/12/25 1140          Reason for Assessment    Reason For Assessment identified at risk by screening criteria;nurse/nurse practitioner consult;per organizational policy (P)      Diagnosis cardiac disease;pulmonary disease (P)      Identified At Risk by Screening Criteria MST SCORE 2+;reduced oral intake over the last month;unintentional loss of 10 lbs or more in the past 2 mos (P)                     Nutrition/Diet History       Row Name 06/12/25 1598          Nutrition/Diet History    Typical Intake (Food/Fluid/EN/PN) Pt alone at time of visit, sitting up in chair. Unable to complete NFPE at this time, will attempt at follow up. Pt states she has lost 20 lbs in the last month, says her BW was 151 lbs at time of last D/C. Pt states last BM a week ago after D/C and went 3x. Pt expereincing intermittent N/V. Pt unsure of chewing/swallowing difficulty, states she's afraid to eat d/t respiratory failure. Pt did not want a diet texture change. Appetite has changed from last admission to present, pt states medication is making her hungry, but reiterated that she is afraid to eat. Supplements: Vit D, Calcium tablet. Dietetic intern observed 25% or less consumed of lunch today, pt was waiting to eat peaches with medication. Pt also requested a popsicle, Dietetic intern provided one. Follow per protocol. (P)      Food Preferences Chocolate (P)      Vitamin/Mineral Supplements Vit D, Calcium (P)      Factors Affecting Nutritional Intake appetite;nausea;fatigue;respiratory difficulty/therapies;other (see comments);pain (P)   Pt stated multiple times that she is \"scared to eat\" d/t respiratory difficulty               Labs/Tests/Procedures/Meds       Row Name 06/12/25 1368          Labs/Procedures/Meds    Lab Results Reviewed " reviewed, pertinent (P)      Lab Results Comments Troponin, Glu, Ca, RBC, RDW, MCV, MCH, MCHC (P)         Diagnostic Tests/Procedures    Diagnostic Test/Procedure Reviewed reviewed, pertinent (P)         Medications    Pertinent Medications Reviewed reviewed, pertinent (P)      Pertinent Medications Comments See MAR (P)                Physical Findings       Row Name 06/12/25 1512          Physical Findings    Overall Physical Appearance Red face, Nasal cannula (high flow), Mustapha Score 17, BM a week ago after D/C and went 3x per pt, oriented x4. (P)      Exam Agreement other (see comments) (P)   Pt in distress, will attempt to complete on follow up          Estimated/Assessed Needs - Anthropometrics       Row Name 06/12/25 1518          Anthropometrics    Calculation Weight 70.8 kg (156 lb 1.4 oz) (P)         Estimated/Assessed Needs    Additional Documentation KCAL/KG (Group);Protein Requirements (Group);Fluid Requirements (Group) (P)         KCAL/KG    KCAL/KG 30 Kcal/Kg (kcal);35 Kcal/Kg (kcal) (P)      30 Kcal/Kg (kcal) 2124 (P)      35 Kcal/Kg (kcal) 2478 (P)         Protein Requirements    Weight Used For Protein Calculations 70.8 kg (156 lb 1.4 oz) (P)      Est Protein Requirement Amount (gms/kg) 1.5 gm protein (P)   1.2 - 1.5 g/kg     Estimated Protein Requirements (gms/day) 106.2 (P)         Fluid Requirements    Fluid Requirements (mL/day) 2124 (P)                Nutrition Prescription Ordered       Row Name 06/12/25 1519          Nutrition Prescription PO    Current PO Diet Regular (P)      Fluid Consistency Thin (P)      Common Modifiers Cardiac;Low Sodium (P)               Problem/Interventions:   Problem 1       Row Name 06/12/25 1519          Nutrition Diagnoses Problem 1    Problem 1 Inadequate Nutrient Intake (P)      Etiology (related to) Medical Diagnosis;Factors Affecting Nutrition (P)      Pulmonary/Critical Care Acute respiratory failure;Chronic respiratory failure;Pulmonary fibrosis;Pulmonary  hypertension (P)      Condition Other (P)   fear of aspiration     Signs/Symptoms (evidenced by) Report/Observation;Other (comment);Unintended Weight Change;PO Intake (P)      Percent (%) intake recorded 25 % (P)      Over number of meals 1 (P)      Unintended Weight Change Loss (P)      Number of Pounds Lost 20 lbs (P)      Weight loss time period 1 month (P)      Reported/Observed By Patient (P)   Pt states she's eating less in fear of aspiration               Intervention Goal       Row Name 06/12/25 1532          Intervention Goal    General Meet nutritional needs for age/condition;Reduce/improve symptoms;Disease management/therapy;Maintain nutrition (P)      PO Establish PO;Tolerate PO;Meet estimated needs (P)      Weight Maintain weight (P)                Nutrition Intervention       Row Name 06/12/25 1532          Nutrition Intervention    RD/Tech Action Supplement provided;Care plan reviewd;Follow Tx progress (P)                Nutrition Prescription       Row Name 06/12/25 1533          Nutrition Prescription PO    PO Prescription Begin/change supplement (P)      Supplement Boost;Other (comment) (P)   fortified pudding     Supplement Frequency Daily (P)                Education/Evaluation       Row Name 06/12/25 1535          Education    Education Provided education regarding (P)      Provided education regarding Avoidance/improvement of symptoms (P)         Monitor/Evaluation    Monitor Per protocol;PO intake (P)                 Electronically signed by:  Harman Luque  06/12/25 16:17 CDT

## 2025-06-12 NOTE — CASE MANAGEMENT/SOCIAL WORK
Discharge Planning Assessment   Javed     Patient Name: Elaine Juárez  MRN: [id number]  Today's Date: 6/12/2025    Admit Date: 6/11/2025        Discharge Needs Assessment       Row Name 06/12/25 1415       Living Environment    People in Home spouse    Current Living Arrangements home    Primary Care Provided by self    Provides Primary Care For no one    Family Caregiver if Needed child(aida), adult;spouse    Quality of Family Relationships helpful;involved;supportive    Able to Return to Prior Arrangements yes       Resource/Environmental Concerns    Transportation Concerns none       Transition Planning    Patient/Family Anticipates Transition to home with family    Patient/Family Anticipated Services at Transition none    Transportation Anticipated family or friend will provide       Discharge Needs Assessment    Readmission Within the Last 30 Days previous discharge plan unsuccessful    Equipment Currently Used at Home oxygen;walker, rolling;wheelchair;hospital bed;commode;cane, straight;nebulizer    Concerns to be Addressed discharge planning    Discharge Coordination/Progress Spoke with patient to complete DC planning. Pt agreeable to short term rehab at DC if needed to prevent having to come back to hospital. Has a PCP and Rx coverage. Currently has home O2 with [employer]. Denies the need for HH or any new DME at this time. Will follow for DC needs.                   Discharge Plan    No documentation.                 Selected Continued Care - Prior Encounters Includes continued care and service providers with selected services from prior encounters from 3/13/2025 to 6/12/2025      Discharged on 3/22/2025 Admission date: 3/20/2025 - Discharge disposition: Home-Health Care [facility]      Home Medical Care       Service Provider Services Address Phone Fax Patient Preferred    [facility] [address] [place] [address] [address], [place] [address] [phone] --                              Demographic Summary    No documentation.                  Functional Status    No documentation.                  Psychosocial    No documentation.                  Abuse/Neglect    No documentation.                  Legal    No documentation.                  Substance Abuse    No documentation.                  Patient Forms    No documentation.                     Kalpana Dhaliwal RN

## 2025-06-12 NOTE — THERAPY EVALUATION
Patient Name: Elaine Juárez  : 1960    MRN: [id number]                              Today's Date: 2025       Admit Date: 2025    Visit Dx:     ICD-10-CM ICD-9-CM   1. Acute on chronic hypoxic respiratory failure  J96.21 518.84     799.02   2. Intractable back pain  M54.9 724.5   3. Hyponatremia  E87.1 276.1   4. Multifocal pneumonia  J18.9 486   5. Impaired mobility [Z74.09]  Z74.09 799.89     Patient Active Problem List   Diagnosis    CREST syndrome, partial     Raynaud's phenomenon    Pulmonary fibrosis prob sec underlying autoimmune disease    Gastroesophageal reflux disease without esophagitis    BMI 31.0-31.9,adult    History of adenomatous polyp of colon    Environmental allergies    PAH (pulmonary arterial hypertension) with portal hypertension    Hypokalemia    Panic attack    Paranoia (psychosis)    H/O noncompliance with medical treatment, presenting hazards to health    Bipolar affective disorder, currently manic, moderate    Obesity (BMI 30-39.9)    Esophageal dysmotility    Oropharyngeal dysphagia    Right ventricular systolic dysfunction    Respiratory failure with hypoxia    Metabolic encephalopathy    Generalized weakness    Respiratory distress    Acute on chronic respiratory failure with hypoxia     Past Medical History:   Diagnosis Date    Allergies     Arthritis     BMI 31.0-31.9,adult 2019    Calcified granuloma of lung 2019    Right lung    CHF (congestive heart failure)     denies    Chronic idiopathic fibrosing alveolitis     Chronic respiratory failure with hypoxia 2020    Chronic respiratory failure with hypoxia, on home oxygen therapy     COVID-19 2022    CREST syndrome     Environmental allergies 2022    Gastroesophageal reflux disease without esophagitis 2019    Interstitial lung disease     Obstructive sleep apnea on CPAP 2019    Oropharyngeal dysphagia 2023    Primary central sleep apnea     Pulmonary fibrosis      Pulmonary hypertension     Rheumatoid arthritis     Right ventricular systolic dysfunction 05/04/2023    Short-term memory loss      Past Surgical History:   Procedure Laterality Date    BREAST BIOPSY      CARDIAC CATHETERIZATION N/A 07/22/2020    Procedure: Right Heart Cath;  Surgeon: Thong Martin MD;  Location: [facility] CATH INVASIVE LOCATION;  Service: Cardiology;  Laterality: N/A;    CHOLECYSTECTOMY      COLONOSCOPY  05/11/2015    5 POLYPS    COLONOSCOPY N/A 08/04/2021    Procedure: COLONOSCOPY WITH ANESTHESIA;  Surgeon: Michael Landin DO;  Location: [facility] ENDOSCOPY;  Service: Gastroenterology;  Laterality: N/A;  pre-hx polyps  post-colon polyps    ENDOSCOPY N/A 08/04/2021    Procedure: ESOPHAGOGASTRODUODENOSCOPY WITH ANESTHESIA;  Surgeon: Michael Landin DO;  Location: [facility] ENDOSCOPY;  Service: Gastroenterology;  Laterality: N/A;  pre-dysphagia  post-normal  pcp-lanette aguila      General Information       Row Name 06/12/25 5386          Physical Therapy Time and Intention    Document Type evaluation  pt presents with SOA and back pain Dx; acute on chronic respiratory failure  -AJ     Mode of Treatment physical therapy  -       Row Name 06/12/25 1355          General Information    Patient Profile Reviewed yes  -AJ     Prior Level of Function independent:;all household mobility;community mobility;gait;home management;ADL's  -AJ     Existing Precautions/Restrictions fall;oxygen therapy device and L/min;other (see comments)  9L at baseline, 10L HFNC left  -AJ     Barriers to Rehab medically complex;physical barrier  -AJ       Row Name 06/12/25 2681          Living Environment    Current Living Arrangements home  -AJ     People in Home spouse  -AJ       Row Name 06/12/25 1357          Home Main Entrance    Number of Stairs, Main Entrance two  -AJ     Stair Railings, Main Entrance railings on both sides of stairs  -AJ       Row Name 06/12/25 1354          Stairs Within Home, Primary    Number of  Stairs, Within Home, Primary none  -AJ       Row Name 06/12/25 1357          Cognition    Orientation Status (Cognition) oriented x 4  -AJ       Row Name 06/12/25 1357          Safety Issues/Impairments Affecting Functional Mobility    Safety Issues Affecting Function (Mobility) safety precaution awareness;safety precautions follow-through/compliance;insight into deficits/self-awareness;impulsivity;friction/shear risk  -AJ     Impairments Affecting Function (Mobility) endurance/activity tolerance;shortness of breath;pain  -AJ               User Key  (r) = Recorded By, (t) = Taken By, (c) = Cosigned By      Initials Name Provider Type    Cyril Ware, PT DPT Physical Therapist                   Mobility       Row Name 06/12/25 1357          Bed Mobility    Bed Mobility rolling right;supine-sit  -AJ     Rolling Right [place] (Bed Mobility) modified independence  -AJ     Supine-Sit [place] (Bed Mobility) modified independence  -AJ     Assistive Device (Bed Mobility) head of bed elevated;bed rails  -AJ       Row Name 06/12/25 1357          Bed-Chair Transfer    Bed-Chair [place] (Transfers) minimum assist (75% patient effort);1 person assist;2 person assist  -AJ     Assistive Device (Bed-Chair Transfers) other (see comments)  HHA  -HILTON     Comment, (Bed-Chair Transfer) to t/f from bsc>recliner and bed>bsc  -AJ       Row Name 06/12/25 1357          Sit-Stand Transfer    Sit-Stand [place] (Transfers) minimum assist (75% patient effort);1 person assist;2 person assist  -HILTON     Comment, (Sit-Stand Transfer) to t/f from bsc>recliner and bed>bsc, stand pivots   -               User Key  (r) = Recorded By, (t) = Taken By, (c) = Cosigned By      Initials Name Provider Type    Cyril Ware, PT DPT Physical Therapist                   Obj/Interventions       Row Name 06/12/25 1357          Range of Motion Comprehensive    General Range of Motion bilateral lower extremity ROM WFL  -HILTON     Comment,  General Range of Motion no formal eval to see ROM due to pt becoming agitated, SOA and desaturation with any mobility. was able to t/f  once pt was calmed down  -       Row Name 06/12/25 [facility]          Strength Comprehensive (MMT)    General Manual Muscle Testing (MMT) Assessment no strength deficits identified  -     Comment, General Manual Muscle Testing (MMT) Assessment no formal eval to see ROM due to pt becoming agitated, SOA and desaturation with any mobility. was able to t/f once pt was calmed down  -       Row Name 06/12/25 [facility]          Motor Skills    Motor Skills functional endurance  -     Functional Endurance limited due to o2 demands  -       Row Name 06/12/25 [facility]          Balance    Balance Assessment sitting static balance;sitting dynamic balance;standing static balance;standing dynamic balance  -     Static Sitting Balance independent  -     Dynamic Sitting Balance independent  -AJ     Position, Sitting Balance unsupported;sitting edge of bed  -     Static Standing Balance contact guard  -     Dynamic Standing Balance minimal assist  -     Position/Device Used, Standing Balance supported;other (see comments)  HHAx2  -     Balance Interventions sitting;standing;sit to stand;supported;static;dynamic  -       Row Name 06/12/25 [facility]          Sensory Assessment (Somatosensory)    Sensory Assessment (Somatosensory) sensation intact  -               User Key  (r) = Recorded By, (t) = Taken By, (c) = Cosigned By      Initials Name Provider Type    Cyril Ware, PT DPT Physical Therapist                   Goals/Plan       Row Name 06/12/25 [facility]          Bed Mobility Goal 1 (PT)    Activity/Assistive Device (Bed Mobility Goal 1, PT) bed mobility activities, all  -AJ     [place] Level/Cues Needed (Bed Mobility Goal 1, PT) independent  -AJ     Time Frame (Bed Mobility Goal 1, PT) long term goal (LTG);10 days  -     Progress/Outcomes (Bed Mobility Goal 1, PT) new goal  -        Row Name 06/12/25 [facility]          Transfer Goal 1 (PT)    Activity/Assistive Device (Transfer Goal 1, PT) sit-to-stand/stand-to-sit;bed-to-chair/chair-to-bed;toilet;wheelchair transfer  -AJ     [place] Level/Cues Needed (Transfer Goal 1, PT) standby assist  -AJ     Time Frame (Transfer Goal 1, PT) long term goal (LTG);10 days  -AJ     Strategies/Barriers (Transfers Goal 1, PT) maintain SpO2 >88%  -AJ     Progress/Outcome (Transfer Goal 1, PT) new goal  -AJ       Row Name 06/12/25 [facility]          Gait Training Goal 1 (PT)    Activity/Assistive Device (Gait Training Goal 1, PT) gait (walking locomotion);decrease asymmetrical patterns;decrease fall risk;diminish gait deviation;forward stepping;improve balance and speed;increase endurance/gait distance;increase energy conservation;assistive device use  -AJ     [place] Level (Gait Training Goal 1, PT) standby assist  -AJ     Distance (Gait Training Goal 1, PT) 15' with SpO2 >88%  -AJ     Time Frame (Gait Training Goal 1, PT) long term goal (LTG);10 days  -AJ     Progress/Outcome (Gait Training Goal 1, PT) new goal  -AJ       Row Name 06/12/25 8374          Therapy Assessment/Plan (PT)    Planned Therapy Interventions (PT) balance training;bed mobility training;gait training;home exercise program;patient/family education;transfer training;postural re-education;strengthening  -AJ               User Key  (r) = Recorded By, (t) = Taken By, (c) = Cosigned By      Initials Name Provider Type    Cyril Ware, PT DPT Physical Therapist                   Clinical Impression       Row Name 06/12/25 6435          Pain    Pretreatment Pain Rating 5/10  -AJ     Posttreatment Pain Rating 5/10  -AJ     Pain Location back  -AJ     Pain Side/Orientation generalized  -AJ     Pain Management Interventions nursing notified;exercise or physical activity utilized  -AJ     Response to Pain Interventions no change per patient report  -AJ       Row Name 06/12/25 1987          Plan  of Care Review    Plan of Care Reviewed With patient  -     Outcome Evaluation PT eval complete. pt in fowlers position, AOx4 with complaints of back pain and SOA. Prior to mobility, SpO2 reading of 92-93% while on 9L HFNC at rest. Pt reports she is needing Respiratory Therapist ue to breathing and was called along with nsg staff present. Once cleared for activity, pt was agreeable to session to decrease back pain and to void. Upon sitting EOB, pt become agitated, SOA and desaturation with any mobility. was able to t/f once pt was calmed down. During this instance, SpO2 reading ranged from 78-84% and was increased to 12L HFNC with RT present, HR reading ranged from 120-145, and recovered to 90% with verbal cues for pursed lip breathing. Once [place] [facility]ting pt then performed another stand pivot to recliner. Pt again desaturated to upper 70s and recovered within 5 minutes to 89-90% and left on 10 L with nsg staff notified. Pt will benefit from skilled PT services to improve activity tolerance, decrease fall risk, continued education for pursed lip breathing and return to PLOF. Recommend home with 24/7 assist vs SNF pending progress towards goals.  -       Row Name 06/12/25 7390          Therapy Assessment/Plan (PT)    Patient/Family Therapy Goals Statement (PT) none stated  -     Rehab Potential (PT) limited  due to O2 demands  -     Criteria for Skilled Interventions Met (PT) yes;meets criteria;skilled treatment is necessary  -     Therapy Frequency (PT) 2 times/day  -     Predicted Duration of Therapy Intervention (PT) until d/c  -       Row Name 06/12/25 7471          Vital Signs    Pre SpO2 (%) 92  -     O2 Delivery Pre Treatment hi-flow  9L  -     Intra SpO2 (%) 78  -     O2 Delivery Intra Treatment hi-flow  increased from 9 to 12-15 at times for recovery  -     Post SpO2 (%) 90  -     O2 Delivery Post Treatment hi-flow  10l  -       Row Name 06/12/25 5738           Positioning and Restraints    Pre-Treatment Position in bed  -AJ     Post Treatment Position chair  -AJ     In Chair notified nsg;reclined;call light within reach;encouraged to call for assist;patient within staff view  -HILTON               User Key  (r) = Recorded By, (t) = Taken By, (c) = Cosigned By      Initials Name Provider Type    Cyril Ware PT DPT Physical Therapist                   Outcome Measures       Row Name 06/12/25 1357 06/12/25 0800       How much help from another person do you currently need...    Turning from your back to your side while in flat bed without using bedrails? 4  -AJ 3  -TS    Moving from lying on back to sitting on the side of a flat bed without bedrails? 4  -AJ 3  -TS    Moving to and from a bed to a chair (including a wheelchair)? 3  -AJ 2  -TS    Standing up from a chair using your arms (e.g., wheelchair, bedside chair)? 3  -HILTON 2  -TS    Climbing 3-5 steps with a railing? 1  -HILTON 2  -TS    To walk in hospital room? 2  -HILTON 2  -TS    AM-PAC 6 Clicks Score (PT) 17  -AJ 14  -TS    Highest Level of Mobility Goal Stand (1 or More Minutes)-5  -AJ Move to Chair/Commode-4  -TS      Row Name 06/12/25 1357          Functional Assessment    Outcome Measure Options AM-PAC 6 Clicks Basic Mobility (PT)  -               User Key  (r) = Recorded By, (t) = Taken By, (c) = Cosigned By      Initials Name Provider Type    Cyril Ware PT DPT Physical Therapist    Valentina Acuna LPN Licensed Nurse                                 Physical Therapy Education       Title: PT OT SLP Therapies (Done)       Topic: Physical Therapy (Done)       Point: Mobility training (Done)       Learning Progress Summary            Patient Acceptance, E, VU,NR by HILTON at 6/12/2025 1602    Comment: role of PT, pursed lip bretahing, calming exercise, d/c plannig                      Point: Home exercise program (Done)       Learning Progress Summary            Patient Acceptance, E, VU,NR by HILTON at  6/12/2025 1602    Comment: role of PT, pursed lip bretahing, calming exercise, d/c plannig                      Point: Body mechanics (Done)       Learning Progress Summary            Patient Acceptance, E, VU,NR by HILTON at 6/12/2025 1602    Comment: role of PT, pursed lip bretahing, calming exercise, d/c plannig                      Point: Precautions (Done)       Learning Progress Summary            Patient Acceptance, E, VU,NR by HILTON at 6/12/2025 1602    Comment: role of PT, pursed lip bretahing, calming exercise, d/c plannig                                      User Key       Initials Effective Dates Name Provider Type Discipline    HILTON 08/15/24 -  Cyril Irby, PT DPT Physical Therapist PT                  PT Recommendation and Plan  Planned Therapy Interventions (PT): balance training, bed mobility training, gait training, home exercise program, patient/family education, transfer training, postural re-education, strengthening  Outcome Evaluation: PT eval complete. pt in fowlers position, AOx4 with complaints of back pain and SOA. Prior to mobility, SpO2 reading of 92-93% while on 9L HFNC at rest. Pt reports she is needing Respiratory Therapist ue to breathing and was called along with nsg staff present. Once cleared for activity, pt was agreeable to session to decrease back pain and to void. Upon sitting EOB, pt become agitated, SOA and desaturation with any mobility. was able to t/f once pt was calmed down. During this instance, SpO2 reading ranged from 78-84% and was increased to 12L HFNC with RT present, HR reading ranged from 120-145, and recovered to 90% with verbal cues for pursed lip breathing. Once recovered t/f to [facility], completed for toileting pt then performed another stand pivot to recliner. Pt again desaturated to upper 70s and recovered within 5 minutes to 89-90% and left on 10 L with nsg staff notified. Pt will benefit from skilled PT services to improve activity tolerance, decrease fall risk,  continued education for pursed lip breathing and return to PLOF. Recommend home with 24/7 assist vs SNF pending progress towards goals.     Time Calculation:         PT Charges       Row Name 06/12/25 1357             Time Calculation    Start Time 1357  -AJ      Stop Time 1452  -AJ      Time Calculation (min) 55 min  -AJ      PT Received On 06/12/25  -AJ      PT Goal Re-Cert Due Date 06/22/25  -AJ         Untimed Charges    PT Eval/Re-eval Minutes 55  -AJ         Total Minutes    Untimed Charges Total Minutes 55  -AJ       Total Minutes 55  -AJ                User Key  (r) = Recorded By, (t) = Taken By, (c) = Cosigned By      Initials Name Provider Type    AJ Cyril Irby, PT DPT Physical Therapist                  Therapy Charges for Today       Code Description Service Date Service Provider Modifiers Qty    [id number] HC PT EVAL HIGH COMPLEXITY 4 6/12/2025 Cyril Irby PT DPT GP 1            PT G-Codes  Outcome Measure Options: AM-PAC 6 Clicks Basic Mobility (PT)  AM-PAC 6 Clicks Score (PT): 17  PT Discharge Summary  Anticipated Discharge Disposition (PT): home with 24/7 care, home with home health, skilled nursing facility    Cyril Irby PT DPT  6/12/2025

## 2025-06-13 ENCOUNTER — APPOINTMENT (OUTPATIENT)
Dept: CT IMAGING | Facility: HOSPITAL | Age: 65
End: 2025-06-13
Payer: MEDICARE

## 2025-06-13 PROBLEM — R45.89 ANXIETY ABOUT HEALTH: Status: ACTIVE | Noted: 2025-06-13

## 2025-06-13 PROBLEM — M54.41 RIGHT-SIDED LOW BACK PAIN WITH RIGHT-SIDED SCIATICA: Status: ACTIVE | Noted: 2025-06-13

## 2025-06-13 LAB
ANION GAP SERPL CALCULATED.3IONS-SCNC: 14 MMOL/L (ref 5–15)
ANISOCYTOSIS BLD QL: ABNORMAL
BASOPHILS # BLD MANUAL: 0 10*3/MM3 (ref 0–0.2)
BASOPHILS NFR BLD MANUAL: 0 % (ref 0–1.5)
BUN SERPL-MCNC: 25.1 MG/DL (ref 8–23)
BUN/CREAT SERPL: 28.2 (ref 7–25)
BURR CELLS BLD QL SMEAR: ABNORMAL
CALCIUM SPEC-SCNC: 8.1 MG/DL (ref 8.6–10.5)
CHLORIDE SERPL-SCNC: 102 MMOL/L (ref 98–107)
CO2 SERPL-SCNC: 20 MMOL/L (ref 22–29)
CREAT SERPL-MCNC: 0.89 MG/DL (ref 0.57–1)
DEPRECATED RDW RBC AUTO: 47.2 FL (ref 37–54)
EGFRCR SERPLBLD CKD-EPI 2021: 71.2 ML/MIN/1.73
EOSINOPHIL # BLD MANUAL: 0 10*3/MM3 (ref 0–0.4)
EOSINOPHIL NFR BLD MANUAL: 0 % (ref 0.3–6.2)
ERYTHROCYTE [DISTWIDTH] IN BLOOD BY AUTOMATED COUNT: 17 % (ref 12.3–15.4)
GLUCOSE SERPL-MCNC: 142 MG/DL (ref 65–99)
HCT VFR BLD AUTO: 41.4 % (ref 34–46.6)
HGB BLD-MCNC: 12.7 G/DL (ref 12–15.9)
HYPOCHROMIA BLD QL: ABNORMAL
LYMPHOCYTES # BLD MANUAL: 1.33 10*3/MM3 (ref 0.7–3.1)
LYMPHOCYTES NFR BLD MANUAL: 1 % (ref 5–12)
MCH RBC QN AUTO: 24 PG (ref 26.6–33)
MCHC RBC AUTO-ENTMCNC: 30.7 G/DL (ref 31.5–35.7)
MCV RBC AUTO: 78.3 FL (ref 79–97)
MONOCYTES # BLD: 0.15 10*3/MM3 (ref 0.1–0.9)
NEUTROPHILS # BLD AUTO: 13.32 10*3/MM3 (ref 1.7–7)
NEUTROPHILS NFR BLD MANUAL: 89 % (ref 42.7–76)
NEUTS BAND NFR BLD MANUAL: 1 % (ref 0–5)
OVALOCYTES BLD QL SMEAR: ABNORMAL
PLAT MORPH BLD: NORMAL
PLATELET # BLD AUTO: 188 10*3/MM3 (ref 140–450)
PMV BLD AUTO: 11 FL (ref 6–12)
POIKILOCYTOSIS BLD QL SMEAR: ABNORMAL
POLYCHROMASIA BLD QL SMEAR: ABNORMAL
POTASSIUM SERPL-SCNC: 4.1 MMOL/L (ref 3.5–5.2)
QT INTERVAL: 312 MS
QTC INTERVAL: 459 MS
RBC # BLD AUTO: 5.29 10*6/MM3 (ref 3.77–5.28)
SODIUM SERPL-SCNC: 136 MMOL/L (ref 136–145)
VARIANT LYMPHS NFR BLD MANUAL: 9 % (ref 19.6–45.3)
WBC MORPH BLD: NORMAL
WBC NRBC COR # BLD AUTO: 14.8 10*3/MM3 (ref 3.4–10.8)

## 2025-06-13 PROCEDURE — 36415 COLL VENOUS BLD VENIPUNCTURE: CPT | Performed by: HOSPITALIST

## 2025-06-13 PROCEDURE — 25010000002 ENOXAPARIN PER 10 MG: Performed by: HOSPITALIST

## 2025-06-13 PROCEDURE — 80048 BASIC METABOLIC PNL TOTAL CA: CPT | Performed by: HOSPITALIST

## 2025-06-13 PROCEDURE — 63710000001 MYCOPHENOLATE MOFETIL PER 250 MG: Performed by: NURSE PRACTITIONER

## 2025-06-13 PROCEDURE — 72131 CT LUMBAR SPINE W/O DYE: CPT

## 2025-06-13 PROCEDURE — 94799 UNLISTED PULMONARY SVC/PX: CPT

## 2025-06-13 PROCEDURE — 93010 ELECTROCARDIOGRAM REPORT: CPT | Performed by: STUDENT IN AN ORGANIZED HEALTH CARE EDUCATION/TRAINING PROGRAM

## 2025-06-13 PROCEDURE — 25010000002 ONDANSETRON PER 1 MG: Performed by: HOSPITALIST

## 2025-06-13 PROCEDURE — 93005 ELECTROCARDIOGRAM TRACING: CPT | Performed by: HOSPITALIST

## 2025-06-13 PROCEDURE — 97116 GAIT TRAINING THERAPY: CPT

## 2025-06-13 PROCEDURE — 25010000002 METHYLPREDNISOLONE PER 40 MG: Performed by: INTERNAL MEDICINE

## 2025-06-13 PROCEDURE — 85025 COMPLETE CBC W/AUTO DIFF WBC: CPT | Performed by: HOSPITALIST

## 2025-06-13 PROCEDURE — 25010000002 PIPERACILLIN SOD-TAZOBACTAM PER 1 G: Performed by: HOSPITALIST

## 2025-06-13 PROCEDURE — 97110 THERAPEUTIC EXERCISES: CPT

## 2025-06-13 PROCEDURE — 99232 SBSQ HOSP IP/OBS MODERATE 35: CPT | Performed by: INTERNAL MEDICINE

## 2025-06-13 PROCEDURE — 99222 1ST HOSP IP/OBS MODERATE 55: CPT | Performed by: STUDENT IN AN ORGANIZED HEALTH CARE EDUCATION/TRAINING PROGRAM

## 2025-06-13 PROCEDURE — 94669 MECHANICAL CHEST WALL OSCILL: CPT

## 2025-06-13 PROCEDURE — 85007 BL SMEAR W/DIFF WBC COUNT: CPT | Performed by: HOSPITALIST

## 2025-06-13 PROCEDURE — 25010000002 METHYLPREDNISOLONE PER 125 MG: Performed by: HOSPITALIST

## 2025-06-13 PROCEDURE — 25010000002 FUROSEMIDE PER 20 MG: Performed by: HOSPITALIST

## 2025-06-13 RX ORDER — ARFORMOTEROL TARTRATE 15 UG/2ML
15 SOLUTION RESPIRATORY (INHALATION)
Status: DISCONTINUED | OUTPATIENT
Start: 2025-06-13 | End: 2025-06-17 | Stop reason: HOSPADM

## 2025-06-13 RX ORDER — VENLAFAXINE HYDROCHLORIDE 75 MG/1
75 CAPSULE, EXTENDED RELEASE ORAL ONCE
Status: DISCONTINUED | OUTPATIENT
Start: 2025-06-13 | End: 2025-06-13 | Stop reason: SDUPTHER

## 2025-06-13 RX ORDER — POLYETHYLENE GLYCOL 3350 17 G/17G
17 POWDER, FOR SOLUTION ORAL DAILY PRN
Status: DISCONTINUED | OUTPATIENT
Start: 2025-06-13 | End: 2025-06-16

## 2025-06-13 RX ORDER — AMOXICILLIN 250 MG
2 CAPSULE ORAL 2 TIMES DAILY
Status: DISCONTINUED | OUTPATIENT
Start: 2025-06-13 | End: 2025-06-16

## 2025-06-13 RX ORDER — HYDROXYZINE HYDROCHLORIDE 25 MG/1
25 TABLET, FILM COATED ORAL 4 TIMES DAILY
Status: DISCONTINUED | OUTPATIENT
Start: 2025-06-13 | End: 2025-06-17 | Stop reason: HOSPADM

## 2025-06-13 RX ORDER — BISACODYL 5 MG/1
5 TABLET, DELAYED RELEASE ORAL DAILY PRN
Status: DISCONTINUED | OUTPATIENT
Start: 2025-06-13 | End: 2025-06-16

## 2025-06-13 RX ORDER — METHYLPREDNISOLONE SODIUM SUCCINATE 40 MG/ML
40 INJECTION, POWDER, LYOPHILIZED, FOR SOLUTION INTRAMUSCULAR; INTRAVENOUS EVERY 8 HOURS
Status: DISCONTINUED | OUTPATIENT
Start: 2025-06-13 | End: 2025-06-14

## 2025-06-13 RX ORDER — MYCOPHENOLATE MOFETIL 500 MG/1
1000 TABLET ORAL 2 TIMES DAILY
Status: DISCONTINUED | OUTPATIENT
Start: 2025-06-13 | End: 2025-06-17 | Stop reason: HOSPADM

## 2025-06-13 RX ORDER — BISACODYL 10 MG
10 SUPPOSITORY, RECTAL RECTAL DAILY PRN
Status: DISCONTINUED | OUTPATIENT
Start: 2025-06-13 | End: 2025-06-16

## 2025-06-13 RX ORDER — VERAPAMIL HYDROCHLORIDE 100 MG/1
100 CAPSULE, EXTENDED RELEASE ORAL NIGHTLY
Status: DISCONTINUED | OUTPATIENT
Start: 2025-06-13 | End: 2025-06-17 | Stop reason: HOSPADM

## 2025-06-13 RX ORDER — VENLAFAXINE HYDROCHLORIDE 75 MG/1
75 CAPSULE, EXTENDED RELEASE ORAL ONCE
Status: COMPLETED | OUTPATIENT
Start: 2025-06-13 | End: 2025-06-13

## 2025-06-13 RX ADMIN — VERAPAMIL HYDROCHLORIDE 100 MG: 100 CAPSULE, EXTENDED RELEASE ORAL at 21:09

## 2025-06-13 RX ADMIN — VENLAFAXINE HYDROCHLORIDE 75 MG: 75 CAPSULE, EXTENDED RELEASE ORAL at 08:43

## 2025-06-13 RX ADMIN — ALPRAZOLAM 0.5 MG: 0.5 TABLET ORAL at 09:01

## 2025-06-13 RX ADMIN — LACTULOSE 20 G: 20 SOLUTION ORAL at 08:43

## 2025-06-13 RX ADMIN — ONDANSETRON 4 MG: 2 INJECTION INTRAMUSCULAR; INTRAVENOUS at 08:53

## 2025-06-13 RX ADMIN — Medication 4 ML: at 20:54

## 2025-06-13 RX ADMIN — Medication 10 ML: at 09:05

## 2025-06-13 RX ADMIN — METHYLPREDNISOLONE SODIUM SUCCINATE 80 MG: 125 INJECTION, POWDER, FOR SOLUTION INTRAMUSCULAR; INTRAVENOUS at 04:26

## 2025-06-13 RX ADMIN — IPRATROPIUM BROMIDE 0.5 MG: 0.5 SOLUTION RESPIRATORY (INHALATION) at 14:48

## 2025-06-13 RX ADMIN — HYDROCODONE BITARTRATE AND ACETAMINOPHEN 1 TABLET: 5; 325 TABLET ORAL at 21:19

## 2025-06-13 RX ADMIN — HYDROXYZINE HYDROCHLORIDE 25 MG: 25 TABLET ORAL at 12:24

## 2025-06-13 RX ADMIN — IPRATROPIUM BROMIDE 0.5 MG: 0.5 SOLUTION RESPIRATORY (INHALATION) at 20:53

## 2025-06-13 RX ADMIN — IPRATROPIUM BROMIDE AND ALBUTEROL SULFATE 3 ML: .5; 3 SOLUTION RESPIRATORY (INHALATION) at 10:50

## 2025-06-13 RX ADMIN — MYCOPHENOLATE MOFETIL 1000 MG: 500 TABLET, FILM COATED ORAL at 08:44

## 2025-06-13 RX ADMIN — PIPERACILLIN AND TAZOBACTAM 3.38 G: 3; .375 INJECTION, POWDER, FOR SOLUTION INTRAVENOUS at 00:31

## 2025-06-13 RX ADMIN — Medication 4 ML: at 10:50

## 2025-06-13 RX ADMIN — ENOXAPARIN SODIUM 40 MG: 100 INJECTION SUBCUTANEOUS at 18:40

## 2025-06-13 RX ADMIN — HYDROXYZINE HYDROCHLORIDE 25 MG: 25 TABLET ORAL at 18:40

## 2025-06-13 RX ADMIN — Medication 10 ML: at 21:20

## 2025-06-13 RX ADMIN — TRAZODONE HYDROCHLORIDE 100 MG: 100 TABLET ORAL at 21:10

## 2025-06-13 RX ADMIN — METHYLPREDNISOLONE SODIUM SUCCINATE 40 MG: 40 INJECTION, POWDER, FOR SOLUTION INTRAMUSCULAR; INTRAVENOUS at 12:24

## 2025-06-13 RX ADMIN — FUROSEMIDE 20 MG: 10 INJECTION, SOLUTION INTRAMUSCULAR; INTRAVENOUS at 08:43

## 2025-06-13 RX ADMIN — METHYLPREDNISOLONE SODIUM SUCCINATE 40 MG: 40 INJECTION, POWDER, FOR SOLUTION INTRAMUSCULAR; INTRAVENOUS at 21:10

## 2025-06-13 RX ADMIN — DOCUSATE SODIUM 50 MG AND SENNOSIDES 8.6 MG 2 TABLET: 8.6; 5 TABLET, FILM COATED ORAL at 12:23

## 2025-06-13 RX ADMIN — HYDROCODONE BITARTRATE AND ACETAMINOPHEN 1 TABLET: 5; 325 TABLET ORAL at 14:58

## 2025-06-13 RX ADMIN — HYDROCODONE BITARTRATE AND ACETAMINOPHEN 1 TABLET: 5; 325 TABLET ORAL at 08:47

## 2025-06-13 RX ADMIN — PIPERACILLIN AND TAZOBACTAM 3.38 G: 3; .375 INJECTION, POWDER, FOR SOLUTION INTRAVENOUS at 08:43

## 2025-06-13 RX ADMIN — ARFORMOTEROL TARTRATE 15 MCG: 15 SOLUTION RESPIRATORY (INHALATION) at 20:53

## 2025-06-13 RX ADMIN — MYCOPHENOLATE MOFETIL 1000 MG: 500 TABLET, FILM COATED ORAL at 21:10

## 2025-06-13 RX ADMIN — ONDANSETRON 4 MG: 2 INJECTION INTRAMUSCULAR; INTRAVENOUS at 21:14

## 2025-06-13 RX ADMIN — VENLAFAXINE HYDROCHLORIDE 75 MG: 75 CAPSULE, EXTENDED RELEASE ORAL at 12:22

## 2025-06-13 RX ADMIN — FUROSEMIDE 20 MG: 10 INJECTION, SOLUTION INTRAMUSCULAR; INTRAVENOUS at 18:40

## 2025-06-13 RX ADMIN — DOCUSATE SODIUM 50 MG AND SENNOSIDES 8.6 MG 2 TABLET: 8.6; 5 TABLET, FILM COATED ORAL at 21:09

## 2025-06-13 NOTE — PLAN OF CARE
Goal Outcome Evaluation:  Plan of Care Reviewed With: patient        Progress: no change  Outcome Evaluation: Patient A/O x 4. VSS. On 10 L high-flow NC. NSR  on telemetry. Chest psiotherapy to start this morning. PRN Xanax and Zofran each given once last night, with good results. Tolerating IV ABX, Solumedrol and Lasix. No complailnts of pain. Safety maintained.

## 2025-06-13 NOTE — PROGRESS NOTES
[facility]  INPATIENT PROGRESS NOTE    Patient Name: Elaine Juárez  Date of Admission: 6/11/2025  Today's Date: 06/13/25  Length of Stay: 1  Primary Care Physician: Aaron Stoddard MD    Subjective   Chief Complaint: Shortness of breath back pain abdominal pain     Shortness of Breath  Back Pain     Patient is a 65-year-old white female with past medical history of oxygen dependent pulmonary fibrosis bronchiectasis show COPD on 6 to 7 L history of depression anxiety history of bilateral kidney stones history of sleep apnea presents to the emergency room with worsening shortness of breath and back pain.  Patient states she has right flank pain and has been getting worse over the last 3 to 4 days.  She states that her urine has also been dark.   She is also been more short of breath today and called EMS for her right flank and back pain but noted that her oxygen was in the 70s while upon arrival and tachycardic.  She refused a DuoNeb by EMS en route.    In ER chest x-ray was showing multifocal pneumonia possible pulmonary edema , CT abdomen pelvis was done for abdominal pain was showing constipation bilateral kidney stones the patient was placed on Vapotherm blood cultures were obtained started on steroid breathing treatment and Sandi NIETO discussed the case with intensivist and with the nurse practitioner intensivist Nba  who felt that the patient could be admitted to the floor and does not need to be in the unit  Will admit continue steroid breathing treatment antibiotics we will start her on IV Lasix order echo of the heart we will consult with pulmonary I's and O's and daily weights Lovenox for DVT prophylaxis identified reconcile restart home medications once reconciled  6/12  As before presented  with above remains on antibiotics steroid breathing treatment started Lasix echo of the heart is pending pulmonary has been consulted continue to titrate down  her oxygen blood cultures remain unremarkable, vest therapy, last echo of the heart was showing EF to be 60 to 65% repeat echo is pending  6/13  Patient lying in bed talking to .  She continues to have high flow oxygen currently on 9 L.  She is extremely anxious.  Nurse notified Dr. Khan of concerns for atrial fibrillation, right 140s.  Patient not on monitor.  EKG reveals what I suspect is normal sinus rhythm with PAC and PVCs.  EP has been consulted.  Patient states she received a breathing treatment prior to episode.  I did discuss with her discontinuation of DuoNebs and changing nebulizer treatment to hopefully decrease the potential for recurrent tachycardia after treatment.  She is complaining of severe back pain mid lumbar/sacral region on the right radiating around to abdomen down the back of her leg and up to her arm.  Patient states she vomited up all her morning meds and is somewhat frantic that she did not receive Effexor.  I did order a repeat one-time dose.  This did seem to pacify her.  Patient states her back has been hurting for 3 months after she felt a pop and she feels no one is addressing this.  I have ordered a CT of the lumbar spine to assure there are no injuries.  Patient is agreeable to scheduled hydroxyzine to help with her anxiety, sleeplessness and it will not affect her respiratory status.  Will continue to monitor closely.    Review of Systems   All other systems reviewed and are negative.     All pertinent negatives and positives are as above. All other systems have been reviewed and are negative unless otherwise stated.     Objective    Temp:  [97.6 °F (36.4 °C)-98 °F (36.7 °C)] 98 °F (36.7 °C)  Heart Rate:  [] 95  Resp:  [18-24] 18  BP: ()/(54-78) 106/76  Physical Exam  Constitutional:       Appearance: She is ill-appearing.   HENT:      Head: Normocephalic and atraumatic.      Mouth/Throat:      Mouth: Mucous membranes are moist.      Pharynx: Oropharynx is  "clear.   Eyes:      Extraocular Movements: Extraocular movements intact.      Pupils: Pupils are equal, round, and reactive to light.   Cardiovascular:      Rate and Rhythm: Tachycardia present.      Comments: Tachycardia with multiple PACs and a few PVCs  Pulmonary:      Effort: No respiratory distress.      Breath sounds: Wheezing present.   Abdominal:      General: Abdomen is flat.      Palpations: Abdomen is soft.   Musculoskeletal:         General: Tenderness (Palpation lumbar/sacral region) present. Normal range of motion.      Cervical back: Normal range of motion and neck supple.   Skin:     General: Skin is warm and dry.      Capillary Refill: Capillary refill takes less than 2 seconds.   Neurological:      General: No focal deficit present.      Mental Status: She is alert and oriented to person, place, and time.   Psychiatric:         Mood and Affect: Affect normal. Mood is anxious.       Results Review:  I have reviewed the labs, radiology results, and diagnostic studies.    Laboratory Data:   Results from last 7 days   Lab Units 06/13/25  0438 06/12/25  0527 06/11/25  1143   WBC 10*3/mm3 14.80* 8.92 14.17*   HEMOGLOBIN g/dL 12.7 13.4 14.3   HEMATOCRIT % 41.4 44.0 46.1   PLATELETS 10*3/mm3 188 194 242        Results from last 7 days   Lab Units 06/13/25  0438 06/12/25  0527 06/11/25  1614 06/11/25  1143   SODIUM mmol/L 136 136  --  133*   SODIUM, ARTERIAL mmol/L  --   --  136  --    POTASSIUM mmol/L 4.1 4.0  --  4.3   CHLORIDE mmol/L 102 101  --  97*   CO2 mmol/L 20.0* 22.0  --  21.0*   BUN mg/dL 25.1* 18.6  --  9.8   CREATININE mg/dL 0.89 0.93  --  0.73   CALCIUM mg/dL 8.1* 8.1*  --  8.8   BILIRUBIN mg/dL  --   --   --  0.4   ALK PHOS U/L  --   --   --  179*   ALT (SGPT) U/L  --   --   --  11   AST (SGOT) U/L  --   --   --  21   GLUCOSE mg/dL 142* 221*  --  131*       Culture Data:   No results found for: \"BLOODCX\", \"URINECX\", \"WOUNDCX\", \"MRSACX\", \"RESPCX\", \"STOOLCX\"    Radiology Data:   Imaging Results " (Last 24 Hours)       Procedure Component Value Units Date/Time    CT Lumbar Spine Without Contrast - In process [[id number]] Resulted: 06/13/25 1313     Updated: 06/13/25 1330    This result has not been signed. Information might be incomplete.              I have reviewed the patient's current medications.     Assessment/Plan   Assessment  Active Hospital Problems    Diagnosis     Right-sided low back pain with right-sided sciatica     Anxiety about health     Acute on chronic respiratory failure with hypoxia     CREST syndrome, partial         Treatment Plan  1.  Acute on chronic respiratory failure with hypoxia/CREST syndrome; discontinue antibiotic, continue steroid therapy, pulmonary following plans for taper as patient tolerates.  Continuing to require high flow oxygen 9/10 L.  Continue OPEP, incentive spirometry, chest physiotherapy, ABGs as needed    2.  Right-sided low back pain with sciatica; CT lumbar spine without contrast today, advised patient to take as needed pain medication as ordered    3.  Anxiety about health; start hydroxyzine 25 mg 4 times a day, monitor for improvement.  Discussed with nurse to give Zofran prior to administering a.m. and p.m. medications    4.  A-fib with RVR versus sinus tachycardia with PACs and PVCs; EP consult, discontinue DuoNebs, start Brovana and Atrovent nebulizer therapy, remote telemetry     in ER chest x-ray was showing multifocal pneumonia possible pulmonary edema , CT abdomen pelvis was done for abdominal pain was showing constipation bilateral kidney stones the patient was placed on Vapotherm blood cultures were obtained started on steroid breathing treatment and Zosyn I discussed the case with intensivist and with the nurse practitioner intensivist Nba  who felt that the patient could be admitted to the floor and does not need to be in the unit  Will admit continue steroid breathing treatment antibiotics we will start her on IV Lasix order echo of the heart we  will consult with pulmonary I's and O's and daily weights Lovenox for DVT prophylaxis identified reconcile restart home medications once reconciled    Medical Decision Making  Number and Complexity of problems: 4  3 problems, acute, high complexity, unchanged  1 problem, chronic, high complexity, unchanged    Differential Diagnosis: As above     Conditions and Status        Condition is unchanged     MDM Data  External documents reviewed: No  Cardiac tracing (EKG, telemetry) interpretation: Reviewed  Radiology interpretation: Reviewed  Labs reviewed: yes  Any tests that were considered but not ordered: no     Decision rules/scores evaluated (example [id number], Wells, etc): no     Discussed with: Patient, nurse Yolie and Dr. Lopez     Care Planning  Shared decision making: Patient apprised of current labs, vitals, imaging and treatment plan.  They are agreeable with proceeding with plans as discussed.   Code status and discussions: full      Disposition  Social Determinants of Health that impact treatment or disposition: no   Estimated length of stay is TBD.      I confirmed that the patient's advanced care plan is present, code status is documented, and a surrogate decision maker is listed in the patient's medical record.        Electronically signed by ANASTASIA Silveira, 06/13/25, 14:05 CDT.

## 2025-06-13 NOTE — PLAN OF CARE
Goal Outcome Evaluation:  Plan of Care Reviewed With: patient        Progress: improving  Outcome Evaluation: PT tx completed. Pt was premedicated with pn meds prior to tx due to back pn. Rates pn still 10/10 upper back. Fabián bed mobility, Fabián sit<>stand. Amb in room utilizing r wx CG. Increased O2 10L hi joanne for activity. Increased time inbetween activities due to SOB.

## 2025-06-13 NOTE — CONSULTS
"EP CONSULT NOTE    Subjective        History of Present Illness    EP Problems:  1.  Multifocal atrial tachycardia    Cardiology Problems:  1.  Pulmonary HTN  2.  RVH    Medical Problems:  1.  Crest syndrome  2.  Pulmonary fibrosis  3.  Raynaud's phenomenon  4.  GERD  5.  Anxiety disorder  6.  Nephrolithiasis    Elaine Juárez is a 65 y.o. female with problem list as above for whom EP is consulted regarding multifocal atrial tachycardia.  She presented to the hospital with back pain.  She states that she has significant back pain with any motion.  As part of her evaluation, she was found to be more hypoxic than typical.  She was connected to telemetry and has been noted to have worsening tachycardia concerning for atrial fibrillation.  Given these findings, EP is consulted for further evaluation.    Objective   Vital Signs:  BP 97/53 (BP Location: Left arm, Patient Position: Lying)   Pulse 116   Temp 97.9 °F (36.6 °C) (Oral)   Resp 18   Ht 160 cm (63\")   Wt 70.8 kg (156 lb 1.6 oz)   SpO2 98%   BMI 27.65 kg/m²   Estimated body mass index is 27.65 kg/m² as calculated from the following:    Height as of this encounter: 160 cm (63\").    Weight as of this encounter: 70.8 kg (156 lb 1.6 oz).      Physical Exam  Vitals reviewed.   Constitutional:       Comments: Chronically ill-appearing, wearing oxygen   Cardiovascular:      Rate and Rhythm: Tachycardia present. Rhythm irregular.      Pulses: Normal pulses.      Heart sounds: Normal heart sounds. No murmur heard.  Pulmonary:      Comments: Mildly increased work of breathing, bilateral crackles   Skin:     General: Skin is warm and dry.   Neurological:      General: No focal deficit present.      Mental Status: She is alert.   Psychiatric:         Mood and Affect: Mood normal.         Judgment: Judgment normal.          Result Review :  The following data was reviewed by: Chriss Link MD on 06/11/2025:  CMP          6/11/2025    11:43 6/11/2025    16:14 6/12/2025 "    05:27 6/13/2025    04:38   CMP   Glucose 131   221  142    BUN 9.8   18.6  25.1    Creatinine 0.73   0.93  0.89    EGFR 90.3   67.5  71.2    Sodium 133  136  136  136    Potassium 4.3   4.0  4.1    Chloride 97   101  102    Calcium 8.8   8.1  8.1    Total Protein 6.4       Albumin 3.2       Globulin 3.2       Total Bilirubin 0.4       Alkaline Phosphatase 179       AST (SGOT) 21       ALT (SGPT) 11       Albumin/Globulin Ratio 1.0       BUN/Creatinine Ratio 13.4   20.0  28.2    Anion Gap 15.0   13.0  14.0      CBC          6/11/2025    11:43 6/12/2025    05:27 6/13/2025    04:38   CBC   WBC 14.17  8.92  14.80    RBC 6.04  5.68  5.29    Hemoglobin 14.3  13.4  12.7    Hematocrit 46.1  44.0  41.4    MCV 76.3  77.5  78.3    MCH 23.7  23.6  24.0    MCHC 31.0  30.5  30.7    RDW 17.8  17.0  17.0    Platelets 242  194  188      TSH          6/11/2025    11:43   TSH   TSH 3.300                   Assessment and Plan   Problems:  Multifocal atrial tachycardia    Elaine Juárez is a 65 y.o. female with problem list as above for whom EP is consulted regarding multifocal atrial tachycardia.  Her ECG findings are not consistent with atrial fibrillation but instead consistent with multifocal atrial tachycardia.  This is very likely a reflection of her pulmonary hypertension and chronic pulmonary issues.  The treatment for multifocal atrial tachycardia is generally conservative with use of AV cora blockers for symptomatic relief.  There is no role for ablation or antiarrhythmic initiation.  Multifocal atrial tachycardia additionally does not require anticoagulation it is not significantly associated with risk of stroke.  With this in mind, we will plan to start her on verapamil and continue to monitor.    Plan:  - Start verapamil 100 mg nightly  - No need for anticoagulation at this time  - Continue telemetry monitoring while inpatient             Part of this note may be an electronic transcription/translation of spoken  language to printed text using the Dragon Dictation System.

## 2025-06-13 NOTE — THERAPY TREATMENT NOTE
Acute Care - Physical Therapy Treatment Note  [facility]     Patient Name: Elaine Juárez  : 1960  MRN: [id number]  Today's Date: 2025      Visit Dx:     ICD-10-CM ICD-9-CM   1. Acute on chronic hypoxic respiratory failure  J96.21 518.84     799.02   2. Intractable back pain  M54.9 724.5   3. Hyponatremia  E87.1 276.1   4. Multifocal pneumonia  J18.9 486   5. Impaired mobility [Z74.09]  Z74.09 799.89     Patient Active Problem List   Diagnosis    CREST syndrome, partial     Raynaud's phenomenon    Pulmonary fibrosis prob sec underlying autoimmune disease    Gastroesophageal reflux disease without esophagitis    BMI 31.0-31.9,adult    History of adenomatous polyp of colon    Environmental allergies    PAH (pulmonary arterial hypertension) with portal hypertension    Hypokalemia    Panic attack    Paranoia (psychosis)    H/O noncompliance with medical treatment, presenting hazards to health    Bipolar affective disorder, currently manic, moderate    Obesity (BMI 30-39.9)    Esophageal dysmotility    Oropharyngeal dysphagia    Right ventricular systolic dysfunction    Respiratory failure with hypoxia    Metabolic encephalopathy    Generalized weakness    Respiratory distress    Acute on chronic respiratory failure with hypoxia    Right-sided low back pain with right-sided sciatica    Anxiety about health     Past Medical History:   Diagnosis Date    Allergies     Arthritis     BMI 31.0-31.9,adult 2019    Calcified granuloma of lung 2019    Right lung    CHF (congestive heart failure)     denies    Chronic idiopathic fibrosing alveolitis     Chronic respiratory failure with hypoxia 2020    Chronic respiratory failure with hypoxia, on home oxygen therapy     COVID-19 2022    CREST syndrome     Environmental allergies 2022    Gastroesophageal reflux disease without esophagitis 2019    Interstitial lung disease     Obstructive sleep apnea on CPAP 2019     Oropharyngeal dysphagia 01/26/2023    Primary central sleep apnea     Pulmonary fibrosis     Pulmonary hypertension     Rheumatoid arthritis     Right ventricular systolic dysfunction 05/04/2023    Short-term memory loss      Past Surgical History:   Procedure Laterality Date    BREAST BIOPSY      CARDIAC CATHETERIZATION N/A 07/22/2020    Procedure: Right Heart Cath;  Surgeon: Thong Martin MD;  Location: [facility] CATH INVASIVE LOCATION;  Service: Cardiology;  Laterality: N/A;    CHOLECYSTECTOMY      COLONOSCOPY  05/11/2015    5 POLYPS    COLONOSCOPY N/A 08/04/2021    Procedure: COLONOSCOPY WITH ANESTHESIA;  Surgeon: Michael Landin DO;  Location: [facility] ENDOSCOPY;  Service: Gastroenterology;  Laterality: N/A;  pre-hx polyps  post-colon polyps    ENDOSCOPY N/A 08/04/2021    Procedure: ESOPHAGOGASTRODUODENOSCOPY WITH ANESTHESIA;  Surgeon: Michael Landin DO;  Location: [facility] ENDOSCOPY;  Service: Gastroenterology;  Laterality: N/A;  pre-dysphagia  post-normal  pcp-lanette aguila     PT Assessment (Last 12 Hours)       PT Evaluation and Treatment       Row Name 06/13/25 1509 06/13/25 1431       Physical Therapy Time and Intention    Subjective Information complains of;pain  -KJ --    Document Type therapy note (daily note)  -KJ --    Mode of Treatment physical therapy  -KJ physical therapy  -KJ    Patient Effort adequate  -KJ --    Comment pre medicated with pn meds prior to tx per pt request  -KJ --      Row Name 06/13/25 1509          General Information    Existing Precautions/Restrictions fall;oxygen therapy device and L/min  10L hi fl for activity  -KJ       Row Name 06/13/25 1509          Pain    Pretreatment Pain Rating 10/10  -KJ     Posttreatment Pain Rating 10/10  -KJ     Pain Location back  -KJ       Row Name 06/13/25 1509          Bed Mobility    Supine-Sit [place] (Bed Mobility) minimum assist (75% patient effort)  -KJ     Sit-Sidelying [place] (Bed Mobility) standby assist  -KJ       Row  Name 06/13/25 1509          Sit-Stand Transfer    Sit-Stand [place] (Transfers) minimum assist (75% patient effort)  -KJ     Assistive Device (Sit-Stand Transfers) walker, front-wheeled  -KJ       Row Name 06/13/25 1509          Stand-Sit Transfer    Stand-Sit [place] (Transfers) minimum assist (75% patient effort)  -KJ       Row Name 06/13/25 1509          Gait/Stairs (Locomotion)    [place] Level (Gait) contact guard  -KJ     Assistive Device (Gait) walker, front-wheeled  -KJ     Distance in Feet (Gait) 10  x 3 in room  -KJ       Row Name 06/13/25 1509          Motor Skills    Therapeutic Exercise --  AROM x 15 reps sitting edge of bed.  -KJ       Row Name 06/13/25 1509          Vital Signs    Pre SpO2 (%) 95  -KJ     O2 Delivery Pre Treatment hi-flow  -KJ     Intra SpO2 (%) 85  -KJ     O2 Delivery Intra Treatment hi-flow  -KJ     Post SpO2 (%) 92  -KJ     O2 Delivery Post Treatment hi-flow  -KJ       Row Name 06/13/25 1509          Positioning and Restraints    Pre-Treatment Position in bed  -KJ     Post Treatment Position bed  -KJ     In Bed side lying left;call light within reach  -KJ               User Key  (r) = Recorded By, (t) = Taken By, (c) = Cosigned By      Initials Name Provider Type    Gladis Steele, PTA Physical Therapist Assistant                    Physical Therapy Education       Title: PT OT SLP Therapies (Done)       Topic: Physical Therapy (Done)       Point: Mobility training (Done)       Learning Progress Summary            Patient Acceptance, E, VU,NR by HILTON at 6/12/2025 1602    Comment: role of PT, pursed lip bretahing, calming exercise, d/c plannig                      Point: Home exercise program (Done)       Learning Progress Summary            Patient Acceptance, E, VU,NR by HILTON at 6/12/2025 1602    Comment: role of PT, pursed lip bretahing, calming exercise, d/c plannig                      Point: Body mechanics (Done)       Learning Progress Summary             Patient Acceptance, E, VU,NR by HILTON at 6/12/2025 1602    Comment: role of PT, pursed lip bretahing, calming exercise, d/c plannig                      Point: Precautions (Done)       Learning Progress Summary            Patient Acceptance, E, VU,NR by HILTON at 6/12/2025 1602    Comment: role of PT, pursed lip bretahing, calming exercise, d/c plannig                                      User Key       Initials Effective Dates Name Provider Type [facility] 08/15/24 -  Cyril Irby, PT DPT Physical Therapist PT                  PT Recommendation and Plan     Progress: improving  Outcome Evaluation: PT tx completed. Pt was premedicated with pn meds prior to tx due to back pn. Rates pn still 10/10 upper back. Fabián bed mobility, Fabián sit<>stand. Amb in room utilizing r wx CG. Increased O2 10L hi joanne for activity. Increased time inbetween activities due to SOB.       Time Calculation:    PT Charges       Row Name 06/13/25 1541             Time Calculation    Start Time 1509  -KJ      Stop Time 1541  -KJ      Time Calculation (min) 32 min  -KJ      PT Received On 06/13/25  -KJ      PT Goal Re-Cert Due Date 06/22/25  -KJ         Time Calculation- PT    Total Timed Code Minutes- PT 32 minute(s)  -KJ                User Key  (r) = Recorded By, (t) = Taken By, (c) = Cosigned By      Initials Name Provider Type    Gladis Steele PTA Physical Therapist Assistant                  Therapy Charges for Today       Code Description Service Date Service Provider Modifiers Qty    40271228875 HC GAIT TRAINING EA 15 MIN 6/13/2025 Gladis Frankel PTA GP 1    56637649740 HC PT THER PROC EA 15 MIN 6/13/2025 Gladis Frankel PTA GP 1            PT G-Codes  Outcome Measure Options: AM-PAC 6 Clicks Basic Mobility (PT)  AM-PAC 6 Clicks Score (PT): 19  AM-PAC 6 Clicks Score (OT): 17    Gladis Frankel PTA  6/13/2025

## 2025-06-13 NOTE — PROGRESS NOTES
Inpatient Progress Note       Demographics    NAME: Elaine Juárez  MRN: [id number]  AGE: 65 y.o.            : 1960  ADMISSION DATE: 2025  PRIMARY CARE PROVIDER: Aaron Stoddard MD  ATTENDING PHYSICIAN: Cristy Lopez MD       Assessment and Plan    Problem List:  Acute on chronic hypoxic respiratory failure  CREST syndrome  Pulmonary fibrosis  Pulmonary hypertension  Concern for pneumonia  Bronchiectasis    Recommendations:   - Continue supplemental oxygen as needed and wean as tolerated, goal O2 sat of 88-92%  - Patient satting in the upper 90s on 10 L supplemental oxygen today, decreased to 8 L  - Start to wean steroids, decrease Solu-Medrol from 80mg 3 times daily to 40mg 3 times daily  - Continue vest physiotherapy and hypertonic saline.  The patient was receiving benefit from vest physiotherapy at home  - Continue pulmonary hypertension management.  She has an upcoming appointment in [place] at the pulmonary hypertension clinic   - Consider monitoring off of antibiotics  - Pulmonary clinic follow-up on discharge  - Frequent incentive spirometer, PT/OT    Thank you for allowing us to participate in this patient's care. We will continue to follow.       HPI    Reason for Consult:  Acute on chronic hypoxic respiratory failure    Interval History:    No acute events overnight, patient resting bed breathing comfortably on 2 L.  O2 sat in the upper 90s.  O2 decreased to 8 L.  Respiratory status slowly improving.    Review of Systems:  A full 12-point review of systems was performed and was negative except as documented in the HPI.       Objective    Physical Exam:  General: No acute distress, cooperative  Head: Atraumatic, normocephalic, normal inspection  Eyes: EOMI, normal inspection  ENT: Mucous membranes moist, no thrush  Teeth/gums: Normal inspection  Heart: RRR, no murmur  Lungs: Diminished with mild crackles  MSK: Normal inspection  GI/abdominal: Soft, nontender  Neurologic: Alert,  "oriented.  Cranial nerves II through XII grossly intact.    Imaging Review:   No new imaging for review.         Results Review:  Results from last 7 days   Lab Units 06/13/25  0438 06/12/25  0527 06/11/25  1614 06/11/25  1143   SODIUM mmol/L 136 136  --  133*   SODIUM, ARTERIAL mmol/L  --   --  136  --    POTASSIUM mmol/L 4.1 4.0  --  4.3   CHLORIDE mmol/L 102 101  --  97*   CO2 mmol/L 20.0* 22.0  --  21.0*   BUN mg/dL 25.1* 18.6  --  9.8   CALCIUM mg/dL 8.1* 8.1*  --  8.8     Results from last 7 days   Lab Units 06/13/25  0438 06/12/25  0527 06/11/25  1143   WBC 10*3/mm3 14.80* 8.92 14.17*   HEMOGLOBIN g/dL 12.7 13.4 14.3   HEMATOCRIT % 41.4 44.0 46.1   PLATELETS 10*3/mm3 188 194 242     Visit Vitals  BP 99/67 (BP Location: Left arm, Patient Position: Lying)   Pulse 87   Temp 97.8 °F (36.6 °C) (Oral)   Resp 18   Ht 160 cm (63\")   Wt 70.8 kg (156 lb 1.6 oz)   SpO2 99%   BMI 27.65 kg/m²       Medications:  enoxaparin sodium, 40 mg, Subcutaneous, Daily  furosemide, 20 mg, Intravenous, Q12H  ipratropium-albuterol, 3 mL, Nebulization, Q6H While Awake - RT  lactulose, 20 g, Oral, TID  Macitentan-Tadalafil, 1 tablet, Oral, Nightly  methylPREDNISolone sodium succinate, 80 mg, Intravenous, Q8H  mycophenolate, 1,000 mg, Oral, BID  piperacillin-tazobactam, 3.375 g, Intravenous, Q8H  sodium chloride, 10 mL, Intravenous, Q12H  sodium chloride, 4 mL, Nebulization, BID - RT  traZODone, 100 mg, Oral, Nightly  venlafaxine XR, 75 mg, Oral, Daily With Breakfast             Alicia Keane DO  Pulmonary/Critical Care  6/13/2025  08:57 CDT  "

## 2025-06-13 NOTE — PLAN OF CARE
Goal Outcome Evaluation:      Pt Aox4. VSS on 8-10L high flow, humidified NC. Upx1 to BSC. Voiding via external catheter at times per pt request. Daughter at bedside. CT of lumbar spine performed today d/t pain. C/o pain and anxiety managed with prn meds. Safety maintained.

## 2025-06-14 PROBLEM — I47.19 MULTIFOCAL ATRIAL TACHYCARDIA: Status: ACTIVE | Noted: 2025-06-14

## 2025-06-14 LAB
ANION GAP SERPL CALCULATED.3IONS-SCNC: 9 MMOL/L (ref 5–15)
ANISOCYTOSIS BLD QL: ABNORMAL
BASOPHILS # BLD MANUAL: 0 10*3/MM3 (ref 0–0.2)
BASOPHILS NFR BLD MANUAL: 0 % (ref 0–1.5)
BUN SERPL-MCNC: 29 MG/DL (ref 8–23)
BUN/CREAT SERPL: 34.9 (ref 7–25)
CALCIUM SPEC-SCNC: 8.2 MG/DL (ref 8.6–10.5)
CHLORIDE SERPL-SCNC: 97 MMOL/L (ref 98–107)
CLUMPED PLATELETS: PRESENT
CO2 SERPL-SCNC: 29 MMOL/L (ref 22–29)
CREAT SERPL-MCNC: 0.83 MG/DL (ref 0.57–1)
DEPRECATED RDW RBC AUTO: 44.9 FL (ref 37–54)
EGFRCR SERPLBLD CKD-EPI 2021: 77.4 ML/MIN/1.73
EOSINOPHIL # BLD MANUAL: 0.1 10*3/MM3 (ref 0–0.4)
EOSINOPHIL NFR BLD MANUAL: 1 % (ref 0.3–6.2)
ERYTHROCYTE [DISTWIDTH] IN BLOOD BY AUTOMATED COUNT: 16.3 % (ref 12.3–15.4)
GLUCOSE SERPL-MCNC: 117 MG/DL (ref 65–99)
HCT VFR BLD AUTO: 37.8 % (ref 34–46.6)
HGB BLD-MCNC: 11.7 G/DL (ref 12–15.9)
LYMPHOCYTES # BLD MANUAL: 0.82 10*3/MM3 (ref 0.7–3.1)
LYMPHOCYTES NFR BLD MANUAL: 2 % (ref 5–12)
MCH RBC QN AUTO: 23.9 PG (ref 26.6–33)
MCHC RBC AUTO-ENTMCNC: 31 G/DL (ref 31.5–35.7)
MCV RBC AUTO: 77.1 FL (ref 79–97)
METAMYELOCYTES NFR BLD MANUAL: 2 % (ref 0–0)
MICROCYTES BLD QL: ABNORMAL
MONOCYTES # BLD: 0.2 10*3/MM3 (ref 0.1–0.9)
MYELOCYTES NFR BLD MANUAL: 2 % (ref 0–0)
NEUTROPHILS # BLD AUTO: 8.64 10*3/MM3 (ref 1.7–7)
NEUTROPHILS NFR BLD MANUAL: 82.8 % (ref 42.7–76)
NEUTS BAND NFR BLD MANUAL: 2 % (ref 0–5)
PLATELET # BLD AUTO: 148 10*3/MM3 (ref 140–450)
PMV BLD AUTO: 10.2 FL (ref 6–12)
POLYCHROMASIA BLD QL SMEAR: ABNORMAL
POTASSIUM SERPL-SCNC: 4.8 MMOL/L (ref 3.5–5.2)
QT INTERVAL: 286 MS
QTC INTERVAL: 433 MS
RBC # BLD AUTO: 4.9 10*6/MM3 (ref 3.77–5.28)
SODIUM SERPL-SCNC: 135 MMOL/L (ref 136–145)
VARIANT LYMPHS NFR BLD MANUAL: 1 % (ref 0–5)
VARIANT LYMPHS NFR BLD MANUAL: 7.1 % (ref 19.6–45.3)
WBC MORPH BLD: NORMAL
WBC NRBC COR # BLD AUTO: 10.18 10*3/MM3 (ref 3.4–10.8)

## 2025-06-14 PROCEDURE — 94799 UNLISTED PULMONARY SVC/PX: CPT

## 2025-06-14 PROCEDURE — 25010000002 ONDANSETRON PER 1 MG: Performed by: HOSPITALIST

## 2025-06-14 PROCEDURE — 85025 COMPLETE CBC W/AUTO DIFF WBC: CPT | Performed by: NURSE PRACTITIONER

## 2025-06-14 PROCEDURE — 25010000002 ENOXAPARIN PER 10 MG: Performed by: HOSPITALIST

## 2025-06-14 PROCEDURE — 85007 BL SMEAR W/DIFF WBC COUNT: CPT | Performed by: NURSE PRACTITIONER

## 2025-06-14 PROCEDURE — 80048 BASIC METABOLIC PNL TOTAL CA: CPT | Performed by: NURSE PRACTITIONER

## 2025-06-14 PROCEDURE — 25010000002 MORPHINE PER 10 MG: Performed by: HOSPITALIST

## 2025-06-14 PROCEDURE — 25010000002 METHYLPREDNISOLONE PER 40 MG: Performed by: NURSE PRACTITIONER

## 2025-06-14 PROCEDURE — 99231 SBSQ HOSP IP/OBS SF/LOW 25: CPT | Performed by: STUDENT IN AN ORGANIZED HEALTH CARE EDUCATION/TRAINING PROGRAM

## 2025-06-14 PROCEDURE — 25010000002 METHYLPREDNISOLONE PER 40 MG: Performed by: INTERNAL MEDICINE

## 2025-06-14 PROCEDURE — 99232 SBSQ HOSP IP/OBS MODERATE 35: CPT | Performed by: INTERNAL MEDICINE

## 2025-06-14 PROCEDURE — 94669 MECHANICAL CHEST WALL OSCILL: CPT

## 2025-06-14 PROCEDURE — 63710000001 MYCOPHENOLATE MOFETIL PER 250 MG: Performed by: NURSE PRACTITIONER

## 2025-06-14 PROCEDURE — 25010000002 FUROSEMIDE PER 20 MG: Performed by: HOSPITALIST

## 2025-06-14 PROCEDURE — 94664 DEMO&/EVAL PT USE INHALER: CPT

## 2025-06-14 RX ORDER — HYDROCODONE BITARTRATE AND ACETAMINOPHEN 5; 325 MG/1; MG/1
2 TABLET ORAL EVERY 6 HOURS PRN
Status: DISCONTINUED | OUTPATIENT
Start: 2025-06-14 | End: 2025-06-17 | Stop reason: HOSPADM

## 2025-06-14 RX ORDER — METHYLPREDNISOLONE SODIUM SUCCINATE 40 MG/ML
40 INJECTION, POWDER, LYOPHILIZED, FOR SOLUTION INTRAMUSCULAR; INTRAVENOUS EVERY 12 HOURS
Status: DISCONTINUED | OUTPATIENT
Start: 2025-06-14 | End: 2025-06-15

## 2025-06-14 RX ORDER — MORPHINE SULFATE 2 MG/ML
2 INJECTION, SOLUTION INTRAMUSCULAR; INTRAVENOUS EVERY 4 HOURS PRN
Status: DISCONTINUED | OUTPATIENT
Start: 2025-06-14 | End: 2025-06-15 | Stop reason: ALTCHOICE

## 2025-06-14 RX ORDER — VERAPAMIL HYDROCHLORIDE 100 MG/1
100 CAPSULE, EXTENDED RELEASE ORAL NIGHTLY
Qty: 90 CAPSULE | Refills: 3 | Status: SHIPPED | OUTPATIENT
Start: 2025-06-14 | End: 2025-06-17

## 2025-06-14 RX ORDER — LIDOCAINE 4 G/G
1 PATCH TOPICAL
Status: DISCONTINUED | OUTPATIENT
Start: 2025-06-14 | End: 2025-06-17 | Stop reason: HOSPADM

## 2025-06-14 RX ADMIN — MYCOPHENOLATE MOFETIL 1000 MG: 500 TABLET, FILM COATED ORAL at 11:06

## 2025-06-14 RX ADMIN — MYCOPHENOLATE MOFETIL 1000 MG: 500 TABLET, FILM COATED ORAL at 20:48

## 2025-06-14 RX ADMIN — VERAPAMIL HYDROCHLORIDE 100 MG: 100 CAPSULE, EXTENDED RELEASE ORAL at 20:48

## 2025-06-14 RX ADMIN — IPRATROPIUM BROMIDE 0.5 MG: 0.5 SOLUTION RESPIRATORY (INHALATION) at 14:29

## 2025-06-14 RX ADMIN — TIZANIDINE 4 MG: 4 TABLET ORAL at 15:15

## 2025-06-14 RX ADMIN — HYDROXYZINE HYDROCHLORIDE 25 MG: 25 TABLET ORAL at 20:49

## 2025-06-14 RX ADMIN — TRAZODONE HYDROCHLORIDE 100 MG: 100 TABLET ORAL at 20:49

## 2025-06-14 RX ADMIN — IPRATROPIUM BROMIDE 0.5 MG: 0.5 SOLUTION RESPIRATORY (INHALATION) at 06:16

## 2025-06-14 RX ADMIN — MORPHINE SULFATE 2 MG: 2 INJECTION, SOLUTION INTRAMUSCULAR; INTRAVENOUS at 14:58

## 2025-06-14 RX ADMIN — HYDROCODONE BITARTRATE AND ACETAMINOPHEN 1 TABLET: 5; 325 TABLET ORAL at 04:39

## 2025-06-14 RX ADMIN — METHYLPREDNISOLONE SODIUM SUCCINATE 40 MG: 40 INJECTION, POWDER, FOR SOLUTION INTRAMUSCULAR; INTRAVENOUS at 17:13

## 2025-06-14 RX ADMIN — ONDANSETRON 4 MG: 2 INJECTION INTRAMUSCULAR; INTRAVENOUS at 10:35

## 2025-06-14 RX ADMIN — LIDOCAINE 1 PATCH: 4 PATCH TOPICAL at 09:41

## 2025-06-14 RX ADMIN — MORPHINE SULFATE 2 MG: 2 INJECTION, SOLUTION INTRAMUSCULAR; INTRAVENOUS at 20:55

## 2025-06-14 RX ADMIN — Medication 10 ML: at 09:44

## 2025-06-14 RX ADMIN — HYDROCODONE BITARTRATE AND ACETAMINOPHEN 2 TABLET: 5; 325 TABLET ORAL at 18:11

## 2025-06-14 RX ADMIN — ENOXAPARIN SODIUM 40 MG: 100 INJECTION SUBCUTANEOUS at 18:04

## 2025-06-14 RX ADMIN — FUROSEMIDE 20 MG: 10 INJECTION, SOLUTION INTRAMUSCULAR; INTRAVENOUS at 06:04

## 2025-06-14 RX ADMIN — HYDROCODONE BITARTRATE AND ACETAMINOPHEN 1 TABLET: 5; 325 TABLET ORAL at 10:35

## 2025-06-14 RX ADMIN — Medication 4 ML: at 06:16

## 2025-06-14 RX ADMIN — ONDANSETRON 4 MG: 2 INJECTION INTRAMUSCULAR; INTRAVENOUS at 04:39

## 2025-06-14 RX ADMIN — FUROSEMIDE 20 MG: 10 INJECTION, SOLUTION INTRAMUSCULAR; INTRAVENOUS at 18:04

## 2025-06-14 RX ADMIN — IPRATROPIUM BROMIDE 0.5 MG: 0.5 SOLUTION RESPIRATORY (INHALATION) at 09:53

## 2025-06-14 RX ADMIN — VENLAFAXINE HYDROCHLORIDE 75 MG: 75 CAPSULE, EXTENDED RELEASE ORAL at 11:06

## 2025-06-14 RX ADMIN — METHYLPREDNISOLONE SODIUM SUCCINATE 40 MG: 40 INJECTION, POWDER, FOR SOLUTION INTRAMUSCULAR; INTRAVENOUS at 04:39

## 2025-06-14 RX ADMIN — ARFORMOTEROL TARTRATE 15 MCG: 15 SOLUTION RESPIRATORY (INHALATION) at 09:47

## 2025-06-14 RX ADMIN — HYDROXYZINE HYDROCHLORIDE 25 MG: 25 TABLET ORAL at 11:06

## 2025-06-14 RX ADMIN — Medication 10 ML: at 20:49

## 2025-06-14 RX ADMIN — ONDANSETRON 4 MG: 2 INJECTION INTRAMUSCULAR; INTRAVENOUS at 20:48

## 2025-06-14 RX ADMIN — HYDROXYZINE HYDROCHLORIDE 25 MG: 25 TABLET ORAL at 17:13

## 2025-06-14 RX ADMIN — DOCUSATE SODIUM 50 MG AND SENNOSIDES 8.6 MG 2 TABLET: 8.6; 5 TABLET, FILM COATED ORAL at 11:06

## 2025-06-14 NOTE — PLAN OF CARE
Goal Outcome Evaluation:      Pt is A&Ox4 and afebrile. VSS and she is on 7L high flow nasal cannula. She voids purewick and has a brief on. She is on tele. Pain controlled with pain meds and zofran given for nausea. Call light within reach.

## 2025-06-14 NOTE — PLAN OF CARE
Goal Outcome Evaluation:  Plan of Care Reviewed With: patient        Progress: improving  Outcome Evaluation: . A&Ox4. VSS on 7L humidified high flow NC. Voiding per PW. Some c/o pain with PRN meds given. Some c/o nausea with zofran given with some relief. Pt appears to have been resting well throughout the night. Call light within reach, safety maintained.

## 2025-06-14 NOTE — PROGRESS NOTES
[facility] Medicine Services  INPATIENT PROGRESS NOTE    Patient Name: Elaine Juárez  Date of Admission: 6/11/2025  Today's Date: 06/14/25  Length of Stay: 2  Primary Care Physician: Aaron Stoddard MD    Subjective   Chief Complaint: Shortness of breath back pain abdominal pain     Shortness of Breath  Back Pain     Patient is a 65-year-old white female with past medical history of oxygen dependent pulmonary fibrosis bronchiectasis show COPD on 6 to 7 L history of depression anxiety history of bilateral kidney stones history of sleep apnea presents to the emergency room with worsening shortness of breath and back pain.  Patient states she has right flank pain and has been getting worse over the last 3 to 4 days.  She states that her urine has also been dark.   She is also been more short of breath today and called EMS for her right flank and back pain but noted that her oxygen was in the 70s while upon arrival and tachycardic.  She refused a DuoNeb by EMS en route.    In ER chest x-ray was showing multifocal pneumonia possible pulmonary edema , CT abdomen pelvis was done for abdominal pain was showing constipation bilateral kidney stones the patient was placed on Vapotherm blood cultures were obtained started on steroid breathing treatment and Sandi NIETO discussed the case with intensivist and with the nurse practitioner intensivist Nba  who felt that the patient could be admitted to the floor and does not need to be in the unit  Will admit continue steroid breathing treatment antibiotics we will start her on IV Lasix order echo of the heart we will consult with pulmonary I's and O's and daily weights Lovenox for DVT prophylaxis identified reconcile restart home medications once reconciled  6/12  As before presented  with above remains on antibiotics steroid breathing treatment started Lasix echo of the heart is pending pulmonary has been consulted continue to titrate down  her oxygen blood cultures remain unremarkable, vest therapy, last echo of the heart was showing EF to be 60 to 65% repeat echo is pending  6/13  Patient lying in bed talking to .  She continues to have high flow oxygen currently on 9 L.  She is extremely anxious.  Nurse notified Dr. Khan of concerns for atrial fibrillation, right 140s.  Patient not on monitor.  EKG reveals what I suspect is normal sinus rhythm with PAC and PVCs.  EP has been consulted.  Patient states she received a breathing treatment prior to episode.  I did discuss with her discontinuation of DuoNebs and changing nebulizer treatment to hopefully decrease the potential for recurrent tachycardia after treatment.  She is complaining of severe back pain mid lumbar/sacral region on the right radiating around to abdomen down the back of her leg and up to her arm.  Patient states she vomited up all her morning meds and is somewhat frantic that she did not receive Effexor.  I did order a repeat one-time dose.  This did seem to pacify her.  Patient states her back has been hurting for 3 months after she felt a pop and she feels no one is addressing this.  I have ordered a CT of the lumbar spine to assure there are no injuries.  Patient is agreeable to scheduled hydroxyzine to help with her anxiety, sleeplessness and it will not affect her respiratory status.  Will continue to monitor closely.  6/14  Patient resting in bed.  Daughters are at bedside.  Reviewed CT of the lumbar spine results, arthritis.  Patient is.  She reports Lidoderm pain patch has helped tremendously however she then states her pain is 8 on a scale of 1-10.  Patient vacillates regarding symptom reporting.  This is not a new finding.  I did broach the subject of discharge tomorrow as respiratory status is at baseline.  She became anxious and stated she was not ready to go home yet.  And I did review Cleo to her that unfortunately she could not stay inpatient for rehab to  assist with her pain.  She declines home health rehab.  After discussion she is agreeable to inpatient rehab placement.  Again, feel patient is high risk for changing her mind regarding placement.  Respiratory status significantly improved.  Currently on 8 L high flow, at baseline.  I will consult .    Review of Systems   All other systems reviewed and are negative.     All pertinent negatives and positives are as above. All other systems have been reviewed and are negative unless otherwise stated.     Objective    Temp:  [97.2 °F (36.2 °C)-98.5 °F (36.9 °C)] 97.2 °F (36.2 °C)  Heart Rate:  [] 91  Resp:  [16-18] 16  BP: ()/(52-73) 104/73  Physical Exam  Constitutional:       Appearance: She is ill-appearing.   HENT:      Head: Normocephalic and atraumatic.      Mouth/Throat:      Mouth: Mucous membranes are moist.      Pharynx: Oropharynx is clear.   Eyes:      Extraocular Movements: Extraocular movements intact.      Pupils: Pupils are equal, round, and reactive to light.   Cardiovascular:      Rate and Rhythm: Normal rate.      Comments: Sinus tachycardia 60-1 13 at time of assessment 90s with PACs.  Pulmonary:      Effort: No respiratory distress.      Breath sounds: Wheezing present.   Abdominal:      General: There is no distension.      Palpations: Abdomen is soft.   Musculoskeletal:      Cervical back: Normal range of motion and neck supple.      Right lower leg: No edema.      Left lower leg: No edema.      Comments: Generalized weakness and debility   Skin:     General: Skin is warm and dry.      Capillary Refill: Capillary refill takes less than 2 seconds.   Neurological:      General: No focal deficit present.      Mental Status: She is alert and oriented to person, place, and time.   Psychiatric:         Mood and Affect: Affect normal. Mood is anxious.       Results Review:  I have reviewed the labs, radiology results, and diagnostic studies.    Laboratory Data:   Results from  "last 7 days   Lab Units 06/14/25 0415 06/13/25 0438 06/12/25  0527   WBC 10*3/mm3 10.18 14.80* 8.92   HEMOGLOBIN g/dL 11.7* 12.7 13.4   HEMATOCRIT % 37.8 41.4 44.0   PLATELETS 10*3/mm3 148 188 194        Results from last 7 days   Lab Units 06/14/25 0415 06/13/25 0438 06/12/25  0527 06/11/25  1614 06/11/25  1143   SODIUM mmol/L 135* 136 136  --  133*   SODIUM, ARTERIAL   --   --   --    < >  --    POTASSIUM mmol/L 4.8 4.1 4.0  --  4.3   CHLORIDE mmol/L 97* 102 101  --  97*   CO2 mmol/L 29.0 20.0* 22.0  --  21.0*   BUN mg/dL 29.0* 25.1* 18.6  --  9.8   CREATININE mg/dL 0.83 0.89 0.93  --  0.73   CALCIUM mg/dL 8.2* 8.1* 8.1*  --  8.8   BILIRUBIN mg/dL  --   --   --   --  0.4   ALK PHOS U/L  --   --   --   --  179*   ALT (SGPT) U/L  --   --   --   --  11   AST (SGOT) U/L  --   --   --   --  21   GLUCOSE mg/dL 117* 142* 221*  --  131*    < > = values in this interval not displayed.       Culture Data:   No results found for: \"BLOODCX\", \"URINECX\", \"WOUNDCX\", \"MRSACX\", \"RESPCX\", \"STOOLCX\"    Radiology Data:   Imaging Results (Last 24 Hours)       Procedure Component Value Units Date/Time    CT Lumbar Spine Without Contrast [[id number]] Collected: 06/13/25 1546     Updated: 06/13/25 1603    Narrative:      CT LUMBAR SPINE WO CONTRAST-      HISTORY: severe pain on right; J96.21-Acute and chronic respiratory  failure with hypoxia; M54.9-Dorsalgia, unspecified;  E87.9-Ftup-hgzeslzoye and hyponatremia; J18.9-Pneumonia, unspecified  organism; Z74.09-Other reduced mobility      COMPARISON: None     TOTAL DOSE LENGTH PRODUCT: 361.51 mGy.cm. Automated exposure control was  also utilized to decrease patient radiation dose.     TECHNIQUE: Axial images of the lumbar spine are obtained with sagittal  coronal reconstructions     FINDINGS: Slight left convexity of the lumbar curvature. Mild  anterolisthesis of L4 over L5 measuring 5 mm. No compression deformity  or pars defect. No lumbar vertebral fracture. Advanced L4/5 and " left  L5-S1 facet osteoarthropathy with moderate degenerative facet change at  remaining levels. Arthritic changes of the sacroiliac joints.     At L1/2, no central canal or foraminal stenosis.     At L2/3, minimal broad-based disc bulging. No central canal or foraminal  stenosis.     At L3/4, mild broad-based disc bulging with moderate facet arthropathy.  Mild central canal stenosis with no prominent foraminal stenosis.     At L4/5, advanced facet arthropathy contributing to the grade 1  spondylolisthesis. Minimal broad-based disc bulging. Mild central canal  stenosis with no significant foraminal narrowing.     At L5-S1, moderate advanced left with moderate right facet  osteoarthropathy. Minimal disc bulging. No central canal or foraminal  stenosis.     Nonobstructing intrarenal stones measuring 5 mm or less. No regional  ureteral stones or hydronephrosis identified. Moderate stool within the  visible colon.       Impression:         1. Grade 1 spondylolisthesis at L4/5 secondary to advanced facet  osteoarthropathy. Degenerative changes create mild central lumbar canal  stenosis without prominent foraminal narrowing. No acute lumbar  vertebral pathology.  2. Moderate stool of the visible rectosigmoid colon for which  constipation considered.  3. Nonobstructing bilateral intrarenal stones with no hydronephrosis.     This report was signed and finalized on 6/13/2025 4:00 PM by Dr. Keke Bangura MD.               I have reviewed the patient's current medications.     Assessment/Plan   Assessment  Active Hospital Problems    Diagnosis     Multifocal atrial tachycardia     Right-sided low back pain with right-sided sciatica     Anxiety about health     Acute on chronic respiratory failure with hypoxia     CREST syndrome, partial         Treatment Plan  1.  Acute on chronic respiratory failure with hypoxia/CREST syndrome; pulmonary managing.  Continue steroid taper, at oxygen goal of 7/8 L high flow sat 90-91%.   Continue OPEP, incentive spirometry, chest physiotherapy, ABGs as needed    2.  Right-sided low back pain with sciatica; CT lumbar spine without contrast discussed results with patient and family, initiated Lidoderm pain patch last evening which has improved pain, as needed pain medication as ordered    3.  Anxiety about health; improving since initiation of hydroxyzine 25 mg 4 times a day.  s    4.  Multifocal atrial tachycardia; EP consulted -started verapamil-rate improving significantly, d continue Brovana and Atrovent nebulizer therapy, remote telemetry     in ER chest x-ray was showing multifocal pneumonia possible pulmonary edema , CT abdomen pelvis was done for abdominal pain was showing constipation bilateral kidney stones the patient was placed on Vapotherm blood cultures were obtained started on steroid breathing treatment and Sandi NIETO discussed the case with intensivist and with the nurse practitioner intensivist Nba  who felt that the patient could be admitted to the floor and does not need to be in the unit  Will admit continue steroid breathing treatment antibiotics we will start her on IV Lasix order echo of the heart we will consult with pulmonary I's and O's and daily weights Lovenox for DVT prophylaxis identified reconcile restart home medications once reconciled    Medical Decision Making  Number and Complexity of problems: 5  4 problems, acute, high complexity, improving  1 problem, chronic, high complexity, unchanged    Differential Diagnosis: None     Conditions and Status        Condition is unchanged     MDM Data  External documents reviewed: No  Cardiac tracing (EKG, telemetry) interpretation: Reviewed  Radiology interpretation: Reviewed  Labs reviewed: yes  Any tests that were considered but not ordered: no     Decision rules/scores evaluated (example [id number], Wells, etc): no     Discussed with: Patient, daughters at bedside, and Dr. Lopez     Care Planning  Shared decision making:  Patient, daughters at bedside and Dr. Lopez   Code status and discussions: Full  Surrogate Decision Maker daughters    Disposition  Social Determinants of Health that impact treatment or disposition: Has agreed to rehab placement  I expect the patient to be discharged to to be determined TBD in 2+ days.     Electronically signed by ANASTASIA Silveira, 06/14/25, 14:39 CDT.

## 2025-06-14 NOTE — PROGRESS NOTES
[facility] [place] - [facility]    Patient: Elaine Juárez  1960   MR# [id number]   Acct# [id number]  06/14/25   10:46 CDT  Referring Provider: Cristy Lopez MD    Chief Complaint: Acute on chronic respiratory failure  Interval history: Afebrile.  Resting in bed on 7 L nasal cannula with an O2 sat of 90%.  Hypotensive this morning with a BP of 84/52 and repeat 95/56.  She notes she is having severe heartburn this morning.  She notes that she does have problems tolerating the Tyvaso at times.  We will decrease her Solu-Medrol from 40 every 8 hours to 40 mg every 12 hours.  She notes she has appointment on the 20th at [facility] in [place] for her pulmonary hypertension.  Further recommendations per Dr. Faisal Griffith.  Meds:  arformoterol, 15 mcg, Nebulization, BID - RT  enoxaparin sodium, 40 mg, Subcutaneous, Daily  furosemide, 20 mg, Intravenous, Q12H  hydrOXYzine, 25 mg, Oral, 4x Daily  ipratropium, 0.5 mg, Nebulization, 4x Daily - RT  Lidocaine, 1 patch, Transdermal, Q24H  Macitentan-Tadalafil, 1 tablet, Oral, Nightly  methylPREDNISolone sodium succinate, 40 mg, Intravenous, Q12H  mycophenolate, 1,000 mg, Oral, BID  senna-docusate sodium, 2 tablet, Oral, BID  sodium chloride, 10 mL, Intravenous, Q12H  sodium chloride, 4 mL, Nebulization, BID - RT  traZODone, 100 mg, Oral, Nightly  venlafaxine XR, 75 mg, Oral, Daily With Breakfast  verapamil, 100 mg, Oral, Nightly           Physical Exam:  SpO2 Percentage    06/14/25 0728 06/14/25 0947 06/14/25 1003   SpO2: 99% 94% 90%     Body mass index is 27.65 kg/m².   Temp:  [97.5 °F (36.4 °C)-98.5 °F (36.9 °C)] 97.7 °F (36.5 °C)  Heart Rate:  [] 102  Resp:  [16-24] 16  BP: ()/(52-76) 95/56  Intake/Output Summary (Last 24 hours) at 6/14/2025 1046  Last data filed at 6/14/2025 0728  Gross per 24 hour   Intake 480 ml   Output 2250 ml   Net -1770 ml     Physical Exam  Vitals and nursing note reviewed.   Constitutional:        Appearance: Normal appearance.   HENT:      Head: Normocephalic and atraumatic.   Cardiovascular:      Rate and Rhythm: Normal rate and regular rhythm.   Pulmonary:      Effort: Pulmonary effort is normal.      Breath sounds: Decreased breath sounds present.   Skin:     General: Skin is warm and dry.   Neurological:      General: No focal deficit present.      Mental Status: She is alert and oriented to person, place, and time.   Psychiatric:         Mood and Affect: Mood normal.         Behavior: Behavior normal.         Time spent by me: 6 minutes  Electronically signed by ANASTASIA Gaines, 6/14/2025, 10:46 CDT      Physician Substantive Portion: Medical Decision Making to follow:        Result Review    Laboratory Data:  Results from last 7 days   Lab Units 06/14/25 0415 06/13/25 0438 06/12/25 0527   WBC 10*3/mm3 10.18 14.80* 8.92   HEMOGLOBIN g/dL 11.7* 12.7 13.4   PLATELETS 10*3/mm3 148 188 194     Results from last 7 days   Lab Units 06/14/25 0415 06/13/25 0438 06/12/25 0527 06/11/25  1614 06/11/25  1143   SODIUM mmol/L 135* 136 136  --  133*   SODIUM, ARTERIAL   --   --   --    < >  --    POTASSIUM mmol/L 4.8 4.1 4.0  --  4.3   CHLORIDE mmol/L 97* 102 101  --  97*   CO2 mmol/L 29.0 20.0* 22.0  --  21.0*   BUN mg/dL 29.0* 25.1* 18.6  --  9.8   CREATININE mg/dL 0.83 0.89 0.93  --  0.73   CALCIUM mg/dL 8.2* 8.1* 8.1*  --  8.8   ALK PHOS U/L  --   --   --   --  179*   ALT (SGPT) U/L  --   --   --   --  11   AST (SGOT) U/L  --   --   --   --  21   LACTATE mmol/L  --   --   --   --  1.9    < > = values in this interval not displayed.         Lab 06/14/25 0415 06/13/25 0438 06/12/25 0527   GLUCOSE 117* 142* 221*     Results from last 7 days   Lab Units 06/11/25  1614 06/11/25  1150   PH, ARTERIAL pH units 7.398 7.438   PCO2, ARTERIAL mm Hg 36.2 35.0   PO2 ART mm Hg 62.6* 68.3*   FIO2 % 50  --          Lab 06/11/25  1259 06/11/25  1143   HSTROP T 17* 19*   INR  --  1.04     Microbiology Results  (last 10 days)       Procedure Component Value - Date/Time    Respiratory Culture - Sputum, Cough [[id number]] Collected: 06/12/25 1104    Lab Status: Final result Specimen: Sputum from Cough Updated: 06/12/25 1515     Respiratory Culture Rejected     Gram Stain Few (2+) Epithelial cells per low power field      No WBCs seen    Narrative:      Specimen rejected due to oropharyngeal contamination. Please reorder and recollect specimen if clinically necessary.    S. Pneumo Ag Urine or CSF - Urine, Urine, Clean Catch [[id number]]  (Normal) Collected: 06/12/25 1039    Lab Status: Final result Specimen: Urine, Clean Catch Updated: 06/12/25 1145     Strep Pneumo Ag Negative    Legionella Antigen, Urine - Urine, Urine, Clean Catch [[id number]]  (Normal) Collected: 06/12/25 1039    Lab Status: Final result Specimen: Urine, Clean Catch Updated: 06/12/25 1144     LEGIONELLA ANTIGEN, URINE Negative    Blood Culture - Blood, Arm, Right [[id number]]  (Normal) Collected: 06/11/25 1259    Lab Status: Preliminary result Specimen: Blood from Arm, Right Updated: 06/13/25 1345     Blood Culture No growth at 2 days    Blood Culture - Blood, Arm, Right [[id number]]  (Normal) Collected: 06/11/25 1223    Lab Status: Preliminary result Specimen: Blood from Arm, Right Updated: 06/13/25 1245     Blood Culture No growth at 2 days    Respiratory Panel PCR w/COVID-19(SARS-CoV-2) KARISHMA/LUIS ARMANDO/DENYS/PAD/COR/DAVID In-House, NP Swab in UTM/VTM, 2 HR TAT - Swab, Nasopharynx [[id number]]  (Normal) Collected: 06/11/25 1215    Lab Status: Final result Specimen: Swab from Nasopharynx Updated: 06/11/25 1311     ADENOVIRUS, PCR Not Detected     Coronavirus 229E Not Detected     Coronavirus HKU1 Not Detected     Coronavirus NL63 Not Detected     Coronavirus OC43 Not Detected     COVID19 Not Detected     Human Metapneumovirus Not Detected     Human Rhinovirus/Enterovirus Not Detected     Influenza A PCR Not Detected     Influenza B PCR Not Detected     Parainfluenza Virus  1 Not Detected     Parainfluenza Virus 2 Not Detected     Parainfluenza Virus 3 Not Detected     Parainfluenza Virus 4 Not Detected     RSV, PCR Not Detected     Bordetella pertussis pcr Not Detected     Bordetella parapertussis PCR Not Detected     Chlamydophila pneumoniae PCR Not Detected     Mycoplasma pneumo by PCR Not Detected    Narrative:      In the setting of a positive respiratory panel with a viral infection PLUS a negative procalcitonin without other underlying concern for bacterial infection, consider observing off antibiotics or discontinuation of antibiotics and continue supportive care. If the respiratory panel is positive for atypical bacterial infection (Bordetella pertussis, Chlamydophila pneumoniae, or Mycoplasma pneumoniae), consider antibiotic de-escalation to target atypical bacterial infection.           Recent films:  CT Lumbar Spine Without Contrast  Result Date: 6/13/2025  CT LUMBAR SPINE WO CONTRAST-  HISTORY: severe pain on right; J96.21-Acute and chronic respiratory failure with hypoxia; M54.9-Dorsalgia, unspecified; E87.1-Lihb-huartqtbgs and hyponatremia; J18.9-Pneumonia, unspecified organism; Z74.09-Other reduced mobility  COMPARISON: None  TOTAL DOSE LENGTH PRODUCT: 361.51 mGy.cm. Automated exposure control was also utilized to decrease patient radiation dose.  TECHNIQUE: Axial images of the lumbar spine are obtained with sagittal coronal reconstructions  FINDINGS: Slight left convexity of the lumbar curvature. Mild anterolisthesis of L4 over L5 measuring 5 mm. No compression deformity or pars defect. No lumbar vertebral fracture. Advanced L4/5 and left L5-S1 facet osteoarthropathy with moderate degenerative facet change at remaining levels. Arthritic changes of the sacroiliac joints.  At L1/2, no central canal or foraminal stenosis.  At L2/3, minimal broad-based disc bulging. No central canal or foraminal stenosis.  At L3/4, mild broad-based disc bulging with moderate facet  arthropathy. Mild central canal stenosis with no prominent foraminal stenosis.  At L4/5, advanced facet arthropathy contributing to the grade 1 spondylolisthesis. Minimal broad-based disc bulging. Mild central canal stenosis with no significant foraminal narrowing.  At L5-S1, moderate advanced left with moderate right facet osteoarthropathy. Minimal disc bulging. No central canal or foraminal stenosis.  Nonobstructing intrarenal stones measuring 5 mm or less. No regional ureteral stones or hydronephrosis identified. Moderate stool within the visible colon.      Impression:  1. Grade 1 spondylolisthesis at L4/5 secondary to advanced facet osteoarthropathy. Degenerative changes create mild central lumbar canal stenosis without prominent foraminal narrowing. No acute lumbar vertebral pathology. 2. Moderate stool of the visible rectosigmoid colon for which constipation considered. 3. Nonobstructing bilateral intrarenal stones with no hydronephrosis.  This report was signed and finalized on 6/13/2025 4:00 PM by Dr. Keke Bangura MD.             Pulmonary Assessment:  Acute on chronic resp failure with hypoxia  ILD/ crest  Bronchiectasis  Pulmonary hypertension, difficulty tolerating tx    Recommend/plan:   Continue tyvaso as long as she can tolerate it at all  Anticipate novel tx as outpatient  Continue oxygen for sat 90  Taper steroids again tomorrow if improved    Time spent by me:  17 minutes  This visit was performed by both a physician and an Advanced Practice RN.  I performed all aspects of the medical decision making as documented.  Electronically signed by Faisal Griffith MD, 6/14/2025, 12:04 CDT

## 2025-06-15 ENCOUNTER — APPOINTMENT (OUTPATIENT)
Dept: GENERAL RADIOLOGY | Facility: HOSPITAL | Age: 65
End: 2025-06-15
Payer: MEDICARE

## 2025-06-15 LAB
ANION GAP SERPL CALCULATED.3IONS-SCNC: 9 MMOL/L (ref 5–15)
BASOPHILS # BLD MANUAL: 0 10*3/MM3 (ref 0–0.2)
BASOPHILS NFR BLD MANUAL: 0 % (ref 0–1.5)
BUN SERPL-MCNC: 26.6 MG/DL (ref 8–23)
BUN/CREAT SERPL: 33.3 (ref 7–25)
CALCIUM SPEC-SCNC: 8.6 MG/DL (ref 8.6–10.5)
CHLORIDE SERPL-SCNC: 94 MMOL/L (ref 98–107)
CO2 SERPL-SCNC: 31 MMOL/L (ref 22–29)
CREAT SERPL-MCNC: 0.8 MG/DL (ref 0.57–1)
DEPRECATED RDW RBC AUTO: 45.2 FL (ref 37–54)
EGFRCR SERPLBLD CKD-EPI 2021: 80.9 ML/MIN/1.73
EOSINOPHIL # BLD MANUAL: 0 10*3/MM3 (ref 0–0.4)
EOSINOPHIL NFR BLD MANUAL: 0 % (ref 0.3–6.2)
ERYTHROCYTE [DISTWIDTH] IN BLOOD BY AUTOMATED COUNT: 16.2 % (ref 12.3–15.4)
GLUCOSE SERPL-MCNC: 105 MG/DL (ref 65–99)
HCT VFR BLD AUTO: 40 % (ref 34–46.6)
HGB BLD-MCNC: 12.2 G/DL (ref 12–15.9)
LYMPHOCYTES # BLD MANUAL: 1.24 10*3/MM3 (ref 0.7–3.1)
LYMPHOCYTES NFR BLD MANUAL: 4 % (ref 5–12)
MCH RBC QN AUTO: 23.9 PG (ref 26.6–33)
MCHC RBC AUTO-ENTMCNC: 30.5 G/DL (ref 31.5–35.7)
MCV RBC AUTO: 78.4 FL (ref 79–97)
METAMYELOCYTES NFR BLD MANUAL: 1 % (ref 0–0)
MONOCYTES # BLD: 0.41 10*3/MM3 (ref 0.1–0.9)
MYELOCYTES NFR BLD MANUAL: 1 % (ref 0–0)
NEUTROPHILS # BLD AUTO: 8.4 10*3/MM3 (ref 1.7–7)
NEUTROPHILS NFR BLD MANUAL: 78.8 % (ref 42.7–76)
NEUTS BAND NFR BLD MANUAL: 3 % (ref 0–5)
PLAT MORPH BLD: NORMAL
PLATELET # BLD AUTO: 159 10*3/MM3 (ref 140–450)
PMV BLD AUTO: 10.2 FL (ref 6–12)
POTASSIUM SERPL-SCNC: 4.5 MMOL/L (ref 3.5–5.2)
RBC # BLD AUTO: 5.1 10*6/MM3 (ref 3.77–5.28)
RBC MORPH BLD: NORMAL
SODIUM SERPL-SCNC: 134 MMOL/L (ref 136–145)
VARIANT LYMPHS NFR BLD MANUAL: 1 % (ref 0–5)
VARIANT LYMPHS NFR BLD MANUAL: 11.1 % (ref 19.6–45.3)
WBC MORPH BLD: NORMAL
WBC NRBC COR # BLD AUTO: 10.27 10*3/MM3 (ref 3.4–10.8)

## 2025-06-15 PROCEDURE — 85025 COMPLETE CBC W/AUTO DIFF WBC: CPT | Performed by: NURSE PRACTITIONER

## 2025-06-15 PROCEDURE — 25010000002 ENOXAPARIN PER 10 MG: Performed by: HOSPITALIST

## 2025-06-15 PROCEDURE — 74018 RADEX ABDOMEN 1 VIEW: CPT

## 2025-06-15 PROCEDURE — 25010000002 ONDANSETRON PER 1 MG: Performed by: HOSPITALIST

## 2025-06-15 PROCEDURE — 94799 UNLISTED PULMONARY SVC/PX: CPT

## 2025-06-15 PROCEDURE — 97530 THERAPEUTIC ACTIVITIES: CPT

## 2025-06-15 PROCEDURE — 63710000001 MYCOPHENOLATE MOFETIL PER 250 MG: Performed by: NURSE PRACTITIONER

## 2025-06-15 PROCEDURE — 25010000002 METHYLPREDNISOLONE PER 40 MG: Performed by: NURSE PRACTITIONER

## 2025-06-15 PROCEDURE — 80048 BASIC METABOLIC PNL TOTAL CA: CPT | Performed by: NURSE PRACTITIONER

## 2025-06-15 PROCEDURE — 94664 DEMO&/EVAL PT USE INHALER: CPT

## 2025-06-15 PROCEDURE — 99232 SBSQ HOSP IP/OBS MODERATE 35: CPT | Performed by: INTERNAL MEDICINE

## 2025-06-15 PROCEDURE — 94761 N-INVAS EAR/PLS OXIMETRY MLT: CPT

## 2025-06-15 PROCEDURE — 25010000002 FUROSEMIDE PER 20 MG: Performed by: HOSPITALIST

## 2025-06-15 PROCEDURE — 85007 BL SMEAR W/DIFF WBC COUNT: CPT | Performed by: NURSE PRACTITIONER

## 2025-06-15 RX ORDER — METHYLPREDNISOLONE SODIUM SUCCINATE 40 MG/ML
20 INJECTION, POWDER, LYOPHILIZED, FOR SOLUTION INTRAMUSCULAR; INTRAVENOUS EVERY 12 HOURS
Status: DISCONTINUED | OUTPATIENT
Start: 2025-06-15 | End: 2025-06-17 | Stop reason: HOSPADM

## 2025-06-15 RX ADMIN — BISACODYL 5 MG: 5 TABLET, COATED ORAL at 20:01

## 2025-06-15 RX ADMIN — MYCOPHENOLATE MOFETIL 1000 MG: 500 TABLET, FILM COATED ORAL at 08:21

## 2025-06-15 RX ADMIN — MYCOPHENOLATE MOFETIL 1000 MG: 500 TABLET, FILM COATED ORAL at 20:04

## 2025-06-15 RX ADMIN — HYDROXYZINE HYDROCHLORIDE 25 MG: 25 TABLET ORAL at 08:21

## 2025-06-15 RX ADMIN — ONDANSETRON 4 MG: 2 INJECTION INTRAMUSCULAR; INTRAVENOUS at 06:37

## 2025-06-15 RX ADMIN — FUROSEMIDE 20 MG: 10 INJECTION, SOLUTION INTRAMUSCULAR; INTRAVENOUS at 06:37

## 2025-06-15 RX ADMIN — IPRATROPIUM BROMIDE 0.5 MG: 0.5 SOLUTION RESPIRATORY (INHALATION) at 18:02

## 2025-06-15 RX ADMIN — METHYLPREDNISOLONE SODIUM SUCCINATE 40 MG: 40 INJECTION, POWDER, FOR SOLUTION INTRAMUSCULAR; INTRAVENOUS at 04:02

## 2025-06-15 RX ADMIN — VERAPAMIL HYDROCHLORIDE 100 MG: 100 CAPSULE, EXTENDED RELEASE ORAL at 20:11

## 2025-06-15 RX ADMIN — LIDOCAINE 1 PATCH: 4 PATCH TOPICAL at 08:23

## 2025-06-15 RX ADMIN — ENOXAPARIN SODIUM 40 MG: 100 INJECTION SUBCUTANEOUS at 18:22

## 2025-06-15 RX ADMIN — HYDROXYZINE HYDROCHLORIDE 25 MG: 25 TABLET ORAL at 20:04

## 2025-06-15 RX ADMIN — ONDANSETRON 4 MG: 2 INJECTION INTRAMUSCULAR; INTRAVENOUS at 20:01

## 2025-06-15 RX ADMIN — Medication 4 ML: at 18:02

## 2025-06-15 RX ADMIN — HYDROXYZINE HYDROCHLORIDE 25 MG: 25 TABLET ORAL at 18:32

## 2025-06-15 RX ADMIN — METHYLPREDNISOLONE SODIUM SUCCINATE 20 MG: 40 INJECTION, POWDER, FOR SOLUTION INTRAMUSCULAR; INTRAVENOUS at 18:22

## 2025-06-15 RX ADMIN — HYDROCODONE BITARTRATE AND ACETAMINOPHEN 2 TABLET: 5; 325 TABLET ORAL at 00:16

## 2025-06-15 RX ADMIN — FUROSEMIDE 20 MG: 10 INJECTION, SOLUTION INTRAMUSCULAR; INTRAVENOUS at 18:22

## 2025-06-15 RX ADMIN — HYDROCODONE BITARTRATE AND ACETAMINOPHEN 2 TABLET: 5; 325 TABLET ORAL at 20:12

## 2025-06-15 RX ADMIN — TRAZODONE HYDROCHLORIDE 100 MG: 100 TABLET ORAL at 20:02

## 2025-06-15 RX ADMIN — IPRATROPIUM BROMIDE 0.5 MG: 0.5 SOLUTION RESPIRATORY (INHALATION) at 06:00

## 2025-06-15 RX ADMIN — TIZANIDINE 4 MG: 4 TABLET ORAL at 04:01

## 2025-06-15 RX ADMIN — IPRATROPIUM BROMIDE 0.5 MG: 0.5 SOLUTION RESPIRATORY (INHALATION) at 14:00

## 2025-06-15 RX ADMIN — Medication 4 ML: at 06:05

## 2025-06-15 RX ADMIN — ARFORMOTEROL TARTRATE 15 MCG: 15 SOLUTION RESPIRATORY (INHALATION) at 06:09

## 2025-06-15 RX ADMIN — VENLAFAXINE HYDROCHLORIDE 75 MG: 75 CAPSULE, EXTENDED RELEASE ORAL at 08:21

## 2025-06-15 RX ADMIN — Medication 10 ML: at 08:23

## 2025-06-15 RX ADMIN — ARFORMOTEROL TARTRATE 15 MCG: 15 SOLUTION RESPIRATORY (INHALATION) at 18:09

## 2025-06-15 RX ADMIN — HYDROCODONE BITARTRATE AND ACETAMINOPHEN 2 TABLET: 5; 325 TABLET ORAL at 10:22

## 2025-06-15 RX ADMIN — DOCUSATE SODIUM 50 MG AND SENNOSIDES 8.6 MG 2 TABLET: 8.6; 5 TABLET, FILM COATED ORAL at 20:02

## 2025-06-15 RX ADMIN — IPRATROPIUM BROMIDE 0.5 MG: 0.5 SOLUTION RESPIRATORY (INHALATION) at 09:55

## 2025-06-15 RX ADMIN — HYDROXYZINE HYDROCHLORIDE 25 MG: 25 TABLET ORAL at 12:28

## 2025-06-15 RX ADMIN — TIZANIDINE 4 MG: 4 TABLET ORAL at 12:28

## 2025-06-15 RX ADMIN — Medication 10 ML: at 20:04

## 2025-06-15 RX ADMIN — TIZANIDINE 4 MG: 4 TABLET ORAL at 20:11

## 2025-06-15 NOTE — PLAN OF CARE
Problem: Adult Inpatient Plan of Care  Goal: Plan of Care Review  Outcome: Progressing  Flowsheets (Taken 6/15/2025 5515)  Progress: improving  Outcome Evaluation: Pt A&Ox4, VSS. 7L HF NC, CPOX. C/o back pain with a little relief from PRN meds. Up with stand by assist and walker, ambulated around room this shift. Voiding, external catheter to wall suction. Safety maintained, will continue to monitor.  Plan of Care Reviewed With: patient

## 2025-06-15 NOTE — THERAPY TREATMENT NOTE
Acute Care - Physical Therapy Treatment Note  [facility]     Patient Name: Elaine Juárez  : 1960  MRN: [id number]  Today's Date: 6/15/2025      Visit Dx:     ICD-10-CM ICD-9-CM   1. Acute on chronic hypoxic respiratory failure  J96.21 518.84     799.02   2. Intractable back pain  M54.9 724.5   3. Hyponatremia  E87.1 276.1   4. Multifocal pneumonia  J18.9 486   5. Impaired mobility [Z74.09]  Z74.09 799.89     Patient Active Problem List   Diagnosis    CREST syndrome, partial     Raynaud's phenomenon    Pulmonary fibrosis prob sec underlying autoimmune disease    Gastroesophageal reflux disease without esophagitis    BMI 31.0-31.9,adult    History of adenomatous polyp of colon    Environmental allergies    PAH (pulmonary arterial hypertension) with portal hypertension    Hypokalemia    Panic attack    Paranoia (psychosis)    H/O noncompliance with medical treatment, presenting hazards to health    Bipolar affective disorder, currently manic, moderate    Obesity (BMI 30-39.9)    Esophageal dysmotility    Oropharyngeal dysphagia    Right ventricular systolic dysfunction    Respiratory failure with hypoxia    Metabolic encephalopathy    Generalized weakness    Respiratory distress    Acute on chronic respiratory failure with hypoxia    Right-sided low back pain with right-sided sciatica    Anxiety about health    Multifocal atrial tachycardia     Past Medical History:   Diagnosis Date    Allergies     Arthritis     BMI 31.0-31.9,adult 2019    Calcified granuloma of lung 2019    Right lung    CHF (congestive heart failure)     denies    Chronic idiopathic fibrosing alveolitis     Chronic respiratory failure with hypoxia 2020    Chronic respiratory failure with hypoxia, on home oxygen therapy     COVID-19 2022    CREST syndrome     Environmental allergies 2022    Gastroesophageal reflux disease without esophagitis 2019    Interstitial lung disease     Obstructive sleep apnea  on CPAP 06/04/2019    Oropharyngeal dysphagia 01/26/2023    Primary central sleep apnea     Pulmonary fibrosis     Pulmonary hypertension     Rheumatoid arthritis     Right ventricular systolic dysfunction 05/04/2023    Short-term memory loss      Past Surgical History:   Procedure Laterality Date    BREAST BIOPSY      CARDIAC CATHETERIZATION N/A 07/22/2020    Procedure: Right Heart Cath;  Surgeon: Thong Martin MD;  Location: [facility] CATH INVASIVE LOCATION;  Service: Cardiology;  Laterality: N/A;    CHOLECYSTECTOMY      COLONOSCOPY  05/11/2015    5 POLYPS    COLONOSCOPY N/A 08/04/2021    Procedure: COLONOSCOPY WITH ANESTHESIA;  Surgeon: Michael Landin DO;  Location: [facility] ENDOSCOPY;  Service: Gastroenterology;  Laterality: N/A;  pre-hx polyps  post-colon polyps    ENDOSCOPY N/A 08/04/2021    Procedure: ESOPHAGOGASTRODUODENOSCOPY WITH ANESTHESIA;  Surgeon: Michael Landin DO;  Location: [facility] ENDOSCOPY;  Service: Gastroenterology;  Laterality: N/A;  pre-dysphagia  post-normal  pcp-lanette aguila     PT Assessment (Last 12 Hours)       PT Evaluation and Treatment       Row Name 06/15/25 1028          Physical Therapy Time and Intention    Subjective Information complains of;fatigue;pain  -LY     Document Type therapy note (daily note)  -LY     Mode of Treatment physical therapy  -LY     Comment AFIB with HR up to 160 with sitting EOB, nsg requests t/f to chair only   -LY       Row Name 06/15/25 1028          General Information    Existing Precautions/Restrictions fall;oxygen therapy device and L/min  10L hi fl for activity  -LY       Row Name 06/15/25 1028          Pain    Pretreatment Pain Rating 10/10  -LY     Posttreatment Pain Rating 10/10  -LY     Pain Location back  -LY     Pain Side/Orientation lower  -LY     Pain Management Interventions exercise or physical activity utilized  -LY     Response to Pain Interventions no change per patient report  -LY       Row Name 06/15/25 1028          Bed Mobility     Supine-Sit [place] (Bed Mobility) verbal cues;contact guard  -LY     Assistive Device (Bed Mobility) head of bed elevated;bed rails  -LY       Row Name 06/15/25 1028          Bed-Chair Transfer    Bed-Chair [place] (Transfers) verbal cues;minimum assist (75% patient effort)  -LY     Assistive Device (Bed-Chair Transfers) other (see comments)  HHA  -LY       Row Name 06/15/25 1028          Sit-Stand Transfer    Sit-Stand [place] (Transfers) verbal cues;minimum assist (75% patient effort)  -LY       Row Name 06/15/25 1028          Stand-Sit Transfer    Stand-Sit [place] (Transfers) minimum assist (75% patient effort)  -LY       Row Name 06/15/25 1028          Plan of Care Review    Plan of Care Reviewed With patient  -LY     Progress improving  -LY       Row Name 06/15/25 1028          Positioning and Restraints    Pre-Treatment Position in bed  -LY     Post Treatment Position chair  -LY     In Chair reclined;call light within reach;encouraged to call for assist  -LY               User Key  (r) = Recorded By, (t) = Taken By, (c) = Cosigned By      Initials Name Provider Type    LY Elena Bowser, PTA Physical Therapist Assistant                    Physical Therapy Education       Title: PT OT SLP Therapies (Done)       Topic: Physical Therapy (Done)       Point: Mobility training (Done)       Learning Progress Summary            Patient Acceptance, E, VU,NR by HILTON at 6/12/2025 1602    Comment: role of PT, pursed lip bretahing, calming exercise, d/c plannig                      Point: Home exercise program (Done)       Learning Progress Summary            Patient Acceptance, E, VU,NR by HILTON at 6/12/2025 1602    Comment: role of PT, pursed lip bretahing, calming exercise, d/c plannig                      Point: Body mechanics (Done)       Learning Progress Summary            Patient Acceptance, E, VU,NR by HILTON at 6/12/2025 1602    Comment: role of PT, pursed lip bretahing, calming exercise, d/c  plannig                      Point: Precautions (Done)       Learning Progress Summary            Patient Acceptance, E, VU,NR by HILTON at 6/12/2025 1602    Comment: role of PT, pursed lip bretahing, calming exercise, d/c plannig                                      User Key       Initials Effective Dates Name Provider Type Discipline    HILTON 08/15/24 -  Cyril Irby, PT DPT Physical Therapist PT                  PT Recommendation and Plan     Plan of Care Reviewed With: patient  Progress: improving       Time Calculation:    PT Charges       Row Name 06/15/25 1342             Time Calculation    Start Time 1028  -LY      Stop Time 1052  -LY      Time Calculation (min) 24 min  -LY      PT Received On 06/15/25  -LY         Time Calculation- PT    Total Timed Code Minutes- PT 24 minute(s)  -LY         Timed Charges    73596 - PT Therapeutic Activity Minutes 24  -LY         Total Minutes    Timed Charges Total Minutes 24  -LY       Total Minutes 24  -LY                User Key  (r) = Recorded By, (t) = Taken By, (c) = Cosigned By      Initials Name Provider Type    LY Elena Bwoser PTA Physical Therapist Assistant                  Therapy Charges for Today       Code Description Service Date Service Provider Modifiers Qty    [id number] HC PT THERAPEUTIC ACT EA 15 MIN 6/15/2025 Elena Bowser PTA GP 2            PT G-Codes  Outcome Measure Options: AM-PAC 6 Clicks Basic Mobility (PT)  AM-PAC 6 Clicks Score (PT): 19  AM-PAC 6 Clicks Score (OT): 17    Elena Bowser PTA  6/15/2025

## 2025-06-15 NOTE — DISCHARGE PLACEMENT REQUEST
"[facility] [phone]  Jose Juárez (65 y.o. Female)       Date of Birth   1960    Social Security Number       Address   [address] [place] [address] [address] [place] [address]    Home Phone   [phone]    MRN   [id number]       [facility]    Marital Status                               Admission Date   6/11/2025    Admission Type   Emergency    Admitting Provider   Cristy Lopez MD    Attending Provider   Cristy Lopez MD    Department, Room/Bed   [facility] 3A, 331/1       Discharge Date       Discharge Disposition       Discharge Destination                                 Attending Provider: Cristy Lpoez MD    Allergies: Codeine, Latex, Prednisone    Isolation: None   Infection: None   Code Status: CPR    Ht: 160 cm (63\")   Wt: 70.8 kg (156 lb 1.6 oz)    Admission Cmt: None   Principal Problem: None                  Active Insurance as of 6/11/2025       Primary Coverage       Payor Plan Insurance Group Employer/Plan Group    MEDICARE MEDICARE A & B        Payor Plan Address Payor Plan Phone Number Payor Plan Fax Number Effective Dates    [address] [phone]  3/1/2023 - None Entered    [facility] [address]         Subscriber Name Subscriber Birth Date Member ID       JOSE JUÁREZ 1960 [id number]               Secondary Coverage       Payor Plan Insurance Group Employer/Plan Group    MISC MC SUP   MISC  SUP                     Coverage Address Coverage Phone Number Coverage Fax Number Effective Dates    [address] [phone]  1/1/2023 - None Entered    [facility] [id number]         Subscriber Name Subscriber Birth Date Member ID       JOSE JUÁREZ 1960 [id number]                     Emergency Contacts        (Rel.) Home Phone Work Phone Mobile Phone    elodia figueroa (Daughter) [phone] -- --    vipul daniels (Daughter) [phone] -- --    Garrison Juárez (Spouse) -- -- [phone]                 History & Physical    "     Cristy Lopez MD at 06/11/25 1834              [facility]  HISTORY AND PHYSICAL    Date of Admission: 6/11/2025  Primary Care Physician: Aaron Stoddard MD    Subjective   Primary Historian: Patient    Chief Complaint: Shortness of breath back pain abdominal pain    Shortness of Breath  Back Pain    Patient is a 65-year-old white female with past medical history of oxygen dependent pulmonary fibrosis bronchiectasis show COPD on 6 to 7 L history of depression anxiety history of bilateral kidney stones history of sleep apnea presents to the emergency room with worsening shortness of breath and back pain.  Patient states she has right flank pain and has been getting worse over the last 3 to 4 days.  She states that her urine has also been dark.   She is also been more short of breath today and called EMS for her right flank and back pain but noted that her oxygen was in the 70s while upon arrival and tachycardic.  She refused a DuoNeb by EMS en route.    In ER chest x-ray was showing multifocal pneumonia possible pulmonary edema , CT abdomen pelvis was done for abdominal pain was showing constipation bilateral kidney stones the patient was placed on Vapotherm blood cultures were obtained started on steroid breathing treatment and Sandi NIETO discussed the case with intensivist and with the nurse practitioner intensivist Nba  who felt that the patient could be admitted to the floor and does not need to be in the unit  Will admit continue steroid breathing treatment antibiotics we will start her on IV Lasix order echo of the heart we will consult with pulmonary I's and O's and daily weights Lovenox for DVT prophylaxis identified reconcile restart home medications once reconciled    Review of Systems   Respiratory:  Positive for shortness of breath.    Musculoskeletal:  Positive for back pain.      Otherwise complete ROS reviewed and negative except as mentioned in  the \[facility] [place] [facility]\"".    Past Medical History:   Past Medical History:   Diagnosis Date    Allergies     Arthritis     BMI 31.0-31.9,adult 06/04/2019    Calcified granuloma of lung 06/04/2019    Right lung    CHF (congestive heart failure)     denies    Chronic idiopathic fibrosing alveolitis     Chronic respiratory failure with hypoxia 08/05/2020    Chronic respiratory failure with hypoxia, on home oxygen therapy     COVID-19 02/01/2022    CREST syndrome     Environmental allergies 02/01/2022    Gastroesophageal reflux disease without esophagitis 06/04/2019    Interstitial lung disease     Obstructive sleep apnea on CPAP 06/04/2019    Oropharyngeal dysphagia 01/26/2023    Primary central sleep apnea     Pulmonary fibrosis     Pulmonary hypertension     Rheumatoid arthritis     Right ventricular systolic dysfunction 05/04/2023    Short-term memory loss      Past Surgical History:  Past Surgical History:   Procedure Laterality Date    BREAST BIOPSY      CARDIAC CATHETERIZATION N/A 07/22/2020    Procedure: Right Heart Cath;  Surgeon: Thong Martin MD;  Location: [facility] CATH INVASIVE LOCATION;  Service: Cardiology;  Laterality: N/A;    CHOLECYSTECTOMY      COLONOSCOPY  05/11/2015    5 POLYPS    COLONOSCOPY N/A 08/04/2021    Procedure: COLONOSCOPY WITH ANESTHESIA;  Surgeon: Michael Landin DO;  Location: [facility] ENDOSCOPY;  Service: Gastroenterology;  Laterality: N/A;  pre-hx polyps  post-colon polyps    ENDOSCOPY N/A 08/04/2021    Procedure: ESOPHAGOGASTRODUODENOSCOPY WITH ANESTHESIA;  Surgeon: Michael Landin DO;  Location: [facility] ENDOSCOPY;  Service: Gastroenterology;  Laterality: N/A;  pre-dysphagia  post-normal  pcp-lanette aguila     Social History:  reports that she has never smoked. She has been exposed to tobacco smoke. She has never used smokeless tobacco. She reports that she does not drink alcohol and does not use drugs.    Family History: family history includes Cirrhosis in her sister; Liver cancer in her  "brother; No Known Problems in her father and mother.       Allergies:  Allergies   Allergen Reactions    Codeine Nausea And Vomiting    Latex Rash    Prednisone Mental Status Change     High doses makes her \"nuts\"       Medications:  Prior to Admission medications    Medication Sig Start Date End Date Taking? Authorizing Provider   alendronate (FOSAMAX) 70 MG tablet Take 1 tablet by mouth 1 (One) Time Per Week. 4/25/24   Latanya Spring MD   busPIRone (BUSPAR) 10 MG tablet Take 1 tablet by mouth 3 (Three) Times a Day As Needed (anxiety). 4/29/24   Latanya Spring MD   famotidine (PEPCID) 20 MG tablet Take 1 tablet by mouth 2 (Two) Times a Day Before Meals. 3/22/25   Kaden Arthur MD   ipratropium-albuterol (DUO-NEB) 0.5-2.5 mg/3 ml nebulizer Take 3 mL by nebulization 4 (Four) Times a Day As Needed for Wheezing for up to 30 days. 4/7/25 5/23/25  Fela Blackman APRN   Macitentan-Tadalafil 10-40 MG tablet Take 1 tablet by mouth Every Night.    ProviderLatanya MD   metoprolol succinate XL (TOPROL-XL) 25 MG 24 hr tablet Take 0.5 tablets by mouth Daily. 2/7/25   Roosevelt Escalera MD   mycophenolate (CELLCEPT) 500 MG tablet Take 2 tablets by mouth 2 (Two) Times a Day.    Antonia Landry MD   omeprazole (priLOSEC) 40 MG capsule Take 1 capsule by mouth Daily. 1/8/25   Fela Blackman APRN   potassium chloride 10 MEQ CR tablet Take 1 tablet by mouth Daily. 4/10/25   Roosevelt Escalera MD   predniSONE (DELTASONE) 5 MG tablet Take 1 tablet by mouth Daily for 90 days. 3/26/25 6/24/25  Fela Blackman APRN   Sotatercept-csrk (Winrevair) 60 MG kit Inject 0.9 mL under the skin into the appropriate area as directed Every 21 (Twenty-One) Days. 6/10/25   Toby Nath APRN   spironolactone (ALDACTONE) 50 MG tablet Take 1 tablet by mouth Daily. 5/7/25   Roosevelt Escalera MD   traZODone (DESYREL) 100 MG tablet Take 1 tablet by mouth Every Night for 90 days. 8/28/24 12/31/25  Fela Blackman, ANASTASIA " "  Treprostinil (Tyvaso) 0.6 MG/ML solution inhalation solution Inhale 12 puffs 4 (Four) Times a Day. 5/22/25   Roosevelt Escalera MD   venlafaxine (EFFEXOR) 75 MG tablet Take 1 tablet by mouth Daily.    Farida Cano APRN   vitamin D (ERGOCALCIFEROL) 1.25 MG (91758 UT) capsule capsule Take 1 capsule by mouth 1 (One) Time Per Week. 5/10/23   Aaron Stoddard MD     I have utilized all available immediate resources to obtain, update, or review the patient's current medications (including all prescriptions, over-the-counter products, herbals, cannabis/cannabidiol products, and vitamin/mineral/dietary (nutritional) supplements).    Objective     Vital Signs: /68   Pulse 116   Temp 98.5 °F (36.9 °C) (Oral)   Resp (!) 38   Ht 160 cm (63\")   Wt 70.8 kg (156 lb 1.6 oz)   SpO2 93%   BMI 27.65 kg/m²   Physical Exam  Vitals reviewed.   Constitutional:       Appearance: She is ill-appearing.   HENT:      Head: Normocephalic.      Nose: Nose normal.   Eyes:      Pupils: Pupils are equal, round, and reactive to light.   Cardiovascular:      Rate and Rhythm: Normal rate.   Pulmonary:      Effort: Respiratory distress present.      Breath sounds: Wheezing present.   Abdominal:      General: Abdomen is flat.   Musculoskeletal:         General: Normal range of motion.      Cervical back: Normal range of motion.   Skin:     General: Skin is warm.      Capillary Refill: Capillary refill takes less than 2 seconds.   Neurological:      Mental Status: She is alert.              Results Reviewed:  Lab Results (last 24 hours)       Procedure Component Value Units Date/Time    Blood Gas, Arterial With Co-Ox [[id number]]  (Abnormal) Collected: 06/11/25 1614    Specimen: Arterial Blood Updated: 06/11/25 1616     Site Right Brachial     Tony's Test N/A     pH, Arterial 7.398 pH units      pCO2, Arterial 36.2 mm Hg      pO2, Arterial 62.6 mm Hg      Comment: 84 Value below reference range        HCO3, Arterial 22.3 mmol/L      Base " Excess, Arterial -2.1 mmol/L      Comment: 84 Value below reference range        O2 Saturation, Arterial 92.2 %      Comment: 84 Value below reference range        Hemoglobin, Blood Gas 14.5 g/dL      Hematocrit, Blood Gas 44.4 %      Oxyhemoglobin 90.7 %      Comment: 84 Value below reference range        Methemoglobin 0.20 %      Carboxyhemoglobin 1.4 %      Temperature 37.0     Sodium, Arterial 136 mmol/L      Comment: 84 Value below reference range        Potassium, Arterial 4.7 mmol/L      Barometric Pressure for Blood Gas 755 mmHg      Modality Heated HFNC     FIO2 50 %      Flow Rate 35.0 lpm      Ventilator Mode NA     Collected by [id number]     Comment: Meter: [id number]     :  Sheree Mariscal, FAVIAN        pH, Temp Corrected 7.398 pH Units      pCO2, Temperature Corrected 36.2 mm Hg      pO2, Temperature Corrected 62.6 mm Hg     High Sensitivity Troponin T 1Hr [[id number]]  (Abnormal) Collected: 06/11/25 1259    Specimen: Blood Updated: 06/11/25 1331     HS Troponin T 17 ng/L      Troponin T Numeric Delta -2 ng/L      Troponin T % Delta -11    Narrative:      High Sensitive Troponin T Reference Range:  <14.0 ng/L- Negative Female for AMI  <22.0 ng/L- Negative Male for AMI  >=14 - Abnormal Female indicating possible myocardial injury.  >=22 - Abnormal Male indicating possible myocardial injury.   Clinicians would have to utilize clinical acumen, EKG, Troponin, and serial changes to determine if it is an Acute Myocardial Infarction or myocardial injury due to an underlying chronic condition.         Blood Culture - Blood, Arm, Right [[id number]] Collected: 06/11/25 1259    Specimen: Blood from Arm, Right Updated: 06/11/25 1331    Urinalysis With Culture If Indicated - Urine, Clean Catch [[id number]]  (Abnormal) Collected: 06/11/25 1304    Specimen: Urine, Clean Catch Updated: 06/11/25 1315     Color, UA Yellow     Appearance, UA Clear     pH, UA 5.5     Specific Gravity, UA 1.013     Glucose, UA Negative      Ketones, UA Trace     Bilirubin, UA Negative     Blood, UA Negative     Protein, UA Negative     Leuk Esterase, UA Negative     Nitrite, UA Negative     Urobilinogen, UA 1.0 E.U./dL    Narrative:      In absence of clinical symptoms, the presence of pyuria, bacteria, and/or nitrites on the urinalysis result does not correlate with infection.  Urine microscopic not indicated.    Respiratory Panel PCR w/COVID-19(SARS-CoV-2) KARISHMA/LUIS ARMANDO/DENYS/PAD/COR/DAVID In-House, NP Swab in UTM/VTM, 2 HR TAT - Swab, Nasopharynx [[id number]]  (Normal) Collected: 06/11/25 1215    Specimen: Swab from Nasopharynx Updated: 06/11/25 1311     ADENOVIRUS, PCR Not Detected     Coronavirus 229E Not Detected     Coronavirus HKU1 Not Detected     Coronavirus NL63 Not Detected     Coronavirus OC43 Not Detected     COVID19 Not Detected     Human Metapneumovirus Not Detected     Human Rhinovirus/Enterovirus Not Detected     Influenza A PCR Not Detected     Influenza B PCR Not Detected     Parainfluenza Virus 1 Not Detected     Parainfluenza Virus 2 Not Detected     Parainfluenza Virus 3 Not Detected     Parainfluenza Virus 4 Not Detected     RSV, PCR Not Detected     Bordetella pertussis pcr Not Detected     Bordetella parapertussis PCR Not Detected     Chlamydophila pneumoniae PCR Not Detected     Mycoplasma pneumo by PCR Not Detected    Narrative:      In the setting of a positive respiratory panel with a viral infection PLUS a negative procalcitonin without other underlying concern for bacterial infection, consider observing off antibiotics or discontinuation of antibiotics and continue supportive care. If the respiratory panel is positive for atypical bacterial infection (Bordetella pertussis, Chlamydophila pneumoniae, or Mycoplasma pneumoniae), consider antibiotic de-escalation to target atypical bacterial infection.    High Sensitivity Troponin T [[id number]]  (Abnormal) Collected: 06/11/25 1143    Specimen: Blood Updated: 06/11/25 1241     HS  Troponin T 19 ng/L     Narrative:      High Sensitive Troponin T Reference Range:  <14.0 ng/L- Negative Female for AMI  <22.0 ng/L- Negative Male for AMI  >=14 - Abnormal Female indicating possible myocardial injury.  >=22 - Abnormal Male indicating possible myocardial injury.   Clinicians would have to utilize clinical acumen, EKG, Troponin, and serial changes to determine if it is an Acute Myocardial Infarction or myocardial injury due to an underlying chronic condition.         Blood Culture - Blood, Arm, Right [[id number]] Collected: 06/11/25 1223    Specimen: Blood from Arm, Right Updated: 06/11/25 1236    CBC & Differential [[id number]]  (Abnormal) Collected: 06/11/25 1143    Specimen: Blood Updated: 06/11/25 1233    Narrative:      The following orders were created for panel order CBC & Differential.  Procedure                               Abnormality         Status                     ---------                               -----------         ------                     CBC Auto Differential[[id number]]        Abnormal            Final result                 Please view results for these tests on the individual orders.    CBC Auto Differential [[id number]]  (Abnormal) Collected: 06/11/25 1143    Specimen: Blood Updated: 06/11/25 1233     WBC 14.17 10*3/mm3      RBC 6.04 10*6/mm3      Hemoglobin 14.3 g/dL      Hematocrit 46.1 %      MCV 76.3 fL      MCH 23.7 pg      MCHC 31.0 g/dL      RDW 17.8 %      RDW-SD 45.1 fl      MPV 10.5 fL      Platelets 242 10*3/mm3     Narrative:      The previously reported component NRBC is no longer being reported. Previous result was 0.1 /100 WBC (Reference Range: 0.0-0.2 /100 WBC) on 6/11/2025 at 1215 CDT.    Manual Differential [[id number]]  (Abnormal) Collected: 06/11/25 1143    Specimen: Blood Updated: 06/11/25 1233     Neutrophil % 78.0 %      Lymphocyte % 8.1 %      Monocyte % 4.1 %      Eosinophil % 1.6 %      Basophil % 0.0 %      Bands %  2.4 %      Atypical Lymphocyte %  5.7 %      Neutrophils Absolute 11.41 10*3/mm3      Lymphocytes Absolute 1.96 10*3/mm3      Monocytes Absolute 0.58 10*3/mm3      Eosinophils Absolute 0.23 10*3/mm3      Basophils Absolute 0.00 10*3/mm3      nRBC 0.8 /100 WBC      Polychromasia Slight/1+     WBC Morphology Normal     Platelet Morphology Normal    TSH Rfx On Abnormal To Free T4 [[id number]]  (Normal) Collected: 06/11/25 1143    Specimen: Blood Updated: 06/11/25 1230     TSH 3.300 uIU/mL     Comprehensive Metabolic Panel [[id number]]  (Abnormal) Collected: 06/11/25 1143    Specimen: Blood Updated: 06/11/25 1224     Glucose 131 mg/dL      BUN 9.8 mg/dL      Creatinine 0.73 mg/dL      Sodium 133 mmol/L      Potassium 4.3 mmol/L      Chloride 97 mmol/L      CO2 21.0 mmol/L      Calcium 8.8 mg/dL      Total Protein 6.4 g/dL      Albumin 3.2 g/dL      ALT (SGPT) 11 U/L      AST (SGOT) 21 U/L      Alkaline Phosphatase 179 U/L      Total Bilirubin 0.4 mg/dL      Globulin 3.2 gm/dL      A/G Ratio 1.0 g/dL      BUN/Creatinine Ratio 13.4     Anion Gap 15.0 mmol/L      eGFR 90.3 mL/min/1.73     Narrative:      GFR Categories in Chronic Kidney Disease (CKD)              GFR Category          GFR (mL/min/1.73)    Interpretation  G1                    90 or greater        Normal or high (1)  G2                    60-89                Mild decrease (1)  G3a                   45-59                Mild to moderate decrease  G3b                   30-44                Moderate to severe decrease  G4                    15-29                Severe decrease  G5                    14 or less           Kidney failure    (1)In the absence of evidence of kidney disease, neither GFR category G1 or G2 fulfill the criteria for CKD.    eGFR calculation 2021 CKD-EPI creatinine equation, which does not include race as a factor    Magnesium [[id number]]  (Normal) Collected: 06/11/25 1143    Specimen: Blood Updated: 06/11/25 1224     Magnesium 1.8 mg/dL     Lactic Acid, Plasma  [[id number]]  (Normal) Collected: 06/11/25 1143    Specimen: Blood Updated: 06/11/25 1222     Lactate 1.9 mmol/L     Protime-INR [[id number]]  (Normal) Collected: 06/11/25 1143    Specimen: Blood Updated: 06/11/25 1213     Protime 14.1 Seconds      INR 1.04    aPTT [[id number]]  (Normal) Collected: 06/11/25 1143    Specimen: Blood Updated: 06/11/25 1213     PTT 28.8 seconds     Narrative:      PTT = The equivalent PTT values for the therapeutic range of heparin levels at 0.3 to 0.7 U/ml are 77 - 99 seconds.    [place] Draw [[id number]] Collected: 06/11/25 1143    Specimen: Blood Updated: 06/11/25 1201    Narrative:      The following orders were created for panel order [place] Draw.  Procedure                               Abnormality         Status                     ---------                               -----------         ------                     Green Top (Gel)[[id number]]                                  Final result               Lavender Top[[id number]]                                     Final result               Red Top[[id number]]                                          Final result               Martinez Top[[id number]]                                         Final result               Light Blue Top[[id number]]                                   Final result                 Please view results for these tests on the individual orders.    Green Top (Gel) [[id number]] Collected: 06/11/25 1143    Specimen: Blood Updated: 06/11/25 1201     Extra Tube Hold for add-ons.     Comment: Auto resulted.       Lavender Top [[id number]] Collected: 06/11/25 1143    Specimen: Blood Updated: 06/11/25 1201     Extra Tube hold for add-on     Comment: Auto resulted       Red Top [[id number]] Collected: 06/11/25 1143    Specimen: Blood Updated: 06/11/25 1201     Extra Tube Hold for add-ons.     Comment: Auto resulted.       Gray Top [[id number]] Collected: 06/11/25 1143    Specimen: Blood Updated: 06/11/25 1201     Extra Tube Hold for  add-ons.     Comment: Auto resulted.       Light Blue Top [[id number]] Collected: 06/11/25 1143    Specimen: Blood Updated: 06/11/25 1201     Extra Tube Hold for add-ons.     Comment: Auto resulted       Blood Gas, Arterial - [[id number]]  (Abnormal) Collected: 06/11/25 1150    Specimen: Arterial Blood Updated: 06/11/25 1157     Site Right Brachial     Tony's Test N/A     pH, Arterial 7.438 pH units      pCO2, Arterial 35.0 mm Hg      Comment: 84 Value below reference range        pO2, Arterial 68.3 mm Hg      Comment: 84 Value below reference range        HCO3, Arterial 23.6 mmol/L      Base Excess, Arterial -0.1 mmol/L      Comment: 84 Value below reference range        O2 Saturation, Arterial 94.8 %      Temperature 37.0     Barometric Pressure for Blood Gas 756 mmHg      Modality Nasal Cannula     Flow Rate 6.0 lpm      Ventilator Mode NA     Collected by [id number]     Comment: Meter: [id number]     :  Sheree Mariscal, FAVIAN        pCO2, Temperature Corrected 35.0 mm Hg      pH, Temp Corrected 7.438 pH Units      pO2, Temperature Corrected 68.3 mm Hg           Imaging Results (Last 24 Hours)       Procedure Component Value Units Date/Time    CT Abdomen Pelvis Stone Protocol [[id number]] Collected: 06/11/25 1336     Updated: 06/11/25 1348    Narrative:      CT ABDOMEN PELVIS STONE PROTOCOL-      HISTORY: Right flank pain history of kidney stone      COMPARISON: CTA chest 5/22/2025     TOTAL DOSE LENGTH PRODUCT: 343.82 mGy.cm. Automated exposure control was  also utilized to decrease patient radiation dose.     TECHNIQUE: Axial images of the abdomen and pelvis are performed without  IV or oral contrast     FINDINGS: Right hide there and mediastinal lymphadenopathy is  redemonstrated. Cardiomegaly with slightly increasing small pericardial  effusion. Chronic basilar interstitial lung disease with diffuse  bronchiectasis. Right lower lobe granuloma.     The nonenhanced liver spleen, pancreas, and adrenal glands  are  unremarkable. There are punctate nonobstructing bilateral intrarenal  stones measuring 4 mm or less. No ureteral stones or hydronephrosis. 1.4  cm fluid attenuated left renal cyst. No abnormal perinephric fluid  collection. Bladder is unremarkable. Uterus is unremarkable. No adnexal  mass.     No free intraperitoneal air or loculated abscess. Moderate fecal stasis.  Normal appendix. No small bowel dilatation. Small hiatal hernia. Fluid  contained within the mildly distended lower esophagus which may relate  to reflux or poor primary peristalsis.     Mild vascular calcification. No pathologic-appearing regional  lymphadenopathy.     Minimal anterolisthesis of L4 over L5 likely due to facet  osteoarthropathy. No focal aggressive regional bony lesions.       Impression:         1. Bilateral nonobstructing intrarenal stones measuring 4 mm or less. No  ureteral stones or hydronephrosis. No bladder stones. Left renal cyst.  2. Pathologic mediastinal right hilar lymphadenopathy is redemonstrated  as seen on CTA chest of 5/22/2025. Advanced basilar chronic interstitial  lung disease with bronchiectasis. Slightly increasing small pericardial  effusion with cardiomegaly.  3. Small hiatal hernia. Fluid of the mildly distended lower esophagus  may relate to reflux.  4. Moderate fecal stasis. Normal appendix. No evidence for bowel  obstruction. No free air or abscess.     This report was signed and finalized on 6/11/2025 1:45 PM by Dr. Keke Bangura MD.       XR Chest 1 View [[id number]] Collected: 06/11/25 1235     Updated: 06/11/25 1242    Narrative:      XR CHEST 1 VW-     HISTORY: Palpitation     COMPARISON: 5/26/2025     FINDINGS: Frontal view the chest obtained.     Increasing perihilar opacities superimposed on chronic interstitial lung  disease. Stable nodular right upper lobe opacities. Mediastinal and  right hilar adenopathy best seen on CTA chest of 5/22/2025.  Cardiomegaly. Questionable trace right pleural  effusion. No  pneumothorax. Degenerative changes regional skeleton.       Impression:      1. Increasing bilateral perihilar opacities favoring pulmonary edema  over multifocal pneumonia superimposed on advanced chronic interstitial  lung disease with basilar predilection. Similar nodular right upper lobe  opacities concerning for potential malignancy. Right hilar and  mediastinal lymphadenopathy better seen on recent CTA chest of  5/22/2025.        This report was signed and finalized on 6/11/2025 12:39 PM by Dr. Keke Bangura MD.             I have personally reviewed and interpreted the radiology studies and ECG obtained at time of admission.     Assessment / Plan   Assessment:   Active Hospital Problems    Diagnosis     Acute on chronic respiratory failure with hypoxia        Treatment Plan  The patient will be admitted to my service here at [facility].   In ER chest x-ray was showing multifocal pneumonia possible pulmonary edema , CT abdomen pelvis was done for abdominal pain was showing constipation bilateral kidney stones the patient was placed on Vapotherm blood cultures were obtained started on steroid breathing treatment and Sandi I discussed the case with intensivist and with the nurse practitioner intensivist Nba  who felt that the patient could be admitted to the floor and does not need to be in the unit  Will admit continue steroid breathing treatment antibiotics we will start her on IV Lasix order echo of the heart we will consult with pulmonary I's and O's and daily weights Lovenox for DVT prophylaxis identified reconcile restart home medications once reconciled  Medical Decision Making  Number and Complexity of problems: 3 acute  Differential Diagnosis: As above    Conditions and Status        Condition is at treatment goal.     [facility] Data  External documents reviewed: yes  Cardiac tracing (EKG, telemetry) interpretation: yes  Radiology interpretation: yes  Labs reviewed: yes  Any  tests that were considered but not ordered: no     Decision rules/scores evaluated (example [id number], Wells, etc): no     Discussed with: ED      Care Planning  Shared decision making: Patient apprised of current labs, vitals, imaging and treatment plan.  They are agreeable with proceeding with plans as discussed.   Code status and discussions: full     Disposition  Social Determinants of Health that impact treatment or disposition: no   Estimated length of stay is TBD.     I confirmed that the patient's advanced care plan is present, code status is documented, and a surrogate decision maker is listed in the patient's medical record.         The patient was seen and examined by me on 1840 at [facility], operated by [facility] .    Electronically signed by Cristy Lopez MD, 06/11/25, 18:34 CDT.              Electronically signed by Cristy Lopez MD at 06/11/25 1839       Current Facility-Administered Medications   Medication Dose Route Frequency Provider Last Rate Last Admin    acetaminophen (TYLENOL) tablet 650 mg  650 mg Oral Q4H PRN Cristy Lopez MD        Or    acetaminophen (TYLENOL) 160 MG/5ML oral solution 650 mg  650 mg Oral Q4H PRN Cristy Lopez MD        Or    acetaminophen (TYLENOL) suppository 650 mg  650 mg Rectal Q4H PRN Cristy Lopez MD        ALPRAZolam (XANAX) tablet 0.5 mg  0.5 mg Oral Q8H PRN Cristy Lopez MD   0.5 mg at 06/13/25 0901    arformoterol (BROVANA) nebulizer solution 15 mcg  15 mcg Nebulization BID - RT Quiana Ortiz APRN   15 mcg at 06/15/25 0609    sennosides-docusate (PERICOLACE) 8.6-50 MG per tablet 2 tablet  2 tablet Oral BID Quiana Ortiz APRN   2 tablet at 06/14/25 1106    And    polyethylene glycol (MIRALAX) packet 17 g  17 g Oral Daily PRN Quiana Ortiz APRN        And    bisacodyl (DULCOLAX) EC tablet 5 mg  5 mg Oral Daily PRN Quiana Ortiz APRN        And    bisacodyl (DULCOLAX) suppository 10 mg  10 mg Rectal Daily PRN Quiana Ortiz APRN        Calcium  Replacement - Follow Nurse / BPA Driven Protocol   Not Applicable PRN Cristy Loepz MD        enoxaparin sodium (LOVENOX) syringe 40 mg  40 mg Subcutaneous Daily Cristy Lopez MD   40 mg at 06/14/25 1804    furosemide (LASIX) injection 20 mg  20 mg Intravenous Q12H Cristy Lopez MD   20 mg at 06/15/25 0637    HYDROcodone-acetaminophen (NORCO) 5-325 MG per tablet 2 tablet  2 tablet Oral Q6H PRN Cristy Lopez MD   2 tablet at 06/15/25 0016    hydrOXYzine (ATARAX) tablet 25 mg  25 mg Oral 4x Daily Quiana Ortiz APRN   25 mg at 06/15/25 0821    ipratropium (ATROVENT) nebulizer solution 0.5 mg  0.5 mg Nebulization 4x Daily - RT Quiana Ortiz APRN   0.5 mg at 06/15/25 0600    Lidocaine 4 % 1 patch  1 patch Transdermal Q24H Quiana Ortiz APRN   1 patch at 06/15/25 0823    Macitentan-Tadalafil 10-40 MG tablet 1 tablet  1 tablet Oral Nightly Cristy Lopez MD   1 tablet at 06/14/25 2049    Magnesium Standard Dose Replacement - Follow Nurse / BPA Driven Protocol   Not Applicable PRN Cristy Lopez MD        methylPREDNISolone sodium succinate (SOLU-Medrol) injection 40 mg  40 mg Intravenous Q12H Grace Hernandez APRN   40 mg at 06/15/25 0402    Morphine sulfate (PF) injection 2 mg  2 mg Intravenous Q4H PRN Cristy Lopez MD   2 mg at 06/14/25 2055    mycophenolate (CELLCEPT) tablet 1,000 mg  1,000 mg Oral BID Quiana Ortiz APRN   1,000 mg at 06/15/25 0821    nitroglycerin (NITROSTAT) SL tablet 0.4 mg  0.4 mg Sublingual Q5 Min PRN Cristy Lopez MD        ondansetron (ZOFRAN) injection 4 mg  4 mg Intravenous Q6H PRN Cristy Lopez MD   4 mg at 06/15/25 0637    Phosphorus Replacement - Follow Nurse / BPA Driven Protocol   Not Applicable PRCristy Griffith MD        Potassium Replacement - Follow Nurse / BPA Driven Protocol   Not Applicable PRN Cristy Lopez MD        sodium chloride 0.9 % flush 10 mL  10 mL Intravenous Q12H Cristy Lopez MD   10 mL at 06/15/25 0823    sodium chloride  "0.9 % flush 10 mL  10 mL Intravenous PRN Cristy Lopez MD   10 mL at 06/12/25 0742    sodium chloride 0.9 % infusion 40 mL  40 mL Intravenous PRN Cristy Lopez MD        sodium chloride 3 % nebulizer solution 4 mL  4 mL Nebulization BID - RT Alicia Keane, DO   4 mL at 06/15/25 0605    tiZANidine (ZANAFLEX) tablet 4 mg  4 mg Oral Q8H PRN Quiana Ortiz APRN   4 mg at 06/15/25 0401    traZODone (DESYREL) tablet 100 mg  100 mg Oral Nightly Radha Blanc MD   100 mg at 06/14/25 2049    Treprostinil (TYVASO) 0.6 MG/ML inhalation solution 4 puff  4 puff Inhalation Q6H PRN Cristy Lopez MD        venlafaxine XR (EFFEXOR-XR) 24 hr capsule 75 mg  75 mg Oral Daily With Breakfast Cristy Lopez MD   75 mg at 06/15/25 0821    verapamil (VERELAN) 24 hr capsule 100 mg  100 mg Oral Nightly Chriss Link MD   100 mg at 06/14/25 2048        Physician Progress Notes (last 24 hours)        Chriss Link MD at 06/14/25 1349          EP Problems:  1.  Multifocal atrial tachycardia     Cardiology Problems:  1.  Pulmonary HTN  2.  RVH     Medical Problems:  1.  Crest syndrome  2.  Pulmonary fibrosis  3.  Raynaud's phenomenon  4.  GERD  5.  Anxiety disorder  6.  Nephrolithiasis    Patient ID:  Elaine Juráez is a 65 y.o. female with problem list as above as above who EP is following for multifocal atrial tachycardia.    Subjective:  Heart rates are much better improved after verapamil initiation.    Objective:  /73 (BP Location: Right arm, Patient Position: Lying)   Pulse 91   Temp 97.2 °F (36.2 °C) (Oral)   Resp 16   Ht 160 cm (63\")   Wt 70.8 kg (156 lb 1.6 oz)   SpO2 91%   BMI 27.65 kg/m²     Awake, alert, no acute distress  Chronically ill-appearing, wearing oxygen  Bilateral crackles  Tachycardic, regular  Warm, well-perfused    Labs today:  Lab Results   Component Value Date    GLUCOSE 117 (H) 06/14/2025    CALCIUM 8.2 (L) 06/14/2025     (L) 06/14/2025    K 4.8 06/14/2025    CO2 29.0 " 06/14/2025    BUN 29.0 (H) 06/14/2025    CREATININE 0.83 06/14/2025    EGFR 77.4 06/14/2025     Lab Results   Component Value Date    WBC 10.18 06/14/2025    HGB 11.7 (L) 06/14/2025    HCT 37.8 06/14/2025     06/14/2025     Inpatient telemetry reviewed: Sinus rhythm and sinus tachycardia, occasional PACs, short runs of SVT.  No obvious evidence of further multifocal atrial tachycardia.    Assessment:  Multifocal atrial tachycardia    Plan:  - Doing much better on current dose of verapamil  - Continue verapamil 100 mg nightly  - No additional medication changes from EP standpoint  -EP to sign off at this time, please call if additional questions arise    Part of this note may be an electronic transcription/translation of spoken language to printed text using the Dragon Dictation System.      Electronically signed by Chriss Link MD at 06/14/25 1355       Faisal Griffith MD at 06/14/25 1046              [facility] PULMONARY PROGRESS NOTE - [facility]    Patient: Elaine Juárez  1960   MR# [id number]   Acct# [id number]  06/14/25   10:46 CDT  Referring Provider: Cristy Lopez MD    Chief Complaint: Acute on chronic respiratory failure  Interval history: Afebrile.  Resting in bed on 7 L nasal cannula with an O2 sat of 90%.  Hypotensive this morning with a BP of 84/52 and repeat 95/56.  She notes she is having severe heartburn this morning.  She notes that she does have problems tolerating the Tyvaso at times.  We will decrease her Solu-Medrol from 40 every 8 hours to 40 mg every 12 hours.  She notes she has appointment on the 20th at [facility] in [place] for her pulmonary hypertension.  Further recommendations per Dr. Faisal Griffith.  Meds:  arformoterol, 15 mcg, Nebulization, BID - RT  enoxaparin sodium, 40 mg, Subcutaneous, Daily  furosemide, 20 mg, Intravenous, Q12H  hydrOXYzine, 25 mg, Oral, 4x Daily  ipratropium, 0.5 mg, Nebulization, 4x Daily - RT  Lidocaine, 1 patch, Transdermal,  Q24H  Macitentan-Tadalafil, 1 tablet, Oral, Nightly  methylPREDNISolone sodium succinate, 40 mg, Intravenous, Q12H  mycophenolate, 1,000 mg, Oral, BID  senna-docusate sodium, 2 tablet, Oral, BID  sodium chloride, 10 mL, Intravenous, Q12H  sodium chloride, 4 mL, Nebulization, BID - RT  traZODone, 100 mg, Oral, Nightly  venlafaxine XR, 75 mg, Oral, Daily With Breakfast  verapamil, 100 mg, Oral, Nightly           Physical Exam:  SpO2 Percentage    06/14/25 0728 06/14/25 0947 06/14/25 1003   SpO2: 99% 94% 90%     Body mass index is 27.65 kg/m².   Temp:  [97.5 °F (36.4 °C)-98.5 °F (36.9 °C)] 97.7 °F (36.5 °C)  Heart Rate:  [] 102  Resp:  [16-24] 16  BP: ()/(52-76) 95/56  Intake/Output Summary (Last 24 hours) at 6/14/2025 1046  Last data filed at 6/14/2025 0728  Gross per 24 hour   Intake 480 ml   Output 2250 ml   Net -1770 ml     Physical Exam  Vitals and nursing note reviewed.   Constitutional:       Appearance: Normal appearance.   HENT:      Head: Normocephalic and atraumatic.   Cardiovascular:      Rate and Rhythm: Normal rate and regular rhythm.   Pulmonary:      Effort: Pulmonary effort is normal.      Breath sounds: Decreased breath sounds present.   Skin:     General: Skin is warm and dry.   Neurological:      General: No focal deficit present.      Mental Status: She is alert and oriented to person, place, and time.   Psychiatric:         Mood and Affect: Mood normal.         Behavior: Behavior normal.         Time spent by me: 6 minutes  Electronically signed by ANASTASIA Gaines, 6/14/2025, 10:46 CDT      Physician Substantive Portion: Medical Decision Making to follow:        Result Review    Laboratory Data:  Results from last 7 days   Lab Units 06/14/25  0415 06/13/25  0438 06/12/25  0527   WBC 10*3/mm3 10.18 14.80* 8.92   HEMOGLOBIN g/dL 11.7* 12.7 13.4   PLATELETS 10*3/mm3 148 188 194     Results from last 7 days   Lab Units 06/14/25  0415 06/13/25  0438 06/12/25  0527 06/11/25  1618  06/11/25  1143   SODIUM mmol/L 135* 136 136  --  133*   SODIUM, ARTERIAL   --   --   --    < >  --    POTASSIUM mmol/L 4.8 4.1 4.0  --  4.3   CHLORIDE mmol/L 97* 102 101  --  97*   CO2 mmol/L 29.0 20.0* 22.0  --  21.0*   BUN mg/dL 29.0* 25.1* 18.6  --  9.8   CREATININE mg/dL 0.83 0.89 0.93  --  0.73   CALCIUM mg/dL 8.2* 8.1* 8.1*  --  8.8   ALK PHOS U/L  --   --   --   --  179*   ALT (SGPT) U/L  --   --   --   --  11   AST (SGOT) U/L  --   --   --   --  21   LACTATE mmol/L  --   --   --   --  1.9    < > = values in this interval not displayed.         Lab 06/14/25  0415 06/13/25  0438 06/12/25  0527   GLUCOSE 117* 142* 221*     Results from last 7 days   Lab Units 06/11/25  1614 06/11/25  1150   PH, ARTERIAL pH units 7.398 7.438   PCO2, ARTERIAL mm Hg 36.2 35.0   PO2 ART mm Hg 62.6* 68.3*   FIO2 % 50  --          Lab 06/11/25  1259 06/11/25  1143   HSTROP T 17* 19*   INR  --  1.04     Microbiology Results (last 10 days)       Procedure Component Value - Date/Time    Respiratory Culture - Sputum, Cough [[id number]] Collected: 06/12/25 1104    Lab Status: Final result Specimen: Sputum from Cough Updated: 06/12/25 1515     Respiratory Culture Rejected     Gram Stain Few (2+) Epithelial cells per low power field      No WBCs seen    Narrative:      Specimen rejected due to oropharyngeal contamination. Please reorder and recollect specimen if clinically necessary.    S. Pneumo Ag Urine or CSF - Urine, Urine, Clean Catch [[id number]]  (Normal) Collected: 06/12/25 1039    Lab Status: Final result Specimen: Urine, Clean Catch Updated: 06/12/25 1145     Strep Pneumo Ag Negative    Legionella Antigen, Urine - Urine, Urine, Clean Catch [[id number]]  (Normal) Collected: 06/12/25 1039    Lab Status: Final result Specimen: Urine, Clean Catch Updated: 06/12/25 1144     LEGIONELLA ANTIGEN, URINE Negative    Blood Culture - Blood, Arm, Right [[id number]]  (Normal) Collected: 06/11/25 1259    Lab Status: Preliminary result Specimen:  Blood from Arm, Right Updated: 06/13/25 1345     Blood Culture No growth at 2 days    Blood Culture - Blood, Arm, Right [[id number]]  (Normal) Collected: 06/11/25 1223    Lab Status: Preliminary result Specimen: Blood from Arm, Right Updated: 06/13/25 1245     Blood Culture No growth at 2 days    Respiratory Panel PCR w/COVID-19(SARS-CoV-2) KARISHMA/LUIS ARMANDO/DENYS/PAD/COR/DAVID In-House, NP Swab in UTM/VTM, 2 HR TAT - Swab, Nasopharynx [[id number]]  (Normal) Collected: 06/11/25 1215    Lab Status: Final result Specimen: Swab from Nasopharynx Updated: 06/11/25 1311     ADENOVIRUS, PCR Not Detected     Coronavirus 229E Not Detected     Coronavirus HKU1 Not Detected     Coronavirus NL63 Not Detected     Coronavirus OC43 Not Detected     COVID19 Not Detected     Human Metapneumovirus Not Detected     Human Rhinovirus/Enterovirus Not Detected     Influenza A PCR Not Detected     Influenza B PCR Not Detected     Parainfluenza Virus 1 Not Detected     Parainfluenza Virus 2 Not Detected     Parainfluenza Virus 3 Not Detected     Parainfluenza Virus 4 Not Detected     RSV, PCR Not Detected     Bordetella pertussis pcr Not Detected     Bordetella parapertussis PCR Not Detected     Chlamydophila pneumoniae PCR Not Detected     Mycoplasma pneumo by PCR Not Detected    Narrative:      In the setting of a positive respiratory panel with a viral infection PLUS a negative procalcitonin without other underlying concern for bacterial infection, consider observing off antibiotics or discontinuation of antibiotics and continue supportive care. If the respiratory panel is positive for atypical bacterial infection (Bordetella pertussis, Chlamydophila pneumoniae, or Mycoplasma pneumoniae), consider antibiotic de-escalation to target atypical bacterial infection.           Recent films:  CT Lumbar Spine Without Contrast  Result Date: 6/13/2025  CT LUMBAR SPINE WO CONTRAST-  HISTORY: severe pain on right; J96.21-Acute and chronic respiratory failure with  hypoxia; M54.9-Dorsalgia, unspecified; E87.7-Sfto-crqvgtyrjv and hyponatremia; J18.9-Pneumonia, unspecified organism; Z74.09-Other reduced mobility  COMPARISON: None  TOTAL DOSE LENGTH PRODUCT: 361.51 mGy.cm. Automated exposure control was also utilized to decrease patient radiation dose.  TECHNIQUE: Axial images of the lumbar spine are obtained with sagittal coronal reconstructions  FINDINGS: Slight left convexity of the lumbar curvature. Mild anterolisthesis of L4 over L5 measuring 5 mm. No compression deformity or pars defect. No lumbar vertebral fracture. Advanced L4/5 and left L5-S1 facet osteoarthropathy with moderate degenerative facet change at remaining levels. Arthritic changes of the sacroiliac joints.  At L1/2, no central canal or foraminal stenosis.  At L2/3, minimal broad-based disc bulging. No central canal or foraminal stenosis.  At L3/4, mild broad-based disc bulging with moderate facet arthropathy. Mild central canal stenosis with no prominent foraminal stenosis.  At L4/5, advanced facet arthropathy contributing to the grade 1 spondylolisthesis. Minimal broad-based disc bulging. Mild central canal stenosis with no significant foraminal narrowing.  At L5-S1, moderate advanced left with moderate right facet osteoarthropathy. Minimal disc bulging. No central canal or foraminal stenosis.  Nonobstructing intrarenal stones measuring 5 mm or less. No regional ureteral stones or hydronephrosis identified. Moderate stool within the visible colon.      Impression:  1. Grade 1 spondylolisthesis at L4/5 secondary to advanced facet osteoarthropathy. Degenerative changes create mild central lumbar canal stenosis without prominent foraminal narrowing. No acute lumbar vertebral pathology. 2. Moderate stool of the visible rectosigmoid colon for which constipation considered. 3. Nonobstructing bilateral intrarenal stones with no hydronephrosis.  This report was signed and finalized on 6/13/2025 4:00 PM by   Keke Bangura MD.             Pulmonary Assessment:  Acute on chronic resp failure with hypoxia  ILD/ crest  Bronchiectasis  Pulmonary hypertension, difficulty tolerating tx    Recommend/plan:   Continue tyvaso as long as she can tolerate it at all  Anticipate novel tx as outpatient  Continue oxygen for sat 90  Taper steroids again tomorrow if improved    Time spent by me:  17 minutes  This visit was performed by both a physician and an Advanced Practice RN.  I performed all aspects of the medical decision making as documented.  Electronically signed by Faisal Griffith MD, 2025, 12:04 CDT       Electronically signed by Faisal Griffith MD at 25 1207       Gladis Frankel, PTA   Physical Therapist Assistant  Physical Therapy     Therapy Treatment Note     Signed     Date of Service: 25  Creation Time: 25     Signed       Expand All Collapse All[]Expand All by Default    Acute Care - Physical Therapy Treatment Note   [facility]     Patient Name: Elaine Juárez               : 1960                        MRN: [id number]  Today's Date: 2025                                  Visit Dx:   Visit Diagnosis       ICD-10-CM ICD-9-CM   1. Acute on chronic hypoxic respiratory failure  J96.21 518.84       799.02   2. Intractable back pain  M54.9 724.5   3. Hyponatremia  E87.1 276.1   4. Multifocal pneumonia  J18.9 486   5. Impaired mobility [Z74.09]  Z74.09 799.89         Problem List       Patient Active Problem List   Diagnosis    CREST syndrome, partial     Raynaud's phenomenon    Pulmonary fibrosis prob sec underlying autoimmune disease    Gastroesophageal reflux disease without esophagitis    BMI 31.0-31.9,adult    History of adenomatous polyp of colon    Environmental allergies    PAH (pulmonary arterial hypertension) with portal hypertension    Hypokalemia    Panic attack    Paranoia (psychosis)    H/O noncompliance with medical treatment, presenting hazards to health     Bipolar affective disorder, currently manic, moderate    Obesity (BMI 30-39.9)    Esophageal dysmotility    Oropharyngeal dysphagia    Right ventricular systolic dysfunction    Respiratory failure with hypoxia    Metabolic encephalopathy    Generalized weakness    Respiratory distress    Acute on chronic respiratory failure with hypoxia    Right-sided low back pain with right-sided sciatica    Anxiety about health         Medical History        Past Medical History:   Diagnosis Date    Allergies      Arthritis      BMI 31.0-31.9,adult 06/04/2019    Calcified granuloma of lung 06/04/2019     Right lung    CHF (congestive heart failure)       denies    Chronic idiopathic fibrosing alveolitis      Chronic respiratory failure with hypoxia 08/05/2020    Chronic respiratory failure with hypoxia, on home oxygen therapy      COVID-19 02/01/2022    CREST syndrome      Environmental allergies 02/01/2022    Gastroesophageal reflux disease without esophagitis 06/04/2019    Interstitial lung disease      Obstructive sleep apnea on CPAP 06/04/2019    Oropharyngeal dysphagia 01/26/2023    Primary central sleep apnea      Pulmonary fibrosis      Pulmonary hypertension      Rheumatoid arthritis      Right ventricular systolic dysfunction 05/04/2023    Short-term memory loss           Surgical History         Past Surgical History:   Procedure Laterality Date    BREAST BIOPSY        CARDIAC CATHETERIZATION N/A 07/22/2020     Procedure: Right Heart Cath;  Surgeon: Thong Martin MD;  Location: [facility] CATH INVASIVE LOCATION;  Service: Cardiology;  Laterality: N/A;    CHOLECYSTECTOMY        COLONOSCOPY   05/11/2015     5 POLYPS    COLONOSCOPY N/A 08/04/2021     Procedure: COLONOSCOPY WITH ANESTHESIA;  Surgeon: Michael Landin DO;  Location: [facility] ENDOSCOPY;  Service: Gastroenterology;  Laterality: N/A;  pre-hx polyps  post-colon polyps    ENDOSCOPY N/A 08/04/2021     Procedure: ESOPHAGOGASTRODUODENOSCOPY WITH ANESTHESIA;   Surgeon: Michael Landin W, DO;  Location: [facility] ENDOSCOPY;  Service: Gastroenterology;  Laterality: N/A;  pre-dysphagia  post-normal  pcp-lanette aguila         PT Assessment (Last 12 Hours)            PT Evaluation and Treatment         Row Name 06/13/25 1509 06/13/25 1431            Physical Therapy Time and Intention     Subjective Information complains of;pain  -KJ --     Document Type therapy note (daily note)  -KJ --     Mode of Treatment physical therapy  -KJ physical therapy  -KJ     Patient Effort adequate  -KJ --     Comment pre medicated with pn meds prior to tx per pt request  -KJ --        Row Name 06/13/25 1509                 General Information     Existing Precautions/Restrictions fall;oxygen therapy device and L/min  10L hi fl for activity  -KJ          Row Name 06/13/25 1509                 Pain     Pretreatment Pain Rating 10/10  -KJ       Posttreatment Pain Rating 10/10  -KJ       Pain Location back  -KJ          Row Name 06/13/25 1509                 Bed Mobility     Supine-Sit [place] (Bed Mobility) minimum assist (75% patient effort)  -KJ       Sit-Sidelying [place] (Bed Mobility) standby assist  -KJ          Row Name 06/13/25 1509                 Sit-Stand Transfer     Sit-Stand [place] (Transfers) minimum assist (75% patient effort)  -KJ       Assistive Device (Sit-Stand Transfers) walker, front-wheeled  -KJ          Row Name 06/13/25 1509                 Stand-Sit Transfer     Stand-Sit [place] (Transfers) minimum assist (75% patient effort)  -KJ          Row Name 06/13/25 1509                 Gait/Stairs (Locomotion)     [place] Level (Gait) contact guard  -KJ       Assistive Device (Gait) walker, front-wheeled  -KJ       Distance in Feet (Gait) 10  x 3 in room  -KJ          Row Name 06/13/25 1509                 Motor Skills     Therapeutic Exercise --  AROM x 15 reps sitting edge of bed.  -KJ          Row Name 06/13/25 1509                 Vital Signs     Pre  SpO2 (%) 95  -KJ       O2 Delivery Pre Treatment hi-flow  -KJ       Intra SpO2 (%) 85  -KJ       O2 Delivery Intra Treatment hi-flow  -KJ       Post SpO2 (%) 92  -KJ       O2 Delivery Post Treatment hi-flow  -KJ          Row Name 06/13/25 1509                 Positioning and Restraints     Pre-Treatment Position in bed  -KJ       Post Treatment Position bed  -KJ       In Bed side lying left;call light within reach  -KJ                    User Key  (r) = Recorded By, (t) = Taken By, (c) = Cosigned By        Initials Name Provider Type     Gladis Steele, PTA Physical Therapist Assistant                             Physical Therapy Education            Title: PT OT SLP Therapies (Done)         Topic: Physical Therapy (Done)         Point: Mobility training (Done)         Learning Progress Summary             Patient Acceptance, E, VU,NR by  at 6/12/2025 1602     Comment: role of PT, pursed lip bretahing, calming exercise, d/c plannig                            Point: Home exercise program (Done)         Learning Progress Summary             Patient Acceptance, E, VU,NR by  at 6/12/2025 1602     Comment: role of PT, pursed lip bretahing, calming exercise, d/c plannig                            Point: Body mechanics (Done)         Learning Progress Summary             Patient Acceptance, E, VU,NR by  at 6/12/2025 1602     Comment: role of PT, pursed lip bretahing, calming exercise, d/c plannig                            Point: Precautions (Done)         Learning Progress Summary             Patient Acceptance, E, VU,NR by  at 6/12/2025 1602     Comment: role of PT, pursed lip bretahing, calming exercise, d/c plannig                                                User Key         Initials Effective Dates Name Provider Type [facility] 08/15/24 -  Cyril Irby, PT DPT Physical Therapist PT                          PT Recommendation and Plan  Progress: improving  Outcome Evaluation: PT tx completed. Pt  was premedicated with pn meds prior to tx due to back pn. Rates pn still 10/10 upper back. Fabián bed mobility, Fabián sit<>stand. Amb in room utilizing r wx CG. Increased O2 10L hi joanne for activity. Increased time inbetween activities due to SOB.               Time Calculation:     PT Charges         Row Name 06/13/25 1541                       Time Calculation     Start Time 1509  -KJ         Stop Time 1541  -KJ         Time Calculation (min) 32 min  -KJ         PT Received On 06/13/25  -KJ         PT Goal Re-Cert Due Date 06/22/25  -KJ                 Time Calculation- PT     Total Timed Code Minutes- PT 32 minute(s)  -KJ                      User Key  (r) = Recorded By, (t) = Taken By, (c) = Cosigned By        Initials Name Provider Type     Gladis Steele PTA Physical Therapist Assistant                       Therapy Charges for Today         Code Description Service Date Service Provider Modifiers Qty     88313233995 HC GAIT TRAINING EA 15 MIN 6/13/2025 Gladis Frankel, ANDER GP 1     81726026242 HC PT THER PROC EA 15 MIN 6/13/2025 Gladis Frankel PTA GP 1                PT G-Codes  Outcome Measure Options: AM-PAC 6 Clicks Basic Mobility (PT)  AM-PAC 6 Clicks Score (PT): 19  AM-PAC 6 Clicks Score (OT): 17     Gladis Frankel PTA               6/13/2025                                   Gladis Frankel PTA   Physical Therapist Assistant  Physical Therapy     Plan of Care     Signed     Date of Service: 06/13/25 1543  Creation Time: 06/13/25 1543     Signed         Goal Outcome Evaluation:  Plan of Care Reviewed With: patient  Progress: improving  Outcome Evaluation: PT tx completed. Pt was premedicated with pn meds prior to tx due to back pn. Rates pn still 10/10 upper back. Fabián bed mobility, Fabián sit<>stand. Amb in room utilizing r wx CG. Increased O2 10L hi joanne for activity. Increased time inbetween activities due to SOB.

## 2025-06-15 NOTE — PROGRESS NOTES
[facility] PULMONARY PROGRESS NOTE - [facility]    Patient: Elaine Juárez  1960   MR# [id number]   Acct# [id number]  06/15/25   12:11 CDT  Referring Provider: Cristy Lopez MD    Chief Complaint: Acute on chronic respiratory failure  Interval history: Afebrile.  Resting in bed on 6 L nasal cannula with an O2 sat of 91%. She feels her pain/ discomfort is secondary to laying in the hospital bed. She is planing to go to rehab for a couple of weeks at discharge. Continue to taper steroids.  Further recommendations per Dr. Faisal Griffith.  Meds:  arformoterol, 15 mcg, Nebulization, BID - RT  enoxaparin sodium, 40 mg, Subcutaneous, Daily  furosemide, 20 mg, Intravenous, Q12H  hydrOXYzine, 25 mg, Oral, 4x Daily  ipratropium, 0.5 mg, Nebulization, 4x Daily - RT  Lidocaine, 1 patch, Transdermal, Q24H  Macitentan-Tadalafil, 1 tablet, Oral, Nightly  methylPREDNISolone sodium succinate, 40 mg, Intravenous, Q12H  mycophenolate, 1,000 mg, Oral, BID  senna-docusate sodium, 2 tablet, Oral, BID  sodium chloride, 10 mL, Intravenous, Q12H  sodium chloride, 4 mL, Nebulization, BID - RT  traZODone, 100 mg, Oral, Nightly  venlafaxine XR, 75 mg, Oral, Daily With Breakfast  verapamil, 100 mg, Oral, Nightly           Physical Exam:  SpO2 Percentage    06/15/25 0729 06/15/25 0955 06/15/25 1000   SpO2: 98% 91% 96%     Body mass index is 27.65 kg/m².   Temp:  [97.6 °F (36.4 °C)-98.1 °F (36.7 °C)] 97.9 °F (36.6 °C)  Heart Rate:  [] 113  Resp:  [16-18] 16  BP: ()/(56-65) 97/58  Intake/Output Summary (Last 24 hours) at 6/15/2025 1211  Last data filed at 6/15/2025 0830  Gross per 24 hour   Intake 1180 ml   Output 1500 ml   Net -320 ml     Physical Exam  Vitals and nursing note reviewed.   Constitutional:       Appearance: Normal appearance.   HENT:      Head: Normocephalic and atraumatic.   Cardiovascular:      Rate and Rhythm: Normal rate and regular rhythm.   Pulmonary:      Effort: Pulmonary effort is normal.       Breath sounds: Decreased breath sounds present.   Skin:     General: Skin is warm and dry.   Neurological:      General: No focal deficit present.      Mental Status: She is alert and oriented to person, place, and time.   Psychiatric:         Mood and Affect: Mood normal.         Behavior: Behavior normal.         Time spent by me: 5 minutes  Electronically signed by ANASTASIA Gaines, 6/15/2025, 12:11 CDT      Physician Substantive Portion: Medical Decision Making to follow:        Result Review    Laboratory Data:  Results from last 7 days   Lab Units 06/15/25  0320 06/14/25  0415 06/13/25  0438   WBC 10*3/mm3 10.27 10.18 14.80*   HEMOGLOBIN g/dL 12.2 11.7* 12.7   PLATELETS 10*3/mm3 159 148 188     Results from last 7 days   Lab Units 06/15/25  0320 06/14/25  0415 06/13/25  0438 06/11/25  1614 06/11/25  1143   SODIUM mmol/L 134* 135* 136   < > 133*   SODIUM, ARTERIAL   --   --   --    < >  --    POTASSIUM mmol/L 4.5 4.8 4.1   < > 4.3   CHLORIDE mmol/L 94* 97* 102   < > 97*   CO2 mmol/L 31.0* 29.0 20.0*   < > 21.0*   BUN mg/dL 26.6* 29.0* 25.1*   < > 9.8   CREATININE mg/dL 0.80 0.83 0.89   < > 0.73   CALCIUM mg/dL 8.6 8.2* 8.1*   < > 8.8   ALK PHOS U/L  --   --   --   --  179*   ALT (SGPT) U/L  --   --   --   --  11   AST (SGOT) U/L  --   --   --   --  21   LACTATE mmol/L  --   --   --   --  1.9    < > = values in this interval not displayed.         Lab 06/15/25  0320 06/14/25  0415 06/13/25  0438   GLUCOSE 105* 117* 142*     Results from last 7 days   Lab Units 06/11/25  1614 06/11/25  1150   PH, ARTERIAL pH units 7.398 7.438   PCO2, ARTERIAL mm Hg 36.2 35.0   PO2 ART mm Hg 62.6* 68.3*   FIO2 % 50  --          Lab 06/11/25  1259 06/11/25  1143   HSTROP T 17* 19*   INR  --  1.04     Microbiology Results (last 10 days)       Procedure Component Value - Date/Time    Respiratory Culture - Sputum, Cough [[id number]] Collected: 06/12/25 1104    Lab Status: Final result Specimen: Sputum from Cough Updated:  06/12/25 1515     Respiratory Culture Rejected     Gram Stain Few (2+) Epithelial cells per low power field      No WBCs seen    Narrative:      Specimen rejected due to oropharyngeal contamination. Please reorder and recollect specimen if clinically necessary.    S. Pneumo Ag Urine or CSF - Urine, Urine, Clean Catch [[id number]]  (Normal) Collected: 06/12/25 1039    Lab Status: Final result Specimen: Urine, Clean Catch Updated: 06/12/25 1145     Strep Pneumo Ag Negative    Legionella Antigen, Urine - Urine, Urine, Clean Catch [[id number]]  (Normal) Collected: 06/12/25 1039    Lab Status: Final result Specimen: Urine, Clean Catch Updated: 06/12/25 1144     LEGIONELLA ANTIGEN, URINE Negative    Blood Culture - Blood, Arm, Right [[id number]]  (Normal) Collected: 06/11/25 1259    Lab Status: Preliminary result Specimen: Blood from Arm, Right Updated: 06/14/25 1345     Blood Culture No growth at 3 days    Blood Culture - Blood, Arm, Right [[id number]]  (Normal) Collected: 06/11/25 1223    Lab Status: Preliminary result Specimen: Blood from Arm, Right Updated: 06/15/25 1246     Blood Culture No growth at 4 days    Respiratory Panel PCR w/COVID-19(SARS-CoV-2) KARISHMA/LUIS ARMANDO/DENYS/PAD/COR/DAVID In-House, NP Swab in UTM/VTM, 2 HR TAT - Swab, Nasopharynx [[id number]]  (Normal) Collected: 06/11/25 1215    Lab Status: Final result Specimen: Swab from Nasopharynx Updated: 06/11/25 1311     ADENOVIRUS, PCR Not Detected     Coronavirus 229E Not Detected     Coronavirus HKU1 Not Detected     Coronavirus NL63 Not Detected     Coronavirus OC43 Not Detected     COVID19 Not Detected     Human Metapneumovirus Not Detected     Human Rhinovirus/Enterovirus Not Detected     Influenza A PCR Not Detected     Influenza B PCR Not Detected     Parainfluenza Virus 1 Not Detected     Parainfluenza Virus 2 Not Detected     Parainfluenza Virus 3 Not Detected     Parainfluenza Virus 4 Not Detected     RSV, PCR Not Detected     Bordetella pertussis pcr Not Detected      Bordetella parapertussis PCR Not Detected     Chlamydophila pneumoniae PCR Not Detected     Mycoplasma pneumo by PCR Not Detected    Narrative:      In the setting of a positive respiratory panel with a viral infection PLUS a negative procalcitonin without other underlying concern for bacterial infection, consider observing off antibiotics or discontinuation of antibiotics and continue supportive care. If the respiratory panel is positive for atypical bacterial infection (Bordetella pertussis, Chlamydophila pneumoniae, or Mycoplasma pneumoniae), consider antibiotic de-escalation to target atypical bacterial infection.           Recent films:  CT Lumbar Spine Without Contrast  Result Date: 6/13/2025  CT LUMBAR SPINE WO CONTRAST-  HISTORY: severe pain on right; J96.21-Acute and chronic respiratory failure with hypoxia; M54.9-Dorsalgia, unspecified; E87.0-Vbko-zejdiggpnl and hyponatremia; J18.9-Pneumonia, unspecified organism; Z74.09-Other reduced mobility  COMPARISON: None  TOTAL DOSE LENGTH PRODUCT: 361.51 mGy.cm. Automated exposure control was also utilized to decrease patient radiation dose.  TECHNIQUE: Axial images of the lumbar spine are obtained with sagittal coronal reconstructions  FINDINGS: Slight left convexity of the lumbar curvature. Mild anterolisthesis of L4 over L5 measuring 5 mm. No compression deformity or pars defect. No lumbar vertebral fracture. Advanced L4/5 and left L5-S1 facet osteoarthropathy with moderate degenerative facet change at remaining levels. Arthritic changes of the sacroiliac joints.  At L1/2, no central canal or foraminal stenosis.  At L2/3, minimal broad-based disc bulging. No central canal or foraminal stenosis.  At L3/4, mild broad-based disc bulging with moderate facet arthropathy. Mild central canal stenosis with no prominent foraminal stenosis.  At L4/5, advanced facet arthropathy contributing to the grade 1 spondylolisthesis. Minimal broad-based disc bulging. Mild  central canal stenosis with no significant foraminal narrowing.  At L5-S1, moderate advanced left with moderate right facet osteoarthropathy. Minimal disc bulging. No central canal or foraminal stenosis.  Nonobstructing intrarenal stones measuring 5 mm or less. No regional ureteral stones or hydronephrosis identified. Moderate stool within the visible colon.      Impression:  1. Grade 1 spondylolisthesis at L4/5 secondary to advanced facet osteoarthropathy. Degenerative changes create mild central lumbar canal stenosis without prominent foraminal narrowing. No acute lumbar vertebral pathology. 2. Moderate stool of the visible rectosigmoid colon for which constipation considered. 3. Nonobstructing bilateral intrarenal stones with no hydronephrosis.  This report was signed and finalized on 6/13/2025 4:00 PM by Dr. Keke Bangura MD.             Pulmonary Assessment:  Acute, chronic resp failure with hypoxia  Crest    Recommend/plan:   Continue oxygen.  Currently needing 6 lpm. If transferring to rehab facility, need to make sure such facility is equipped to supply oxygen to meet her needs  Continue iv steroids for now.  Transition to po and taper on discharge    Time spent by me:  9 minutes  This visit was performed by both a physician and an Advanced Practice RN.  I performed all aspects of the medical decision making as documented.  Electronically signed by Faisal Griffith MD, 6/15/2025, 12:46 CDT

## 2025-06-15 NOTE — PLAN OF CARE
Goal Outcome Evaluation:           Progress: no change  Outcome Evaluation: A&Ox4. 6L humidified, high-flow NC. Up with assistance to BSC with walker. Purewick in place, changed this shift. Family at bedside at times. C/o pain, prn meds given with relief. Call light within reach.

## 2025-06-15 NOTE — PROGRESS NOTES
[facility] Medicine Services  INPATIENT PROGRESS NOTE    Patient Name: Elaine Juárez  Date of Admission: 6/11/2025  Today's Date: 06/15/25  Length of Stay: 3  Primary Care Physician: Aaron Stoddard MD    Subjective   Chief Complaint: Shortness of breath back pain abdominal pain     Shortness of Breath  Back Pain     Patient is a 65-year-old white female with past medical history of oxygen dependent pulmonary fibrosis bronchiectasis show COPD on 6 to 7 L history of depression anxiety history of bilateral kidney stones history of sleep apnea presents to the emergency room with worsening shortness of breath and back pain.  Patient states she has right flank pain and has been getting worse over the last 3 to 4 days.  She states that her urine has also been dark.   She is also been more short of breath today and called EMS for her right flank and back pain but noted that her oxygen was in the 70s while upon arrival and tachycardic.  She refused a DuoNeb by EMS en route.    In ER chest x-ray was showing multifocal pneumonia possible pulmonary edema , CT abdomen pelvis was done for abdominal pain was showing constipation bilateral kidney stones the patient was placed on Vapotherm blood cultures were obtained started on steroid breathing treatment and Sandi NIETO discussed the case with intensivist and with the nurse practitioner intensivist Nba  who felt that the patient could be admitted to the floor and does not need to be in the unit  Will admit continue steroid breathing treatment antibiotics we will start her on IV Lasix order echo of the heart we will consult with pulmonary I's and O's and daily weights Lovenox for DVT prophylaxis identified reconcile restart home medications once reconciled  6/12  As before presented  with above remains on antibiotics steroid breathing treatment started Lasix echo of the heart is pending pulmonary has been consulted continue to titrate down  her oxygen blood cultures remain unremarkable, vest therapy, last echo of the heart was showing EF to be 60 to 65% repeat echo is pending  6/13  Patient lying in bed talking to .  She continues to have high flow oxygen currently on 9 L.  She is extremely anxious.  Nurse notified Dr. Khan of concerns for atrial fibrillation, right 140s.  Patient not on monitor.  EKG reveals what I suspect is normal sinus rhythm with PAC and PVCs.  EP has been consulted.  Patient states she received a breathing treatment prior to episode.  I did discuss with her discontinuation of DuoNebs and changing nebulizer treatment to hopefully decrease the potential for recurrent tachycardia after treatment.  She is complaining of severe back pain mid lumbar/sacral region on the right radiating around to abdomen down the back of her leg and up to her arm.  Patient states she vomited up all her morning meds and is somewhat frantic that she did not receive Effexor.  I did order a repeat one-time dose.  This did seem to pacify her.  Patient states her back has been hurting for 3 months after she felt a pop and she feels no one is addressing this.  I have ordered a CT of the lumbar spine to assure there are no injuries.  Patient is agreeable to scheduled hydroxyzine to help with her anxiety, sleeplessness and it will not affect her respiratory status.  Will continue to monitor closely.  6/14  Patient resting in bed.  Daughters are at bedside.  Reviewed CT of the lumbar spine results, arthritis.  Patient is.  She reports Lidoderm pain patch has helped tremendously however she then states her pain is 8 on a scale of 1-10.  Patient vacillates regarding symptom reporting.  This is not a new finding.  I did broach the subject of discharge tomorrow as respiratory status is at baseline.  She became anxious and stated she was not ready to go home yet.  And I did review Cleo to her that unfortunately she could not stay inpatient for rehab to  assist with her pain.  She declines home health rehab.  After discussion she is agreeable to inpatient rehab placement.  Again, feel patient is high risk for changing her mind regarding placement.  Respiratory status significantly improved.  Currently on 8 L high flow, at baseline.  I will consult .    6/15  Patient resting in bedside recliner. She states it is more comfortable than the bed.  Currently on 6 L high flow oxygen with saturation of 94%.  Discussed with Dr. Griffith, continuing to taper steroids.  Patient continues to be agreeable to rehab placement, will need facility that will accept high flow oxygen therapy.  Today she is complaining of possible constipation.  She continues to complain of pain 8 on a scale of 1-10 despite stating that she is better.  She has been complaining of a score of 8 almost daily.  Daily discussions are lengthy, patient does have flight of ideas-chronic.  I feel patient's condition is at baseline.    Review of Systems   All other systems reviewed and are negative.     All pertinent negatives and positives are as above. All other systems have been reviewed and are negative unless otherwise stated.     Objective    Temp:  [97.6 °F (36.4 °C)-98.1 °F (36.7 °C)] 97.9 °F (36.6 °C)  Heart Rate:  [] 113  Resp:  [16-18] 16  BP: ()/(56-65) 97/58  Physical Exam  Constitutional:       Appearance: She is ill-appearing.   HENT:      Head: Normocephalic and atraumatic.      Mouth/Throat:      Mouth: Mucous membranes are moist.      Pharynx: Oropharynx is clear.   Eyes:      Extraocular Movements: Extraocular movements intact.      Pupils: Pupils are equal, round, and reactive to light.   Cardiovascular:      Rate and Rhythm: Tachycardia present.      Comments: Sinus bradycardia 51 to sinus tachycardia 130's, at time of assessment 130 with PACs.  Pulmonary:      Effort: No respiratory distress.      Breath sounds: No wheezing.   Abdominal:      General: There is no  "distension.      Palpations: Abdomen is soft.   Musculoskeletal:      Cervical back: Normal range of motion and neck supple.      Right lower leg: No edema.      Left lower leg: No edema.      Comments: Generalized weakness and debility   Skin:     General: Skin is warm and dry.      Capillary Refill: Capillary refill takes less than 2 seconds.   Neurological:      General: No focal deficit present.      Mental Status: She is alert and oriented to person, place, and time.   Psychiatric:         Mood and Affect: Affect normal. Mood is anxious.       Results Review:  I have reviewed the labs, radiology results, and diagnostic studies.    Laboratory Data:   Results from last 7 days   Lab Units 06/15/25  0320 06/14/25 0415 06/13/25  0438   WBC 10*3/mm3 10.27 10.18 14.80*   HEMOGLOBIN g/dL 12.2 11.7* 12.7   HEMATOCRIT % 40.0 37.8 41.4   PLATELETS 10*3/mm3 159 148 188        Results from last 7 days   Lab Units 06/15/25  0320 06/14/25  0415 06/13/25  0438 06/11/25  1614 06/11/25  1143   SODIUM mmol/L 134* 135* 136   < > 133*   SODIUM, ARTERIAL   --   --   --    < >  --    POTASSIUM mmol/L 4.5 4.8 4.1   < > 4.3   CHLORIDE mmol/L 94* 97* 102   < > 97*   CO2 mmol/L 31.0* 29.0 20.0*   < > 21.0*   BUN mg/dL 26.6* 29.0* 25.1*   < > 9.8   CREATININE mg/dL 0.80 0.83 0.89   < > 0.73   CALCIUM mg/dL 8.6 8.2* 8.1*   < > 8.8   BILIRUBIN mg/dL  --   --   --   --  0.4   ALK PHOS U/L  --   --   --   --  179*   ALT (SGPT) U/L  --   --   --   --  11   AST (SGOT) U/L  --   --   --   --  21   GLUCOSE mg/dL 105* 117* 142*   < > 131*    < > = values in this interval not displayed.       Culture Data:   No results found for: \"BLOODCX\", \"URINECX\", \"WOUNDCX\", \"MRSACX\", \"RESPCX\", \"STOOLCX\"    Radiology Data:   Imaging Results (Last 24 Hours)       ** No results found for the last 24 hours. **            I have reviewed the patient's current medications.     Assessment/Plan   Assessment  Active Hospital Problems    Diagnosis     Multifocal atrial " tachycardia     Right-sided low back pain with right-sided sciatica     Anxiety about health     Acute on chronic respiratory failure with hypoxia     CREST syndrome, partial         Treatment Plan  1.  Acute on chronic respiratory failure with hypoxia/CREST syndrome; pulmonary managing.  Continue steroid taper, at oxygen goal of 7/8 L high flow sat 90-91%.  Continue OPEP, incentive spirometry, chest physiotherapy, ABGs as needed    2.  Right-sided low back pain with sciatica; CT lumbar spine without contrast discussed results with patient and family, initiated Lidoderm pain patch last evening which has improved pain, as needed pain medication as ordered    3.  Anxiety about health; improving since initiation of hydroxyzine 25 mg 4 times a day.  Today patient is concerned that she is constipated.  We will obtain KUB.  According to results.    4.  Multifocal atrial tachycardia; EP consulted -started verapamil-rate improving significantly, continue Brovana and Atrovent nebulizer therapy, remote telemetry     in ER chest x-ray was showing multifocal pneumonia possible pulmonary edema , CT abdomen pelvis was done for abdominal pain was showing constipation bilateral kidney stones the patient was placed on Vapotherm blood cultures were obtained started on steroid breathing treatment and Sandi NIETO discussed the case with intensivist and with the nurse practitioner intensivist Nba  who felt that the patient could be admitted to the floor and does not need to be in the unit  Will admit continue steroid breathing treatment antibiotics we will start her on IV Lasix order echo of the heart we will consult with pulmonary I's and O's and daily weights Lovenox for DVT prophylaxis identified reconcile restart home medications once reconciled    Medical Decision Making  Number and Complexity of problems: 5  4 problems, acute, high complexity, improving  1 problem, chronic, high complexity, unchanged    Differential Diagnosis: None      Conditions and Status        Condition is unchanged     [facility] Data  External documents reviewed: No  Cardiac tracing (EKG, telemetry) interpretation: Reviewed  Radiology interpretation: Reviewed  Labs reviewed: yes  Any tests that were considered but not ordered: no     Decision rules/scores evaluated (example [id number], Wells, etc): no     Discussed with: Patient, Dr Griffith, and Dr. Lopez     Care Planning  Shared decision making: Patient, daughters and Dr. Lopez   Code status and discussions: Full  Surrogate Decision Maker daughters    Disposition  Social Determinants of Health that impact treatment or disposition: Has agreed to rehab placement  I expect the patient to be discharged to to be determined TBD in 2+ days.     Electronically signed by ANASTASIA Silveira, 06/15/25, 12:05 CDT.

## 2025-06-15 NOTE — CASE MANAGEMENT/SOCIAL WORK
Continued Stay Note   Javed     Patient Name: Elaine Juárez  MRN: [id number]  Today's Date: 6/15/2025    Admit Date: 6/11/2025    Plan: SNF   Discharge Plan       Row Name 06/15/25 0927       Plan    Plan SNF    Patient/Family in Agreement with Plan yes    Provided Post Acute Provider List? Yes    Post Acute Provider List Nursing Home    Provided Post Acute Provider Quality & Resource List? Yes    Post Acute Provider Quality and Resource List Nursing Home    Delivered To Patient    Method of Delivery In person    Plan Comments Spoke with pt to see if she is interested in SNF. With options/rankings she would like to look at possible admit to either [facility] or [facility]. Will list and follow for decision.                   Discharge Codes    No documentation.                       BRYN AlvaradoW

## 2025-06-16 LAB
BACTERIA SPEC AEROBE CULT: NORMAL
BACTERIA SPEC AEROBE CULT: NORMAL

## 2025-06-16 PROCEDURE — 25010000002 FUROSEMIDE PER 20 MG: Performed by: HOSPITALIST

## 2025-06-16 PROCEDURE — 25010000002 ENOXAPARIN PER 10 MG: Performed by: HOSPITALIST

## 2025-06-16 PROCEDURE — 94664 DEMO&/EVAL PT USE INHALER: CPT

## 2025-06-16 PROCEDURE — 93010 ELECTROCARDIOGRAM REPORT: CPT | Performed by: HOSPITALIST

## 2025-06-16 PROCEDURE — 94799 UNLISTED PULMONARY SVC/PX: CPT

## 2025-06-16 PROCEDURE — 97530 THERAPEUTIC ACTIVITIES: CPT

## 2025-06-16 PROCEDURE — 94669 MECHANICAL CHEST WALL OSCILL: CPT

## 2025-06-16 PROCEDURE — 63710000001 MYCOPHENOLATE MOFETIL PER 250 MG: Performed by: NURSE PRACTITIONER

## 2025-06-16 PROCEDURE — 25010000002 METHYLPREDNISOLONE PER 40 MG: Performed by: NURSE PRACTITIONER

## 2025-06-16 PROCEDURE — 25010000002 ONDANSETRON PER 1 MG: Performed by: HOSPITALIST

## 2025-06-16 PROCEDURE — 93005 ELECTROCARDIOGRAM TRACING: CPT | Performed by: HOSPITALIST

## 2025-06-16 PROCEDURE — 99232 SBSQ HOSP IP/OBS MODERATE 35: CPT | Performed by: INTERNAL MEDICINE

## 2025-06-16 PROCEDURE — 97116 GAIT TRAINING THERAPY: CPT

## 2025-06-16 RX ORDER — POLYETHYLENE GLYCOL 3350 17 G/17G
17 POWDER, FOR SOLUTION ORAL DAILY PRN
Status: DISCONTINUED | OUTPATIENT
Start: 2025-06-16 | End: 2025-06-17 | Stop reason: HOSPADM

## 2025-06-16 RX ORDER — BISACODYL 10 MG
10 SUPPOSITORY, RECTAL RECTAL DAILY PRN
Status: DISCONTINUED | OUTPATIENT
Start: 2025-06-16 | End: 2025-06-17 | Stop reason: HOSPADM

## 2025-06-16 RX ORDER — BISACODYL 5 MG/1
5 TABLET, DELAYED RELEASE ORAL DAILY
Status: DISCONTINUED | OUTPATIENT
Start: 2025-06-16 | End: 2025-06-17 | Stop reason: HOSPADM

## 2025-06-16 RX ORDER — AMOXICILLIN 250 MG
2 CAPSULE ORAL 2 TIMES DAILY
Status: DISCONTINUED | OUTPATIENT
Start: 2025-06-16 | End: 2025-06-17 | Stop reason: HOSPADM

## 2025-06-16 RX ADMIN — ARFORMOTEROL TARTRATE 15 MCG: 15 SOLUTION RESPIRATORY (INHALATION) at 06:22

## 2025-06-16 RX ADMIN — HYDROXYZINE HYDROCHLORIDE 25 MG: 25 TABLET ORAL at 08:14

## 2025-06-16 RX ADMIN — Medication 4 ML: at 20:16

## 2025-06-16 RX ADMIN — Medication 4 ML: at 10:11

## 2025-06-16 RX ADMIN — Medication 10 ML: at 20:19

## 2025-06-16 RX ADMIN — IPRATROPIUM BROMIDE 0.5 MG: 0.5 SOLUTION RESPIRATORY (INHALATION) at 13:46

## 2025-06-16 RX ADMIN — BISACODYL 5 MG: 5 TABLET, COATED ORAL at 15:09

## 2025-06-16 RX ADMIN — ENOXAPARIN SODIUM 40 MG: 100 INJECTION SUBCUTANEOUS at 18:02

## 2025-06-16 RX ADMIN — METHYLPREDNISOLONE SODIUM SUCCINATE 20 MG: 40 INJECTION, POWDER, FOR SOLUTION INTRAMUSCULAR; INTRAVENOUS at 03:03

## 2025-06-16 RX ADMIN — HYDROXYZINE HYDROCHLORIDE 25 MG: 25 TABLET ORAL at 20:18

## 2025-06-16 RX ADMIN — ARFORMOTEROL TARTRATE 15 MCG: 15 SOLUTION RESPIRATORY (INHALATION) at 20:21

## 2025-06-16 RX ADMIN — VENLAFAXINE HYDROCHLORIDE 75 MG: 75 CAPSULE, EXTENDED RELEASE ORAL at 08:14

## 2025-06-16 RX ADMIN — IPRATROPIUM BROMIDE 0.5 MG: 0.5 SOLUTION RESPIRATORY (INHALATION) at 06:22

## 2025-06-16 RX ADMIN — TIZANIDINE 4 MG: 4 TABLET ORAL at 23:48

## 2025-06-16 RX ADMIN — MYCOPHENOLATE MOFETIL 1000 MG: 500 TABLET, FILM COATED ORAL at 20:18

## 2025-06-16 RX ADMIN — HYDROCODONE BITARTRATE AND ACETAMINOPHEN 2 TABLET: 5; 325 TABLET ORAL at 08:31

## 2025-06-16 RX ADMIN — MYCOPHENOLATE MOFETIL 1000 MG: 500 TABLET, FILM COATED ORAL at 08:14

## 2025-06-16 RX ADMIN — ALPRAZOLAM 0.5 MG: 0.5 TABLET ORAL at 12:34

## 2025-06-16 RX ADMIN — LIDOCAINE 1 PATCH: 4 PATCH TOPICAL at 08:15

## 2025-06-16 RX ADMIN — HYDROXYZINE HYDROCHLORIDE 25 MG: 25 TABLET ORAL at 18:02

## 2025-06-16 RX ADMIN — HYDROXYZINE HYDROCHLORIDE 25 MG: 25 TABLET ORAL at 11:25

## 2025-06-16 RX ADMIN — DOCUSATE SODIUM 50 MG AND SENNOSIDES 8.6 MG 2 TABLET: 8.6; 5 TABLET, FILM COATED ORAL at 20:18

## 2025-06-16 RX ADMIN — ONDANSETRON 4 MG: 2 INJECTION INTRAMUSCULAR; INTRAVENOUS at 07:57

## 2025-06-16 RX ADMIN — VERAPAMIL HYDROCHLORIDE 100 MG: 100 CAPSULE, EXTENDED RELEASE ORAL at 20:18

## 2025-06-16 RX ADMIN — FUROSEMIDE 20 MG: 10 INJECTION, SOLUTION INTRAMUSCULAR; INTRAVENOUS at 08:14

## 2025-06-16 RX ADMIN — TRAZODONE HYDROCHLORIDE 100 MG: 100 TABLET ORAL at 20:18

## 2025-06-16 RX ADMIN — METHYLPREDNISOLONE SODIUM SUCCINATE 20 MG: 40 INJECTION, POWDER, FOR SOLUTION INTRAMUSCULAR; INTRAVENOUS at 15:09

## 2025-06-16 RX ADMIN — DOCUSATE SODIUM 50 MG AND SENNOSIDES 8.6 MG 2 TABLET: 8.6; 5 TABLET, FILM COATED ORAL at 08:14

## 2025-06-16 RX ADMIN — ONDANSETRON 4 MG: 2 INJECTION INTRAMUSCULAR; INTRAVENOUS at 18:08

## 2025-06-16 RX ADMIN — IPRATROPIUM BROMIDE 0.5 MG: 0.5 SOLUTION RESPIRATORY (INHALATION) at 10:11

## 2025-06-16 RX ADMIN — Medication 10 ML: at 08:15

## 2025-06-16 RX ADMIN — IPRATROPIUM BROMIDE 0.5 MG: 0.5 SOLUTION RESPIRATORY (INHALATION) at 20:10

## 2025-06-16 RX ADMIN — ONDANSETRON 4 MG: 2 INJECTION INTRAMUSCULAR; INTRAVENOUS at 23:48

## 2025-06-16 NOTE — PLAN OF CARE
Goal Outcome Evaluation:  Plan of Care Reviewed With: patient, family           Outcome Evaluation: Pt aox4. Ill appearing. 6L o2. Anticipating DC to [place] tomorrow. Up SBA. Purewick. Pain and anxiety managed with PRN medications. Safety maintained.

## 2025-06-16 NOTE — PLAN OF CARE
Goal Outcome Evaluation:  Plan of Care Reviewed With: patient        Progress: no change  Outcome Evaluation: Pt agreed to therapy, pt on 6L hi flow at rest sat 95%, up to 10L for activity, BLE AROM, sit-stand cga, pt amb 5 feet to BSC with rwx cga, pt with increased anxiety due to feeling SOA, pt requested non-rebreather, switched to non-rebreather 10L, took several minutes for pt recover, sat were in mid-hi 90's but pt still felt SOA, pt amb 10 feet in room with rwx cga, trans to chair cga, HR did get up to 160's with activity, once pt recovered put back on 6L hiflow N/C sat 95% at rest. pt would benefit from rehab

## 2025-06-16 NOTE — PROGRESS NOTES
Inpatient Progress Note       Demographics    NAME: Elaine Juárez  MRN: [id number]  AGE: 65 y.o.            : 1960  ADMISSION DATE: 2025  PRIMARY CARE PROVIDER: Aaron Stoddard MD  ATTENDING PHYSICIAN: Cristy Lopez MD       Assessment and Plan    Problem List:  Acute on chronic hypoxic respiratory failure  CREST syndrome  Pulmonary fibrosis  Pulmonary hypertension  Concern for pneumonia  Bronchiectasis    Recommendations:   - Continue supplemental oxygen as needed and wean as tolerated, goal O2 sat of 88-92%  - O2 requirements have improved  - Continue vest physiotherapy  - Continue pulmonary hypertension management, upcoming appointment in [place]  - Will arrange follow-up at the pulmonary clinic    Patient's respiratory status and O2 requirements have steadily improved.  Plans underway for discharge to rehab.  Will need to ensure the rehab facility is able to provide adequate oxygen for her needs.  Will arrange pulmonary clinic follow-up on discharge.  Thank you for allowing us to participate in this patient's care.  Pulmonology will sign off at this time, please feel free to contact us if we can be of further assistance.       HPI    Reason for Consult:  Acute on chronic hypoxic respiratory failure    Interval History:    No acute events overnight, patient resting in bed breathing comfortably on 6 L.  O2 requirements steadily improving.    Review of Systems:  A full 12-point review of systems was performed and was negative except as documented in the HPI.       Objective    Physical Exam:  General: No acute distress, cooperative  Head: Atraumatic, normocephalic, normal inspection  Eyes: EOMI, normal inspection  ENT: Mucous membranes moist, no thrush  Teeth/gums: Normal inspection  Heart: RRR, no murmur  Lungs: Diminished with mild crackles  MSK: Normal inspection  GI/abdominal: Soft, nontender  Neurologic: Alert, oriented.  Cranial nerves II through XII grossly intact.    Imaging Review:  "  No new imaging for review.         Results Review:  Results from last 7 days   Lab Units 06/15/25  0320 06/14/25  0415 06/13/25  0438   SODIUM mmol/L 134* 135* 136   POTASSIUM mmol/L 4.5 4.8 4.1   CHLORIDE mmol/L 94* 97* 102   CO2 mmol/L 31.0* 29.0 20.0*   BUN mg/dL 26.6* 29.0* 25.1*   CALCIUM mg/dL 8.6 8.2* 8.1*     Results from last 7 days   Lab Units 06/15/25  0320 06/14/25  0415 06/13/25  0438   WBC 10*3/mm3 10.27 10.18 14.80*   HEMOGLOBIN g/dL 12.2 11.7* 12.7   HEMATOCRIT % 40.0 37.8 41.4   PLATELETS 10*3/mm3 159 148 188     Visit Vitals  /80 (BP Location: Right arm, Patient Position: Sitting)   Pulse (!) 128   Temp 97.4 °F (36.3 °C) (Axillary)   Resp 20   Ht 160 cm (63\")   Wt 70.8 kg (156 lb 1.6 oz)   SpO2 94%   BMI 27.65 kg/m²       Medications:  arformoterol, 15 mcg, Nebulization, BID - RT  enoxaparin sodium, 40 mg, Subcutaneous, Daily  furosemide, 20 mg, Intravenous, Q12H  hydrOXYzine, 25 mg, Oral, 4x Daily  ipratropium, 0.5 mg, Nebulization, 4x Daily - RT  Lidocaine, 1 patch, Transdermal, Q24H  Macitentan-Tadalafil, 1 tablet, Oral, Nightly  methylPREDNISolone sodium succinate, 20 mg, Intravenous, Q12H  mycophenolate, 1,000 mg, Oral, BID  senna-docusate sodium, 2 tablet, Oral, BID  sodium chloride, 10 mL, Intravenous, Q12H  sodium chloride, 4 mL, Nebulization, BID - RT  traZODone, 100 mg, Oral, Nightly  venlafaxine XR, 75 mg, Oral, Daily With Breakfast  verapamil, 100 mg, Oral, Nightly             Alicia Keane DO  Pulmonary/Critical Care  6/16/2025  10:48 CDT  "

## 2025-06-16 NOTE — THERAPY TREATMENT NOTE
Acute Care - Physical Therapy Treatment Note  [facility]     Patient Name: Elaine Juárez  : 1960  MRN: [id number]  Today's Date: 2025      Visit Dx:     ICD-10-CM ICD-9-CM   1. Acute on chronic hypoxic respiratory failure  J96.21 518.84     799.02   2. Intractable back pain  M54.9 724.5   3. Hyponatremia  E87.1 276.1   4. Multifocal pneumonia  J18.9 486   5. Impaired mobility [Z74.09]  Z74.09 799.89     Patient Active Problem List   Diagnosis    CREST syndrome, partial     Raynaud's phenomenon    Pulmonary fibrosis prob sec underlying autoimmune disease    Gastroesophageal reflux disease without esophagitis    BMI 31.0-31.9,adult    History of adenomatous polyp of colon    Environmental allergies    PAH (pulmonary arterial hypertension) with portal hypertension    Hypokalemia    Panic attack    Paranoia (psychosis)    H/O noncompliance with medical treatment, presenting hazards to health    Bipolar affective disorder, currently manic, moderate    Obesity (BMI 30-39.9)    Esophageal dysmotility    Oropharyngeal dysphagia    Right ventricular systolic dysfunction    Respiratory failure with hypoxia    Metabolic encephalopathy    Generalized weakness    Respiratory distress    Acute on chronic respiratory failure with hypoxia    Right-sided low back pain with right-sided sciatica    Anxiety about health    Multifocal atrial tachycardia     Past Medical History:   Diagnosis Date    Allergies     Arthritis     BMI 31.0-31.9,adult 2019    Calcified granuloma of lung 2019    Right lung    CHF (congestive heart failure)     denies    Chronic idiopathic fibrosing alveolitis     Chronic respiratory failure with hypoxia 2020    Chronic respiratory failure with hypoxia, on home oxygen therapy     COVID-19 2022    CREST syndrome     Environmental allergies 2022    Gastroesophageal reflux disease without esophagitis 2019    Interstitial lung disease     Obstructive sleep apnea  on CPAP 06/04/2019    Oropharyngeal dysphagia 01/26/2023    Primary central sleep apnea     Pulmonary fibrosis     Pulmonary hypertension     Rheumatoid arthritis     Right ventricular systolic dysfunction 05/04/2023    Short-term memory loss      Past Surgical History:   Procedure Laterality Date    BREAST BIOPSY      CARDIAC CATHETERIZATION N/A 07/22/2020    Procedure: Right Heart Cath;  Surgeon: Thong Martin MD;  Location: [facility] CATH INVASIVE LOCATION;  Service: Cardiology;  Laterality: N/A;    CHOLECYSTECTOMY      COLONOSCOPY  05/11/2015    5 POLYPS    COLONOSCOPY N/A 08/04/2021    Procedure: COLONOSCOPY WITH ANESTHESIA;  Surgeon: Michael Landin DO;  Location: [facility] ENDOSCOPY;  Service: Gastroenterology;  Laterality: N/A;  pre-hx polyps  post-colon polyps    ENDOSCOPY N/A 08/04/2021    Procedure: ESOPHAGOGASTRODUODENOSCOPY WITH ANESTHESIA;  Surgeon: Michael Landin DO;  Location: [facility] ENDOSCOPY;  Service: Gastroenterology;  Laterality: N/A;  pre-dysphagia  post-normal  pcp-lanette aguila     PT Assessment (Last 12 Hours)       PT Evaluation and Treatment       Row Name 06/16/25 0821          Physical Therapy Time and Intention    Subjective Information complains of;weakness;fatigue;dyspnea  -     Document Type therapy note (daily note)  -     Mode of Treatment physical therapy  -     Comment increased anxiety when SOA, HR up to  160's with activity  -       Row Name 06/16/25 0821          General Information    Existing Precautions/Restrictions fall;oxygen therapy device and L/min  10L hi fl for activity  -     Barriers to Rehab medically complex  -       Row Name 06/16/25 0821          Pain    Pretreatment Pain Rating 5/10  -     Posttreatment Pain Rating 5/10  -     Pain Location back  -     Pain Side/Orientation lower  -     Pain Management Interventions premedicated for activity  -     Response to Pain Interventions activity participation with tolerable pain  -       Row  Name 06/16/25 0821          Transfers    Transfers toilet transfer  -[facility] Name 06/16/25 0821          Bed-Chair Transfer    Bed-Chair Coffee (Transfers) verbal cues;minimum assist (75% patient effort)  -     Assistive Device (Bed-Chair Transfers) other (see comments)  HHA  -       Row Name 06/16/25 0821          Sit-Stand Transfer    Sit-Stand Coffee (Transfers) verbal cues;contact guard  -       Row Name 06/16/25 0821          Stand-Sit Transfer    Stand-Sit Coffee (Transfers) contact guard;verbal cues  -[facility] Name 06/16/25 0821          Toilet Transfer    [place] Level (Toilet Transfer) contact guard;verbal cues  -     Assistive Device (Toilet Transfer) commode, 3-in-1;walker, front-wheeled  -[facility] Name 06/16/25 0821          Gait/Stairs (Locomotion)    [place] (Gait) contact guard  -     Assistive Device (Gait) walker, front-wheeled  -     Distance in Feet (Gait) 5  5 feet to BSC, then 10 feet in room  -[facility] Name 06/16/25 0821          Motor Skills    Comments, Therapeutic Exercise BLE AROM  -     Additional Documentation Comments, Therapeutic Exercise ([facility])  -[facility] Name 06/16/25 0821          Plan of Care Review    Plan of Care Reviewed With patient  -     Progress no change  -     Outcome Evaluation Pt agreed to therapy, pt on 6L hi flow at rest sat 95%, up to 10L for activity, BLE AROM, sit-stand cga, pt amb 5 feet to BSC with rwx cga, pt with increased anxiety due to feeling SOA, pt requested non-rebreather, switched to non-rebreather 10L, took several minutes for pt recover, sat were in mid-hi 90's but pt still felt SOA, pt amb 10 feet in room with rwx cga, trans to chair cga, HR did get up to 160's with activity, once pt recovered put back on 6L hiflow N/C sat 95% at rest. pt would benefit from rehab  -[facility] Name 06/16/25 0821          Positioning and Restraints    Pre-Treatment Position sitting in chair/recliner   -     Post Treatment Position chair  -     In Chair notified nsg;reclined;call light within reach;encouraged to call for assist;waffle cushion  -               User Key  (r) = Recorded By, (t) = Taken By, (c) = Cosigned By      Initials Name Provider Type     Caroline Bell, PTA Physical Therapist Assistant                    Physical Therapy Education       Title: PT OT SLP Therapies (Done)       Topic: Physical Therapy (Done)       Point: Mobility training (Done)       Learning Progress Summary            Patient Acceptance, E, VU,NR by  at 6/12/2025 1602    Comment: role of PT, pursed lip bretahing, calming exercise, d/c plannig                      Point: Home exercise program (Done)       Learning Progress Summary            Patient Acceptance, E, VU,NR by  at 6/12/2025 1602    Comment: role of PT, pursed lip bretahing, calming exercise, d/c plannig                      Point: Body mechanics (Done)       Learning Progress Summary            Patient Acceptance, E, VU,NR by  at 6/12/2025 1602    Comment: role of PT, pursed lip bretahing, calming exercise, d/c plannig                      Point: Precautions (Done)       Learning Progress Summary            Patient Acceptance, E, VU,NR by  at 6/12/2025 1602    Comment: role of PT, pursed lip bretahing, calming exercise, d/c plannig                                      User Key       Initials Effective Dates Name Provider Type [facility] 08/15/24 -  Cyril Irby, PT DPT Physical Therapist PT                  PT Recommendation and Plan     Plan of Care Reviewed With: patient  Progress: no change  Outcome Evaluation: Pt agreed to therapy, pt on 6L hi flow at rest sat 95%, up to 10L for activity, BLE AROM, sit-stand cga, pt amb 5 feet to [facility] with rwx cga, pt with increased anxiety due to feeling SOA, pt requested non-rebreather, switched to non-rebreather 10L, took several minutes for pt recover, sat were in mid-hi 90's but pt still felt SOA,  pt amb 10 feet in room with rwx cga, trans to chair cga, HR did get up to 160's with activity, once pt recovered put back on 6L hiflow N/C sat 95% at rest. pt would benefit from rehab   Outcome Measures       Row Name 06/16/25 0900             How much help from another person do you currently need...    Turning from your back to your side while in flat bed without using bedrails? 4  -AH      Moving from lying on back to sitting on the side of a flat bed without bedrails? 4  -AH      Moving to and from a bed to a chair (including a wheelchair)? 3  -AH      Standing up from a chair using your arms (e.g., wheelchair, bedside chair)? 3  -AH      Climbing 3-5 steps with a railing? 2  -AH      To walk in hospital room? 3  -AH      AM-PAC 6 Clicks Score (PT) 19  -AH         Functional Assessment    Outcome Measure Options AM-PAC 6 Clicks Basic Mobility (PT)  -AH                User Key  (r) = Recorded By, (t) = Taken By, (c) = Cosigned By      Initials Name Provider Type    Caroline Ayers PTA Physical Therapist Assistant                     Time Calculation:    PT Charges       Row Name 06/16/25 0922             Time Calculation    Start Time 0821  -      Stop Time 0914  -      Time Calculation (min) 53 min  -      PT Received On 06/16/25  -         Time Calculation- PT    Total Timed Code Minutes- PT 53 minute(s)  -         Timed Charges    07039 - Gait Training Minutes  23  -AH      96420 - PT Therapeutic Activity Minutes 30  -AH         Total Minutes    Timed Charges Total Minutes 53  -AH       Total Minutes 53  -AH                User Key  (r) = Recorded By, (t) = Taken By, (c) = Cosigned By      Initials Name Provider Type    Caroline Ayers PTA Physical Therapist Assistant                  Therapy Charges for Today       Code Description Service Date Service Provider Modifiers Qty    96788464833 HC GAIT TRAINING EA 15 MIN 6/16/2025 Caroline Bell PTA GP 2    [id number] HC PT THERAPEUTIC ACT EA  15 MIN 6/16/2025 Caroline Bell, PTA GP 2            PT G-Codes  Outcome Measure Options: AM-PAC 6 Clicks Basic Mobility (PT)  AM-PAC 6 Clicks Score (PT): 19  AM-PAC 6 Clicks Score (OT): 17    Caroline Bell PTA  6/16/2025

## 2025-06-16 NOTE — PROGRESS NOTES
[facility] Medicine Services  INPATIENT PROGRESS NOTE    Patient Name: Elaine Juárez  Date of Admission: 6/11/2025  Today's Date: 06/16/25  Length of Stay: 4  Primary Care Physician: Aaron Stoddard MD    Subjective   Chief Complaint: Shortness of breath back pain abdominal pain     Shortness of Breath  Back Pain     Patient is a 65-year-old white female with past medical history of oxygen dependent pulmonary fibrosis bronchiectasis show COPD on 6 to 7 L history of depression anxiety history of bilateral kidney stones history of sleep apnea presents to the emergency room with worsening shortness of breath and back pain.  Patient states she has right flank pain and has been getting worse over the last 3 to 4 days.  She states that her urine has also been dark.   She is also been more short of breath today and called EMS for her right flank and back pain but noted that her oxygen was in the 70s while upon arrival and tachycardic.  She refused a DuoNeb by EMS en route.    In ER chest x-ray was showing multifocal pneumonia possible pulmonary edema , CT abdomen pelvis was done for abdominal pain was showing constipation bilateral kidney stones the patient was placed on Vapotherm blood cultures were obtained started on steroid breathing treatment and Sandi NIETO discussed the case with intensivist and with the nurse practitioner intensivist Nba  who felt that the patient could be admitted to the floor and does not need to be in the unit  Will admit continue steroid breathing treatment antibiotics we will start her on IV Lasix order echo of the heart we will consult with pulmonary I's and O's and daily weights Lovenox for DVT prophylaxis identified reconcile restart home medications once reconciled  6/12  As before presented  with above remains on antibiotics steroid breathing treatment started Lasix echo of the heart is pending pulmonary has been consulted continue to titrate down  her oxygen blood cultures remain unremarkable, vest therapy, last echo of the heart was showing EF to be 60 to 65% repeat echo is pending  6/13  Patient lying in bed talking to .  She continues to have high flow oxygen currently on 9 L.  She is extremely anxious.  Nurse notified Dr. Khan of concerns for atrial fibrillation, right 140s.  Patient not on monitor.  EKG reveals what I suspect is normal sinus rhythm with PAC and PVCs.  EP has been consulted.  Patient states she received a breathing treatment prior to episode.  I did discuss with her discontinuation of DuoNebs and changing nebulizer treatment to hopefully decrease the potential for recurrent tachycardia after treatment.  She is complaining of severe back pain mid lumbar/sacral region on the right radiating around to abdomen down the back of her leg and up to her arm.  Patient states she vomited up all her morning meds and is somewhat frantic that she did not receive Effexor.  I did order a repeat one-time dose.  This did seem to pacify her.  Patient states her back has been hurting for 3 months after she felt a pop and she feels no one is addressing this.  I have ordered a CT of the lumbar spine to assure there are no injuries.  Patient is agreeable to scheduled hydroxyzine to help with her anxiety, sleeplessness and it will not affect her respiratory status.  Will continue to monitor closely.  6/14  Patient resting in bed.  Daughters are at bedside.  Reviewed CT of the lumbar spine results, arthritis.  Patient is.  She reports Lidoderm pain patch has helped tremendously however she then states her pain is 8 on a scale of 1-10.  Patient vacillates regarding symptom reporting.  This is not a new finding.  I did broach the subject of discharge tomorrow as respiratory status is at baseline.  She became anxious and stated she was not ready to go home yet.  And I did review Cleo to her that unfortunately she could not stay inpatient for rehab to  assist with her pain.  She declines home health rehab.  After discussion she is agreeable to inpatient rehab placement.  Again, feel patient is high risk for changing her mind regarding placement.  Respiratory status significantly improved.  Currently on 8 L high flow, at baseline.  I will consult .    6/15  Patient resting in bedside recliner. She states it is more comfortable than the bed.  Currently on 6 L high flow oxygen with saturation of 94%.  Discussed with Dr. Griffith, continuing to taper steroids.  Patient continues to be agreeable to rehab placement, will need facility that will accept high flow oxygen therapy.  Today she is complaining of possible constipation.  She continues to complain of pain 8 on a scale of 1-10 despite stating that she is better.  She has been complaining of a score of 8 almost daily.  Daily discussions are lengthy, patient does have flight of ideas-chronic.  I feel patient's condition is at baseline.    6/16: Patient again sitting up in chair.  She has episodes of high right tachycardia that does improve once she has decreased activity or anxiety improves.  Awaiting rehab placement.  This is complicated by need for facility that will accept high flow oxygen.  At rest she is on 6 L however needs 10 L with ambulation/exertion.  She is currently scoring her pain 3 on a scale of 1-10.  Condition is at baseline.    Review of Systems   All other systems reviewed and are negative.     All pertinent negatives and positives are as above. All other systems have been reviewed and are negative unless otherwise stated.     Objective    Temp:  [97.4 °F (36.3 °C)-98.3 °F (36.8 °C)] 97.4 °F (36.3 °C)  Heart Rate:  [] 132  Resp:  [16-20] 18  BP: ()/(52-80) 111/80  Physical Exam  Constitutional:       Appearance: She is ill-appearing.   HENT:      Head: Normocephalic and atraumatic.      Mouth/Throat:      Mouth: Mucous membranes are moist.      Pharynx: Oropharynx is clear.    Eyes:      Extraocular Movements: Extraocular movements intact.      Pupils: Pupils are equal, round, and reactive to light.   Cardiovascular:      Rate and Rhythm: Tachycardia present.      Comments: Sinus65 to sinus tachycardia 124, intermittent episodes right tachycardia with exertion documented 156 this a.m.  Pulmonary:      Effort: No respiratory distress.      Breath sounds: No wheezing.   Abdominal:      General: There is no distension.      Palpations: Abdomen is soft.   Musculoskeletal:      Cervical back: Normal range of motion and neck supple.      Right lower leg: No edema.      Left lower leg: No edema.      Comments: Generalized weakness and debility   Skin:     General: Skin is warm and dry.      Capillary Refill: Capillary refill takes less than 2 seconds.   Neurological:      General: No focal deficit present.      Mental Status: She is alert and oriented to person, place, and time.   Psychiatric:         Mood and Affect: Affect normal. Mood is anxious.       Results Review:  I have reviewed the labs, radiology results, and diagnostic studies.    Laboratory Data:   Results from last 7 days   Lab Units 06/15/25  0320 06/14/25 0415 06/13/25  0438   WBC 10*3/mm3 10.27 10.18 14.80*   HEMOGLOBIN g/dL 12.2 11.7* 12.7   HEMATOCRIT % 40.0 37.8 41.4   PLATELETS 10*3/mm3 159 148 188        Results from last 7 days   Lab Units 06/15/25  0320 06/14/25  0415 06/13/25  0438 06/11/25  1614 06/11/25  1143   SODIUM mmol/L 134* 135* 136   < > 133*   SODIUM, ARTERIAL   --   --   --    < >  --    POTASSIUM mmol/L 4.5 4.8 4.1   < > 4.3   CHLORIDE mmol/L 94* 97* 102   < > 97*   CO2 mmol/L 31.0* 29.0 20.0*   < > 21.0*   BUN mg/dL 26.6* 29.0* 25.1*   < > 9.8   CREATININE mg/dL 0.80 0.83 0.89   < > 0.73   CALCIUM mg/dL 8.6 8.2* 8.1*   < > 8.8   BILIRUBIN mg/dL  --   --   --   --  0.4   ALK PHOS U/L  --   --   --   --  179*   ALT (SGPT) U/L  --   --   --   --  11   AST (SGOT) U/L  --   --   --   --  21   GLUCOSE mg/dL  "105* 117* 142*   < > 131*    < > = values in this interval not displayed.       Culture Data:   No results found for: \"BLOODCX\", \"URINECX\", \"WOUNDCX\", \"MRSACX\", \"RESPCX\", \"STOOLCX\"    Radiology Data:   Imaging Results (Last 24 Hours)       ** No results found for the last 24 hours. **            I have reviewed the patient's current medications.     Assessment/Plan   Assessment  Active Hospital Problems    Diagnosis     Multifocal atrial tachycardia     Right-sided low back pain with right-sided sciatica     Anxiety about health     Acute on chronic respiratory failure with hypoxia     CREST syndrome, partial         Treatment Plan  1.  Acute on chronic respiratory failure with hypoxia/CREST syndrome; pulmonary managing.  Continue steroid taper, at oxygen goal of 7/8 L high flow sat 90-91%.  Continue OPEP, incentive spirometry, chest physiotherapy, ABGs as needed    2.  Right-sided low back pain with sciatica; CT lumbar spine without contrast discussed results with patient and family, continue low-dose Norco 5/325 mg and Lidoderm pain patch as needed    3.  Anxiety about health; improving since initiation of hydroxyzine 25 mg 4 times a day.  Today patient is concerned that she is constipated.  We will obtain KUB.  According to results.    4.  Multifocal atrial tachycardia; EP consulted -started verapamil-variable rate depending on activity.  Remote telemetry     in ER chest x-ray was showing multifocal pneumonia possible pulmonary edema , CT abdomen pelvis was done for abdominal pain was showing constipation bilateral kidney stones the patient was placed on Vapotherm blood cultures were obtained started on steroid breathing treatment and Zosyn I discussed the case with intensivist and with the nurse practitioner intensivist Nba  who felt that the patient could be admitted to the floor and does not need to be in the unit  Will admit continue steroid breathing treatment antibiotics we will start her on IV Lasix order " echo of the heart we will consult with pulmonary I's and O's and daily weights Lovenox for DVT prophylaxis identified reconcile restart home medications once reconciled    Medical Decision Making  Number and Complexity of problems: 5  4 problems, acute, high complexity, improving  1 problem, chronic, high complexity, unchanged    Differential Diagnosis: None     Conditions and Status        Condition is unchanged     MDM Data  External documents reviewed: No  Cardiac tracing (EKG, telemetry) interpretation: Reviewed  Radiology interpretation: Reviewed  Labs reviewed: yes  Any tests that were considered but not ordered: no     Decision rules/scores evaluated (example [id number], Wells, etc): no     Discussed with: Patient and Dr. Lopez     Care Planning  Shared decision making: Patient, daughters and Dr. Lopez   Code status and discussions: Full  Surrogate Decision Maker daughters    Disposition  Social Determinants of Health that impact treatment or disposition: Has agreed to rehab placement  I expect the patient to be discharged to to be determined TBD in 2+ days.     Electronically signed by ANASTASIA Silveira, 06/16/25, 13:49 CDT.

## 2025-06-16 NOTE — PLAN OF CARE
Goal Outcome Evaluation:        Outcome Evaluation: (P) Nutrition Follow Up Complete: Pt with nursing at time of visit. Pt received their lunch shortly after visit began so unable to complete NFPE, will re-attempt at follow up. Pt endorsing that her appetite is still poor, requests to change her diet at time of visit. Since the visit has been upgraded from Cardi/ to a Regular Diet. Pt enjoys boost shake with breakfast, but requested to remove the fortified pudding with dinner. Per Chart Review: Pt consuming 50% over 8 meals, PO Fluid Inake: Avg 825 mL x 5 days. Avg UOP 1983mL. Last BM 6/13 (constipation). Dietetic intern educated patient on the importance of fiber, fluid and movement to encourage a bowel movement. Follow per protocol.

## 2025-06-16 NOTE — CASE MANAGEMENT/SOCIAL WORK
Continued Stay Note   Javed     Patient Name: Elaine Juárez  MRN: [id number]  Today's Date: 6/16/2025    Admit Date: 6/11/2025    Plan: SNF   Discharge Plan       Row Name 06/16/25 1054       Plan    Plan Comments [facility] has offered a bed. Waiting on answer from [facility].                   Discharge Codes    No documentation.                       JAUN Brandt

## 2025-06-16 NOTE — CASE MANAGEMENT/SOCIAL WORK
Continued Stay Note   Javed     Patient Name: Elaine Juárez  MRN: [id number]  Today's Date: 6/16/2025    Admit Date: 6/11/2025    Plan: SNF   Discharge Plan       Row Name 06/16/25 1704       Plan    Plan Comments [facility] unable to offer a bed. [facility] can take tomorrow if pt remains on 6L or below at rest.    Final Discharge Disposition Code 03 - skilled nursing facility (SNF)                   Discharge Codes    No documentation.                       JAUN Brandt

## 2025-06-16 NOTE — PLAN OF CARE
Goal Outcome Evaluation:                 Pt is A/Ox4. 5-6L NC. PRN medications for c/o pain, effective per pt. IV steroids per orders. Voiding per PW. Lasix. BP soft. Stool softeners given. Zofran given w/meds per pt request. Pt in chair, she states it is more comfortable for her. Call light in reach, safety maintained.

## 2025-06-17 ENCOUNTER — TELEPHONE (OUTPATIENT)
Dept: UROLOGY | Facility: CLINIC | Age: 65
End: 2025-06-17
Payer: MEDICARE

## 2025-06-17 VITALS
OXYGEN SATURATION: 100 % | WEIGHT: 156.1 LBS | DIASTOLIC BLOOD PRESSURE: 81 MMHG | SYSTOLIC BLOOD PRESSURE: 135 MMHG | BODY MASS INDEX: 27.66 KG/M2 | RESPIRATION RATE: 18 BRPM | HEART RATE: 118 BPM | TEMPERATURE: 97.8 F | HEIGHT: 63 IN

## 2025-06-17 LAB
QT INTERVAL: 302 MS
QTC INTERVAL: 442 MS

## 2025-06-17 PROCEDURE — 94799 UNLISTED PULMONARY SVC/PX: CPT

## 2025-06-17 PROCEDURE — 94669 MECHANICAL CHEST WALL OSCILL: CPT

## 2025-06-17 PROCEDURE — 97535 SELF CARE MNGMENT TRAINING: CPT

## 2025-06-17 PROCEDURE — 25010000002 METHYLPREDNISOLONE PER 40 MG: Performed by: NURSE PRACTITIONER

## 2025-06-17 PROCEDURE — 25010000002 ONDANSETRON PER 1 MG: Performed by: HOSPITALIST

## 2025-06-17 PROCEDURE — 63710000001 MYCOPHENOLATE MOFETIL PER 250 MG: Performed by: NURSE PRACTITIONER

## 2025-06-17 RX ORDER — VERAPAMIL HYDROCHLORIDE 100 MG/1
100 CAPSULE, EXTENDED RELEASE ORAL NIGHTLY
Start: 2025-06-17

## 2025-06-17 RX ORDER — HYDROXYZINE HYDROCHLORIDE 25 MG/1
25 TABLET, FILM COATED ORAL EVERY 6 HOURS PRN
Start: 2025-06-17

## 2025-06-17 RX ORDER — LIDOCAINE 4 G/G
1 PATCH TOPICAL
Start: 2025-06-18

## 2025-06-17 RX ORDER — PREDNISONE 20 MG/1
20 TABLET ORAL TAKE AS DIRECTED
Start: 2025-06-17

## 2025-06-17 RX ORDER — HYDROCODONE BITARTRATE AND ACETAMINOPHEN 5; 325 MG/1; MG/1
2 TABLET ORAL EVERY 6 HOURS PRN
Qty: 24 TABLET | Refills: 0 | Status: SHIPPED | OUTPATIENT
Start: 2025-06-17

## 2025-06-17 RX ORDER — ALPRAZOLAM 0.5 MG
0.25 TABLET ORAL EVERY 8 HOURS PRN
Qty: 9 TABLET | Refills: 0 | Status: SHIPPED | OUTPATIENT
Start: 2025-06-17

## 2025-06-17 RX ORDER — SODIUM CHLORIDE FOR INHALATION 3 %
4 VIAL, NEBULIZER (ML) INHALATION
Start: 2025-06-17

## 2025-06-17 RX ORDER — ARFORMOTEROL TARTRATE 15 UG/2ML
15 SOLUTION RESPIRATORY (INHALATION)
Start: 2025-06-17

## 2025-06-17 RX ADMIN — METHYLPREDNISOLONE SODIUM SUCCINATE 20 MG: 40 INJECTION, POWDER, FOR SOLUTION INTRAMUSCULAR; INTRAVENOUS at 04:11

## 2025-06-17 RX ADMIN — HYDROCODONE BITARTRATE AND ACETAMINOPHEN 2 TABLET: 5; 325 TABLET ORAL at 04:11

## 2025-06-17 RX ADMIN — ONDANSETRON 4 MG: 2 INJECTION INTRAMUSCULAR; INTRAVENOUS at 07:54

## 2025-06-17 RX ADMIN — IPRATROPIUM BROMIDE 0.5 MG: 0.5 SOLUTION RESPIRATORY (INHALATION) at 10:23

## 2025-06-17 RX ADMIN — MYCOPHENOLATE MOFETIL 1000 MG: 500 TABLET, FILM COATED ORAL at 09:03

## 2025-06-17 RX ADMIN — ALPRAZOLAM 0.5 MG: 0.5 TABLET ORAL at 04:11

## 2025-06-17 RX ADMIN — IPRATROPIUM BROMIDE 0.5 MG: 0.5 SOLUTION RESPIRATORY (INHALATION) at 06:33

## 2025-06-17 RX ADMIN — LIDOCAINE 1 PATCH: 4 PATCH TOPICAL at 09:03

## 2025-06-17 RX ADMIN — Medication 10 ML: at 09:03

## 2025-06-17 RX ADMIN — ARFORMOTEROL TARTRATE 15 MCG: 15 SOLUTION RESPIRATORY (INHALATION) at 06:33

## 2025-06-17 RX ADMIN — VENLAFAXINE HYDROCHLORIDE 75 MG: 75 CAPSULE, EXTENDED RELEASE ORAL at 09:03

## 2025-06-17 RX ADMIN — Medication 4 ML: at 10:23

## 2025-06-17 NOTE — TELEPHONE ENCOUNTER
----- Message from Kandi SANCHEZ sent at 6/17/2025 11:22 AM CDT -----  This pt has a referral for kidney stones could you please call her to schedule.  She is in pain

## 2025-06-17 NOTE — DISCHARGE SUMMARY
[facility] Medicine Services  DISCHARGE SUMMARY       Date of Admission: 6/11/2025  Date of Discharge:  6/17/2025  Primary Care Physician: Aaron Stoddard MD    Presenting Problem/History of Present Illness:  Shortness of breath back pain abdominal pain     Final Discharge Diagnoses:  Active Hospital Problems    Diagnosis     Multifocal atrial tachycardia     Right-sided low back pain with right-sided sciatica     Anxiety about health     Acute on chronic respiratory failure with hypoxia     CREST syndrome, partial         Consults: Chriss Link MD EP; Alicia Keane DO    Procedures Performed: None    Pertinent Test Results:   Results for orders placed during the hospital encounter of 06/11/25    Adult Transthoracic Echo Complete w/ Color, Spectral and Contrast if necessary per protocol    Interpretation Summary    Left ventricular systolic function is normal. Left ventricular ejection fraction appears to be 61 - 65%.    Left ventricular wall thickness is consistent with moderate concentric hypertrophy.    The right ventricular cavity is borderline dilated with low normal systolic function noted.    No significant valvular abnormalities identified on this study.      Imaging Results (All)       Procedure Component Value Units Date/Time    XR Abdomen KUB [[id number]] Collected: 06/15/25 1254     Updated: 06/15/25 1259    Narrative:      EXAMINATION: XR ABDOMEN KUB- 6/15/2025 12:54 PM     HISTORY: ABDOMINAL PAIN.     REPORT: Supine imaging of the abdomen was performed.     COMPARISON: CT abdomen and pelvis 6/11/2025.     Coarse markings in the lung bases appear similar to the previous CT and  are most compatible with pulmonary fibrosis. Moderate volume of stool is  noted within the transverse colon and flexures. No evidence of bowel  obstruction is identified. There are small tiny bilateral  nephrolithiasis, better demonstrated on previous CT. No definite  ureterolithiasis  is seen. No acute osseous abnormality.       Impression:      Mild constipation at the level of the transverse colon and  flexures. No evidence of bowel obstruction. Pulmonary fibrosis is  evident in the lung bases greater on the right. Small bilateral tiny  nephrolithiasis better demonstrated on previous CT.     This report was signed and finalized on 6/15/2025 12:56 PM by Dr. Derek Roblero MD.       CT Lumbar Spine Without Contrast [[id number]] Collected: 06/13/25 1546     Updated: 06/13/25 1603    Narrative:      CT LUMBAR SPINE WO CONTRAST-      HISTORY: severe pain on right; J96.21-Acute and chronic respiratory  failure with hypoxia; M54.9-Dorsalgia, unspecified;  E87.3-Iksc-eywwndqbbt and hyponatremia; J18.9-Pneumonia, unspecified  organism; Z74.09-Other reduced mobility      COMPARISON: None     TOTAL DOSE LENGTH PRODUCT: 361.51 mGy.cm. Automated exposure control was  also utilized to decrease patient radiation dose.     TECHNIQUE: Axial images of the lumbar spine are obtained with sagittal  coronal reconstructions     FINDINGS: Slight left convexity of the lumbar curvature. Mild  anterolisthesis of L4 over L5 measuring 5 mm. No compression deformity  or pars defect. No lumbar vertebral fracture. Advanced L4/5 and left  L5-S1 facet osteoarthropathy with moderate degenerative facet change at  remaining levels. Arthritic changes of the sacroiliac joints.     At L1/2, no central canal or foraminal stenosis.     At L2/3, minimal broad-based disc bulging. No central canal or foraminal  stenosis.     At L3/4, mild broad-based disc bulging with moderate facet arthropathy.  Mild central canal stenosis with no prominent foraminal stenosis.     At L4/5, advanced facet arthropathy contributing to the grade 1  spondylolisthesis. Minimal broad-based disc bulging. Mild central canal  stenosis with no significant foraminal narrowing.     At L5-S1, moderate advanced left with moderate right facet  osteoarthropathy. Minimal  disc bulging. No central canal or foraminal  stenosis.     Nonobstructing intrarenal stones measuring 5 mm or less. No regional  ureteral stones or hydronephrosis identified. Moderate stool within the  visible colon.       Impression:         1. Grade 1 spondylolisthesis at L4/5 secondary to advanced facet  osteoarthropathy. Degenerative changes create mild central lumbar canal  stenosis without prominent foraminal narrowing. No acute lumbar  vertebral pathology.  2. Moderate stool of the visible rectosigmoid colon for which  constipation considered.  3. Nonobstructing bilateral intrarenal stones with no hydronephrosis.     This report was signed and finalized on 6/13/2025 4:00 PM by Dr. Keke Bangura MD.       CT Abdomen Pelvis Stone Protocol [[id number]] Collected: 06/11/25 1336     Updated: 06/11/25 1348    Narrative:      CT ABDOMEN PELVIS STONE PROTOCOL-      HISTORY: Right flank pain history of kidney stone      COMPARISON: CTA chest 5/22/2025     TOTAL DOSE LENGTH PRODUCT: 343.82 mGy.cm. Automated exposure control was  also utilized to decrease patient radiation dose.     TECHNIQUE: Axial images of the abdomen and pelvis are performed without  IV or oral contrast     FINDINGS: Right hide there and mediastinal lymphadenopathy is  redemonstrated. Cardiomegaly with slightly increasing small pericardial  effusion. Chronic basilar interstitial lung disease with diffuse  bronchiectasis. Right lower lobe granuloma.     The nonenhanced liver spleen, pancreas, and adrenal glands are  unremarkable. There are punctate nonobstructing bilateral intrarenal  stones measuring 4 mm or less. No ureteral stones or hydronephrosis. 1.4  cm fluid attenuated left renal cyst. No abnormal perinephric fluid  collection. Bladder is unremarkable. Uterus is unremarkable. No adnexal  mass.     No free intraperitoneal air or loculated abscess. Moderate fecal stasis.  Normal appendix. No small bowel dilatation. Small hiatal hernia.  Fluid  contained within the mildly distended lower esophagus which may relate  to reflux or poor primary peristalsis.     Mild vascular calcification. No pathologic-appearing regional  lymphadenopathy.     Minimal anterolisthesis of L4 over L5 likely due to facet  osteoarthropathy. No focal aggressive regional bony lesions.       Impression:         1. Bilateral nonobstructing intrarenal stones measuring 4 mm or less. No  ureteral stones or hydronephrosis. No bladder stones. Left renal cyst.  2. Pathologic mediastinal right hilar lymphadenopathy is redemonstrated  as seen on CTA chest of 5/22/2025. Advanced basilar chronic interstitial  lung disease with bronchiectasis. Slightly increasing small pericardial  effusion with cardiomegaly.  3. Small hiatal hernia. Fluid of the mildly distended lower esophagus  may relate to reflux.  4. Moderate fecal stasis. Normal appendix. No evidence for bowel  obstruction. No free air or abscess.     This report was signed and finalized on 6/11/2025 1:45 PM by Dr. Keke Bangura MD.       XR Chest 1 View [[id number]] Collected: 06/11/25 1235     Updated: 06/11/25 1242    Narrative:      XR CHEST 1 VW-     HISTORY: Palpitation     COMPARISON: 5/26/2025     FINDINGS: Frontal view the chest obtained.     Increasing perihilar opacities superimposed on chronic interstitial lung  disease. Stable nodular right upper lobe opacities. Mediastinal and  right hilar adenopathy best seen on CTA chest of 5/22/2025.  Cardiomegaly. Questionable trace right pleural effusion. No  pneumothorax. Degenerative changes regional skeleton.       Impression:      1. Increasing bilateral perihilar opacities favoring pulmonary edema  over multifocal pneumonia superimposed on advanced chronic interstitial  lung disease with basilar predilection. Similar nodular right upper lobe  opacities concerning for potential malignancy. Right hilar and  mediastinal lymphadenopathy better seen on recent CTA chest  of  5/22/2025.        This report was signed and finalized on 6/11/2025 12:39 PM by Dr. Keke Bangura MD.             LAB RESULTS:      Lab 06/15/25  0320 06/14/25  0415 06/13/25  0438 06/12/25  0527 06/11/25  1143   WBC 10.27 10.18 14.80* 8.92 14.17*   HEMOGLOBIN 12.2 11.7* 12.7 13.4 14.3   HEMATOCRIT 40.0 37.8 41.4 44.0 46.1   PLATELETS 159 148 188 194 242   NEUTROS ABS 8.40* 8.64* 13.32* 7.40* 11.41*   EOS ABS 0.00 0.10 0.00 0.00 0.23   MCV 78.4* 77.1* 78.3* 77.5* 76.3*   PROCALCITONIN  --   --   --  0.14  --    LACTATE  --   --   --   --  1.9   PROTIME  --   --   --   --  14.1   APTT  --   --   --   --  28.8         Lab 06/15/25  0320 06/14/25  0415 06/13/25  0438 06/12/25  0527 06/11/25  1614 06/11/25  1143   SODIUM 134* 135* 136 136  --  133*   SODIUM, ARTERIAL  --   --   --   --  136  --    POTASSIUM 4.5 4.8 4.1 4.0  --  4.3   CHLORIDE 94* 97* 102 101  --  97*   CO2 31.0* 29.0 20.0* 22.0  --  21.0*   ANION GAP 9.0 9.0 14.0 13.0  --  15.0   BUN 26.6* 29.0* 25.1* 18.6  --  9.8   CREATININE 0.80 0.83 0.89 0.93  --  0.73   EGFR 80.9 77.4 71.2 67.5  --  90.3   GLUCOSE 105* 117* 142* 221*  --  131*   CALCIUM 8.6 8.2* 8.1* 8.1*  --  8.8   MAGNESIUM  --   --   --   --   --  1.8   TSH  --   --   --   --   --  3.300         Lab 06/11/25  1143   TOTAL PROTEIN 6.4   ALBUMIN 3.2*   GLOBULIN 3.2   ALT (SGPT) 11   AST (SGOT) 21   BILIRUBIN 0.4   ALK PHOS 179*         Lab 06/11/25  1259 06/11/25  1143   HSTROP T 17* 19*   PROTIME  --  14.1   INR  --  1.04                 Lab 06/11/25  1614 06/11/25  1150   PH, ARTERIAL 7.398 7.438   PCO2, ARTERIAL 36.2 35.0   PO2 ART 62.6* 68.3*   O2 SATURATION ART 92.2* 94.8   FIO2 50  --    HCO3 ART 22.3 23.6   BASE EXCESS ART -2.1* -0.1*   CARBOXYHEMOGLOBIN 1.4  --      Brief Urine Lab Results  (Last result in the past 365 days)        Color   Clarity   Blood   Leuk Est   Nitrite   Protein   CREAT   Urine HCG        06/11/25 1304 Yellow   Clear   Negative   Negative   Negative    Negative                 Microbiology Results (last 10 days)       Procedure Component Value - Date/Time    Respiratory Culture - Sputum, Cough [[id number]] Collected: 06/12/25 1104    Lab Status: Final result Specimen: Sputum from Cough Updated: 06/12/25 1515     Respiratory Culture Rejected     Gram Stain Few (2+) Epithelial cells per low power field      No WBCs seen    Narrative:      Specimen rejected due to oropharyngeal contamination. Please reorder and recollect specimen if clinically necessary.    S. Pneumo Ag Urine or CSF - Urine, Urine, Clean Catch [[id number]]  (Normal) Collected: 06/12/25 1039    Lab Status: Final result Specimen: Urine, Clean Catch Updated: 06/12/25 1145     Strep Pneumo Ag Negative    Legionella Antigen, Urine - Urine, Urine, Clean Catch [[id number]]  (Normal) Collected: 06/12/25 1039    Lab Status: Final result Specimen: Urine, Clean Catch Updated: 06/12/25 1144     LEGIONELLA ANTIGEN, URINE Negative    Blood Culture - Blood, Arm, Right [[id number]]  (Normal) Collected: 06/11/25 1259    Lab Status: Final result Specimen: Blood from Arm, Right Updated: 06/16/25 1345     Blood Culture No growth at 5 days    Blood Culture - Blood, Arm, Right [[id number]]  (Normal) Collected: 06/11/25 1223    Lab Status: Final result Specimen: Blood from Arm, Right Updated: 06/16/25 1245     Blood Culture No growth at 5 days    Respiratory Panel PCR w/COVID-19(SARS-CoV-2) KARISHMA/LUIS ARMANDO/DENYS/PAD/COR/DAVID In-House, NP Swab in UTM/VTM, 2 HR TAT - Swab, Nasopharynx [[id number]]  (Normal) Collected: 06/11/25 1215    Lab Status: Final result Specimen: Swab from Nasopharynx Updated: 06/11/25 1311     ADENOVIRUS, PCR Not Detected     Coronavirus 229E Not Detected     Coronavirus HKU1 Not Detected     Coronavirus NL63 Not Detected     Coronavirus OC43 Not Detected     COVID19 Not Detected     Human Metapneumovirus Not Detected     Human Rhinovirus/Enterovirus Not Detected     Influenza A PCR Not Detected     Influenza B PCR  Not Detected     Parainfluenza Virus 1 Not Detected     Parainfluenza Virus 2 Not Detected     Parainfluenza Virus 3 Not Detected     Parainfluenza Virus 4 Not Detected     RSV, PCR Not Detected     Bordetella pertussis pcr Not Detected     Bordetella parapertussis PCR Not Detected     Chlamydophila pneumoniae PCR Not Detected     Mycoplasma pneumo by PCR Not Detected    Narrative:      In the setting of a positive respiratory panel with a viral infection PLUS a negative procalcitonin without other underlying concern for bacterial infection, consider observing off antibiotics or discontinuation of antibiotics and continue supportive care. If the respiratory panel is positive for atypical bacterial infection (Bordetella pertussis, Chlamydophila pneumoniae, or Mycoplasma pneumoniae), consider antibiotic de-escalation to target atypical bacterial infection.          Microbiology Results (last 10 days)       Procedure Component Value - Date/Time    Respiratory Culture - Sputum, Cough [[id number]] Collected: 06/12/25 1104    Lab Status: Final result Specimen: Sputum from Cough Updated: 06/12/25 1515     Respiratory Culture Rejected     Gram Stain Few (2+) Epithelial cells per low power field      No WBCs seen    Narrative:      Specimen rejected due to oropharyngeal contamination. Please reorder and recollect specimen if clinically necessary.    S. Pneumo Ag Urine or CSF - Urine, Urine, Clean Catch [[id number]]  (Normal) Collected: 06/12/25 1039    Lab Status: Final result Specimen: Urine, Clean Catch Updated: 06/12/25 1145     Strep Pneumo Ag Negative    Legionella Antigen, Urine - Urine, Urine, Clean Catch [[id number]]  (Normal) Collected: 06/12/25 1039    Lab Status: Final result Specimen: Urine, Clean Catch Updated: 06/12/25 1144     LEGIONELLA ANTIGEN, URINE Negative    Blood Culture - Blood, Arm, Right [[id number]]  (Normal) Collected: 06/11/25 1259    Lab Status: Final result Specimen: Blood from Arm, Right Updated:  06/16/25 1345     Blood Culture No growth at 5 days    Blood Culture - Blood, Arm, Right [[id number]]  (Normal) Collected: 06/11/25 1223    Lab Status: Final result Specimen: Blood from Arm, Right Updated: 06/16/25 1245     Blood Culture No growth at 5 days    Respiratory Panel PCR w/COVID-19(SARS-CoV-2) KARISHMA/LUIS ARMANDO/DENYS/PAD/COR/DAVID In-House, NP Swab in UTM/VTM, 2 HR TAT - Swab, Nasopharynx [[id number]]  (Normal) Collected: 06/11/25 1215    Lab Status: Final result Specimen: Swab from Nasopharynx Updated: 06/11/25 1311     ADENOVIRUS, PCR Not Detected     Coronavirus 229E Not Detected     Coronavirus HKU1 Not Detected     Coronavirus NL63 Not Detected     Coronavirus OC43 Not Detected     COVID19 Not Detected     Human Metapneumovirus Not Detected     Human Rhinovirus/Enterovirus Not Detected     Influenza A PCR Not Detected     Influenza B PCR Not Detected     Parainfluenza Virus 1 Not Detected     Parainfluenza Virus 2 Not Detected     Parainfluenza Virus 3 Not Detected     Parainfluenza Virus 4 Not Detected     RSV, PCR Not Detected     Bordetella pertussis pcr Not Detected     Bordetella parapertussis PCR Not Detected     Chlamydophila pneumoniae PCR Not Detected     Mycoplasma pneumo by PCR Not Detected    Narrative:      In the setting of a positive respiratory panel with a viral infection PLUS a negative procalcitonin without other underlying concern for bacterial infection, consider observing off antibiotics or discontinuation of antibiotics and continue supportive care. If the respiratory panel is positive for atypical bacterial infection (Bordetella pertussis, Chlamydophila pneumoniae, or Mycoplasma pneumoniae), consider antibiotic de-escalation to target atypical bacterial infection.             Documented weights    06/11/25 1138   Weight: 70.8 kg (156 lb 1.6 oz)        Hospital Course: Patient is a 65-year-old white female with past medical history of oxygen dependent pulmonary fibrosis bronchiectasis show  COPD on 6 to 7 L history of depression anxiety history of bilateral kidney stones history of sleep apnea presents to the emergency room with worsening shortness of breath and back pain.  Patient states she has right flank pain and has been getting worse over the last 3 to 4 days.  She states that her urine has also been dark.   She is also been more short of breath today and called EMS for her right flank and back pain but noted that her oxygen was in the 70s while upon arrival and tachycardic.  She refused a DuoNeb by EMS en route.    In ER chest x-ray was showing multifocal pneumonia possible pulmonary edema , CT abdomen pelvis was done for abdominal pain was showing constipation bilateral kidney stones the patient was placed on Vapotherm blood cultures were obtained started on steroid breathing treatment and Sandi NIETO discussed the case with intensivist and with the nurse practitioner intensivist Nba  who felt that the patient could be admitted to the floor and does not need to be in the unit  Will admit continue steroid breathing treatment antibiotics we will start her on IV Lasix order echo of the heart we will consult with pulmonary I's and O's and daily weights Lovenox for DVT prophylaxis identified reconcile restart home medications once reconciled  6/12  As before presented  with above remains on antibiotics steroid breathing treatment started Lasix echo of the heart is pending pulmonary has been consulted continue to titrate down her oxygen blood cultures remain unremarkable, vest therapy, last echo of the heart was showing EF to be 60 to 65% repeat echo is pending  6/13  Patient lying in bed talking to .  She continues to have high flow oxygen currently on 9 L.  She is extremely anxious.  Nurse notified Dr. Khan of concerns for atrial fibrillation, right 140s.  Patient not on monitor.  EKG reveals what I suspect is normal sinus rhythm with PAC and PVCs.  EP has been consulted.  Patient states  she received a breathing treatment prior to episode.  I did discuss with her discontinuation of DuoNebs and changing nebulizer treatment to hopefully decrease the potential for recurrent tachycardia after treatment.  She is complaining of severe back pain mid lumbar/sacral region on the right radiating around to abdomen down the back of her leg and up to her arm.  Patient states she vomited up all her morning meds and is somewhat frantic that she did not receive Effexor.  I did order a repeat one-time dose.  This did seem to pacify her.  Patient states her back has been hurting for 3 months after she felt a pop and she feels no one is addressing this.  I have ordered a CT of the lumbar spine to assure there are no injuries.  Patient is agreeable to scheduled hydroxyzine to help with her anxiety, sleeplessness and it will not affect her respiratory status.  Will continue to monitor closely.  6/14  Patient resting in bed.  Daughters are at bedside.  Reviewed CT of the lumbar spine results, arthritis.  Patient is.  She reports Lidoderm pain patch has helped tremendously however she then states her pain is 8 on a scale of 1-10.  Patient vacillates regarding symptom reporting.  This is not a new finding.  I did broach the subject of discharge tomorrow as respiratory status is at baseline.  She became anxious and stated she was not ready to go home yet.  And I did review Cleo to her that unfortunately she could not stay inpatient for rehab to assist with her pain.  She declines home health rehab.  After discussion she is agreeable to inpatient rehab placement.  Again, feel patient is high risk for changing her mind regarding placement.  Respiratory status significantly improved.  Currently on 8 L high flow, at baseline.  I will consult .   Dr Link EP recommendations for multifocal atrial tachycardia  - Doing much better on current dose of verapamil  - Continue verapamil 100 mg nightly  -  No additional  medication changes from EP standpoint  -  EP to sign off at this time, please call if additional questions arise  6/15  Patient resting in bedside recliner. She states it is more comfortable than the bed.  Currently on 6 L high flow oxygen with saturation of 94%.  Discussed with Dr. Griffith, continuing to taper steroids.  Patient continues to be agreeable to rehab placement, will need facility that will accept high flow oxygen therapy.  Today she is complaining of possible constipation.  She continues to complain of pain 8 on a scale of 1-10 despite stating that she is better.  She has been complaining of a score of 8 almost daily.  Daily discussions are lengthy, patient does have flight of ideas-chronic.  I feel patient's condition is at baseline.  6/16: Patient again sitting up in chair.  She has episodes of high right tachycardia that does improve once she has decreased activity or anxiety improves.  Awaiting rehab placement.  This is complicated by need for facility that will accept high flow oxygen.  At rest she is on 6 L however needs 10 L with ambulation/exertion.  She is currently scoring her pain 3 on a scale of 1-10. Condition is at baseline.  Pulmonary recommendations:  - Continue supplemental oxygen as needed and wean as tolerated, goal O2 sat of 88-92%  - O2 requirements have improved  - Continue vest physiotherapy  - Continue pulmonary hypertension management, upcoming appointment in [place]  - Will arrange follow-up at the pulmonary clinic    Today: Sitting up in chair receiving breathing treatment.  Discussed with respiratory technician Samina Fontaine, patient oxygen is turned up to 10 L during respiratory treatments and then back down to her baseline.  Currently at rest she is utilizing 5 L high flow.  Per physical therapy she also requires 10 L, again returns to base flow right at rest.  Patient states she is ready to go to rehab today.  This is the first indication that she is not anxious  "regarding transfer.  Condition remains guarded but stable.  She does have end-stage pulmonary disease.  Patient has multifocal tachycardia seen by EP physician Dr. Link.  Rates will vary from normal sinus rhythm in the 60s to as high as 150s.  She has had no A-fib during her stay.  Multiple EKGs due to nursing concerns.  New medication verapamil started 100 mg nightly.  She will decrease rate as she decreases activity same as with oxygen needs.  She does remain a full code despite knowing the severity of her disease process.    Patient has reached the maximum benefit of hospitalization, evaluated today 06/17/25 and is in guarded but stable condition for discharge.     Physical Exam on Discharge:  /81 (BP Location: Right arm, Patient Position: Sitting)   Pulse 118   Temp 97.8 °F (36.6 °C) (Axillary)   Resp 18   Ht 160 cm (63\")   Wt 70.8 kg (156 lb 1.6 oz)   SpO2 100%   BMI 27.65 kg/m²   Physical Exam  Vitals reviewed.   Constitutional:       Appearance: She is ill-appearing (at baseline).   HENT:      Head: Normocephalic and atraumatic.      Mouth/Throat:      Mouth: Mucous membranes are moist.      Pharynx: Oropharynx is clear.      Comments: Full mask respiratory treatment in progress  Eyes:      Extraocular Movements: Extraocular movements intact.      Conjunctiva/sclera: Conjunctivae normal.   Cardiovascular:      Rate and Rhythm: Normal rate and regular rhythm.      Comments: Right overnight ,, has been as high as 148 today during exertion.  Patient does not have A-fib but her rate is this high.  Pulmonary:      Effort: No respiratory distress (At baseline).      Breath sounds: No wheezing.      Comments: Velcro lung sounds consistent with pulmonary fibrosis, no wheezing  Abdominal:      General: There is no distension.      Palpations: Abdomen is soft.   Musculoskeletal:      Cervical back: Normal range of motion and neck supple.      Right lower leg: No edema.      Left lower leg: No edema. "   Skin:     General: Skin is warm and dry.      Comments: Flushed, chronic   Neurological:      General: No focal deficit present.      Mental Status: She is alert and oriented to person, place, and time.   Psychiatric:         Mood and Affect: Mood normal.         Behavior: Behavior normal.       Condition on Discharge: Guarded but stable    Discharge Disposition:  Skilled Nursing Facility (DC - External)    Discharge Medications:     Discharge Medications        PAUSE taking these medications        Instructions Start Date   predniSONE 5 MG tablet  Wait to take this until your doctor or other care provider tells you to start again.  Until long taper has been completed  Commonly known as: DELTASONE   5 mg, Oral, Daily  You also have another medication with the same name that you may need to continue taking.             New Medications        Instructions Start Date   ALPRAZolam 0.5 MG tablet  Commonly known as: XANAX   0.25 mg, Oral, Every 8 Hours PRN      arformoterol 15 MCG/2ML nebulizer solution  Commonly known as: BROVANA   15 mcg, Nebulization, 2 Times Daily - RT      hydrOXYzine 25 MG tablet  Commonly known as: ATARAX   25 mg, Oral, Every 6 Hours PRN      ipratropium 0.02 % nebulizer solution  Commonly known as: ATROVENT   0.5 mg, Nebulization, 4 Times Daily - RT      Lidocaine 4 %   1 patch, Transdermal, Every 24 Hours Scheduled, Remove & Discard patch within 12 hours or as directed by MD   Start Date: June 18, 2025     naloxone 4 MG/0.1ML nasal spray  Commonly known as: NARCAN   Call 911. Don't prime. [place] in 1 nostril for overdose. Repeat in 2-3 minutes in other nostril if no or minimal breathing/responsiveness.      sodium chloride 3 % nebulizer solution   4 mL, Nebulization, 2 Times Daily - RT      tiZANidine 4 MG tablet  Commonly known as: ZANAFLEX   2 mg, Oral, Every 8 Hours PRN      verapamil 100 MG 24 hr capsule  Commonly known as: VERELAN   100 mg, Oral, Nightly             Changes to Medications         Instructions Start Date   HYDROcodone-acetaminophen 5-325 MG per tablet  Commonly known as: NORCO  What changed:   how much to take  when to take this  reasons to take this   2 tablets, Oral, Every 6 Hours PRN      predniSONE 20 MG tablet  Commonly known as: DELTASONE  What changed: Another medication with the same name was paused. Ask your nurse or doctor if you should take this medication.   20 mg, Oral, Take As Directed, Take 1 tablet twice a day x 7 days; 1 tab in a.m. and half tablet in p.m. x 7 days; 1 tablet in a.m. x 7 days.  Patient will then need home 10 mg daily             Continue These Medications        Instructions Start Date   alendronate 70 MG tablet  Commonly known as: FOSAMAX   70 mg, Oral, Every 7 Days      Macitentan-Tadalafil 10-40 MG tablet   1 tablet, Nightly      mycophenolate 500 MG tablet  Commonly known as: CELLCEPT   1,000 mg, 2 Times Daily      omeprazole 40 MG capsule  Commonly known as: priLOSEC   40 mg, Oral, Daily      potassium chloride 10 MEQ CR tablet   10 mEq, Oral, Daily      spironolactone 50 MG tablet  Commonly known as: ALDACTONE   50 mg, Oral, Daily      traZODone 100 MG tablet  Commonly known as: DESYREL   100 mg, Oral, Nightly      Tyvaso 0.6 MG/ML solution inhalation solution  Generic drug: Treprostinil   12 puffs, Inhalation, 4 Times Daily - RT      venlafaxine 75 MG tablet  Commonly known as: EFFEXOR   75 mg, Daily      vitamin D 1.25 MG (98055 UT) capsule capsule  Commonly known as: ERGOCALCIFEROL   50,000 Units, Oral, Every 7 Days      Winrevair 60 MG kit  Generic drug: Sotatercept-csrk   0.9 mL, Subcutaneous, Every 21 Days             Stop These Medications      metoprolol succinate XL 25 MG 24 hr tablet  Commonly known as: TOPROL-XL            Discharge Diet:   Diet Instructions       Diet: Cardiac Diets; Healthy Heart (2-3 Na+); Regular (IDDSI 7); Thin (IDDSI 0)      Discharge Diet: Cardiac Diets    Cardiac Diet: Healthy Heart (2-3 Na+)    Texture: Regular  (IDDSI 7)    Fluid Consistency: Thin (IDDSI 0)            Activity at Discharge:   Activity Instructions       Activity as Tolerated              Discharge Instructions:   1.  Discharged to [facility] re[facility] for ongoing strengthening  2.  Multiple new medications, see discharge summary and med list that will need to be ordered at discharge from rehab provider deems appropriate.  3.  Verapamil ordered by Dr. Link EP physician, follow-up per his recommendations  4.  Increase oxygen rate to 10 L with respiratory treatments and exertion, currently on high flow 5 L continuously at rest, follow-up with pulmonary as scheduled  5.  Return to ED for worsening respiratory failure, fever, chills, chest pain    Follow-up Appointments:   Future Appointments   Date Time Provider Department Center   6/23/2025  3:00 PM [facility] ECHO ROOM 1  [facility]   7/1/2025 11:00 AM Fela Blackman, ANASTASIA [facility]       Test Results Pending at Discharge: none    Electronically signed by JOSE Silveira, 06/17/25, 11:48 CDT.    Time: 60 minutes.

## 2025-06-17 NOTE — PLAN OF CARE
Goal Outcome Evaluation:  Plan of Care Reviewed With: patient        Progress: no change  Outcome Evaluation: A&Ox4. 6 L NC while resting. Purewick. Prns given.

## 2025-06-17 NOTE — CASE MANAGEMENT/SOCIAL WORK
Continued Stay Note   [facility]     Patient Name: Elaine Juárez  MRN: [id number]  Today's Date: 6/17/2025    Admit Date: 6/11/2025    Plan: SNF   Discharge Plan       Row Name 06/17/25 1113       Plan    Final Discharge Disposition Code 03 - skilled nursing facility (SNF)    Final Note Pt is being dcd to [facility] today. Pharmacy correct in [place] ([facility] Call report number is [phone]                   Discharge Codes    No documentation.                       JAUN Brandt

## 2025-06-17 NOTE — TELEPHONE ENCOUNTER
Spoke briefly with the patient. She is in the hospital currently and wanted me to go ahead and get her scheduled then send her a message on [employer].    I got her scheduled for Wednesday 6/25/25 @ 11:00 AM and sent her a [employer] message informing her she would need to go to the [facility] for her KUB X-Ray 1 hour before the appointment with Mary hWeeler. I will call her back by the end of the week to see if she still wants that appointment.

## 2025-06-17 NOTE — THERAPY DISCHARGE NOTE
Acute Care - Physical Therapy Discharge Summary  [facility]       Patient Name: Elaine Juárez  : 1960  MRN: [id number]    Today's Date: 2025                 Admit Date: 2025      PT Recommendation and Plan    Visit Dx:    ICD-10-CM ICD-9-CM   1. Acute on chronic hypoxic respiratory failure  J96.21 518.84     799.02   2. Intractable back pain  M54.9 724.5   3. Hyponatremia  E87.1 276.1   4. Multifocal pneumonia  J18.9 486   5. Impaired mobility [Z74.09]  Z74.09 799.89   6. Anxiety about health  R45.89 799.29   7. Acute right-sided low back pain with right-sided sciatica  M54.41 724.2     724.3        Outcome Measures       Row Name 25 1400 25 0900          How much help from another person do you currently need...    Turning from your back to your side while in flat bed without using bedrails? -- 4  -AH     Moving from lying on back to sitting on the side of a flat bed without bedrails? -- 4  -AH     Moving to and from a bed to a chair (including a wheelchair)? -- 3  -AH     Standing up from a chair using your arms (e.g., wheelchair, bedside chair)? -- 3  -AH     Climbing 3-5 steps with a railing? -- 2  -AH     To walk in hospital room? -- 3  -AH     AM-PAC 6 Clicks Score (PT) -- 19  -AH        How much help from another is currently needed...    Putting on and taking off regular lower body clothing? 3  -TS --     Bathing (including washing, rinsing, and drying) 3  -TS --     Toileting (which includes using toilet bed pan or urinal) 3  -TS --     Putting on and taking off regular upper body clothing 3  -TS --     Taking care of personal grooming (such as brushing teeth) 3  -TS --     Eating meals 4  -TS --     AM-PAC 6 Clicks Score (OT) 19  -TS --        Functional Assessment    Outcome Measure Options -- AM-PAC 6 Clicks Basic Mobility (PT)  -               User Key  (r) = Recorded By, (t) = Taken By, (c) = Cosigned By      Initials Name Provider Type    Caroline Ayers, PTA  Physical Therapist Assistant    Aniyah Junior COTA Occupational Therapist Assistant                         PT Rehab Goals       Row Name 06/17/25 1509             Bed Mobility Goal 1 (PT)    Activity/Assistive Device (Bed Mobility Goal 1, PT) bed mobility activities, all  -      [place] Level/Cues Needed (Bed Mobility Goal 1, PT) independent  -AH      Time Frame (Bed Mobility Goal 1, PT) long term goal (LTG);10 days  -      Progress/Outcomes (Bed Mobility Goal 1, PT) goal met  -         Transfer Goal 1 (PT)    Activity/Assistive Device (Transfer Goal 1, PT) sit-to-stand/stand-to-sit;bed-to-chair/chair-to-bed;toilet;wheelchair transfer  -      [place] Level/Cues Needed (Transfer Goal 1, PT) standby assist  -      Time Frame (Transfer Goal 1, PT) long term goal (LTG);10 days  -      Strategies/Barriers (Transfers Goal 1, PT) maintain SpO2 >88%  -      Progress/Outcome (Transfer Goal 1, PT) goal met  -         Gait Training Goal 1 (PT)    Activity/Assistive Device (Gait Training Goal 1, PT) gait (walking locomotion);decrease asymmetrical patterns;decrease fall risk;diminish gait deviation;forward stepping;improve balance and speed;increase endurance/gait distance;increase energy conservation;assistive device use  -      [place] Level (Gait Training Goal 1, PT) standby assist  -      Distance (Gait Training Goal 1, PT) 15' with SpO2 >88%  -      Time Frame (Gait Training Goal 1, PT) long term goal (LTG);10 days  -      Progress/Outcome (Gait Training Goal 1, PT) goal met  -                User Key  (r) = Recorded By, (t) = Taken By, (c) = Cosigned By      Initials Name Provider Type Discipline     Caroline Bell PTA Physical Therapist Assistant PT                    Therapy Charges for Today       Code Description Service Date Service Provider Modifiers Qty    [id number]  GAIT TRAINING EA 15 MIN 6/16/2025 Caroline Bell PTA GP 2    [id number]  PT  THERAPEUTIC ACT EA 15 MIN 6/16/2025 Caroline Bell, PTA GP 2            PT Discharge Summary  Reason for Discharge: Discharge from facility  Outcomes Achieved: Refer to plan of care for updates on goals achieved  Discharge Destination: SNF      Caroline Bell PTA   6/17/2025

## 2025-06-17 NOTE — THERAPY DISCHARGE NOTE
Acute Care - Occupational Therapy Treatment Note/Discharge   [place]     Patient Name: Elaine Juárez  : 1960  MRN: [id number]  Today's Date: 2025               Admit Date: 2025       ICD-10-CM ICD-9-CM   1. Acute on chronic hypoxic respiratory failure  J96.21 518.84     799.02   2. Intractable back pain  M54.9 724.5   3. Hyponatremia  E87.1 276.1   4. Multifocal pneumonia  J18.9 486   5. Impaired mobility [Z74.09]  Z74.09 799.89   6. Anxiety about health  R45.89 799.29   7. Acute right-sided low back pain with right-sided sciatica  M54.41 724.2     724.3     Patient Active Problem List   Diagnosis    CREST syndrome, partial     Raynaud's phenomenon    Pulmonary fibrosis prob sec underlying autoimmune disease    Gastroesophageal reflux disease without esophagitis    BMI 31.0-31.9,adult    History of adenomatous polyp of colon    Environmental allergies    PAH (pulmonary arterial hypertension) with portal hypertension    Hypokalemia    Panic attack    Paranoia (psychosis)    H/O noncompliance with medical treatment, presenting hazards to health    Bipolar affective disorder, currently manic, moderate    Obesity (BMI 30-39.9)    Esophageal dysmotility    Oropharyngeal dysphagia    Right ventricular systolic dysfunction    Respiratory failure with hypoxia    Metabolic encephalopathy    Generalized weakness    Respiratory distress    Acute on chronic respiratory failure with hypoxia    Right-sided low back pain with right-sided sciatica    Anxiety about health    Multifocal atrial tachycardia     Past Medical History:   Diagnosis Date    Allergies     Arthritis     BMI 31.0-31.9,adult 2019    Calcified granuloma of lung 2019    Right lung    CHF (congestive heart failure)     denies    Chronic idiopathic fibrosing alveolitis     Chronic respiratory failure with hypoxia 2020    Chronic respiratory failure with hypoxia, on home oxygen therapy     COVID-19 2022    CREST  syndrome     Environmental allergies 02/01/2022    Gastroesophageal reflux disease without esophagitis 06/04/2019    Interstitial lung disease     Obstructive sleep apnea on CPAP 06/04/2019    Oropharyngeal dysphagia 01/26/2023    Primary central sleep apnea     Pulmonary fibrosis     Pulmonary hypertension     Rheumatoid arthritis     Right ventricular systolic dysfunction 05/04/2023    Short-term memory loss      Past Surgical History:   Procedure Laterality Date    BREAST BIOPSY      CARDIAC CATHETERIZATION N/A 07/22/2020    Procedure: Right Heart Cath;  Surgeon: Thong Martin MD;  Location: [facility] CATH INVASIVE LOCATION;  Service: Cardiology;  Laterality: N/A;    CHOLECYSTECTOMY      COLONOSCOPY  05/11/2015    5 POLYPS    COLONOSCOPY N/A 08/04/2021    Procedure: COLONOSCOPY WITH ANESTHESIA;  Surgeon: Michael Landin DO;  Location: [facility] ENDOSCOPY;  Service: Gastroenterology;  Laterality: N/A;  pre-hx polyps  post-colon polyps    ENDOSCOPY N/A 08/04/2021    Procedure: ESOPHAGOGASTRODUODENOSCOPY WITH ANESTHESIA;  Surgeon: Michael Landin DO;  Location: [facility] ENDOSCOPY;  Service: Gastroenterology;  Laterality: N/A;  pre-dysphagia  post-normal  pcp-lanette aguila       OT ASSESSMENT FLOWSHEET (Last 12 Hours)       OT Evaluation and Treatment       Row Name 06/17/25 0833                   OT Time and Intention    Subjective Information complains of;dyspnea;weakness;fatigue  -TS        Document Type therapy note (daily note)  -TS        Mode of Treatment occupational therapy  -TS        Patient Effort adequate  -TS           General Information    Existing Precautions/Restrictions fall;oxygen therapy device and L/min  -TS           Pain Assessment    Pretreatment Pain Rating 0/10 - no pain  -TS        Posttreatment Pain Rating 0/10 - no pain  -TS           Cognition    Orientation Status (Cognition) oriented x 4  -TS        Personal Safety Interventions fall prevention program maintained;nonskid  shoes/slippers when out of bed  -TS           Activities of Daily Living    BADL Assessment/Intervention toileting;grooming  -TS           Toileting Assessment/Training    [place] Level (Toileting) toileting skills;adjust/manage clothing;standby assist;supervision  -TS        Assistive Devices (Toileting) commode, bedside without drop arms  -TS        Position (Toileting) unsupported sitting;supported standing  -TS           Bed Mobility    Bed Mobility supine-sit  -TS        Supine-Sit [place] (Bed Mobility) supervision  -TS           Transfer Assessment/Treatment    Transfers sit-stand transfer;stand-sit transfer;stand pivot/stand step transfer;toilet transfer  -TS           Sit-Stand Transfer    Sit-Stand [place] (Transfers) standby assist  -TS           Stand-Sit Transfer    Stand-Sit [place] (Transfers) standby assist  -TS           Stand Pivot/Stand Step Transfer    Stand Pivot/Stand Step [place] (Transfers) standby assist;contact guard  -TS           Toilet Transfer    Type (Toilet Transfer) sit-stand;stand-sit  -TS        [place] Level (Toilet Transfer) standby assist  -TS        Assistive Device (Toilet Transfer) commode, bedside without drop arms  -TS           Plan of Care Review    Plan of Care Reviewed With patient  -TS        Progress no change  -TS        Outcome Evaluation Pt required increase O2 and increased rest breaks to return to O2 sats of 90 or greater during toileting and transfers in OT tx. Pt is able to anticipate her O2 needs during task but still requires a significant amount of O2 to remain in 87-93% range. Pt plans to discharge to SNF for rehab. Continue OT POC  -TS           Positioning and Restraints    Pre-Treatment Position in bed  -TS        Post Treatment Position chair  -TS        In Chair reclined;call light within reach;encouraged to call for assist;with nsg  -TS           Therapy Plan Review/Discharge Plan (OT)    Anticipated Discharge Disposition  (OT) skilled nursing facility  -TS                  User Key  (r) = Recorded By, (t) = Taken By, (c) = Cosigned By      Initials Name Effective Dates    TS Aniyah Sanabria, PERALTA 02/03/23 -                         OT Recommendation and Plan     Plan of Care Review  Plan of Care Reviewed With: patient  Progress: no change  Outcome Evaluation: Pt required increase O2 and increased rest breaks to return to O2 sats of 90 or greater during toileting and transfers in OT tx. Pt is able to anticipate her O2 needs during task but still requires a significant amount of O2 to remain in 87-93% range. Pt plans to discharge to SNF for rehab. Continue OT POC  Plan of Care Reviewed With: patient  Outcome Evaluation: Pt required increase O2 and increased rest breaks to return to O2 sats of 90 or greater during toileting and transfers in OT tx. Pt is able to anticipate her O2 needs during task but still requires a significant amount of O2 to remain in 87-93% range. Pt plans to discharge to SNF for rehab. Continue OT POC      OT Rehab Goals       Row Name 06/17/25 1400             Transfer Goal 1 (OT)    Activity/Assistive Device (Transfer Goal 1, OT) bed-to-chair/chair-to-bed;commode, bedside without drop arms  -TS      [place] Level/Cues Needed (Transfer Goal 1, OT) standby assist  -TS      Time Frame (Transfer Goal 1, OT) long term goal (LTG);10 days  -TS      Progress/Outcome (Transfer Goal 1, OT) goal met  -TS         Dressing Goal 1 (OT)    Activity/Device (Dressing Goal 1, OT) dressing skills, all  -TS      [place]/Cues Needed (Dressing Goal 1, OT) minimum assist (75% or more patient effort)  -TS      Time Frame (Dressing Goal 1, OT) long term goal (LTG);10 days  -TS      Progress/Outcome (Dressing Goal 1, OT) goal not met  -TS         Toileting Goal 1 (OT)    Activity/Device (Toileting Goal 1, OT) toileting skills, all  -TS      [place] Level/Cues Needed (Toileting Goal 1, OT) standby assist  -TS      Time  Frame (Toileting Goal 1, OT) long term goal (LTG);10 days  -TS      Progress/Outcome (Toileting Goal 1, OT) goal met  -TS         Problem Specific Goal 1 (OT)    Problem Specific Goal 1 (OT) Pt will independently implement one pain management technique to decrease pain and improve functional adl performance.  -TS      Time Frame (Problem Specific Goal 1, OT) long term goal (LTG);by discharge  -TS      Progress/Outcome (Problem Specific Goal 1, OT) goal not met  -TS                User Key  (r) = Recorded By, (t) = Taken By, (c) = Cosigned By      Initials Name Provider Type Discipline    TS Aniyah Sanabria COTA Occupational Therapist Assistant OT                     Outcome Measures       Row Name 06/17/25 1400 06/16/25 0900          How much help from another person do you currently need...    Turning from your back to your side while in flat bed without using bedrails? -- 4  -AH     Moving from lying on back to sitting on the side of a flat bed without bedrails? -- 4  -AH     Moving to and from a bed to a chair (including a wheelchair)? -- 3  -AH     Standing up from a chair using your arms (e.g., wheelchair, bedside chair)? -- 3  -AH     Climbing 3-5 steps with a railing? -- 2  -AH     To walk in hospital room? -- 3  -AH     AM-PAC 6 Clicks Score (PT) -- 19  -AH        How much help from another is currently needed...    Putting on and taking off regular lower body clothing? 3  -TS --     Bathing (including washing, rinsing, and drying) 3  -TS --     Toileting (which includes using toilet bed pan or urinal) 3  -TS --     Putting on and taking off regular upper body clothing 3  -TS --     Taking care of personal grooming (such as brushing teeth) 3  -TS --     Eating meals 4  -TS --     AM-PAC 6 Clicks Score (OT) 19  -TS --        Functional Assessment    Outcome Measure Options -- AM-PAC 6 Clicks Basic Mobility (PT)  -AH               User Key  (r) = Recorded By, (t) = Taken By, (c) = Cosigned By       Initials Name Provider Type     Caroline Bell, PTA Physical Therapist Assistant    TS Aniyah Sanabria COTA Occupational Therapist Assistant                    Time Calculation:    Time Calculation- OT       Row Name 06/17/25 1441             Time Calculation- OT    OT Start Time 0833  -TS      OT Stop Time 0911  -TS      OT Time Calculation (min) 38 min  -TS      Total Timed Code Minutes- OT 38 minute(s)  -TS      OT Received On 06/17/25  -TS         Timed Charges    07341 - OT Self Care/Mgmt Minutes 38  -TS         Total Minutes    Timed Charges Total Minutes 38  -TS       Total Minutes 38  -TS                User Key  (r) = Recorded By, (t) = Taken By, (c) = Cosigned By      Initials Name Provider Type    TS Aniyah Sanabria COTA Occupational Therapist Assistant                  Timed Therapy Charges  Total Units: 3      Suggested Charges  Total Units: 3      Procedure Name Documented Minutes Units Code    HC OT SELF CARE/MGMT/TRAIN EA 15 MIN 38 3   48961 (CPT®)                 Documented Minutes  Total Minutes: 38      Therapy Provided Minutes    62855 - OT Self Care/Mgmt Minutes 38                  Therapy Charges for Today       Code Description Service Date Service Provider Modifiers Qty    [id number] HC OT SELF CARE/MGMT/TRAIN EA 15 MIN 6/17/2025 Aniyah Sanabria COTA GO 3                 OT Discharge Summary  Anticipated Discharge Disposition (OT): home with assist  Reason for Discharge: Discharge from facility  Outcomes Achieved: Refer to plan of care for updates on goals achieved  Discharge Destination: SNF    FABIAN Phelps  6/17/2025

## 2025-06-17 NOTE — PLAN OF CARE
Goal Outcome Evaluation:           Progress: improving  Outcome Evaluation: STEPHANIS. CHANI&Gabby4. Pt on 5L NC. DC today to [facility]

## 2025-06-17 NOTE — PLAN OF CARE
Goal Outcome Evaluation:  Plan of Care Reviewed With: patient        Progress: no change  Outcome Evaluation: Pt required increase O2 and increased rest breaks to return to O2 sats of 90 or greater during toileting and transfers in OT tx. Pt is able to anticipate her O2 needs during task but still requires a significant amount of O2 to remain in 87-93% range. Pt plans to discharge to SNF for rehab. Continue OT POC    Anticipated Discharge Disposition (OT): home with assist

## 2025-06-18 ENCOUNTER — LAB REQUISITION (OUTPATIENT)
Dept: LAB | Facility: HOSPITAL | Age: 65
End: 2025-06-18
Payer: MEDICARE

## 2025-06-18 ENCOUNTER — TELEPHONE (OUTPATIENT)
Dept: UROLOGY | Facility: CLINIC | Age: 65
End: 2025-06-18
Payer: MEDICARE

## 2025-06-18 DIAGNOSIS — N20.0 KIDNEY STONES: Primary | ICD-10-CM

## 2025-06-18 DIAGNOSIS — M54.31 SCIATICA, RIGHT SIDE: ICD-10-CM

## 2025-06-18 DIAGNOSIS — J96.21 ACUTE AND CHRONIC RESPIRATORY FAILURE WITH HYPOXIA: ICD-10-CM

## 2025-06-18 DIAGNOSIS — I47.19 OTHER SUPRAVENTRICULAR TACHYCARDIA: ICD-10-CM

## 2025-06-18 DIAGNOSIS — I50.9 HEART FAILURE, UNSPECIFIED: ICD-10-CM

## 2025-06-18 DIAGNOSIS — F41.1 GENERALIZED ANXIETY DISORDER: ICD-10-CM

## 2025-06-18 DIAGNOSIS — I10 ESSENTIAL (PRIMARY) HYPERTENSION: ICD-10-CM

## 2025-06-18 LAB
ALBUMIN SERPL-MCNC: 3.2 G/DL (ref 3.5–5.2)
ALBUMIN/GLOB SERPL: 1.4 G/DL
ALP SERPL-CCNC: 125 U/L (ref 39–117)
ALT SERPL W P-5'-P-CCNC: 19 U/L (ref 1–33)
ANION GAP SERPL CALCULATED.3IONS-SCNC: 12 MMOL/L (ref 5–15)
ANISOCYTOSIS BLD QL: ABNORMAL
AST SERPL-CCNC: 27 U/L (ref 1–32)
BASOPHILS # BLD MANUAL: 0 10*3/MM3 (ref 0–0.2)
BASOPHILS NFR BLD MANUAL: 0 % (ref 0–1.5)
BILIRUB SERPL-MCNC: 0.3 MG/DL (ref 0–1.2)
BUN SERPL-MCNC: 22.3 MG/DL (ref 8–23)
BUN/CREAT SERPL: 31.9 (ref 7–25)
CALCIUM SPEC-SCNC: 9.4 MG/DL (ref 8.6–10.5)
CHLORIDE SERPL-SCNC: 94 MMOL/L (ref 98–107)
CO2 SERPL-SCNC: 31 MMOL/L (ref 22–29)
CREAT SERPL-MCNC: 0.7 MG/DL (ref 0.57–1)
DEPRECATED RDW RBC AUTO: 47.1 FL (ref 37–54)
EGFRCR SERPLBLD CKD-EPI 2021: 94.9 ML/MIN/1.73
EOSINOPHIL # BLD MANUAL: 0 10*3/MM3 (ref 0–0.4)
EOSINOPHIL NFR BLD MANUAL: 0 % (ref 0.3–6.2)
ERYTHROCYTE [DISTWIDTH] IN BLOOD BY AUTOMATED COUNT: 18.3 % (ref 12.3–15.4)
GLOBULIN UR ELPH-MCNC: 2.3 GM/DL
GLUCOSE SERPL-MCNC: 123 MG/DL (ref 65–99)
HCT VFR BLD AUTO: 48.4 % (ref 34–46.6)
HGB BLD-MCNC: 14.6 G/DL (ref 12–15.9)
LYMPHOCYTES # BLD MANUAL: 2.01 10*3/MM3 (ref 0.7–3.1)
LYMPHOCYTES NFR BLD MANUAL: 9.6 % (ref 5–12)
MCH RBC QN AUTO: 24.2 PG (ref 26.6–33)
MCHC RBC AUTO-ENTMCNC: 30.2 G/DL (ref 31.5–35.7)
MCV RBC AUTO: 80.1 FL (ref 79–97)
METAMYELOCYTES NFR BLD MANUAL: 6.7 % (ref 0–0)
MONOCYTES # BLD: 1.68 10*3/MM3 (ref 0.1–0.9)
MYELOCYTES NFR BLD MANUAL: 10.6 % (ref 0–0)
NEUTROPHILS # BLD AUTO: 10.76 10*3/MM3 (ref 1.7–7)
NEUTROPHILS NFR BLD MANUAL: 56.7 % (ref 42.7–76)
NEUTS BAND NFR BLD MANUAL: 4.8 % (ref 0–5)
PLAT MORPH BLD: NORMAL
PLATELET # BLD AUTO: 229 10*3/MM3 (ref 140–450)
PMV BLD AUTO: 11.2 FL (ref 6–12)
POLYCHROMASIA BLD QL SMEAR: ABNORMAL
POTASSIUM SERPL-SCNC: 3.7 MMOL/L (ref 3.5–5.2)
PROT SERPL-MCNC: 5.5 G/DL (ref 6–8.5)
RBC # BLD AUTO: 6.04 10*6/MM3 (ref 3.77–5.28)
SODIUM SERPL-SCNC: 137 MMOL/L (ref 136–145)
VARIANT LYMPHS NFR BLD MANUAL: 11.5 % (ref 19.6–45.3)
WBC MORPH BLD: NORMAL
WBC NRBC COR # BLD AUTO: 17.49 10*3/MM3 (ref 3.4–10.8)

## 2025-06-18 PROCEDURE — 85025 COMPLETE CBC W/AUTO DIFF WBC: CPT | Performed by: INTERNAL MEDICINE

## 2025-06-18 PROCEDURE — 80053 COMPREHEN METABOLIC PANEL: CPT | Performed by: INTERNAL MEDICINE

## 2025-06-18 NOTE — TELEPHONE ENCOUNTER
----- Message from Kristy BABB sent at 6/17/2025 12:53 PM CDT -----  Regarding: KUB Order  Could you place a KUB order for this patient? Thank you

## 2025-06-19 ENCOUNTER — TELEPHONE (OUTPATIENT)
Dept: CARDIOLOGY | Facility: CLINIC | Age: 65
End: 2025-06-19
Payer: MEDICARE

## 2025-06-25 ENCOUNTER — TELEPHONE (OUTPATIENT)
Dept: UROLOGY | Facility: CLINIC | Age: 65
End: 2025-06-25
Payer: MEDICARE

## 2025-06-25 NOTE — TELEPHONE ENCOUNTER
Called Patient to remind them to get KUB prior to appointment.  Spoke with Patient, she is in rehab facility and asked that I call her daughter Gladis, patient's daughter rescheduled for next wednesday.  If patient calls back it is ok for the HUB to tell the pt the message.

## 2025-07-08 ENCOUNTER — HOSPITAL ENCOUNTER (INPATIENT)
Age: 65
LOS: 3 days | Discharge: HOSPICE | End: 2025-07-11
Attending: EMERGENCY MEDICINE | Admitting: INTERNAL MEDICINE
Payer: MEDICARE

## 2025-07-08 ENCOUNTER — APPOINTMENT (OUTPATIENT)
Dept: CT IMAGING | Age: 65
End: 2025-07-08
Payer: MEDICARE

## 2025-07-08 ENCOUNTER — APPOINTMENT (OUTPATIENT)
Dept: GENERAL RADIOLOGY | Age: 65
End: 2025-07-08
Attending: EMERGENCY MEDICINE
Payer: MEDICARE

## 2025-07-08 DIAGNOSIS — J96.21 ACUTE ON CHRONIC RESPIRATORY FAILURE WITH HYPOXIA (HCC): Primary | ICD-10-CM

## 2025-07-08 DIAGNOSIS — J84.10 PULMONARY FIBROSIS (HCC): ICD-10-CM

## 2025-07-08 DIAGNOSIS — R06.03 RESPIRATORY DISTRESS: ICD-10-CM

## 2025-07-08 DIAGNOSIS — M34.1 CREST (CALCINOSIS, RAYNAUD'S PHENOMENON, ESOPHAGEAL DYSFUNCTION, SCLERODACTYLY, TELANGIECTASIA) (HCC): ICD-10-CM

## 2025-07-08 PROBLEM — J96.01 ACUTE RESPIRATORY FAILURE WITH HYPOXEMIA (HCC): Status: ACTIVE | Noted: 2025-07-08

## 2025-07-08 LAB
ALBUMIN SERPL-MCNC: 3.1 G/DL (ref 3.5–5.2)
ALLENS TEST: ABNORMAL
ALP SERPL-CCNC: 207 U/L (ref 35–104)
ALT SERPL-CCNC: 25 U/L (ref 10–35)
ANION GAP SERPL CALCULATED.3IONS-SCNC: 18 MMOL/L (ref 8–16)
ANISOCYTOSIS BLD QL SMEAR: ABNORMAL
AST SERPL-CCNC: 37 U/L (ref 10–35)
BACTERIA URNS QL MICRO: ABNORMAL /HPF
BASE EXCESS ARTERIAL: -3.1 MMOL/L (ref -2–2)
BASOPHILS # BLD: 0 K/UL (ref 0–0.2)
BASOPHILS NFR BLD: 0 % (ref 0–1)
BILIRUB SERPL-MCNC: 0.4 MG/DL (ref 0.2–1.2)
BILIRUB UR QL STRIP: NEGATIVE
BNP BLD-MCNC: 897 PG/ML (ref 0–124)
BUN SERPL-MCNC: 22 MG/DL (ref 8–23)
CALCIUM SERPL-MCNC: 8.4 MG/DL (ref 8.8–10.2)
CARBOXYHEMOGLOBIN ARTERIAL: 1.8 % (ref 0–5)
CHLORIDE SERPL-SCNC: 96 MMOL/L (ref 98–107)
CLARITY UR: CLEAR
CO2 SERPL-SCNC: 17 MMOL/L (ref 22–29)
COLOR UR: YELLOW
CREAT SERPL-MCNC: 0.7 MG/DL (ref 0.5–0.9)
CRYSTALS URNS MICRO: ABNORMAL /HPF
D DIMER PPP FEU-MCNC: 7.27 UG/ML FEU (ref 0–0.48)
DACRYOCYTES BLD QL SMEAR: ABNORMAL
EKG P AXIS: 37 DEGREES
EKG P-R INTERVAL: 122 MS
EKG Q-T INTERVAL: 270 MS
EKG QRS DURATION: 70 MS
EKG QTC CALCULATION (BAZETT): 431 MS
EKG T AXIS: 24 DEGREES
EOSINOPHIL # BLD: 0.15 K/UL (ref 0–0.6)
EOSINOPHIL NFR BLD: 1 % (ref 0–5)
EPI CELLS #/AREA URNS AUTO: 1 /HPF (ref 0–5)
ERYTHROCYTE [DISTWIDTH] IN BLOOD BY AUTOMATED COUNT: 21.1 % (ref 11.5–14.5)
FIO2: 80 %
GLUCOSE SERPL-MCNC: 172 MG/DL (ref 70–99)
GLUCOSE UR STRIP.AUTO-MCNC: NEGATIVE MG/DL
HCO3 ARTERIAL: 19.7 MMOL/L (ref 22–26)
HCT VFR BLD AUTO: 48.7 % (ref 37–47)
HEMOGLOBIN, ART, EXTENDED: 15.5 G/DL (ref 12–16)
HGB BLD-MCNC: 15.3 G/DL (ref 12–16)
HGB UR STRIP.AUTO-MCNC: ABNORMAL MG/L
HYALINE CASTS #/AREA URNS AUTO: 10 /HPF (ref 0–8)
HYPOCHROMIA BLD QL SMEAR: ABNORMAL
IMM GRANULOCYTES # BLD: 2 K/UL
INR PPP: 1.16 (ref 0.88–1.18)
KETONES UR STRIP.AUTO-MCNC: NEGATIVE MG/DL
LACTATE BLDV-SCNC: 2.6 MG/DL (ref 0.5–1.9)
LACTATE BLDV-SCNC: 3.1 MG/DL (ref 0.5–1.9)
LEUKOCYTE ESTERASE UR QL STRIP.AUTO: ABNORMAL
LIPASE SERPL-CCNC: 61 U/L (ref 13–60)
LYMPHOCYTES # BLD: 4.3 K/UL (ref 1.1–4.5)
LYMPHOCYTES NFR BLD: 28 % (ref 20–40)
MCH RBC QN AUTO: 24.4 PG (ref 27–31)
MCHC RBC AUTO-ENTMCNC: 31.4 G/DL (ref 33–37)
MCV RBC AUTO: 77.8 FL (ref 81–99)
METHEMOGLOBIN ARTERIAL: 0.7 %
MICROCYTES BLD QL SMEAR: ABNORMAL
MONOCYTES # BLD: 1.1 K/UL (ref 0–0.9)
MONOCYTES NFR BLD: 7 % (ref 0–10)
MRSA DNA SPEC QL NAA+PROBE: NOT DETECTED
NEUTROPHILS # BLD: 9.8 K/UL (ref 1.5–7.5)
NEUTS BAND NFR BLD MANUAL: 2 % (ref 0–5)
NEUTS SEG NFR BLD: 62 % (ref 50–65)
NITRITE UR QL STRIP.AUTO: NEGATIVE
O2 CONTENT ARTERIAL: 21.2 ML/DL
O2 DELIVERY DEVICE: ABNORMAL
O2 SAT, ARTERIAL: 96.7 %
O2 THERAPY: ABNORMAL
OVALOCYTES BLD QL SMEAR: ABNORMAL
OXYGEN FLOW: 30
PCO2 ARTERIAL: 29 MMHG (ref 35–45)
PH ARTERIAL: 7.44 (ref 7.35–7.45)
PH UR STRIP.AUTO: 6 [PH] (ref 5–8)
PLATELET # BLD AUTO: 153 K/UL (ref 130–400)
PLATELET SLIDE REVIEW: ADEQUATE
PO2 ARTERIAL: 101 MMHG (ref 80–100)
POLYCHROMASIA BLD QL SMEAR: ABNORMAL
POTASSIUM BLD-SCNC: 4 MMOL/L
POTASSIUM SERPL-SCNC: 4.5 MMOL/L (ref 3.5–5.1)
PROT SERPL-MCNC: 5.5 G/DL (ref 6.4–8.3)
PROT UR STRIP.AUTO-MCNC: 30 MG/DL
PROTHROMBIN TIME: 14.6 SEC (ref 12–14.6)
RBC # BLD AUTO: 6.26 M/UL (ref 4.2–5.4)
RBC #/AREA URNS AUTO: 204 /HPF (ref 0–4)
SAMPLE SOURCE: ABNORMAL
SODIUM SERPL-SCNC: 131 MMOL/L (ref 136–145)
SP GR UR STRIP.AUTO: 1.01 (ref 1–1.03)
SPONT RATE(BPM): 36
TROPONIN, HIGH SENSITIVITY: 47 NG/L (ref 0–14)
TROPONIN, HIGH SENSITIVITY: 49 NG/L (ref 0–14)
UROBILINOGEN UR STRIP.AUTO-MCNC: 0.2 E.U./DL
WBC # BLD AUTO: 15.3 K/UL (ref 4.8–10.8)
WBC #/AREA URNS AUTO: 106 /HPF (ref 0–5)

## 2025-07-08 PROCEDURE — 85610 PROTHROMBIN TIME: CPT

## 2025-07-08 PROCEDURE — 94640 AIRWAY INHALATION TREATMENT: CPT

## 2025-07-08 PROCEDURE — 36415 COLL VENOUS BLD VENIPUNCTURE: CPT

## 2025-07-08 PROCEDURE — 71045 X-RAY EXAM CHEST 1 VIEW: CPT

## 2025-07-08 PROCEDURE — 99285 EMERGENCY DEPT VISIT HI MDM: CPT

## 2025-07-08 PROCEDURE — 2580000003 HC RX 258: Performed by: EMERGENCY MEDICINE

## 2025-07-08 PROCEDURE — 6360000002 HC RX W HCPCS: Performed by: EMERGENCY MEDICINE

## 2025-07-08 PROCEDURE — 96365 THER/PROPH/DIAG IV INF INIT: CPT

## 2025-07-08 PROCEDURE — 87086 URINE CULTURE/COLONY COUNT: CPT

## 2025-07-08 PROCEDURE — 81001 URINALYSIS AUTO W/SCOPE: CPT

## 2025-07-08 PROCEDURE — 80053 COMPREHEN METABOLIC PANEL: CPT

## 2025-07-08 PROCEDURE — 36600 WITHDRAWAL OF ARTERIAL BLOOD: CPT

## 2025-07-08 PROCEDURE — 87449 NOS EACH ORGANISM AG IA: CPT

## 2025-07-08 PROCEDURE — 6360000004 HC RX CONTRAST MEDICATION: Performed by: EMERGENCY MEDICINE

## 2025-07-08 PROCEDURE — 6370000000 HC RX 637 (ALT 250 FOR IP): Performed by: INTERNAL MEDICINE

## 2025-07-08 PROCEDURE — 87186 SC STD MICRODIL/AGAR DIL: CPT

## 2025-07-08 PROCEDURE — 6360000002 HC RX W HCPCS: Performed by: INTERNAL MEDICINE

## 2025-07-08 PROCEDURE — 2700000000 HC OXYGEN THERAPY PER DAY

## 2025-07-08 PROCEDURE — 51702 INSERT TEMP BLADDER CATH: CPT

## 2025-07-08 PROCEDURE — 2000000000 HC ICU R&B

## 2025-07-08 PROCEDURE — 2580000003 HC RX 258: Performed by: INTERNAL MEDICINE

## 2025-07-08 PROCEDURE — 2500000003 HC RX 250 WO HCPCS: Performed by: INTERNAL MEDICINE

## 2025-07-08 PROCEDURE — 83690 ASSAY OF LIPASE: CPT

## 2025-07-08 PROCEDURE — 85379 FIBRIN DEGRADATION QUANT: CPT

## 2025-07-08 PROCEDURE — 84484 ASSAY OF TROPONIN QUANT: CPT

## 2025-07-08 PROCEDURE — 87641 MR-STAPH DNA AMP PROBE: CPT

## 2025-07-08 PROCEDURE — 87040 BLOOD CULTURE FOR BACTERIA: CPT

## 2025-07-08 PROCEDURE — 85025 COMPLETE CBC W/AUTO DIFF WBC: CPT

## 2025-07-08 PROCEDURE — 93010 ELECTROCARDIOGRAM REPORT: CPT | Performed by: INTERNAL MEDICINE

## 2025-07-08 PROCEDURE — 87077 CULTURE AEROBIC IDENTIFY: CPT

## 2025-07-08 PROCEDURE — 94760 N-INVAS EAR/PLS OXIMETRY 1: CPT

## 2025-07-08 PROCEDURE — 96375 TX/PRO/DX INJ NEW DRUG ADDON: CPT

## 2025-07-08 PROCEDURE — 6370000000 HC RX 637 (ALT 250 FOR IP): Performed by: EMERGENCY MEDICINE

## 2025-07-08 PROCEDURE — 82803 BLOOD GASES ANY COMBINATION: CPT

## 2025-07-08 PROCEDURE — 83605 ASSAY OF LACTIC ACID: CPT

## 2025-07-08 PROCEDURE — 71275 CT ANGIOGRAPHY CHEST: CPT

## 2025-07-08 PROCEDURE — 83880 ASSAY OF NATRIURETIC PEPTIDE: CPT

## 2025-07-08 PROCEDURE — 93005 ELECTROCARDIOGRAM TRACING: CPT | Performed by: EMERGENCY MEDICINE

## 2025-07-08 PROCEDURE — 2500000003 HC RX 250 WO HCPCS: Performed by: EMERGENCY MEDICINE

## 2025-07-08 PROCEDURE — 99291 CRITICAL CARE FIRST HOUR: CPT | Performed by: INTERNAL MEDICINE

## 2025-07-08 RX ORDER — CLONAZEPAM 1 MG/1
0.5 TABLET ORAL EVERY 8 HOURS PRN
Status: DISCONTINUED | OUTPATIENT
Start: 2025-07-08 | End: 2025-07-09

## 2025-07-08 RX ORDER — FUROSEMIDE 10 MG/ML
60 INJECTION INTRAMUSCULAR; INTRAVENOUS ONCE
Status: COMPLETED | OUTPATIENT
Start: 2025-07-08 | End: 2025-07-08

## 2025-07-08 RX ORDER — SODIUM CHLORIDE 0.9 % (FLUSH) 0.9 %
5-40 SYRINGE (ML) INJECTION EVERY 12 HOURS SCHEDULED
Status: DISCONTINUED | OUTPATIENT
Start: 2025-07-08 | End: 2025-07-09

## 2025-07-08 RX ORDER — CYCLOBENZAPRINE HCL 10 MG
10 TABLET ORAL 3 TIMES DAILY PRN
COMMUNITY

## 2025-07-08 RX ORDER — MONTELUKAST SODIUM 10 MG/1
10 TABLET ORAL NIGHTLY
Status: DISCONTINUED | OUTPATIENT
Start: 2025-07-08 | End: 2025-07-11 | Stop reason: HOSPADM

## 2025-07-08 RX ORDER — MACITENTAN AND TADALAFIL 10; 40 MG/1; MG/1
1 TABLET, FILM COATED ORAL NIGHTLY
COMMUNITY

## 2025-07-08 RX ORDER — BUSPIRONE HYDROCHLORIDE 10 MG/1
10 TABLET ORAL 3 TIMES DAILY
Status: DISCONTINUED | OUTPATIENT
Start: 2025-07-08 | End: 2025-07-11 | Stop reason: HOSPADM

## 2025-07-08 RX ORDER — ENOXAPARIN SODIUM 100 MG/ML
40 INJECTION SUBCUTANEOUS DAILY
Status: DISCONTINUED | OUTPATIENT
Start: 2025-07-08 | End: 2025-07-11 | Stop reason: HOSPADM

## 2025-07-08 RX ORDER — ACETAMINOPHEN 325 MG/1
650 TABLET ORAL EVERY 6 HOURS PRN
Status: DISCONTINUED | OUTPATIENT
Start: 2025-07-08 | End: 2025-07-11 | Stop reason: HOSPADM

## 2025-07-08 RX ORDER — MIDAZOLAM HYDROCHLORIDE 2 MG/2ML
0.5 INJECTION, SOLUTION INTRAMUSCULAR; INTRAVENOUS ONCE
Status: COMPLETED | OUTPATIENT
Start: 2025-07-08 | End: 2025-07-08

## 2025-07-08 RX ORDER — TRAMADOL HYDROCHLORIDE 50 MG/1
50 TABLET ORAL EVERY 6 HOURS PRN
Status: ON HOLD | COMMUNITY
End: 2025-07-09

## 2025-07-08 RX ORDER — SILDENAFIL CITRATE 20 MG/1
20 TABLET ORAL 3 TIMES DAILY
Status: DISCONTINUED | OUTPATIENT
Start: 2025-07-08 | End: 2025-07-09 | Stop reason: SDUPTHER

## 2025-07-08 RX ORDER — ONDANSETRON 4 MG/1
4 TABLET, ORALLY DISINTEGRATING ORAL
COMMUNITY

## 2025-07-08 RX ORDER — VENLAFAXINE HYDROCHLORIDE 75 MG/1
75 CAPSULE, EXTENDED RELEASE ORAL
Status: DISCONTINUED | OUTPATIENT
Start: 2025-07-09 | End: 2025-07-11 | Stop reason: HOSPADM

## 2025-07-08 RX ORDER — SODIUM CHLORIDE 9 MG/ML
INJECTION, SOLUTION INTRAVENOUS PRN
Status: DISCONTINUED | OUTPATIENT
Start: 2025-07-08 | End: 2025-07-11 | Stop reason: HOSPADM

## 2025-07-08 RX ORDER — METOPROLOL SUCCINATE 25 MG/1
12.5 TABLET, EXTENDED RELEASE ORAL NIGHTLY
COMMUNITY

## 2025-07-08 RX ORDER — ACETYLCYSTEINE 200 MG/ML
600 SOLUTION ORAL; RESPIRATORY (INHALATION)
Status: DISCONTINUED | OUTPATIENT
Start: 2025-07-08 | End: 2025-07-11 | Stop reason: HOSPADM

## 2025-07-08 RX ORDER — IPRATROPIUM BROMIDE AND ALBUTEROL SULFATE 2.5; .5 MG/3ML; MG/3ML
1 SOLUTION RESPIRATORY (INHALATION) ONCE
Status: COMPLETED | OUTPATIENT
Start: 2025-07-08 | End: 2025-07-08

## 2025-07-08 RX ORDER — MYCOPHENOLATE MOFETIL 250 MG/1
500 CAPSULE ORAL 2 TIMES DAILY
Status: DISCONTINUED | OUTPATIENT
Start: 2025-07-08 | End: 2025-07-11 | Stop reason: HOSPADM

## 2025-07-08 RX ORDER — ONDANSETRON 2 MG/ML
4 INJECTION INTRAMUSCULAR; INTRAVENOUS EVERY 6 HOURS PRN
Status: DISCONTINUED | OUTPATIENT
Start: 2025-07-08 | End: 2025-07-11 | Stop reason: HOSPADM

## 2025-07-08 RX ORDER — GUAIFENESIN/DEXTROMETHORPHAN 100-10MG/5
5 SYRUP ORAL EVERY 4 HOURS PRN
Status: DISCONTINUED | OUTPATIENT
Start: 2025-07-08 | End: 2025-07-11 | Stop reason: HOSPADM

## 2025-07-08 RX ORDER — BUSPIRONE HYDROCHLORIDE 10 MG/1
10 TABLET ORAL EVERY 6 HOURS PRN
COMMUNITY

## 2025-07-08 RX ORDER — IPRATROPIUM BROMIDE AND ALBUTEROL SULFATE 2.5; .5 MG/3ML; MG/3ML
1 SOLUTION RESPIRATORY (INHALATION)
Status: DISCONTINUED | OUTPATIENT
Start: 2025-07-08 | End: 2025-07-11 | Stop reason: HOSPADM

## 2025-07-08 RX ORDER — POLYETHYLENE GLYCOL 3350 17 G/17G
17 POWDER, FOR SOLUTION ORAL DAILY PRN
Status: DISCONTINUED | OUTPATIENT
Start: 2025-07-08 | End: 2025-07-11 | Stop reason: HOSPADM

## 2025-07-08 RX ORDER — ONDANSETRON 4 MG/1
4 TABLET, ORALLY DISINTEGRATING ORAL EVERY 8 HOURS PRN
Status: DISCONTINUED | OUTPATIENT
Start: 2025-07-08 | End: 2025-07-11 | Stop reason: HOSPADM

## 2025-07-08 RX ORDER — FAMOTIDINE 20 MG/1
20 TABLET, FILM COATED ORAL 2 TIMES DAILY
COMMUNITY

## 2025-07-08 RX ORDER — SODIUM CHLORIDE 0.9 % (FLUSH) 0.9 %
5-40 SYRINGE (ML) INJECTION PRN
Status: DISCONTINUED | OUTPATIENT
Start: 2025-07-08 | End: 2025-07-11 | Stop reason: HOSPADM

## 2025-07-08 RX ORDER — TRAZODONE HYDROCHLORIDE 100 MG/1
100 TABLET ORAL NIGHTLY
Status: DISCONTINUED | OUTPATIENT
Start: 2025-07-08 | End: 2025-07-11 | Stop reason: HOSPADM

## 2025-07-08 RX ORDER — ACETAMINOPHEN 650 MG/1
650 SUPPOSITORY RECTAL EVERY 6 HOURS PRN
Status: DISCONTINUED | OUTPATIENT
Start: 2025-07-08 | End: 2025-07-11 | Stop reason: HOSPADM

## 2025-07-08 RX ORDER — IOPAMIDOL 755 MG/ML
70 INJECTION, SOLUTION INTRAVASCULAR
Status: COMPLETED | OUTPATIENT
Start: 2025-07-08 | End: 2025-07-08

## 2025-07-08 RX ADMIN — TRAZODONE HYDROCHLORIDE 100 MG: 100 TABLET ORAL at 20:41

## 2025-07-08 RX ADMIN — BUSPIRONE HYDROCHLORIDE 10 MG: 10 TABLET ORAL at 16:59

## 2025-07-08 RX ADMIN — SILDENAFIL CITRATE 20 MG: 20 TABLET ORAL at 20:41

## 2025-07-08 RX ADMIN — CEFEPIME 2000 MG: 2 INJECTION, POWDER, FOR SOLUTION INTRAVENOUS at 20:33

## 2025-07-08 RX ADMIN — METHYLPREDNISOLONE SODIUM SUCCINATE 125 MG: 125 INJECTION INTRAMUSCULAR; INTRAVENOUS at 10:17

## 2025-07-08 RX ADMIN — IPRATROPIUM BROMIDE AND ALBUTEROL SULFATE 1 DOSE: 2.5; .5 SOLUTION RESPIRATORY (INHALATION) at 18:08

## 2025-07-08 RX ADMIN — BUSPIRONE HYDROCHLORIDE 10 MG: 10 TABLET ORAL at 20:41

## 2025-07-08 RX ADMIN — VANCOMYCIN HYDROCHLORIDE 1000 MG: 1 INJECTION, POWDER, LYOPHILIZED, FOR SOLUTION INTRAVENOUS at 23:59

## 2025-07-08 RX ADMIN — IPRATROPIUM BROMIDE AND ALBUTEROL SULFATE 1 DOSE: 2.5; .5 SOLUTION RESPIRATORY (INHALATION) at 14:03

## 2025-07-08 RX ADMIN — METHYLPREDNISOLONE SODIUM SUCCINATE 375 MG: 500 INJECTION INTRAMUSCULAR; INTRAVENOUS at 15:33

## 2025-07-08 RX ADMIN — ONDANSETRON 4 MG: 2 INJECTION, SOLUTION INTRAMUSCULAR; INTRAVENOUS at 15:45

## 2025-07-08 RX ADMIN — SILDENAFIL CITRATE 20 MG: 20 TABLET ORAL at 16:58

## 2025-07-08 RX ADMIN — SODIUM CHLORIDE, PRESERVATIVE FREE 10 ML: 5 INJECTION INTRAVENOUS at 20:41

## 2025-07-08 RX ADMIN — MIDAZOLAM HYDROCHLORIDE 0.5 MG: 1 INJECTION, SOLUTION INTRAMUSCULAR; INTRAVENOUS at 10:14

## 2025-07-08 RX ADMIN — MYCOPHENOLATE MOFETIL 500 MG: 250 CAPSULE ORAL at 20:41

## 2025-07-08 RX ADMIN — MONTELUKAST SODIUM 10 MG: 10 TABLET, FILM COATED ORAL at 20:41

## 2025-07-08 RX ADMIN — CEFEPIME 2000 MG: 2 INJECTION, POWDER, FOR SOLUTION INTRAVENOUS at 11:16

## 2025-07-08 RX ADMIN — ENOXAPARIN SODIUM 40 MG: 100 INJECTION SUBCUTANEOUS at 15:02

## 2025-07-08 RX ADMIN — VANCOMYCIN HYDROCHLORIDE 2000 MG: 10 INJECTION, POWDER, LYOPHILIZED, FOR SOLUTION INTRAVENOUS at 12:31

## 2025-07-08 RX ADMIN — ACETYLCYSTEINE 600 MG: 200 SOLUTION ORAL; RESPIRATORY (INHALATION) at 18:08

## 2025-07-08 RX ADMIN — SODIUM BICARBONATE: 84 INJECTION, SOLUTION INTRAVENOUS at 15:00

## 2025-07-08 RX ADMIN — FUROSEMIDE 60 MG: 10 INJECTION, SOLUTION INTRAMUSCULAR; INTRAVENOUS at 11:12

## 2025-07-08 RX ADMIN — IPRATROPIUM BROMIDE AND ALBUTEROL SULFATE 1 DOSE: 2.5; .5 SOLUTION RESPIRATORY (INHALATION) at 10:10

## 2025-07-08 RX ADMIN — IOPAMIDOL 70 ML: 755 INJECTION, SOLUTION INTRAVENOUS at 11:33

## 2025-07-08 ASSESSMENT — ENCOUNTER SYMPTOMS
BACK PAIN: 0
VOMITING: 0
COLOR CHANGE: 0
COUGH: 1
DIARRHEA: 0
RHINORRHEA: 0
CONSTIPATION: 0
SHORTNESS OF BREATH: 1
VOICE CHANGE: 0
NAUSEA: 0

## 2025-07-08 ASSESSMENT — PAIN SCALES - GENERAL
PAINLEVEL_OUTOF10: 0
PAINLEVEL_OUTOF10: 0

## 2025-07-08 NOTE — PROGRESS NOTES
Patient arrived to  from ER with respiratory distress.ICU monitoring initiated. HR 140s, on HHFNC 40L, 80%fiO2. Patient is alert and oriented x 4. Daughters to bedside.

## 2025-07-08 NOTE — ED NOTES
[facility] to [facility] with Natalia Prince for Dr. Boykin. Left message to return call.

## 2025-07-08 NOTE — CARE COORDINATION
Case Management Assessment  Initial Evaluation    Date/Time of Evaluation: 7/8/2025 3:47 PM  Assessment Completed by: Jacquelyn Porras    If patient is discharged prior to next notation, then this note serves as note for discharge by case management.    Patient Name: Kirsten Montano                   YOB: 1960  Diagnosis: Pulmonary fibrosis (HCC) [J84.10]  Respiratory distress [R06.03]  CREST (calcinosis, Raynaud's phenomenon, esophageal dysfunction, sclerodactyly, telangiectasia) (HCC) [M34.1]  Acute on chronic respiratory failure with hypoxia (HCC) [J96.21]  Acute respiratory failure with hypoxemia (HCC) [J96.01]                   Date / Time: 7/8/2025  9:58 AM    Patient Admission Status: Inpatient   Readmission Risk (Low < 19, Mod (19-27), High > 27): Readmission Risk Score: 15.2    Current PCP: Femi Bruce MD  PCP verified by CM? (P) Yes    Chart Reviewed: Yes      History Provided by: (P) Medical Record  Patient Orientation: (P) Alert and Oriented    Patient Cognition: (P) Alert    Hospitalization in the last 30 days (Readmission):  No    If yes, Readmission Assessment in  Navigator will be completed.    Advance Directives:      Code Status: Full Code   Patient's Primary Decision Maker is:        Discharge Planning:    Patient lives with: (P) Alone Type of Home: (P) Skilled Nursing Facility  Primary Care Giver: (P) Other (Comment) (SNF)  Patient Support Systems include: (P) Spouse/Significant Other, Family Members   Current Financial resources: (P) Medicare  Current community resources: (P) None  Current services prior to admission: (P) Skilled Nursing Facility            Current DME:              Type of Home Care services:  (P) None    ADLS  Prior functional level: (P) Assistance with the following:, Bathing, Dressing, Mobility  Current functional level: (P) Assistance with the following:, Bathing, Dressing, Mobility    PT AM-PAC:   /24  OT AM-PAC:   /24    Family can provide assistance at

## 2025-07-08 NOTE — CARE COORDINATION
Pt is from [facility]. Awaiting for bedhold status.  [facility] ([facility]   [phone] P  [phone] F  Electronically signed by Jacquelyn Porras on 7/8/2025 at 12:41 PM

## 2025-07-08 NOTE — H&P
[facility] Medicine H&P    Patient:  Kirsten Montano  MRN: [id number]    Consulting Physician: Beka Devlin DO  Reason for Consult: Medical Management  Primacy Care Physician: Femi Bruce MD    HISTORY OF PRESENT ILLNESS:   The patient is a 65 y.o. female presents to the ER with increased respiratory distress.  She has a very complicated medical history.  She was diagnosed with lupus in her 30s and has developed CREST.  She has pulmonary fibrosis and is followed by pulmonology at [facility] and [facility].  She had a recent right heart cath at [facility] and did not have significantly elevated pressures per her report.  She was hypoxic on intake in the ER.  She has been stabilized with heated high flow currently.  She is not able to tolerate BIPAP.  She will be admitted to ICU.  I will ask pulmonology and ID to evaluate.  We did discuss code status and she does want intubation and enteral feedings if she were to deteriorate.    Past Medical History:        Diagnosis Date    Allergies     Arthritis     Calcified granuloma of lung     Right    Chronic respiratory failure with hypoxia, on home O2 therapy (HCC)     Colon polyps     CREST syndrome (HCC)     scleroderma    Gastroesophageal reflux disease without esophagitis     Kidney calculi     Lupus     RIOS (nonalcoholic steatohepatitis)     LORETTA on CPAP     Primary biliary cirrhosis (HCC)     Pulmonary fibrosis (HCC)     Pulmonary hypertension (HCC)     Short-term memory loss        Past Surgical History:        Procedure Laterality Date    BREAST BIOPSY      x 3, all benign    CARDIAC CATHETERIZATION  07/22/2020    Dr Elizabeth    CHOLECYSTECTOMY  07/13/2015    Dr Carver-Chronic cholecystitis and cholesterolosis with cholelithiasis    COLONOSCOPY  08/04/2021    Dr Briones-AP, 5 yr recall    COLONOSCOPY  05/11/2015    Dr Briones-AP    LIVER BIOPSY  07/13/2015    Dr Carver-Mild fatty metamorphosis, marked portal triaditis w/focal periportal inflammation, scattered  hypoxemia.  Admit to ICU.  Continue heated high flow.  Pulmonology and ID consults.  Pneumonia pathway.    Further recommendations to follow.    43 minutes of critical care time spent excluding procedures.      Beka Devlin, DO

## 2025-07-08 NOTE — CONSULTS
INFECTIOUS DISEASES CONSULT NOTE    Patient:  Kirsten Montano 65 y.o. female  ROOM # [unfilled]  YOB: 1960  MRN: [id number]  [facility]:  [id number]  Admit date: 7/8/2025   Admitting Physician: Beka Devlin DO  Primary Care Physician: Femi Bruce MD  REFERRING PROVIDER: No ref. provider found    Reason for Consultation: \"Pneumonia in a patient with CREST\"    Chief Complaint: Recommendations for antibiotic management patient with severe pulmonary fibrosis, crest, and progressive respiratory decline.    History of Present Illness: 65-year-old woman.  Currently in ICU.  History is obtained from patient's daughters, , and chart review.  She has a long history of crest.  She has had multiple hospitalizations over the past 4 to 5 months.  She has been to [facility].  She has been to [facility] in [place].  They indicate her most recent hospitalization was at [facility] in [place].  She had undergone right heart catheterization per the report to look for pulmonary hypertension as a cause for her progressive symptoms in addition to her pulmonary fibrosis.  She has had progressive pulmonary fibrosis per the report as result of her crest.  When she left [facility], she went to [facility].  She was on 10 L of high flow oxygen.  She also periodically would be on additional 6 L.  They indicate despite the therapy at [facility] she continued to show some slow decline.  They indicate she would get short of breath with even rolling over in bed.  She would easily drop her oxygen saturations into the 80s.  Due to the progressive dyspnea they brought her back to the hospital.  She was admitted for further management.  She has not been experiencing fever.  From what they describe with each of her hospitalizations she has had decline and then recovery about 2 physician a little bit better than when she went into the hospital but after discharge would have some progressive  [facility] June 12, 2025-report copied below:    Left ventricular systolic function is normal. Left ventricular ejection   fraction appears to be 61 - 65%.     Left ventricular wall thickness is consistent with moderate concentric   hypertrophy.    The right ventricular cavity is borderline dilated with low normal   systolic function noted.     No significant valvular abnormalities identified on this study.     Left Ventricle   Left ventricular systolic function is normal. Left ventricular ejection fraction appears to be 61 - 65%.   Normal left ventricular cavity size noted. Left ventricular wall thickness is consistent with moderate concentric hypertrophy. All left ventricular wall segments contract normally.   Right Ventricle   The right ventricular cavity is borderline dilated. Borderline low right ventricular systolic function noted.   Left Atrium   Normal left atrial size and volume noted.   Right Atrium   Normal right atrial cavity size noted.   Mitral Valve   Mitral annular calcification is present. No significant mitral valve regurgitation is present. No significant mitral valve stenosis is present.   Tricuspid Valve   The tricuspid valve is structurally normal with no significant regurgitation or significant stenosis present. Estimated right ventricular systolic pressure from tricuspid regurgitation is normal (<35 mmHg).     Aortic Valve   The aortic valve is abnormal in structure. There is calcification of the aortic valve. The aortic valve appears trileaflet. No significant aortic valve regurgitation is present. No hemodynamically significant aortic valve stenosis is present.     Pulmonic Valve   The pulmonic valve is grossly normal in structure. There is mild pulmonic valve regurgitation present. There is no pulmonic valve stenosis present.     Pericardium   There is no evidence of pericardial effusion. . There is evidence of a fat pad present.     Greater Vessels   No dilation of the aortic root is

## 2025-07-08 NOTE — TELEPHONE ENCOUNTER
Message sent to Dr. Beal:    [3:27 PM] Mayelin Valdovinos ([facility])     New Consult:  Ying with [facility] ICU   [phone]   Elaine Juárez, 1960  [facility] ICU # [id number]  Consulting: Alin Rain DO  For: Pneumonia in patient with CREST        [3:31 PM] Lg Beal ([facility])   Received the consult

## 2025-07-08 NOTE — PROGRESS NOTES
[facility]   Pharmacy Pharmacokinetic Monitoring Service - Vancomycin     Kirsten Montano is a 65 y.o. female starting on vancomycin therapy for pneumonia nosocomial. Pharmacy consulted by Dr. Boykin for monitoring and adjustment.    Target Concentration: Goal AUC/ABENA 400-600 mg*hr/L    Additional Antimicrobials: Cefepime    Pertinent Laboratory Values:   Wt Readings from Last 1 Encounters:   07/08/25 81.6 kg (180 lb)     Temp Readings from Last 1 Encounters:   07/08/25 98.2 °F (36.8 °C)     Estimated Creatinine Clearance: 81 mL/min (based on SCr of 0.7 mg/dL).  Recent Labs     07/08/25  1005   CREATININE 0.7   BUN 22   WBC 15.3*     Procalcitonin: N/A    Pertinent Cultures:  Culture Date Source Results   07/08/25 Blood x 2 collected   MRSA Nasal Swab: not ordered. Order placed by pharmacy.    Plan:  Dosing recommendations based on Bayesian software  Start vancomycin 2000 mg IV x 1 dose followed by 1000 mg IV Q 12 hours.  Anticipated AUC of 512 and trough concentration of 16.2 at steady state  Renal labs as indicated   Vancomycin concentration ordered for 07/09/25 @ 1100   Pharmacy will continue to monitor patient and adjust therapy as indicated    Thank you for the consult,  Romero Muñoz RPH  7/8/2025 11:07 AM

## 2025-07-08 NOTE — ED PROVIDER NOTES
[facility] EMERGENCY DEPARTMENT  eMERGENCY dEPARTMENT eNCOUnter      Pt Name: Kirsten Montano  MRN: [id number]  Birthdate 1960  Date of evaluation: 7/8/2025  Provider: Prakash Boykin MD    CHIEF COMPLAINT       Chief Complaint   Patient presents with    Respiratory Distress     From [facility]. Wears 10L/min NC at baseline. Staff reports increased shortness of breath. Pt arrives to ED with 15L/min NRB          HISTORY OF PRESENT ILLNESS   (Location/Symptom, Timing/Onset,Context/Setting, Quality, Duration, Modifying Factors, Severity)  Note limiting factors.   Kirsten Montano is a 65 y.o. female who presents to the emergency department with respiratory distress.  The patient wears 10 L nasal cannula at baseline per report from the nursing home.  She has CREST syndrome with pulmonary fibrosis.  She goes to [facility] pulmonology.  The patient was profoundly short of breath today it sounds like it started yesterday.  At some point her oxygen accidentally got discontinued yesterday for some period of time it sounds like.  The patient was admitted in May to [facility] and received high-dose steroids for her pulmonary fibrosis along with breathing treatments and Vanco and cefepime for possible superimposed pneumonia.  The patient's on medications as well for pulmonary hypertension.  She arrives to the ER today and extremis.  She is saturating on a nonrebreather and a nasal cannula on high flow in the 70s.  Per report she got down into the 50s at some point.  The patient is breathing rapidly with a respiratory rate in the 60s.  Little is known at time of arrival secondary to not all records being populated in [facility] from outside hospitals.  The patient is a poor historian and in distress on arrival.    The history is provided by the patient, the EMS personnel, a relative and medical records.       NursingNotes were reviewed.    REVIEW OF SYSTEMS    (2-9 systems for level 4, 10 or more for level 5)     Review of Systems  updating them multiple times.     Pt critical but improved from arrival, stable for transfer to ICU. Very guarded prognosis and if intubated high likely tabor at not being extubated based on lung situation.        Amount and/or Complexity of Data Reviewed  Clinical lab tests: ordered and reviewed  Tests in the radiology section of CPT®: ordered and reviewed  Obtain history from someone other than the patient: yes  Review and summarize past medical records: yes  Discuss the patient with other providers: yes  Independent visualization of images, tracings, or specimens: yes    Risk of Complications, Morbidity, and/or Mortality  Presenting problems: high  Management options: high    Critical Care  Total time providing critical care: 40 minutes    Patient Progress  Patient progress: stable      CRITICAL CARE TIME   Total Critical Care time was 40 minutes, excluding separately reportable procedures.  There was a high probability of clinically significant/life threatening deterioration in the patient's condition which required my urgent intervention.        CONSULTS:  IP CONSULT TO PHARMACY  IP CONSULT TO PULMONOLOGY    PROCEDURES:  Unless otherwise notedbelow, none     Procedures    FINAL IMPRESSION     1. Acute on chronic respiratory failure with hypoxia (HCC)    2. CREST (calcinosis, Raynaud's phenomenon, esophageal dysfunction, sclerodactyly, telangiectasia) (HCC)    3. Pulmonary fibrosis (HCC)    4. Respiratory distress          DISPOSITION/PLAN   DISPOSITION Decision To Admit 07/08/2025 11:13:35 AM   DISPOSITION CONDITION Stable           PATIENT REFERRED TO:  No follow-up provider specified.    DISCHARGE MEDICATIONS:  New Prescriptions    No medications on file          (Please note that portions of this note were completed with a voice recognition program.  Efforts were made to edit the dictations butoccasionally words are mis-transcribed.)    Prakash Boykin MD (electronically signed)  AttendingEmergency

## 2025-07-08 NOTE — CONSULTS
Pulmonary and Critical Care Consult Note    [facility]    [facility]    MRN# [id number]    [id number]  7/8/2025   6:15 PM CDT    Referring Provider:Beka Devlin DO      Chief Complaint: Shortness of breath    Requesting physician: Dr. Boykin    Reason for consult: Respiratory failure, pulmonary fibrosis      HPI: We have been consulted to see this 65 y.o. year old female born on 1960.  The patient presented to the hospital due to increasing shortness of breath and hypoxia.  The patient does have a history of pulmonary fibrosis and crest syndrome.  She had been on high flow oxygen up to 8 L/min at the nursing home.  In the emergency room she had a CT pulmonary angiogram that showed no evidence of PE however she did have a large consolidation in her right upper lobe.  There was air bronchograms is seen throughout the consolidation.  I was asked to see her regarding the above.  She was started empirically on IV antibiotic therapy.  Infectious disease Consult was placed.  The patient currently is in intensive care unit on high flow nasal cannula oxygen.  She says that she is feeling better.  The patient is interested will and going home with hospice      Past Medical History      Past Medical History:   Diagnosis Date    Allergies     Arthritis     Calcified granuloma of lung     Right    Chronic respiratory failure with hypoxia, on home O2 therapy (HCC)     Colon polyps     CREST syndrome (HCC)     scleroderma    Gastroesophageal reflux disease without esophagitis     Kidney calculi     Lupus     RIOS (nonalcoholic steatohepatitis)     LORETTA on CPAP     Primary biliary cirrhosis (HCC)     Pulmonary fibrosis (HCC)     Pulmonary hypertension (HCC)     Short-term memory loss      SurgicalHistory  Past Surgical History:   Procedure Laterality Date    BREAST BIOPSY      x 3, all benign  with pulmonary arterial hypertension. 3.5 cm ectatic ascending aorta.  All CT scans are performed using dose optimization techniques as appropriate to the performed exam and include at least one of the following: Automated exposure control, adjustment of the mA and/or kV according to size, and the use of iterative reconstruction technique.  ______________________________________ Electronically signed by: OLEG BETANCUR M.D. Date:     07/08/2025 Time:    11:50     XR CHEST PORTABLE  Result Date: 7/8/2025   Right midlung consolidation may be alveolar edema versus  Mild interstitial edema.  Probable small right pleural effusion.   ______________________________________ Electronically signed by: OLEG BETANCUR M.D. Date:     07/08/2025 Time:    10:28        My radiograph interpretation/independent review of imaging: CT of the chest with a large upper lobe consolidation on the right and honeycomb lung and pulmonary fibrosis    Problem list generated by [employer]:  Hospital Problems           Last Modified POA    * (Principal) Acute respiratory failure with hypoxemia (HCC) 7/8/2025 Yes          Pulmonary Assessment/plan:    Acute on chronic hypoxic respiratory failure requiring high flow oxygen at 8 L/min and 100% FiO2.  Continue supplemental oxygen as necessary to assure adequate oxygen saturation above 90%.  The patient seems to be comfortable at this time.  Consider noninvasive ventilation with high expiratory pressure to assure PEEP for alveolar recruitment if necessary.  Community-acquired pneumonia in immunocompromised host.  Consider broad-spectrum IV antibiotic therapy.  Infectious disease following.  Underlying pulmonary fibrosis and crest syndrome with longstanding lupus.  The patient received a pulse dose of steroid.  DVT prophylaxis.  Prognosis is guarded.  Discussed extensively at the bedside with the patient, her family, and the nursing staff.  Discussed with Dr. Devlin.    Critical care time 36 min       Vimal

## 2025-07-08 NOTE — PROGRESS NOTES
4 Eyes Skin Assessment     NAME:  Kirsten Montano  YOB: 1960  MEDICAL RECORD NUMBER:  [id number]    The patient is being assessed for  Admission    I agree that at least one RN has performed a thorough Head to Toe Skin Assessment on the patient. ALL assessment sites listed below have been assessed.      Areas assessed by both nurses:    Head, Face, Ears, Shoulders, Back, Chest, Arms, Elbows, Hands, Sacrum. Buttock, Coccyx, Ischium, Legs. Feet and Heels, and Under Medical Devices         Does the Patient have a Wound? No noted wound(s)       Basil Prevention initiated by RN: Yes  Wound Care Orders initiated by RN: No    Pressure Injury (Stage 1,2,3,4, Unstageable, DTI, NWPT, and Complex wounds) if present, place Wound referral order by RN under : No    New Ostomies, if present place, Ostomy referral order under : No     Nurse 1 eSignature: Electronically signed by Kirsten Canas RN on 7/8/25 at 4:17 PM CDT    **SHARE this note so that the co-signing nurse can place an eSignature**    Nurse 2 eSignature: Electronically signed by Karon Teague RN on 7/8/25 at 4:40 PM CDT

## 2025-07-09 PROBLEM — G89.4 CHRONIC PAIN SYNDROME: Status: ACTIVE | Noted: 2025-07-09

## 2025-07-09 PROBLEM — M34.1 CREST (CALCINOSIS, RAYNAUD'S PHENOMENON, ESOPHAGEAL DYSFUNCTION, SCLERODACTYLY, TELANGIECTASIA) (HCC): Status: ACTIVE | Noted: 2025-07-09

## 2025-07-09 PROBLEM — J84.10 PULMONARY FIBROSIS (HCC): Status: ACTIVE | Noted: 2025-07-09

## 2025-07-09 PROBLEM — K22.4 ESOPHAGEAL DYSMOTILITY: Chronic | Status: ACTIVE | Noted: 2022-12-29

## 2025-07-09 PROBLEM — F41.9 ANXIETY AND DEPRESSION: Status: ACTIVE | Noted: 2025-07-09

## 2025-07-09 PROBLEM — M34.1 CREST SYNDROME (HCC): Chronic | Status: ACTIVE | Noted: 2017-01-23

## 2025-07-09 PROBLEM — F33.9 CHRONIC RECURRENT MAJOR DEPRESSIVE DISORDER: Status: ACTIVE | Noted: 2023-04-26

## 2025-07-09 PROBLEM — J96.21 ACUTE ON CHRONIC RESPIRATORY FAILURE WITH HYPOXIA (HCC): Status: ACTIVE | Noted: 2025-07-09

## 2025-07-09 PROBLEM — F32.A ANXIETY AND DEPRESSION: Status: ACTIVE | Noted: 2025-07-09

## 2025-07-09 PROBLEM — M32.9 SYSTEMIC LUPUS ERYTHEMATOSUS (HCC): Status: ACTIVE | Noted: 2025-07-09

## 2025-07-09 PROBLEM — J84.9 INTERSTITIAL LUNG DISEASE (HCC): Status: ACTIVE | Noted: 2018-08-22

## 2025-07-09 PROBLEM — K74.3 PRIMARY BILIARY CIRRHOSIS (HCC): Status: ACTIVE | Noted: 2025-07-09

## 2025-07-09 PROBLEM — Z51.5 PALLIATIVE CARE PATIENT: Status: ACTIVE | Noted: 2025-07-09

## 2025-07-09 LAB
ALBUMIN SERPL-MCNC: 2.7 G/DL (ref 3.5–5.2)
ALP SERPL-CCNC: 170 U/L (ref 35–104)
ALT SERPL-CCNC: 22 U/L (ref 10–35)
ANION GAP SERPL CALCULATED.3IONS-SCNC: 14 MMOL/L (ref 8–16)
ANISOCYTOSIS BLD QL SMEAR: ABNORMAL
AST SERPL-CCNC: 28 U/L (ref 10–35)
BASOPHILS # BLD: 0 K/UL (ref 0–0.2)
BASOPHILS NFR BLD: 0 % (ref 0–1)
BILIRUB SERPL-MCNC: 0.2 MG/DL (ref 0.2–1.2)
BUN SERPL-MCNC: 27 MG/DL (ref 8–23)
CALCIUM SERPL-MCNC: 7.6 MG/DL (ref 8.8–10.2)
CHLORIDE SERPL-SCNC: 95 MMOL/L (ref 98–107)
CO2 SERPL-SCNC: 23 MMOL/L (ref 22–29)
CREAT SERPL-MCNC: 0.8 MG/DL (ref 0.5–0.9)
DACRYOCYTES BLD QL SMEAR: ABNORMAL
EOSINOPHIL # BLD: 0 K/UL (ref 0–0.6)
EOSINOPHIL NFR BLD: 0 % (ref 0–5)
ERYTHROCYTE [DISTWIDTH] IN BLOOD BY AUTOMATED COUNT: 19.9 % (ref 11.5–14.5)
GLUCOSE SERPL-MCNC: 145 MG/DL (ref 70–99)
HCT VFR BLD AUTO: 41.1 % (ref 37–47)
HGB BLD-MCNC: 12.9 G/DL (ref 12–16)
HYPOCHROMIA BLD QL SMEAR: ABNORMAL
IMM GRANULOCYTES # BLD: 1.9 K/UL
LEGIONELLA AG UR QL: NORMAL
LYMPHOCYTES # BLD: 2.4 K/UL (ref 1.1–4.5)
LYMPHOCYTES NFR BLD: 8 % (ref 20–40)
MCH RBC QN AUTO: 24.3 PG (ref 27–31)
MCHC RBC AUTO-ENTMCNC: 31.4 G/DL (ref 33–37)
MCV RBC AUTO: 77.4 FL (ref 81–99)
METAMYELOCYTES NFR BLD MANUAL: 5 %
MICROCYTES BLD QL SMEAR: ABNORMAL
MONOCYTES # BLD: 0.2 K/UL (ref 0–0.9)
MONOCYTES NFR BLD: 2 % (ref 0–10)
MYELOCYTES NFR BLD MANUAL: 1 %
NEUTROPHILS # BLD: 9.3 K/UL (ref 1.5–7.5)
NEUTS BAND NFR BLD MANUAL: 6 % (ref 0–5)
NEUTS SEG NFR BLD: 66 % (ref 50–65)
OVALOCYTES BLD QL SMEAR: ABNORMAL
PHOSPHATE SERPL-MCNC: 3.3 MG/DL (ref 2.5–4.5)
PLATELET # BLD AUTO: 129 K/UL (ref 130–400)
PLATELET SLIDE REVIEW: ABNORMAL
PMV BLD AUTO: 11.4 FL (ref 9.4–12.3)
POLYCHROMASIA BLD QL SMEAR: ABNORMAL
POTASSIUM SERPL-SCNC: 3.9 MMOL/L (ref 3.5–5)
PROT SERPL-MCNC: 4.9 G/DL (ref 6.4–8.3)
RBC # BLD AUTO: 5.31 M/UL (ref 4.2–5.4)
S PNEUM AG SPEC QL: NORMAL
SODIUM SERPL-SCNC: 132 MMOL/L (ref 136–145)
VANCOMYCIN TROUGH SERPL-MCNC: 18.2 UG/ML (ref 10–20)
VARIANT LYMPHS NFR BLD: 12 % (ref 0–8)
WBC # BLD AUTO: 11.9 K/UL (ref 4.8–10.8)

## 2025-07-09 PROCEDURE — 87449 NOS EACH ORGANISM AG IA: CPT

## 2025-07-09 PROCEDURE — 84100 ASSAY OF PHOSPHORUS: CPT

## 2025-07-09 PROCEDURE — 6360000002 HC RX W HCPCS: Performed by: INTERNAL MEDICINE

## 2025-07-09 PROCEDURE — 2580000003 HC RX 258: Performed by: INTERNAL MEDICINE

## 2025-07-09 PROCEDURE — 2700000000 HC OXYGEN THERAPY PER DAY

## 2025-07-09 PROCEDURE — 94760 N-INVAS EAR/PLS OXIMETRY 1: CPT

## 2025-07-09 PROCEDURE — G0316 PR PROLONG INPT EVAL ADD15 M: HCPCS | Performed by: PHYSICIAN ASSISTANT

## 2025-07-09 PROCEDURE — 6370000000 HC RX 637 (ALT 250 FOR IP): Performed by: INTERNAL MEDICINE

## 2025-07-09 PROCEDURE — 2500000003 HC RX 250 WO HCPCS: Performed by: INTERNAL MEDICINE

## 2025-07-09 PROCEDURE — 80202 ASSAY OF VANCOMYCIN: CPT

## 2025-07-09 PROCEDURE — 80053 COMPREHEN METABOLIC PANEL: CPT

## 2025-07-09 PROCEDURE — 2000000000 HC ICU R&B

## 2025-07-09 PROCEDURE — 51798 US URINE CAPACITY MEASURE: CPT

## 2025-07-09 PROCEDURE — 6370000000 HC RX 637 (ALT 250 FOR IP): Performed by: PHYSICIAN ASSISTANT

## 2025-07-09 PROCEDURE — 85025 COMPLETE CBC W/AUTO DIFF WBC: CPT

## 2025-07-09 PROCEDURE — 94640 AIRWAY INHALATION TREATMENT: CPT

## 2025-07-09 PROCEDURE — 99223 1ST HOSP IP/OBS HIGH 75: CPT | Performed by: PHYSICIAN ASSISTANT

## 2025-07-09 PROCEDURE — 36415 COLL VENOUS BLD VENIPUNCTURE: CPT

## 2025-07-09 RX ORDER — SODIUM CHLORIDE/ALOE VERA
GEL (GRAM) NASAL 3 TIMES DAILY
Status: DISCONTINUED | OUTPATIENT
Start: 2025-07-09 | End: 2025-07-11 | Stop reason: HOSPADM

## 2025-07-09 RX ORDER — POTASSIUM CITRATE 1080 MG/1
10 TABLET, EXTENDED RELEASE ORAL
COMMUNITY

## 2025-07-09 RX ORDER — HYDROXYZINE HYDROCHLORIDE 25 MG/1
25 TABLET, FILM COATED ORAL EVERY 6 HOURS PRN
COMMUNITY

## 2025-07-09 RX ORDER — SPIRONOLACTONE 50 MG/1
50 TABLET, FILM COATED ORAL DAILY
COMMUNITY

## 2025-07-09 RX ORDER — ONDANSETRON 8 MG/1
4 TABLET, FILM COATED ORAL
Status: DISCONTINUED | OUTPATIENT
Start: 2025-07-09 | End: 2025-07-11 | Stop reason: HOSPADM

## 2025-07-09 RX ORDER — NALOXONE HYDROCHLORIDE 0.4 MG/ML
0.4 INJECTION, SOLUTION INTRAMUSCULAR; INTRAVENOUS; SUBCUTANEOUS PRN
COMMUNITY

## 2025-07-09 RX ORDER — BUSPIRONE HYDROCHLORIDE 10 MG/1
10 TABLET ORAL DAILY
COMMUNITY

## 2025-07-09 RX ORDER — ALENDRONATE SODIUM 70 MG/1
70 TABLET ORAL
COMMUNITY

## 2025-07-09 RX ORDER — SODIUM CHLORIDE FOR INHALATION 3 %
4 VIAL, NEBULIZER (ML) INHALATION 2 TIMES DAILY PRN
COMMUNITY

## 2025-07-09 RX ORDER — ARFORMOTEROL TARTRATE 15 UG/2ML
1 SOLUTION RESPIRATORY (INHALATION) 2 TIMES DAILY
COMMUNITY

## 2025-07-09 RX ORDER — PREDNISONE 5 MG/1
5 TABLET ORAL DAILY
COMMUNITY

## 2025-07-09 RX ORDER — CLONIDINE HYDROCHLORIDE 0.1 MG/1
0.1 TABLET ORAL EVERY 8 HOURS PRN
COMMUNITY

## 2025-07-09 RX ORDER — ACETAMINOPHEN 325 MG/1
650 TABLET ORAL EVERY 6 HOURS PRN
COMMUNITY

## 2025-07-09 RX ORDER — ALPRAZOLAM 0.5 MG
0.25 TABLET ORAL 3 TIMES DAILY PRN
Status: DISCONTINUED | OUTPATIENT
Start: 2025-07-09 | End: 2025-07-11 | Stop reason: HOSPADM

## 2025-07-09 RX ORDER — BENZOCAINE/MENTHOL 6 MG-10 MG
1 LOZENGE MUCOUS MEMBRANE 2 TIMES DAILY
COMMUNITY

## 2025-07-09 RX ORDER — POLYETHYLENE GLYCOL 3350 17 G/17G
17 POWDER, FOR SOLUTION ORAL DAILY PRN
COMMUNITY

## 2025-07-09 RX ORDER — HYDROCODONE BITARTRATE AND ACETAMINOPHEN 10; 325 MG/1; MG/1
1 TABLET ORAL EVERY 6 HOURS PRN
Refills: 0 | Status: DISCONTINUED | OUTPATIENT
Start: 2025-07-09 | End: 2025-07-11 | Stop reason: HOSPADM

## 2025-07-09 RX ADMIN — ONDANSETRON HYDROCHLORIDE 4 MG: 8 TABLET, FILM COATED ORAL at 15:37

## 2025-07-09 RX ADMIN — CEFEPIME 2000 MG: 2 INJECTION, POWDER, FOR SOLUTION INTRAVENOUS at 03:24

## 2025-07-09 RX ADMIN — SILDENAFIL CITRATE 20 MG: 20 TABLET ORAL at 08:10

## 2025-07-09 RX ADMIN — SODIUM CHLORIDE, PRESERVATIVE FREE 10 ML: 5 INJECTION INTRAVENOUS at 08:18

## 2025-07-09 RX ADMIN — BUSPIRONE HYDROCHLORIDE 10 MG: 10 TABLET ORAL at 08:10

## 2025-07-09 RX ADMIN — MEROPENEM 1000 MG: 1 INJECTION INTRAVENOUS at 10:35

## 2025-07-09 RX ADMIN — MYCOPHENOLATE MOFETIL 500 MG: 250 CAPSULE ORAL at 08:09

## 2025-07-09 RX ADMIN — AZITHROMYCIN MONOHYDRATE 500 MG: 500 INJECTION, POWDER, LYOPHILIZED, FOR SOLUTION INTRAVENOUS at 08:09

## 2025-07-09 RX ADMIN — Medication: at 15:59

## 2025-07-09 RX ADMIN — IPRATROPIUM BROMIDE AND ALBUTEROL SULFATE 1 DOSE: 2.5; .5 SOLUTION RESPIRATORY (INHALATION) at 09:59

## 2025-07-09 RX ADMIN — ENOXAPARIN SODIUM 40 MG: 100 INJECTION SUBCUTANEOUS at 15:28

## 2025-07-09 RX ADMIN — ALPRAZOLAM 0.25 MG: 0.5 TABLET ORAL at 15:28

## 2025-07-09 RX ADMIN — SODIUM CHLORIDE, PRESERVATIVE FREE 10 ML: 5 INJECTION INTRAVENOUS at 20:21

## 2025-07-09 RX ADMIN — MONTELUKAST SODIUM 10 MG: 10 TABLET, FILM COATED ORAL at 20:21

## 2025-07-09 RX ADMIN — BUSPIRONE HYDROCHLORIDE 10 MG: 10 TABLET ORAL at 20:21

## 2025-07-09 RX ADMIN — IPRATROPIUM BROMIDE AND ALBUTEROL SULFATE 1 DOSE: 2.5; .5 SOLUTION RESPIRATORY (INHALATION) at 18:17

## 2025-07-09 RX ADMIN — ACETYLCYSTEINE 600 MG: 200 SOLUTION ORAL; RESPIRATORY (INHALATION) at 10:00

## 2025-07-09 RX ADMIN — VENLAFAXINE HYDROCHLORIDE 75 MG: 75 CAPSULE, EXTENDED RELEASE ORAL at 08:10

## 2025-07-09 RX ADMIN — BUSPIRONE HYDROCHLORIDE 10 MG: 10 TABLET ORAL at 15:28

## 2025-07-09 RX ADMIN — MYCOPHENOLATE MOFETIL 500 MG: 250 CAPSULE ORAL at 20:21

## 2025-07-09 RX ADMIN — ONDANSETRON 4 MG: 2 INJECTION, SOLUTION INTRAMUSCULAR; INTRAVENOUS at 05:24

## 2025-07-09 RX ADMIN — Medication: at 20:21

## 2025-07-09 RX ADMIN — MEROPENEM 1000 MG: 1 INJECTION INTRAVENOUS at 15:43

## 2025-07-09 RX ADMIN — IPRATROPIUM BROMIDE AND ALBUTEROL SULFATE 1 DOSE: 2.5; .5 SOLUTION RESPIRATORY (INHALATION) at 06:30

## 2025-07-09 RX ADMIN — TRAZODONE HYDROCHLORIDE 100 MG: 100 TABLET ORAL at 20:21

## 2025-07-09 ASSESSMENT — PAIN DESCRIPTION - PAIN TYPE: TYPE: CHRONIC PAIN

## 2025-07-09 ASSESSMENT — PAIN DESCRIPTION - LOCATION: LOCATION: ABDOMEN

## 2025-07-09 ASSESSMENT — PAIN SCALES - GENERAL
PAINLEVEL_OUTOF10: 0
PAINLEVEL_OUTOF10: 10
PAINLEVEL_OUTOF10: 0

## 2025-07-09 ASSESSMENT — PAIN DESCRIPTION - DESCRIPTORS: DESCRIPTORS: DISCOMFORT

## 2025-07-09 ASSESSMENT — PAIN - FUNCTIONAL ASSESSMENT: PAIN_FUNCTIONAL_ASSESSMENT: ACTIVITIES ARE NOT PREVENTED

## 2025-07-09 ASSESSMENT — PAIN DESCRIPTION - ORIENTATION: ORIENTATION: LOWER

## 2025-07-09 ASSESSMENT — PAIN DESCRIPTION - ONSET: ONSET: ON-GOING

## 2025-07-09 ASSESSMENT — PAIN DESCRIPTION - FREQUENCY: FREQUENCY: CONTINUOUS

## 2025-07-09 NOTE — PROGRESS NOTES
Medicine Progress Note  [facility]     Patient: Kirsten Montano  : 1960  MRN: [id number]  Code Status: Full Code    Hospital Day: 1   Date of Service: 2025    Subjective:   Patient seen and examined.  HHFNC 30 L / 70%.  AAOx3.  Answering questions appropriately.    Past Medical History:   Diagnosis Date    Allergies     Arthritis     Calcified granuloma of lung     Right    Chronic respiratory failure with hypoxia, on home O2 therapy (HCC)     Colon polyps     CREST syndrome (HCC)     scleroderma    Gastroesophageal reflux disease without esophagitis     Kidney calculi     Lupus     RIOS (nonalcoholic steatohepatitis)     LORETTA on CPAP     Palliative care patient 2025    Primary biliary cirrhosis (HCC)     Pulmonary fibrosis (HCC)     Pulmonary hypertension (HCC)     Short-term memory loss        Past Surgical History:   Procedure Laterality Date    BREAST BIOPSY      x 3, all benign    CARDIAC CATHETERIZATION  2020    Dr Elizabeth    CHOLECYSTECTOMY  2015    Dr Carver-Chronic cholecystitis and cholesterolosis with cholelithiasis    COLONOSCOPY  2021    Dr Briones-AP, 5 yr recall    COLONOSCOPY  2015    Dr Briones-AP    LIVER BIOPSY  2015    Dr Carver-Mild fatty metamorphosis, marked portal triaditis w/focal periportal inflammation, scattered noncaseating granulomas within the hepatic lobules, iron staining absent, GMS stain and Kinyoun stain negative for organisms, per [facility]    UPPER GASTROINTESTINAL ENDOSCOPY  2021    Dr Briones-Normal    UPPER GASTROINTESTINAL ENDOSCOPY  08/10/2016    Urease neg       Family History   Problem Relation Age of Onset    Colon Polyps Sister     Cirrhosis Sister     Colon Polyps Sister     Cancer Brother     Cirrhosis Brother     Colon Cancer Neg Hx     Esophageal Cancer Neg Hx     Rectal Cancer Neg Hx     Stomach Cancer Neg Hx        Social History     Socioeconomic History    Marital status:      Spouse name:

## 2025-07-09 NOTE — ACP (ADVANCE CARE PLANNING)
Advance Care Planning      Palliative Medicine Provider  Advance Care Planning (ACP) Conversation      Date of Conversation: 07/09/25  The patient and/or authorized decision maker consented to a voluntary Advance Care Planning conversation.   Individuals present for the conversation:   Patient with decision making capacity    Legal Healthcare Agent(s):    Primary Decision Maker: Nova Meyer - Child - [phone]    Primary Decision Maker: Natalia Prince - Child - [phone]    ACP documents available in EMR prior to discussion:  None    Primary Palliative Diagnosis(es):  Acute on chronic hypoxic respiratory failure   Pulmonary fibrosis   CREST syndrome    Conversation Summary:  I saw Kirsten at her bedside in ICU. She was awake, oriented and interactive when I presented for consultation. She does have capacity for medical decision making at this time. On chart review, hospice was mentioned in documentation by ID as well as Pulmonology. She confirms that she is hopeful to discharge home w/ home hospice. She is not interested in inpatient hospice or a return to a facility at this time. She voices wanting to return home and focusing on quality of life. She was receptive to reviewing her code status and voices that she wants to remain full code at this time. She states \"I want to be resuscitated as long as I have my mind\". She voices understanding of potential for poor outcome w/ severity of illness. She is agreeable to continue current level of support in hopes O2 can be weaned to baseline to allow discharge home.  She identifies her daughters, Natalia and Nova, as her healthcare surrogates and states they have the proper paperwork in place.     Resuscitation Status:    Code Status: Full Code    I spent 25 minutes providing ACP services during consultation with the patient and/or surrogate decision maker in a voluntary, in-person conversation discussing the patient's wishes and goals as detailed in the above

## 2025-07-09 NOTE — PROGRESS NOTES
Patient's home Opsynvi brought in by family. It was reviewed and okayed for use by Dr. Ma.     Release for use of home medications form signed by patient's daughter and POANova alongside verbal consent from patient. Patient's home Opsynvi taken to pharmacy with form and relabeled by pharmacist, Romero. Med now to bedside for use as it is no longer kept in [facility].

## 2025-07-09 NOTE — PROGRESS NOTES
Family plans to bring in new med list today for updates--discussed yesterday. POA papers received yesterday, scanned, and placed to paper chart.

## 2025-07-09 NOTE — PROGRESS NOTES
Pt has been difficult to deal with through the night.  She pulls lines off and doesn't stay compliant with keeping her arm straight for IV in AC.  C/o clots in her nose from the high flow O2.  But it is humidified.  Had a short spell of vomiting.  Zofran given and it resolved.  UOP has been consistently 75 mls every 2 hours.  Refused to take clonazepam but states she is anxious from all the steroids.  Tachycardic all shift.  BP 's stable.  Pt pulls off her O2 probe occasionally stating \"because I can\".  Refused to take PO meds whole; requested they be put in a cup with a popsicle to melt and take them that way which was actually quite a mess.  Refused to take them in applesauce or pudding.  No needs voiced at this current moment.

## 2025-07-09 NOTE — PROGRESS NOTES
Current medication list received from [facility]; Home medication list updated. Some meds, which family stated yesterday she still took but were not listed (Effexor and more), were flagged for provider to review.

## 2025-07-09 NOTE — CONSULTS
documentation from [facility] transplant team that she was evaluated for a lung transplant but was denied due to liver disease.  She has vocalized that she is considering a transition to hospice care.  I sat with her for quite a while and we discussed current clinical concerns and treatment provided thus far.  She tells me that she will not be returning to the nursing facility and has discussed that decision with her family and is hopeful to involve hospice upon discharge.  She tells me that she does not have any interest in inpatient hospice at this time.  We discussed oxygen requirement is a barrier to setting up home hospice.  Currently, hospice is only able to provide 10 L O2 to their home patients.  She tells me she feels that she can be weaned fairly quickly to 9 L and would like to move forward with aggressive oxygen weaning as tolerated.  She voices concern for insomnia and tells me she has not slept in over 3 days.  She declined clonazepam overnight.  She does have alprazolam listed on a discharge summary from her last hospital stay at [facility] and tells me that she will take that as needed in hopes of being able to sleep this evening.  She does have chronic pain and has Norco listed on a discharge medication list as well.  I will have that available for her if needed.  She tells me she tries to avoid sedating medications and her family confirm this as well and feels that Kirsten feels a loss of control when she takes those medications and tries to avoid that is much as possible.  I did review need for continued management if Kirsten will allow in hopes of further weaning her oxygen.  She is agreeable to that at this time.  I did review her CODE STATUS at length.  She tells me she wants to remain full code at this point and will want to be resuscitated \"as long as I have my mind\".  Her family confirm this as well but do go on to state that when she loses capacity to make her own medical decisions they will then  and placed on Verapamil. Natalia voices she believes the patient still takes that medication. Will discuss resumption w/ Attending if felt appropriate. Opportunity for questions, support provided.     Palliative team will follow as needed.    Candidate for SCOP:Yes pending disposition     Recommendations:     Palliative Care-[facility] planning transition to home hospice upon discharge once oxygen weaned to 10L or less and/or additional oxygen concentrator obtained for home use if hospice will allow. Code status: Full code-patient wants to remain full code as long as she has capacity, family voice that when she loses capacity they will make the transition to do not resuscitate   Acute on chronic hypoxic respiratory failure in the setting of crest syndrome, pulmonary hypertension and known pulmonary fibrosis-Pulmonology, ID following, voices intolerance of steroids, continue abx, supportive care w/ hope to wean O2 to allow discharge home w/ hospice, not interested in inpatient hospice at this time and does currently wish to remain full code to include intubation if indicated   Anxiety/depression-patient refusing clonazepam but voices she will take xanax-Xanax 0.25 mg TID prn ordered, Venlafaxine continued  Multifocal atrial Tachycardia-was placed on Verapamil 100 mg nightly during hospitalization 06/2025, consider resuming, will defer to Attending   Chronic back pain-Norco listed as home medication-will have available as needed  Partial Nasal obstruction-family voice she frequently has multiple blood clots in nose and uses a estela pot at home to flush out. They did mention possible ENT evaluation-will hold off for now and order saline gel to be placed in nose to soften dried blood     Thank you for consulting palliative care and allowing us to participate in the care of the patient.      CounselingTopics: Goals of care, Code Status, Disease process education, pt/family support                                     Multiple

## 2025-07-09 NOTE — PROGRESS NOTES
[facility]   Pharmacy Pharmacokinetic Monitoring Service - Vancomycin    Consulting Provider: Dr. Devlin   Indication:  PNA nosocomial  Target Concentration: Goal AUC/ABENA 400-600 mg*hr/L  Day of Therapy: 2  Additional Antimicrobials: Azithromycin/Merrem    Pertinent Laboratory Values:   Wt Readings from Last 1 Encounters:   07/08/25 81.6 kg (180 lb)     Temp Readings from Last 1 Encounters:   07/09/25 98.4 °F (36.9 °C) (Temporal)     Estimated Creatinine Clearance: 71 mL/min (based on SCr of 0.8 mg/dL).  Recent Labs     07/08/25  1005 07/09/25  0133   CREATININE 0.7 0.8   BUN 22 27*   WBC 15.3* 11.9*     Procalcitonin: no level    Pertinent Cultures:  Culture Date Source Results   07/08/25 Blood X2 NGTD   07/08/25 Urine E. coli   MRSA Nasal Swab: was negative on 7/8, ordered for respiratory indication, pharmacy to contact provider about discontinuing vancomycin    Recent vancomycin administrations                     vancomycin (VANCOCIN) 1,000 mg in sodium chloride 0.9 % 250 mL IVPB ([id number]) (mg) 1,000 mg New Bag 07/08/25 2359    vancomycin (VANCOCIN) 2,000 mg in sodium chloride 0.9 % 500 mL IVPB (mg) 2,000 mg New Bag 07/08/25 1231                    Assessment:  Date/Time Current Dose Concentration Timing of Concentration (h) AUC   07/09/25@1048 1000mg every 12h 18.2 10h 48m 649   Note: Serum concentrations collected for AUC dosing may appear elevated if collected in close proximity to the dose administered, this is not necessarily an indication of toxicity    Plan:  Current dosing regimen is supra-therapeutic-- with steady state trough of 21.2 mg/L  MRSA swab was negative. Will check with ID as Linezolid ( not used due to SSRI interaction) was a recommended empiric antibiotic and blood cultures are not final yet.  ID amendable to discontinuation of Vancomycin with negative MRSA swab result.    Thank you for the consult,  Ursula Kelly [facility]  7/9/2025 11:27 AM

## 2025-07-09 NOTE — PROGRESS NOTES
Spiritual Health History and Assessment/Progress Note  [facility]    Initial Encounter, Spiritual/Emotional Needs, Loneliness/Social Isolation,  , Adjustment to illness, Life Adjustments,      Name: Kirsten Montano MRN: [id number]    Age: 65 y.o.     Sex: female   Language: English   Mandaen: Sikh   Acute respiratory failure with hypoxemia (HCC)     Date: 7/9/2025            Total Time Calculated: 22 min              Spiritual Assessment began in [facility] ICU        Referral/Consult From: Palliative Care   Encounter Overview/Reason: Initial Encounter, Spiritual/Emotional Needs, Loneliness/Social Isolation  Service Provided For: Patient    Laxmi, Belief, Meaning:   Patient identifies as spiritual. Pt has lost her connection with her Jewish due to illness.   Family/Friends       Importance and Influence:  Patient has spiritual/personal beliefs that influence decisions regarding their health. She expresses laxmi and states she believes God is with her.   Family/Friends     Community:  Patient She feels disconnect from people who care for her.   Family/Friends     Assessment and Plan of Care:     Patient Interventions include: Facilitated expression of thoughts and feelings and Explored spiritual coping/struggle/distress. The pt expresses a feeling of neglect and uncared for.  She did seem to feel much better avter being able to express her feelings and be heard.  She did also recognize thei  and has in the past wotred with my wife.  She seemed comforted to connect with memories from her past.   Family/Friends Interventions include:     Patient Plan of Care: No spiritual needs identified for follow-up  Family/Friends Plan of Care:     Electronically signed by Chaplain Dante on 7/9/2025 at 12:21 PM

## 2025-07-10 ENCOUNTER — APPOINTMENT (OUTPATIENT)
Dept: GENERAL RADIOLOGY | Age: 65
End: 2025-07-10
Payer: MEDICARE

## 2025-07-10 LAB
ANION GAP SERPL CALCULATED.3IONS-SCNC: 11 MMOL/L (ref 8–16)
ANISOCYTOSIS BLD QL SMEAR: ABNORMAL
BACTERIA UR CULT: ABNORMAL
BACTERIA UR CULT: ABNORMAL
BASOPHILS # BLD: 0 K/UL (ref 0–0.2)
BASOPHILS NFR BLD: 0 % (ref 0–1)
BUN SERPL-MCNC: 18 MG/DL (ref 8–23)
CALCIUM SERPL-MCNC: 8.2 MG/DL (ref 8.8–10.2)
CHLORIDE SERPL-SCNC: 98 MMOL/L (ref 98–107)
CO2 SERPL-SCNC: 28 MMOL/L (ref 22–29)
CREAT SERPL-MCNC: 0.7 MG/DL (ref 0.5–0.9)
EOSINOPHIL # BLD: 0 K/UL (ref 0–0.6)
EOSINOPHIL NFR BLD: 0 % (ref 0–5)
ERYTHROCYTE [DISTWIDTH] IN BLOOD BY AUTOMATED COUNT: 19.8 % (ref 11.5–14.5)
GLUCOSE SERPL-MCNC: 96 MG/DL (ref 70–99)
HCT VFR BLD AUTO: 35.5 % (ref 37–47)
HGB BLD-MCNC: 11 G/DL (ref 12–16)
HYPOCHROMIA BLD QL SMEAR: ABNORMAL
IMM GRANULOCYTES # BLD: 1.3 K/UL
LYMPHOCYTES # BLD: 0.7 K/UL (ref 1.1–4.5)
LYMPHOCYTES NFR BLD: 7 % (ref 20–40)
Lab: NORMAL
MAGNESIUM SERPL-MCNC: 1.9 MG/DL (ref 1.6–2.4)
MCH RBC QN AUTO: 24.6 PG (ref 27–31)
MCHC RBC AUTO-ENTMCNC: 31 G/DL (ref 33–37)
MCV RBC AUTO: 79.4 FL (ref 81–99)
MICROCYTES BLD QL SMEAR: ABNORMAL
MONOCYTES # BLD: 1.3 K/UL (ref 0–0.9)
MONOCYTES NFR BLD: 13 % (ref 0–10)
NEUTROPHILS # BLD: 8.2 K/UL (ref 1.5–7.5)
NEUTS BAND NFR BLD MANUAL: 5 % (ref 0–5)
NEUTS SEG NFR BLD: 75 % (ref 50–65)
ORGANISM: ABNORMAL
PLATELET # BLD AUTO: 120 K/UL (ref 130–400)
PLATELET SLIDE REVIEW: ABNORMAL
PMV BLD AUTO: 10.5 FL (ref 9.4–12.3)
POTASSIUM SERPL-SCNC: 3.6 MMOL/L (ref 3.5–5.1)
RBC # BLD AUTO: 4.47 M/UL (ref 4.2–5.4)
REPORT: NORMAL
SODIUM SERPL-SCNC: 137 MMOL/L (ref 136–145)
STOMATOCYTES BLD QL SMEAR: ABNORMAL
THIS TEST SENT TO: NORMAL
WBC # BLD AUTO: 10.3 K/UL (ref 4.8–10.8)

## 2025-07-10 PROCEDURE — 97165 OT EVAL LOW COMPLEX 30 MIN: CPT

## 2025-07-10 PROCEDURE — 6370000000 HC RX 637 (ALT 250 FOR IP): Performed by: INTERNAL MEDICINE

## 2025-07-10 PROCEDURE — 97530 THERAPEUTIC ACTIVITIES: CPT

## 2025-07-10 PROCEDURE — 6360000002 HC RX W HCPCS: Performed by: INTERNAL MEDICINE

## 2025-07-10 PROCEDURE — 71045 X-RAY EXAM CHEST 1 VIEW: CPT

## 2025-07-10 PROCEDURE — 87449 NOS EACH ORGANISM AG IA: CPT

## 2025-07-10 PROCEDURE — 6360000002 HC RX W HCPCS: Performed by: HOSPITALIST

## 2025-07-10 PROCEDURE — 2700000000 HC OXYGEN THERAPY PER DAY

## 2025-07-10 PROCEDURE — 80048 BASIC METABOLIC PNL TOTAL CA: CPT

## 2025-07-10 PROCEDURE — 94640 AIRWAY INHALATION TREATMENT: CPT

## 2025-07-10 PROCEDURE — 94669 MECHANICAL CHEST WALL OSCILL: CPT

## 2025-07-10 PROCEDURE — 85025 COMPLETE CBC W/AUTO DIFF WBC: CPT

## 2025-07-10 PROCEDURE — 83735 ASSAY OF MAGNESIUM: CPT

## 2025-07-10 PROCEDURE — 2580000003 HC RX 258: Performed by: INTERNAL MEDICINE

## 2025-07-10 PROCEDURE — 99291 CRITICAL CARE FIRST HOUR: CPT | Performed by: INTERNAL MEDICINE

## 2025-07-10 PROCEDURE — 87385 HISTOPLASMA CAPSUL AG IA: CPT

## 2025-07-10 PROCEDURE — 6370000000 HC RX 637 (ALT 250 FOR IP): Performed by: PHYSICIAN ASSISTANT

## 2025-07-10 PROCEDURE — P9047 ALBUMIN (HUMAN), 25%, 50ML: HCPCS | Performed by: HOSPITALIST

## 2025-07-10 PROCEDURE — 36415 COLL VENOUS BLD VENIPUNCTURE: CPT

## 2025-07-10 PROCEDURE — 2000000000 HC ICU R&B

## 2025-07-10 PROCEDURE — 97161 PT EVAL LOW COMPLEX 20 MIN: CPT

## 2025-07-10 PROCEDURE — 99233 SBSQ HOSP IP/OBS HIGH 50: CPT | Performed by: PHYSICIAN ASSISTANT

## 2025-07-10 PROCEDURE — 2500000003 HC RX 250 WO HCPCS: Performed by: INTERNAL MEDICINE

## 2025-07-10 RX ORDER — VERAPAMIL HYDROCHLORIDE 80 MG/1
80 TABLET ORAL EVERY 8 HOURS SCHEDULED
Status: DISCONTINUED | OUTPATIENT
Start: 2025-07-10 | End: 2025-07-11 | Stop reason: HOSPADM

## 2025-07-10 RX ORDER — PANTOPRAZOLE SODIUM 40 MG/1
40 TABLET, DELAYED RELEASE ORAL
Status: DISCONTINUED | OUTPATIENT
Start: 2025-07-10 | End: 2025-07-11 | Stop reason: HOSPADM

## 2025-07-10 RX ORDER — LOPERAMIDE HYDROCHLORIDE 2 MG/1
2 CAPSULE ORAL 4 TIMES DAILY PRN
Status: DISCONTINUED | OUTPATIENT
Start: 2025-07-10 | End: 2025-07-11 | Stop reason: HOSPADM

## 2025-07-10 RX ORDER — ALBUMIN (HUMAN) 12.5 G/50ML
25 SOLUTION INTRAVENOUS ONCE
Status: COMPLETED | OUTPATIENT
Start: 2025-07-10 | End: 2025-07-10

## 2025-07-10 RX ADMIN — TRAZODONE HYDROCHLORIDE 100 MG: 100 TABLET ORAL at 21:01

## 2025-07-10 RX ADMIN — VERAPAMIL HYDROCHLORIDE 80 MG: 80 TABLET ORAL at 16:37

## 2025-07-10 RX ADMIN — BUSPIRONE HYDROCHLORIDE 10 MG: 10 TABLET ORAL at 13:54

## 2025-07-10 RX ADMIN — VERAPAMIL HYDROCHLORIDE 80 MG: 80 TABLET ORAL at 21:01

## 2025-07-10 RX ADMIN — IPRATROPIUM BROMIDE AND ALBUTEROL SULFATE 1 DOSE: 2.5; .5 SOLUTION RESPIRATORY (INHALATION) at 10:05

## 2025-07-10 RX ADMIN — ONDANSETRON HYDROCHLORIDE 4 MG: 8 TABLET, FILM COATED ORAL at 11:26

## 2025-07-10 RX ADMIN — BUSPIRONE HYDROCHLORIDE 10 MG: 10 TABLET ORAL at 21:01

## 2025-07-10 RX ADMIN — LOPERAMIDE HYDROCHLORIDE 2 MG: 2 CAPSULE ORAL at 15:18

## 2025-07-10 RX ADMIN — AZITHROMYCIN MONOHYDRATE 500 MG: 500 INJECTION, POWDER, LYOPHILIZED, FOR SOLUTION INTRAVENOUS at 06:27

## 2025-07-10 RX ADMIN — MEROPENEM 1000 MG: 1 INJECTION INTRAVENOUS at 08:34

## 2025-07-10 RX ADMIN — ALBUMIN (HUMAN) 25 G: 0.25 INJECTION, SOLUTION INTRAVENOUS at 00:36

## 2025-07-10 RX ADMIN — ONDANSETRON HYDROCHLORIDE 4 MG: 8 TABLET, FILM COATED ORAL at 15:18

## 2025-07-10 RX ADMIN — Medication: at 08:36

## 2025-07-10 RX ADMIN — SODIUM CHLORIDE, PRESERVATIVE FREE 10 ML: 5 INJECTION INTRAVENOUS at 21:04

## 2025-07-10 RX ADMIN — PANTOPRAZOLE SODIUM 40 MG: 40 TABLET, DELAYED RELEASE ORAL at 08:48

## 2025-07-10 RX ADMIN — MYCOPHENOLATE MOFETIL 500 MG: 250 CAPSULE ORAL at 21:00

## 2025-07-10 RX ADMIN — GUAIFENESIN SYRUP AND DEXTROMETHORPHAN 5 ML: 100; 10 SYRUP ORAL at 04:43

## 2025-07-10 RX ADMIN — MYCOPHENOLATE MOFETIL 500 MG: 250 CAPSULE ORAL at 08:34

## 2025-07-10 RX ADMIN — Medication: at 13:54

## 2025-07-10 RX ADMIN — Medication: at 21:04

## 2025-07-10 RX ADMIN — ONDANSETRON HYDROCHLORIDE 4 MG: 8 TABLET, FILM COATED ORAL at 06:49

## 2025-07-10 RX ADMIN — ENOXAPARIN SODIUM 40 MG: 100 INJECTION SUBCUTANEOUS at 13:54

## 2025-07-10 RX ADMIN — VENLAFAXINE HYDROCHLORIDE 75 MG: 75 CAPSULE, EXTENDED RELEASE ORAL at 08:34

## 2025-07-10 RX ADMIN — ALPRAZOLAM 0.25 MG: 0.5 TABLET ORAL at 15:18

## 2025-07-10 RX ADMIN — MEROPENEM 1000 MG: 1 INJECTION INTRAVENOUS at 00:07

## 2025-07-10 RX ADMIN — IPRATROPIUM BROMIDE AND ALBUTEROL SULFATE 1 DOSE: 2.5; .5 SOLUTION RESPIRATORY (INHALATION) at 06:20

## 2025-07-10 RX ADMIN — MONTELUKAST SODIUM 10 MG: 10 TABLET, FILM COATED ORAL at 21:00

## 2025-07-10 RX ADMIN — MEROPENEM 1000 MG: 1 INJECTION INTRAVENOUS at 15:22

## 2025-07-10 ASSESSMENT — PAIN SCALES - GENERAL: PAINLEVEL_OUTOF10: 0

## 2025-07-10 NOTE — PROGRESS NOTES
Patient assisted back to bed with nursing staff for clean up at this time.     Electronically signed by Kiki Guevara RN on 7/10/2025 at 1:09 PM

## 2025-07-10 NOTE — PROGRESS NOTES
Medicine Progress Note  [facility]     Patient: Kirsten Montano  : 1960  MRN: [id number]  Code Status: Full Code    Hospital Day: 2   Date of Service: 7/10/2025    Subjective:   Patient seen and examined.  Multiple issues addressed with patient this morning for which she expressed gratitude.      Past Medical History:   Diagnosis Date    Allergies     Arthritis     Calcified granuloma of lung     Right    Chronic respiratory failure with hypoxia, on home O2 therapy (HCC)     Colon polyps     CREST syndrome (HCC)     scleroderma    Gastroesophageal reflux disease without esophagitis     Kidney calculi     Lupus     RIOS (nonalcoholic steatohepatitis)     LORETTA on CPAP     Palliative care patient 2025    Primary biliary cirrhosis (HCC)     Pulmonary fibrosis (HCC)     Pulmonary hypertension (HCC)     Short-term memory loss        Past Surgical History:   Procedure Laterality Date    BREAST BIOPSY      x 3, all benign    CARDIAC CATHETERIZATION  2020    Dr Elizabeth    CHOLECYSTECTOMY  2015    Dr Carver-Chronic cholecystitis and cholesterolosis with cholelithiasis    COLONOSCOPY  2021    Dr Briones-AP, 5 yr recall    COLONOSCOPY  2015    Dr Briones-AP    LIVER BIOPSY  2015    Dr Carver-Mild fatty metamorphosis, marked portal triaditis w/focal periportal inflammation, scattered noncaseating granulomas within the hepatic lobules, iron staining absent, GMS stain and Kinyoun stain negative for organisms, per [facility]    UPPER GASTROINTESTINAL ENDOSCOPY  2021    Dr Briones-Normal    UPPER GASTROINTESTINAL ENDOSCOPY  08/10/2016    Urease neg       Family History   Problem Relation Age of Onset    Colon Polyps Sister     Cirrhosis Sister     Colon Polyps Sister     Cancer Brother     Cirrhosis Brother     Colon Cancer Neg Hx     Esophageal Cancer Neg Hx     Rectal Cancer Neg Hx     Stomach Cancer Neg Hx        Social History     Socioeconomic History    Marital  25 22   BILITOT 0.4 0.2   ALKPHOS 207* 170*     No results for input(s): \"PH\", \"PO2\", \"PCO2\", \"HCO3\", \"BE\", \"O2SAT\" in the last 72 hours.  No results for input(s): \"TROPONINI\" in the last 72 hours.  Recent Labs     07/08/25  1005   INR 1.16     No results for input(s): \"LACTA\" in the last 72 hours.      Intake/Output Summary (Last 24 hours) at 7/10/2025 1046  Last data filed at 7/10/2025 0800  Gross per 24 hour   Intake 1631.4 ml   Output 950 ml   Net 681.4 ml       CTA PULMONARY W CONTRAST  Result Date: 7/8/2025  CT CHEST/PULMONARY ANGIOGRAM WITH CONTRAST  INDICATION: 65-year-old female patient with acute respiratory distress, crest syndrome evaluate for edema versus fibrosis versus pneumonia and/or pulmonary embolism acute decompensation today  COMPARISON: Chest radiograph 7/8/25 at 10: 18  TECHNIQUE: Axial CT pulmonary angiogram of the chest from the thoracic inlet to the upper abdomen following intravenous contrast administration timed for optimal opacification of the pulmonary arterial tree. One or more of the following dose reduction techniques were used: Automated exposure control, adjustment of the mA and/or kV according to patient size, use of iterative reconstruction technique. 3D/MIP/VR images were  performed. IV Contrast: Administered.  FINDINGS: Enlargement of the main pulmonary artery which measures up to 3.8 cm in caliber. There is suboptimal evaluation of some of the pulmonary arteries due to motion artifact as well as firm streak artifact from dense contrast material within the superior vena  cava. No pulmonary embolism within the main pulmonary artery to some of the segmental branches with other segmental and subsegmental branches are suboptimally evaluated.  There is left level 4 cervical lymphadenopathy. There is mediastinal lymphadenopathy including prevascular lymph nodes pretracheal change lymph nodes subcarinal lymph node. No retrocrural lymphadenopathy. No left hilar lymphadenopathy. There

## 2025-07-10 NOTE — PROGRESS NOTES
Pulmonary and Critical Care Progress note.    [facility]    Kirsten Montano    MRN# [id number]    Acct# [id number]  7/10/2025   6:14 PM CDT    Referring Provider:Rios Ma MD      Chief Complaint: Shortness of breath    HPI: She continues to be short of breath.  She continues to be on high flow nasal cannula oxygen 100% FiO2.  Her respiratory status seems to be slightly worse than yesterday.  She continues to refuse some of her medications.  She refuses DNR.  She however wants to go home.    Medications:  The following medications were reviewed and adjustments made when necessary.    pantoprazole, 40 mg, Oral, QAM AC    verapamil, 80 mg, Oral, 3 times per day    meropenem, 1,000 mg, IntraVENous, Q8H    azithromycin, 500 mg, IntraVENous, Q24H    ondansetron, 4 mg, Oral, TID AC    saline nasal gel, , Nasal, TID    Macitentan-Tadalafil, 1 tablet, Oral, Nightly    enoxaparin, 40 mg, SubCUTAneous, Daily    ipratropium 0.5 mg-albuterol 2.5 mg, 1 Dose, Inhalation, Q4H WA RT    acetylcysteine, 600 mg, Inhalation, BID RT    montelukast, 10 mg, Oral, Nightly    busPIRone, 10 mg, Oral, TID    traZODone, 100 mg, Oral, Nightly    mycophenolate, 500 mg, Oral, BID    venlafaxine, 75 mg, Oral, Daily with breakfast         Physical Exam:  Vitals:    07/10/25 1606 07/10/25 1610 07/10/25 1637 07/10/25 1700   BP:   (!) 98/59 93/62   Pulse: (!) 148 (!) 145 (!) 138 (!) 122   Resp: (!) 47 (!) 34 (!) 34 (!) 41   Temp:       TempSrc:       SpO2: (!) 86% (!) 86% 92% 92%   Weight:       Height:          Intake/Output Summary (Last 24 hours) at 7/10/2025 1814  Last data filed at 7/10/2025 1522  Gross per 24 hour   Intake 1269.83 ml   Output 550 ml   Net 719.83 ml       General appearance: Elderly female in no mild resp distress   HEENT:normocephalic atraumatic  Heart:S1S2 no murmurs  Lungs:diminished bilaterally no rubs or tenderness or dullness to percussion  Abdomen:Soft non tender no organomegaly  Extremities:no clubbing  MD Rosamaria, [facility], [facility]    The above note was generated using voice recognition software.  Inadvertent typographical errors in transcription may have occurred.      Electronically signed by Vimal Blank MD on 07/10/25 at 6:14 PM

## 2025-07-10 NOTE — PROGRESS NOTES
60% high flow.  Cefepime, vancomycin and Lasix given.  She was admitted to hospitalist service for acute on chronic respiratory failure with hypoxemia and heated high flow continued.  Pulmonology and infectious disease are following.  Antibiotic recommendations made for meropenem, linezolid and azithromycin. Palliative care has been consulted to assist w/ goals of care.     Review of Systems:   14 point review of systems is negative except as specifically addressed above.    Objective:   VITALS:  /67   Pulse (!) 135   Temp 98.4 °F (36.9 °C) (Temporal)   Resp (!) 37   Ht 1.626 m (5' 4.02\")   Wt 81.6 kg (180 lb)   SpO2 (!) 88%   BMI 30.88 kg/m²   24HR INTAKE/OUTPUT:    Intake/Output Summary (Last 24 hours) at 7/10/2025 1357  Last data filed at 7/10/2025 0800  Gross per 24 hour   Intake 1631.4 ml   Output 950 ml   Net 681.4 ml       General appearance: chronically ill appearing female, up to bedside chair HFNC O2 in place  Pulmonary: tachypneic w/ accessory muscle use, diminished throughout w/ rhonchi  Cardiovascular: tachycardic   Musculoskeletal:No lower extremity edema  Skin: warm, dry  Neurologic: Alert and oriented X 3, generalized weakness, normal tone   Psychiatric: anxious    Medications:      sodium chloride        pantoprazole  40 mg Oral QAM AC    meropenem  1,000 mg IntraVENous Q8H    azithromycin  500 mg IntraVENous Q24H    ondansetron  4 mg Oral TID AC    saline nasal gel   Nasal TID    Macitentan-Tadalafil  1 tablet Oral Nightly    enoxaparin  40 mg SubCUTAneous Daily    ipratropium 0.5 mg-albuterol 2.5 mg  1 Dose Inhalation Q4H WA RT    acetylcysteine  600 mg Inhalation BID RT    montelukast  10 mg Oral Nightly    busPIRone  10 mg Oral TID    traZODone  100 mg Oral Nightly    mycophenolate  500 mg Oral BID    venlafaxine  75 mg Oral Daily with breakfast     loperamide, ALPRAZolam, HYDROcodone-acetaminophen, sodium chloride, sodium chloride flush, sodium chloride, ondansetron **OR**  understands that if she continues to decompensate she will require intubation and again confirms desire to remain full code for as long as she has capacity to make her own decisions.  I have voiced concern regarding air hunger and need for symptom management and she declines addition of IV or sublingual morphine at this time.  Norco is available as needed but she is hopeful to avoid this as well.  She tells me she did take Xanax 0.25 mg once yesterday afternoon and that she was able to sleep yesterday evening.  I Returned to the ICU this afternoon and her ICU RN indicates that the patient asked to be sent to inpatient hospice.  Additionally she has had some loose stools.  Imodium has since been added as needed.  At this time Kirsten does want to remain full code and her daughter Natalia came over to discuss her request to move to inpatient hospice.  I do feel that Kirsten was somewhat confused when asking to be moved to inpatient hospice as she is not yet ready to be changed to a do not resuscitate.  I was able to review this with Natalia and for now they wish to continue current level of support.  Natalia believes they may already have an additional oxygen concentrator and tubing to set up in addition to the one the patient already has in her home.  She is going to check with her sister and see what equipment they have.  She voices that she, her sister and their father do want to support the patient's wishes to remain a full code at this time but that they are prepared to make further decisions regarding CODE STATUS and goals of care should Kirsten decompensate and lose capacity.  They would like to be notified if there are significant changes in her clinical status.  I discussed this with hospitalist as well as ICU RN. I did review my concern w/ Natalia that Kirsten may not be able to wean to a level of Oxygen that can be facilitated in the home setting but we will continue to wean as she tolerates    Palliative team will follow as

## 2025-07-10 NOTE — PROGRESS NOTES
Infectious Diseases Progress Note    Patient:  Kirsten Montano  YOB: 1960  MRN: [id number]   Admit date: 7/8/2025   Admitting Physician: iRos Ma MD  Primary Care Physician: Femi Bruce MD    Chief Complaint: Pneumonia in a patient with crest and acute on chronic respiratory failure.    Interval History: She seems to be feeling a little bit better today.  Oxygen requirement slightly improved.  Remains on high flow supplemental oxygen.  Appears to be tolerating Merrem and azithromycin without side effect.  She has not had fever overnight.  She is hemodynamically stable at present.  Explained antibiotic plan.  Explained steroids may have a role as well.  She indicates steroids \"are not my friend.\"  She indicates that make it very hard for her to sleep/rest.  Remains in the intensive care unit.    In/Out    Intake/Output Summary (Last 24 hours) at 7/10/2025 0621  Last data filed at 7/10/2025 0400  Gross per 24 hour   Intake 1381.46 ml   Output 600 ml   Net 781.46 ml     Allergies:   Allergies   Allergen Reactions    Latex Rash    Codeine Nausea And Vomiting     Current Meds: meropenem (MERREM) 1,000 mg in sodium chloride 0.9 % 100 mL IVPB ([id number]), Q8H  azithromycin (ZITHROMAX) 500 mg in sodium chloride 0.9 % 250 mL IVPB ([id number]), Q24H  ondansetron (ZOFRAN) tablet 4 mg, TID AC  ALPRAZolam (XANAX) tablet 0.25 mg, TID PRN  HYDROcodone-acetaminophen (NORCO)  MG per tablet 1 tablet, Q6H PRN  sodium chloride (OCEAN, BABY AYR) 0.65 % nasal spray 1 spray, PRN  saline nasal gel (AYR), TID  Macitentan-Tadalafil 10-40 MG TABS 1 tablet (Patient Supplied), Nightly  sodium chloride flush 0.9 % injection 5-40 mL, PRN  0.9 % sodium chloride infusion, PRN  enoxaparin (LOVENOX) injection 40 mg, Daily  ondansetron (ZOFRAN-ODT) disintegrating tablet 4 mg, Q8H PRN   Or  ondansetron (ZOFRAN) injection 4 mg, Q6H PRN  polyethylene glycol (GLYCOLAX) packet 17 g, Daily PRN  acetaminophen (TYLENOL) tablet 650 mg,  today  2.  Right upper lobe mass or infiltrate  3.  Pulmonary fibrosis  4.  Crest syndrome  5.  Leukocytosis-improved today  6.  Hyponatremia-stable    Recommendations:  Overall little bit better today in comparison to yesterday  Continue broad antimicrobial coverage with Merrem and azithromycin  Since nasal MRSA screen negative have discontinued MRSA coverage  Continue supportive care  Continue to follow    BA BAUER MD

## 2025-07-10 NOTE — PLAN OF CARE
Problem: Discharge Planning  Goal: Discharge to home or other facility with appropriate resources  7/10/2025 0924 by Kiki Guevara RN  Outcome: Progressing  Flowsheets (Taken 7/10/2025 0800)  Discharge to home or other facility with appropriate resources:   Identify barriers to discharge with patient and caregiver   Arrange for needed discharge resources and transportation as appropriate     Problem: Skin/Tissue Integrity  Goal: Skin integrity remains intact  7/10/2025 0924 by Kiki Guevara RN  Outcome: Progressing  Flowsheets (Taken 7/10/2025 0800)  Skin Integrity Remains Intact: Monitor for areas of redness and/or skin breakdown     Problem: ABCDS Injury Assessment  Goal: Absence of physical injury  7/10/2025 0924 by Kiki Guevara RN  Outcome: Progressing     Problem: Safety - Adult  Goal: Free from fall injury  7/10/2025 0924 by Kiki Guevara RN  Outcome: Progressing     Problem: Pain  Goal: Verbalizes/displays adequate comfort level or baseline comfort level  7/10/2025 0924 by Kiki Guevara, RN  Outcome: Progressing

## 2025-07-10 NOTE — PROGRESS NOTES
Nutrition Assessment     Type and Reason for Visit: Initial, Positive nutrition screen    Nutrition Recommendations/Plan:   Modify current ONS to Ensure High Protein  Follow for po intake, labs     Malnutrition Assessment:  Malnutrition Status: At risk for malnutrition    Nutrition Assessment:  +NS for decreased weight and po intake.  Pt is receiving a Regular diet.  Started Ensure High Protein instead of Ensure Plus to help with decreased CO2 production.  PO intake is fair with intake ranging 25-50%.  BM 7/10    Estimated Daily Nutrient Needs:  Energy (kcal):  898-1142 kcals (11-14 kcals/kg) Weight Used for Energy Requirements: Current     Protein (g):  109g Weight Used for Protein Requirements: Ideal        Fluid (ml/day):  898-1142ml Method Used for Fluid Requirements: 1 ml/kcal    Nutrition Related Findings:   no edema noted Wound Type: None    Current Nutrition Therapies:    ADULT DIET; Regular  ADULT ORAL NUTRITION SUPPLEMENT; Breakfast, Lunch, Dinner; Low Calorie/High Protein Oral Supplement    Anthropometric Measures:  Height: 162.6 cm (5' 4.02\")  Current Body Wt: 81.6 kg (179 lb 14.3 oz)   BMI: 30.9        Nutrition Diagnosis:   Inadequate protein-energy intake related to acute injury/trauma, impaired respiratory function as evidenced by intake 26-50%    Nutrition Interventions:   Food and/or Nutrient Delivery: Continue Current Diet, Modify Oral Nutrition Supplement  Nutrition Education/Counseling: No recommendation at this time  Coordination of Nutrition Care: Continue to monitor while inpatient       Goals:  Goals: Meet at least 75% of estimated needs, PO intake 50% or greater, Maintain adequate nutrition status  Type of Goal: New goal  Previous Goal Met: New Goal    Nutrition Monitoring and Evaluation:   Behavioral-Environmental Outcomes: None Identified  Food/Nutrient Intake Outcomes: Food and Nutrient Intake, Supplement Intake  Physical Signs/Symptoms Outcomes: Biochemical Data, Fluid Status or Edema,

## 2025-07-10 NOTE — PROGRESS NOTES
Infectious Diseases Progress Note    Patient:  Kirsten Montano  YOB: 1960  MRN: [id number]   Admit date: 7/8/2025   Admitting Physician: Rios Ma MD  Primary Care Physician: Femi Bruce MD    Chief Complaint: Seeing in follow-up of pneumonia in a patient with crest and acute respiratory failure.    Interval History: She is tired today.  She does not feel much different.  Grandson and granddaughter while at bedside.  She is without fever.  Remains on high flow oxygen.  Will notes including palliative care notes reviewed.  Discussed with Dr. Ma.  Remains in ICU.    In/Out    Intake/Output Summary (Last 24 hours) at 7/10/2025 1646  Last data filed at 7/10/2025 1522  Gross per 24 hour   Intake 2147.18 ml   Output 550 ml   Net 1597.18 ml     Allergies:   Allergies   Allergen Reactions    Latex Rash    Codeine Nausea And Vomiting     Current Meds: pantoprazole (PROTONIX) tablet 40 mg, QAM AC  loperamide (IMODIUM) capsule 2 mg, 4x Daily PRN  verapamil (CALAN) tablet 80 mg, 3 times per day  meropenem (MERREM) 1,000 mg in sodium chloride 0.9 % 100 mL IVPB ([id number]), Q8H  azithromycin (ZITHROMAX) 500 mg in sodium chloride 0.9 % 250 mL IVPB ([id number]), Q24H  ondansetron (ZOFRAN) tablet 4 mg, TID AC  ALPRAZolam (XANAX) tablet 0.25 mg, TID PRN  HYDROcodone-acetaminophen (NORCO)  MG per tablet 1 tablet, Q6H PRN  sodium chloride (OCEAN, BABY AYR) 0.65 % nasal spray 1 spray, PRN  saline nasal gel (AYR), TID  Macitentan-Tadalafil 10-40 MG TABS 1 tablet (Patient Supplied), Nightly  sodium chloride flush 0.9 % injection 5-40 mL, PRN  0.9 % sodium chloride infusion, PRN  enoxaparin (LOVENOX) injection 40 mg, Daily  ondansetron (ZOFRAN-ODT) disintegrating tablet 4 mg, Q8H PRN   Or  ondansetron (ZOFRAN) injection 4 mg, Q6H PRN  polyethylene glycol (GLYCOLAX) packet 17 g, Daily PRN  acetaminophen (TYLENOL) tablet 650 mg, Q6H PRN   Or  acetaminophen (TYLENOL) suppository 650 mg, Q6H PRN  ipratropium 0.5

## 2025-07-10 NOTE — PROGRESS NOTES
Physical Therapy  Facility/Department: [facility] ICU  Physical Therapy Initial Assessment    Name: Kirsten Montano  : 1960  MRN: [id number]  Date of Service: 7/10/2025    Discharge Recommendations:  Continue to assess pending progress, Patient would benefit from continued therapy after discharge          Patient Diagnosis(es): The primary encounter diagnosis was Acute on chronic respiratory failure with hypoxia (HCC). Diagnoses of CREST (calcinosis, Raynaud's phenomenon, esophageal dysfunction, sclerodactyly, telangiectasia) (HCC), Pulmonary fibrosis (HCC), and Respiratory distress were also pertinent to this visit.  Past Medical History:  has a past medical history of Allergies, Arthritis, Calcified granuloma of lung, Chronic respiratory failure with hypoxia, on home O2 therapy (HCC), Colon polyps, CREST syndrome (HCC), Gastroesophageal reflux disease without esophagitis, Kidney calculi, Lupus, RIOS (nonalcoholic steatohepatitis), LORETTA on CPAP, Palliative care patient, Primary biliary cirrhosis (HCC), Pulmonary fibrosis (HCC), Pulmonary hypertension (HCC), and Short-term memory loss.  Past Surgical History:  has a past surgical history that includes Breast biopsy; Cholecystectomy (2015); Colonoscopy (2021); Cardiac catheterization (2020); Colonoscopy (2015); Upper gastrointestinal endoscopy (2021); liver biopsy (2015); and Upper gastrointestinal endoscopy (08/10/2016).    Assessment  Body Structures, Functions, Activity Limitations Requiring Skilled Therapeutic Intervention: Decreased functional mobility ;Decreased ROM;Decreased strength;Decreased endurance;Increased pain  Assessment: pt WOULD BENEFIT FROM SKILLED PT IN THIS SETTING TO PROGRESS HER MOBILITY AND ENDURANCE  Therapy Prognosis: Good  Decision Making: Low Complexity  Requires PT Follow-Up: Yes  Activity Tolerance  Activity Tolerance: Patient tolerated treatment well    Plan  Physical Therapy Plan  General Plan: 5-7 times  per week  Therapy Duration: 2 Weeks  Current Treatment Recommendations: Strengthening, Balance training, Functional mobility training, Transfer training, Gait training, Safety education & training, Positioning, Pain management, Patient/Caregiver education & training, Therapeutic activities  Additional Comments: PROGRESS AS TOLERATED BY SOA  Safety Devices  Type of Devices: Call light within reach, Gait belt, Left in chair, Nurse notified    Restrictions        Subjective  General  Patient assessed for rehabilitation services?: Yes  Diagnosis: RESP FAILURE  Follows Commands: Within Functional Limits  Subjective  Subjective: RN REQUESTING PT ASSIST IN GETTING PT UP TO THE RECLINER. Pt DID NOT REPORT PAIN AND IS READY TO WORK WITH THERAPY         Social/Functional History  Social/Functional History  Lives With: Alone  Type of Home: Facility  Home Layout: One level  Home Access: Level entry  Bathroom Shower/Tub: Tub/Shower unit  Bathroom Toilet: Standard  Bathroom Equipment: Built-in shower seat, Grab bars in shower, Commode  Bathroom Accessibility: Accessible  Home Equipment: Oxygen  Receives Help From: Other (Comment) (SNF)  Prior Level of Assist for ADLs: Needs assistance  Toileting: Needs assistance  Prior Level of Assist for Homemaking: Needs assistance  Homemaking Responsibilities: No  Prior Level of Assist for Ambulation: Needs assistance  Prior Level of Assist for Transfers: Needs assistance  Active : No  Patient's  Info: n/a  Mode of Transportation: Car  Education: n/a  Occupation: Retired  Type of Occupation: n/a  Vision/Hearing       Cognition   Cognition  Cognition Comment: ALERT, CONVERSING APPROPRIATELY, FOLLOWING SIMPLE DIRECTIONS    Objective  Temp: 98.4 °F (36.9 °C)  Pulse: (!) 135  Heart Rate Source: Monitor  Respirations: (!) 37  SpO2: 90 %  O2 Device: Heated high flow cannula  BP: (!) 92/59  MAP (Calculated): 70  BP Location: Left upper arm  BP Method: Automatic  Patient Position: Turns

## 2025-07-10 NOTE — PROGRESS NOTES
Occupational Therapy  Facility/Department: [facility] ICU  Occupational Therapy Initial Assessment    Name: Kirsten Montano  : 1960  MRN: [id number]  Date of Service: 7/10/2025    Discharge Recommendations:             Patient Diagnosis(es): The primary encounter diagnosis was Acute on chronic respiratory failure with hypoxia (HCC). Diagnoses of CREST (calcinosis, Raynaud's phenomenon, esophageal dysfunction, sclerodactyly, telangiectasia) (HCC), Pulmonary fibrosis (HCC), and Respiratory distress were also pertinent to this visit.  Past Medical History:  has a past medical history of Allergies, Arthritis, Calcified granuloma of lung, Chronic respiratory failure with hypoxia, on home O2 therapy (HCC), Colon polyps, CREST syndrome (HCC), Gastroesophageal reflux disease without esophagitis, Kidney calculi, Lupus, RIOS (nonalcoholic steatohepatitis), LORETTA on CPAP, Palliative care patient, Primary biliary cirrhosis (HCC), Pulmonary fibrosis (HCC), Pulmonary hypertension (HCC), and Short-term memory loss.  Past Surgical History:  has a past surgical history that includes Breast biopsy; Cholecystectomy (2015); Colonoscopy (2021); Cardiac catheterization (2020); Colonoscopy (2015); Upper gastrointestinal endoscopy (2021); liver biopsy (2015); and Upper gastrointestinal endoscopy (08/10/2016).    Treatment Diagnosis: Pulmonary Fibrosis, Respiratory distress      Assessment  Performance deficits / Impairments: Decreased endurance;Decreased strength;Decreased functional mobility ;Decreased ROM;Decreased balance;Decreased ADL status  Assessment: Will progress as tolerated.  Treatment Diagnosis: Pulmonary Fibrosis, Respiratory distress  Prognosis: Good  Decision Making: Low Complexity  REQUIRES OT FOLLOW-UP: Yes  Activity Tolerance  Activity Tolerance: Treatment limited secondary to medical complications (free text)     Plan  Occupational Therapy Plan  Times Per Week: 3-5x/week    Restrictions    Partial/Moderate assistance  Transfers  Stand Step Transfers: Contact guard assistance;Minimal assistance (hand held Assist)  Sit to stand: Contact guard assistance;Minimal assistance     Cognition  Overall Cognitive Status: WNL  Orientation  Overall Orientation Status: Within Normal Limits                                        G-Code     OutComes Score                                                  AM-PAC - ADL       Tinneti Score       Goals  Short Term Goals  Time Frame for Short Term Goals: 1 week  Short Term Goal 1: Supervision for bed to [facility] tfers  Short Term Goal 2: Supervision managing clothes for toileting with standing  Short Term Goal 3: Tolerate 15 minutes BUE exercises.  Short Term Goal 4: Tolerate standing 30 seconds with assist to  help with self care.  Long Term Goals  Long Term Goal 1: Return to PLOF      Therapy Time   Individual Concurrent Group Co-treatment   Time In           Time Out           Minutes                   Albert Salazar OT

## 2025-07-11 ENCOUNTER — HOSPITAL ENCOUNTER (INPATIENT)
Age: 65
LOS: 1 days | DRG: 951 | End: 2025-07-11
Attending: INTERNAL MEDICINE | Admitting: INTERNAL MEDICINE
Payer: COMMERCIAL

## 2025-07-11 VITALS
TEMPERATURE: 98 F | HEART RATE: 140 BPM | OXYGEN SATURATION: 84 % | HEIGHT: 64 IN | RESPIRATION RATE: 20 BRPM | SYSTOLIC BLOOD PRESSURE: 99 MMHG | DIASTOLIC BLOOD PRESSURE: 69 MMHG | BODY MASS INDEX: 30.73 KG/M2 | WEIGHT: 180 LBS

## 2025-07-11 VITALS
HEART RATE: 147 BPM | DIASTOLIC BLOOD PRESSURE: 64 MMHG | RESPIRATION RATE: 28 BRPM | SYSTOLIC BLOOD PRESSURE: 90 MMHG | TEMPERATURE: 98 F | OXYGEN SATURATION: 82 %

## 2025-07-11 PROBLEM — J96.01 ACUTE RESPIRATORY FAILURE WITH HYPOXIA (HCC): Status: ACTIVE | Noted: 2025-07-11

## 2025-07-11 LAB
(1,3)-BETA-D-GLUCAN (FUNGITELL) INTERPRETATION: ABNORMAL
1,3 BETA GLUCAN SER-MCNC: 71 PG/ML
ANION GAP SERPL CALCULATED.3IONS-SCNC: 12 MMOL/L (ref 8–16)
BASOPHILS # BLD: 0.1 K/UL (ref 0–0.2)
BASOPHILS NFR BLD: 0.6 % (ref 0–1)
BUN SERPL-MCNC: 10 MG/DL (ref 8–23)
CALCIUM SERPL-MCNC: 8.7 MG/DL (ref 8.8–10.2)
CHLORIDE SERPL-SCNC: 96 MMOL/L (ref 98–107)
CO2 SERPL-SCNC: 25 MMOL/L (ref 22–29)
CREAT SERPL-MCNC: 0.5 MG/DL (ref 0.5–0.9)
EOSINOPHIL # BLD: 0.1 K/UL (ref 0–0.6)
EOSINOPHIL NFR BLD: 0.6 % (ref 0–5)
ERYTHROCYTE [DISTWIDTH] IN BLOOD BY AUTOMATED COUNT: 20.2 % (ref 11.5–14.5)
GLUCOSE SERPL-MCNC: 85 MG/DL (ref 70–99)
HCT VFR BLD AUTO: 37.6 % (ref 37–47)
HGB BLD-MCNC: 11.6 G/DL (ref 12–16)
IMM GRANULOCYTES # BLD: 1.1 K/UL
LYMPHOCYTES # BLD: 1.3 K/UL (ref 1.1–4.5)
LYMPHOCYTES NFR BLD: 9.4 % (ref 20–40)
MAGNESIUM SERPL-MCNC: 1.8 MG/DL (ref 1.6–2.4)
MCH RBC QN AUTO: 24.7 PG (ref 27–31)
MCHC RBC AUTO-ENTMCNC: 30.9 G/DL (ref 33–37)
MCV RBC AUTO: 80 FL (ref 81–99)
MONOCYTES # BLD: 0.7 K/UL (ref 0–0.9)
MONOCYTES NFR BLD: 5 % (ref 0–10)
NEUTROPHILS # BLD: 10.7 K/UL (ref 1.5–7.5)
NEUTS SEG NFR BLD: 76.3 % (ref 50–65)
PLATELET # BLD AUTO: 100 K/UL (ref 130–400)
POTASSIUM SERPL-SCNC: 3.5 MMOL/L (ref 3.5–5)
RBC # BLD AUTO: 4.7 M/UL (ref 4.2–5.4)
SODIUM SERPL-SCNC: 133 MMOL/L (ref 136–145)
WBC # BLD AUTO: 14.1 K/UL (ref 4.8–10.8)

## 2025-07-11 PROCEDURE — 99233 SBSQ HOSP IP/OBS HIGH 50: CPT | Performed by: PHYSICIAN ASSISTANT

## 2025-07-11 PROCEDURE — 6370000000 HC RX 637 (ALT 250 FOR IP): Performed by: INTERNAL MEDICINE

## 2025-07-11 PROCEDURE — 80048 BASIC METABOLIC PNL TOTAL CA: CPT

## 2025-07-11 PROCEDURE — 99232 SBSQ HOSP IP/OBS MODERATE 35: CPT | Performed by: INTERNAL MEDICINE

## 2025-07-11 PROCEDURE — 6360000002 HC RX W HCPCS: Performed by: INTERNAL MEDICINE

## 2025-07-11 PROCEDURE — 6370000000 HC RX 637 (ALT 250 FOR IP): Performed by: PHYSICIAN ASSISTANT

## 2025-07-11 PROCEDURE — 85025 COMPLETE CBC W/AUTO DIFF WBC: CPT

## 2025-07-11 PROCEDURE — 94640 AIRWAY INHALATION TREATMENT: CPT

## 2025-07-11 PROCEDURE — 83735 ASSAY OF MAGNESIUM: CPT

## 2025-07-11 PROCEDURE — 6360000002 HC RX W HCPCS: Performed by: PHYSICIAN ASSISTANT

## 2025-07-11 PROCEDURE — 94760 N-INVAS EAR/PLS OXIMETRY 1: CPT

## 2025-07-11 PROCEDURE — 2580000003 HC RX 258: Performed by: INTERNAL MEDICINE

## 2025-07-11 PROCEDURE — 2700000000 HC OXYGEN THERAPY PER DAY

## 2025-07-11 PROCEDURE — 6560000002 HC HOSPICE GENERAL INPATIENT

## 2025-07-11 PROCEDURE — 36415 COLL VENOUS BLD VENIPUNCTURE: CPT

## 2025-07-11 PROCEDURE — 94669 MECHANICAL CHEST WALL OSCILL: CPT

## 2025-07-11 RX ORDER — 0.9 % SODIUM CHLORIDE 0.9 %
250 INTRAVENOUS SOLUTION INTRAVENOUS ONCE
Status: COMPLETED | OUTPATIENT
Start: 2025-07-11 | End: 2025-07-11

## 2025-07-11 RX ORDER — BISACODYL 10 MG
10 SUPPOSITORY, RECTAL RECTAL DAILY PRN
Status: DISCONTINUED | OUTPATIENT
Start: 2025-07-11 | End: 2025-07-12 | Stop reason: HOSPADM

## 2025-07-11 RX ORDER — FUROSEMIDE 10 MG/ML
40 INJECTION INTRAMUSCULAR; INTRAVENOUS ONCE
Status: COMPLETED | OUTPATIENT
Start: 2025-07-11 | End: 2025-07-11

## 2025-07-11 RX ORDER — MORPHINE SULFATE 2 MG/ML
2 INJECTION, SOLUTION INTRAMUSCULAR; INTRAVENOUS ONCE
Status: DISCONTINUED | OUTPATIENT
Start: 2025-07-11 | End: 2025-07-11 | Stop reason: HOSPADM

## 2025-07-11 RX ORDER — LORAZEPAM 2 MG/ML
0.5 INJECTION INTRAMUSCULAR EVERY 4 HOURS PRN
Status: DISCONTINUED | OUTPATIENT
Start: 2025-07-11 | End: 2025-07-11 | Stop reason: HOSPADM

## 2025-07-11 RX ORDER — MORPHINE SULFATE 2 MG/ML
1 INJECTION, SOLUTION INTRAMUSCULAR; INTRAVENOUS
Status: DISCONTINUED | OUTPATIENT
Start: 2025-07-11 | End: 2025-07-11

## 2025-07-11 RX ORDER — GLYCOPYRROLATE 0.2 MG/ML
0.2 INJECTION INTRAMUSCULAR; INTRAVENOUS EVERY 6 HOURS PRN
Status: DISCONTINUED | OUTPATIENT
Start: 2025-07-11 | End: 2025-07-11 | Stop reason: HOSPADM

## 2025-07-11 RX ORDER — MORPHINE SULFATE 2 MG/ML
1 INJECTION, SOLUTION INTRAMUSCULAR; INTRAVENOUS
Status: DISCONTINUED | OUTPATIENT
Start: 2025-07-11 | End: 2025-07-11 | Stop reason: HOSPADM

## 2025-07-11 RX ORDER — ACETAMINOPHEN 325 MG/1
650 TABLET ORAL EVERY 4 HOURS PRN
Status: DISCONTINUED | OUTPATIENT
Start: 2025-07-11 | End: 2025-07-12 | Stop reason: HOSPADM

## 2025-07-11 RX ADMIN — VENLAFAXINE HYDROCHLORIDE 75 MG: 75 CAPSULE, EXTENDED RELEASE ORAL at 08:23

## 2025-07-11 RX ADMIN — BUSPIRONE HYDROCHLORIDE 10 MG: 10 TABLET ORAL at 08:19

## 2025-07-11 RX ADMIN — IPRATROPIUM BROMIDE AND ALBUTEROL SULFATE 1 DOSE: 2.5; .5 SOLUTION RESPIRATORY (INHALATION) at 10:09

## 2025-07-11 RX ADMIN — ONDANSETRON HYDROCHLORIDE 4 MG: 8 TABLET, FILM COATED ORAL at 06:30

## 2025-07-11 RX ADMIN — BUSPIRONE HYDROCHLORIDE 10 MG: 10 TABLET ORAL at 13:36

## 2025-07-11 RX ADMIN — HYDROCODONE BITARTRATE AND ACETAMINOPHEN 1 TABLET: 10; 325 TABLET ORAL at 08:15

## 2025-07-11 RX ADMIN — MEROPENEM 1000 MG: 1 INJECTION INTRAVENOUS at 15:36

## 2025-07-11 RX ADMIN — Medication 0.5 MG: at 19:32

## 2025-07-11 RX ADMIN — Medication: at 14:04

## 2025-07-11 RX ADMIN — IPRATROPIUM BROMIDE AND ALBUTEROL SULFATE 1 DOSE: 2.5; .5 SOLUTION RESPIRATORY (INHALATION) at 14:06

## 2025-07-11 RX ADMIN — PANTOPRAZOLE SODIUM 40 MG: 40 TABLET, DELAYED RELEASE ORAL at 06:30

## 2025-07-11 RX ADMIN — MORPHINE SULFATE 1 MG: 2 INJECTION, SOLUTION INTRAMUSCULAR; INTRAVENOUS at 20:17

## 2025-07-11 RX ADMIN — ALPRAZOLAM 0.25 MG: 0.5 TABLET ORAL at 20:23

## 2025-07-11 RX ADMIN — ACETYLCYSTEINE 600 MG: 200 SOLUTION ORAL; RESPIRATORY (INHALATION) at 10:17

## 2025-07-11 RX ADMIN — VERAPAMIL HYDROCHLORIDE 80 MG: 80 TABLET ORAL at 06:30

## 2025-07-11 RX ADMIN — AZITHROMYCIN MONOHYDRATE 500 MG: 500 INJECTION, POWDER, LYOPHILIZED, FOR SOLUTION INTRAVENOUS at 06:27

## 2025-07-11 RX ADMIN — VERAPAMIL HYDROCHLORIDE 80 MG: 80 TABLET ORAL at 13:36

## 2025-07-11 RX ADMIN — ONDANSETRON HYDROCHLORIDE 4 MG: 8 TABLET, FILM COATED ORAL at 13:36

## 2025-07-11 RX ADMIN — SODIUM CHLORIDE 250 ML: 0.9 INJECTION, SOLUTION INTRAVENOUS at 15:36

## 2025-07-11 RX ADMIN — ALPRAZOLAM 0.25 MG: 0.5 TABLET ORAL at 06:45

## 2025-07-11 RX ADMIN — GLYCOPYRROLATE 0.2 MG: 0.2 INJECTION INTRAMUSCULAR; INTRAVENOUS at 20:04

## 2025-07-11 RX ADMIN — MORPHINE SULFATE 1 MG: 2 INJECTION, SOLUTION INTRAMUSCULAR; INTRAVENOUS at 18:08

## 2025-07-11 RX ADMIN — MEROPENEM 1000 MG: 1 INJECTION INTRAVENOUS at 08:21

## 2025-07-11 RX ADMIN — FUROSEMIDE 40 MG: 10 INJECTION, SOLUTION INTRAMUSCULAR; INTRAVENOUS at 08:20

## 2025-07-11 RX ADMIN — Medication: at 10:00

## 2025-07-11 RX ADMIN — MEROPENEM 1000 MG: 1 INJECTION INTRAVENOUS at 00:16

## 2025-07-11 ASSESSMENT — PAIN SCALES - WONG BAKER: WONGBAKER_NUMERICALRESPONSE: NO HURT

## 2025-07-11 ASSESSMENT — PAIN DESCRIPTION - DESCRIPTORS
DESCRIPTORS: NAGGING;SORE;ACHING
DESCRIPTORS: DISCOMFORT

## 2025-07-11 ASSESSMENT — PAIN SCALES - GENERAL
PAINLEVEL_OUTOF10: 2
PAINLEVEL_OUTOF10: 0
PAINLEVEL_OUTOF10: 9

## 2025-07-11 ASSESSMENT — PAIN DESCRIPTION - LOCATION
LOCATION: GENERALIZED
LOCATION: GENERALIZED

## 2025-07-11 ASSESSMENT — PAIN DESCRIPTION - FREQUENCY: FREQUENCY: CONTINUOUS

## 2025-07-11 ASSESSMENT — PAIN DESCRIPTION - PAIN TYPE: TYPE: CHRONIC PAIN

## 2025-07-11 ASSESSMENT — PAIN DESCRIPTION - ONSET: ONSET: ON-GOING

## 2025-07-11 ASSESSMENT — PAIN - FUNCTIONAL ASSESSMENT: PAIN_FUNCTIONAL_ASSESSMENT: ACTIVITIES ARE NOT PREVENTED

## 2025-07-11 NOTE — PROGRESS NOTES
Pt's family at bedside throughout the afternoon; bother daughters and pt's  present. Daughters, Natalia and Rose, at bedside and wish for patient to go to inpatient hospice.    RN discussed with pt's daughters, as well as , about change in code status. Both daughters, Natalia and Rose, are agreeable to make pt a DNR at this time. MD notified. See orders.       Electronically signed by Lauren Demarco RN on 7/11/2025 at 5:51 PM

## 2025-07-11 NOTE — CARE COORDINATION
Private pay for a second O2 concentrator is $200.00 per month from [employer].  Electronically signed by Bekah Lopez RN on 7/11/2025 at 1:53 PM

## 2025-07-11 NOTE — PROGRESS NOTES
Nutrition Assessment     Type and Reason for Visit: Reassess    Nutrition Recommendations/Plan:   Follow for po intake, labs      Malnutrition Assessment:  Malnutrition Status: At risk for malnutrition    Nutrition Assessment:  Pt continues to receive a Regular diet.  PO intake is poor this a.m. with intake at 0-25%  Did not take Ensure.  Will continue this as she said she would drink a vanilla Ensure, but also add Magic cup.  No new weight.    Estimated Daily Nutrient Needs:  Energy (kcal):  898-1142 kcals (11-14 kcals/kg) Weight Used for Energy Requirements: Current     Protein (g):  109g Weight Used for Protein Requirements: Ideal        Fluid (ml/day):  898-1142ml Method Used for Fluid Requirements: 1 ml/kcal    Nutrition Related Findings:   no edema noted Wound Type: None    Current Nutrition Therapies:    ADULT DIET; Regular  ADULT ORAL NUTRITION SUPPLEMENT; Breakfast, Lunch, Dinner; Low Calorie/High Protein Oral Supplement  ADULT ORAL NUTRITION SUPPLEMENT; Lunch, Dinner; Frozen Oral Supplement    Anthropometric Measures:  Height: 162.6 cm (5' 4.02\")  Current Body Wt: 81.6 kg (179 lb 14.3 oz)   BMI: 30.9        Nutrition Diagnosis:   Inadequate protein-energy intake related to acute injury/trauma, impaired respiratory function as evidenced by intake 26-50%    Nutrition Interventions:   Food and/or Nutrient Delivery: Continue Current Diet, Continue Oral Nutrition Supplement, Start Oral Nutrition Supplement  Nutrition Education/Counseling: No recommendation at this time  Coordination of Nutrition Care: Continue to monitor while inpatient       Goals:  Goals: Meet at least 75% of estimated needs, PO intake 50% or greater, Maintain adequate nutrition status  Type of Goal: Continue current goal  Previous Goal Met: No Progress toward Goal(s)    Nutrition Monitoring and Evaluation:   Behavioral-Environmental Outcomes: None Identified  Food/Nutrient Intake Outcomes: Food and Nutrient Intake, Supplement

## 2025-07-11 NOTE — PROGRESS NOTES
Infectious Diseases Progress Note    Patient:  Kirsten Montano  YOB: 1960  MRN: [id number]   Admit date: 7/8/2025   Admitting Physician: Rios Ma MD  Primary Care Physician: Femi Bruce MD    Chief Complaint: Seeing in follow-up of acute on chronic respiratory failure with history of crest.    Interval History: I saw her late afternoon July 11, 2025.  Daughter, , grandson, and granddaughter in law at bedside.  Patient indicated her day yesterday was rough.  She has had a hard time with these hospitalizations.  She had indicated she was transitioning to hospice care.  Palliative care notes reviewed.  Discussed with nursing as well.  Probable transfer to inpatient hospice later today.    Note initiated mid afternoon July 11, 2025.  Saw patient late afternoon July 11, 2025.  Note completed early morning July 12, 2025.  CBL    In/Out    Intake/Output Summary (Last 24 hours) at 7/11/2025 1359  Last data filed at 7/11/2025 1245  Gross per 24 hour   Intake 1449.98 ml   Output 750 ml   Net 699.98 ml     Allergies:   Allergies   Allergen Reactions    Latex Rash    Codeine Nausea And Vomiting     Current Meds: morphine (PF) injection 2 mg, Once  morphine (PF) injection 1 mg, Q1H PRN  pantoprazole (PROTONIX) tablet 40 mg, QAM AC  loperamide (IMODIUM) capsule 2 mg, 4x Daily PRN  verapamil (CALAN) tablet 80 mg, 3 times per day  meropenem (MERREM) 1,000 mg in sodium chloride 0.9 % 100 mL IVPB ([id number]), Q8H  azithromycin (ZITHROMAX) 500 mg in sodium chloride 0.9 % 250 mL IVPB ([id number]), Q24H  ondansetron (ZOFRAN) tablet 4 mg, TID AC  ALPRAZolam (XANAX) tablet 0.25 mg, TID PRN  HYDROcodone-acetaminophen (NORCO)  MG per tablet 1 tablet, Q6H PRN  sodium chloride (OCEAN, BABY AYR) 0.65 % nasal spray 1 spray, PRN  saline nasal gel (AYR), TID  Macitentan-Tadalafil 10-40 MG TABS 1 tablet (Patient Supplied), Nightly  sodium chloride flush 0.9 % injection 5-40 mL, PRN  0.9 % sodium chloride infusion,

## 2025-07-11 NOTE — PROGRESS NOTES
Patient refusal for all AM breathing treatments (DuoNeb, Mucomyst, and Chest Vest). Explained importance of each and said she would like to maybe do them at a later time. Told patient she can have RN call RT if she changes her mind and would to have treatments before next scheduled administration time. Patient agreeable and RN notified and aware.

## 2025-07-11 NOTE — PROGRESS NOTES
Medicine Progress Note  [facility]     Patient: Kirsten Montano  : 1960  MRN: [id number]  Code Status: Full Code    Hospital Day: 3   Date of Service: 2025    Subjective:   Patient seen and examined.  Currently requiring HHFNC 45 L / 100%.    Past Medical History:   Diagnosis Date    Allergies     Arthritis     Calcified granuloma of lung     Right    Chronic respiratory failure with hypoxia, on home O2 therapy (HCC)     Colon polyps     CREST syndrome (HCC)     scleroderma    Gastroesophageal reflux disease without esophagitis     Kidney calculi     Lupus     RIOS (nonalcoholic steatohepatitis)     LORETTA on CPAP     Palliative care patient 2025    Primary biliary cirrhosis (HCC)     Pulmonary fibrosis (HCC)     Pulmonary hypertension (HCC)     Short-term memory loss        Past Surgical History:   Procedure Laterality Date    BREAST BIOPSY      x 3, all benign    CARDIAC CATHETERIZATION  2020    Dr Elizabeth    CHOLECYSTECTOMY  2015    Dr Carver-Chronic cholecystitis and cholesterolosis with cholelithiasis    COLONOSCOPY  2021    Dr Briones-AP, 5 yr recall    COLONOSCOPY  2015    Dr Briones-AP    LIVER BIOPSY  2015    Dr Carver-Mild fatty metamorphosis, marked portal triaditis w/focal periportal inflammation, scattered noncaseating granulomas within the hepatic lobules, iron staining absent, GMS stain and Kinyoun stain negative for organisms, per [facility]    UPPER GASTROINTESTINAL ENDOSCOPY  2021    Dr Briones-Normal    UPPER GASTROINTESTINAL ENDOSCOPY  08/10/2016    Urease neg       Family History   Problem Relation Age of Onset    Colon Polyps Sister     Cirrhosis Sister     Colon Polyps Sister     Cancer Brother     Cirrhosis Brother     Colon Cancer Neg Hx     Esophageal Cancer Neg Hx     Rectal Cancer Neg Hx     Stomach Cancer Neg Hx        Social History     Socioeconomic History    Marital status:      Spouse name: Not on file    Number

## 2025-07-11 NOTE — PROGRESS NOTES
Pulmonary and Critical Care Progress note.    [facility]    Kirsten Montano    MRN# [id number]    Acct# [id number]  7/11/2025   6:14 PM CDT    Referring Provider:Rios Ma MD      Chief Complaint: Shortness of breath    HPI: She continues to be short of breath.  She continues to be on high flow nasal cannula. O2 sats marginal. CXR worse. I/O positive.     Medications:  The following medications were reviewed and adjustments made when necessary.    morphine, 2 mg, IntraVENous, Once    pantoprazole, 40 mg, Oral, QAM AC    verapamil, 80 mg, Oral, 3 times per day    meropenem, 1,000 mg, IntraVENous, Q8H    azithromycin, 500 mg, IntraVENous, Q24H    ondansetron, 4 mg, Oral, TID AC    saline nasal gel, , Nasal, TID    Macitentan-Tadalafil, 1 tablet, Oral, Nightly    enoxaparin, 40 mg, SubCUTAneous, Daily    ipratropium 0.5 mg-albuterol 2.5 mg, 1 Dose, Inhalation, Q4H WA RT    acetylcysteine, 600 mg, Inhalation, BID RT    montelukast, 10 mg, Oral, Nightly    busPIRone, 10 mg, Oral, TID    traZODone, 100 mg, Oral, Nightly    mycophenolate, 500 mg, Oral, BID    venlafaxine, 75 mg, Oral, Daily with breakfast         Physical Exam:  Vitals:    07/11/25 0800 07/11/25 0815 07/11/25 0830 07/11/25 0840   BP: (!) 89/49   (!) 94/54   Pulse: (!) 110 (!) 110 (!) 113 (!) 107   Resp: (!) 49 (!) 37 (!) 47 (!) 33   Temp:       TempSrc:       SpO2: 90% (!) 89% 90% 91%   Weight:       Height:          Intake/Output Summary (Last 24 hours) at 7/11/2025 0943  Last data filed at 7/11/2025 0800  Gross per 24 hour   Intake 1319.98 ml   Output 600 ml   Net 719.98 ml       General appearance: Elderly female in no mild resp distress   HEENT:normocephalic atraumatic  Heart:S1S2 no murmurs  Lungs:diminished bilaterally no rubs or tenderness or dullness to percussion  Abdomen:Soft non tender no organomegaly  Extremities:no clubbing cyanosis or edema  Neuro:no focal findings  Skin:intact        The following lab data was reviewed:  CBC    Recent Labs     07/08/25  1005 07/09/25  0133 07/10/25  0120 07/11/25  0649   WBC 15.3* 11.9* 10.3 14.1*   RBC 6.26* 5.31 4.47 4.70   HGB 15.3 12.9 11.0* 11.6*   HCT 48.7* 41.1 35.5* 37.6    129* 120* 100*   MCV 77.8* 77.4* 79.4* 80.0*   MCH 24.4* 24.3* 24.6* 24.7*   MCHC 31.4* 31.4* 31.0* 30.9*   RDW 21.1* 19.9* 19.8* 20.2*   BANDSPCT 2 6* 5  --       BMP   Recent Labs     07/08/25  1005 07/08/25  1103 07/09/25  0133 07/10/25  0120 07/11/25  0649   *  --  132* 137 133*   K 4.5 4.0 3.9 3.6 3.5   CL 96*  --  95* 98 96*   CO2 17*  --  23 28 25   BUN 22  --  27* 18 10   CREATININE 0.7  --  0.8 0.7 0.5   CALCIUM 8.4*  --  7.6* 8.2* 8.7*   GLUCOSE 172*  --  145* 96 85      ABG   Recent Labs     07/08/25  1103   PHART 7.440   AOO6FOW 29.0*   PO2ART 101.0*   [id number] 19.7*   [id number] 96.7   BEART -3.1*     Liver function studies and cardiac markers   Recent Labs     07/08/25  1005 07/09/25  0133 07/10/25  0120 07/11/25  0649   AST 37* 28  --   --    ALT 25 22  --   --    ALKPHOS 207* 170*  --   --    BILITOT 0.4 0.2  --   --    MG  --   --    < > 1.8   CALCIUM 8.4* 7.6*   < > 8.7*   PHOS  --  3.3  --   --    PROBNP 897*  --   --   --    INR 1.16  --   --   --    DDIMER 7.27*  --   --   --     < > = values in this interval not displayed.     Microbiology and Cultures   No results for input(s): \"BC\", \"LABGRAM\", \"CULTRESP\", \"BFCX\" in the last 72 hours.        Radiographs reviewed:     CTA PULMONARY W CONTRAST  Result Date: 7/8/2025   No pulmonary emboli within the  main pulmonary artery to some of the segmental branches with other segmental and subsegmental branches suboptimally evaluated.  Right upper lobe mass/malignancy insert multiple right hilar lymphadenopathy.  Metastatic cervical levels 4 and 5 and mediastinal lymphadenopathy.  Metastatic hepatic lesion.  Probable right-sided lymphangitic carcinomatosis.  Small pericardial effusion.  Enlargement of the pulmonary artery which may be seen with pulmonary

## 2025-07-11 NOTE — PROGRESS NOTES
Pt anxious at start of dayshift; c/o feeling short of air. Pt sat up in bed; educated on slow calm deep breathing. Pt O2 sat remains 91% once sat up in bed. Pt states that she is 'ready to die.' Pt states that she is ready for us to move her to hospice; adds that she is 'ready to go now.'    Electronically signed by Lauren Demarco RN on 7/11/2025 at 7:40 AM

## 2025-07-11 NOTE — PLAN OF CARE
Problem: Nutrition Deficit:  Goal: Optimize nutritional status  Recent Flowsheet Documentation  Taken 7/11/2025 1139 by Ayesha Barrera, MS, RD, LD  Nutrient intake appropriate for improving, restoring, or maintaining nutritional needs:   Assess nutritional status and recommend course of action   Monitor oral intake, labs, and treatment plans   Recommend appropriate diets, oral nutritional supplements, and vitamin/mineral supplements  7/10/2025 2233 by Denita Alamo, RN  Outcome: Progressing     Problem: Discharge Planning  Goal: Discharge to home or other facility with appropriate resources  7/10/2025 2233 by Denita Alamo, RN  Outcome: Not Progressing

## 2025-07-11 NOTE — PROGRESS NOTES
Physical Therapy     07/11/25 1032   Subjective   Subjective attempted AM tx; pt very lethargic post medication. will attempt PM tx and follow for therapy needs.   Vitals   Pulse (!) 108   Respirations 16   SpO2 93 %   BP (!) 94/58   MAP (Calculated) 70     Electronically signed by Jonas King PTA on 7/11/2025 at 10:45 AM

## 2025-07-11 NOTE — PROGRESS NOTES
Attempted to call  on call x3 r/t inpatient hospice consult.     Electronically signed by Lauren Demarco RN on 7/11/2025 at 6:25 PM

## 2025-07-11 NOTE — PLAN OF CARE
Problem: Discharge Planning  Goal: Discharge to home or other facility with appropriate resources  Outcome: Progressing     Problem: Skin/Tissue Integrity  Goal: Skin integrity remains intact  Description: 1.  Monitor for areas of redness and/or skin breakdown  2.  Assess vascular access sites hourly  3.  Every 4-6 hours minimum:  Change oxygen saturation probe site  4.  Every 4-6 hours:  If on nasal continuous positive airway pressure, respiratory therapy assess nares and determine need for appliance change or resting period  Outcome: Progressing     Problem: ABCDS Injury Assessment  Goal: Absence of physical injury  Outcome: Progressing     Problem: Safety - Adult  Goal: Free from fall injury  Outcome: Progressing     Problem: Pain  Goal: Verbalizes/displays adequate comfort level or baseline comfort level  Outcome: Progressing     Problem: Nutrition Deficit:  Goal: Optimize nutritional status  Outcome: Progressing  Flowsheets (Taken 7/11/2025 1139 by Ayesha Barrera, MS, RD, LD)  Nutrient intake appropriate for improving, restoring, or maintaining nutritional needs:   Assess nutritional status and recommend course of action   Monitor oral intake, labs, and treatment plans   Recommend appropriate diets, oral nutritional supplements, and vitamin/mineral supplements

## 2025-07-11 NOTE — PROGRESS NOTES
mycophenolate  500 mg Oral BID    venlafaxine  75 mg Oral Daily with breakfast     morphine, loperamide, ALPRAZolam, HYDROcodone-acetaminophen, sodium chloride, sodium chloride flush, sodium chloride, ondansetron **OR** ondansetron, polyethylene glycol, acetaminophen **OR** acetaminophen, guaiFENesin-dextromethorphan  ADULT DIET; Regular  ADULT ORAL NUTRITION SUPPLEMENT; Breakfast, Lunch, Dinner; Low Calorie/High Protein Oral Supplement  ADULT ORAL NUTRITION SUPPLEMENT; Lunch, Dinner; Frozen Oral Supplement     Lab and other Data:     Recent Labs     07/09/25  0133 07/10/25  0120 07/11/25  0649   WBC 11.9* 10.3 14.1*   HGB 12.9 11.0* 11.6*   * 120* 100*     Recent Labs     07/09/25  0133 07/10/25  0120 07/11/25  0649   * 137 133*   K 3.9 3.6 3.5   CL 95* 98 96*   CO2 23 28 25   BUN 27* 18 10   CREATININE 0.8 0.7 0.5   GLUCOSE 145* 96 85     Recent Labs     07/09/25 0133   AST 28   ALT 22   BILITOT 0.2   ALKPHOS 170*       Palliative Performance Scale:40-50%    Assessment/Plan   Principal Problem:    Acute respiratory failure with hypoxemia (HCC)  Active Problems:    Palliative care patient    CREST syndrome (HCC)    Pulmonary fibrosis (HCC)    Acute on chronic respiratory failure with hypoxia (HCC)    Primary biliary cirrhosis (HCC)    Anxiety and depression    Chronic pain syndrome    CREST (calcinosis, Raynaud's phenomenon, esophageal dysfunction, sclerodactyly, telangiectasia) (HCC)  Resolved Problems:    * No resolved hospital problems. *      Visit Summary:  Chart reviewed.  Discussed with ICU RN.  This morning Kirsten again verbalized that she was ready to discharge to inpatient hospice.  She was not ready at that time to transition to a do not resuscitate.  She did allow use of Xanax this morning at 06 45 and Norco  mg at 08 15.  She appears much more comfortable today and is able to rest.  She is easily awakened but does fall back asleep.  She is less restless.  She is a candidate for the  refusing clonazepam but voices she will take xanax-Xanax 0.25 mg TID prn ordered, Venlafaxine continued  Multifocal atrial Tachycardia-was placed on Verapamil 100 mg nightly during hospitalization 06/2025,  will defer to Attending w/ concern for hypotension   Chronic back pain-Norco listed as home medication-will have available as needed  Partial Nasal obstruction-family voice she frequently has multiple blood clots in nose and uses a estela pot at home to flush out. They did mention possible ENT evaluation-will hold off for now and order saline gel to be placed in nose to soften dried blood   Insomnia-improving       Total Time Spent with patient assessment, interview of independent historian/HCS, workup/treatment review, discussion with medical team, review of current and home medications, and placement of orders/preparation of this note: 52 minutes                                 Electronically signed by Kiki Chatterjee PA-C on 7/11/2025 at 2:33 PM    (Please note that portions of this note were completed with a voice recognition program.  Effortswere made to edit the dictations but occasionally words are mis-transcribed.)

## 2025-07-12 NOTE — CARE COORDINATION
SW was contacted by Cristela with Palliative Care to please enter the hospice referral into Care port for [facility] at family request    Completed.    Electronically signed by AMANDA Hoover on 7/11/2025 at 7:43 PM      07/11/25 1800  Inpatient consult to Hospice  ONE TIME     Complete  Discontinue     Comments: Pulmonary fibrosis; respiratory distress; acute on chronic respiratory failure   Provider: (Not yet assigned)   Question: Reason for Consult? Answer: Other (Specify in Comments)    07/11/25 6305

## 2025-07-12 NOTE — PROGRESS NOTES
Patient's daughter came to desk reporting she thought patient had passed. Patient with no respiration, no pulse and no blood pressure. Patient passed peacefully with family at bedside.

## 2025-07-12 NOTE — PROGRESS NOTES
Patient arrived from ICU via bed with nursing staff and respiratory therapist. Transferred to bed without difficulty. Dr. Elena notified of arrival.

## 2025-07-12 NOTE — DISCHARGE SUMMARY
Medicine Discharge Summary  [facility]    Patient: Kirsten Montano  : 1960  MRN: [id number]  Code Status: DNR  PCP: Femi Bruce MD  Attending: Rios Ma MD  Admission Date: 2025  Discharge Date: 2025    Hospital Course:   65-year-old female with history of crest syndrome and pulmonary fibrosis initially admitted to critical care unit for acute on chronic hypoxic respiratory failure.     Requires 9-15 L supplemental O2 at home  Followed by pulmonary at [facility] and [facility]  Multiple hospitalizations over the past year  Currently on HHFNC 45 L / 100%  Palliative following for goals of care  ID recs on board  Pulmonary recs on board  Lovenox for DVT prophylaxis  Discussed treatment plan with patient/RN/palliative care/ID    Patient discharged to hospice facility on 2025 while I was off service.    Discharge Exam:   Patient discharged to hospice facility on 2025 while I was off service.    Recent Labs     07/10/25  0120 25  0649   WBC 10.3 14.1*   HGB 11.0* 11.6*   * 100*     Recent Labs     07/10/25  0120 25  0649    133*   K 3.6 3.5   CL 98 96*   CO2 28 25   BUN 18 10   CREATININE 0.7 0.5   GLUCOSE 96 85     No results for input(s): \"AST\", \"ALT\", \"BILITOT\", \"ALKPHOS\" in the last 72 hours.    Invalid input(s): \"ALB\"  Troponin T: No results for input(s): \"TROPONINI\" in the last 72 hours.  Pro-BNP: No results for input(s): \"BNP\" in the last 72 hours.  INR: No results for input(s): \"INR\" in the last 72 hours.  UA:No results for input(s): \"NITRITE\", \"COLORU\", \"PHUR\", \"LABCAST\", \"WBCUA\", \"RBCUA\", \"MUCUS\", \"TRICHOMONAS\", \"YEAST\", \"BACTERIA\", \"CLARITYU\", \"SPECGRAV\", \"LEUKOCYTESUR\", \"UROBILINOGEN\", \"BILIRUBINUR\", \"BLOODU\", \"GLUCOSEU\", \"AMORPHOUS\" in the last 72 hours.  A1C: No results for input(s): \"LABA1C\" in the last 72 hours.  ABG:No results for input(s): \"PHART\", \"VHS6HCF\", \"PO2ART\", \"KMA2NRO\", \"BEART\", \"HGBAE\", \"D8LGGRZJ\", \"CARBOXHGBART\" in

## 2025-07-12 NOTE — PROGRESS NOTES
Report called to Hospice nurse. Pt assigned bed 3. No questions from hospice nurse at this time. Pt's family updated.    Electronically signed by Amandeep Phillips RN on 7/11/2025 at 8:01 PM

## 2025-07-13 LAB
BACTERIA BLD CULT ORG #2: NORMAL
BACTERIA BLD CULT: NORMAL
H CAPSUL AG UR IA-ACNC: NOT DETECTED NG/ML
H CAPSUL AG UR QL IA: NOT DETECTED

## 2025-07-13 NOTE — PLAN OF CARE
Problem: Patient Care Overview (Adult)  Goal: Plan of Care Review  Outcome: Ongoing (interventions implemented as appropriate)    01/22/17 1601   Coping/Psychosocial Response Interventions   Plan Of Care Reviewed With patient;daughter   Patient Care Overview   Progress progress toward functional goals as expected   Outcome Evaluation   Outcome Summary/Follow up Plan Pt c/o dry cough. Pulm signed off. Rheumatology has been consulted and will see Monday.          Problem: Pneumonia (Adult)  Goal: Signs and Symptoms of Listed Potential Problems Will be Absent or Manageable (Pneumonia)  Outcome: Ongoing (interventions implemented as appropriate)       5 (moderate pain)

## 2025-07-14 ENCOUNTER — TELEPHONE (OUTPATIENT)
Dept: PULMONOLOGY | Facility: CLINIC | Age: 65
End: 2025-07-14

## 2025-07-14 LAB — MISCELLANEOUS LAB TEST ORDER: NORMAL

## 2025-07-29 NOTE — H&P (VIEW-ONLY)
"    [facility] - CARDIOLOGY  New Patient Initial Outpatient [facility]    Primary Care Physician: Aaron Stoddard MD    Subjective     Chief Complaint:  \"need a right heart catheterization,\" SOA      History of Present Illness  59yo followed by Dr. Griffith and Dr Sanders for scleroderma/CREST, and was referred for right heart catheterization. She c/o chronic dyspnea (>1 year). Aggravating factor: scleroderma. Alleviating factor: on O2 via nasal cannula. Progression: gradually worsening. Associated symptoms: no chest pain, orthopnea, PND, LE edema.     Prior echocardiograms have not shown any pulm hyn      Review of Systems   Constitution: Negative.   HENT: Negative.  Negative for nosebleeds.    Eyes: Negative.    Cardiovascular: Negative.  Negative for chest pain, claudication, dyspnea on exertion, irregular heartbeat, leg swelling, near-syncope, orthopnea, palpitations, paroxysmal nocturnal dyspnea and syncope.   Respiratory: Positive for cough and shortness of breath. Negative for wheezing.    Endocrine: Negative.    Hematologic/Lymphatic: Negative.  Negative for bleeding problem. Does not bruise/bleed easily.   Skin: Negative.    Musculoskeletal: Negative.    Gastrointestinal: Negative.  Negative for dysphagia, hematemesis, hematochezia and melena.   Genitourinary: Negative.  Negative for hematuria and non-menstrual bleeding.   Neurological: Negative.    Psychiatric/Behavioral: Negative.    Allergic/Immunologic: Negative.     Otherwise complete ROS reviewed and negative except as mentioned in the HPI.  I have reviewed the Review of Systems as provided above.       Past Medical History:   Past Medical History:   Diagnosis Date   • Arthritis    • BMI 31.0-31.9,adult 6/4/2019   • Calcified granuloma of lung (CMS/HCC) 6/4/2019    Right lung   • CHF (congestive heart failure) (CMS/HCC)    • Gastroesophageal reflux disease without esophagitis 6/4/2019   • Obstructive sleep apnea on CPAP 6/4/2019       Past Surgical " History:  Past Surgical History:   Procedure Laterality Date   • BREAST BIOPSY     • CHOLECYSTECTOMY     • COLONOSCOPY  05/11/2015    5 POLYPS       Family History: family history includes Cirrhosis in her sister; Liver cancer in her brother; No Known Problems in her father and mother.    Social History:  reports that she has never smoked. She has never used smokeless tobacco. She reports that she does not drink alcohol or use drugs.    Medications:  Prior to Admission medications    Medication Sig Start Date End Date Taking? Authorizing Provider   cholecalciferol (VITAMIN D3) 25 MCG (1000 UT) tablet Take 1,000 Units by mouth Daily.    Latanya Spring MD   Flaxseed, Linseed, (FLAX SEED OIL PO) Take  by mouth.    Latanya Spring MD   Multiple Vitamin (MULTI VITAMIN PO) Take  by mouth.    Latanya Spring MD   mycophenolate (CELLCEPT) 500 MG tablet Take  by mouth 3 (Three) Times a Day.    Latanya Spring MD   omeprazole (priLOSEC) 40 MG capsule TAKE 1 CAPSULE TWICE A DAY 2/15/20   Michael Landin,    predniSONE (DELTASONE) 10 MG tablet Take 4 tabs daily x 3 days, then take 3 tabs daily x 3 days, then take 2 tabs daily x 3 days, then take 1 tab daily x 3 days 5/5/20   Fela Blackman APRN   predniSONE (DELTASONE) 2.5 MG tablet  5/9/19   Latanya Spring MD   predniSONE (DELTASONE) 5 MG tablet Take 1 tablet by mouth Daily for 90 days. 5/5/20 8/3/20  Fela Blackman APRN   traZODone (DESYREL) 150 MG tablet 75 mg Every Night. 4/30/19   Latanya Spring MD   ursodiol (ACTIGALL) 300 MG capsule Take 1 capsule by mouth 2 (Two) Times a Day. 2/6/20   Michael Landin DO   venlafaxine (EFFEXOR) 75 MG tablet Take 75 mg by mouth 2 (Two) Times a Day.    Latanya Spring MD   vitamin C (ASCORBIC ACID) 500 MG tablet Take 500 mg by mouth Daily.    Latanya Spring MD     Allergies:  Allergies   Allergen Reactions   • Codeine GI Intolerance       Objective     Vital Signs: /78  "(BP Location: Right arm, Patient Position: Sitting)   Pulse 105   Ht 162.6 cm (64\")   Wt 74.8 kg (165 lb)   BMI 28.32 kg/m²     Physical Exam   Constitutional: No distress.   HENT:   Mouth/Throat: Oropharynx is clear. Pharynx is normal.   Neck: Normal range of motion and thyroid normal. Neck supple. No JVD present. No thyromegaly present.   Cardiovascular: Regular rhythm, S1 normal, S2 normal, normal heart sounds, intact distal pulses and normal pulses.  No extrasystoles are present. Tachycardia present. PMI is not displaced. Exam reveals no gallop.   No murmur heard.  Pulmonary/Chest: Effort normal.   +dry crackles at bases bilaterally   Abdominal: Soft. Bowel sounds are normal. She exhibits no distension. There is no splenomegaly or hepatomegaly. There is no tenderness.   Musculoskeletal: She exhibits no edema or tenderness.   Neurological: She is alert and oriented to person, place, and time.   Skin: Skin is warm and dry.       Results Reviewed:      ECG 12 Lead  Date/Time: 7/17/2020 11:15 AM  Performed by: Thong Martin MD  Authorized by: Thong Martin MD   Comparison: not compared with previous ECG   Rhythm: sinus tachycardia  BPM: 105  QRS axis: right    Clinical impression: abnormal EKG              Lab Results   Component Value Date    TRIG 206 (H) 04/27/2018    HDL 58 (L) 04/27/2018     No results found for: HGBA1C    Assessment / Plan        Problem List Items Addressed This Visit (all new to me, more work-up planned)       Cardiovascular and Mediastinum    Primary pulmonary hypertension (CMS/HCC) - Primary    Overview     Suspected, awaiting right heart cath            Respiratory    Pulmonary fibrosis prob sec underlying autoimmune disease       Musculoskeletal and Integument    CREST syndrome, partial        Other    Raynaud's phenomenon        Plans: proceed with outpatient RHC via IJ approach; all r/b/a discussed with patient. Results will be communicated with pulmonary; likely no cardiology " f/u will be required.    Thong Martin MD   07/17/20   11:16   [de-identified] : CABGx3 (LIMA-LAD, SVG-OM, SVG-PDA)  [de-identified] : 7/15/25 [de-identified] : 13 [de-identified] : Arrived to CTICU extubated on levo. Mido added. POD 1, CT removed, post PTX seen on CT chest. Right pigtail chest tube placed for PTX. Persisting subQ air of anterior chest. Midodrine discontinued. 2uPRBC gave for Hbg 7 with appropriate response. POD 2, delined. Lopressor started. CXR with persisting bilateral PTX. Transferred to [facility]. POD3, CT was placed to water seal, CT Chest obtained, decision made to remove CT. Evening f/u CXR revealed large PTX and subcutaneous air; R anterior pigtail placed once more. Placed in HFNC overnight for comfort. POD4, remained with pigtail in place to suction with stable subq air. POD5, CT placed to water seal with increased facial and neck edema. CT was placed back on suction -40 overnight. POD6, CT placed to water seal. F/u CXR revealed stable subcutaneous air. Remained on water seal overnight. POD7, AM CXR showed stable subq emphysema and CT was placed on clamp trial. After 2 hours, CXR revealed no progression in subq emphysema and CT was subsequently removed. Afternoon CXR revealed stable subq emphysema, reviewed with Dr. Haywood. Pacing wires were removed without arrhythmia or complication. Per Dr. Pickens, patient is hemodynamically stable to be discharged. Upon discharge, patient is eating well, tolerating pain, ambulating, and having bowel movements. Patient discharged home on 7/22/25.   Patient is recuperating well from surgery. He is ambulating and increasing his activities daily. Patient denies fever, chills, dizziness, syncope, shortness of breath, chest pain, palpitations or peripheral edema.  [Spouse] : spouse

## (undated) DEVICE — ELECTRD PAD DEFIB A/

## (undated) DEVICE — CUFF,BP,DISP,1 TUBE,ADULT,HP: Brand: MEDLINE

## (undated) DEVICE — SNAR POLYP CAPTIVATOR MICROHEX 13 240CM

## (undated) DEVICE — SOL NACL 0.9PCT 100ML SGL

## (undated) DEVICE — SKIN PREP TRAY W/CHG: Brand: MEDLINE INDUSTRIES, INC.

## (undated) DEVICE — CANN CO2/O2 NASL A/

## (undated) DEVICE — ST PRIM PUMP 20DRP 3VLV 127IN

## (undated) DEVICE — PINNACLE INTRODUCER SHEATH: Brand: PINNACLE

## (undated) DEVICE — Device

## (undated) DEVICE — THE CHANNEL CLEANING BRUSH IS A NYLON FLEXI BRUSH ATTACHED TO A FLEXIBLE PLASTIC SHEATH DESIGNED TO SAFELY REMOVE DEBRIS FROM FLEXIBLE ENDOSCOPES.

## (undated) DEVICE — SOLIDIFIER LIQUI LOC PLUS 2000CC

## (undated) DEVICE — Device: Brand: DEFENDO AIR/WATER/SUCTION AND BIOPSY VALVE

## (undated) DEVICE — NDL ART SECUR LK 18G 2 3/4IN

## (undated) DEVICE — SENSR O2 OXIMAX FNGR A/ 18IN NONSTR

## (undated) DEVICE — PK CATH CARD 30 CA/4

## (undated) DEVICE — STPCK 3/WY HP M/RA W/OFF/HNDL 1050PSI STRL

## (undated) DEVICE — THE SINGLE USE ETRAP – POLYP TRAP IS USED FOR SUCTION RETRIEVAL OF ENDOSCOPICALLY REMOVED POLYPS.: Brand: ETRAP

## (undated) DEVICE — CONMED SCOPE SAVER BITE BLOCK, 20X27 MM: Brand: SCOPE SAVER

## (undated) DEVICE — KT MANIFLD RT CUST BHPAD

## (undated) DEVICE — YANKAUER,BULB TIP WITH VENT: Brand: ARGYLE

## (undated) DEVICE — SOL NS 500ML

## (undated) DEVICE — Device: Brand: MEDEX

## (undated) DEVICE — TBG SMPL FLTR LINE NASL 02/C02 A/ BX/100

## (undated) DEVICE — MASK,OXYGEN,MED CONC,ADLT,7' TUB, UC: Brand: PENDING